# Patient Record
Sex: FEMALE | Race: WHITE | Employment: OTHER | URBAN - METROPOLITAN AREA
[De-identification: names, ages, dates, MRNs, and addresses within clinical notes are randomized per-mention and may not be internally consistent; named-entity substitution may affect disease eponyms.]

---

## 2017-04-12 ENCOUNTER — HOSPITAL ENCOUNTER (OUTPATIENT)
Dept: MAMMOGRAPHY | Age: 72
Discharge: HOME | End: 2017-04-12
Attending: FAMILY MEDICINE
Payer: MEDICARE

## 2017-04-12 DIAGNOSIS — Z12.39 SCREENING FOR BREAST CANCER: ICD-10-CM

## 2017-04-12 DIAGNOSIS — Z12.31 ENCOUNTER FOR SCREENING MAMMOGRAM FOR MALIGNANT NEOPLASM OF BREAST: ICD-10-CM

## 2017-04-12 PROCEDURE — 77067 SCR MAMMO BI INCL CAD: CPT

## 2017-04-19 ENCOUNTER — HOSPITAL ENCOUNTER (OUTPATIENT)
Dept: MAMMOGRAPHY | Age: 72
Discharge: HOME | End: 2017-04-19
Attending: FAMILY MEDICINE
Payer: MEDICARE

## 2017-04-19 DIAGNOSIS — R92.8 ABNORMAL SCREENING MAMMOGRAM: ICD-10-CM

## 2017-04-19 PROCEDURE — 77065 DX MAMMO INCL CAD UNI: CPT

## 2017-07-27 ENCOUNTER — PATIENT OUTREACH (OUTPATIENT)
Dept: CASE MANAGEMENT | Age: 72
End: 2017-07-27

## 2017-07-27 NOTE — PROGRESS NOTES
PANKAJ CM attempted outreach to patient today - RN CM will return to town and be present at work on Monday, 7/31 - PANKAJ explained to patient name, contact information, position on team, function of SW on  team and relationship with  [facility] [place] [facility] practices and ambulatory care group - patient stated that she was unaware of this referral being made by her PCP (Dr. Annie Teixeira) and was reluctant to supply any information - SW stated understanding of same and will communicate to RN CM when she returns and request outreach call be made to patient at that time - relayed this to patient and she was in agreement.

## 2017-07-31 ENCOUNTER — PATIENT OUTREACH (OUTPATIENT)
Dept: CASE MANAGEMENT | Age: 72
End: 2017-07-31

## 2017-07-31 NOTE — PROGRESS NOTES
CCM initial outreach completed. Patient declined Case Management services at present time. Patient is a patient of Dr. Los Marie with [facility]. She plans to notify the office to schedule appointment. Patient also states she is attending a 6 week course titled \"Living a Healthy Life with Chronic Health Conditions\"  offered through [facility] ([facility]). CCM contact information provided in the event she changes her mind or has questions after completion of the course. This note will not be viewable in [address]

## 2018-10-28 ENCOUNTER — HOSPITAL ENCOUNTER (EMERGENCY)
Age: 73
Discharge: HOME | End: 2018-10-28
Attending: EMERGENCY MEDICINE
Payer: MEDICARE

## 2018-10-28 VITALS
SYSTOLIC BLOOD PRESSURE: 175 MMHG | TEMPERATURE: 98 F | HEART RATE: 81 BPM | OXYGEN SATURATION: 97 % | RESPIRATION RATE: 16 BRPM | DIASTOLIC BLOOD PRESSURE: 81 MMHG

## 2018-10-28 DIAGNOSIS — I10 ESSENTIAL HYPERTENSION: ICD-10-CM

## 2018-10-28 DIAGNOSIS — L03.90 CELLULITIS, UNSPECIFIED CELLULITIS SITE: Primary | ICD-10-CM

## 2018-10-28 DIAGNOSIS — R60.9 PERIPHERAL EDEMA: ICD-10-CM

## 2018-10-28 LAB
ALBUMIN SERPL-MCNC: 3.9 G/DL (ref 3.2–4.6)
ALBUMIN/GLOB SERPL: 1 {RATIO} (ref 1.2–3.5)
ALP SERPL-CCNC: 123 U/L (ref 50–136)
ALT SERPL-CCNC: 22 U/L (ref 12–65)
ANION GAP SERPL CALC-SCNC: 9 MMOL/L (ref 7–16)
AST SERPL-CCNC: 15 U/L (ref 15–37)
BASOPHILS # BLD: 0.1 K/UL (ref 0–0.2)
BASOPHILS NFR BLD: 1 % (ref 0–2)
BILIRUB SERPL-MCNC: 0.6 MG/DL (ref 0.2–1.1)
BNP SERPL-MCNC: 114 PG/ML
BUN SERPL-MCNC: 13 MG/DL (ref 8–23)
CALCIUM SERPL-MCNC: 9.5 MG/DL (ref 8.3–10.4)
CHLORIDE SERPL-SCNC: 104 MMOL/L (ref 98–107)
CO2 SERPL-SCNC: 26 MMOL/L (ref 21–32)
CREAT SERPL-MCNC: 0.68 MG/DL (ref 0.6–1)
DIFFERENTIAL METHOD BLD: NORMAL
EOSINOPHIL # BLD: 0.3 K/UL (ref 0–0.8)
EOSINOPHIL NFR BLD: 3 % (ref 0.5–7.8)
ERYTHROCYTE [DISTWIDTH] IN BLOOD BY AUTOMATED COUNT: 14 %
GLOBULIN SER CALC-MCNC: 4 G/DL (ref 2.3–3.5)
GLUCOSE SERPL-MCNC: 107 MG/DL (ref 65–100)
HCT VFR BLD AUTO: 36.5 % (ref 35.8–46.3)
HGB BLD-MCNC: 12.4 G/DL (ref 11.7–15.4)
IMM GRANULOCYTES # BLD: 0 K/UL (ref 0–0.5)
IMM GRANULOCYTES NFR BLD AUTO: 0 % (ref 0–5)
LYMPHOCYTES # BLD: 1.7 K/UL (ref 0.5–4.6)
LYMPHOCYTES NFR BLD: 17 % (ref 13–44)
MCH RBC QN AUTO: 30.2 PG (ref 26.1–32.9)
MCHC RBC AUTO-ENTMCNC: 34 G/DL (ref 31.4–35)
MCV RBC AUTO: 89 FL (ref 79.6–97.8)
MONOCYTES # BLD: 0.8 K/UL (ref 0.1–1.3)
MONOCYTES NFR BLD: 8 % (ref 4–12)
NEUTS SEG # BLD: 7 K/UL (ref 1.7–8.2)
NEUTS SEG NFR BLD: 71 % (ref 43–78)
NRBC # BLD: 0 K/UL (ref 0–0.2)
PLATELET # BLD AUTO: 291 K/UL (ref 150–450)
PMV BLD AUTO: 10.8 FL (ref 9.4–12.3)
POTASSIUM SERPL-SCNC: 3.8 MMOL/L (ref 3.5–5.1)
PROT SERPL-MCNC: 7.9 G/DL (ref 6.3–8.2)
RBC # BLD AUTO: 4.1 M/UL (ref 4.05–5.2)
SODIUM SERPL-SCNC: 139 MMOL/L (ref 136–145)
WBC # BLD AUTO: 9.9 K/UL (ref 4.3–11.1)

## 2018-10-28 PROCEDURE — 85025 COMPLETE CBC W/AUTO DIFF WBC: CPT

## 2018-10-28 PROCEDURE — 99283 EMERGENCY DEPT VISIT LOW MDM: CPT | Performed by: EMERGENCY MEDICINE

## 2018-10-28 PROCEDURE — 80053 COMPREHEN METABOLIC PANEL: CPT

## 2018-10-28 PROCEDURE — 74011250637 HC RX REV CODE- 250/637: Performed by: EMERGENCY MEDICINE

## 2018-10-28 PROCEDURE — 83880 ASSAY OF NATRIURETIC PEPTIDE: CPT

## 2018-10-28 RX ORDER — FUROSEMIDE 20 MG/1
20 TABLET ORAL DAILY
Qty: 30 TAB | Refills: 0 | Status: SHIPPED | OUTPATIENT
Start: 2018-10-28 | End: 2018-11-14 | Stop reason: SDUPTHER

## 2018-10-28 RX ORDER — CEPHALEXIN 500 MG/1
500 CAPSULE ORAL 4 TIMES DAILY
Qty: 28 CAP | Refills: 0 | Status: SHIPPED | OUTPATIENT
Start: 2018-10-28 | End: 2018-11-04

## 2018-10-28 RX ORDER — FUROSEMIDE 40 MG/1
40 TABLET ORAL
Status: COMPLETED | OUTPATIENT
Start: 2018-10-28 | End: 2018-10-28

## 2018-10-28 RX ADMIN — FUROSEMIDE 40 MG: 40 TABLET ORAL at 14:40

## 2018-10-28 NOTE — ED PROVIDER NOTES
75-year-old female with a history of gout, hypertension, morbid obesity, polymyalgia rheumatica, Sjogrens syndrome, arthritis, cellulitis, chronic kidney disease presents with swelling to bilateral ankles that has been worsening over the past several months. She states she has never addressed this with her doctor. She is, however, on 20 mg of Lasix daily and is not sure why this was prescribed. She has taken a few extra doses recently. She states that when she takes Lasix, she usually has a gout flare. She admits to twisting her right knee about 2 weeks ago after standing up in her chair. She has had increased swelling to her right knee since that time. She states pain improved with cream and taping. She recently traveled to [facility] and swelling seemed to increase. She has been using compression stockings and elevating legs. She noticed redness to her right ankle with increased swelling 3 days ago. She has occasional chills without documented fevers. No chest pain or shortness of breath. Denies history of congestive heart failure. No urinary changes. Denies any pain with range of motion of her ankles. The history is provided by the patient. Leg Pain Pertinent negatives include no back pain. Past Medical History:  
Diagnosis Date  Arrhythmia  Arthritis  Asthma  Cellulitis  Chronic kidney disease Hx renal failure  Depression  Gout  Gouty arthritis  NOS 12/2/2015  Hypercholesterolemia  Hypertension  Mitral valve prolapse  Morbid obesity ([address]  Nausea & vomiting  Polymyalgia rheumatica ([address] 12/2/2015  Psychiatric disorder   
 depression & anxiety  PUD (peptic ulcer disease)  Sjogren's syndrome ([address]  Temporal arteritis ([address] Past Surgical History:  
Procedure Laterality Date  CARDIAC SURG PROCEDURE UNLIST  4/07 STRESS TEST  CARDIAC SURG PROCEDURE UNLIST  8/13  HEART ABLATION  
  HX COLONOSCOPY  6/08  HX ENDOSCOPY  8/08  HX HYSTERECTOMY  HX TUBAL LIGATION Family History:  
Problem Relation Age of Onset  Cancer Mother  Breast Cancer Mother 80  Hypertension Mother  Dementia Mother  Cancer Father LUNG  
 Heart Disease Father 62  Diabetes Son   
Antonio Fregoso Other Son PSYCHIATRIC ILLNESS Social History Socioeconomic History  Marital status:  Spouse name: Not on file  Number of children: Not on file  Years of education: Not on file  Highest education level: Not on file Social Needs  Financial resource strain: Not on file  Food insecurity - worry: Not on file  Food insecurity - inability: Not on file  Transportation needs - medical: Not on file  Transportation needs - non-medical: Not on file Occupational History  Not on file Tobacco Use  Smoking status: Never Smoker  Smokeless tobacco: Never Used Substance and Sexual Activity  Alcohol use: No  
 Drug use: No  
 Sexual activity: No  
Other Topics Concern   Service No  
 Blood Transfusions No  
 Caffeine Concern No  
 Occupational Exposure No  
 Hobby Hazards No  
 Sleep Concern No  
 Stress Concern Yes  Weight Concern Yes  Special Diet No  
 Back Care No  
 Exercise No  
 Bike Helmet No  
 Seat Belt Yes  Self-Exams Not Asked Social History Narrative  Not on file ALLERGIES: Celebrex [celecoxib]; Lisinopril; and Losartan Review of Systems Constitutional: Positive for chills. Negative for fever. HENT: Negative for hearing loss. Eyes: Negative for visual disturbance. Respiratory: Negative for cough and shortness of breath. Cardiovascular: Positive for leg swelling. Negative for chest pain and palpitations. Gastrointestinal: Negative for abdominal pain, diarrhea, nausea and vomiting. Musculoskeletal: Positive for joint swelling. Negative for arthralgias and back pain. Skin: Positive for color change. Negative for rash. Neurological: Negative for weakness and headaches. Psychiatric/Behavioral: Negative for confusion. Vitals:  
 10/28/18 1356 BP: 196/76 Pulse: 84 Resp: 18 Temp: 98 °F (36.7 °C) SpO2: 98% Physical Exam  
Constitutional: She appears well-developed and well-nourished. Morbid obesity HENT:  
Head: Normocephalic and atraumatic. Right Ear: External ear normal.  
Left Ear: External ear normal.  
Nose: Nose normal.  
Mouth/Throat: Oropharynx is clear and moist.  
Eyes: Conjunctivae are normal. Pupils are equal, round, and reactive to light. Neck: Normal range of motion. Neck supple. Cardiovascular: Regular rhythm, normal heart sounds and intact distal pulses. Pulmonary/Chest: Effort normal and breath sounds normal. No respiratory distress. She has no wheezes. Abdominal: Soft. Bowel sounds are normal. She exhibits no distension. There is no tenderness. Musculoskeletal: Normal range of motion. She exhibits edema. Right ankle: She exhibits swelling. She exhibits normal range of motion. No tenderness. Feet: 
 
Bilateral 3+ pitting edema, slightly worse on right. Strong palpable DP pulses Neurological: She is alert. Skin: Skin is warm and dry. Psychiatric: Judgment normal.  
Nursing note and vitals reviewed. MDM Number of Diagnoses or Management Options Diagnosis management comments: Parts of this document were created using dragon voice recognition software. The chart has been reviewed but errors may still be present. Bilateral ankle edema likely due to body habitus and venous insufficiency. Slight erythema on the right consistent with cellulitis. No pain with range of motion to suggest septic joint, gout, or traumatic injury. We'll give Lasix and antibiotic. Advised primary care follow-up. Normal kidney function.  
 
 I discussed the results of all labs, procedures, radiographs, and treatments with the patient and available family. Treatment plan is agreed upon and the patient is ready for discharge. Questions about treatment in the ED and differential diagnosis of presenting condition were answered. Patient was given verbal discharge instructions including, but not limited to, importance of returning to the emergency department for any concern of worsening or continued symptoms. Instructions were given to follow up with a primary care provider or specialist within 1-2 days. Adverse effects of medications, if prescribed, were discussed and patient was advised to refrain from significant physical activity until followed up by primary care physician and to not drive or operate heavy machinery after taking any sedating substances. Amount and/or Complexity of Data Reviewed Clinical lab tests: reviewed and ordered Tests in the medicine section of CPT®: ordered and reviewed Procedures

## 2018-10-28 NOTE — DISCHARGE INSTRUCTIONS
Cellulitis: Care Instructions  Your Care Instructions    Cellulitis is a skin infection caused by bacteria, most often strep or staph. It often occurs after a break in the skin from a scrape, cut, bite, or puncture, or after a rash. Cellulitis may be treated without doing tests to find out what caused it. But your doctor may do tests, if needed, to look for a specific bacteria, like methicillin-resistant Staphylococcus aureus (MRSA). The doctor has checked you carefully, but problems can develop later. If you notice any problems or new symptoms, get medical treatment right away. Follow-up care is a key part of your treatment and safety. Be sure to make and go to all appointments, and call your doctor if you are having problems. It's also a good idea to know your test results and keep a list of the medicines you take. How can you care for yourself at home? · Take your antibiotics as directed. Do not stop taking them just because you feel better. You need to take the full course of antibiotics. · Prop up the infected area on pillows to reduce pain and swelling. Try to keep the area above the level of your heart as often as you can. · If your doctor told you how to care for your wound, follow your doctor's instructions. If you did not get instructions, follow this general advice:  ? Wash the wound with clean water 2 times a day. Don't use hydrogen peroxide or alcohol, which can slow healing. ? You may cover the wound with a thin layer of petroleum jelly, such as Vaseline, and a nonstick bandage. ? Apply more petroleum jelly and replace the bandage as needed. · Be safe with medicines. Take pain medicines exactly as directed. ? If the doctor gave you a prescription medicine for pain, take it as prescribed. ? If you are not taking a prescription pain medicine, ask your doctor if you can take an over-the-counter medicine.   To prevent cellulitis in the future  · Try to prevent cuts, scrapes, or other injuries to your skin. Cellulitis most often occurs where there is a break in the skin. · If you get a scrape, cut, mild burn, or bite, wash the wound with clean water as soon as you can to help avoid infection. Don't use hydrogen peroxide or alcohol, which can slow healing. · If you have swelling in your legs (edema), support stockings and good skin care may help prevent leg sores and cellulitis. · Take care of your feet, especially if you have diabetes or other conditions that increase the risk of infection. Wear shoes and socks. Do not go barefoot. If you have athlete's foot or other skin problems on your feet, talk to your doctor about how to treat them. When should you call for help? Call your doctor now or seek immediate medical care if:    · You have signs that your infection is getting worse, such as:  ? Increased pain, swelling, warmth, or redness. ? Red streaks leading from the area. ? Pus draining from the area. ? A fever.     · You get a rash.    Watch closely for changes in your health, and be sure to contact your doctor if:    · You do not get better as expected. Where can you learn more? Go to http://noe-eri.info/. Amara Iqbal in the search box to learn more about \"Cellulitis: Care Instructions. \"  Current as of: April 18, 2018  Content Version: 11.8  © 1122-0382 [employer]. Care instructions adapted under license by [employer] (which disclaims liability or warranty for this information). If you have questions about a medical condition or this instruction, always ask your healthcare professional. [place] [address] any warranty or liability for your use of this information. Leg and Ankle Edema: Care Instructions  Your Care Instructions  Swelling in the legs, ankles, and feet is called edema. It is common after you sit or stand for a while. Long plane flights or car rides often cause swelling in the legs and feet.  You may also have swelling if you have to stand for long periods of time at your job. Problems with the veins in the legs (varicose veins) and changes in hormones can also cause swelling. Sometimes the swelling in the ankles and feet is caused by a more serious problem, such as heart failure, infection, blood clots, or liver or kidney disease. Follow-up care is a key part of your treatment and safety. Be sure to make and go to all appointments, and call your doctor if you are having problems. It's also a good idea to know your test results and keep a list of the medicines you take. How can you care for yourself at home? · If your doctor gave you medicine, take it as prescribed. Call your doctor if you think you are having a problem with your medicine. · Whenever you are resting, raise your legs up. Try to keep the swollen area higher than the level of your heart. · Take breaks from standing or sitting in one position. ? Walk around to increase the blood flow in your lower legs. ? Move your feet and ankles often while you stand, or tighten and relax your leg muscles. · Wear support stockings. Put them on in the morning, before swelling gets worse. · Eat a balanced diet. Lose weight if you need to. · Limit the amount of salt (sodium) in your diet. Salt holds fluid in the body and may increase swelling. When should you call for help? Call 911 anytime you think you may need emergency care. For example, call if:    · You have symptoms of a blood clot in your lung (called a pulmonary embolism). These may include:  ? Sudden chest pain. ? Trouble breathing. ? Coughing up blood.    Call your doctor now or seek immediate medical care if:    · You have signs of a blood clot, such as:  ? Pain in your calf, back of the knee, thigh, or groin. ? Redness and swelling in your leg or groin.     · You have symptoms of infection, such as:  ? Increased pain, swelling, warmth, or redness. ? Red streaks or pus. ? A fever.  Watch closely for changes in your health, and be sure to contact your doctor if:    · Your swelling is getting worse.     · You have new or worsening pain in your legs.     · You do not get better as expected. Where can you learn more? Go to http://noe-eri.info/. Enter W747 in the search box to learn more about \"Leg and Ankle Edema: Care Instructions. \"  Current as of: November 20, 2017  Content Version: 11.8  © 7328-5918 [employer]. Care instructions adapted under license by [employer] (which disclaims liability or warranty for this information). If you have questions about a medical condition or this instruction, always ask your healthcare professional. [address] any warranty or liability for your use of this information.

## 2018-10-28 NOTE — ED NOTES
I have reviewed discharge instructions with the patient and caregiver. The patient and caregiver verbalized understanding. Patient left ED via Discharge Method: ambulatory to Home with family. Opportunity for questions and clarification provided. Patient given 2 scripts. To continue your aftercare when you leave the hospital, you may receive an automated call from our care team to check in on how you are doing. This is a free service and part of our promise to provide the best care and service to meet your aftercare needs.  If you have questions, or wish to unsubscribe from this service please call [phone]. Thank you for Choosing our [facility].

## 2018-10-28 NOTE — ED TRIAGE NOTES
Patient states that she twisted her right knee last Monday and has been treating at home with taping and NSAIDs. Patient states that her ankle on the left is swollen and red. Patient states that she does have some swelling at times but this the first time that it is red. Patient ambulatory to triage with rollator.

## 2019-02-23 ENCOUNTER — APPOINTMENT (OUTPATIENT)
Dept: CT IMAGING | Age: 74
End: 2019-02-23
Attending: EMERGENCY MEDICINE
Payer: MEDICARE

## 2019-02-23 ENCOUNTER — HOSPITAL ENCOUNTER (EMERGENCY)
Age: 74
Discharge: HOME | End: 2019-02-23
Attending: EMERGENCY MEDICINE
Payer: MEDICARE

## 2019-02-23 VITALS
HEIGHT: 63 IN | HEART RATE: 86 BPM | WEIGHT: 209 LBS | RESPIRATION RATE: 18 BRPM | DIASTOLIC BLOOD PRESSURE: 83 MMHG | TEMPERATURE: 98.6 F | SYSTOLIC BLOOD PRESSURE: 178 MMHG | BODY MASS INDEX: 37.03 KG/M2 | OXYGEN SATURATION: 100 %

## 2019-02-23 DIAGNOSIS — R51.9 NONINTRACTABLE HEADACHE, UNSPECIFIED CHRONICITY PATTERN, UNSPECIFIED HEADACHE TYPE: Primary | ICD-10-CM

## 2019-02-23 LAB
ANION GAP SERPL CALC-SCNC: 9 MMOL/L (ref 7–16)
BASOPHILS # BLD: 0.1 K/UL (ref 0–0.2)
BASOPHILS NFR BLD: 1 % (ref 0–2)
BUN SERPL-MCNC: 16 MG/DL (ref 8–23)
CALCIUM SERPL-MCNC: 10.2 MG/DL (ref 8.3–10.4)
CHLORIDE SERPL-SCNC: 102 MMOL/L (ref 98–107)
CO2 SERPL-SCNC: 27 MMOL/L (ref 21–32)
CREAT SERPL-MCNC: 0.79 MG/DL (ref 0.6–1)
DIFFERENTIAL METHOD BLD: ABNORMAL
EOSINOPHIL # BLD: 0.1 K/UL (ref 0–0.8)
EOSINOPHIL NFR BLD: 1 % (ref 0.5–7.8)
ERYTHROCYTE [DISTWIDTH] IN BLOOD BY AUTOMATED COUNT: 13.4 % (ref 11.9–14.6)
ERYTHROCYTE [SEDIMENTATION RATE] IN BLOOD: 34 MM/HR (ref 0–30)
GLUCOSE SERPL-MCNC: 138 MG/DL (ref 65–100)
HCT VFR BLD AUTO: 40 % (ref 35.8–46.3)
HGB BLD-MCNC: 13.7 G/DL (ref 11.7–15.4)
IMM GRANULOCYTES # BLD AUTO: 0 K/UL (ref 0–0.5)
IMM GRANULOCYTES NFR BLD AUTO: 0 % (ref 0–5)
LYMPHOCYTES # BLD: 1.9 K/UL (ref 0.5–4.6)
LYMPHOCYTES NFR BLD: 17 % (ref 13–44)
MCH RBC QN AUTO: 30.7 PG (ref 26.1–32.9)
MCHC RBC AUTO-ENTMCNC: 34.3 G/DL (ref 31.4–35)
MCV RBC AUTO: 89.7 FL (ref 79.6–97.8)
MONOCYTES # BLD: 0.7 K/UL (ref 0.1–1.3)
MONOCYTES NFR BLD: 6 % (ref 4–12)
NEUTS SEG # BLD: 8.3 K/UL (ref 1.7–8.2)
NEUTS SEG NFR BLD: 75 % (ref 43–78)
NRBC # BLD: 0 K/UL (ref 0–0.2)
PLATELET # BLD AUTO: 343 K/UL (ref 150–450)
PMV BLD AUTO: 10.8 FL (ref 9.4–12.3)
POTASSIUM SERPL-SCNC: 3.6 MMOL/L (ref 3.5–5.1)
RBC # BLD AUTO: 4.46 M/UL (ref 4.05–5.2)
SODIUM SERPL-SCNC: 138 MMOL/L (ref 136–145)
WBC # BLD AUTO: 11 K/UL (ref 4.3–11.1)

## 2019-02-23 PROCEDURE — 70450 CT HEAD/BRAIN W/O DYE: CPT

## 2019-02-23 PROCEDURE — 85025 COMPLETE CBC W/AUTO DIFF WBC: CPT

## 2019-02-23 PROCEDURE — 99284 EMERGENCY DEPT VISIT MOD MDM: CPT | Performed by: EMERGENCY MEDICINE

## 2019-02-23 PROCEDURE — 85652 RBC SED RATE AUTOMATED: CPT

## 2019-02-23 PROCEDURE — 74011250637 HC RX REV CODE- 250/637: Performed by: EMERGENCY MEDICINE

## 2019-02-23 PROCEDURE — 96374 THER/PROPH/DIAG INJ IV PUSH: CPT | Performed by: EMERGENCY MEDICINE

## 2019-02-23 PROCEDURE — 74011250636 HC RX REV CODE- 250/636: Performed by: EMERGENCY MEDICINE

## 2019-02-23 PROCEDURE — 80048 BASIC METABOLIC PNL TOTAL CA: CPT

## 2019-02-23 RX ORDER — ACETAMINOPHEN 500 MG
1000 TABLET ORAL
Status: COMPLETED | OUTPATIENT
Start: 2019-02-23 | End: 2019-02-23

## 2019-02-23 RX ORDER — ONDANSETRON 2 MG/ML
4 INJECTION INTRAMUSCULAR; INTRAVENOUS
Status: COMPLETED | OUTPATIENT
Start: 2019-02-23 | End: 2019-02-23

## 2019-02-23 RX ADMIN — ONDANSETRON 4 MG: 2 INJECTION INTRAMUSCULAR; INTRAVENOUS at 14:33

## 2019-02-23 RX ADMIN — ACETAMINOPHEN 1000 MG: 500 TABLET, FILM COATED ORAL at 14:59

## 2019-02-23 NOTE — ED TRIAGE NOTES
Pt started new medication (nifedipine) this morning. Less than 1 hour after taking medication she began having headache around both eyes. Had retinal hemorrhage a few years ago and lost vision in left eye. Denies blurry vision. Pt states she was told a side effect of the medication is headache.

## 2019-02-23 NOTE — DISCHARGE INSTRUCTIONS
Follow-up primary care physician on Monday in regards to restarting your prednisone. Follow-up with ophthalmology on Monday as well. .  Return to ER if symptoms worsen or progress in any way. Headache: Care Instructions  Your Care Instructions    Headaches have many possible causes. Most headaches aren't a sign of a more serious problem, and they will get better on their own. Home treatment may help you feel better faster. The doctor has checked you carefully, but problems can develop later. If you notice any problems or new symptoms, get medical treatment right away. Follow-up care is a key part of your treatment and safety. Be sure to make and go to all appointments, and call your doctor if you are having problems. It's also a good idea to know your test results and keep a list of the medicines you take. How can you care for yourself at home? · Do not drive if you have taken a prescription pain medicine. · Rest in a quiet, dark room until your headache is gone. Close your eyes and try to relax or go to sleep. Don't watch TV or read. · Put a cold, moist cloth or cold pack on the painful area for 10 to 20 minutes at a time. Put a thin cloth between the cold pack and your skin. · Use a warm, moist towel or a heating pad set on low to relax tight shoulder and neck muscles. · Have someone gently massage your neck and shoulders. · Take pain medicines exactly as directed. ? If the doctor gave you a prescription medicine for pain, take it as prescribed. ? If you are not taking a prescription pain medicine, ask your doctor if you can take an over-the-counter medicine. · Be careful not to take pain medicine more often than the instructions allow, because you may get worse or more frequent headaches when the medicine wears off. · Do not ignore new symptoms that occur with a headache, such as a fever, weakness or numbness, vision changes, or confusion. These may be signs of a more serious problem.   To prevent headaches  · Keep a headache diary so you can figure out what triggers your headaches. Avoiding triggers may help you prevent headaches. Record when each headache began, how long it lasted, and what the pain was like (throbbing, aching, stabbing, or dull). Write down any other symptoms you had with the headache, such as nausea, flashing lights or dark spots, or sensitivity to bright light or loud noise. Note if the headache occurred near your period. List anything that might have triggered the headache, such as certain foods (chocolate, cheese, wine) or odors, smoke, bright light, stress, or lack of sleep. · Find healthy ways to deal with stress. Headaches are most common during or right after stressful times. Take time to relax before and after you do something that has caused a headache in the past.  · Try to keep your muscles relaxed by keeping good posture. Check your jaw, face, neck, and shoulder muscles for tension, and try relaxing them. When sitting at a desk, change positions often, and stretch for 30 seconds each hour. · Get plenty of sleep and exercise. · Eat regularly and well. Long periods without food can trigger a headache. · Treat yourself to a massage. Some people find that regular massages are very helpful in relieving tension. · Limit caffeine by not drinking too much coffee, tea, or soda. But don't quit caffeine suddenly, because that can also give you headaches. · Reduce eyestrain from computers by blinking frequently and looking away from the computer screen every so often. Make sure you have proper eyewear and that your monitor is set up properly, about an arm's length away. · Seek help if you have depression or anxiety. Your headaches may be linked to these conditions. Treatment can both prevent headaches and help with symptoms of anxiety or depression. When should you call for help? Call 911 anytime you think you may need emergency care.  For example, call if:    · You have signs of a stroke. These may include:  ? Sudden numbness, paralysis, or weakness in your face, arm, or leg, especially on only one side of your body. ? Sudden vision changes. ? Sudden trouble speaking. ? Sudden confusion or trouble understanding simple statements. ? Sudden problems with walking or balance. ? A sudden, severe headache that is different from past headaches.    Call your doctor now or seek immediate medical care if:    · You have a new or worse headache.     · Your headache gets much worse. Where can you learn more? Go to http://noe-eri.info/. Enter M271 in the search box to learn more about \"Headache: Care Instructions. \"  Current as of: Tonia 3, 2018  Content Version: 11.9  © 1592-1393 [employer]. Care instructions adapted under license by [employer] (which disclaims liability or warranty for this information). If you have questions about a medical condition or this instruction, always ask your healthcare professional. [place] [address] any warranty or liability for your use of this information. Patient Education        Head or Face Pain: Care Instructions  Your Care Instructions    Common causes of head or face pain are allergies, stress, and injuries. Other causes include tooth problems and sinus infections. Eating certain foods, such as chocolate or cheese, or drinking certain liquids, such as coffee or cola, can cause head pain for some people. If you have mild head pain, you may not need treatment. It is important to watch your symptoms and talk to your doctor if your pain continues or gets worse. Follow-up care is a key part of your treatment and safety. Be sure to make and go to all appointments, and call your doctor if you are having problems. It's also a good idea to know your test results and keep a list of the medicines you take. How can you care for yourself at home?   · Take pain medicines exactly as directed. ? If the doctor gave you a prescription medicine for pain, take it as prescribed. ? If you are not taking a prescription pain medicine, ask your doctor if you can take an over-the-counter pain medicine. · Take it easy for the next few days or longer if you are not feeling well. · Use a warm, moist towel or heating pad set on low to relax tight muscles in your shoulder and neck. Have someone gently massage your neck and shoulders. · Put ice or a cold pack on the area for 10 to 20 minutes at a time. Put a thin cloth between the ice and your skin. When should you call for help? Call 911 anytime you think you may need emergency care. For example, call if:    · You have twitching, jerking, or a seizure.     · You passed out (lost consciousness).     · You have symptoms of a stroke. These may include:  ? Sudden numbness, tingling, weakness, or loss of movement in your face, arm, or leg, especially on only one side of your body. ? Sudden vision changes. ? Sudden trouble speaking. ? Sudden confusion or trouble understanding simple statements. ? Sudden problems with walking or balance. ? A sudden, severe headache that is different from past headaches.     · You have jaw pain and pain in your chest, shoulder, neck, or arm.    Call your doctor now or seek immediate medical care if:    · You have a fever with a stiff neck or a severe headache.     · You have nausea and vomiting, or you cannot keep food or liquids down.    Watch closely for changes in your health, and be sure to contact your doctor if:    · Your head or face pain does not get better as expected. Where can you learn more? Go to http://noe-eri.info/. Enter P568 in the search box to learn more about \"Head or Face Pain: Care Instructions. \"  Current as of: September 23, 2018  Content Version: 11.9  © 4039-5220 [employer].  Care instructions adapted under license by [employer] (which disclaims liability or warranty for this information). If you have questions about a medical condition or this instruction, always ask your healthcare professional. [place] [address] any warranty or liability for your use of this information.

## 2019-02-23 NOTE — ED PROVIDER NOTES
27-year-old female with history of hypertension, temporal arteritis presents with complaint of worsening bilateral headache since this morning. States that it occurred less than one hour after taking new medication she was started on for her blood pressure. States she was started on nifedipine this morning. Denies vision loss, nausea, vomiting, fever, chills, blurred vision, dysarthria, focal weakness, numbness, tingling, chest pain, shortness of breath, headache, neck pain, trouble walking. Denies any recent trauma or injury. States she is previously on prednisone in the past.  States she was diagnosed with temporal arteritis 11 years ago. States she never underwent temporal artery biopsy. States she was instructed by her primary care doctor to stop taking prednisone around a year ago. States she is not currently on any steroid. The history is provided by the patient. No  was used. Headache This is a new problem. The current episode started 3 to 5 hours ago. The problem occurs constantly. The problem has not changed since onset. The pain is located in the bilateral and temporal region. The quality of the pain is described as throbbing. The pain is at a severity of 3/10. The pain is mild. Pertinent negatives include no anorexia, no fever, no malaise/fatigue, no chest pressure, no near-syncope, no orthopnea, no palpitations, no syncope, no shortness of breath, no weakness, no dizziness, no visual change, no nausea and no vomiting. She has tried nothing for the symptoms. The treatment provided no relief. Past Medical History:  
Diagnosis Date  Arrhythmia  Arthritis  Asthma  Cellulitis  Chronic kidney disease Hx renal failure  Depression  Gout  Gouty arthritis  NOS 12/2/2015  Hypercholesterolemia  Hypertension  Mitral valve prolapse  Morbid obesity ([address]  Nausea & vomiting  Polymyalgia rheumatica [facility] [facility] 12/2/2015  Psychiatric disorder   
 depression & anxiety  PUD (peptic ulcer disease)  Sjogren's syndrome [facility]  Temporal arteritis ([facility] Past Surgical History:  
Procedure Laterality Date  CARDIAC SURG PROCEDURE UNLIST  4/07 STRESS TEST  CARDIAC SURG PROCEDURE UNLIST  8/13 HEART ABLATION  
 HX COLONOSCOPY  6/08  HX ENDOSCOPY  8/08  HX HYSTERECTOMY  HX TUBAL LIGATION Family History:  
Problem Relation Age of Onset  Cancer Mother  Breast Cancer Mother 80  Hypertension Mother  Dementia Mother  Cancer Father LUNG  
 Heart Disease Father 62  Diabetes Son   
[address] Other Son PSYCHIATRIC ILLNESS Social History Socioeconomic History  Marital status:  Spouse name: Not on file  Number of children: Not on file  Years of education: Not on file  Highest education level: Not on file Social Needs  Financial resource strain: Not on file  Food insecurity - worry: Not on file  Food insecurity - inability: Not on file  Transportation needs - medical: Not on file  Transportation needs - non-medical: Not on file Occupational History  Not on file Tobacco Use  Smoking status: Never Smoker  Smokeless tobacco: Never Used Substance and Sexual Activity  Alcohol use: No  
 Drug use: No  
 Sexual activity: No  
Other Topics Concern   Service No  
 Blood Transfusions No  
 Caffeine Concern No  
 Occupational Exposure No  
 Hobby Hazards No  
 Sleep Concern No  
 Stress Concern Yes  Weight Concern Yes  Special Diet No  
 Back Care No  
 Exercise No  
 Bike Helmet No  
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, 12 great grandchildren ALLERGIES: Celebrex [celecoxib]; Lisinopril; and Losartan Review of Systems Constitutional: Negative for chills, fatigue, fever and malaise/fatigue. Eyes: Negative for visual disturbance. Respiratory: Negative for cough and shortness of breath. Cardiovascular: Negative for chest pain, palpitations, orthopnea, syncope and near-syncope. Gastrointestinal: Negative for abdominal pain, anorexia, nausea and vomiting. Genitourinary: Negative for dysuria and flank pain. Musculoskeletal: Negative for myalgias and neck pain. Skin: Negative for rash. Neurological: Positive for headaches. Negative for dizziness, seizures, speech difficulty, weakness, light-headedness and numbness. Psychiatric/Behavioral: Negative for confusion. Vitals:  
 02/23/19 1245 BP: 184/90 Pulse: 88 Resp: 18 Temp: 97.8 °F (36.6 °C) SpO2: 99% Weight: 94.8 kg (209 lb) Height: 5' 2.5\" (1.588 m) Physical Exam  
Constitutional: She is oriented to person, place, and time. She appears well-developed and well-nourished. Well appearing and in NAD. HENT:  
Head: Normocephalic. MMM. No tonsillar erythema or exudate. Bilateral temporal artery tenderness to palpation. Eyes: EOM are normal. Pupils are equal, round, and reactive to light. No nystagmus. Neck: No JVD present. No tracheal deviation present. FROM. No nuchal rigidity. Cardiovascular: Normal rate, regular rhythm and normal heart sounds. RRR. Pulses 2+ and equal bilaterally. Pulmonary/Chest:  
CTAB. Abdominal:  
Soft, NTND. Musculoskeletal: Normal range of motion. FROM of all extremities. Neurological: She is alert and oriented to person, place, and time. No cranial nerve deficit. Strength 5/5 throughout. Normal sensory. No dysarthria. No drift. No meningismus. No focal neuro deficits present. Skin: Skin is warm and dry. No rash. Nursing note and vitals reviewed. MDM Number of Diagnoses or Management Options Nonintractable headache, unspecified chronicity pattern, unspecified headache type: new and requires workup Diagnosis management comments: Repeat vital signs stable. CT of acute findings. ESR is slightly elevated. Remainder of labs normal. 
Ophthalmology consulted. See ED course section for recommendations. Instructed patient on need for close follow-up with PCP on tomorrow as well as follow-up with ophthalmology. Differential return precautions. Visual acuity 20/30 bilaterally. No visual complaints. Headache resolved. Neuro exam normal.  No focal deficits. Amount and/or Complexity of Data Reviewed Clinical lab tests: ordered and reviewed Tests in the radiology section of CPT®: ordered and reviewed Tests in the medicine section of CPT®: ordered and reviewed Review and summarize past medical records: yes Independent visualization of images, tracings, or specimens: yes Risk of Complications, Morbidity, and/or Mortality Presenting problems: moderate Diagnostic procedures: moderate Management options: moderate Patient Progress Patient progress: stable ED Course as of Feb 23 1536 Sat Feb 23, 2019  
1406 CT head IMPRESSION: No acute intracranial abnormality. [DF] 1506 Visual acuity 20/30 bilaterally. Denies vision disturbance, diplopia. [DF] 200 Ophthalmology Lcuian Renata) states not overwhelmingly concerned for temporal arteritis. Recommends patient follow-up w/ internist in Monday. States not to start Prednisone at this time and have internist manage. [DF] ED Course User Index 
[DF] Delmer Byrd MD  
 
 
Procedures Results Include: 
 
Recent Results (from the past 24 hour(s)) CBC WITH AUTOMATED DIFF Collection Time: 02/23/19  1:28 PM  
Result Value Ref Range WBC 11.0 4.3 - 11.1 K/uL  
 RBC 4.46 4.05 - 5.2 M/uL  
 HGB 13.7 11.7 - 15.4 g/dL HCT 40.0 35.8 - 46.3 % MCV 89.7 79.6 - 97.8 FL  
 MCH 30.7 26.1 - 32.9 PG  
 MCHC 34.3 31.4 - 35.0 g/dL  
 RDW 13.4 11.9 - 14.6 % PLATELET 831 402 - 274 K/uL MPV 10.8 9.4 - 12.3 FL ABSOLUTE NRBC 0.00 0.0 - 0.2 K/uL  
 DF AUTOMATED NEUTROPHILS 75 43 - 78 % LYMPHOCYTES 17 13 - 44 % MONOCYTES 6 4.0 - 12.0 % EOSINOPHILS 1 0.5 - 7.8 % BASOPHILS 1 0.0 - 2.0 % IMMATURE GRANULOCYTES 0 0.0 - 5.0 %  
 ABS. NEUTROPHILS 8.3 (H) 1.7 - 8.2 K/UL  
 ABS. LYMPHOCYTES 1.9 0.5 - 4.6 K/UL  
 ABS. MONOCYTES 0.7 0.1 - 1.3 K/UL  
 ABS. EOSINOPHILS 0.1 0.0 - 0.8 K/UL  
 ABS. BASOPHILS 0.1 0.0 - 0.2 K/UL  
 ABS. IMM. GRANS. 0.0 0.0 - 0.5 K/UL METABOLIC PANEL, BASIC Collection Time: 02/23/19  1:28 PM  
Result Value Ref Range Sodium 138 136 - 145 mmol/L Potassium 3.6 3.5 - 5.1 mmol/L Chloride 102 98 - 107 mmol/L  
 CO2 27 21 - 32 mmol/L Anion gap 9 7 - 16 mmol/L Glucose 138 (H) 65 - 100 mg/dL BUN 16 8 - 23 MG/DL Creatinine 0.79 0.6 - 1.0 MG/DL  
 GFR est AA >60 >60 ml/min/1.73m2 GFR est non-AA >60 >60 ml/min/1.73m2 Calcium 10.2 8.3 - 10.4 MG/DL Elan Perry MD; 2/23/2019 @2:08 PM Voice dictation software was used during the making of this note. This software is not perfect and grammatical and other typographical errors may be present.   This note has not been proofread for errors. 
===================================================================

## 2019-02-23 NOTE — ED NOTES

## 2019-03-18 ENCOUNTER — HOSPITAL ENCOUNTER (OUTPATIENT)
Dept: ULTRASOUND IMAGING | Age: 74
Discharge: HOME | End: 2019-03-18
Attending: NURSE PRACTITIONER

## 2019-03-18 DIAGNOSIS — I10 RESISTANT HYPERTENSION: ICD-10-CM

## 2019-07-03 ENCOUNTER — HOSPITAL ENCOUNTER (INPATIENT)
Age: 74
LOS: 12 days | Discharge: NURSING, REHAB OR LONG-TERM CARE | DRG: 853 | End: 2019-07-15
Admitting: INTERNAL MEDICINE
Payer: MEDICARE

## 2019-07-03 ENCOUNTER — APPOINTMENT (OUTPATIENT)
Dept: GENERAL RADIOLOGY | Age: 74
DRG: 853 | End: 2019-07-03
Payer: MEDICARE

## 2019-07-03 DIAGNOSIS — M35.3 POLYMYALGIA RHEUMATICA (HCC): ICD-10-CM

## 2019-07-03 DIAGNOSIS — I95.89 HYPOTENSION DUE TO HYPOVOLEMIA: ICD-10-CM

## 2019-07-03 DIAGNOSIS — E66.01 SEVERE OBESITY WITH BODY MASS INDEX (BMI) OF 35.0 TO 39.9 WITH COMORBIDITY (HCC): ICD-10-CM

## 2019-07-03 DIAGNOSIS — J96.01 ACUTE HYPOXEMIC RESPIRATORY FAILURE (HCC): ICD-10-CM

## 2019-07-03 DIAGNOSIS — I48.91 ATRIAL FIBRILLATION WITH RAPID VENTRICULAR RESPONSE (HCC): ICD-10-CM

## 2019-07-03 DIAGNOSIS — R65.20 SEVERE SEPSIS (HCC): ICD-10-CM

## 2019-07-03 DIAGNOSIS — M35.01 SJOGREN'S SYNDROME WITH KERATOCONJUNCTIVITIS SICCA (HCC): ICD-10-CM

## 2019-07-03 DIAGNOSIS — E86.1 HYPOTENSION DUE TO HYPOVOLEMIA: ICD-10-CM

## 2019-07-03 DIAGNOSIS — J96.01 ACUTE RESPIRATORY FAILURE WITH HYPOXIA (HCC): Primary | ICD-10-CM

## 2019-07-03 DIAGNOSIS — A41.9 SEVERE SEPSIS (HCC): ICD-10-CM

## 2019-07-03 DIAGNOSIS — M1A.9XX0 CHRONIC GOUT WITHOUT TOPHUS, UNSPECIFIED CAUSE, UNSPECIFIED SITE: ICD-10-CM

## 2019-07-03 DIAGNOSIS — R91.8 PULMONARY INFILTRATES: ICD-10-CM

## 2019-07-03 LAB
ALBUMIN SERPL-MCNC: 3.6 G/DL (ref 3.2–4.6)
ALBUMIN/GLOB SERPL: 1 {RATIO} (ref 1.2–3.5)
ALP SERPL-CCNC: 112 U/L (ref 50–136)
ALT SERPL-CCNC: 77 U/L (ref 12–65)
AMORPH CRY URNS QL MICRO: ABNORMAL
ANION GAP SERPL CALC-SCNC: 11 MMOL/L (ref 7–16)
APPEARANCE UR: CLEAR
ARTERIAL PATENCY WRIST A: YES
AST SERPL-CCNC: 38 U/L (ref 15–37)
ATRIAL RATE: 375 BPM
BACTERIA URNS QL MICRO: 0 /HPF
BASE DEFICIT BLD-SCNC: 4 MMOL/L
BASOPHILS # BLD: 0.2 K/UL (ref 0–0.2)
BASOPHILS NFR BLD: 1 % (ref 0–2)
BASOPHILS NFR FLD: 1 %
BDY SITE: ABNORMAL
BILIRUB SERPL-MCNC: 0.9 MG/DL (ref 0.2–1.1)
BILIRUB UR QL: NEGATIVE
BNP SERPL-MCNC: 981 PG/ML
BODY TEMPERATURE: 98.6
BRONCH. LAVAGE DIFF.,BR: NORMAL
BUN SERPL-MCNC: 22 MG/DL (ref 8–23)
CALCIUM SERPL-MCNC: 9.3 MG/DL (ref 8.3–10.4)
CALCULATED R AXIS, ECG10: 32 DEGREES
CALCULATED T AXIS, ECG11: 148 DEGREES
CASTS URNS QL MICRO: ABNORMAL /LPF
CHLORIDE SERPL-SCNC: 107 MMOL/L (ref 98–107)
CO2 BLD-SCNC: 24 MMOL/L
CO2 SERPL-SCNC: 24 MMOL/L (ref 21–32)
COLLECT TIME,HTIME: 344
COLOR UR: YELLOW
CORTIS BS SERPL-MCNC: 30.4 UG/DL
CREAT SERPL-MCNC: 1.02 MG/DL (ref 0.6–1)
CRYSTALS URNS QL MICRO: 0 /LPF
DIAGNOSIS, 93000: NORMAL
DIFFERENTIAL METHOD BLD: ABNORMAL
EOSINOPHIL # BLD: 0.2 K/UL (ref 0–0.8)
EOSINOPHIL NFR BLD: 1 % (ref 0.5–7.8)
EOSINOPHIL NFR BRONCH MANUAL: 0 %
EPI CELLS #/AREA URNS HPF: 0 /HPF
ERYTHROCYTE [DISTWIDTH] IN BLOOD BY AUTOMATED COUNT: 15.5 % (ref 11.9–14.6)
ERYTHROCYTE [SEDIMENTATION RATE] IN BLOOD: 12 MM/HR (ref 0–30)
EXHALED MINUTE VOLUME, VE: 12 L/MIN
GAS FLOW.O2 O2 DELIVERY SYS: ABNORMAL L/MIN
GAS FLOW.O2 SETTING OXYMISER: 20 BPM
GLOBULIN SER CALC-MCNC: 3.6 G/DL (ref 2.3–3.5)
GLUCOSE SERPL-MCNC: 255 MG/DL (ref 65–100)
GLUCOSE UR STRIP.AUTO-MCNC: NEGATIVE MG/DL
HCO3 BLD-SCNC: 22.6 MMOL/L (ref 22–26)
HCT VFR BLD AUTO: 44.8 % (ref 35.8–46.3)
HGB BLD-MCNC: 14.5 G/DL (ref 11.7–15.4)
HGB UR QL STRIP: ABNORMAL
IMM GRANULOCYTES # BLD AUTO: 0.2 K/UL (ref 0–0.5)
IMM GRANULOCYTES NFR BLD AUTO: 1 % (ref 0–5)
KETONES UR QL STRIP.AUTO: NEGATIVE MG/DL
LACTATE BLD-SCNC: 1.69 MMOL/L (ref 0.5–1.9)
LACTATE BLD-SCNC: 3.45 MMOL/L (ref 0.5–1.9)
LEUKOCYTE ESTERASE UR QL STRIP.AUTO: NEGATIVE
LYMPHOCYTES # BLD: 3.8 K/UL (ref 0.5–4.6)
LYMPHOCYTES NFR BLD: 13 % (ref 13–44)
LYMPHOCYTES NFR BRONCH MANUAL: 48 %
MACROPHAGES NFR BRONCH MANUAL: 13 %
MCH RBC QN AUTO: 31.3 PG (ref 26.1–32.9)
MCHC RBC AUTO-ENTMCNC: 32.4 G/DL (ref 31.4–35)
MCV RBC AUTO: 96.6 FL (ref 79.6–97.8)
MONOCYTES # BLD: 1.9 K/UL (ref 0.1–1.3)
MONOCYTES NFR BLD: 7 % (ref 4–12)
MUCOUS THREADS URNS QL MICRO: 0 /LPF
NEUTROPHILS NFR BRONCH MANUAL: 38 %
NEUTS SEG # BLD: 22.2 K/UL (ref 1.7–8.2)
NEUTS SEG NFR BLD: 78 % (ref 43–78)
NITRITE UR QL STRIP.AUTO: NEGATIVE
NRBC # BLD: 0 K/UL (ref 0–0.2)
O2/TOTAL GAS SETTING VFR VENT: 60 %
OTHER OBSERVATIONS,UCOM: ABNORMAL
PCO2 BLD: 45.8 MMHG (ref 35–45)
PEEP RESPIRATORY: 8 CMH2O
PH BLD: 7.3 [PH] (ref 7.35–7.45)
PH UR STRIP: 6 [PH] (ref 5–9)
PLATELET # BLD AUTO: 283 K/UL (ref 150–450)
PMV BLD AUTO: 12.3 FL (ref 9.4–12.3)
PO2 BLD: 84 MMHG (ref 75–100)
POTASSIUM SERPL-SCNC: 4.3 MMOL/L (ref 3.5–5.1)
PROCALCITONIN SERPL-MCNC: <0.1 NG/ML
PROT SERPL-MCNC: 7.2 G/DL (ref 6.3–8.2)
PROT UR STRIP-MCNC: 100 MG/DL
Q-T INTERVAL, ECG07: 278 MS
QRS DURATION, ECG06: 82 MS
QTC CALCULATION (BEZET), ECG08: 421 MS
RBC # BLD AUTO: 4.64 M/UL (ref 4.05–5.2)
RBC #/AREA URNS HPF: ABNORMAL /HPF
SAO2 % BLD: 95 % (ref 95–98)
SERVICE CMNT-IMP: ABNORMAL
SODIUM SERPL-SCNC: 142 MMOL/L (ref 136–145)
SP GR UR REFRACTOMETRY: 1.02 (ref 1–1.02)
SPECIMEN TYPE: ABNORMAL
TROPONIN I BLD-MCNC: 0.02 NG/ML (ref 0.02–0.05)
TROPONIN I SERPL-MCNC: <0.02 NG/ML (ref 0.02–0.05)
TROPONIN I SERPL-MCNC: <0.02 NG/ML (ref 0.02–0.05)
URATE SERPL-MCNC: 7.2 MG/DL (ref 2.6–6)
UROBILINOGEN UR QL STRIP.AUTO: 0.2 EU/DL (ref 0.2–1)
VENTILATION MODE VENT: ABNORMAL
VENTRICULAR RATE, ECG03: 138 BPM
VT SETTING VENT: 450 ML
WBC # BLD AUTO: 28.5 K/UL (ref 4.3–11.1)
WBC URNS QL MICRO: ABNORMAL /HPF

## 2019-07-03 PROCEDURE — 83605 ASSAY OF LACTIC ACID: CPT

## 2019-07-03 PROCEDURE — 87116 MYCOBACTERIA CULTURE: CPT

## 2019-07-03 PROCEDURE — 84145 PROCALCITONIN (PCT): CPT

## 2019-07-03 PROCEDURE — 87254 VIRUS INOCULATION SHELL VIA: CPT

## 2019-07-03 PROCEDURE — 80053 COMPREHEN METABOLIC PANEL: CPT

## 2019-07-03 PROCEDURE — 74011250636 HC RX REV CODE- 250/636: Performed by: INTERNAL MEDICINE

## 2019-07-03 PROCEDURE — 88112 CYTOPATH CELL ENHANCE TECH: CPT

## 2019-07-03 PROCEDURE — 96368 THER/DIAG CONCURRENT INF: CPT

## 2019-07-03 PROCEDURE — 74011000258 HC RX REV CODE- 258: Performed by: INTERNAL MEDICINE

## 2019-07-03 PROCEDURE — 87633 RESP VIRUS 12-25 TARGETS: CPT

## 2019-07-03 PROCEDURE — 36415 COLL VENOUS BLD VENIPUNCTURE: CPT

## 2019-07-03 PROCEDURE — 02HV33Z INSERTION OF INFUSION DEVICE INTO SUPERIOR VENA CAVA, PERCUTANEOUS APPROACH: ICD-10-PCS

## 2019-07-03 PROCEDURE — 87486 CHLMYD PNEUM DNA AMP PROBE: CPT

## 2019-07-03 PROCEDURE — 31500 INSERT EMERGENCY AIRWAY: CPT

## 2019-07-03 PROCEDURE — 81015 MICROSCOPIC EXAM OF URINE: CPT

## 2019-07-03 PROCEDURE — 84484 ASSAY OF TROPONIN QUANT: CPT

## 2019-07-03 PROCEDURE — 86356 MONONUCLEAR CELL ANTIGEN: CPT

## 2019-07-03 PROCEDURE — 81003 URINALYSIS AUTO W/O SCOPE: CPT

## 2019-07-03 PROCEDURE — 87070 CULTURE OTHR SPECIMN AEROBIC: CPT

## 2019-07-03 PROCEDURE — 87305 ASPERGILLUS AG IA: CPT

## 2019-07-03 PROCEDURE — 96366 THER/PROPH/DIAG IV INF ADDON: CPT

## 2019-07-03 PROCEDURE — 82803 BLOOD GASES ANY COMBINATION: CPT

## 2019-07-03 PROCEDURE — 65610000001 HC ROOM ICU GENERAL

## 2019-07-03 PROCEDURE — 94002 VENT MGMT INPAT INIT DAY: CPT

## 2019-07-03 PROCEDURE — 74011250636 HC RX REV CODE- 250/636

## 2019-07-03 PROCEDURE — 87076 CULTURE ANAEROBE IDENT EACH: CPT

## 2019-07-03 PROCEDURE — 82533 TOTAL CORTISOL: CPT

## 2019-07-03 PROCEDURE — 94640 AIRWAY INHALATION TREATMENT: CPT

## 2019-07-03 PROCEDURE — 85025 COMPLETE CBC W/AUTO DIFF WBC: CPT

## 2019-07-03 PROCEDURE — C1751 CATH, INF, PER/CENT/MIDLINE: HCPCS

## 2019-07-03 PROCEDURE — 93005 ELECTROCARDIOGRAM TRACING: CPT

## 2019-07-03 PROCEDURE — 75810000455 HC PLCMT CENT VENOUS CATH LVL 2 5182

## 2019-07-03 PROCEDURE — 31624 DX BRONCHOSCOPE/LAVAGE: CPT | Performed by: INTERNAL MEDICINE

## 2019-07-03 PROCEDURE — 77030018836 HC SOL IRR NACL ICUM -A

## 2019-07-03 PROCEDURE — 96375 TX/PRO/DX INJ NEW DRUG ADDON: CPT

## 2019-07-03 PROCEDURE — 77030012699 HC VLV SUC CNTRL OCOA -A: Performed by: INTERNAL MEDICINE

## 2019-07-03 PROCEDURE — 87077 CULTURE AEROBIC IDENTIFY: CPT

## 2019-07-03 PROCEDURE — 84550 ASSAY OF BLOOD/URIC ACID: CPT

## 2019-07-03 PROCEDURE — 74011000258 HC RX REV CODE- 258

## 2019-07-03 PROCEDURE — 99285 EMERGENCY DEPT VISIT HI MDM: CPT

## 2019-07-03 PROCEDURE — 87040 BLOOD CULTURE FOR BACTERIA: CPT

## 2019-07-03 PROCEDURE — 74011000250 HC RX REV CODE- 250: Performed by: INTERNAL MEDICINE

## 2019-07-03 PROCEDURE — 74011000250 HC RX REV CODE- 250

## 2019-07-03 PROCEDURE — 71045 X-RAY EXAM CHEST 1 VIEW: CPT

## 2019-07-03 PROCEDURE — 85652 RBC SED RATE AUTOMATED: CPT

## 2019-07-03 PROCEDURE — 87075 CULTR BACTERIA EXCEPT BLOOD: CPT

## 2019-07-03 PROCEDURE — 87153 DNA/RNA SEQUENCING: CPT

## 2019-07-03 PROCEDURE — 0B9H8ZX DRAINAGE OF LUNG LINGULA, VIA NATURAL OR ARTIFICIAL OPENING ENDOSCOPIC, DIAGNOSTIC: ICD-10-PCS | Performed by: INTERNAL MEDICINE

## 2019-07-03 PROCEDURE — 5A1945Z RESPIRATORY VENTILATION, 24-96 CONSECUTIVE HOURS: ICD-10-PCS

## 2019-07-03 PROCEDURE — 87252 VIRUS INOCULATION TISSUE: CPT

## 2019-07-03 PROCEDURE — 87102 FUNGUS ISOLATION CULTURE: CPT

## 2019-07-03 PROCEDURE — 83880 ASSAY OF NATRIURETIC PEPTIDE: CPT

## 2019-07-03 PROCEDURE — 36600 WITHDRAWAL OF ARTERIAL BLOOD: CPT

## 2019-07-03 PROCEDURE — 76040000025: Performed by: INTERNAL MEDICINE

## 2019-07-03 PROCEDURE — 77030019605

## 2019-07-03 PROCEDURE — 99291 CRITICAL CARE FIRST HOUR: CPT | Performed by: INTERNAL MEDICINE

## 2019-07-03 PROCEDURE — 77030020263 HC SOL INJ SOD CL0.9% LFCR 1000ML

## 2019-07-03 PROCEDURE — 96365 THER/PROPH/DIAG IV INF INIT: CPT

## 2019-07-03 PROCEDURE — 0BH17EZ INSERTION OF ENDOTRACHEAL AIRWAY INTO TRACHEA, VIA NATURAL OR ARTIFICIAL OPENING: ICD-10-PCS

## 2019-07-03 PROCEDURE — 89051 BODY FLUID CELL COUNT: CPT

## 2019-07-03 PROCEDURE — 87276 INFLUENZA A AG IF: CPT

## 2019-07-03 PROCEDURE — 75810000304 HC PLACE NEED INTRAOSSEOUS INFUS

## 2019-07-03 PROCEDURE — C8929 TTE W OR WO FOL WCON,DOPPLER: HCPCS

## 2019-07-03 RX ORDER — SODIUM CHLORIDE 0.9 % (FLUSH) 0.9 %
5-40 SYRINGE (ML) INJECTION EVERY 8 HOURS
Status: DISCONTINUED | OUTPATIENT
Start: 2019-07-03 | End: 2019-07-15 | Stop reason: HOSPADM

## 2019-07-03 RX ORDER — PANTOPRAZOLE SODIUM 40 MG/10ML
40 INJECTION, POWDER, LYOPHILIZED, FOR SOLUTION INTRAVENOUS EVERY 24 HOURS
Status: DISCONTINUED | OUTPATIENT
Start: 2019-07-03 | End: 2019-07-03 | Stop reason: CLARIF

## 2019-07-03 RX ORDER — VECURONIUM BROMIDE FOR INJECTION 1 MG/ML
10 INJECTION, POWDER, LYOPHILIZED, FOR SOLUTION INTRAVENOUS
Status: COMPLETED | OUTPATIENT
Start: 2019-07-03 | End: 2019-07-03

## 2019-07-03 RX ORDER — FENTANYL CITRATE-0.9 % NACL/PF 25 MCG/ML
0-200 PLASTIC BAG, INJECTION (ML) INJECTION
Status: DISCONTINUED | OUTPATIENT
Start: 2019-07-03 | End: 2019-07-06

## 2019-07-03 RX ORDER — PROPOFOL 10 MG/ML
0-50 VIAL (ML) INTRAVENOUS
Status: DISCONTINUED | OUTPATIENT
Start: 2019-07-03 | End: 2019-07-03

## 2019-07-03 RX ORDER — HYDROCORTISONE SODIUM SUCCINATE 100 MG/2ML
100 INJECTION, POWDER, FOR SOLUTION INTRAMUSCULAR; INTRAVENOUS EVERY 8 HOURS
Status: DISCONTINUED | OUTPATIENT
Start: 2019-07-03 | End: 2019-07-05

## 2019-07-03 RX ORDER — SUCCINYLCHOLINE CHLORIDE 20 MG/ML
150 INJECTION INTRAMUSCULAR; INTRAVENOUS
Status: COMPLETED | OUTPATIENT
Start: 2019-07-03 | End: 2019-07-03

## 2019-07-03 RX ORDER — HEPARIN SODIUM 5000 [USP'U]/100ML
12-25 INJECTION, SOLUTION INTRAVENOUS
Status: DISCONTINUED | OUTPATIENT
Start: 2019-07-03 | End: 2019-07-06

## 2019-07-03 RX ORDER — ENOXAPARIN SODIUM 100 MG/ML
40 INJECTION SUBCUTANEOUS EVERY 12 HOURS
Status: DISCONTINUED | OUTPATIENT
Start: 2019-07-03 | End: 2019-07-03

## 2019-07-03 RX ORDER — MIDAZOLAM HYDROCHLORIDE 1 MG/ML
INJECTION, SOLUTION INTRAMUSCULAR; INTRAVENOUS
Status: DISCONTINUED
Start: 2019-07-03 | End: 2019-07-03

## 2019-07-03 RX ORDER — HEPARIN SODIUM 5000 [USP'U]/100ML
12-25 INJECTION, SOLUTION INTRAVENOUS
Status: CANCELLED | OUTPATIENT
Start: 2019-07-03

## 2019-07-03 RX ORDER — VANCOMYCIN/0.9 % SOD CHLORIDE 1.5G/250ML
1500 PLASTIC BAG, INJECTION (ML) INTRAVENOUS
Status: DISCONTINUED | OUTPATIENT
Start: 2019-07-04 | End: 2019-07-06 | Stop reason: ALTCHOICE

## 2019-07-03 RX ORDER — LEVALBUTEROL INHALATION SOLUTION 0.63 MG/3ML
0.63 SOLUTION RESPIRATORY (INHALATION)
Status: DISCONTINUED | OUTPATIENT
Start: 2019-07-03 | End: 2019-07-08

## 2019-07-03 RX ORDER — HEPARIN SODIUM 5000 [USP'U]/ML
5000 INJECTION, SOLUTION INTRAVENOUS; SUBCUTANEOUS ONCE
Status: CANCELLED | OUTPATIENT
Start: 2019-07-03 | End: 2019-07-03

## 2019-07-03 RX ORDER — ETOMIDATE 2 MG/ML
40 INJECTION INTRAVENOUS
Status: COMPLETED | OUTPATIENT
Start: 2019-07-03 | End: 2019-07-03

## 2019-07-03 RX ORDER — PROPOFOL 10 MG/ML
INJECTION, EMULSION INTRAVENOUS
Status: DISCONTINUED
Start: 2019-07-03 | End: 2019-07-03 | Stop reason: WASHOUT

## 2019-07-03 RX ORDER — PROPOFOL 10 MG/ML
10 VIAL (ML) INTRAVENOUS
Status: DISCONTINUED | OUTPATIENT
Start: 2019-07-03 | End: 2019-07-03

## 2019-07-03 RX ORDER — SODIUM CHLORIDE 0.9 % (FLUSH) 0.9 %
5-40 SYRINGE (ML) INJECTION AS NEEDED
Status: DISCONTINUED | OUTPATIENT
Start: 2019-07-03 | End: 2019-07-15 | Stop reason: HOSPADM

## 2019-07-03 RX ORDER — ENOXAPARIN SODIUM 100 MG/ML
40 INJECTION SUBCUTANEOUS EVERY 24 HOURS
Status: DISCONTINUED | OUTPATIENT
Start: 2019-07-03 | End: 2019-07-03

## 2019-07-03 RX ORDER — HEPARIN SODIUM 5000 [USP'U]/ML
5000 INJECTION, SOLUTION INTRAVENOUS; SUBCUTANEOUS ONCE
Status: COMPLETED | OUTPATIENT
Start: 2019-07-03 | End: 2019-07-03

## 2019-07-03 RX ORDER — PROPOFOL 10 MG/ML
INJECTION, EMULSION INTRAVENOUS
Status: DISCONTINUED
Start: 2019-07-03 | End: 2019-07-03

## 2019-07-03 RX ORDER — SODIUM CHLORIDE 0.9 % (FLUSH) 0.9 %
5-10 SYRINGE (ML) INJECTION AS NEEDED
Status: DISCONTINUED | OUTPATIENT
Start: 2019-07-03 | End: 2019-07-03 | Stop reason: SDUPTHER

## 2019-07-03 RX ORDER — VANCOMYCIN 2 GRAM/500 ML IN 0.9 % SODIUM CHLORIDE INTRAVENOUS
2000 ONCE
Status: COMPLETED | OUTPATIENT
Start: 2019-07-03 | End: 2019-07-03

## 2019-07-03 RX ORDER — MIDAZOLAM HYDROCHLORIDE 1 MG/ML
5 INJECTION, SOLUTION INTRAMUSCULAR; INTRAVENOUS
Status: COMPLETED | OUTPATIENT
Start: 2019-07-03 | End: 2019-07-03

## 2019-07-03 RX ADMIN — ETOMIDATE 40 MG: 2 INJECTION, SOLUTION INTRAVENOUS at 03:13

## 2019-07-03 RX ADMIN — LEVALBUTEROL HYDROCHLORIDE 0.63 MG: 0.63 SOLUTION RESPIRATORY (INHALATION) at 11:19

## 2019-07-03 RX ADMIN — PIPERACILLIN SODIUM,TAZOBACTAM SODIUM 4.5 G: 4; .5 INJECTION, POWDER, FOR SOLUTION INTRAVENOUS at 05:31

## 2019-07-03 RX ADMIN — DILTIAZEM HYDROCHLORIDE 10 MG/HR: 5 INJECTION, SOLUTION INTRAVENOUS at 14:57

## 2019-07-03 RX ADMIN — LEVALBUTEROL HYDROCHLORIDE 0.63 MG: 0.63 SOLUTION RESPIRATORY (INHALATION) at 16:31

## 2019-07-03 RX ADMIN — Medication 10 ML: at 08:11

## 2019-07-03 RX ADMIN — DILTIAZEM HYDROCHLORIDE 7.5 MG/HR: 5 INJECTION, SOLUTION INTRAVENOUS at 21:28

## 2019-07-03 RX ADMIN — MIDAZOLAM 2 MG/HR: 5 INJECTION INTRAMUSCULAR; INTRAVENOUS at 14:26

## 2019-07-03 RX ADMIN — HYDROCORTISONE SODIUM SUCCINATE 100 MG: 100 INJECTION, POWDER, FOR SOLUTION INTRAMUSCULAR; INTRAVENOUS at 21:31

## 2019-07-03 RX ADMIN — Medication 10 ML: at 14:00

## 2019-07-03 RX ADMIN — Medication 10 ML: at 22:00

## 2019-07-03 RX ADMIN — DILTIAZEM HYDROCHLORIDE 10 MG/HR: 5 INJECTION INTRAVENOUS at 11:09

## 2019-07-03 RX ADMIN — FAMOTIDINE 20 MG: 10 INJECTION INTRAVENOUS at 21:26

## 2019-07-03 RX ADMIN — LEVALBUTEROL HYDROCHLORIDE 0.63 MG: 0.63 SOLUTION RESPIRATORY (INHALATION) at 08:07

## 2019-07-03 RX ADMIN — PIPERACILLIN SODIUM,TAZOBACTAM SODIUM 3.38 G: 3; .375 INJECTION, POWDER, FOR SOLUTION INTRAVENOUS at 19:36

## 2019-07-03 RX ADMIN — VANCOMYCIN HYDROCHLORIDE 2000 MG: 10 INJECTION, POWDER, LYOPHILIZED, FOR SOLUTION INTRAVENOUS at 07:43

## 2019-07-03 RX ADMIN — PERFLUTREN 1 ML: 6.52 INJECTION, SUSPENSION INTRAVENOUS at 13:29

## 2019-07-03 RX ADMIN — SODIUM CHLORIDE 1000 ML: 900 INJECTION, SOLUTION INTRAVENOUS at 04:56

## 2019-07-03 RX ADMIN — HEPARIN SODIUM 5000 UNITS: 5000 INJECTION INTRAVENOUS; SUBCUTANEOUS at 21:27

## 2019-07-03 RX ADMIN — LEVALBUTEROL HYDROCHLORIDE 0.63 MG: 0.63 SOLUTION RESPIRATORY (INHALATION) at 19:27

## 2019-07-03 RX ADMIN — MIDAZOLAM HYDROCHLORIDE 5 MG: 1 INJECTION, SOLUTION INTRAMUSCULAR; INTRAVENOUS at 03:15

## 2019-07-03 RX ADMIN — PIPERACILLIN SODIUM,TAZOBACTAM SODIUM 3.38 G: 3; .375 INJECTION, POWDER, FOR SOLUTION INTRAVENOUS at 11:51

## 2019-07-03 RX ADMIN — SODIUM CHLORIDE 500 ML: 900 INJECTION, SOLUTION INTRAVENOUS at 09:49

## 2019-07-03 RX ADMIN — SODIUM CHLORIDE 500 ML: 900 INJECTION, SOLUTION INTRAVENOUS at 12:57

## 2019-07-03 RX ADMIN — SODIUM CHLORIDE 1000 ML: 900 INJECTION, SOLUTION INTRAVENOUS at 03:19

## 2019-07-03 RX ADMIN — ENOXAPARIN SODIUM 40 MG: 40 INJECTION SUBCUTANEOUS at 08:13

## 2019-07-03 RX ADMIN — HEPARIN SODIUM 12 UNITS/KG/HR: 5000 INJECTION, SOLUTION INTRAVENOUS at 21:37

## 2019-07-03 RX ADMIN — HYDROCORTISONE SODIUM SUCCINATE 100 MG: 100 INJECTION, POWDER, FOR SOLUTION INTRAMUSCULAR; INTRAVENOUS at 14:54

## 2019-07-03 RX ADMIN — Medication 50 MCG/HR: at 14:25

## 2019-07-03 RX ADMIN — PROPOFOL 15 MCG/KG/MIN: 10 INJECTION, EMULSION INTRAVENOUS at 08:34

## 2019-07-03 RX ADMIN — PROPOFOL 10 MCG/KG/MIN: 10 INJECTION, EMULSION INTRAVENOUS at 03:20

## 2019-07-03 RX ADMIN — SUCCINYLCHOLINE CHLORIDE 150 MG: 20 INJECTION, SOLUTION INTRAMUSCULAR; INTRAVENOUS; PARENTERAL at 03:13

## 2019-07-03 RX ADMIN — FAMOTIDINE 20 MG: 10 INJECTION INTRAVENOUS at 08:13

## 2019-07-03 RX ADMIN — VECURONIUM BROMIDE 10 MG: 1 INJECTION, POWDER, LYOPHILIZED, FOR SOLUTION INTRAVENOUS at 04:29

## 2019-07-03 NOTE — PROGRESS NOTES
Head to toe assessment completed on pt this PM.    Neuro: Pt eyes open to voice - able to follow all commands and AMOS purposefully. Pt mouths words appropriately and writes comprehensible words. PERRLA - 3 mm and sluggish to react. Resp: ETT PRVC FiO2 40%. Coarse upper / diminished lower breath sounds. Cardiac: Rate controlled Afib. Cardizem gtt at 10. GI: NPO - active bowel sounds. : Morales - draining and patent. Skin: Skin clean, dry and intact. Allevyn intact.

## 2019-07-03 NOTE — PROGRESS NOTES
Ventilator check complete; patient has a #7.5 ET tube secured at the 23 at the lip. Patient is sedated. Patient is not able to follow commands. Breath sounds are clear and diminished. Trachea is midline, Negative for subcutaneous air, and chest excursion is symmetric. Patient is also Negative for cyanosis and is Negative for pitting edema. All alarms are set and audible. Resuscitation bag is at the head of the bed.       Ventilator Settings  Mode FIO2 Rate Tidal Volume Pressure PEEP I:E Ratio   PRVC  50 %  20  450 ml     8 cm H20  1:2.2      Peak airway pressure: 27 cm H2O   Minute ventilation: 10.5 l/min     Malachi Osgood Putman, RT

## 2019-07-03 NOTE — PROGRESS NOTES
PT. Arrived to room 3109 with RN and RT. Placed on monitors. HR a fib ranging from 100's- 130's. Pt. On propofol gtt- opens eyes to voice and follows commands. Pt bathed with CHG wipes and Allevyn placed. No skin issues.

## 2019-07-03 NOTE — H&P
HISTORY AND PHYSICAL      Meaghan Toledo    7/3/2019    Date of Admission:  7/3/2019    The patient's chart is reviewed and the patient is discussed with the staff. Subjective:     Patient is a 74 y.o.  female presents with worsening dyspnea from home. All information from ER attending/chart -- no family here. 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 Oxygen needs increased per EMS and in ER noted in AFIB with RVR and worsening dyspnea. Started Cardizem and then had to intubate patient since worsening hypoxemia and could not even speak/moaning per ER attending. Patient had WBC of 28.5 K. CXR with infiltrates more on Right side. procal is <0.1. Per charts by Rheumatology Dr. Jomar Torres has been having bronchitis and he gave her azithromycin. Per his notes she has not bee on any biologics but was on methotrexate in the past but was not effective. In ER now on Vent and had central line placed by Dr. Colette Chaparro. On diprivan and BP lower and just stopped and map coming up. On 3rd liter of fluid currently. Sedated. No family here. Her chart also reports Sjogren's syndrome?       Review of Systems  Sedated -- not able to assess    Patient Active Problem List   Diagnosis Code    WPW (Suresh-Parkinson-White syndrome) I45.6    HTN (hypertension) I10    Severe obesity with body mass index (BMI) of 35.0 to 39.9 with comorbidity ([facility]) E66.01    Other and unspecified hyperlipidemia E78.5    Sjogren's syndrome ([address] M35.00    Fibromyalgia M79.7    Peptic ulcer K27.9    Gout M10.9    Polymyalgia rheumatica (HCC) M35.3    Gouty arthritis  NOS M10.9    Mixed hyperlipidemia E78.2    Acute hypoxemic respiratory failure ([facility] J96.01    Atrial fibrillation with rapid ventricular response ([address] vs WPW in patient with history of ablation I48.91    Pulmonary infiltrates vs acute congestive heart failure R91.8       Prior to Admission Medications   Prescriptions Last Dose Informant Patient Reported? Taking? COLCRYS 0.6 mg tablet   No No   Sig: Take 1 Tab by mouth daily for 360 days. albuterol (PROVENTIL HFA, VENTOLIN HFA, PROAIR HFA) 90 mcg/actuation inhaler   No No   Sig: Take 2 Puffs by inhalation every six (6) hours as needed for Wheezing. allopurinol (ZYLOPRIM) 100 mg tablet   No No   Sig: Take 1 Tab by mouth daily. amLODIPine (NORVASC) 5 mg tablet   No No   Sig: Take 1 Tab by mouth daily. aspirin delayed-release 81 mg tablet   Yes No   Sig: Take 81 mg by mouth daily. hydroxychloroquine (PLAQUENIL) 200 mg tablet   No No   Sig: Take 1 Tab by mouth two (2) times a day. ibuprofen (MOTRIN) 200 mg tablet   Yes No   Sig: Take  by mouth as needed for Pain.   magnesium oxide (MAG-OX) 400 mg tablet   Yes No   Sig: Take 400 mg by mouth two (2) times a day. multivitamin (ONE A DAY) tablet   Yes No   Sig: Take 1 Tab by mouth daily. nadolol (CORGARD) 40 mg tablet   No No   Sig: Take 2 Tabs by mouth daily for 360 days. predniSONE (DELTASONE) 5 mg tablet   No No   Sig: Take 3 Tabs by mouth daily.       Facility-Administered Medications: None       Past Medical History:   Diagnosis Date    Arrhythmia     Arthritis     Asthma     Cellulitis     Chronic kidney disease     Hx renal failure    Depression     Gout     Gouty arthritis  NOS 12/2/2015    Hypercholesterolemia     Hypertension     Mitral valve prolapse     Morbid obesity (HCC)     Nausea & vomiting     Polymyalgia rheumatica (HCC) 12/2/2015    Psychiatric disorder     depression & anxiety    PUD (peptic ulcer disease)     [facility]ogren's [facility]     Temporal arteritis ([facility] [address]      Past Surgical History:   Procedure Laterality Date    CARDIAC SURG PROCEDURE UNLIST  4/07    STRESS TEST    CARDIAC SURG PROCEDURE UNLIST  8/13    HEART ABLATION    HX COLONOSCOPY  6/08    HX ENDOSCOPY  8/08    HX HYSTERECTOMY      HX TUBAL LIGATION       Social History     Socioeconomic History    Marital status:      Spouse name: Not on file    Number of children: Not on file    Years of education: Not on file    Highest education level: Not on file   Occupational History    Not on file   Social Needs    Financial resource strain: Not on file    Food insecurity:     Worry: Not on file     Inability: Not on file    Transportation needs:     Medical: Not on file     Non-medical: Not on file   Tobacco Use    Smoking status: Never Smoker    Smokeless tobacco: Never Used   Substance and Sexual Activity    Alcohol use: No    Drug use: No    Sexual activity: Never   Lifestyle    Physical activity:     Days per week: Not on file     Minutes per session: Not on file    Stress: Not on file   Relationships    Social connections:     Talks on phone: Not on file     Gets together: Not on file     Attends Yazdanism service: Not on file     Active member of club or organization: Not on file     Attends meetings of clubs or organizations: Not on file     Relationship status: Not on file    Intimate partner violence:     Fear of current or ex partner: Not on file     Emotionally abused: Not on file     Physically abused: Not on file     Forced sexual activity: Not on file   Other Topics Concern     Service No    Blood Transfusions No    Caffeine Concern No    Occupational Exposure No    Hobby Hazards No    Sleep Concern No    Stress Concern Yes    Weight Concern Yes    Special Diet No    Back Care No    Exercise No    Bike Helmet No    Seat Belt Yes    Self-Exams Not Asked   Social History Narrative    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  Nifedipine Other (comments)     severe headache       Current Facility-Administered Medications   Medication Dose Route Frequency    sodium chloride (NS) flush 5-10 mL  5-10 mL IntraVENous PRN    sodium chloride 0.9 % bolus infusion 748 mL  748 mL IntraVENous ONCE    vancomycin (VANCOCIN) 2000 mg in  ml infusion  2,000 mg IntraVENous ONCE    propofol (DIPRIVAN) infusion  10 mcg/kg/min IntraVENous TITRATE     Current Outpatient Medications   Medication Sig    ibuprofen (MOTRIN) 200 mg tablet Take  by mouth as needed for Pain.  allopurinol (ZYLOPRIM) 100 mg tablet Take 1 Tab by mouth daily.  predniSONE (DELTASONE) 5 mg tablet Take 3 Tabs by mouth daily.  hydroxychloroquine (PLAQUENIL) 200 mg tablet Take 1 Tab by mouth two (2) times a day.  albuterol (PROVENTIL HFA, VENTOLIN HFA, PROAIR HFA) 90 mcg/actuation inhaler Take 2 Puffs by inhalation every six (6) hours as needed for Wheezing.  nadolol (CORGARD) 40 mg tablet Take 2 Tabs by mouth daily for 360 days.  amLODIPine (NORVASC) 5 mg tablet Take 1 Tab by mouth daily.  COLCRYS 0.6 mg tablet Take 1 Tab by mouth daily for 360 days.  multivitamin (ONE A DAY) tablet Take 1 Tab by mouth daily.  magnesium oxide (MAG-OX) 400 mg tablet Take 400 mg by mouth two (2) times a day.  aspirin delayed-release 81 mg tablet Take 81 mg by mouth daily.            Objective:     Vitals:    07/03/19 0515 07/03/19 0521 07/03/19 0531 07/03/19 0539   BP: 184/88 174/71 181/82    Pulse: (!) 139 (!) 113 (!) 106    Resp: 18 20 17    Temp:       SpO2: 100% 99% 100% 100%   Weight:       Height:           PHYSICAL EXAM Ventilator Settings  Mode FIO2 Rate Tidal Volume Pressure PEEP      65 %    450 ml     8 cm H20      Peak airway pressure: 27 cm H2O   Minute ventilation: 9.8 l/min     ABG:   Recent Labs     07/03/19  0344   PHI 7.301*   PCO2I 45.8*   PO2I 84   HCO3I 22.6       Constitutional:  the patient is elderly WF in bed on Vent  EENMT:  Sclera clear, pupils equal, oral mucosa moist  Respiratory: b/l rhonchi  Cardiovascular:  RRR without M,G,R  Gastrointestinal: soft and non-tender; with positive bowel sounds. Musculoskeletal: warm without cyanosis. There is no lower extremity edema. Skin:  no jaundice or rashes, no visible wounds   Neurologic: sedated with diprivan but pupils equal   Psychiatric:  Sedated and calm currently. CXR: intubated with b/l infiltrates more in right chest          Recent Labs     07/03/19  0334   WBC 28.5*   HGB 14.5   HCT 44.8        Recent Labs     07/03/19  0334      K 4.3      *   CO2 24   BUN 22   CREA 1.02*   CA 9.3   ALB 3.6   TBILI 0.9   ALT 77*   SGOT 38*     Recent Labs     07/03/19  0344   PHI 7.301*   PCO2I 45.8*   PO2I 84   HCO3I 22.6     No results for input(s): LCAD, LAC in the last 72 hours.     Assessment:  (Medical Decision Making)     Hospital Problems  Date Reviewed: 6/9/2019          Codes Class Noted POA    * (Principal) Acute hypoxemic respiratory failure (HCC) ICD-10-CM: J96.01  ICD-9-CM: 518.81  7/3/2019 Unknown        Atrial fibrillation with rapid ventricular response (HCC) vs WPW in patient with history of ablation ICD-10-CM: I48.91  ICD-9-CM: 427.31  7/3/2019 Unknown        Pulmonary infiltrates vs acute congestive heart failure ICD-10-CM: R91.8  ICD-9-CM: 793.19  7/3/2019 Unknown        Polymyalgia rheumatica ([facility] [address] [address] ICD-10-CM: M35.3  ICD-9-CM: 294  12/2/2015 Yes        HTN (hypertension) ICD-10-CM: I10  ICD-9-CM: 401.9  8/12/2013 Yes        Severe obesity with body mass index (BMI) of 35.0 to 39.9 with comorbidity ([address] ICD-10-CM: E66.01  ICD-9-CM: 278.01  8/12/2013 Yes        Sjogren's syndrome ([facility] ICD-10-CM: M35.00  ICD-9-CM: 710.2  8/12/2013 Yes            Elderly obese WF with history of PMR/hyperuricemia on steroids and plaquenil / WPW history with prior ablation came in with AFIB with RVR and respiratory failure on Vent and cxr with b/l infiltrates    Plan:  (Medical Decision Making)     --Will admit to ICU  --continue vent support and serial ABG   --plan for bronch in AM with BAL since has been fighting bronchitis and given on chronic steroids for rheumatologic disease. --cultures and started ABX and would continue for now but note procal is 0.1. cxr noted with infiltrates  --EKG noted in AFIB and down to 110-120 range. Will get cardiology to see in AM given arhythmia history. Check BNP and Echo. First TNI is not normal range f/u serially. --check ESR and will get rheumatology to see in AM  --check baseline cortisol  --DVT/PUD prophylaxis    More than 50% of the time documented was spent in face-to-face contact with the patient and in the care of the patient on the floor/unit where the patient is located.     Darrion Piper MD

## 2019-07-03 NOTE — ED TRIAGE NOTES
Pt arrives from home via [facility] with c/o shortness of breath and nausea with movement. Upon ems arrival lung sounds clear and sats 94% ra. Upon getting pt onto ems stretcher sats in 80s and heart rate in 180s. sats 70% ra on arrival, heart rate in 130s, cyanotic, diaphoretic, wheezing.  cardizem 20mg, zofran 4mg iv given enroute by ems

## 2019-07-03 NOTE — ED PROVIDER NOTES
75-year-old female brought in by EMS shortness of breath respiratory distress. EMS called home patient was having shortness of breathhypoxia. Patient does not use oxygen at home. Patient is morbidly obese and has Suresh-Parkinson-White syndrome as well as Sjorgenson disease. patient initially was able to answer questions but was having difficulty breathing however in route to the hospital continued to deteriorate requiring more oxygenand switched to a nonrebreather mask. Patient was in A. Fib RVR was given Cardizem slowed her rate down briefly however she continues to have tachycardia. Patient arrived moaning and having respiratory distress    The history is provided by the EMS personnel. Shortness of Breath   This is a new problem.         Past Medical History:   Diagnosis Date    Arrhythmia     Arthritis     Asthma     Cellulitis     Chronic kidney disease     Hx renal failure    Depression     Gout     Gouty arthritis  NOS 12/2/2015    Hypercholesterolemia     Hypertension     Mitral valve prolapse     Morbid obesity (HCC)     Nausea & vomiting     Polymyalgia rheumatica (HCC) 12/2/2015    Psychiatric disorder     depression & anxiety    PUD (peptic ulcer disease)     Sjogren's syndrome ([address]     Temporal arteritis ([address]        Past Surgical History:   Procedure Laterality Date    CARDIAC SURG PROCEDURE UNLIST  4/07    STRESS TEST    CARDIAC SURG PROCEDURE UNLIST  8/13    HEART ABLATION    HX COLONOSCOPY  6/08    HX ENDOSCOPY  8/08    HX HYSTERECTOMY      HX TUBAL LIGATION           Family History:   Problem Relation Age of Onset   [facility] Cancer Mother     Breast Cancer Mother 80    Hypertension Mother     Dementia Mother     Cancer Father         LUNG    Heart Disease Father 62    Diabetes Son     Other Son         PSYCHIATRIC ILLNESS    Hypertension Sister        Social History     Socioeconomic History    Marital status:      Spouse name: Not on file    Number of children: Not on file    Years of education: Not on file    Highest education level: Not on file   Occupational History    Not on file   Social Needs    Financial resource strain: Not on file    Food insecurity:     Worry: Not on file     Inability: Not on file    Transportation needs:     Medical: Not on file     Non-medical: Not on file   Tobacco Use    Smoking status: Never Smoker    Smokeless tobacco: Never Used   Substance and Sexual Activity    Alcohol use: No    Drug use: No    Sexual activity: Never   Lifestyle    Physical activity:     Days per week: Not on file     Minutes per session: Not on file    Stress: Not on file   Relationships    Social connections:     Talks on phone: Not on file     Gets together: Not on file     Attends Latter day service: Not on file     Active member of club or organization: Not on file     Attends meetings of clubs or organizations: Not on file     Relationship status: Not on file    Intimate partner violence:     Fear of current or ex partner: Not on file     Emotionally abused: Not on file     Physically abused: Not on file     Forced sexual activity: Not on file   Other Topics Concern     Service No    Blood Transfusions No    Caffeine Concern No    Occupational Exposure No    Hobby Hazards No    Sleep Concern No    Stress Concern Yes    Weight Concern Yes    Special Diet No    Back Care No    Exercise No    Bike Helmet No    Seat Belt Yes    Self-Exams Not Asked   Social History Narrative    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, 12 great grandchildren         ALLERGIES: Celebrex [celecoxib]; Lisinopril; Losartan; and Nifedipine    Review of Systems   Constitutional: Negative. Negative for activity change. HENT: Negative. Eyes: Negative. Respiratory: Positive for shortness of breath. Cardiovascular: Negative. Gastrointestinal: Negative. Genitourinary: Negative.     Musculoskeletal: Negative. Skin: Negative. Neurological: Negative. Psychiatric/Behavioral: Negative. All other systems reviewed and are negative. Vitals:    07/03/19 0300   BP: (!) 179/119   Pulse: (!) 133   Resp: (!) 36   Temp: 98.6 °F (37 °C)   SpO2: (!) 71%   Weight: 91.6 kg (202 lb)   Height: 5' 2\" (1.575 m)            Physical Exam   Constitutional: She is oriented to person, place, and time. She appears well-developed and well-nourished. She appears toxic. She appears ill. She appears distressed. HENT:   Head: Normocephalic and atraumatic. Right Ear: External ear normal.   Left Ear: External ear normal.   Nose: Nose normal.   Mouth/Throat: Mucous membranes are pale and dry. No oropharyngeal exudate. Eyes: Pupils are equal, round, and reactive to light. Conjunctivae and EOM are normal. Right eye exhibits no discharge. Left eye exhibits no discharge. No scleral icterus. Neck: Normal range of motion. Neck supple. No JVD present. No tracheal deviation present. Cardiovascular: Normal rate, regular rhythm and intact distal pulses. Pulmonary/Chest: Effort normal and breath sounds normal. No stridor. No respiratory distress. She has no wheezes. She exhibits no tenderness. Abdominal: Soft. Bowel sounds are normal. She exhibits no distension and no mass. There is no tenderness. Musculoskeletal: Normal range of motion. She exhibits no edema or tenderness. Neurological: She is alert and oriented to person, place, and time. No cranial nerve deficit. Skin: Skin is warm and dry. No rash noted. She is not diaphoretic. No erythema. No pallor. Psychiatric: She has a normal mood and affect. Her behavior is normal. Thought content normal.   Nursing note and vitals reviewed. MDM  Number of Diagnoses or Management Options  Acute respiratory failure with hypoxia [facility]):   Diagnosis management comments: Severe shortness of breath and hypoxia. Patient was struggling to breathe using accessory muscles.  She was unable to answer questions due to dyspnea. Intubation was achieved without difficulty SaO2 improved markedly. Intraosseous access was needed to get medications therefore right IJ was placed without difficulty. Internist was consult to and responds to ED for admission. Impression: (J96.01) Acute respiratory failure with hypoxia (HCC)  (primary encounter diagnosis)     Condition at disposition:     Disposition: ICU           Amount and/or Complexity of Data Reviewed  Clinical lab tests: ordered and reviewed  Tests in the radiology section of CPT®: reviewed and ordered  Tests in the medicine section of CPT®: ordered and reviewed    Risk of Complications, Morbidity, and/or Mortality  Presenting problems: high  Diagnostic procedures: high  Management options: high           Central Line  Date/Time: 7/3/2019 7:18 AM  Performed by: Ashwini Hunt MD  Authorized by: Ashwini Hunt MD     Consent:     Consent obtained:  Emergent situation    Risks discussed:  Arterial puncture, incorrect placement, bleeding and infection  Pre-procedure details:     Hand hygiene: Hand hygiene performed prior to insertion      Sterile barrier technique: All elements of maximal sterile technique followed      Skin preparation:  2% chlorhexidine    Skin preparation agent: Skin preparation agent completely dried prior to procedure    Sedation:     Sedation type:  Deep  Anesthesia (see MAR for exact dosages): Anesthesia method:  None  Procedure details:     Location:  R internal jugular    Procedural supplies: quad.     Catheter size:  7.5 Fr    Landmarks identified: yes      Ultrasound guidance: yes      Sterile ultrasound techniques: Sterile gel and sterile probe covers were used      Number of attempts:  2    Successful placement: yes    Post-procedure details:     Post-procedure:  Dressing applied and line sutured    Assessment:  Blood return through all ports, free fluid flow, no pneumothorax on x-ray and placement verified by x-ray    Patient tolerance of procedure: Tolerated well, no immediate complications  Intubation  Date/Time: 7/3/2019 7:20 AM  Performed by: Zechariah Hart MD  Authorized by: Zechariah Hart MD     Consent:     Consent obtained:  Emergent situation    Risks discussed:  Aspiration and bleeding    Alternatives discussed:  No treatment  Pre-procedure details:     Patient status:  Altered mental status    Mallampati score:  II    Pretreatment medications:  Midazolam    Paralytics:  Succinylcholine  Procedure details:     Preoxygenation:  Nonrebreather mask    CPR in progress: no      Intubation method:  Oral    Oral intubation technique:  Video-assisted    Laryngoscope blade: Mac 3    Tube type:  Cuffed    Number of attempts:  1    Cricoid pressure: no      Tube visualized through cords: yes    Placement assessment:     ETT to lip:  22    Tube secured with:  ETT espinosa    Breath sounds:  Absent over the epigastrium and equal    Placement verification: chest rise, condensation, CXR verification, direct visualization, equal breath sounds and esophageal detector      CXR findings:  ETT in proper place  Post-procedure details:     Patient tolerance of procedure:   Tolerated well, no immediate complications  CRITICAL CARE (ASAP ONLY)  Performed by: Zechariah Hart MD  Authorized by: Zechariah Hart MD     Critical care provider statement:     Critical care time (minutes):  50    Critical care start time:  7/3/2019 4:00 AM    Critical care end time:  7/3/2019 6:22 AM    Critical care time was exclusive of:  Separately billable procedures and treating other patients    Critical care was necessary to treat or prevent imminent or life-threatening deterioration of the following conditions:  Respiratory failure and sepsis    Critical care was time spent personally by me on the following activities:  Blood draw for specimens, discussions with consultants, evaluation of patient's response to treatment, examination of patient, ordering and performing treatments and interventions, ordering and review of laboratory studies, ordering and review of radiographic studies, pulse oximetry, re-evaluation of patient's condition, review of old charts and vascular access procedures

## 2019-07-03 NOTE — PROGRESS NOTES
Critical Care Daily Progress Note: 7/3/2019  Admission Date: 7/3/2019     The patient's chart is reviewed and the patient is discussed with the staff. Patient is a 74 y.o.  female presents with worsening dyspnea from home. All information from ER attending/chart -- no family here.      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 Oxygen needs increased per EMS and in ER noted in AFIB with RVR and worsening dyspnea. Started Cardizem and then had to intubate patient since worsening hypoxemia and could not even speak/moaning per ER attending.      Patient had WBC of 28.5 K. CXR with infiltrates more on Right side. procal is <0.1.    Per charts by Rheumatology Dr. Nini Guzman has been having bronchitis and he gave her azithromycin.      Per his notes she has not bee on any biologics but was on methotrexate in the past but was not effective.               Subjective:     Sedated   On vent     Current Facility-Administered Medications   Medication Dose Route Frequency    sodium chloride 0.9 % bolus infusion 748 mL  748 mL IntraVENous ONCE    sodium chloride (NS) flush 5-40 mL  5-40 mL IntraVENous Q8H    sodium chloride (NS) flush 5-40 mL  5-40 mL IntraVENous PRN    levalbuterol (XOPENEX) nebulizer soln 0.63 mg/3 mL  0.63 mg Nebulization QID RT    piperacillin-tazobactam (ZOSYN) 3.375 g in 0.9% sodium chloride (MBP/ADV) 100 mL  3.375 g IntraVENous Q8H    famotidine (PF) (PEPCID) 20 mg in sodium chloride 0.9% 10 mL injection  20 mg IntraVENous Q12H    propofol (DIPRIVAN) infusion  0-50 mcg/kg/min IntraVENous TITRATE    [START ON 7/4/2019] vancomycin (VANCOCIN) 1500 mg in  ml infusion  1,500 mg IntraVENous Q18H    enoxaparin (LOVENOX) injection 40 mg  40 mg SubCUTAneous Q12H    dilTIAZem (CARDIZEM) 125 mg in dextrose 5% 125 mL infusion  10 mg/hr IntraVENous TITRATE    sodium chloride 0.9 % bolus infusion 500 mL  500 mL IntraVENous ONCE       Review of Systems   Unobtainable due to patient status. Objective:     Vitals:    07/03/19 1216 07/03/19 1230 07/03/19 1245 07/03/19 1250   BP:  (!) 88/53 98/56 90/54   Pulse: 93 100 91 93   Resp: 20 20 20 20   Temp:       SpO2:  100% 100% 100%   Weight:       Height:           Intake and Output:   No intake/output data recorded. 07/03 0701 - 07/03 1900  In: -   Out: 500 [Urine:500]    Physical Exam:          Constitutional:  intubated and mechanically ventilated. EENMT:  Sclera clear, pupils equal, oral mucosa moist  Respiratory: crackels  Cardiovascular:  RRR with no M,G,R;  Gastrointestinal:  soft with no tenderness; positive bowel sounds present  Musculoskeletal:  warm with no cyanosis, no lower extremity edema  Skin:  no jaundice or ecchymosis  Neurologic: no gross neuro deficits     Psychiatric:  Sedated on vent     LINES:    ETT,foely, RIJ central line    DRIPS:    Fentanyl, versed gtt    CXR:        Ventilator Settings  Mode FIO2 Rate Tidal Volume Pressure PEEP   PRVC  40 %    450 ml     8 cm H20      Peak airway pressure: 23.4 cm H2O   Minute ventilation: 8.8 l/min     ABG:   Recent Labs     07/03/19  0344   PHI 7.301*   PCO2I 45.8*   PO2I 84   HCO3I 22.6        LAB  No results for input(s): GLUCPOC in the last 72 hours.     No lab exists for component: [place]  Recent Labs     07/03/19  0334   WBC 28.5*   HGB 14.5   HCT 44.8        Recent Labs     07/03/19  0334      K 4.3      CO2 24   *   BUN 22   CREA 1.02*   CA 9.3   ALB 3.6   SGOT 38*         Assessment:  (Medical Decision Making)     Hospital Problems  Date Reviewed: 6/9/2019          Codes Class Noted POA    * (Principal) Acute hypoxemic respiratory failure ([facility] [address] on vent  ICD-10-CM: J96.01  ICD-9-CM: 518.81  7/3/2019 Unknown        Atrial fibrillation with rapid ventricular response (HCC) vs WPW in patient with history of ablation on cardizem gtt on HR control ICD-10-CM: I48.91  ICD-9-CM: 427.31  7/3/2019 Unknown        Pulmonary infiltrates vs acute congestive heart failure ICD-10-CM: R91.8  ICD-9-CM: 793.19  7/3/2019 Unknown        Severe sepsis [facility]) ICD-10-CM: A41.9, R65.20  ICD-9-CM: 038.9, 995.92  7/3/2019 Unknown        Hypotension due to hypovolemia   iv fluids for now may need pressors ICD-10-CM: I95.89, E86.1  ICD-9-CM: 458.8, 276.52  7/3/2019 Unknown        Polymyalgia rheumatica ([facility] ICD-10-CM: M35.3  ICD-9-CM: 115  12/2/2015 Yes        HTN (hypertension) ICD-10-CM: I10  ICD-9-CM: 401.9  8/12/2013 Yes        Severe obesity with body mass index (BMI) of 35.0 to 39.9 with comorbidity [facility]) ICD-10-CM: E66.01  ICD-9-CM: 278.01  8/12/2013 Yes        Sjogren's syndrome ([address] ICD-10-CM: M35.00  ICD-9-CM: 710.2  8/12/2013 Yes              Plan:  (Medical Decision Making)     --  -stop diprivan gtt due to hypotension and change to Fentanyl and versed gtt  - continue ABX  - bronch with BAL  -pressors if needed  -start solucortef as she is on chronic prednisone  - echo pending  - continue cardizem gtt, titrate for HR control  - GI/ DVT profylaxis    -critically ill, spent 40 min patient care    More than 50% of the time documented was spent in face-to-face contact with the patient and in the care of the patient on the floor/unit where the patient is located.     Bowen Nguyen MD

## 2019-07-03 NOTE — PROGRESS NOTES
Called to do bedside bronchoscopy. Consent obtained from patient family. Time out performed. Pts vitals monitored throughout procedure. Scope # 17 used. Procedure tolerated with no adverse rxn. No additional meds given. Pt placed on a rate on the ventilator by RT for procedure. BAL sent to the lab x1 and labeled appropriately. Report given to [facility].

## 2019-07-03 NOTE — ROUTINE PROCESS
TRANSFER - OUT REPORT: 
 
Verbal report given to NICOLA Walker on Indira Betts  being transferred to ICU for routine progression of care Report consisted of patients Situation, Background, Assessment and  
Recommendations(SBAR). Information from the following report(s) ED Summary was reviewed with the receiving nurse. Lines:  
Quad Lumen 07/03/19 Right Internal jugular (Active) Peripheral IV Left Hand (Active) Site Assessment Clean, dry, & intact 7/3/2019  3:16 AM  
Phlebitis Assessment 0 7/3/2019  3:16 AM  
Infiltration Assessment 0 7/3/2019  3:16 AM  
Dressing Status Clean, dry, & intact 7/3/2019  3:16 AM  
Dressing Type Transparent 7/3/2019  3:16 AM  
Hub Color/Line Status Pink 7/3/2019  3:16 AM  
   
Intraosseous Line 07/03/19 Left;Humerus (Active) Site Assessment Clean, dry, & intact 7/3/2019  3:16 AM  
Dressing Status Clean, dry, & intact 7/3/2019  3:16 AM  
Dressing Type Transparent 7/3/2019  3:16 AM  
Status Infusing 7/3/2019  3:16 AM  
  
 
Opportunity for questions and clarification was provided. Patient transported with: 
 Monitor Patient-specific medications from Pharmacy Registered Nurse Respiratory with intubation

## 2019-07-03 NOTE — PROCEDURES
PROCEDURE    Bronchoscopy with airway inspection/cleanout/BAL    INDICATION   Pulmonary infiltrate     EQUIPMENT:    Olympus Q 180 Bronchhoscope. ANESTHESIA    Concious sedation with:  Diprivan gtt  . AIRWAY INSPECTION    After obtaining informed consent, using a bite block/ET tube adapter, an Olympus Q 180 video/fiberoptic bronchoscope was  introduced into the trachea through ET tube, without complication. RIGHT    LOCATION NORM/ABNORM DESCRIPTION   VOCAL CORDS NL    TRACHEA NL    ALYSHA NL    RMSB NL    RUL NL    BI NL    RML NL    SUP SEGM RLL NL    MED BASAL NL    ANTERIOR BASAL NL    LATERAL BASAL NL    POSTERIOR BASAL NL                          LEFT    LOCATION NORMAL/ABNORMAL TYPE   LMSB NL    IKE NL    LINGULA NL Scope wedged and BAL obtained    SUPERIOR DIVISION NL    SUPERIOR SEG LLL NL    SABINE-MEDIAL LLL NL    LATERAL LLL NL    POSTERIOR LLL NL        There was minimal secretions present, no endobronchial lesion ,    The following samples were obtained:    BAL:    Samples were sent for:  GS, c and S  Viral panel  Fungal cultures  Atypical   -cytology  - galactomanan    The procedure was completed  without complication and the patient tolerated the procedure well.     EBL:  None      Recommendations:    Await results   Nidia Ramirez MD

## 2019-07-03 NOTE — PROGRESS NOTES
Chart reviewed after admission to ICU s/p Acute hypoxemic respiratory failure/afib. Currently intubated/vent. Pt has PCP - Dr. Shahriar Velasco per records, MCR/СЕРГЕЙ. CM will follow for any assist and d/c POC when medically stable.      Care Management Interventions  PCP Verified by CM: ([address])  Transition of Care Consult (CM Consult): Discharge Planning  Discharge Location  Discharge Placement: Unable to determine at this time

## 2019-07-03 NOTE — ED NOTES
Dr. Saqib Zuniga intubated pt, 7.5 tube via curt vision, 22 at the lips, good color change.  X-ray called for placement verification

## 2019-07-03 NOTE — PROGRESS NOTES
Patient was intubated with a number 7.5 ET Tube. Tube placement verified by auscultation, by CXR and ETCO2 monitor. ET Tube is secured at the 22 cm new at the lip and on the right side. Patient was intubated by Dr. Mery Meredith on the 1 attempt. Breath sounds are coarse. Patient is Negative for subcutaneous air and chest excursion is symmetrical. Trachea is midline. Patient is also Negative for cyanosis and is Negative for pitting edema. Patient placed on ventilator on documented settings. All alarms are set and audible. Resuscitation bag is at the head of the bed. Ventilator Settings  Mode FIO2 Rate Tidal Volume Pressure PEEP I:E Ratio     PRVC 60 %  20  450 ml     8 cm H20  1:2.2      Peak airway pressure: 27 cm H2O   Minute ventilation: 9.8 l/min     ABG: No results for input(s): PH, PCO2, PO2, HCO3 in the last 72 hours.

## 2019-07-03 NOTE — CONSULTS
[facility] Cardiology Consult                Date of  Admission: 7/3/2019  3:02 AM     Primary Care Physician:  Primary Cardiologist:  Referring Physician:  Consulting Physician:    CC/Reason for consult: New onset A-fib with SOB      Maris Pike is a 74 y.o. female admitted for new onset A-fib with SOB and CAP. The pt is currently on Propofol and intubated with Vancomycin and Zosyn. The pt is currently in Afib.      Patient Active Problem List   Diagnosis Code    WPW (Suresh-Parkinson-White syndrome) I45.6    HTN (hypertension) I10    Severe obesity with body mass index (BMI) of 35.0 to 39.9 with comorbidity (HCC) E66.01    Other and unspecified hyperlipidemia E78.5    Sjogren's syndrome ([address] M35.00    Fibromyalgia M79.7    Peptic ulcer K27.9    Gout M10.9    Polymyalgia rheumatica (HCC) M35.3    Gouty arthritis  NOS M10.9    Mixed hyperlipidemia E78.2    Acute hypoxemic respiratory failure (HCC) J96.01    Atrial fibrillation with rapid ventricular response (HCC) vs WPW in patient with history of ablation I48.91    Pulmonary infiltrates vs acute congestive heart failure R91.8    Severe sepsis (HCC) A41.9, R65.20       Past Medical History:   Diagnosis Date    Arrhythmia     Arthritis     Asthma     Cellulitis     Chronic kidney disease     Hx renal failure    Depression     Gout     Gouty arthritis  NOS 12/2/2015    Hypercholesterolemia     Hypertension     Mitral valve prolapse     Morbid obesity (HCC)     Nausea & vomiting     Polymyalgia rheumatica (HCC) 12/2/2015    Psychiatric disorder     depression & anxiety    PUD (peptic ulcer disease)     Sjogren's syndrome ([address]     Temporal arteritis ([address]       Past Surgical History:   Procedure Laterality Date    CARDIAC SURG PROCEDURE UNLIST  4/07    STRESS TEST    CARDIAC SURG PROCEDURE UNLIST  8/13    HEART ABLATION    HX COLONOSCOPY  6/08    HX ENDOSCOPY  8/08    HX HYSTERECTOMY      HX TUBAL LIGATION       Allergies Allergen Reactions    Celebrex [Celecoxib] Rash    Lisinopril Cough    Losartan Other (comments)     Burning sensation tongue.  Nifedipine Other (comments)     severe headache      Family History   Problem Relation Age of Onset    Cancer Mother     Breast Cancer Mother 80    Hypertension Mother     Dementia Mother     Cancer Father         LUNG    Heart Disease Father 62    Diabetes Son    [facility] Other Son         PSYCHIATRIC ILLNESS    Hypertension Sister         Current Facility-Administered Medications   Medication Dose Route Frequency    sodium chloride 0.9 % bolus infusion 748 mL  748 mL IntraVENous ONCE    vancomycin (VANCOCIN) 2000 mg in  ml infusion  2,000 mg IntraVENous ONCE    sodium chloride (NS) flush 5-40 mL  5-40 mL IntraVENous Q8H    sodium chloride (NS) flush 5-40 mL  5-40 mL IntraVENous PRN    levalbuterol (XOPENEX) nebulizer soln 0.63 mg/3 mL  0.63 mg Nebulization QID RT    piperacillin-tazobactam (ZOSYN) 3.375 g in 0.9% sodium chloride (MBP/ADV) 100 mL  3.375 g IntraVENous Q8H    famotidine (PF) (PEPCID) 20 mg in sodium chloride 0.9% 10 mL injection  20 mg IntraVENous Q12H    propofol (DIPRIVAN) infusion  0-50 mcg/kg/min IntraVENous TITRATE    [START ON 7/4/2019] vancomycin (VANCOCIN) 1500 mg in  ml infusion  1,500 mg IntraVENous Q18H    enoxaparin (LOVENOX) injection 40 mg  40 mg SubCUTAneous Q12H       ROS Cardiovascular as noted above. Physical Exam  Vitals:    07/03/19 0845 07/03/19 0901 07/03/19 0915 07/03/19 0930   BP: 94/56 105/70 109/79 99/60   Pulse: (!) 118 (!) 111 (!) 141 (!) 147   Resp: 20 20 20 20   Temp:       SpO2: 100% 100% (!) 86% 100%   Weight:       Height:           Physical Exam:  Constitution:  Well developed, well nourished. Pt not alert and on Propofol with intubation. Skin:  Warm, dry, pale. HEENT:  Neck supple, no JVD noted. Cardiovascular:  Irregularly irregular rhythm noted.     Pulmonary:  Pt intubated tolerating well with Propofol. Gastrointestinal:  No masses, soft, tender, no guarding noted. MSK:  Pt in bed, able to move. No contractures noted. No edema. Cardiographics    Telemetry: Afib  ECG: Afib   Echocardiogram: Not performed. Labs:   Recent Labs     07/03/19  0334      K 4.3   BUN 22   CREA 1.02*   *   WBC 28.5*   HGB 14.5   HCT 44.8           Assessment/Plan:     Assessment:      Principal Problem:    Acute hypoxemic respiratory failure ([facility] [address] (7/3/2019)  Continue per provider. Active Problems:    HTN (hypertension) (8/12/2013)  Continue to monitor BP. Fluids as needed. Severe obesity with body mass index (BMI) of 35.0 to 39.9 with comorbidity ([facility] [address] (8/12/2013)  Continue per provider. Polymyalgia rheumatica ([facility] [address] (12/2/2015)  Continue per provider. Atrial fibrillation with rapid ventricular response (HCC) vs WPW in patient with history of ablation (7/3/2019)  Place patient on Cardizem drip with bolus to control rate. Place patient on Heparin drip and continuously watch CBC. Pulmonary infiltrates vs acute congestive heart failure (7/3/2019)  Continue as per provider and will check Echo. Severe sepsis ([facility] [address] (7/3/2019)  Continue per provider. Thank you very much for this referral. We appreciate the opportunity to participate in this patient's care. We will follow along with above stated plan.     Frank Ceballos MD: Josefa Latham

## 2019-07-03 NOTE — PROGRESS NOTES
TRANSFER - IN REPORT:    Verbal report received from Berta RN(name) on Alfred Solis  being received from ED(unit) for routine progression of care      Report consisted of patients Situation, Background, Assessment and   Recommendations(SBAR). Information from the following report(s) SBAR, Kardex, ED Summary, Intake/Output, MAR and Recent Results was reviewed with the receiving nurse. Opportunity for questions and clarification was provided. Assessment completed upon patients arrival to unit and care assumed.

## 2019-07-03 NOTE — PROGRESS NOTES
Ventilator check complete; patient has a #7.5 ET tube secured at the 23 at the lip. Patient is sedated. Patient is able to follow commands. Breath sounds are coarse. Trachea is midline, Negative for subcutaneous air, and chest excursion is symmetric. Patient is also Negative for cyanosis and is Negative for pitting edema. All alarms are set and audible. Resuscitation bag is at the head of the bed.       Ventilator Settings  Mode FIO2 Rate Tidal Volume Pressure PEEP I:E Ratio   PRVC  40 %    450 ml     8 cm H20  1:4      Peak airway pressure: 22.6 cm H2O   Minute ventilation: 5.6 l/min     [facility]

## 2019-07-03 NOTE — PROGRESS NOTES
CM chart review. PCP - listed as Dr. Vida Rios but last seen by Pancho Pastrana NP ([address] on 6-3-19). Per system, patient also sees [facility] (last seen on 5-15-19) and [facility] (last seen on 3-22-19).

## 2019-07-03 NOTE — ROUTINE PROCESS
Guideline Guideline Number: [id number] Title: Management of the Patient with Mechanical Ventilation (including weaning) and ABCDE Bundle Effective Date:  03/00 Revised Date: 02/09, 03/10, 7/12, 5/13,                                  10/13, 8/14 Reviewed Date: 07/2015, 04/2016, 06/2017 I. Policy:  Management of the patient requiring mechanical ventilation, including readiness to wean and weaning protocol. The information provided serves as a guideline for patient management. Included in this guidelines is the ABCDE Bundle to provide guidance to staff for evidence based management of pain, agitation/anxiety and delirium in the intensive care unit. The goals of critical care analgesia and sedation are to facilitate mechanical ventilation, to prevent patient and caregiver injury, and to avoid the psychological and physiologic consequences of inadequate treatment of pain, anxiety, agitation, and delirium by maintaining a light level of sedation. Pain occurs commonly in adult ICU patients, regardless of admitting diagnosis. Therefore, pain should be frequently assessed and analgesic medications titrated to prevent adverse effects associated with either inadequate or excessive analgesia. Once pain has been addressed, anxiolytic and/or antipsychotic medications can be utilized to treat unresolved agitation/anxiety and delirium, with the goal to prevent over- or under sedation by using the Parnell Agitation Sedation Scale (RASS). Assertive management of these issues has been shown to reduce costs, improve ICU outcomes such as successful extubation and ICU length of stay, and allow for patients to participate in their own care. II. Purpose: The respiratory care practitioner and the critical care RN will utilize the following guideline to provide the most efficient and effective management of mechanical ventilators and weaning and extubation processes. Goals of Treatment: 1. Adequate management of patients pain and discomfort while maintaining a light level of                   sedation (RASS score of 0 to -1) 2. Both chronic and acute sources of pain should be identified and treated. 3. Sedative agents should be considered if patient still expresses discomfort and/or is not at RASS         goal of 0 to -1 despite adequate management of pain. 4. Patients requiring neuromuscular blockage must have continuous infusions of analgesic and              sedative agents. III. Responsibility: Director Respiratory Care Services and all Respiratory Care Practitioners  with documented competency as well as Critical Care RN staff. General Guidelines 1. Introduction to Ventilator Plan Phase 
a. Ventilator Management Phase, General Statement -  The plan should be initiated in patients who have a secure airway/require invasive mechanical ventilation (endotracheal or tracheostomy) only -   The provider determines the appropriate medications used for analgesia and agitation/anxiety along with the clinical pharmacist 
 
2. Monitoring Levels of Comfort 
a. Pain Assessment 
- Pain is monitored using the numerical scales - A pain assessment should be conducted, at a minimum, every 4 hours and as needed and per guidelines. - The level of pain should be determined as satisfactory by the patient based on patients baseline level of pain , considering any chronic pain that the patient may have. - If the patient is unable to communicate pain level, the nurse can assess for nonverbal indicators including facial grimacing, moaning, tachypnea, tachycardia, hypertension, diaphoresis, etc as a cue to begin further pain assessment. b.  Sedation Assessment - Sedation is monitored using the Parnell Agitation Sedation Scale (RASS) Target RASS RASS Description +4 Combative, violent, danger to staff 
  
+3 Pulls or removes tubes(s) or catheters; aggressive +2 Frequent nonpurposeful movement, fights ventilator +1 Anxious, apprehensive, but not aggressive  
0 Alert and calm  
-1 Awakens to voice (eye opening/contact) > 10 sec  
-2 Light sedation, briefly awakens to voice (eye opening/contact) < 10 sec  
-3 Moderate sedation, movement or eye opening. No eye contact  
-4 Deep sedation, no response to voice, but movement or eye opening to physical stimulation  
-5 Unarousable, no response to voice or physical stimulation  
 
-  Goal RASS is 0 to -1, unless otherwise specified by providers order. 
- Nursing staff should conduct the RASS every 4 hours and as needed to maintain goal RASS of 0 to -1. 
- If RASS is outside of goal range, discuss treatment options with provider. 
- A RASS score of +2 to +4 requires further assessment by the nurse. Causes of agitation/anxiety that should be considered include: 
a. Pulmonary -   endotracheal tube malposition or patency, mode of ventilation, pneumothorax, hypoxemia, hypercarbia 
b. Metabolic  hypoglycemia, hyponatremia, acute renal or hepatic failure 
c. Emotional upset  with information and awareness of critical condition, prognosis, need for surgical or invasive procedures, other interventions or complications, family or personal stressors 
- C. Sedation Assessment while using Neuromusclar Blocking Agents 
- D. Delirium Assessment 
a. the ICU ([facility]  ICU) 3. Analgesia The incidence of significant pain has been reported to be 50% or higher in both medical and surgical ICU patients. These patients also experience discomfort during routine/procedural ICU care and at rest.  However, patients may be unable to self-report their pain (either verbally or with other signs) because of an altered level of consciousness, the use of mechanical ventilation, or high doses of sedative agents or neuromuscular blocking agents.  
The short and long term consequences of unrelieved or inadequately treated pain are significant and include patient discomfort, decreased satisfaction with care by family and patient, delirium, agitation/anxiety, post traumatic stress disorder and depression. Therefore, routine assessment and treatment of pain should occur in all ICU patients. Causes and Treatment of Pain in the ICU 
a. Acute pain (post-operative, procedural pain, discomfort with usual ICU care or other acute episodes of pain-related to underlying disease) 1. Consider use of PCA for alert and oriented patients with pain needs not met by PRN dosing or opoids. 2. Preemptive analgesia should occur prior to chest tube removal, and should be considered for other procedural pain such as turning and repositioning, would drain removal, wound dressing change, tracheal suctioning, femoral catheter removal or place of central venous catheter. 3. Appropriate analgesic medications for preemptive analgesia are short acting intravenous (IV) agents (i.e. fentanyl, morphine, hydromorphone) a. Administration of analgesia before patient experiences noxious stimuli prevents amplification and hyperexcitability of the central nervous system. b. Analgesia for Mechanically Ventilated Patients: 
1. The approach to sedation and analgesia management for mechanically ventilated patients favors use of analgesia first sedation. The primary goal of this strategy is to address pain and discomfort first, and then if necessary, add anxiolytic agent. 2. Analgesia first sedation reduces dose escalation of medications, decreases the duration of mechanical ventilation and the incidence of VAP, improves the probability of successful extubation, and ultimately shortens ICU length of stay. 3. For pain management, analgesic medications are determined by the provider.    Intermittent dosing of the analgesic should be attempted first.   
If the patient requires more than 3 doses within 1 hour then provider should be contacted to consider continuous infusion. 4.  Analgesic options for mechanically ventilated patients include: 
a. Fentanyl which is considered the drug of choice for patients requiring continuous infusion. b.Morphine may be considered for those patients without renal dysfunction who are hemodynamically stable and require intermittent pain medication. Continuous infusions of morphine may be used for patientl who are receiving comfort care as part of end of life care. c.Hydromorphone is reserved for patients who are refractory to fentanyl or morphine and is typically admininstered by intermittent dosing. 4.  Agitation/Anxiety Background Agitation and anxiety frequently occur in ICU patients. Anxiolytic/sedation agents may be indicated to help relieve discomfort, improve synchrony with mechanical ventilation, and decrease the overall work of breathing. Pain control alone may be sufficient to make patients comfortable enough to require no anxiolytic/sedative agent. In addition, non-pharmacologic interventions such as repositioning or verbal assurance may be helpful to comfort or redirect an agitated patient. If these methods are unsuccessful, then anxiolytic/sedative medications such as propofol, dexmedetomidine, or benzodiazepines can be used. Selection of an anxiolytic should be based on the pharmacokinetic properties of the medication, patient specific characteristics, and sedation goal.   However, nonbenzodiazepine sedatives (ie propofol or dexmedetomidine) may be preferable over benzodiazepines (ie midazolam or larazepam) due to more favorable outcomes such as delirum. Causes and Treatment of Agitation/Anxiety 
a. Possible underlying causes of agitation and anxiety include pain, delirium, hypoxemia, hypoglycemia, hypotension, or withdrawal from alcohol  and other drugs.  
b. Analgesia first sedation should be attempted initially to manage pain and provide sedation in appropriate patients. Analgesia alone may be adequate to reach RASS goal of 0 to -1. If patient remains agitated or anxious despite adequate analgesia (ie RASS +2 to +4) then anxiolytic/sedative should be considered. c. The choice of anxiolytic should be based on desired levels of sedation (ie light sedation or deep sedation) with preference for the use of nonbenzodiazepines such as propofol or dexmedetomidine if appropriate. While light sedation (ie RASS 0 to -1) is preferred for most patients, there are instances when deep sedation (ie RASS -4 to -5) is desired. For example, in the setting of ventilator dysynchrony due to ARDS or for patients receiving NMB agents. d. Medications to maintain light sedation (ie RASS 0 to -1) include 1. Propofol continuous infusion can be considered for hemodynamically stable (ie SBP = 100, MAP = 65 and/or not requiring vasopressor support) patients requiring light sedation. Propofol has a quick onset (1-2 minutes) and offset of action, making it a good agent to assess neurological status and facilitate liberation from the mechanical ventilator. 2.Dexmedetomidine continuous infusion is a good option for hemodynamically stable patients requiring light sedation as it allows for a more awake, interactive patient is associated with less delirium. It has an intermediate onset of action (5-10 min). Therefore, abrupt titrations should be avoided, but use of prn haloperidol or benzodiazepine may be useful to manage agitation until the medication takes effect. 3. Antipsychotics are another option. In particular, haloperidol intermittently dosed may be useful for patients with symptoms of agitation/anxiety and delirium. 4.Benzodiazapines can also be considered for light sedation, but should be intermittently doses. Midazolam is an option for patients without renal dysfunction.   It has a short onset of action (2-5 minutes) making it a good agent for acute agitation/anxiety, but short duration of action resulting in frequent dosing. Lorazepam is another option. It has a longer onset of action (15-20 minutes) in comparison to midazolam, but longer duration of action. e. Medications to maintain deep sedation (RASS -4 to -5) include: 
1) Propofol continuous infusion should be considered as a first line option for hemodynamically stable patients. 2)Benzodiazepines can be considered as second line options for deep sedation. Studies comparing these agents to other sedatives have shown that they lead to worse outcomes including delirium, oversedation, delayed extubation, and longer time to discharge. Midazolam is one option for patients without renal dysfunction and lorazepam is another options. If patient requires more than 3 doses within 1 hour then contact provider  to consider initiation of continuous infusion. 5.  Daily Sedation Awakening Trial (SAT) from IV Continuous Analgesia/Sedation 
a. Patients are to have daily awakening from sedation while on continuous IV analgesia and/or sedation in the ICU. Continuous analgesia infusions may be maintained only if needed for active pain and RASS is at goal 0 - -1. Unit guideline is for the SAT to occur following ICP rounds each morning.   
b. The sedation awakening trial (SAT) is done regardless if the patient meets criteria for spontaneous breathing trial (SBT). c. SAT safety screen is assessed and SAT should not be performed if sedation is being used for active seizures, alcohol withdrawal, hemodynamically unstable or requiring support of vasoactive medications , in conjunction with NMB agents, if ICP is greater than 
20mmHg or if sedation is being used to control ICP, patients RASS is +3 or +4 (very agitated or combative).   Other exclusion criteria are:  if there is documentation of myocardial ischemia in the past 24 hours; or patient is receiving high frequency oscillator ventilation (HFOV) , if the patient has an open chest /abdomen or is receiving comfort care. d. Criteria for passing the SAT are the patient opened their eyes to verbal stimuli or tolerated sedative interruption without exhibiting failure criteria. 
e. Patients fail the SAT if the develop sustained anxiety, agitation, or pain; a respiratory rate of 35 per minutes for 5 minutes or longer, an SpO2 less than 88% for 5 minutes or longer; an acute cardiac dysrhythmia; two or more signs of respiratory distress including tachycardia, bradycardia; use of accessory muscles; diaphoresis; marked dyspnea; or myocardial ischemia. f. Respiratory therapy staff must verify with the nurse that continuous IV analgesia (unless being use  for active pain) and sedation (unless patient is receiving dexmedetomidine) is off prior to placing patient on SBT. g. DO NOT interrupt infusion of analgesia and sedation medications if patient is receiving neuromuscular blockade. 
h. Monitor level of wakefulness unless patient is awake and follows commands (RASS 0 to -1) or patient becomes uncomfortable or agitated (RASS +3 to +4) 
i. If agitation prevents successful awakening , administer bolus of analgesia and/or sedation then resume infusion of the medication at ½ previous dose and titrate as needed. 
j. If oversedation prevents successful awakening, hole infusion until at goal and resume ½ of prior infusion rate/dose if clinically indicated. 6. Delirium Background Delirium is characterized by the acute onset of cerebral dysfunction with a change or fluctuation baseline 
mental status, inattention, and either disorganized thinking or an altered level of consciousness. It affects up to 80% of mechanically ventilated adult ICU patients, and is associated with increased mortality,  
and treatment is important and may in turn allow for a patient to be conscious yet cooperative enough to participate in ventilator weaning trials and early mobilization efforts. Delirium can only be assessed in patients who are able to sufficiently interact and communicate with bedside 
clinicians (ie RASS -3 to +4). IV. Procedure: A. Assessment: The following criteria must be assessed prior to the initiation of weaning from mechanical ventilation. Note: The criteria are general guidelines and must be individualized for each patient. The patients primary nurse will be responsible, in coordination with the RT, the Spontaneous Awakening Trial). The RT will perform the SBT. B. Spontaneous Awakening Trials (SATs  also referred to as Sedation Vacation) and Spontaneous Breathing Trials (SBTs) performed to determine readiness to wean. 1. For patients who meet established criteria, such as those without active seizures, alcohol withdrawal and agitation, myocardial ischemia or those requiring cardiac support devices, without increased intracranial pressure and those not receiving neuromuscular blockade, the nurse will reduce the infusions of sedative by 50% of current used for sedation that was used to achieve a level of light sedation (Morgan Score 2 or RASS score of 0 to -1) and evaluate patient response to reduction of sedation. Analgesics required for pain control are continued during the test.  Obtain MD order to cover no SAT for that time period if patient has any exclusion criteria as described above. 2. Failure of the spontaneous awakening trial occurs when the patient shows symptoms such as increased agitation, anxiety, pain or signs of respiratory distress including respiratory rate >35/min or oxygen saturation <88% as well as development of acute cardiac arrhythmias. If these symptoms develop during the SAT, the nurse then restarts sedation at 75% of the previous dose and titrates the medications until the patient is comfortable and/or symptoms have abated. 3.  If the patient passes the SAT then the patient moves on to the Spontaneous Breathing Trials as performed by the RT. The SBT Safety Screen included the following:  No agitation, oxygen saturation > 88%, FIO2 < 50%, PEEP < 7.5 cm H20, no myocardial ischemia, no vasopressor use, and with inspiratory efforts. 4. Patients who pass the spontaneous awakening trial but fail the spontaneous breathing trial are placed back on full ventilator support and reassessed the next day. 5. Failure of the SBT (spontaneous breathing trail) includes any of the following:  Respiratory rate > 35/min, respiratory rate < 8/min, oxygen saturation < 88%, respiratory distress, mental status change, acute cardiac arrhythmia. 6. Extubation is considered for patients who tolerate the spontaneous awakening trial and pass the spontaneous breathing trial.   
C. Can the cause of respiratory failure be reversed (i.e. absence of high spinal cord injury or advanced ALS)? D. Is gas exchange adequate? 1. PaO2/FIO2 ratio > 150  200, 2. PEEP < 8 cm H20 
3. FIO2 < 50 
4. pH > 7.30 
5. Rapid shallow breathing index (f/VT) < 105 
E. Is patient hemodynamically stable? 1. Absence of clinically significant hypotension (minimal vasopressors such as Dopamine < 5mcg/kg/minute)? F. Is there evidence of intact respiratory drive (NIP/NIF >-53 WPI85, stable VC02)? G. Does patient have an adequate cough, airway clearance ability? H. Is there absence of excessive secretions? V. Initiation: A. The therapist shall consult with RN to determine if sedation can be discontinued or significantly decreased. If this can be achieved, the therapist shall implement the  
                  followin. Identify patient and verify name and account number via ID bracelet. 2. Perform hand hygiene per hospital policy utilizing Standard Precautions for all patients and following transmission-based isolation as indicated per hospital policy. 3. Perform a ONE-MINUTE SPONTANEOUS TRIAL AND ASSESSMENT. 4. Measure Rapid Shallow Breathing Index (RSBI) and monitor SpO2 and cardiovascular parameters during the spontaneous breathing assessment. 5. If SpO2 and cardiovascular parameters are stable, continue spontaneous breathing trial for at least 30 minutes and up to 120 minutes, as patient tolerates. 6. Monitor ventilatory status, SpO2, and cardiovascular status during spontaneous breathing trial. 
7. If patient has a successful trial, consider patient as a candidate for extubation and obtain order. 8. If patient fails the weaning trial, place back on ventilator and adjust settings to provide a non-fatiguing form of ventilatory support for the remainder of the day and night. 9. One attempt at weaning shall be performed each day until successful weaning occurs. The RCP will make every attempt to begin the spontaneous breathing trials between 0500 and 0600 to provide documentation of the trial when the pulmonologist makes rounds. B.  Assessment of SBT or PST: 
1. Is gas exchange acceptable? 2. PaO2 > 60 mm Hg. 3. PH > 7.30 
4. Increase in PaCO2  < 10 mm Hg C. Is patient hemodynamically stable? 1. HR < 120 beats/minute 2. HR  < 20% 3. Systolic BP < 230 and > 90 mmHg 4. BP  < 20%, no vasopressors required D. Does patient have stable ventilatory pattern? 1. Sustained RR < 30 breaths per minute 2. Normal and stable VCO2 3. Patient is not demonstrating any signs of increased work of breathing, such as increased use of accessory muscles. E. Mental status stable throughout trial? 
1. Absence of changes such as somnolence, excessive agitation or anxiety 2. Absence of diaphoresis during trial? 
IV. Safety: A. The RCP shall monitor patient according to the above guidelines.  If at any time during the weaning process, the respiratory therapist or nurse feels that the patient is not tolerating weaning, the therapist shall place patient back on previous ventilator settings. B. The patient shall be reassessed and the weaning process should be continued the following day. V. Reportable Conditions: A. The therapist shall notify the physician, as appropriate, for any of the following         conditions: 1. FIO2 increase (sustained) at 10% or greater 2. Poor patient/ventilator interface in spite of adjustments 3. Need for increased sedation for respiratory distress 4. Need for increasing ventilating pressures (i.e. PEEP, PIP, MAP) 5. ABG results meeting panic value criteria or other clinical signs indicating deterioration of patients condition. 6. Unplanned extubation. 7. Unexplained sustained increase in PIP greater than 10 cm H2O. 
8. Assessment results regarding ventilator discontinuance process. VI. Ventilator protocol management A. The following items should be maintained for patients who are being mechanically           ventilated. 1. Obtain STAT Chest X-Ray for ET tube placement after insertion. 2. Chest X-Ray q a.m. while on ventilator. 3. ABGs 30 - 60 minutes after being stable on the ventilator. 4. ABG's q a.m. while on ventilator and prn. 
5. Do spontaneous breathing trials when patient is hemodynamically stable, responsive, and without fever. 6. Terminate trials if patient exhibits signs of respiratory distress. 7. Therapists should maintain ABG s as follows: 
    a. pH -  7.30 - 7.50              
    b. PaO2 -   60  100  
     8. Racemic Epinephrine (0.5cc) for post extubation stridor (2 UA treatments max.) 
 
VII. Early Mobilization Mobility Level Criteria Start at Level 1 if:  
PaO2/FIO2 <250 Positive end-expiratory Pressure (PEEP) >=10 cm H2O  
O2 saturation <90% Respiratory Rate (RR) Not within 10-30 per min Cardiac arrhythmias or ischemia New onset Heart Rate  (HR) <60 or >120 beats per min Mean arterial pressure (MAP) <55 or >140 mmHg Systolic blood pressure (SBP) <90 or > 180 mmHg Vasopressor infusion New or increasing Parnell Agitation Scale (RASS) < - 3 Level I:   Breathe  (Rass -5 to -3) HOB Angle  improve VAP protocol compliance ? Visually confirm the [facility] is elevated >= 30 degrees to comply with VAP prevention protocols ? The Centers for Disease Control and Prevention recommends an HOB angle of 30-45 degrees , unless contraindicated Additional activities to be implemented ? Every 2 hour turning ? Passive range of motion ? Up to 20 degrees reverse trendelenburg with lower extremity exercise/retracting footboard ? Continuous lateral rotation therapy can be considered part of early mobility therapy in patients who are at high risk for pulmonary complications Move to Level 2 when the patient 
- Has acceptable oxygenation/hemodynamics - Tolerates q 2 turning - Tolerates HOB > 30 degrees or up to 20 degrees reverse trendelenburg Level 2 :Tilt  Patient Assessment Rass > -3  (eg, opens eyes, may have profound weakness) Up to 20 degrees Reverse Trendelenburg position and 10 degrees minimum HOB 
- Reverse Trendelenburg positioning allows for orthostatic position in fragile patients - If available , use in conjunction with retracting foot section to allow for partial weight bearing prior to sitting up in the bed or getting out of bed Additional activities to be implemented -  Maintain HOB >/= 30 degrees - Q 2 hour turning - Passive/active range of motion - Legs dependent - PT consultation Move to Level 3 when the patient . Dipesh Eminence -Tolerates active- assistance exercises 2 times per day 
  -Tolerates lower extremity exercises against footboard/Up to 20 degrees Reverse Trendelenburg 
  -Tolerates legs dependent /HOB 45 degrees Level 3 :  SIT  (Rass >- 1 (eg , weak but may move arms/legs independently) Full chair position (footboard on) ?  Full upright positioning allows for diaphragmatic excursion and lung expansion ? Sitting with legs in a dependent position facilitates gas exchange Additional activities to be implemented - Maintain HOB >= 30 degrees - Q 2 hour turning (assisted) - Active range of motion  PT/OT actively involved - Encourage activities of daily living 
- Dangling, if patient can move arm against gravity Move to Level 4 when the patient . Arnaud Bundy - Tolerates increasing active exercise in bed - Actively assists with every- 2- hour turning or turns independently - Tolerates full chair position 3 times/day Level 4:  Stand ( RASS >0 (eg, weak but may tolerate increased activity) Stand Attempts ? Full chair position (footboard off/feet on the floor) ? Consider using a sit-to-stand lift ? Pivot to chair, it tolerates partial weight bearing Additional activities to be implemented - Maintain head of bed >= 30 degrees - Q 2 hr turning (self/assisted) - Active range of motion - Encourage activities of daily living 
- PT/OT actively involved Move to Level 5 when the patient . 
- Can successfully comply with all activities - Tolerates trial periods of full chair position (footboard off/feet on floor) 3 times per day - Tolerates partial weight-bearing stand and pivots to chair Level 5 :  Move  (RASS > 0    (eg, weak but may tolerate increased activity) Achieve out of bed ? Utilize mobile floor life to ambulate to bedside chair Additional activities to be implemented - Maintain HOB > = 30 degrees - Q 2 hour turning (self/assisted) - Active range of motion - Patient stands/bears weight > 1 minute - Patient marches in place 
- PT/OT actively involved Patient continues to ambulate progressively longer distances as tolerated until they consistently participate and move independently. E Approved by Critical Care Committee 2-19-09 N [facility] Clinical Guidelines ABCDE DOC Flowsheet Content Variables to select when addressing section Comments ABCDE Initiated ? Yes/No  RN to address minimum q 24 hours (day shift) Target RASS ? 0 = alert and oriented ? -1 = drowsy ? -2 = light sedation ? -3= moderate sedation ? -4= deep sedation Target on standard ventilator setting should be -2; -4 with oscillator CAM -ICU ? Positive ? Negative ? Unable to assess Delirium assessment SAT Safety Screen Passed ? Yes 
? No Select yes if proceeding on to the sedation vacation (reduction of continuous sedative drip by directed by MD) Select no if your patient has any of the below reasons for not proceeding on to the daily awakening sedation vacation trial  
SAT Screen for Failure ? Active seizures ? Acute delirium tremors ? Agitation that threatens accidental line/tube removal 
? On paralytics ? MI (24-48hr) ? Abnormal ICP ? Open abdomen Select one of the options when the patient will not undergo the sedation vacation  ALSO MUST OBTAIN AN ORDER FOR leslie Almanzar written under nursing miscellaneous for now by either the NP or Intensivist  
Daily sedation Vacation/assessment of  ? Yes 
? No 
? Not applicable If yes, MUST see the reduction in sedation on the [facility] and please place in the comment section of the sedative sedation vacation started SBT Safety Screen Passed ? Yes 
? No Select Yes if the patient has none of the below listed reasons for not proceeding on to the SBT following reduction of sedation SBT Screen Reason for Failure ? Agitation ? O2 Sat < or = 88% 
? FIO2 > 50% ? PEEP >7 
? MI 
? Vasopressor Use 
? Bilevel setting on Vent ? Oscillator in use ? Increased resp effort Select reason as appropriate for NOT proceeding on to the SBT Wake Up and Breathe Protocol

## 2019-07-03 NOTE — PROGRESS NOTES
Initial visit made to patient and a prayer was provided. The patient was not alert. A  card was left.  met her granddaughter later when she came to visit her grandmother.         [employer]

## 2019-07-03 NOTE — PROGRESS NOTES
Pharmacokinetic Consult to Pharmacist    Belkys Facundo is a 74 y.o. female being treated for CAP with vancomycin and pip/tazo. Height: 5' 2\" (157.5 cm)  Weight: 107.4 kg (236 lb 12.4 oz)  Lab Results   Component Value Date/Time    BUN 22 07/03/2019 03:34 AM    Creatinine 1.02 (H) 07/03/2019 03:34 AM    WBC 28.5 (H) 07/03/2019 03:34 AM    Procalcitonin <0.1 07/03/2019 03:34 AM    Lactic Acid (POC) 1.69 07/03/2019 05:56 AM      Estimated Creatinine Clearance: 54.9 mL/min (A) (based on SCr of 1.02 mg/dL (H)). CULTURES:  All Micro Results     Procedure Component Value Units Date/Time    CULTURE, RESPIRATORY/SPUTUM/BRONCH [place] [[id number]] Collected:  07/03/19 0643    Order Status:  Completed Specimen:  Sputum Updated:  07/03/19 0745    CULTURE, BLOOD [[id number]] Collected:  07/03/19 0641    Order Status:  Completed Specimen:  Blood Updated:  07/03/19 0701    CULTURE, BLOOD [[id number]] Collected:  07/03/19 0515    Order Status:  Completed Specimen:  Blood Updated:  07/03/19 0635     Special Requests: CENTRAL LINE     Culture result: PENDING          Day 1 of vancomycin. Goal trough is 15-20. Vancomycin dose initiated at 2000 mg IV x 1 and will be followed by 1500 mg IV q18h. Levels will be ordered as clinically indicated. Pharmacy will continue to follow. Please call with any questions.      Thank you,  Shiva Chauhan, PharmD  Clinical Pharmacist  [phone]

## 2019-07-03 NOTE — H&P
Date of Surgery Update:  Nick Núñez was seen and examined. History and physical has been reviewed. The patient has been examined.  There have been no significant clinical changes since the completion of the originally dated History and Physical.    Signed By: Maciej Juarez MD     July 3, 2019 1:08 PM

## 2019-07-04 ENCOUNTER — APPOINTMENT (OUTPATIENT)
Dept: GENERAL RADIOLOGY | Age: 74
DRG: 853 | End: 2019-07-04
Attending: INTERNAL MEDICINE
Payer: MEDICARE

## 2019-07-04 LAB
ANION GAP SERPL CALC-SCNC: 9 MMOL/L (ref 7–16)
APTT PPP: 154.2 SEC (ref 24.7–39.8)
APTT PPP: 66.4 SEC (ref 24.7–39.8)
APTT PPP: 91.9 SEC (ref 24.7–39.8)
ARTERIAL PATENCY WRIST A: YES
BASE DEFICIT BLD-SCNC: 4 MMOL/L
BASOPHILS # BLD: 0 K/UL (ref 0–0.2)
BASOPHILS NFR BLD: 0 % (ref 0–2)
BDY SITE: ABNORMAL
BNP SERPL-MCNC: 314 PG/ML
BODY TEMPERATURE: 98.6
BUN SERPL-MCNC: 24 MG/DL (ref 8–23)
CA-I BLD-MCNC: 4.52 MG/DL (ref 4–5.2)
CALCIUM SERPL-MCNC: 7.6 MG/DL (ref 8.3–10.4)
CHLORIDE SERPL-SCNC: 112 MMOL/L (ref 98–107)
CO2 BLD-SCNC: 22 MMOL/L
CO2 SERPL-SCNC: 24 MMOL/L (ref 21–32)
COLLECT TIME,HTIME: 310
CREAT SERPL-MCNC: 0.9 MG/DL (ref 0.6–1)
DIFFERENTIAL METHOD BLD: ABNORMAL
EOSINOPHIL # BLD: 0 K/UL (ref 0–0.8)
EOSINOPHIL NFR BLD: 0 % (ref 0.5–7.8)
ERYTHROCYTE [DISTWIDTH] IN BLOOD BY AUTOMATED COUNT: 15.1 % (ref 11.9–14.6)
EXHALED MINUTE VOLUME, VE: 7.5 L/MIN
GAS FLOW.O2 O2 DELIVERY SYS: ABNORMAL L/MIN
GAS FLOW.O2 SETTING OXYMISER: 14 BPM
GLUCOSE SERPL-MCNC: 121 MG/DL (ref 65–100)
HCO3 BLD-SCNC: 21 MMOL/L (ref 22–26)
HCT VFR BLD AUTO: 32 % (ref 35.8–46.3)
HGB BLD-MCNC: 10.3 G/DL (ref 11.7–15.4)
IMM GRANULOCYTES # BLD AUTO: 0 K/UL (ref 0–0.5)
IMM GRANULOCYTES NFR BLD AUTO: 0 % (ref 0–5)
INSPIRATION.DURATION SETTING TIME VENT: 0.9 SEC
LYMPHOCYTES # BLD: 0.7 K/UL (ref 0.5–4.6)
LYMPHOCYTES NFR BLD: 7 % (ref 13–44)
MAGNESIUM SERPL-MCNC: 2 MG/DL (ref 1.8–2.4)
MCH RBC QN AUTO: 30.7 PG (ref 26.1–32.9)
MCHC RBC AUTO-ENTMCNC: 32.2 G/DL (ref 31.4–35)
MCV RBC AUTO: 95.5 FL (ref 79.6–97.8)
MONOCYTES # BLD: 0.3 K/UL (ref 0.1–1.3)
MONOCYTES NFR BLD: 3 % (ref 4–12)
NEUTS SEG # BLD: 8.4 K/UL (ref 1.7–8.2)
NEUTS SEG NFR BLD: 89 % (ref 43–78)
NRBC # BLD: 0 K/UL (ref 0–0.2)
O2/TOTAL GAS SETTING VFR VENT: 40 %
PCO2 BLD: 37.4 MMHG (ref 35–45)
PEEP RESPIRATORY: 8 CMH2O
PH BLD: 7.36 [PH] (ref 7.35–7.45)
PHOSPHATE SERPL-MCNC: 4.5 MG/DL (ref 2.3–3.7)
PLATELET # BLD AUTO: 145 K/UL (ref 150–450)
PMV BLD AUTO: 11.1 FL (ref 9.4–12.3)
PO2 BLD: 110 MMHG (ref 75–100)
POTASSIUM SERPL-SCNC: 3.3 MMOL/L (ref 3.5–5.1)
POTASSIUM SERPL-SCNC: 4.3 MMOL/L (ref 3.5–5.1)
PROCALCITONIN SERPL-MCNC: 4.3 NG/ML
RBC # BLD AUTO: 3.35 M/UL (ref 4.05–5.2)
SAO2 % BLD: 98 % (ref 95–98)
SERVICE CMNT-IMP: 0
SERVICE CMNT-IMP: ABNORMAL
SODIUM SERPL-SCNC: 145 MMOL/L (ref 136–145)
SPECIMEN TYPE: ABNORMAL
VENTILATION MODE VENT: ABNORMAL
VT SETTING VENT: 450 ML
WBC # BLD AUTO: 9.4 K/UL (ref 4.3–11.1)

## 2019-07-04 PROCEDURE — 74011250636 HC RX REV CODE- 250/636: Performed by: INTERNAL MEDICINE

## 2019-07-04 PROCEDURE — 71045 X-RAY EXAM CHEST 1 VIEW: CPT

## 2019-07-04 PROCEDURE — 84132 ASSAY OF SERUM POTASSIUM: CPT

## 2019-07-04 PROCEDURE — 74011000258 HC RX REV CODE- 258: Performed by: INTERNAL MEDICINE

## 2019-07-04 PROCEDURE — 85025 COMPLETE CBC W/AUTO DIFF WBC: CPT

## 2019-07-04 PROCEDURE — 36600 WITHDRAWAL OF ARTERIAL BLOOD: CPT

## 2019-07-04 PROCEDURE — 84100 ASSAY OF PHOSPHORUS: CPT

## 2019-07-04 PROCEDURE — 83735 ASSAY OF MAGNESIUM: CPT

## 2019-07-04 PROCEDURE — 80048 BASIC METABOLIC PNL TOTAL CA: CPT

## 2019-07-04 PROCEDURE — 65610000001 HC ROOM ICU GENERAL

## 2019-07-04 PROCEDURE — 82803 BLOOD GASES ANY COMBINATION: CPT

## 2019-07-04 PROCEDURE — 83880 ASSAY OF NATRIURETIC PEPTIDE: CPT

## 2019-07-04 PROCEDURE — 99233 SBSQ HOSP IP/OBS HIGH 50: CPT | Performed by: INTERNAL MEDICINE

## 2019-07-04 PROCEDURE — 82330 ASSAY OF CALCIUM: CPT

## 2019-07-04 PROCEDURE — 85730 THROMBOPLASTIN TIME PARTIAL: CPT

## 2019-07-04 PROCEDURE — 94640 AIRWAY INHALATION TREATMENT: CPT

## 2019-07-04 PROCEDURE — 94003 VENT MGMT INPAT SUBQ DAY: CPT

## 2019-07-04 PROCEDURE — 84145 PROCALCITONIN (PCT): CPT

## 2019-07-04 PROCEDURE — 74011000250 HC RX REV CODE- 250: Performed by: INTERNAL MEDICINE

## 2019-07-04 RX ORDER — POTASSIUM CHLORIDE 14.9 MG/ML
20 INJECTION INTRAVENOUS
Status: COMPLETED | OUTPATIENT
Start: 2019-07-04 | End: 2019-07-04

## 2019-07-04 RX ORDER — HEPARIN SODIUM 5000 [USP'U]/ML
35 INJECTION, SOLUTION INTRAVENOUS; SUBCUTANEOUS ONCE
Status: COMPLETED | OUTPATIENT
Start: 2019-07-04 | End: 2019-07-04

## 2019-07-04 RX ADMIN — LEVALBUTEROL HYDROCHLORIDE 0.63 MG: 0.63 SOLUTION RESPIRATORY (INHALATION) at 11:19

## 2019-07-04 RX ADMIN — DILTIAZEM HYDROCHLORIDE 12.5 MG/HR: 5 INJECTION, SOLUTION INTRAVENOUS at 17:23

## 2019-07-04 RX ADMIN — LEVALBUTEROL HYDROCHLORIDE 0.63 MG: 0.63 SOLUTION RESPIRATORY (INHALATION) at 15:16

## 2019-07-04 RX ADMIN — CALCIUM GLUCONATE 1 G: 98 INJECTION, SOLUTION INTRAVENOUS at 05:28

## 2019-07-04 RX ADMIN — LEVALBUTEROL HYDROCHLORIDE 0.63 MG: 0.63 SOLUTION RESPIRATORY (INHALATION) at 19:45

## 2019-07-04 RX ADMIN — FAMOTIDINE 20 MG: 10 INJECTION INTRAVENOUS at 10:00

## 2019-07-04 RX ADMIN — VANCOMYCIN HYDROCHLORIDE 1500 MG: 10 INJECTION, POWDER, LYOPHILIZED, FOR SOLUTION INTRAVENOUS at 03:07

## 2019-07-04 RX ADMIN — HYDROCORTISONE SODIUM SUCCINATE 100 MG: 100 INJECTION, POWDER, FOR SOLUTION INTRAMUSCULAR; INTRAVENOUS at 13:02

## 2019-07-04 RX ADMIN — HEPARIN SODIUM 10 UNITS/KG/HR: 5000 INJECTION, SOLUTION INTRAVENOUS at 19:34

## 2019-07-04 RX ADMIN — HYDROCORTISONE SODIUM SUCCINATE 100 MG: 100 INJECTION, POWDER, FOR SOLUTION INTRAMUSCULAR; INTRAVENOUS at 21:33

## 2019-07-04 RX ADMIN — FAMOTIDINE 20 MG: 10 INJECTION INTRAVENOUS at 20:18

## 2019-07-04 RX ADMIN — Medication 10 ML: at 13:02

## 2019-07-04 RX ADMIN — VANCOMYCIN HYDROCHLORIDE 1500 MG: 10 INJECTION, POWDER, LYOPHILIZED, FOR SOLUTION INTRAVENOUS at 20:07

## 2019-07-04 RX ADMIN — Medication 10 ML: at 21:34

## 2019-07-04 RX ADMIN — POTASSIUM CHLORIDE 20 MEQ: 200 INJECTION, SOLUTION INTRAVENOUS at 05:30

## 2019-07-04 RX ADMIN — PIPERACILLIN SODIUM,TAZOBACTAM SODIUM 3.38 G: 3; .375 INJECTION, POWDER, FOR SOLUTION INTRAVENOUS at 11:15

## 2019-07-04 RX ADMIN — PIPERACILLIN SODIUM,TAZOBACTAM SODIUM 3.38 G: 3; .375 INJECTION, POWDER, FOR SOLUTION INTRAVENOUS at 20:04

## 2019-07-04 RX ADMIN — LEVALBUTEROL HYDROCHLORIDE 0.63 MG: 0.63 SOLUTION RESPIRATORY (INHALATION) at 08:53

## 2019-07-04 RX ADMIN — HEPARIN SODIUM 3550 UNITS: 5000 INJECTION INTRAVENOUS; SUBCUTANEOUS at 21:33

## 2019-07-04 RX ADMIN — POTASSIUM CHLORIDE 20 MEQ: 200 INJECTION, SOLUTION INTRAVENOUS at 07:47

## 2019-07-04 RX ADMIN — PIPERACILLIN SODIUM,TAZOBACTAM SODIUM 3.38 G: 3; .375 INJECTION, POWDER, FOR SOLUTION INTRAVENOUS at 05:06

## 2019-07-04 RX ADMIN — HYDROCORTISONE SODIUM SUCCINATE 100 MG: 100 INJECTION, POWDER, FOR SOLUTION INTRAMUSCULAR; INTRAVENOUS at 05:38

## 2019-07-04 NOTE — PROGRESS NOTES
Ventilator check complete; patient has a #7.5 ET tube secured at the 22 at the lip. Patient is sedated. Patient is able to follow commands. Breath sounds are clear. Trachea is midline, Negative for subcutaneous air, and chest excursion is symmetric. Patient is also Negative for cyanosis. All alarms are set and audible. Resuscitation bag is at the head of the bed. Ventilator Settings  Mode FIO2 Rate Tidal Volume Pressure PEEP I:E Ratio   PRVC  30 %   14 450 ml     8 cm H20  1:4      Peak airway pressure: 16 cm H2O   Minute ventilation: 6.5 l/min     ABG: No results for input(s): PH, PCO2, PO2, HCO3 in the last 72 hours.       Davi Addison, RT

## 2019-07-04 NOTE — PROGRESS NOTES
Critical Care Daily Progress Note: 7/4/2019  Admission Date: 7/3/2019     The patient's chart is reviewed and the patient is discussed with the staff. Patient is a 74 y.o.  female with hx of PMR, ? Sjogrens presents with worsening dyspnea from home. Found to be in Afib with RVR with R infiltrate. Intubated in ER and CVL placed. Admitted to ICU.  Pt had been on MTX in the past without benefit and has not been on biologics.        Subjective:     Sedated     Current Facility-Administered Medications   Medication Dose Route Frequency    potassium chloride 20 mEq in 100 ml IVPB  20 mEq IntraVENous Q2H    sodium chloride (NS) flush 5-40 mL  5-40 mL IntraVENous Q8H    sodium chloride (NS) flush 5-40 mL  5-40 mL IntraVENous PRN    levalbuterol (XOPENEX) nebulizer soln 0.63 mg/3 mL  0.63 mg Nebulization QID RT    piperacillin-tazobactam (ZOSYN) 3.375 g in 0.9% sodium chloride (MBP/ADV) 100 mL  3.375 g IntraVENous Q8H    famotidine (PF) (PEPCID) 20 mg in sodium chloride 0.9% 10 mL injection  20 mg IntraVENous Q12H    vancomycin (VANCOCIN) 1500 mg in  ml infusion  1,500 mg IntraVENous Q18H    fentaNYL in normal saline (pf) 25 mcg/mL infusion  0-200 mcg/hr IntraVENous TITRATE    midazolam (VERSED) 100 mg in 0.9% sodium chloride 100 mL infusion  0-10 mg/hr IntraVENous TITRATE    hydrocortisone Sod Succ (PF) (SOLU-CORTEF) injection 100 mg  100 mg IntraVENous Q8H    dilTIAZem (CARDIZEM) 125 mg in dextrose 5% 125 mL infusion  0-15 mg/hr IntraVENous TITRATE    heparin 25,000 units in dextrose 500 mL infusion  12-25 Units/kg/hr IntraVENous TITRATE       Review of Systems    Constitutional:  negative for fever, chills, sweats  Cardiovascular:  negative for chest pain, palpitations, syncope, edema  Gastrointestinal:  negative for dysphagia, reflux, vomiting, diarrhea, abdominal pain, or melena  Neurologic:  negative for focal weakness, numbness, headache        Objective:     Vitals: 07/04/19 0700 07/04/19 0715 07/04/19 0730 07/04/19 0745   BP: 109/58 114/59 121/59 110/57   Pulse: (!) 101 (!) 101 93 100   Resp: 15 16 (!) 32 26   Temp:       SpO2: 99% 96% 98% 98%   Weight:       Height:           Intake and Output:   07/02 1901 - 07/04 0700  In: 8008.7 [I.V.:8008.7]  Out: 3516 [Urine:1650]  No intake/output data recorded. Physical Exam:          Constitutional:  intubated and mechanically ventilated. EENMT:  Sclera clear, pupils equal, oral mucosa moist  Respiratory: fairly clear  Cardiovascular:  Tachy iRR with no M,G,R;  Gastrointestinal:  soft with no tenderness; positive bowel sounds present  Musculoskeletal:  warm with no cyanosis, no lower extremity edema  Skin:  no jaundice or ecchymosis  Neurologic: no gross neuro deficits     Psychiatric:  alert and oriented x 3    LINES:    ETT, RIJ CVL, covarrubias    DRIPS:    Cardizem 5, heparin, fentanyl    CXR:            Ventilator Settings  Mode FIO2 Rate Tidal Volume Pressure PEEP   PRVC  30 %(decreased per ABG results)    450 ml     8 cm H20      Peak airway pressure: 16 cm H2O   Minute ventilation: 6.3 l/min     ABG:   Recent Labs     07/04/19  0311 07/03/19  0344   PHI 7.358 7.301*   PCO2I 37.4 45.8*   PO2I 110* 84   HCO3I 21.0* 22.6        LAB  No results for input(s): GLUCPOC in the last 72 hours. No lab exists for component: [place]  Recent Labs     07/04/19  0355 07/03/19  0334   WBC 9.4 28.5*   HGB 10.3* 14.5   HCT 32.0* 44.8   * 283     Recent Labs     07/04/19  0355 07/03/19  0334    142   K 3.3* 4.3   * 107   CO2 24 24   * 255*   BUN 24* 22   CREA 0.90 1.02*   MG 2.0  --    PHOS 4.5*  --    CA 7.6* 9.3   ALB  --  3.6   SGOT  --  38*     No results for input(s): LCAD, LAC in the last 72 hours. Results for Arce Delay (MRN [id number]) as of 7/4/2019 07:51   Ref.  Range 7/3/2019 09:42 7/3/2019 19:24 7/4/2019 03:55   BNP Latest Ref Range: 0 pg/mL 981 (H)       Blood and sputum cx: Pndg      BAL:    Results for Zarina Paris (MRN [id number]) as of 7/4/2019 07:51   Ref. Range 7/3/2019 12:45   FLD BASOS Latest Units: % 1   BRCH NEUTROPHIL Latest Units: % 38   BRCH LYMPHS Latest Units: % 48   BRCH MACROPHAGES Latest Units: % 13   BRCH EOSINS Latest Units: % 0       Assessment:  (Medical Decision Making)     Hospital Problems  Date Reviewed: 6/9/2019          Codes Class Noted POA    * (Principal) Acute hypoxemic respiratory failure ([facility] ICD-10-CM: J96.01  ICD-9-CM: 518.81  7/3/2019 Unknown    Comfortable on vent. 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 Looks to have more effusions. May benefit from lasix. Need to replete K. Severe sepsis ([facility] ICD-10-CM: A41.9, R65.20  ICD-9-CM: 038.9, 995.92  7/3/2019 Unknown    WBC markedly better    Hypotension due to hypovolemia ICD-10-CM: I95.89, E86.1  ICD-9-CM: 458.8, 276.52  7/3/2019 Unknown    BP better. Polymyalgia rheumatica ([facility] ICD-10-CM: M35.3  ICD-9-CM: 006  12/2/2015 Yes    Dr. Janice Oliver recommending continuing hydroxychoroquin and low dose SM. HTN (hypertension) ICD-10-CM: I10  ICD-9-CM: 401.9  8/12/2013 Yes    BP controlled    Severe obesity with body mass index (BMI) of 35.0 to 39.9 with comorbidity ([facility] ICD-10-CM: E66.01  ICD-9-CM: 278.01  8/12/2013 Yes        Sjogren's syndrome ([facility] ICD-10-CM: M35.00  ICD-9-CM: 710.2  8/12/2013 Yes              Plan:  (Medical Decision Making)     Check BNP and follow up PCT. Replete K. Await cardiology input for RVR. Wean to PSV if able to control VR. Follow up BAL studies. Continue Abx/HC.     --    More than 50% of the time documented was spent in face-to-face contact with the patient and in the care of the patient on the floor/unit where the patient is located.     Brian Godfrey MD

## 2019-07-04 NOTE — PROGRESS NOTES
7/4/2019 9:54 AM    Admit Date: 7/3/2019        Subjective:     Iram Pearce has been alert writing notes on vent On low dose cardiazem drip for a fib Has h/o ablation for WPW  . Objective:      Visit Vitals  /57   Pulse 88   Temp 98.8 °F (37.1 °C)   Resp 18   Ht 5' 2\" (1.575 m)   Wt 107.4 kg (236 lb 12.4 oz)   SpO2 99%   BMI 43.31 kg/m²       Physical Exam:  Heart: irregularly irregular rhythm  Lungs: rhonchi L base    Data Review:   Labs:    Recent Results (from the past 24 hour(s))   CULTURE, RESPIRATORY/SPUTUM/BRONCH W GRAM STAIN    Collection Time: 07/03/19 12:45 PM   Result Value Ref Range    Special Requests: NO SPECIAL REQUESTS      GRAM STAIN PENDING     Culture result: NO GROWTH 1 DAY     BRONCH. LAVAGE DIFF. Collection Time: 07/03/19 12:45 PM   Result Value Ref Range    BRCH LAVAGE DIFF          BRCH NEUTROPHIL 38 %    BRCH LYMPHS 48 %    BRCH MACROPHAGES 13 %    BRCH EOSINS 0 %    FLD BASOS 1 %   TROPONIN I    Collection Time: 07/03/19  7:24 PM   Result Value Ref Range    Troponin-I, Qt. <0.02 (L) 0.02 - 0.05 NG/ML   POC G3    Collection Time: 07/04/19  3:11 AM   Result Value Ref Range    Device: VENT      FIO2 (POC) 40 %    pH (POC) 7.358 7.35 - 7.45      pCO2 (POC) 37.4 35 - 45 MMHG    pO2 (POC) 110 (H) 75 - 100 MMHG    HCO3 (POC) 21.0 (L) 22 - 26 MMOL/L    sO2 (POC) 98 95 - 98 %    Base deficit (POC) 4 mmol/L    Mode Pressure regulated volume control      Tidal volume 450 ml    Set Rate 14 bpm    PEEP/CPAP (POC) 8 cmH2O    Allens test (POC) YES      Inspiratory Time 0.9 sec    Site RIGHT RADIAL      Patient temp.  98.6      Specimen type (POC) ARTERIAL      Performed by Gene     CO2, POC 22 MMOL/L    Respiratory comment: 0      Exhaled minute volume 7.50 L/min    COLLECT TIME 418     METABOLIC PANEL, BASIC    Collection Time: 07/04/19  3:55 AM   Result Value Ref Range    Sodium 145 136 - 145 mmol/L    Potassium 3.3 (L) 3.5 - 5.1 mmol/L    Chloride 112 (H) 98 - 107 mmol/L    CO2 24 21 - 32 mmol/L    Anion gap 9 7 - 16 mmol/L    Glucose 121 (H) 65 - 100 mg/dL    BUN 24 (H) 8 - 23 MG/DL    Creatinine 0.90 0.6 - 1.0 MG/DL    GFR est AA >60 >60 ml/min/1.73m2    GFR est non-AA >60 >60 ml/min/1.73m2    Calcium 7.6 (L) 8.3 - 10.4 MG/DL   MAGNESIUM    Collection Time: 07/04/19  3:55 AM   Result Value Ref Range    Magnesium 2.0 1.8 - 2.4 mg/dL   PHOSPHORUS    Collection Time: 07/04/19  3:55 AM   Result Value Ref Range    Phosphorus 4.5 (H) 2.3 - 3.7 MG/DL   CBC WITH AUTOMATED DIFF    Collection Time: 07/04/19  3:55 AM   Result Value Ref Range    WBC 9.4 4.3 - 11.1 K/uL    RBC 3.35 (L) 4.05 - 5.2 M/uL    HGB 10.3 (L) 11.7 - 15.4 g/dL    HCT 32.0 (L) 35.8 - 46.3 %    MCV 95.5 79.6 - 97.8 FL    MCH 30.7 26.1 - 32.9 PG    MCHC 32.2 31.4 - 35.0 g/dL    RDW 15.1 (H) 11.9 - 14.6 %    PLATELET 918 (L) 176 - 450 K/uL    MPV 11.1 9.4 - 12.3 FL    ABSOLUTE NRBC 0.00 0.0 - 0.2 K/uL    DF AUTOMATED      NEUTROPHILS 89 (H) 43 - 78 %    LYMPHOCYTES 7 (L) 13 - 44 %    MONOCYTES 3 (L) 4.0 - 12.0 %    EOSINOPHILS 0 (L) 0.5 - 7.8 %    BASOPHILS 0 0.0 - 2.0 %    IMMATURE GRANULOCYTES 0 0.0 - 5.0 %    ABS. NEUTROPHILS 8.4 (H) 1.7 - 8.2 K/UL    ABS. LYMPHOCYTES 0.7 0.5 - 4.6 K/UL    ABS. MONOCYTES 0.3 0.1 - 1.3 K/UL    ABS. EOSINOPHILS 0.0 0.0 - 0.8 K/UL    ABS. BASOPHILS 0.0 0.0 - 0.2 K/UL    ABS. IMM.  GRANS. 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 titrate cardiazem drip  07/03/2019    Pulmonary infiltrates vs acute congestive heart failure 07/03/2019    Severe sepsis ([address] 07/03/2019    Hypotension due to hypovolemia 07/03/2019    Polymyalgia rheumatica ([facility] 12/02/2015    Gouty arthritis  NOS 12/02/2015    Mixed hyperlipidemia 12/02/2015    WPW (Suresh-Parkinson-White syndrome) stable 08/12/2013    HTN (hypertension) 08/12/2013    Severe obesity with body mass index (BMI) of 35.0 to 39.9 with comorbidity ([facility] 08/12/2013    Other and unspecified hyperlipidemia 08/12/2013    Sjogren's syndrome ([facility] 08/12/2013    Fibromyalgia 08/12/2013    Peptic ulcer 08/12/2013    Gout 08/12/2013       Plan:   Rate control ?  Extubate later

## 2019-07-04 NOTE — PROGRESS NOTES
Bedside and Verbal shift change report given to Audrey Barnes RN (oncoming nurse) by Tato Zuniga RN (offgoing nurse). Report included the following information SBAR, Kardex, ED Summary, Intake/Output, MAR, Recent Results and Cardiac Rhythm Afib. Pt resting in bed on vent PRVC 30% with O2 sat 99%. Pt awakens to voice, follows commands and nods appropriately. PERRLA 3 mm. ETT in place 22 at the lips. Lung sounds clear. Afib on monitor  and /58. Stomach obese and soft upon palpation with bowel sounds active. Morales in place. Pt shakes head \"no\" when asked if in pain.     Heparin @ 10  Versed @ 1  Fentanyl @ 25  Cardizem @ 5

## 2019-07-04 NOTE — PROGRESS NOTES
Ventilator check complete; patient has a #7.5 ET tube secured at the 22 at the lip. Patient is not sedated. Patient is able to follow commands. Breath sounds are coarse. Trachea is midline, Negative for subcutaneous air, and chest excursion is symmetric. Patient is also Negative for cyanosis and is Negative for pitting edema. All alarms are set and audible. Resuscitation bag is at the head of the bed.       Ventilator Settings  Mode FIO2 Rate Tidal Volume Pressure PEEP I:E Ratio   PRVC  30 %    450 ml     8 cm H20  1:4      Peak airway pressure: 18.2 cm H2O   Minute ventilation: 8 l/min     Sue Beckett

## 2019-07-04 NOTE — PROGRESS NOTES
A follow up visit was made to the patient. Emotional support, spiritual presence and   prayer were provided. She was awake and alert.       [employer]

## 2019-07-04 NOTE — PROGRESS NOTES
Bedside, Verbal and Written shift change report given to Chalo Veliz RN (oncoming nurse) by Khurram Melton RN (offgoing nurse). Report included the following information SBAR, Kardex, ED Summary, Intake/Output and Recent Results. Fentanyl and heparin infusions dual verified with Khurram Melton RN.

## 2019-07-05 ENCOUNTER — APPOINTMENT (OUTPATIENT)
Dept: GENERAL RADIOLOGY | Age: 74
DRG: 853 | End: 2019-07-05
Attending: INTERNAL MEDICINE
Payer: MEDICARE

## 2019-07-05 LAB
ANION GAP SERPL CALC-SCNC: 8 MMOL/L (ref 7–16)
APTT PPP: 103.6 SEC (ref 24.7–39.8)
APTT PPP: 127.2 SEC (ref 24.7–39.8)
APTT PPP: 55.2 SEC (ref 24.7–39.8)
APTT PPP: 81.1 SEC (ref 24.7–39.8)
ARTERIAL PATENCY WRIST A: YES
BACTERIA SPEC CULT: NORMAL
BACTERIA SPEC CULT: NORMAL
BASE DEFICIT BLD-SCNC: 4 MMOL/L
BASOPHILS # BLD: 0 K/UL (ref 0–0.2)
BASOPHILS NFR BLD: 0 % (ref 0–2)
BDY SITE: ABNORMAL
BODY TEMPERATURE: 98.6
BUN SERPL-MCNC: 30 MG/DL (ref 8–23)
CALCIUM SERPL-MCNC: 8.2 MG/DL (ref 8.3–10.4)
CD3 CELLS # SPEC: 74 %
CD3+CD4+ CELLS # BLD: 51 %
CD4:CD8 RATIO, C48RBT: 2.68 RATIO
CD8, CD8BT: 19 %
CHLORIDE SERPL-SCNC: 111 MMOL/L (ref 98–107)
CO2 BLD-SCNC: 23 MMOL/L
CO2 SERPL-SCNC: 24 MMOL/L (ref 21–32)
COLLECT TIME,HTIME: 318
CREAT SERPL-MCNC: 1 MG/DL (ref 0.6–1)
DIFFERENTIAL METHOD BLD: ABNORMAL
EOSINOPHIL # BLD: 0 K/UL (ref 0–0.8)
EOSINOPHIL NFR BLD: 0 % (ref 0.5–7.8)
ERYTHROCYTE [DISTWIDTH] IN BLOOD BY AUTOMATED COUNT: 15 % (ref 11.9–14.6)
EXHALED MINUTE VOLUME, VE: 6.8 L/MIN
GAS FLOW.O2 O2 DELIVERY SYS: ABNORMAL L/MIN
GLUCOSE SERPL-MCNC: 153 MG/DL (ref 65–100)
GRAM STN SPEC: NORMAL
HCO3 BLD-SCNC: 21.4 MMOL/L (ref 22–26)
HCT VFR BLD AUTO: 33.4 % (ref 35.8–46.3)
HGB BLD-MCNC: 10.6 G/DL (ref 11.7–15.4)
IMM GRANULOCYTES # BLD AUTO: 0.1 K/UL (ref 0–0.5)
IMM GRANULOCYTES NFR BLD AUTO: 1 % (ref 0–5)
LYMPHOCYTES # BLD: 0.9 K/UL (ref 0.5–4.6)
LYMPHOCYTES NFR BLD: 8 % (ref 13–44)
MAGNESIUM SERPL-MCNC: 2.3 MG/DL (ref 1.8–2.4)
MCH RBC QN AUTO: 30.5 PG (ref 26.1–32.9)
MCHC RBC AUTO-ENTMCNC: 31.7 G/DL (ref 31.4–35)
MCV RBC AUTO: 96.3 FL (ref 79.6–97.8)
MONOCYTES # BLD: 0.4 K/UL (ref 0.1–1.3)
MONOCYTES NFR BLD: 3 % (ref 4–12)
NEUTS SEG # BLD: 10.2 K/UL (ref 1.7–8.2)
NEUTS SEG NFR BLD: 88 % (ref 43–78)
NRBC # BLD: 0 K/UL (ref 0–0.2)
O2/TOTAL GAS SETTING VFR VENT: 30 %
PCO2 BLD: 37.8 MMHG (ref 35–45)
PEEP RESPIRATORY: 8 CMH2O
PH BLD: 7.36 [PH] (ref 7.35–7.45)
PHOSPHATE SERPL-MCNC: 3.3 MG/DL (ref 2.3–3.7)
PLATELET # BLD AUTO: 179 K/UL (ref 150–450)
PMV BLD AUTO: 11 FL (ref 9.4–12.3)
PO2 BLD: 89 MMHG (ref 75–100)
POTASSIUM SERPL-SCNC: 3.8 MMOL/L (ref 3.5–5.1)
PRESSURE SUPPORT SETTING VENT: 10 CMH2O
RBC # BLD AUTO: 3.47 M/UL (ref 4.05–5.2)
SAO2 % BLD: 97 % (ref 95–98)
SERVICE CMNT-IMP: 21
SERVICE CMNT-IMP: ABNORMAL
SERVICE CMNT-IMP: NORMAL
SERVICE CMNT-IMP: NORMAL
SODIUM SERPL-SCNC: 143 MMOL/L (ref 136–145)
SPECIMEN SOURCE: NORMAL
SPECIMEN TYPE: ABNORMAL
TOTAL RESP. RATE, ITRR: 14
VANCOMYCIN TROUGH SERPL-MCNC: 16 UG/ML (ref 5–20)
VENTILATION MODE VENT: ABNORMAL
WBC # BLD AUTO: 11.6 K/UL (ref 4.3–11.1)

## 2019-07-05 PROCEDURE — 74011000258 HC RX REV CODE- 258: Performed by: INTERNAL MEDICINE

## 2019-07-05 PROCEDURE — 92610 EVALUATE SWALLOWING FUNCTION: CPT

## 2019-07-05 PROCEDURE — 74011250636 HC RX REV CODE- 250/636: Performed by: INTERNAL MEDICINE

## 2019-07-05 PROCEDURE — 36600 WITHDRAWAL OF ARTERIAL BLOOD: CPT

## 2019-07-05 PROCEDURE — 85730 THROMBOPLASTIN TIME PARTIAL: CPT

## 2019-07-05 PROCEDURE — 82803 BLOOD GASES ANY COMBINATION: CPT

## 2019-07-05 PROCEDURE — 94003 VENT MGMT INPAT SUBQ DAY: CPT

## 2019-07-05 PROCEDURE — 80202 ASSAY OF VANCOMYCIN: CPT

## 2019-07-05 PROCEDURE — 84100 ASSAY OF PHOSPHORUS: CPT

## 2019-07-05 PROCEDURE — 83735 ASSAY OF MAGNESIUM: CPT

## 2019-07-05 PROCEDURE — 74011000250 HC RX REV CODE- 250: Performed by: INTERNAL MEDICINE

## 2019-07-05 PROCEDURE — 65610000001 HC ROOM ICU GENERAL

## 2019-07-05 PROCEDURE — 77010033678 HC OXYGEN DAILY

## 2019-07-05 PROCEDURE — 99233 SBSQ HOSP IP/OBS HIGH 50: CPT | Performed by: INTERNAL MEDICINE

## 2019-07-05 PROCEDURE — 71045 X-RAY EXAM CHEST 1 VIEW: CPT

## 2019-07-05 PROCEDURE — 94640 AIRWAY INHALATION TREATMENT: CPT

## 2019-07-05 PROCEDURE — 36592 COLLECT BLOOD FROM PICC: CPT

## 2019-07-05 PROCEDURE — 85025 COMPLETE CBC W/AUTO DIFF WBC: CPT

## 2019-07-05 PROCEDURE — 80048 BASIC METABOLIC PNL TOTAL CA: CPT

## 2019-07-05 RX ORDER — FUROSEMIDE 10 MG/ML
40 INJECTION INTRAMUSCULAR; INTRAVENOUS ONCE
Status: COMPLETED | OUTPATIENT
Start: 2019-07-05 | End: 2019-07-05

## 2019-07-05 RX ORDER — HYDROCORTISONE SODIUM SUCCINATE 100 MG/2ML
50 INJECTION, POWDER, FOR SOLUTION INTRAMUSCULAR; INTRAVENOUS EVERY 8 HOURS
Status: DISCONTINUED | OUTPATIENT
Start: 2019-07-05 | End: 2019-07-06 | Stop reason: ALTCHOICE

## 2019-07-05 RX ORDER — HEPARIN SODIUM 5000 [USP'U]/ML
35 INJECTION, SOLUTION INTRAVENOUS; SUBCUTANEOUS ONCE
Status: COMPLETED | OUTPATIENT
Start: 2019-07-05 | End: 2019-07-05

## 2019-07-05 RX ADMIN — LEVALBUTEROL HYDROCHLORIDE 0.63 MG: 0.63 SOLUTION RESPIRATORY (INHALATION) at 11:54

## 2019-07-05 RX ADMIN — PIPERACILLIN SODIUM,TAZOBACTAM SODIUM 3.38 G: 3; .375 INJECTION, POWDER, FOR SOLUTION INTRAVENOUS at 05:24

## 2019-07-05 RX ADMIN — HEPARIN SODIUM 3550 UNITS: 5000 INJECTION INTRAVENOUS; SUBCUTANEOUS at 17:11

## 2019-07-05 RX ADMIN — PIPERACILLIN SODIUM,TAZOBACTAM SODIUM 3.38 G: 3; .375 INJECTION, POWDER, FOR SOLUTION INTRAVENOUS at 11:31

## 2019-07-05 RX ADMIN — FAMOTIDINE 20 MG: 10 INJECTION INTRAVENOUS at 21:27

## 2019-07-05 RX ADMIN — HYDROCORTISONE SODIUM SUCCINATE 50 MG: 100 INJECTION, POWDER, FOR SOLUTION INTRAMUSCULAR; INTRAVENOUS at 21:28

## 2019-07-05 RX ADMIN — HYDROCORTISONE SODIUM SUCCINATE 100 MG: 100 INJECTION, POWDER, FOR SOLUTION INTRAMUSCULAR; INTRAVENOUS at 05:24

## 2019-07-05 RX ADMIN — PIPERACILLIN SODIUM,TAZOBACTAM SODIUM 3.38 G: 3; .375 INJECTION, POWDER, FOR SOLUTION INTRAVENOUS at 19:43

## 2019-07-05 RX ADMIN — LEVALBUTEROL HYDROCHLORIDE 0.63 MG: 0.63 SOLUTION RESPIRATORY (INHALATION) at 19:21

## 2019-07-05 RX ADMIN — DILTIAZEM HYDROCHLORIDE 12.5 MG/HR: 5 INJECTION, SOLUTION INTRAVENOUS at 23:32

## 2019-07-05 RX ADMIN — Medication 10 ML: at 05:25

## 2019-07-05 RX ADMIN — VANCOMYCIN HYDROCHLORIDE 1500 MG: 10 INJECTION, POWDER, LYOPHILIZED, FOR SOLUTION INTRAVENOUS at 13:41

## 2019-07-05 RX ADMIN — FAMOTIDINE 20 MG: 10 INJECTION INTRAVENOUS at 08:30

## 2019-07-05 RX ADMIN — FUROSEMIDE 40 MG: 10 INJECTION, SOLUTION INTRAMUSCULAR; INTRAVENOUS at 09:33

## 2019-07-05 RX ADMIN — LEVALBUTEROL HYDROCHLORIDE 0.63 MG: 0.63 SOLUTION RESPIRATORY (INHALATION) at 15:29

## 2019-07-05 RX ADMIN — HEPARIN SODIUM 12 UNITS/KG/HR: 5000 INJECTION, SOLUTION INTRAVENOUS at 18:28

## 2019-07-05 RX ADMIN — Medication 10 ML: at 13:43

## 2019-07-05 RX ADMIN — Medication 10 ML: at 21:27

## 2019-07-05 RX ADMIN — LEVALBUTEROL HYDROCHLORIDE 0.63 MG: 0.63 SOLUTION RESPIRATORY (INHALATION) at 08:00

## 2019-07-05 RX ADMIN — HYDROCORTISONE SODIUM SUCCINATE 50 MG: 100 INJECTION, POWDER, FOR SOLUTION INTRAMUSCULAR; INTRAVENOUS at 13:37

## 2019-07-05 NOTE — PROGRESS NOTES
Bedside, Verbal and Written shift change report given to Darling Rodriguez RN (oncoming nurse) by Mana Mixon RN (offgoing nurse). Report included the following information SBAR, Kardex, ED Summary, Intake/Output and Recent Results.

## 2019-07-05 NOTE — PROGRESS NOTES
Ventilator check complete; patient has a #7.5 ET tube secured at the 22 at the lip. Patient is not sedated. Patient is able to follow commands. Breath sounds are clear. Trachea is midline, Negative for subcutaneous air, and chest excursion is symmetric. Patient is also Negative for cyanosis and is Negative for pitting edema. All alarms are set and audible. Resuscitation bag is at the head of the bed. Ventilator Settings  Mode FIO2 Rate Tidal Volume Pressure PEEP I:E Ratio   Pressure support  31 %    450 ml  8 cm H2O  8 cm H20  1:4      Peak airway pressure: 16.5 cm H2O   Minute ventilation: 6.3 l/min     ABG: Results for Geovany Ross (MRN [id number]) as of 7/5/2019 08:05   7/5/2019 03:20   pH (POC) 7.361   pCO2 (POC) 37.8   pO2 (POC) 89   HCO3 (POC) 21.4 (L)   sO2 (POC) 97   Base deficit (POC) 4   FIO2 (POC) 30   Patient temp. 98.6   Specimen type (POC) ARTERIAL   Site RIGHT RADIAL   Device: VENT   Total resp. rate 14   Mode VENTILATOR/SPONTA. ..    PEEP/CPAP (POC) 8   Pressure support 10       Pablo Martinez

## 2019-07-05 NOTE — INTERDISCIPLINARY ROUNDS
Interdisciplinary team rounds were held 7/5/2019 with the following team members:Care Management, Nursing, Nurse Practitioner, Nutrition, Palliative Care, Pastoral Care, Pharmacy, Physical Therapy, Physician, Respiratory Therapy and Clinical Coordinator and the patient. Plan of care discussed. See clinical pathway and/or care plan for interventions and desired outcomes.

## 2019-07-05 NOTE — PROGRESS NOTES
Pharmacokinetic Consult to Pharmacist    Debra Aubrie is a 74 y.o. female being treated for CAP with vancomycin and pip/tazo. Height: 5' 2\" (157.5 cm)  Weight: 102 kg (224 lb 13.9 oz)  Lab Results   Component Value Date/Time    BUN 30 (H) 07/05/2019 03:17 AM    Creatinine 1.00 07/05/2019 03:17 AM    WBC 11.6 (H) 07/05/2019 03:17 AM    Procalcitonin 4.3 07/04/2019 03:55 AM    Lactic Acid (POC) 1.69 07/03/2019 05:56 AM      Estimated Creatinine Clearance: 54.4 mL/min (based on SCr of 1 mg/dL). Lab Results   Component Value Date/Time    Vancomycin,trough 16.0 07/05/2019 01:37 PM       Day 3 of vancomycin. Goal trough is 15-20. Vancomycin dose initiated at 2000 mg IV x 1 and will be followed by 1500 mg IV q18 hours. Trough within therapeutic range - will continue regimen for now. Pharmacy will continue to follow. Please call with any questions.      Thank you,  J Luis Chand, PharmD  Clinical Pharmacist  [phone]

## 2019-07-05 NOTE — PROGRESS NOTES
Respiratory Mechanics completed and are as follows:  Weaning Parameters  Spontaneous Breathing Trial Complete: Yes  Resp Rate Observed: 13  Ve: 6.5  VT: 505  RSBI: 25  NIF: -30  [place]ell Agitation Sedation Scale (RASS): Alert and calm  Patient extubated to a 3L NC. Patient is able to communicate and is negative for stridor. Breath sounds are clear. No complications with extubation.      [place] Harley

## 2019-07-05 NOTE — PROGRESS NOTES
SPEECH LANGUAGE PATHOLOGY: BEDSIDE SWALLOW NOTE: Initial Assessment and Discharge    NAME/AGE/GENDER: Selena Shah is a 74 y.o. female  DATE: 7/5/2019  PRIMARY DIAGNOSIS: Acute hypoxemic respiratory failure (HCC) [J96.01]  Severe sepsis ([facility] [address] [A41.9, R65.20]  Procedure(s) (LRB):  BRONCHOSCOPY (N/A)  BRONCHIAL ALVEOLAR LAVAGE (N/A) 2 Days Post-Op  ICD-10: Treatment Diagnosis: R13.12 oropharyngeal dysphagia  INTERDISCIPLINARY COLLABORATION: Registered Nurse  PRECAUTIONS/ALLERGIES: Celebrex [celecoxib]; Lisinopril; Losartan; and Nifedipine   ASSESSMENT:   Based on the objective data described below, Ms. Adrienne Ward presents with an essentially normal oropharyngeal swallow function characterized by no overt signs/symptoms of laryngeal penetration/aspiration with thin liquids, pureed solids, mech soft/mixed consistency solids, or regular solids as evidenced by timely/distinct swallows with no cough response and no change in clear/dry vocal quality. Pt was intubated from 7/3-7/5, extubated a few hours ago, and presents with strong vocal quality with productive cough on command. Pt expectorated small mucus plug and independently used yankauer to orally suction. Pt denies any globus sensation with po trials and fed herself all trials under the direction of SLP. After all trials completed, pt reporting feeling need to cough to expectorate small amount of secretions and pointed to sternal notch area. No expectoration achieved despite adequate cough. SLP encouraged pt to drink water throughout the day to help with secretions. Pt in agreement. No further skilled SLP intervention warranted at this time. Thank you for this referral. Recommend regular diet with thin liquids, meds whole with water. Pt reports eating all foods without upper dentition at home. ?????? ? ? This section established at most recent assessment??????????  PROBLEM LIST (Impairments causing functional limitations):  none  REHABILITATION POTENTIAL FOR STATED GOALS: Excellent  PLAN OF CARE:   Patient will benefit from skilled intervention to address the following impairments. RECOMMENDATIONS AND PLANNED INTERVENTIONS (Benefits and precautions of therapy have been discussed with the patient.):  PO:  Regular  Liquids:  regular thin  MEDICATIONS:  With liquid  ASPIRATION PRECAUTIONS:  Slow rate of intake  Small bites/sips  Upright at 90 degrees during meal  COMPENSATORY STRATEGIES/MODIFICATIONS INCLUDING:  None  FREQUENCY/DURATION: No further speech therapy indicated at this time as oropharyngeal swallow function is within normal limits. RECOMMENDED REHABILITATION/EQUIPMENT: (at time of discharge pending progress): Due to the probability of continued deficits (see above) this patient will not likely need continued skilled speech therapy after discharge. SUBJECTIVE:   Pt calm and cooperative, no visitors present. Pt in good spirits. History of Present Injury/Illness: Ms. Chana Wills  has a past medical history of Arrhythmia, Arthritis, Asthma, Cellulitis, Chronic kidney disease, Depression, Gout, Gouty arthritis  NOS (12/2/2015), Hypercholesterolemia, Hypertension, Mitral valve prolapse, Morbid obesity ([address] Nausea & vomiting, Polymyalgia rheumatica ([address] (12/2/2015), Psychiatric disorder, PUD (peptic ulcer disease), Sjogren's syndrome ([address] and Temporal arteritis ([address] She also  has a past surgical history that includes hx hysterectomy; hx tubal ligation; pr cardiac surg procedure unlist (4/07); pr cardiac surg procedure unlist (8/13); hx endoscopy (8/08); and hx colonoscopy (6/08). Present Symptoms:    Pain Scale 1: Numeric (0 - 10)  Pain Intensity 1: 0  Current Medications:   No current facility-administered medications on file prior to encounter. Current Outpatient Medications on File Prior to Encounter   Medication Sig Dispense Refill    ibuprofen (MOTRIN) 200 mg tablet Take  by mouth as needed for Pain.       allopurinol (ZYLOPRIM) 100 mg tablet Take 1 Tab by mouth daily. 90 Tab 0    predniSONE (DELTASONE) 5 mg tablet Take 3 Tabs by mouth daily. 90 Tab 1    hydroxychloroquine (PLAQUENIL) 200 mg tablet Take 1 Tab by mouth two (2) times a day. 60 Tab 1    albuterol (PROVENTIL HFA, VENTOLIN HFA, PROAIR HFA) 90 mcg/actuation inhaler Take 2 Puffs by inhalation every six (6) hours as needed for Wheezing. 1 Inhaler 0    nadolol (CORGARD) 40 mg tablet Take 2 Tabs by mouth daily for 360 days. 180 Tab 3    amLODIPine (NORVASC) 5 mg tablet Take 1 Tab by mouth daily. 30 Tab 11    COLCRYS 0.6 mg tablet Take 1 Tab by mouth daily for 360 days. 30 Tab 11    multivitamin (ONE A DAY) tablet Take 1 Tab by mouth daily. magnesium oxide (MAG-OX) 400 mg tablet Take 400 mg by mouth two (2) times a day. aspirin delayed-release 81 mg tablet Take 81 mg by mouth daily. Current Dietary Status:     NPO  History of reflux:  NO   Reflux medication:n/a  Social History/Home Situation:       OBJECTIVE:   Respiratory Status:  Nasal cannula  2 l/min  CXR Results:mildly improved bibasilar lung edema or infiltrates and small pleural effusion  MRI/CT Results:n/a  Oral Motor Structure/Speech:  Oral-Motor Structure/Motor Speech  Labial: No impairment  Dentition: Other (comment)(natural lower dentition, no upper dentures in place)  Oral Hygiene: adequate  Lingual: No impairment    Cognitive and Communication Status:  Neurologic State: Alert  Orientation Level: Oriented to situation;Oriented to place;Oriented to person  Cognition: Appropriate for age attention/concentration; Follows commands  Perception: Appears intact  Perseveration: No perseveration noted       BEDSIDE SWALLOW EVALUATION  Oral Assessment:  Oral Assessment  Labial: No impairment  Dentition: Other (comment)(natural lower dentition, no upper dentures in place)  Oral Hygiene: adequate  Lingual: No impairment  P.O.  Trials:  Patient Position: upright in bed    The patient was given bite/sip amounts of the following:   Consistency Presented: Solid;Puree; Thin liquid; Ice chips;Mechanical soft;Mixed consistency  How Presented: Self-fed/presented;Cup/sip;Straw;Spoon; Successive swallows    ORAL PHASE:  Bolus Acceptance: No impairment  Bolus Formation/Control: No impairment  Propulsion: No impairment     Oral Residue: None    PHARYNGEAL PHASE:  Initiation of Swallow: No impairment  Laryngeal Elevation: Functional  Aspiration Signs/Symptoms: None  Vocal Quality: No impairment           Pharyngeal Phase Characteristics: No impairment, issues, or problems     OTHER OBSERVATIONS:  Rate/bite size: WNL   Endurance: WNL       Tool Used: Dysphagia Outcome and Severity Scale (MANISH)    Score Comments   Normal Diet  ? 7 With no strategies or extra time needed   Functional Swallow  ? 6 May have mild oral or pharyngeal delay       Mild Dysphagia    ? 5 Which may require one diet consistency restricted (those who demonstrate penetration which is entirely cleared on MBS would be included)   Mild-Moderate Dysphagia  ? 4 With 1-2 diet consistencies restricted       Moderate Dysphagia  ? 3 With 2 or more diet consistencies restricted       Moderately Severe Dysphagia  ? 2 With partial PO strategies (trials with ST only)       Severe Dysphagia  ? 1 With inability to tolerate any PO safely          Score:  Initial: 7 Most Recent: X (Date: --)   Interpretation of Tool: The Dysphagia Outcome and Severity Scale (MANISH) is a simple, easy-to-use, 7-point scale developed to systematically rate the functional severity of dysphagia based on objective assessment and make recommendations for diet level, independence level, and type of nutrition.        [place] / [place] B / Product Type: Medicare /     TREATMENT:    (In addition to Assessment/Re-Assessment sessions the following treatments were rendered)  Assessment/Reassessment only, no treatment provided today  MODALITIES:                                                                    ORAL MOTOR EXERCISES:                                                                                                                                                                      LARYNGEAL / PHARYNGEAL EXERCISES:                                                                                                                                     __________________________________________________________________________________________________  Safety:   After treatment position/precautions:  Call light within reach  Nurse at bedside  Upright in Bed  Progression/Medical Necessity:   No further skilled SLP intervention warranted at this time. No signs of oropharyngeal dysphagia and pt tolerating thin liquids and regular solids without difficulty. Recommend regular diet with thin liquids.  Thank you for this referral.     Total Treatment Duration:  Time In: 1315  Time Out: Lex 45, [place], CCC-SLP

## 2019-07-05 NOTE — CDMP QUERY
Pt admitted with Acute hypoxemic respiratory failure. Pt noted to have EF of 40-45%, high BNP . If possible, please document in progress notes and d/c summary if you are evaluating and /or treating any of the following: ? Acute on Chronic Systolic CHF ? Acute on Chronic Diastolic CHF ? Acute on Chronic Systolic and Diastolic CHF ? Acute Systolic CHF ? Acute Diastolic CHF ? Acute Systolic and Diastolic CHF ? Chronic Systolic CHF ? Chronic Diastolic CHF ? Chronic Systolic and Diastolic CHF 
? Other, please specify ? Clinically unable to determine The medical record reflects the following: 
  Risk Factors: 74 female, hx of WPW,HTN, Clinical Indicators: Afib with RVR, EF 40-45%, cxr shows. Mildly progressed bilateral lung edema or infiltrates,  decreased to 314 after 2 doses of lasix Treatment: IV lasix, Echo, Mechanical ventilation, ICU, strict I & O's, covarrubias catheter insertion Thanks, Nabeel Christian RN Compliant Documentation Management Program 
[phone]

## 2019-07-05 NOTE — PROGRESS NOTES
07/05/19 0015   Vent Settings   FIO2 (%) 30 %   Back-Up Rate 15   Pressure Support (cm H2O) 10 cm H2O   PEEP/VENT (cm H2O) 8 cm H20   Insp Rise Time (sec) 0.15   Flow Trigger 2   Expiratory Sensitivity 30 %   Ventilator Measurements   Vt Exhaled (Machine Breath) (ml) 527 ml   Vt Spont (ml) 499 ml   Ve Observed (l/min) 6 l/min   PIP Observed (cm H2O) 18 cm H2O   MAP (cm H2O) 10.5   Dynamic Compliance (ml/cm H20) 55 ml/cm H20   Vent Method/Mode   Ventilation Method Conventional   Ventilator Mode Pressure support   Ventilator Mode ID 8   $$ Ventilator Subsequent Subsequent Vent Day     Pt placed in PSV in attempt to wean support. ABG to follow. Adryan Cleaning RN aware.

## 2019-07-05 NOTE — PROGRESS NOTES
Bedside and Verbal shift change report given to Doug Duque RN (oncoming nurse) by Jennie Ugalde RN (offgoing nurse). Report included the following information SBAR, Kardex, Procedure Summary, Intake/Output and MAR.

## 2019-07-05 NOTE — PROGRESS NOTES
A follow up visit was made to the patient. Emotional support, spiritual presence and   prayer were provided. The patient is on the vent. She is awake and oriented.       [employer]

## 2019-07-05 NOTE — PROGRESS NOTES
7/5/2019 9:24 AM    Admit Date: 7/3/2019        Subjective:     Iram Pearce is on vent but very alert and comfortable Plan to extubate Had a dose of lasix this AM for elevated BNP effusion  on IV cardiazem. Objective:      Visit Vitals  /64   Pulse 95   Temp 97.8 °F (36.6 °C)   Resp 15   Ht 5' 2\" (1.575 m)   Wt 102 kg (224 lb 13.9 oz)   SpO2 98%   BMI 41.13 kg/m²       Physical Exam:  Heart: irregularly irregular rhythm  Lungs: clear to auscultation bilaterally  Abdomen: soft, non-tender.  Bowel sounds normal. No masses,  no organomegaly  Extremities: no edema    Data Review:   Labs:    Recent Results (from the past 24 hour(s))   POTASSIUM    Collection Time: 07/04/19  9:26 AM   Result Value Ref Range    Potassium 4.3 3.5 - 5.1 mmol/L   CALCIUM, IONIZED    Collection Time: 07/04/19  9:26 AM   Result Value Ref Range    Calcium, ionized 4.52 4.0 - 5.2 mg/dL   PTT    Collection Time: 07/04/19 11:22 AM   Result Value Ref Range    aPTT 91.9 (H) 24.7 - 39.8 SEC   PTT    Collection Time: 07/04/19  7:29 PM   Result Value Ref Range    aPTT 66.4 (H) 24.7 - 39.8 SEC   PTT    Collection Time: 07/05/19  2:45 AM   Result Value Ref Range    aPTT 127.2 (H) 24.7 - 01.6 SEC   METABOLIC PANEL, BASIC    Collection Time: 07/05/19  3:17 AM   Result Value Ref Range    Sodium 143 136 - 145 mmol/L    Potassium 3.8 3.5 - 5.1 mmol/L    Chloride 111 (H) 98 - 107 mmol/L    CO2 24 21 - 32 mmol/L    Anion gap 8 7 - 16 mmol/L    Glucose 153 (H) 65 - 100 mg/dL    BUN 30 (H) 8 - 23 MG/DL    Creatinine 1.00 0.6 - 1.0 MG/DL    GFR est AA >60 >60 ml/min/1.73m2    GFR est non-AA 57 (L) >60 ml/min/1.73m2    Calcium 8.2 (L) 8.3 - 10.4 MG/DL   MAGNESIUM    Collection Time: 07/05/19  3:17 AM   Result Value Ref Range    Magnesium 2.3 1.8 - 2.4 mg/dL   PHOSPHORUS    Collection Time: 07/05/19  3:17 AM   Result Value Ref Range    Phosphorus 3.3 2.3 - 3.7 MG/DL   CBC WITH AUTOMATED DIFF    Collection Time: 07/05/19  3:17 AM   Result Value Ref Range    WBC 11.6 (H) 4.3 - 11.1 K/uL    RBC 3.47 (L) 4.05 - 5.2 M/uL    HGB 10.6 (L) 11.7 - 15.4 g/dL    HCT 33.4 (L) 35.8 - 46.3 %    MCV 96.3 79.6 - 97.8 FL    MCH 30.5 26.1 - 32.9 PG    MCHC 31.7 31.4 - 35.0 g/dL    RDW 15.0 (H) 11.9 - 14.6 %    PLATELET 228 911 - 526 K/uL    MPV 11.0 9.4 - 12.3 FL    ABSOLUTE NRBC 0.00 0.0 - 0.2 K/uL    DF AUTOMATED      NEUTROPHILS 88 (H) 43 - 78 %    LYMPHOCYTES 8 (L) 13 - 44 %    MONOCYTES 3 (L) 4.0 - 12.0 %    EOSINOPHILS 0 (L) 0.5 - 7.8 %    BASOPHILS 0 0.0 - 2.0 %    IMMATURE GRANULOCYTES 1 0.0 - 5.0 %    ABS. NEUTROPHILS 10.2 (H) 1.7 - 8.2 K/UL    ABS. LYMPHOCYTES 0.9 0.5 - 4.6 K/UL    ABS. MONOCYTES 0.4 0.1 - 1.3 K/UL    ABS. EOSINOPHILS 0.0 0.0 - 0.8 K/UL    ABS. BASOPHILS 0.0 0.0 - 0.2 K/UL    ABS. IMM. GRANS. 0.1 0.0 - 0.5 K/UL   POC G3    Collection Time: 07/05/19  3:20 AM   Result Value Ref Range    Device: VENT      FIO2 (POC) 30 %    pH (POC) 7.361 7.35 - 7.45      pCO2 (POC) 37.8 35 - 45 MMHG    pO2 (POC) 89 75 - 100 MMHG    HCO3 (POC) 21.4 (L) 22 - 26 MMOL/L    sO2 (POC) 97 95 - 98 %    Base deficit (POC) 4 mmol/L    Mode VENTILATOR/SPONTANEOUS/PRESSURE SUPPORT      PEEP/CPAP (POC) 8 cmH2O    Pressure support 10 cmH2O    Allens test (POC) YES      Total resp. rate 14      Site RIGHT RADIAL      Patient temp.  98.6      Specimen type (POC) ARTERIAL      Performed by Irma     CO2, POC 23 MMOL/L    Respiratory comment: 21      Exhaled minute volume 6.80 L/min    COLLECT TIME 318         Telemetry: AFIB    ECHO:EF 40-45%      Radiology:small eff      Assessment:     Patient Active Problem List    Diagnosis Date Noted    Acute hypoxemic respiratory failure ([address] on vent Better hopefully extubate today 07/03/2019    Atrial fibrillation with rapid ventricular response (HCC) vs WPW in patient with history of ablation once taking po will add lopressor If BP ok add ace 07/03/2019    Pulmonary infiltrates vs acute congestive heart failure has elevated BNP BP is better lasix  07/03/2019    Severe sepsis ([facility] [address] 07/03/2019    Hypotension due to hypovolemia 07/03/2019    Polymyalgia rheumatica ([address] 12/02/2015    Gouty arthritis  NOS 12/02/2015    Mixed hyperlipidemia 12/02/2015    WPW (Suresh-Parkinson-White syndrome) prior ablation  08/12/2013    HTN (hypertension) 08/12/2013    Severe obesity with body mass index (BMI) of 35.0 to 39.9 with comorbidity ([facility] [address] 08/12/2013    Other and unspecified hyperlipidemia 08/12/2013    S[facility]ogren's [facility] ([facility] 08/12/2013    Fibromyalgia 08/12/2013    Peptic ulcer 08/12/2013    Gout 08/12/2013       Plan:   Continue cardiazem for now

## 2019-07-05 NOTE — PROGRESS NOTES
Bedside, Verbal and Written shift change report given to Chalo Veliz RN (oncoming nurse) by Dallas Burt RN (offgoing nurse). Report included the following information SBAR, Kardex, ED Summary, Intake/Output, MAR and Recent Results. Fentanyl and heparin infusions dual verified.

## 2019-07-05 NOTE — PROGRESS NOTES
Physical Therapy Note:    Participated in interdisciplinary rounds in ICU/CCU and chart reviewed. Patient is experiencing decrease in function from baseline. Patient would benefit from skilled acute therapy to increase independence with self care/ADLs, strength, endurance, and functional mobility. Recommend PT/OT consult when medically stable and MD agrees.     Thank you for your consideration,  Indu Nguyen, PT, DPT

## 2019-07-05 NOTE — PROGRESS NOTES
Critical Care Daily Progress Note: 7/5/2019  Admission Date: 7/3/2019     The patient's chart is reviewed and the patient is discussed with the staff. Patient is a 65 y.o.  female with hx of PMR and  ? Sjogrens. She presented to the ER with worsening dyspnea on 7/3/19. She was found to be in Afib with RVR with R infiltrate. Intubated in ER and CVL placed. Admitted to ICU. Pt had been on MTX in the past without benefit and has not been on biologics. Subjective:     Pt awake and able to follow commands. Pt denies complaints.      Current Facility-Administered Medications   Medication Dose Route Frequency    Vancomycin trough reminder   Other ONCE    sodium chloride (NS) flush 5-40 mL  5-40 mL IntraVENous Q8H    sodium chloride (NS) flush 5-40 mL  5-40 mL IntraVENous PRN    levalbuterol (XOPENEX) nebulizer soln 0.63 mg/3 mL  0.63 mg Nebulization QID RT    piperacillin-tazobactam (ZOSYN) 3.375 g in 0.9% sodium chloride (MBP/ADV) 100 mL  3.375 g IntraVENous Q8H    famotidine (PF) (PEPCID) 20 mg in sodium chloride 0.9% 10 mL injection  20 mg IntraVENous Q12H    vancomycin (VANCOCIN) 1500 mg in  ml infusion  1,500 mg IntraVENous Q18H    fentaNYL in normal saline (pf) 25 mcg/mL infusion  0-200 mcg/hr IntraVENous TITRATE    midazolam (VERSED) 100 mg in 0.9% sodium chloride 100 mL infusion  0-10 mg/hr IntraVENous TITRATE    hydrocortisone Sod Succ (PF) (SOLU-CORTEF) injection 100 mg  100 mg IntraVENous Q8H    dilTIAZem (CARDIZEM) 125 mg in dextrose 5% 125 mL infusion  0-15 mg/hr IntraVENous TITRATE    heparin 25,000 units in dextrose 500 mL infusion  12-25 Units/kg/hr IntraVENous TITRATE       Review of Systems  Constitutional:  negative for fever, chills, sweats  Cardiovascular:  negative for chest pain, palpitations, syncope, edema  Gastrointestinal:  negative for dysphagia, reflux, vomiting, diarrhea, abdominal pain, or melena  Neurologic:  negative for focal weakness, numbness, headache        Objective:     Vitals:    07/05/19 0815 07/05/19 0830 07/05/19 0845 07/05/19 0846   BP: 147/67 136/75 138/78    Pulse: (!) 107 (!) 110  (!) 160   Resp: 11 16  26   Temp:       SpO2: 98% 99% 92% 94%   Weight:       Height:           Intake and Output:   07/03 1901 - 07/05 0700  In: 2159.3 [I.V.:2159.3]  Out: 975 [Urine:975]  No intake/output data recorded. Physical Exam:          Constitutional:  intubated and mechanically ventilated, on vent. EENMT:  Sclera clear, pupils equal, oral mucosa moist  Respiratory: fairly clear, no wheezing   Cardiovascular:  irregularly irregular   Gastrointestinal:  soft with no tenderness; positive bowel sounds present  Musculoskeletal:  warm with no cyanosis, trace lower extremity edema  Skin:  no jaundice or ecchymosis  Neurologic: no gross neuro deficits     Psychiatric:  alert and oriented x 3     LINES:    ETT, quad lumen R IJ, peripheral L hand, covarrubias    DRIPS:    Fentanyl, heparin, cardizem    CXR:        Ventilator Settings  Mode FIO2 Rate Tidal Volume Pressure PEEP   Pressure support  31 %    450 ml  8 cm H2O  8 cm H20      Peak airway pressure: 16.5 cm H2O   Minute ventilation: 6.3 l/min     ABG:   Recent Labs     07/05/19  0320 07/04/19  0311 07/03/19  0344   PHI 7.361 7.358 7.301*   PCO2I 37.8 37.4 45.8*   PO2I 89 110* 84   HCO3I 21.4* 21.0* 22.6        LAB  No results for input(s): GLUCPOC in the last 72 hours.     No lab exists for component: [place]  Recent Labs     07/05/19 0317 07/04/19  0355 07/03/19  0334   WBC 11.6* 9.4 28.5*   HGB 10.6* 10.3* 14.5   HCT 33.4* 32.0* 44.8    145* 283     Recent Labs     07/05/19  0317 07/04/19  0926 07/04/19  0355 07/03/19  0334     --  145 142   K 3.8 4.3 3.3* 4.3   *  --  112* 107   CO2 24  --  24 24   *  --  121* 255*   BUN 30*  --  24* 22   CREA 1.00  --  0.90 1.02*   MG 2.3  --  2.0  --    PHOS 3.3  --  4.5*  --    CA 8.2*  --  7.6* 9.3   ALB  --   --   --  3.6   SGOT  -- --   --  38*     No results for input(s): LCAD, LAC in the last 72 hours. Assessment:  (Medical Decision Making)     Hospital Problems  Date Reviewed: 7/5/2019          Codes Class Noted POA    * (Principal) Acute hypoxemic respiratory failure (HCC) ICD-10-CM: J96.01  ICD-9-CM: 518.81  7/3/2019 Unknown    Wean vent as tolerated     Atrial fibrillation with rapid ventricular response (HCC) vs WPW in patient with history of ablation ICD-10-CM: I48.91  ICD-9-CM: 427.31  7/3/2019 Unknown    Cardiology following     Pulmonary infiltrates vs acute congestive heart failure ICD-10-CM: R91.8  ICD-9-CM: 793.19  7/3/2019 Unknown    On IV vanc     Severe sepsis [facility]) ICD-10-CM: A41.9, R65.20  ICD-9-CM: 038.9, 995.92  7/3/2019 Unknown    On IV Vanc    Hypotension due to hypovolemia ICD-10-CM: I95.89, E86.1  ICD-9-CM: 458.8, 276.52  7/3/2019 Unknown    Pressures improved     Polymyalgia rheumatica ([facility] ICD-10-CM: M35.3  ICD-9-CM: 584  12/2/2015 Yes    Chronic     HTN (hypertension) ICD-10-CM: I10  ICD-9-CM: 401.9  8/12/2013 Yes    Controlled     Severe obesity with body mass index (BMI) of 35.0 to 39.9 with comorbidity ([facility] ICD-10-CM: E66.01  ICD-9-CM: 278.01  8/12/2013 Yes        Sjogren's syndrome ([facility] ICD-10-CM: M35.00  ICD-9-CM: 710.2  8/12/2013 Yes    Chronic           Plan:  (Medical Decision Making)     --wean vent as tolerated. Tolerating PS well  --continue IV vanc  --continue nebs  --rate control is major issue    More than 50% of the time documented was spent in face-to-face contact with the patient and in the care of the patient on the floor/unit where the patient is located. KRISTIE Sylvester     Lungs:  Fairly clear  Heart:  iRR with no Murmur/Rubs/Gallops    Additional Comments:  Appears very comfortable on 8/8 with good volumes. VR controlled. Can extubate. PCT yesterday up to 4.3 so will continue ABx. BAL studies negative so far. Will given lasix x 1 as UOP well behind.      I have spoken with and examined the patient. I agree with the above assessment and plan as documented.     Grant Casanova MD

## 2019-07-06 ENCOUNTER — APPOINTMENT (OUTPATIENT)
Dept: GENERAL RADIOLOGY | Age: 74
DRG: 853 | End: 2019-07-06
Attending: INTERNAL MEDICINE
Payer: MEDICARE

## 2019-07-06 LAB
ANION GAP SERPL CALC-SCNC: 9 MMOL/L (ref 7–16)
APTT PPP: 92.3 SEC (ref 24.7–39.8)
BASOPHILS # BLD: 0 K/UL (ref 0–0.2)
BASOPHILS NFR BLD: 0 % (ref 0–2)
BUN SERPL-MCNC: 31 MG/DL (ref 8–23)
CALCIUM SERPL-MCNC: 8.4 MG/DL (ref 8.3–10.4)
CHLORIDE SERPL-SCNC: 108 MMOL/L (ref 98–107)
CO2 SERPL-SCNC: 26 MMOL/L (ref 21–32)
CREAT SERPL-MCNC: 1.06 MG/DL (ref 0.6–1)
DIFFERENTIAL METHOD BLD: ABNORMAL
EOSINOPHIL # BLD: 0 K/UL (ref 0–0.8)
EOSINOPHIL NFR BLD: 0 % (ref 0.5–7.8)
ERYTHROCYTE [DISTWIDTH] IN BLOOD BY AUTOMATED COUNT: 14.9 % (ref 11.9–14.6)
GLUCOSE SERPL-MCNC: 159 MG/DL (ref 65–100)
HCT VFR BLD AUTO: 32.1 % (ref 35.8–46.3)
HGB BLD-MCNC: 10.8 G/DL (ref 11.7–15.4)
IMM GRANULOCYTES # BLD AUTO: 0.1 K/UL (ref 0–0.5)
IMM GRANULOCYTES NFR BLD AUTO: 1 % (ref 0–5)
LYMPHOCYTES # BLD: 0.8 K/UL (ref 0.5–4.6)
LYMPHOCYTES NFR BLD: 8 % (ref 13–44)
MAGNESIUM SERPL-MCNC: 2.2 MG/DL (ref 1.8–2.4)
MCH RBC QN AUTO: 31.2 PG (ref 26.1–32.9)
MCHC RBC AUTO-ENTMCNC: 33.6 G/DL (ref 31.4–35)
MCV RBC AUTO: 92.8 FL (ref 79.6–97.8)
MONOCYTES # BLD: 0.4 K/UL (ref 0.1–1.3)
MONOCYTES NFR BLD: 4 % (ref 4–12)
NEUTS SEG # BLD: 8.7 K/UL (ref 1.7–8.2)
NEUTS SEG NFR BLD: 87 % (ref 43–78)
NRBC # BLD: 0 K/UL (ref 0–0.2)
PHOSPHATE SERPL-MCNC: 2.7 MG/DL (ref 2.3–3.7)
PLATELET # BLD AUTO: 185 K/UL (ref 150–450)
PMV BLD AUTO: 10.8 FL (ref 9.4–12.3)
POTASSIUM SERPL-SCNC: 2.8 MMOL/L (ref 3.5–5.1)
RBC # BLD AUTO: 3.46 M/UL (ref 4.05–5.2)
SODIUM SERPL-SCNC: 143 MMOL/L (ref 136–145)
WBC # BLD AUTO: 10 K/UL (ref 4.3–11.1)

## 2019-07-06 PROCEDURE — 94640 AIRWAY INHALATION TREATMENT: CPT

## 2019-07-06 PROCEDURE — 74011000250 HC RX REV CODE- 250: Performed by: INTERNAL MEDICINE

## 2019-07-06 PROCEDURE — 74011000258 HC RX REV CODE- 258: Performed by: INTERNAL MEDICINE

## 2019-07-06 PROCEDURE — 85025 COMPLETE CBC W/AUTO DIFF WBC: CPT

## 2019-07-06 PROCEDURE — 71045 X-RAY EXAM CHEST 1 VIEW: CPT

## 2019-07-06 PROCEDURE — 94760 N-INVAS EAR/PLS OXIMETRY 1: CPT

## 2019-07-06 PROCEDURE — 74011250637 HC RX REV CODE- 250/637: Performed by: NURSE PRACTITIONER

## 2019-07-06 PROCEDURE — 99233 SBSQ HOSP IP/OBS HIGH 50: CPT | Performed by: INTERNAL MEDICINE

## 2019-07-06 PROCEDURE — 80048 BASIC METABOLIC PNL TOTAL CA: CPT

## 2019-07-06 PROCEDURE — 74011250636 HC RX REV CODE- 250/636: Performed by: INTERNAL MEDICINE

## 2019-07-06 PROCEDURE — 74011000302 HC RX REV CODE- 302: Performed by: PHYSICIAN ASSISTANT

## 2019-07-06 PROCEDURE — 83735 ASSAY OF MAGNESIUM: CPT

## 2019-07-06 PROCEDURE — 74011250637 HC RX REV CODE- 250/637: Performed by: INTERNAL MEDICINE

## 2019-07-06 PROCEDURE — 84100 ASSAY OF PHOSPHORUS: CPT

## 2019-07-06 PROCEDURE — 86580 TB INTRADERMAL TEST: CPT | Performed by: PHYSICIAN ASSISTANT

## 2019-07-06 PROCEDURE — 77030027138 HC INCENT SPIROMETER -A

## 2019-07-06 PROCEDURE — 65660000000 HC RM CCU STEPDOWN

## 2019-07-06 PROCEDURE — 85730 THROMBOPLASTIN TIME PARTIAL: CPT

## 2019-07-06 RX ORDER — TEMAZEPAM 15 MG/1
15 CAPSULE ORAL
Status: DISCONTINUED | OUTPATIENT
Start: 2019-07-06 | End: 2019-07-15 | Stop reason: HOSPADM

## 2019-07-06 RX ORDER — PANTOPRAZOLE SODIUM 40 MG/1
40 TABLET, DELAYED RELEASE ORAL
Status: DISCONTINUED | OUTPATIENT
Start: 2019-07-06 | End: 2019-07-15 | Stop reason: HOSPADM

## 2019-07-06 RX ORDER — CARVEDILOL 6.25 MG/1
6.25 TABLET ORAL 2 TIMES DAILY WITH MEALS
Status: DISCONTINUED | OUTPATIENT
Start: 2019-07-06 | End: 2019-07-07

## 2019-07-06 RX ORDER — BISACODYL 5 MG
5 TABLET, DELAYED RELEASE (ENTERIC COATED) ORAL DAILY PRN
Status: DISCONTINUED | OUTPATIENT
Start: 2019-07-06 | End: 2019-07-15 | Stop reason: HOSPADM

## 2019-07-06 RX ORDER — POTASSIUM CHLORIDE 14.9 MG/ML
20 INJECTION INTRAVENOUS
Status: COMPLETED | OUTPATIENT
Start: 2019-07-06 | End: 2019-07-06

## 2019-07-06 RX ORDER — CARVEDILOL 6.25 MG/1
6.25 TABLET ORAL 2 TIMES DAILY WITH MEALS
Status: DISCONTINUED | OUTPATIENT
Start: 2019-07-06 | End: 2019-07-06

## 2019-07-06 RX ADMIN — DILTIAZEM HYDROCHLORIDE 15 MG/HR: 5 INJECTION, SOLUTION INTRAVENOUS at 23:14

## 2019-07-06 RX ADMIN — LEVALBUTEROL HYDROCHLORIDE 0.63 MG: 0.63 SOLUTION RESPIRATORY (INHALATION) at 08:11

## 2019-07-06 RX ADMIN — TUBERCULIN PURIFIED PROTEIN DERIVATIVE 5 UNITS: 5 INJECTION, SOLUTION INTRADERMAL at 16:11

## 2019-07-06 RX ADMIN — PIPERACILLIN SODIUM,TAZOBACTAM SODIUM 3.38 G: 3; .375 INJECTION, POWDER, FOR SOLUTION INTRAVENOUS at 21:05

## 2019-07-06 RX ADMIN — VANCOMYCIN HYDROCHLORIDE 1500 MG: 10 INJECTION, POWDER, LYOPHILIZED, FOR SOLUTION INTRAVENOUS at 08:30

## 2019-07-06 RX ADMIN — PANTOPRAZOLE SODIUM 40 MG: 40 TABLET, DELAYED RELEASE ORAL at 10:52

## 2019-07-06 RX ADMIN — POTASSIUM CHLORIDE 20 MEQ: 200 INJECTION, SOLUTION INTRAVENOUS at 07:17

## 2019-07-06 RX ADMIN — Medication 10 ML: at 06:33

## 2019-07-06 RX ADMIN — LEVALBUTEROL HYDROCHLORIDE 0.63 MG: 0.63 SOLUTION RESPIRATORY (INHALATION) at 15:13

## 2019-07-06 RX ADMIN — CARVEDILOL 6.25 MG: 6.25 TABLET, FILM COATED ORAL at 12:05

## 2019-07-06 RX ADMIN — APIXABAN 5 MG: 5 TABLET, FILM COATED ORAL at 21:05

## 2019-07-06 RX ADMIN — BISACODYL 5 MG: 5 TABLET, COATED ORAL at 10:52

## 2019-07-06 RX ADMIN — APIXABAN 5 MG: 5 TABLET, FILM COATED ORAL at 11:24

## 2019-07-06 RX ADMIN — HYDROCORTISONE SODIUM SUCCINATE 50 MG: 100 INJECTION, POWDER, FOR SOLUTION INTRAMUSCULAR; INTRAVENOUS at 06:33

## 2019-07-06 RX ADMIN — POTASSIUM CHLORIDE 20 MEQ: 200 INJECTION, SOLUTION INTRAVENOUS at 13:24

## 2019-07-06 RX ADMIN — Medication 10 ML: at 13:25

## 2019-07-06 RX ADMIN — POTASSIUM CHLORIDE 20 MEQ: 200 INJECTION, SOLUTION INTRAVENOUS at 09:04

## 2019-07-06 RX ADMIN — FAMOTIDINE 20 MG: 10 INJECTION INTRAVENOUS at 09:03

## 2019-07-06 RX ADMIN — POTASSIUM CHLORIDE 20 MEQ: 200 INJECTION, SOLUTION INTRAVENOUS at 10:51

## 2019-07-06 RX ADMIN — LEVALBUTEROL HYDROCHLORIDE 0.63 MG: 0.63 SOLUTION RESPIRATORY (INHALATION) at 12:59

## 2019-07-06 RX ADMIN — Medication 10 ML: at 21:05

## 2019-07-06 RX ADMIN — DILTIAZEM HYDROCHLORIDE 10 MG/HR: 5 INJECTION, SOLUTION INTRAVENOUS at 15:27

## 2019-07-06 RX ADMIN — CARVEDILOL 6.25 MG: 6.25 TABLET, FILM COATED ORAL at 16:12

## 2019-07-06 RX ADMIN — DILTIAZEM HYDROCHLORIDE 7.5 MG/HR: 5 INJECTION, SOLUTION INTRAVENOUS at 11:05

## 2019-07-06 RX ADMIN — TEMAZEPAM 15 MG: 15 CAPSULE ORAL at 22:09

## 2019-07-06 RX ADMIN — LEVALBUTEROL HYDROCHLORIDE 0.63 MG: 0.63 SOLUTION RESPIRATORY (INHALATION) at 19:35

## 2019-07-06 RX ADMIN — PIPERACILLIN SODIUM,TAZOBACTAM SODIUM 3.38 G: 3; .375 INJECTION, POWDER, FOR SOLUTION INTRAVENOUS at 03:57

## 2019-07-06 RX ADMIN — PIPERACILLIN SODIUM,TAZOBACTAM SODIUM 3.38 G: 3; .375 INJECTION, POWDER, FOR SOLUTION INTRAVENOUS at 11:30

## 2019-07-06 NOTE — PROGRESS NOTES
Bedside, Verbal and Written shift change report given to Rusty Mcelroy RN (oncoming nurse) by Rosalba Perea RN (offgoing nurse). Report included the following information SBAR, Kardex, ED Summary, Intake/Output and Recent Results.

## 2019-07-06 NOTE — PROGRESS NOTES
Bedside and Verbal shift change report received from [facility]. Report included the following information SBAR, Kardex, ED Summary, Procedure Summary, Intake/Output, MAR, Recent Results and Cardiac Rhythm A FIB.

## 2019-07-06 NOTE — PROGRESS NOTES
TRANSFER - IN REPORT:    Verbal report received from [place], [address] on Julita Barriga being received from ICU  for routine progression of care. Report consisted of patients Situation, Background, Assessment and Recommendations(SBAR). Information from the following report(s) SBAR, Kardex, MAR, Recent Results and Cardiac Rhythm afib controlled <100 was reviewed. Opportunity for questions and clarification was provided. Assessment completed upon patients arrival to unit and care assumed. Patient received to room 301. Patient connected to monitor and assessment completed. Plan of care reviewed. Patient oriented to room and call light. Patient aware to use call light to communicate any chest pain or needs. On arrival to unit noted pt's HR ranging afib 120-140. Notified Nelly Platt and may restart Cardizem gtt stopped in ICU at 1230 today. Daisetta set in the room to cycle hourly BP    Admission skin assessment completed with second RN and reveals the following: No pressure injury. Sacrum is free of breakdown. Dimple with flaky skin, allevyn intact. Feet with trace edema. Bruising left hand.

## 2019-07-06 NOTE — PROGRESS NOTES
Critical Care Daily Progress Note: 7/6/2019  Admission Date: 7/3/2019     The patient's chart is reviewed and the patient is discussed with the staff. Patient is a 65 y.o.  female with hx of PMR and  ? Sjogrens. She presented to the ER with worsening dyspnea on 7/3/19. She was found to be in Afib with RVR with R infiltrate. Intubated in ER and CVL placed. Admitted to ICU. Pt had been on MTX in the past without benefit and has not been on biologics. Subjective:     Extubated yesterday. Now on RA. VR now under better control. Still coughing up yellow sputum.      Current Facility-Administered Medications   Medication Dose Route Frequency    NUTRITIONAL SUPPORT ELECTROLYTE PRN ORDERS   Does Not Apply PRN    potassium chloride 20 mEq in 100 ml IVPB  20 mEq IntraVENous Q2H    hydrocortisone Sod Succ (PF) (SOLU-CORTEF) injection 50 mg  50 mg IntraVENous Q8H    sodium chloride (NS) flush 5-40 mL  5-40 mL IntraVENous Q8H    sodium chloride (NS) flush 5-40 mL  5-40 mL IntraVENous PRN    levalbuterol (XOPENEX) nebulizer soln 0.63 mg/3 mL  0.63 mg Nebulization QID RT    piperacillin-tazobactam (ZOSYN) 3.375 g in 0.9% sodium chloride (MBP/ADV) 100 mL  3.375 g IntraVENous Q8H    famotidine (PF) (PEPCID) 20 mg in sodium chloride 0.9% 10 mL injection  20 mg IntraVENous Q12H    vancomycin (VANCOCIN) 1500 mg in  ml infusion  1,500 mg IntraVENous Q18H    dilTIAZem (CARDIZEM) 125 mg in dextrose 5% 125 mL infusion  0-15 mg/hr IntraVENous TITRATE    heparin 25,000 units in dextrose 500 mL infusion  12-25 Units/kg/hr IntraVENous TITRATE       Review of Systems  Constitutional:  negative for fever, chills, sweats  Cardiovascular:  negative for chest pain, palpitations, syncope, edema  Gastrointestinal:  negative for dysphagia, reflux, vomiting, diarrhea, abdominal pain, or melena  Neurologic:  negative for focal weakness, numbness, headache        Objective:     Vitals:    07/06/19 0645 07/06/19 0700 07/06/19 0715 07/06/19 0811   BP: 142/65 136/69 144/67    Pulse: 84 86 87    Resp: 17 18 22    Temp:  98.2 °F (36.8 °C)     SpO2: 95% 95% 94% 99%   Weight:       Height:           Intake and Output:   07/04 1901 - 07/06 0700  In: 2233.3 [P.O.:180; I.V.:2053.3]  Out: 3645 [Urine:3645]  No intake/output data recorded. Physical Exam:          Constitutional:  intubated and mechanically ventilated, on vent. EENMT:  Sclera clear, pupils equal, oral mucosa moist  Respiratory: fairly clear, no wheezing   Cardiovascular:  irregularly irregular   Gastrointestinal:  soft with no tenderness; positive bowel sounds present  Musculoskeletal:  warm with no cyanosis, trace lower extremity edema  Skin:  no jaundice or ecchymosis  Neurologic: no gross neuro deficits     Psychiatric:  alert and oriented x 3     LINES:    Quad lumen R IJ, peripheral L hand, covarrubias    DRIPS:    Cardizem; heparin  CXR:        7/6:          7/5:        Ventilator Settings  Mode FIO2 Rate Tidal Volume Pressure PEEP   Pressure support  31 %    450 ml  8 cm H2O  8 cm H20      Peak airway pressure: 16.5 cm H2O   Minute ventilation: 6.3 l/min     ABG:   Recent Labs     07/05/19 0320 07/04/19 0311   PHI 7.361 7.358   PCO2I 37.8 37.4   PO2I 89 110*   HCO3I 21.4* 21.0*        LAB  No results for input(s): GLUCPOC in the last 72 hours. No lab exists for component: [place]  Recent Labs     07/06/19 0459 07/05/19 0317 07/04/19  0355   WBC 10.0 11.6* 9.4   HGB 10.8* 10.6* 10.3*   HCT 32.1* 33.4* 32.0*    179 145*     Recent Labs     07/06/19 0459 07/05/19 0317 07/04/19  0926 07/04/19  0355    143  --  145   K 2.8* 3.8 4.3 3.3*   * 111*  --  112*   CO2 26 24  --  24   * 153*  --  121*   BUN 31* 30*  --  24*   CREA 1.06* 1.00  --  0.90   MG 2.2 2.3  --  2.0   PHOS 2.7 3.3  --  4.5*   CA 8.4 8.2*  --  7.6*     No results for input(s): LCAD, LAC in the last 72 hours.     Specimen type      Final   BRONCHOALVEOLAR LAVAGE CD3, CD3BT 74    % Final   CD4, CD4BT 51    % Final   CD8, CD8BT 19    % Final   CD4:CD8 RATIO, C48RBT 2.68    Ratio Final     Resp viral panel: pending    Assessment:  (Medical Decision Making)     Hospital Problems  Date Reviewed: 7/5/2019          Codes Class Noted POA    * (Principal) Acute hypoxemic respiratory failure (HCC) ICD-10-CM: J96.01  ICD-9-CM: 518.81  7/3/2019 Unknown    Extubated    Atrial fibrillation with rapid ventricular response (HCC) vs WPW in patient with history of ablation ICD-10-CM: I48.91  ICD-9-CM: 427.31  7/3/2019 Unknown    Cardiology following     Pulmonary infiltrates vs acute congestive heart failure ICD-10-CM: R91.8  ICD-9-CM: 793.19  7/3/2019 Unknown    On IV vanc and zosyn. BAL negative. Stop Vanc. Severe sepsis ([facility] ICD-10-CM: A41.9, R65.20  ICD-9-CM: 038.9, 995.92  7/3/2019 Unknown    Hypotension resolved    Hypotension due to hypovolemia ICD-10-CM: I95.89, E86.1  ICD-9-CM: 458.8, 276.52  7/3/2019 Unknown    Resolved    Polymyalgia rheumatica ([facility] ICD-10-CM: M35.3  ICD-9-CM: 697  12/2/2015 Yes    Chronic     HTN (hypertension) ICD-10-CM: I10  ICD-9-CM: 401.9  8/12/2013 Yes    Controlled     Severe obesity with body mass index (BMI) of 35.0 to 39.9 with comorbidity ([facility] ICD-10-CM: E66.01  ICD-9-CM: 278.01  8/12/2013 Yes        Sjogren's syndrome ([facility] ICD-10-CM: M35.00  ICD-9-CM: 710.2  8/12/2013 Yes    Chronic           Plan:  (Medical Decision Making)     Stop Vanc.  ? PO cardizem and NOAC- defer to cardiology. Stop steroids. Follow resp viral panel. [facility] for telemetry if [facility] with cardiology. Gina Romero MD      More than 50% of the time documented was spent in face-to-face contact with the patient and in the care of the patient on the floor/unit where the patient is located.

## 2019-07-06 NOTE — PROGRESS NOTES
7/6/2019 10:42 AM    Admit Date: 7/3/2019        Subjective:     Florence Romero reports feeling better She was extubated States she has had bronchitis before the a fib .             Objective:      Visit Vitals  /66   Pulse 93   Temp 98.2 °F (36.8 °C)   Resp 24   Ht 5' 2\" (1.575 m)   Wt 100.3 kg (221 lb 1.9 oz)   SpO2 96%   BMI 40.44 kg/m²       Physical Exam:  Heart: irregularly irregular rhythm  Lungs: clear to auscultation bilaterally  Extremities: no edema    Data Review:   Labs:    Recent Results (from the past 24 hour(s))   VANCOMYCIN, TROUGH    Collection Time: 07/05/19  1:37 PM   Result Value Ref Range    Vancomycin,trough 16.0 5 - 20 ug/mL   PTT    Collection Time: 07/05/19  3:54 PM   Result Value Ref Range    aPTT 55.2 (H) 24.7 - 39.8 SEC   PTT    Collection Time: 07/05/19 11:18 PM   Result Value Ref Range    aPTT 103.6 (H) 24.7 - 26.6 SEC   METABOLIC PANEL, BASIC    Collection Time: 07/06/19  4:59 AM   Result Value Ref Range    Sodium 143 136 - 145 mmol/L    Potassium 2.8 (LL) 3.5 - 5.1 mmol/L    Chloride 108 (H) 98 - 107 mmol/L    CO2 26 21 - 32 mmol/L    Anion gap 9 7 - 16 mmol/L    Glucose 159 (H) 65 - 100 mg/dL    BUN 31 (H) 8 - 23 MG/DL    Creatinine 1.06 (H) 0.6 - 1.0 MG/DL    GFR est AA >60 >60 ml/min/1.73m2    GFR est non-AA 54 (L) >60 ml/min/1.73m2    Calcium 8.4 8.3 - 10.4 MG/DL   MAGNESIUM    Collection Time: 07/06/19  4:59 AM   Result Value Ref Range    Magnesium 2.2 1.8 - 2.4 mg/dL   PHOSPHORUS    Collection Time: 07/06/19  4:59 AM   Result Value Ref Range    Phosphorus 2.7 2.3 - 3.7 MG/DL   CBC WITH AUTOMATED DIFF    Collection Time: 07/06/19  4:59 AM   Result Value Ref Range    WBC 10.0 4.3 - 11.1 K/uL    RBC 3.46 (L) 4.05 - 5.2 M/uL    HGB 10.8 (L) 11.7 - 15.4 g/dL    HCT 32.1 (L) 35.8 - 46.3 %    MCV 92.8 79.6 - 97.8 FL    MCH 31.2 26.1 - 32.9 PG    MCHC 33.6 31.4 - 35.0 g/dL    RDW 14.9 (H) 11.9 - 14.6 %    PLATELET 083 874 - 081 K/uL    MPV 10.8 9.4 - 12.3 FL    ABSOLUTE NRBC 0. 00 0.0 - 0.2 K/uL    DF AUTOMATED      NEUTROPHILS 87 (H) 43 - 78 %    LYMPHOCYTES 8 (L) 13 - 44 %    MONOCYTES 4 4.0 - 12.0 %    EOSINOPHILS 0 (L) 0.5 - 7.8 %    BASOPHILS 0 0.0 - 2.0 %    IMMATURE GRANULOCYTES 1 0.0 - 5.0 %    ABS. NEUTROPHILS 8.7 (H) 1.7 - 8.2 K/UL    ABS. LYMPHOCYTES 0.8 0.5 - 4.6 K/UL    ABS. MONOCYTES 0.4 0.1 - 1.3 K/UL    ABS. EOSINOPHILS 0.0 0.0 - 0.8 K/UL    ABS. BASOPHILS 0.0 0.0 - 0.2 K/UL    ABS. IMM.  GRANS. 0.1 0.0 - 0.5 K/UL   PTT    Collection Time: 07/06/19  4:59 AM   Result Value Ref Range    aPTT 92.3 (H) 24.7 - 39.8 SEC       Telemetry: AFIB rate 93      Assessment:     Patient Active Problem List    Diagnosis Date Noted    Acute hypoxemic respiratory failure (HCC) better 07/03/2019    Atrial fibrillation with rapid ventricular response ([address] vs WPW in patient with history of ablation start coreg wean cardiazem drip 07/03/2019    Pulmonary infiltrates vs acute congestive heart failure 07/03/2019    Severe sepsis ([address] 07/03/2019    Hypotension due to hypovolemia 07/03/2019    Polymyalgia rheumatica ([address] 12/02/2015    Gouty arthritis  NOS 12/02/2015    Mixed hyperlipidemia 12/02/2015    WPW (Suresh-Parkinson-White syndrome) 08/12/2013    HTN (hypertension) 08/12/2013    Severe obesity with body mass index (BMI) of 35.0 to 39.9 with comorbidity ([address] 08/12/2013    Other and unspecified hyperlipidemia 08/12/2013    Sjogren's syndrome ([address] 08/12/2013    Fibromyalgia 08/12/2013    Peptic ulcer 08/12/2013    Gout 08/12/2013       Plan:   Can change to eliquis

## 2019-07-06 NOTE — PROGRESS NOTES
TRANSFER - OUT REPORT:    Verbal report given to [address], RN (name) on Tahmina Quintanilla  being transferred to 3rd floor (unit) for routine progression of care       Report consisted of patients Situation, Background, Assessment and   Recommendations(SBAR). Information from the following report(s) SBAR, Kardex, ED Summary, Intake/Output and Recent Results was reviewed with the receiving nurse. Lines:   Quad Lumen 07/03/19 Right Internal jugular (Active)   Central Line Being Utilized Yes 7/6/2019  7:00 AM   Criteria for Appropriate Use Limited/no vessel suitable for conventional peripheral access 7/6/2019  7:00 AM   Site Assessment Clean, dry, & intact 7/6/2019  7:00 AM   Infiltration Assessment 0 7/6/2019  7:00 AM   Affected Extremity/Extremities Color distal to insertion site pink (or appropriate for race); Pulses palpable;Range of motion performed 7/6/2019  7:00 AM   Date of Last Dressing Change 07/05/19 7/6/2019  7:00 AM   Dressing Status Clean, dry, & intact 7/6/2019  7:00 AM   Dressing Type Disk with Chlorhexadine gluconate (CHG); Transparent 7/6/2019  7:00 AM   Action Taken Blood drawn 7/5/2019 11:30 PM   Proximal Hub Color/Line Status Flushed;Patent; White 7/6/2019  7:00 AM   Positive Blood Return (Medial Site) Yes 7/6/2019  7:00 AM   Medial 1 Hub Color/Line Status Flushed;Patent;Gray 7/6/2019  7:00 AM   Positive Blood Return (Lateral Site) Yes 7/6/2019  7:00 AM   Medial 2 Hub Color/Line Status Flushed;Patent;Blue 7/6/2019  7:00 AM   Positive Blood Return (Site #3) Yes 7/6/2019  7:00 AM   Distal Hub Color/Line Status Flushed;Patent;Brown 7/6/2019  7:00 AM   Positive Blood Return (Site #4) Yes 7/6/2019  7:00 AM   External Catheter Length (cm) 0 centimeters 7/5/2019 10:51 AM   Alcohol Cap Used No 7/6/2019  7:00 AM        Opportunity for questions and clarification was provided.       Patient transported with:   Monitor  Registered Nurse

## 2019-07-07 ENCOUNTER — APPOINTMENT (OUTPATIENT)
Dept: GENERAL RADIOLOGY | Age: 74
DRG: 853 | End: 2019-07-07
Attending: INTERNAL MEDICINE
Payer: MEDICARE

## 2019-07-07 LAB
ANION GAP SERPL CALC-SCNC: 6 MMOL/L (ref 7–16)
APTT PPP: 28.9 SEC (ref 24.7–39.8)
BASOPHILS # BLD: 0 K/UL (ref 0–0.2)
BASOPHILS NFR BLD: 0 % (ref 0–2)
BUN SERPL-MCNC: 29 MG/DL (ref 8–23)
CALCIUM SERPL-MCNC: 8.5 MG/DL (ref 8.3–10.4)
CHLORIDE SERPL-SCNC: 113 MMOL/L (ref 98–107)
CO2 SERPL-SCNC: 27 MMOL/L (ref 21–32)
CREAT SERPL-MCNC: 1.04 MG/DL (ref 0.6–1)
DIFFERENTIAL METHOD BLD: ABNORMAL
EOSINOPHIL # BLD: 0.2 K/UL (ref 0–0.8)
EOSINOPHIL NFR BLD: 2 % (ref 0.5–7.8)
ERYTHROCYTE [DISTWIDTH] IN BLOOD BY AUTOMATED COUNT: 14.9 % (ref 11.9–14.6)
FLUAV RNA SPEC QL NAA+PROBE: NEGATIVE
FLUBV RNA SPEC QL NAA+PROBE: NEGATIVE
GLUCOSE SERPL-MCNC: 92 MG/DL (ref 65–100)
HADV DNA SPEC QL NAA+PROBE: NEGATIVE
HCT VFR BLD AUTO: 31.8 % (ref 35.8–46.3)
HEMOCCULT STL QL: NEGATIVE
HGB BLD-MCNC: 10.5 G/DL (ref 11.7–15.4)
HMPV RNA SPEC QL NAA+PROBE: NEGATIVE
HPIV1 RNA SPEC QL NAA+PROBE: NEGATIVE
HPIV2 RNA SPEC QL NAA+PROBE: NEGATIVE
HPIV3 RNA SPEC QL NAA+PROBE: NEGATIVE
IMM GRANULOCYTES # BLD AUTO: 0 K/UL (ref 0–0.5)
IMM GRANULOCYTES NFR BLD AUTO: 1 % (ref 0–5)
LYMPHOCYTES # BLD: 1.3 K/UL (ref 0.5–4.6)
LYMPHOCYTES NFR BLD: 15 % (ref 13–44)
MAGNESIUM SERPL-MCNC: 2.1 MG/DL (ref 1.8–2.4)
MCH RBC QN AUTO: 31 PG (ref 26.1–32.9)
MCHC RBC AUTO-ENTMCNC: 33 G/DL (ref 31.4–35)
MCV RBC AUTO: 93.8 FL (ref 79.6–97.8)
MM INDURATION POC: 0 MM (ref 0–5)
MONOCYTES # BLD: 0.8 K/UL (ref 0.1–1.3)
MONOCYTES NFR BLD: 9 % (ref 4–12)
NEUTS SEG # BLD: 6.5 K/UL (ref 1.7–8.2)
NEUTS SEG NFR BLD: 73 % (ref 43–78)
NRBC # BLD: 0 K/UL (ref 0–0.2)
PHOSPHATE SERPL-MCNC: 1.9 MG/DL (ref 2.3–3.7)
PLATELET # BLD AUTO: 187 K/UL (ref 150–450)
PMV BLD AUTO: 10.9 FL (ref 9.4–12.3)
POTASSIUM SERPL-SCNC: 3.1 MMOL/L (ref 3.5–5.1)
PPD POC: NEGATIVE NEGATIVE
RBC # BLD AUTO: 3.39 M/UL (ref 4.05–5.2)
RHINOVIRUS RNA SPEC QL NAA+PROBE: NEGATIVE
RSV A RNA SPEC QL NAA+PROBE: NEGATIVE
RSV B RNA SPEC QL NAA+PROBE: NEGATIVE
SODIUM SERPL-SCNC: 146 MMOL/L (ref 136–145)
SPECIMEN SOURCE: NORMAL
WBC # BLD AUTO: 8.8 K/UL (ref 4.3–11.1)

## 2019-07-07 PROCEDURE — 74011250636 HC RX REV CODE- 250/636: Performed by: INTERNAL MEDICINE

## 2019-07-07 PROCEDURE — 74011250637 HC RX REV CODE- 250/637: Performed by: INTERNAL MEDICINE

## 2019-07-07 PROCEDURE — 99232 SBSQ HOSP IP/OBS MODERATE 35: CPT | Performed by: INTERNAL MEDICINE

## 2019-07-07 PROCEDURE — 80048 BASIC METABOLIC PNL TOTAL CA: CPT

## 2019-07-07 PROCEDURE — 97530 THERAPEUTIC ACTIVITIES: CPT

## 2019-07-07 PROCEDURE — 51798 US URINE CAPACITY MEASURE: CPT

## 2019-07-07 PROCEDURE — 84100 ASSAY OF PHOSPHORUS: CPT

## 2019-07-07 PROCEDURE — 97161 PT EVAL LOW COMPLEX 20 MIN: CPT

## 2019-07-07 PROCEDURE — 82272 OCCULT BLD FECES 1-3 TESTS: CPT

## 2019-07-07 PROCEDURE — 85025 COMPLETE CBC W/AUTO DIFF WBC: CPT

## 2019-07-07 PROCEDURE — 74011000258 HC RX REV CODE- 258: Performed by: INTERNAL MEDICINE

## 2019-07-07 PROCEDURE — 94640 AIRWAY INHALATION TREATMENT: CPT

## 2019-07-07 PROCEDURE — 36591 DRAW BLOOD OFF VENOUS DEVICE: CPT

## 2019-07-07 PROCEDURE — 74011000250 HC RX REV CODE- 250: Performed by: INTERNAL MEDICINE

## 2019-07-07 PROCEDURE — 85730 THROMBOPLASTIN TIME PARTIAL: CPT

## 2019-07-07 PROCEDURE — 94760 N-INVAS EAR/PLS OXIMETRY 1: CPT

## 2019-07-07 PROCEDURE — 65660000000 HC RM CCU STEPDOWN

## 2019-07-07 PROCEDURE — 77010033678 HC OXYGEN DAILY

## 2019-07-07 PROCEDURE — 71045 X-RAY EXAM CHEST 1 VIEW: CPT

## 2019-07-07 PROCEDURE — 83735 ASSAY OF MAGNESIUM: CPT

## 2019-07-07 RX ORDER — POTASSIUM CHLORIDE 1.5 G/1.77G
20 POWDER, FOR SOLUTION ORAL 2 TIMES DAILY WITH MEALS
Status: DISCONTINUED | OUTPATIENT
Start: 2019-07-07 | End: 2019-07-15 | Stop reason: HOSPADM

## 2019-07-07 RX ORDER — FUROSEMIDE 10 MG/ML
20 INJECTION INTRAMUSCULAR; INTRAVENOUS EVERY 12 HOURS
Status: DISCONTINUED | OUTPATIENT
Start: 2019-07-07 | End: 2019-07-14

## 2019-07-07 RX ORDER — HYDRALAZINE HYDROCHLORIDE 25 MG/1
25 TABLET, FILM COATED ORAL 3 TIMES DAILY
Status: DISCONTINUED | OUTPATIENT
Start: 2019-07-07 | End: 2019-07-08

## 2019-07-07 RX ORDER — CARVEDILOL 12.5 MG/1
12.5 TABLET ORAL 2 TIMES DAILY WITH MEALS
Status: DISCONTINUED | OUTPATIENT
Start: 2019-07-07 | End: 2019-07-08

## 2019-07-07 RX ORDER — POTASSIUM CHLORIDE 14.9 MG/ML
20 INJECTION INTRAVENOUS EVERY 4 HOURS
Status: COMPLETED | OUTPATIENT
Start: 2019-07-07 | End: 2019-07-07

## 2019-07-07 RX ADMIN — POTASSIUM CHLORIDE 20 MEQ: 1.5 POWDER, FOR SOLUTION ORAL at 18:15

## 2019-07-07 RX ADMIN — HYDRALAZINE HYDROCHLORIDE 25 MG: 25 TABLET ORAL at 08:25

## 2019-07-07 RX ADMIN — HYDRALAZINE HYDROCHLORIDE 25 MG: 25 TABLET ORAL at 20:10

## 2019-07-07 RX ADMIN — HYDRALAZINE HYDROCHLORIDE 25 MG: 25 TABLET ORAL at 18:15

## 2019-07-07 RX ADMIN — CARVEDILOL 12.5 MG: 12.5 TABLET, FILM COATED ORAL at 08:25

## 2019-07-07 RX ADMIN — FUROSEMIDE 20 MG: 10 INJECTION, SOLUTION INTRAMUSCULAR; INTRAVENOUS at 20:11

## 2019-07-07 RX ADMIN — PIPERACILLIN SODIUM,TAZOBACTAM SODIUM 3.38 G: 3; .375 INJECTION, POWDER, FOR SOLUTION INTRAVENOUS at 04:40

## 2019-07-07 RX ADMIN — CARVEDILOL 12.5 MG: 12.5 TABLET, FILM COATED ORAL at 18:15

## 2019-07-07 RX ADMIN — LEVALBUTEROL HYDROCHLORIDE 0.63 MG: 0.63 SOLUTION RESPIRATORY (INHALATION) at 07:12

## 2019-07-07 RX ADMIN — DILTIAZEM HYDROCHLORIDE 10 MG/HR: 5 INJECTION, SOLUTION INTRAVENOUS at 08:52

## 2019-07-07 RX ADMIN — PIPERACILLIN SODIUM,TAZOBACTAM SODIUM 3.38 G: 3; .375 INJECTION, POWDER, FOR SOLUTION INTRAVENOUS at 11:21

## 2019-07-07 RX ADMIN — APIXABAN 5 MG: 5 TABLET, FILM COATED ORAL at 20:10

## 2019-07-07 RX ADMIN — LEVALBUTEROL HYDROCHLORIDE 0.63 MG: 0.63 SOLUTION RESPIRATORY (INHALATION) at 19:19

## 2019-07-07 RX ADMIN — LEVALBUTEROL HYDROCHLORIDE 0.63 MG: 0.63 SOLUTION RESPIRATORY (INHALATION) at 11:17

## 2019-07-07 RX ADMIN — Medication 10 ML: at 15:06

## 2019-07-07 RX ADMIN — POTASSIUM CHLORIDE 20 MEQ: 200 INJECTION, SOLUTION INTRAVENOUS at 11:22

## 2019-07-07 RX ADMIN — PIPERACILLIN SODIUM,TAZOBACTAM SODIUM 3.38 G: 3; .375 INJECTION, POWDER, FOR SOLUTION INTRAVENOUS at 20:10

## 2019-07-07 RX ADMIN — Medication 10 ML: at 04:43

## 2019-07-07 RX ADMIN — DILTIAZEM HYDROCHLORIDE 10 MG/HR: 5 INJECTION, SOLUTION INTRAVENOUS at 20:12

## 2019-07-07 RX ADMIN — Medication 10 ML: at 20:12

## 2019-07-07 RX ADMIN — PANTOPRAZOLE SODIUM 40 MG: 40 TABLET, DELAYED RELEASE ORAL at 08:25

## 2019-07-07 RX ADMIN — POTASSIUM CHLORIDE 20 MEQ: 200 INJECTION, SOLUTION INTRAVENOUS at 15:05

## 2019-07-07 RX ADMIN — FUROSEMIDE 20 MG: 10 INJECTION, SOLUTION INTRAMUSCULAR; INTRAVENOUS at 11:22

## 2019-07-07 RX ADMIN — LEVALBUTEROL HYDROCHLORIDE 0.63 MG: 0.63 SOLUTION RESPIRATORY (INHALATION) at 15:17

## 2019-07-07 RX ADMIN — APIXABAN 5 MG: 5 TABLET, FILM COATED ORAL at 08:25

## 2019-07-07 NOTE — PROGRESS NOTES
Patient complaining of shortness of breath at rest and feels like \"shes not getting better\". Patient is on room air with expiratory wheezes in all lung fields. She just received a xopenex treatment. Gave patient an incentive spirometer and gave education on how to use it. Patient pulls 500 ml with nursing instruction. Instructed patient to practice 10 times an hour while awake. Will continue to monitor closely.

## 2019-07-07 NOTE — PROGRESS NOTES
Bedside and Verbal shift change report received from [facility]. Report included the following information SBAR, Kardex, Intake/Output, MAR, Recent Results and Cardiac Rhythm A FIB.

## 2019-07-07 NOTE — PROGRESS NOTES
7/7/2019  Attempted to see patient this afternoon for OT evaluation, however pt reports that she doesn't feel up to participating due to having such a busy day. Pt admits that she is weaker than baseline but states she will not go to rehab and pt lives alone. Will re-attempt as time permits.   Thank you,  Amarjit Garcia, OT

## 2019-07-07 NOTE — CONSULTS
Hospitalist Note     Admit Date:  7/3/2019  3:02 AM   Name:  Indira Betts   Age:  74 y.o.  :  1945   MRN:  [id number]   PCP:  Rosales Haley MD  Treatment Team: Attending Provider: Deepa Urrutia MD; Consulting Provider: Vonn Goodell, MD; Consulting Provider: Robert Smith MD; Care Manager: Sheila Urias, RN; Utilization Review: Solomon Hightower RN; Consulting Provider: Bernice Parrish MD; Occupational Therapist: Deepika Levi OT    HPI/Subjective:   Mrs. Kelsea Harley is a pleasant 73 y/o WF seen at the request of the ICU service for assumption of care. Her PMH includes PMR on chronic steroids, WPW s/p ablation, gout, HLD, HTN. She was brought to the ER on 7/3 after calling EMS with worsening SOB. On arrival to the ER she was in rapid atrial fibrillation. CXR showed patchy bilateral edema vs infiltrates. She had progressive hypoxemia and was ultimately intubated and admitted to the ICU service. She was started on Zosyn and diuresed. Cardiology consulted for atrial fibrillation and she was started on Cardizem drip and heparin drip; she has since been transitioned to Eliquis. TTE showed LVEF 40-45%, grade 1 DD, RV dilation with reduced function, JONNY without significant valvular abnormality. She was extubated on  and transferred to the medical floor on . Repeat CXR this morning showed vascular congestion with bilateral pleural effusions. She continued to c/o SOB today and HRs up so she was re-started on Cardizem drip and IV Lasix and has had >1L uop since. Feels better but still says she's a little SOB. Hospitalist is consulted to assume care now that she is out of the ICU. 10 systems reviewed and negative except as noted in HPI.   Past Medical History:   Diagnosis Date    Arrhythmia     Arthritis     Asthma     Cellulitis     Chronic kidney disease     Hx renal failure    Depression     Gout     Gouty arthritis  NOS 2015    Hypercholesterolemia     Hypertension     Mitral valve prolapse     Morbid obesity (HCC)     Nausea & vomiting     Polymyalgia rheumatica (HCC) 12/2/2015    Psychiatric disorder     depression & anxiety    PUD (peptic ulcer disease)     Sjogren's syndrome ([facility]     Temporal arteritis (HCC)       Past Surgical History:   Procedure Laterality Date    CARDIAC SURG PROCEDURE UNLIST  4/07    STRESS TEST    CARDIAC SURG PROCEDURE UNLIST  8/13    HEART ABLATION    HX COLONOSCOPY  6/08    HX ENDOSCOPY  8/08    HX HYSTERECTOMY      HX TUBAL LIGATION        Allergies   Allergen Reactions    Celebrex [Celecoxib] Rash    Lisinopril Cough    Losartan Other (comments)     Burning sensation tongue.  Nifedipine Other (comments)     severe headache      Social History     Tobacco Use    Smoking status: Never Smoker    Smokeless tobacco: Never Used   Substance Use Topics    Alcohol use: No      Family History   Problem Relation Age of Onset    Cancer Mother     Breast Cancer Mother 80    Hypertension Mother     Dementia Mother     Cancer Father         LUNG    Heart Disease Father 62    Diabetes Son     Other Son         PSYCHIATRIC ILLNESS    Hypertension Sister       Immunization History   Administered Date(s) Administered    Influenza Vaccine 10/06/2014    Influenza Vaccine PF 12/03/2015    Pneumococcal Conjugate (PCV-13) 10/06/2014    TB Skin Test (PPD) Intradermal 07/06/2019     PTA Medications:  Prior to Admission Medications   Prescriptions Last Dose Informant Patient Reported? Taking? COLCRYS 0.6 mg tablet   No Yes   Sig: Take 1 Tab by mouth daily for 360 days. Patient taking differently: Take 0.6 mg by mouth as needed (gout). albuterol (PROVENTIL HFA, VENTOLIN HFA, PROAIR HFA) 90 mcg/actuation inhaler   No Yes   Sig: Take 2 Puffs by inhalation every six (6) hours as needed for Wheezing. amLODIPine (NORVASC) 5 mg tablet   No Yes   Sig: Take 1 Tab by mouth daily.    aspirin delayed-release 81 mg tablet   Yes Yes   Sig: Take 81 mg by mouth daily. ibuprofen (MOTRIN) 200 mg tablet   Yes Yes   Sig: Take  by mouth as needed for Pain.   magnesium oxide (MAG-OX) 400 mg tablet   Yes Yes   Sig: Take 400 mg by mouth two (2) times a day. multivitamin (ONE A DAY) tablet   Yes Yes   Sig: Take 1 Tab by mouth daily. nadolol (CORGARD) 40 mg tablet   No Yes   Sig: Take 2 Tabs by mouth daily for 360 days. predniSONE (DELTASONE) 5 mg tablet   No Yes   Sig: Take 3 Tabs by mouth daily.       Facility-Administered Medications: None       Objective:     Patient Vitals for the past 24 hrs:   Temp Pulse Resp BP SpO2   07/07/19 1122 97.9 °F (36.6 °C) 97 16 133/64 96 %   07/07/19 1119     96 %   07/07/19 0825 98.2 °F (36.8 °C) (!) 102 18 147/75 94 %   07/07/19 0714     97 %   07/07/19 0534 97.5 °F (36.4 °C) 90 18 148/84 95 %   07/07/19 0500  88  148/84    07/07/19 0400  96  139/69    07/07/19 0300  82  148/71    07/07/19 0200  84  134/79    07/07/19 0100  96  134/49    07/07/19 0037 97.4 °F (36.3 °C) 90 18 134/49 95 %   07/07/19 0000  84  136/76    07/06/19 2300  86  116/66    07/06/19 2200  90  136/65    07/06/19 2100  (!) 102  115/89    07/06/19 2045 97.8 °F (36.6 °C) 99 18 115/89 95 %   07/06/19 2000  (!) 104  135/55    07/06/19 1935     94 %   07/06/19 1900  (!) 108  133/73    07/06/19 1632 97.8 °F (36.6 °C) 86 18 134/72 93 %   07/06/19 1533  (!) 121  149/77    07/06/19 1519     95 %     Oxygen Therapy  O2 Sat (%): 96 % (07/07/19 1122)  Pulse via Oximetry: 91 beats per minute (07/06/19 1935)  O2 Device: Room air (07/07/19 1119)  O2 Flow Rate (L/min): 2 l/min (07/05/19 1529)  FIO2 (%): 31 % (07/05/19 0800)      Intake/Output Summary (Last 24 hours) at 7/7/2019 1457  Last data filed at 7/7/2019 1105  Gross per 24 hour   Intake 1153.67 ml   Output 575 ml   Net 578.67 ml       *Note that automatically entered I/Os may not be accurate; dependent on patient compliance with collection and accurate  by assistants. Physical Exam:  General:    Well nourished. Alert. Obese. Eyes:   Normal sclerae. Extraocular movements intact. HENT:  Normocephalic, atraumatic. Moist mucous membranes  CV:   Irreg irreg, rate 80s-90s. No m/r/g. Lungs:  CTAB. May some scant bb rales. Abdomen: Soft, nontender, nondistended. Extremities: Warm and dry. No cyanosis or edema. Neurologic: CN II-XII grossly intact. Sensation intact. Skin:     No rashes or jaundice. Normal coloration  Psych:  Normal mood and affect. I reviewed the labs, imaging, EKGs, telemetry, and other studies done this admission.   Data Review:   Recent Results (from the past 24 hour(s))   METABOLIC PANEL, BASIC    Collection Time: 07/07/19  4:36 AM   Result Value Ref Range    Sodium 146 (H) 136 - 145 mmol/L    Potassium 3.1 (L) 3.5 - 5.1 mmol/L    Chloride 113 (H) 98 - 107 mmol/L    CO2 27 21 - 32 mmol/L    Anion gap 6 (L) 7 - 16 mmol/L    Glucose 92 65 - 100 mg/dL    BUN 29 (H) 8 - 23 MG/DL    Creatinine 1.04 (H) 0.6 - 1.0 MG/DL    GFR est AA >60 >60 ml/min/1.73m2    GFR est non-AA 55 (L) >60 ml/min/1.73m2    Calcium 8.5 8.3 - 10.4 MG/DL   MAGNESIUM    Collection Time: 07/07/19  4:36 AM   Result Value Ref Range    Magnesium 2.1 1.8 - 2.4 mg/dL   PHOSPHORUS    Collection Time: 07/07/19  4:36 AM   Result Value Ref Range    Phosphorus 1.9 (L) 2.3 - 3.7 MG/DL   CBC WITH AUTOMATED DIFF    Collection Time: 07/07/19  4:36 AM   Result Value Ref Range    WBC 8.8 4.3 - 11.1 K/uL    RBC 3.39 (L) 4.05 - 5.2 M/uL    HGB 10.5 (L) 11.7 - 15.4 g/dL    HCT 31.8 (L) 35.8 - 46.3 %    MCV 93.8 79.6 - 97.8 FL    MCH 31.0 26.1 - 32.9 PG    MCHC 33.0 31.4 - 35.0 g/dL    RDW 14.9 (H) 11.9 - 14.6 %    PLATELET 607 984 - 985 K/uL    MPV 10.9 9.4 - 12.3 FL    ABSOLUTE NRBC 0.00 0.0 - 0.2 K/uL    DF AUTOMATED      NEUTROPHILS 73 43 - 78 %    LYMPHOCYTES 15 13 - 44 %    MONOCYTES 9 4.0 - 12.0 %    EOSINOPHILS 2 0.5 - 7.8 %    BASOPHILS 0 0.0 - 2.0 %    IMMATURE GRANULOCYTES 1 0.0 - 5.0 %    ABS. NEUTROPHILS 6.5 1.7 - 8.2 K/UL    ABS. LYMPHOCYTES 1.3 0.5 - 4.6 K/UL    ABS. MONOCYTES 0.8 0.1 - 1.3 K/UL    ABS. EOSINOPHILS 0.2 0.0 - 0.8 K/UL    ABS. BASOPHILS 0.0 0.0 - 0.2 K/UL    ABS. IMM.  GRANS. 0.0 0.0 - 0.5 K/UL   PTT    Collection Time: 19  4:36 AM   Result Value Ref Range    aPTT 28.9 24.7 - 39.8 SEC       All Micro Results     Procedure Component Value Units Date/Time    CULTURE, BLOOD [[id number]] Collected:  19    Order Status:  Completed Specimen:  Blood Updated:  19     Special Requests: CENTRAL LINE     Culture result: NO GROWTH 4 DAYS       CULTURE, BLOOD [[id number]] Collected:  19    Order Status:  Completed Specimen:  Blood Updated:  19     Special Requests: Terrea Scripture     Culture result: NO GROWTH 4 DAYS       FUNGUS CULTURE AND SMEAR - INCLUDES MUCOR [[id number]] Collected:  19 124    Order Status:  Completed Specimen:  Other from Miscellaneous sample Updated:  19 0836     Source BRONCHIAL LAVAGE        Fungus stain Direct Inoculation     Fungus (Mycology) Culture Other source received     Comment: (NOTE)  Performed At: [address] [id number]  Edwardo Bruce MD S         CULTURE, RESPIRATORY/SPUTUM/BRONCH Robin Roe [[id number]] Collected:  19 0643    Order Status:  Completed Specimen:  Sputum Updated:  19 1214     Special Requests: NO SPECIAL REQUESTS        GRAM STAIN 0 to 2 WBCS/OIF      NO EPITHELIAL CELLS SEEN         FEW GRAM POSITIVE COCCI         2+ MUCUS PRESENT        Culture result:       HEAVY NORMAL RESPIRATORY ROBEL          ANAEROBIC CULTURE - ONLY PERFORMED ON BRONCHIAL BRUSHING [[id number]] Collected:  19 1245    Order Status:  Completed Specimen:  Bronchial lavage Updated:  19 1025     Special Requests: NO SPECIAL REQUESTS        Culture result:       NO ANAEROBES ISOLATED 2 DAYS          CULTURE, RESPIRATORY/SPUTUM/BRONCH Randall Lent STAIN [[id number]] Collected:  07/03/19 1245    Order Status:  Completed Specimen:  Sputum from Bronchial lavage Updated:  07/05/19 0743     Special Requests: NO SPECIAL REQUESTS        GRAM STAIN NO WBCS SEEN         NO DEFINITE ORGANISM SEEN        Culture result: NO GROWTH 2 DAYS       VIRAL CULTURE - INCLUDES ADENOVIRUS, CMV, ENTEROVIRUSES, HSV, INFLUENZA, MUMPS, PARAINFLUENZA, RSV, & VARICELLA ZOSTER [[id number]] Collected:  07/03/19 1245    Order Status:  Completed Specimen:  Other from Miscellaneous sample Updated:  07/05/19 0635     Source BRONCHIAL LAVAGE        Viral Culture, General Comment        Comment: (NOTE)  Preliminary Report:  No virus isolated at 24 hours. Next report to follow after 4 days. Performed At: [facility]  [employer] [address] [place] [id number]  Marce Hoffman MD KN:[id number]         AFB (MYCOBACTERIUM) CULTURE & SMEAR W/REFLEX ID Laure Jones [[id number]] Collected:  07/03/19 1245    Order Status:  Completed Specimen:  Miscellaneous sample Updated:  07/04/19 1336     Source BRONCHIAL LAVAGE        AFB Specimen processing Concentration     Acid Fast Smear NEGATIVE         Comment: (NOTE)  Performed At: [facility]  [employer] [address] [place] [id number]  Marce Hoffman MD OJ:[id number]          Acid Fast Culture PENDING    RESPIRATORY VIRAL PANEL, PCR [[id number]] Collected:  07/03/19 1245    Order Status:  Completed Updated:  07/03/19 1531    A.  GALACTOMANNAN BY EIA, BRONCHOALVEOLAR LAVAGE [[id number]] Collected:  07/03/19 1245    Order Status:  Completed Updated:  07/03/19 1446    ATYPICAL PNEUMONIA BY PCR,BRONCHOALVEOLAR LAVAGE - INCLUDES: MYCOPLASMA PNEUMONIAE AND CHLAMYDIA PNEUMONIAE [[id number]] Collected:  07/03/19 1245    Order Status:  Completed Updated:  07/03/19 1440          Current Facility-Administered Medications   Medication Dose Route Frequency    carvedilol (COREG) tablet 12.5 mg  12.5 mg Oral BID WITH MEALS    hydrALAZINE (APRESOLINE) tablet 25 mg  25 mg Oral TID    furosemide (LASIX) injection 20 mg  20 mg IntraVENous Q12H    potassium chloride 20 mEq in 100 ml IVPB  20 mEq IntraVENous Q4H    potassium chloride (KLOR-CON) packet for solution 20 mEq  20 mEq Oral BID WITH MEALS    NUTRITIONAL SUPPORT ELECTROLYTE PRN ORDERS   Does Not Apply PRN    pantoprazole (PROTONIX) tablet 40 mg  40 mg Oral ACB    bisacodyl (DULCOLAX) tablet 5 mg  5 mg Oral DAILY PRN    apixaban (ELIQUIS) tablet 5 mg  5 mg Oral Q12H    tuberculin injection 5 Units  5 Units IntraDERMal ONCE    temazepam (RESTORIL) capsule 15 mg  15 mg Oral QHS PRN    sodium chloride (NS) flush 5-40 mL  5-40 mL IntraVENous Q8H    sodium chloride (NS) flush 5-40 mL  5-40 mL IntraVENous PRN    levalbuterol (XOPENEX) nebulizer soln 0.63 mg/3 mL  0.63 mg Nebulization QID RT    piperacillin-tazobactam (ZOSYN) 3.375 g in 0.9% sodium chloride (MBP/ADV) 100 mL  3.375 g IntraVENous Q8H    dilTIAZem (CARDIZEM) 125 mg in dextrose 5% 125 mL infusion  0-15 mg/hr IntraVENous TITRATE       Other Studies:  Xr Chest Sngl V    Result Date: 7/7/2019   Portable view of the chest COMPARISON: Yesterday. CLINICAL HISTORY: Shortness of breath. FINDINGS: There is vascular congestion. There are bilateral pleural effusions. No pneumothorax. Heart is enlarged. Mediastinal contour is within normal limits. Right-sided IJ line is stable. IMPRESSION: Bilateral pleural effusions and vascular congestion. No significant change.       Assessment and Plan:     Hospital Problems as of 7/7/2019 Date Reviewed: 7/5/2019          Codes Class Noted - Resolved POA    * (Principal) Acute hypoxemic respiratory failure (HCC) ICD-10-CM: J96.01  ICD-9-CM: 518.81  7/3/2019 - Present Unknown        Atrial fibrillation with rapid ventricular response (HCC) vs WPW in patient with history of ablation ICD-10-CM: I48.91  ICD-9-CM: 427.31  7/3/2019 - Present Unknown        Pulmonary infiltrates vs acute congestive heart failure ICD-10-CM: R91.8  ICD-9-CM: 793.19  7/3/2019 - Present Unknown        Severe sepsis ([facility] ICD-10-CM: A41.9, R65.20  ICD-9-CM: 038.9, 995.92  7/3/2019 - Present Unknown        Hypotension due to hypovolemia ICD-10-CM: I95.89, E86.1  ICD-9-CM: 458.8, 276.52  7/3/2019 - Present Unknown        Polymyalgia rheumatica ([facility] ICD-10-CM: M35.3  ICD-9-CM: 127  12/2/2015 - Present Yes        HTN (hypertension) ICD-10-CM: I10  ICD-9-CM: 401.9  8/12/2013 - Present Yes        Severe obesity with body mass index (BMI) of 35.0 to 39.9 with comorbidity ([facility] ICD-10-CM: E66.01  ICD-9-CM: 278.01  8/12/2013 - Present Yes        Sjogren's syndrome ([facility] ICD-10-CM: M35.00  ICD-9-CM: 710.2  8/12/2013 - Present Yes              Plan:  # Acute volume overload/dCHF   - TTE with grade 1 DD plus rate induced initially with RVR   - extubated 7/5   - IV Lasix    # AFib RVR   - Cardiology following. Kate BB. Back on Cardizem gtt. # HTN   - hydralazine added. BB.     # Normocytic anemia   - Hb 14 on admission, now stable 10-11.   - Add FOBT. Already on PPI. # HypoK   - replace    # PMR   - now off steroids    Thank you for the consultation on Mrs. Nova. We will assume care of her onto our service. Discharge planning: Doesn't want rehab but she will probably require it.     DVT ppx: Eliquis  Code status:  Full  Estimated LOS:  Greater than 2 midnights  Risk:  high    Signed:  Jean Carlos Cole MD

## 2019-07-07 NOTE — PROGRESS NOTES
Critical Care Daily Progress Note: 7/7/2019  Admission Date: 7/3/2019     The patient's chart is reviewed and the patient is discussed with the staff. Patient is a 65 y.o.  female with hx of PMR and ? Sjogrens. She presented to the ER with worsening dyspnea on 7/3/19. She was found to be in Afib with RVR with R infiltrate. Intubated in ER and CVL placed. Admitted to ICU. Pt had been on MTX in the past without benefit and has not been on biologics. Subjective:     Extubated 7/5. RA yesterday and transferred to floor yesterday. Remains on RA, but stated short of breath at time. HR 80s-120s past 24 hours, back on dilt gtt at 10, hydralazine added by cardiology for hypertension. States she gained 20lbs in last fews days, hasn't eaten, thinks its fluid.      Current Facility-Administered Medications   Medication Dose Route Frequency    carvedilol (COREG) tablet 12.5 mg  12.5 mg Oral BID WITH MEALS    hydrALAZINE (APRESOLINE) tablet 25 mg  25 mg Oral TID    NUTRITIONAL SUPPORT ELECTROLYTE PRN ORDERS   Does Not Apply PRN    pantoprazole (PROTONIX) tablet 40 mg  40 mg Oral ACB    bisacodyl (DULCOLAX) tablet 5 mg  5 mg Oral DAILY PRN    apixaban (ELIQUIS) tablet 5 mg  5 mg Oral Q12H    tuberculin injection 5 Units  5 Units IntraDERMal ONCE    temazepam (RESTORIL) capsule 15 mg  15 mg Oral QHS PRN    sodium chloride (NS) flush 5-40 mL  5-40 mL IntraVENous Q8H    sodium chloride (NS) flush 5-40 mL  5-40 mL IntraVENous PRN    levalbuterol (XOPENEX) nebulizer soln 0.63 mg/3 mL  0.63 mg Nebulization QID RT    piperacillin-tazobactam (ZOSYN) 3.375 g in 0.9% sodium chloride (MBP/ADV) 100 mL  3.375 g IntraVENous Q8H    dilTIAZem (CARDIZEM) 125 mg in dextrose 5% 125 mL infusion  0-15 mg/hr IntraVENous TITRATE     Review of Systems  Constitutional:  negative for fever, chills, sweats  Cardiovascular:  negative for chest pain, palpitations, syncope, edema  Gastrointestinal:  negative for dysphagia, reflux, vomiting, diarrhea, abdominal pain, or melena  Neurologic:  negative for focal weakness, numbness, headache    Objective:     Vitals:    07/07/19 0500 07/07/19 0534 07/07/19 0714 07/07/19 0825   BP: 148/84 148/84  147/75   Pulse: 88 90  (!) 102   Resp:  18  18   Temp:  97.5 °F (36.4 °C)  98.2 °F (36.8 °C)   SpO2:  95% 97% 94%   Weight:  226 lb 8 oz (102.7 kg)     Height:         Intake and Output:   07/05 1901 - 07/07 0700  In: 1952.5 [P.O.:390; I.V.:1562.5]  Out: 1620 [Urine:1620]  07/07 0701 - 07/07 1900  In: 144 [I.V.:144]  Out: 0     Physical Exam:          Constitutional:  intubated and mechanically ventilated, on vent. EENMT:  Sclera clear, pupils equal, oral mucosa moist  Respiratory: fairly clear, no wheezing   Cardiovascular:  irregularly irregular   Gastrointestinal:  soft with no tenderness; positive bowel sounds present  Musculoskeletal:  warm with no cyanosis, trace lower extremity edema  Skin:  no jaundice or ecchymosis  Neurologic: no gross neuro deficits     Psychiatric:  alert and oriented x 3     LINES:    Quad lumen R IJ, peripheral L hand, covarrubias    DRIPS:    Cardizem;    CXR: 7/7: stable pulmonary edema, likely small effusions      7/6:      7/5:      ABG:   Recent Labs     07/05/19  0320   PHI 7.361   PCO2I 37.8   PO2I 89   HCO3I 21.4*      LAB  No results for input(s): GLUCPOC in the last 72 hours. No lab exists for component: [place]  Recent Labs     07/07/19  0436 07/06/19  0459 07/05/19  0317   WBC 8.8 10.0 11.6*   HGB 10.5* 10.8* 10.6*   HCT 31.8* 32.1* 33.4*    185 179     Recent Labs     07/07/19  0436 07/06/19  0459 07/05/19  0317   * 143 143   K 3.1* 2.8* 3.8   * 108* 111*   CO2 27 26 24   GLU 92 159* 153*   BUN 29* 31* 30*   CREA 1.04* 1.06* 1.00   MG 2.1 2.2 2.3   PHOS 1.9* 2.7 3.3   CA 8.5 8.4 8.2*     No results for input(s): LCAD, LAC in the last 72 hours.     Specimen type      Final   BRONCHOALVEOLAR LAVAGE    CD3, CD3BT 74    % Final   CD4, CD4BT 51    % Final   CD8, CD8BT 19    % Final   CD4:CD8 RATIO, C48RBT 2.68    Ratio Final     Resp viral panel: pending    Assessment:  (Medical Decision Making)     Hospital Problems  Date Reviewed: 7/5/2019          Codes Class Noted POA    * (Principal) Acute hypoxemic respiratory failure (HCC) ICD-10-CM: J96.01  ICD-9-CM: 518.81  7/3/2019 Unknown    Extubated    Atrial fibrillation with rapid ventricular response (HCC) vs WPW in patient with history of ablation ICD-10-CM: I48.91  ICD-9-CM: 427.31  7/3/2019 Unknown    Cardiology following     Pulmonary infiltrates vs acute congestive heart failure ICD-10-CM: R91.8  ICD-9-CM: 793.19  7/3/2019 Unknown    On IV vanc and zosyn. BAL negative. Stop Vanc. Severe sepsis [facility]) ICD-10-CM: A41.9, R65.20  ICD-9-CM: 038.9, 995.92  7/3/2019 Unknown    Hypotension resolved    Hypotension due to hypovolemia ICD-10-CM: I95.89, E86.1  ICD-9-CM: 458.8, 276.52  7/3/2019 Unknown    Resolved    Polymyalgia rheumatica ([facility] ICD-10-CM: M35.3  ICD-9-CM: 879  12/2/2015 Yes    Chronic     HTN (hypertension) ICD-10-CM: I10  ICD-9-CM: 401.9  8/12/2013 Yes    Controlled     Severe obesity with body mass index (BMI) of 35.0 to 39.9 with comorbidity [facility]) ICD-10-CM: E66.01  ICD-9-CM: 278.01  8/12/2013 Yes        Sjogren's syndrome ([facility] ICD-10-CM: M35.00  ICD-9-CM: 710.2  8/12/2013 Yes    Chronic           Plan:  (Medical Decision Making)     HR and BP control per cards  Off steroids  On RA  Add lasix for pulmonary edema, effusions  RVP pending, no significant findings from bronchoscopy, rheum evaluation  K is 3.1, give 40mEq IV K, start K 20mEq BID PO  Continue to monitor chem daily    Jhony Phipps MD    More than 50% of the time documented was spent in face-to-face contact with the patient and in the care of the patient on the floor/unit where the patient is located.

## 2019-07-07 NOTE — PROGRESS NOTES
7/7/2019 7:51 AM    Admit Date: 7/3/2019        Subjective:     Jean Alberto reports SOB last night No h/o CP or CAD She states that she has trouble finding meds that she can take for BP. Objective:      Visit Vitals  /84 (BP 1 Location: Left arm, BP Patient Position: At rest)   Pulse 90   Temp 97.5 °F (36.4 °C)   Resp 18   Ht 5' 2\" (1.575 m)   Wt 226 lb 8 oz (102.7 kg)   SpO2 97%   BMI 41.43 kg/m²       Physical Exam:  Heart: regularly irregular rhythm  Lungs: clear to auscultation bilaterally no rales  Extremities: no edema    Data Review:   Labs:    Recent Results (from the past 24 hour(s))   METABOLIC PANEL, BASIC    Collection Time: 07/07/19  4:36 AM   Result Value Ref Range    Sodium 146 (H) 136 - 145 mmol/L    Potassium 3.1 (L) 3.5 - 5.1 mmol/L    Chloride 113 (H) 98 - 107 mmol/L    CO2 27 21 - 32 mmol/L    Anion gap 6 (L) 7 - 16 mmol/L    Glucose 92 65 - 100 mg/dL    BUN 29 (H) 8 - 23 MG/DL    Creatinine 1.04 (H) 0.6 - 1.0 MG/DL    GFR est AA >60 >60 ml/min/1.73m2    GFR est non-AA 55 (L) >60 ml/min/1.73m2    Calcium 8.5 8.3 - 10.4 MG/DL   MAGNESIUM    Collection Time: 07/07/19  4:36 AM   Result Value Ref Range    Magnesium 2.1 1.8 - 2.4 mg/dL   PHOSPHORUS    Collection Time: 07/07/19  4:36 AM   Result Value Ref Range    Phosphorus 1.9 (L) 2.3 - 3.7 MG/DL   CBC WITH AUTOMATED DIFF    Collection Time: 07/07/19  4:36 AM   Result Value Ref Range    WBC 8.8 4.3 - 11.1 K/uL    RBC 3.39 (L) 4.05 - 5.2 M/uL    HGB 10.5 (L) 11.7 - 15.4 g/dL    HCT 31.8 (L) 35.8 - 46.3 %    MCV 93.8 79.6 - 97.8 FL    MCH 31.0 26.1 - 32.9 PG    MCHC 33.0 31.4 - 35.0 g/dL    RDW 14.9 (H) 11.9 - 14.6 %    PLATELET 013 797 - 058 K/uL    MPV 10.9 9.4 - 12.3 FL    ABSOLUTE NRBC 0.00 0.0 - 0.2 K/uL    DF AUTOMATED      NEUTROPHILS 73 43 - 78 %    LYMPHOCYTES 15 13 - 44 %    MONOCYTES 9 4.0 - 12.0 %    EOSINOPHILS 2 0.5 - 7.8 %    BASOPHILS 0 0.0 - 2.0 %    IMMATURE GRANULOCYTES 1 0.0 - 5.0 %    ABS.  NEUTROPHILS 6.5 1.7 - 8.2 K/UL    ABS. LYMPHOCYTES 1.3 0.5 - 4.6 K/UL    ABS. MONOCYTES 0.8 0.1 - 1.3 K/UL    ABS. EOSINOPHILS 0.2 0.0 - 0.8 K/UL    ABS. BASOPHILS 0.0 0.0 - 0.2 K/UL    ABS. IMM. GRANS. 0.0 0.0 - 0.5 K/UL   PTT    Collection Time: 07/07/19  4:36 AM   Result Value Ref Range    aPTT 28.9 24.7 - 39.8 SEC       Telemetry: AFIB    ECHO:mild reduced function      Radiology:          Assessment:     Patient Active Problem List    Diagnosis Date Noted    Acute hypoxemic respiratory failure ([facility] 07/03/2019    Atrial fibrillation with rapid ventricular response ([facility] vs WPW in patient with history of ablation better on po coreg will increase dose 07/03/2019    Pulmonary infiltrates vs acute congestive heart failure reduced EF has reactions to ace/arb add hydralazine Her BP is > 140 07/03/2019    Severe sepsis ([facility] better 07/03/2019    Hypotension due to hypovolemia 07/03/2019    Polymyalgia rheumatica ([facility] 12/02/2015    Gouty arthritis  NOS 12/02/2015    Mixed hyperlipidemia 12/02/2015    WPW (Suresh-Parkinson-White syndrome) 08/12/2013    HTN (hypertension) 08/12/2013    Severe obesity with body mass index (BMI) of 35.0 to 39.9 with comorbidity ([facility] 08/12/2013    Other and unspecified hyperlipidemia 08/12/2013    Sjogren's syndrome ([facility] 08/12/2013    Fibromyalgia 08/12/2013    Peptic ulcer 08/12/2013    Gout 08/12/2013       Plan:    Add hydralazine

## 2019-07-07 NOTE — PROGRESS NOTES
Problem: Mobility Impaired (Adult and Pediatric)  Goal: *Acute Goals and Plan of Care (Insert Text)  Description  STG:  (1.)Ms. Sue David will move from supine to sit and sit to supine  with SUPERVISION within 3 treatment day(s). (2.)Ms. Sue David will transfer from bed to chair and chair to bed with SUPERVISION using the least restrictive device within 3 treatment day(s). (3.)Ms. Sue David will ambulate with SUPERVISION for 25 feet with the least restrictive device within 3 treatment day(s). LTG:  (1.)Ms. Sue David will move from supine to sit and sit to supine  in bed with MODIFIED INDEPENDENCE within 7 treatment day(s). (2.)Ms. Sue David will transfer from bed to chair and chair to bed with MODIFIED INDEPENDENCE using the least restrictive device within 7 treatment day(s). (3.)Ms. Nova will ambulate with MODIFIED INDEPENDENCE for 100 feet with the least restrictive device within 7 treatment day(s). ________________________________________________________________________________________________     Outcome: Progressing Towards Goal      PHYSICAL THERAPY: Initial Assessment 7/7/2019  INPATIENT: PT Visit Days : 1  [place] / [place] / Product Type: Medicare /       NAME/AGE/GENDER: Meaghan Toledo is a 74 y.o. female   PRIMARY DIAGNOSIS: Acute hypoxemic respiratory failure ([facility] [J96.01]  Severe sepsis ([facility] [A41.9, R65.20] Acute hypoxemic respiratory failure ([facility]   Acute hypoxemic respiratory failure (HCC)    Procedure(s) (LRB):  BRONCHOSCOPY (N/A)  BRONCHIAL ALVEOLAR LAVAGE (N/A)  4 Days Post-Op  ICD-10: Treatment Diagnosis:    Generalized Muscle Weakness (M62.81)  Difficulty in walking, Not elsewhere classified (R26.2)   Precaution/Allergies:  Celebrex [celecoxib]; Lisinopril; Losartan; and Nifedipine      ASSESSMENT:     Ms. Sue David presents supine in bed and is agreeable to PT. She reports lives alone at baseline and using a cane or a rollator depending on the day.   She uses the transport [employer] [facility] to go to her appointments and is otherwise mod I with ADLs. She required additional time to peform bed mobility, transfers, and gait today. She reported slight dizziness that resolved with prolonged sitting. She was very motivated despite being overall fatigued. She needed min A for all mobility. She would benefit from acute skilled PT to improve her functional mobility and functional independence in order to return to living alone. This section established at most recent assessment   PROBLEM LIST (Impairments causing functional limitations):  Decreased Strength  Decreased ADL/Functional Activities  Decreased Transfer Abilities  Decreased Ambulation Ability/Technique  Decreased Balance  Decreased Activity Tolerance  Decreased [place] with Home Exercise Program   INTERVENTIONS PLANNED: (Benefits and precautions of physical therapy have been discussed with the patient.)  Balance Exercise  Bed Mobility  Gait Training  Home Exercise Program (HEP)  Neuromuscular Re-education/Strengthening  Therapeutic Activites  Therapeutic Exercise/Strengthening  Transfer Training     TREATMENT PLAN: Frequency/Duration: 3 times a week for duration of hospital stay  Rehabilitation Potential For Stated Goals: Good     REHAB RECOMMENDATIONS (at time of discharge pending progress):    Placement: It is my opinion, based on this patient's performance to date, that Ms. Mary Leos may benefit from participating in 1-2 additional therapy sessions in order to continue to assess for rehab potential and then make recommendation for disposition at discharge. Equipment:   None at this time              HISTORY:   History of Present Injury/Illness (Reason for Referral):  Patient is a 74 y.o.  female presents with worsening dyspnea from home. All information from ER attending/chart -- no family here.       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 Oxygen needs increased per EMS and in ER noted in AFIB with RVR and worsening dyspnea. Started Cardizem and then had to intubate patient since worsening hypoxemia and could not even speak/moaning per ER attending. Patient had WBC of 28.5 K. CXR with infiltrates more on Right side. procal is <0.1. Per charts by Rheumatology Dr. Renea Lennon has been having bronchitis and he gave her azithromycin. Per his notes she has not bee on any biologics but was on methotrexate in the past but was not effective. In ER now on Vent and had central line placed by Dr. Lizzette Dunbar. On diprivan and BP lower and just stopped and map coming up. On 3rd liter of fluid currently. Sedated. No family here. Her chart also reports Sjogren's syndrome? Past Medical History/Comorbidities:   Ms. Nicola Bates  has a past medical history of Arrhythmia, Arthritis, Asthma, Cellulitis, Chronic kidney disease, Depression, Gout, Gouty arthritis  NOS (12/2/2015), Hypercholesterolemia, Hypertension, Mitral valve prolapse, Morbid obesity ([address] Nausea & vomiting, Polymyalgia rheumatica ([address] (12/2/2015), Psychiatric disorder, PUD (peptic ulcer disease), Sjogren's syndrome ([address] and Temporal arteritis ([address] Ms. Nicola Bates  has a past surgical history that includes hx hysterectomy; hx tubal ligation; pr cardiac surg procedure unlist (4/07); pr cardiac surg procedure unlist (8/13); hx endoscopy (8/08); and hx colonoscopy (6/08). Social History/Living Environment:   Home Environment: Apartment  One/Two Story Residence: One story  Living Alone: Yes  Support Systems: Child(aubrey)  Patient Expects to be Discharged to[de-identified] Apartment  Current DME Used/Available at Home: [address], [place], straight, Walker, rollator  Prior Level of Function/Work/Activity:  Modified independent. Using a cane and rollator for 10+ years.        Number of Personal Factors/Comorbidities that affect the Plan of Care: 1-2: MODERATE COMPLEXITY   EXAMINATION:   Most Recent Physical Functioning:   Gross Assessment:  AROM: Generally decreased, functional  PROM: Generally decreased, functional  Strength: Generally decreased, functional  Sensation: Intact               Posture:  Posture (WDL): Exceptions to WDL  Posture Assessment: Forward head, Kyphosis, Rounded shoulders  Balance:  Sitting: Impaired  Sitting - Static: Good (unsupported)  Sitting - Dynamic: Fair (occasional)  Standing: Impaired  Standing - Static: Fair  Standing - Dynamic : Poor Bed Mobility:  Rolling: Minimum assistance; Additional time  Supine to Sit: Minimum assistance; Additional time  Scooting: Minimum assistance; Additional time  Wheelchair Mobility:     Transfers:  Sit to Stand: Minimum assistance; Additional time  Stand to Sit: Minimum assistance; Additional time  Bed to Chair: Minimum assistance; Additional time  Gait:     Base of Support: Widened  Speed/[place]: Shuffled; Slow  Step Length: Left shortened;Right shortened  Gait Abnormalities: Decreased step clearance;Shuffling gait  Distance (ft): 5 Feet (ft)  Assistive Device: Walker, rolling  Ambulation - Level of Assistance: Minimal assistance  Interventions: Safety awareness training;Verbal cues; Tactile cues      Body Structures Involved:  Heart  Lungs  Bones  Joints  Muscles Body Functions Affected:  Cardio  Respiratory  Neuromusculoskeletal  Movement Related Activities and Participation Affected:  General Tasks and Demands  Mobility  Self Care  Domestic Life   Number of elements that affect the Plan of Care: 4+: HIGH COMPLEXITY   CLINICAL PRESENTATION:   Presentation: Stable and uncomplicated: LOW COMPLEXITY   CLINICAL DECISION MAKIN [address]? ?6 Clicks? Basic Mobility Inpatient Short Form  How much difficulty does the patient currently have. .. Unable A Lot A Little None   1.   Turning over in bed (including adjusting bedclothes, sheets and blankets)? ? 1   ? 2   ? 3   ? 4   2. Sitting down on and standing up from a chair with arms ( e.g., wheelchair, bedside commode, etc.)   ? 1   ? 2   ? 3   ? 4   3. Moving from lying on back to sitting on the side of the bed?   ? 1   ? 2   ? 3   ? 4   How much help from another person does the patient currently need. .. Total A Lot A Little None   4. Moving to and from a bed to a chair (including a wheelchair)? ? 1   ? 2   ? 3   ? 4   5. Need to walk in hospital room? ? 1   ? 2   ? 3   ? 4   6. Climbing 3-5 steps with a railing? ? 1   ? 2   ? 3   ? 4   © 2007, Trustees of [address] [place] [address] [address] [id number], under license to 1-800-[employer]ENT[employer]. All rights reserved      Score:  Initial: 17 Most Recent: X (Date: -- )    Interpretation of Tool:  Represents activities that are increasingly more difficult (i.e. Bed mobility, Transfers, Gait). Medical Necessity:     Patient is expected to demonstrate progress in strength, balance and functional technique   to increase independence with ADLs and IADLs   . Reason for Services/Other Comments:  Patient continues to require modification of therapeutic interventions to increase complexity of exercises  . Use of outcome tool(s) and clinical judgement create a POC that gives a: Clear prediction of patient's progress: LOW COMPLEXITY            TREATMENT:   (In addition to Assessment/Re-Assessment sessions the following treatments were rendered)   Pre-treatment Symptoms/Complaints:  no pain just fatigue and SOB  Pain: Initial:   Pain Intensity 1: 0  Post Session:  0/10     Therapeutic Activity: (    8 minutes): Therapeutic activities including Bed transfers, Chair transfers and Ambulation on level ground to improve mobility, strength and balance. Required moderate Safety awareness training;Verbal cues; Tactile cues to promote static and dynamic balance in standing.          Braces/Orthotics/Lines/Etc:   IV  covarrubias catheter  O2 Device: Room air  Treatment/Session Assessment: Response to Treatment:  left in bedside chair, needs in reach. HR stabilized prior to PT leaving room  Interdisciplinary Collaboration:   Occupational Therapist  Registered Nurse  After treatment position/precautions:   Up in chair  Bed/Chair-wheels locked  Call light within reach   Compliance with Program/Exercises: Will assess as treatment progresses  Recommendations/Intent for next treatment session: \"Next visit will focus on advancements to more challenging activities and reduction in assistance provided\".   Total Treatment Duration:  PT Patient Time In/Time Out  Time In: 0910  Time Out: 084 [facility], [facility]

## 2019-07-07 NOTE — PROGRESS NOTES
Problem: Falls - Risk of  Goal: *Absence of Falls  Description  Document Nhung Joseph Fall Risk and appropriate interventions in the flowsheet. Outcome: Progressing Towards Goal   PT consult ordered to evaluate mobility. Instructed to call for assistance with moving. Problem: Patient Education: Go to Patient Education Activity  Goal: Patient/Family Education  Outcome: Progressing Towards Goal     Problem: Breathing Pattern - Ineffective  Goal: *Absence of hypoxia  Outcome: Progressing Towards Goal   On room air with no shortness of breath.

## 2019-07-07 NOTE — PROGRESS NOTES
Verbal bedside report given to [facility], oncoming RN. Patient's situation, background, assessment and recommendations provided. Opportunity for questions provided. Oncoming RN assumed care of patient. Cardizem IV drip verified at bedside with oncoming RN.

## 2019-07-08 ENCOUNTER — APPOINTMENT (OUTPATIENT)
Dept: GENERAL RADIOLOGY | Age: 74
DRG: 853 | End: 2019-07-08
Attending: INTERNAL MEDICINE
Payer: MEDICARE

## 2019-07-08 LAB
ADENOVIRUS, VCR1: NEGATIVE
ANION GAP SERPL CALC-SCNC: 8 MMOL/L (ref 7–16)
BACTERIA SPEC CULT: NORMAL
BACTERIA SPEC CULT: NORMAL
BASOPHILS # BLD: 0.1 K/UL (ref 0–0.2)
BASOPHILS NFR BLD: 1 % (ref 0–2)
BUN SERPL-MCNC: 21 MG/DL (ref 8–23)
C. PNEUMONAIE, CPPT: NOT DETECTED
CALCIUM SERPL-MCNC: 8.1 MG/DL (ref 8.3–10.4)
CHLORIDE SERPL-SCNC: 109 MMOL/L (ref 98–107)
CO2 SERPL-SCNC: 30 MMOL/L (ref 21–32)
CREAT SERPL-MCNC: 1.13 MG/DL (ref 0.6–1)
DIFFERENTIAL METHOD BLD: ABNORMAL
EOSINOPHIL # BLD: 0.3 K/UL (ref 0–0.8)
EOSINOPHIL NFR BLD: 3 % (ref 0.5–7.8)
ERYTHROCYTE [DISTWIDTH] IN BLOOD BY AUTOMATED COUNT: 15.1 % (ref 11.9–14.6)
FLUAV AG SPEC QL IF: NEGATIVE
FLUBV AG SPEC QL IF: NEGATIVE
GALACTOMANNAN AG SERPL IA-ACNC: 0.07
GLUCOSE SERPL-MCNC: 97 MG/DL (ref 65–100)
HADV AG SPEC QL IF: NEGATIVE
HCT VFR BLD AUTO: 31 % (ref 35.8–46.3)
HGB BLD-MCNC: 10.1 G/DL (ref 11.7–15.4)
HMPV AG SPEC QL IF: NEGATIVE
HPIV AG SPEC QL IF: NEGATIVE
IMM GRANULOCYTES # BLD AUTO: 0 K/UL (ref 0–0.5)
IMM GRANULOCYTES NFR BLD AUTO: 0 % (ref 0–5)
INFLUENZA A, VCR2: NEGATIVE
INFLUENZA B, VCR3: NEGATIVE
LYMPHOCYTES # BLD: 1.1 K/UL (ref 0.5–4.6)
LYMPHOCYTES NFR BLD: 11 % (ref 13–44)
M. PNEUMONIAE, MPPT: NOT DETECTED
MAGNESIUM SERPL-MCNC: 1.9 MG/DL (ref 1.8–2.4)
MCH RBC QN AUTO: 30.5 PG (ref 26.1–32.9)
MCHC RBC AUTO-ENTMCNC: 32.6 G/DL (ref 31.4–35)
MCV RBC AUTO: 93.7 FL (ref 79.6–97.8)
MM INDURATION POC: 0 MM (ref 0–5)
MONOCYTES # BLD: 0.7 K/UL (ref 0.1–1.3)
MONOCYTES NFR BLD: 7 % (ref 4–12)
NEUTS SEG # BLD: 7.5 K/UL (ref 1.7–8.2)
NEUTS SEG NFR BLD: 78 % (ref 43–78)
NRBC # BLD: 0 K/UL (ref 0–0.2)
PARAINFLUENZA 1, VCR4: NEGATIVE
PARAINFLUENZA 2, VCR5: NEGATIVE
PARAINFLUENZA 3, VCR6: NEGATIVE
PHOSPHATE SERPL-MCNC: 3 MG/DL (ref 2.3–3.7)
PLATELET # BLD AUTO: 181 K/UL (ref 150–450)
PMV BLD AUTO: 10.9 FL (ref 9.4–12.3)
POTASSIUM SERPL-SCNC: 3.3 MMOL/L (ref 3.5–5.1)
PPD POC: NEGATIVE NEGATIVE
RBC # BLD AUTO: 3.31 M/UL (ref 4.05–5.2)
RESPIRATORY SYNCYTIAL VIRUS, VCR7: NEGATIVE
RSV AG SPEC QL IF: NEGATIVE
SERVICE CMNT-IMP: NORMAL
SERVICE CMNT-IMP: NORMAL
SODIUM SERPL-SCNC: 147 MMOL/L (ref 136–145)
SPECIMEN SOURCE: NORMAL
VIRAL CULTURE, REFLEX, RDFACT: NORMAL
WBC # BLD AUTO: 9.7 K/UL (ref 4.3–11.1)

## 2019-07-08 PROCEDURE — 74011250636 HC RX REV CODE- 250/636: Performed by: INTERNAL MEDICINE

## 2019-07-08 PROCEDURE — 74011000250 HC RX REV CODE- 250: Performed by: INTERNAL MEDICINE

## 2019-07-08 PROCEDURE — 74011250637 HC RX REV CODE- 250/637: Performed by: INTERNAL MEDICINE

## 2019-07-08 PROCEDURE — 84100 ASSAY OF PHOSPHORUS: CPT

## 2019-07-08 PROCEDURE — 97530 THERAPEUTIC ACTIVITIES: CPT

## 2019-07-08 PROCEDURE — 65660000000 HC RM CCU STEPDOWN

## 2019-07-08 PROCEDURE — 36591 DRAW BLOOD OFF VENOUS DEVICE: CPT

## 2019-07-08 PROCEDURE — 94760 N-INVAS EAR/PLS OXIMETRY 1: CPT

## 2019-07-08 PROCEDURE — 94640 AIRWAY INHALATION TREATMENT: CPT

## 2019-07-08 PROCEDURE — 83735 ASSAY OF MAGNESIUM: CPT

## 2019-07-08 PROCEDURE — 77010033678 HC OXYGEN DAILY

## 2019-07-08 PROCEDURE — 97110 THERAPEUTIC EXERCISES: CPT

## 2019-07-08 PROCEDURE — 74011000258 HC RX REV CODE- 258: Performed by: INTERNAL MEDICINE

## 2019-07-08 PROCEDURE — 80048 BASIC METABOLIC PNL TOTAL CA: CPT

## 2019-07-08 PROCEDURE — 97535 SELF CARE MNGMENT TRAINING: CPT

## 2019-07-08 PROCEDURE — 85025 COMPLETE CBC W/AUTO DIFF WBC: CPT

## 2019-07-08 PROCEDURE — 97166 OT EVAL MOD COMPLEX 45 MIN: CPT

## 2019-07-08 PROCEDURE — 71045 X-RAY EXAM CHEST 1 VIEW: CPT

## 2019-07-08 RX ORDER — DIGOXIN 0.25 MG/ML
250 INJECTION INTRAMUSCULAR; INTRAVENOUS EVERY 6 HOURS
Status: COMPLETED | OUTPATIENT
Start: 2019-07-08 | End: 2019-07-08

## 2019-07-08 RX ORDER — LEVALBUTEROL INHALATION SOLUTION 0.63 MG/3ML
0.63 SOLUTION RESPIRATORY (INHALATION)
Status: DISCONTINUED | OUTPATIENT
Start: 2019-07-08 | End: 2019-07-15 | Stop reason: HOSPADM

## 2019-07-08 RX ORDER — SPIRONOLACTONE 25 MG/1
25 TABLET ORAL DAILY
Status: DISCONTINUED | OUTPATIENT
Start: 2019-07-08 | End: 2019-07-15 | Stop reason: HOSPADM

## 2019-07-08 RX ORDER — MAGNESIUM SULFATE HEPTAHYDRATE 40 MG/ML
2 INJECTION, SOLUTION INTRAVENOUS ONCE
Status: COMPLETED | OUTPATIENT
Start: 2019-07-08 | End: 2019-07-08

## 2019-07-08 RX ADMIN — APIXABAN 5 MG: 5 TABLET, FILM COATED ORAL at 08:44

## 2019-07-08 RX ADMIN — Medication 10 ML: at 21:02

## 2019-07-08 RX ADMIN — FUROSEMIDE 20 MG: 10 INJECTION, SOLUTION INTRAMUSCULAR; INTRAVENOUS at 20:59

## 2019-07-08 RX ADMIN — Medication 10 ML: at 04:14

## 2019-07-08 RX ADMIN — PIPERACILLIN SODIUM,TAZOBACTAM SODIUM 3.38 G: 3; .375 INJECTION, POWDER, FOR SOLUTION INTRAVENOUS at 04:13

## 2019-07-08 RX ADMIN — POTASSIUM CHLORIDE 20 MEQ: 1.5 POWDER, FOR SOLUTION ORAL at 17:26

## 2019-07-08 RX ADMIN — FUROSEMIDE 20 MG: 10 INJECTION, SOLUTION INTRAMUSCULAR; INTRAVENOUS at 08:47

## 2019-07-08 RX ADMIN — PIPERACILLIN SODIUM,TAZOBACTAM SODIUM 3.38 G: 3; .375 INJECTION, POWDER, FOR SOLUTION INTRAVENOUS at 12:11

## 2019-07-08 RX ADMIN — POTASSIUM CHLORIDE 20 MEQ: 1.5 POWDER, FOR SOLUTION ORAL at 08:45

## 2019-07-08 RX ADMIN — SPIRONOLACTONE 25 MG: 25 TABLET ORAL at 08:45

## 2019-07-08 RX ADMIN — CARVEDILOL 18.75 MG: 12.5 TABLET, FILM COATED ORAL at 21:02

## 2019-07-08 RX ADMIN — DILTIAZEM HYDROCHLORIDE 15 MG/HR: 5 INJECTION, SOLUTION INTRAVENOUS at 17:26

## 2019-07-08 RX ADMIN — MAGNESIUM SULFATE HEPTAHYDRATE 2 G: 40 INJECTION, SOLUTION INTRAVENOUS at 10:30

## 2019-07-08 RX ADMIN — DIGOXIN 250 MCG: 0.25 INJECTION INTRAMUSCULAR; INTRAVENOUS at 14:55

## 2019-07-08 RX ADMIN — Medication 10 ML: at 13:37

## 2019-07-08 RX ADMIN — DILTIAZEM HYDROCHLORIDE 10 MG/HR: 5 INJECTION, SOLUTION INTRAVENOUS at 08:47

## 2019-07-08 RX ADMIN — LEVALBUTEROL HYDROCHLORIDE 0.63 MG: 0.63 SOLUTION RESPIRATORY (INHALATION) at 07:17

## 2019-07-08 RX ADMIN — PIPERACILLIN SODIUM,TAZOBACTAM SODIUM 3.38 G: 3; .375 INJECTION, POWDER, FOR SOLUTION INTRAVENOUS at 20:58

## 2019-07-08 RX ADMIN — PANTOPRAZOLE SODIUM 40 MG: 40 TABLET, DELAYED RELEASE ORAL at 08:44

## 2019-07-08 RX ADMIN — CARVEDILOL 18.75 MG: 12.5 TABLET, FILM COATED ORAL at 14:54

## 2019-07-08 RX ADMIN — APIXABAN 5 MG: 5 TABLET, FILM COATED ORAL at 20:59

## 2019-07-08 RX ADMIN — DIGOXIN 250 MCG: 0.25 INJECTION INTRAMUSCULAR; INTRAVENOUS at 08:46

## 2019-07-08 NOTE — PROGRESS NOTES
Date of Outreach Update:  Jessica Phillips was seen and assessed. MEWS Score: 4 (07/08/19 0845)  Vitals:    07/08/19 0522 07/08/19 0718 07/08/19 0845 07/08/19 1030   BP: 127/60  121/54 148/59   Pulse: (!) 110  (!) 136 (!) 122   Resp: 18  20    Temp: 98 °F (36.7 °C)  98.7 °F (37.1 °C)    SpO2: 97% 98% 97%    Weight: 96.3 kg (212 lb 3.2 oz)      Height:             Pain Assessment  Pain Intensity 1: 0 (07/08/19 1130)        Patient Stated Pain Goal: 0      Previous Outreach assessment has been reviewed. Patient with ongoing tachycardia. Discussed with primary RN. Suggested titration of Cardizem gtt as ordered. Morning dose of carvedilol that was missed could also be given after discussion with MD or pharmacist.     Will continue to follow up per outreach protocol.      Signed By:   Alesha Saab RN    July 8, 2019 1:13 PM

## 2019-07-08 NOTE — PROGRESS NOTES
Verbal bedside report received from NICOLA Hampton. Assumed care of patient. Cardizem IV drip verified at bedside with outgoing RN. BP cycling hourly.

## 2019-07-08 NOTE — PROGRESS NOTES
Respiratory Care Services     Policy Number: -[id number]    Title: Aerosolized Medication Protocol    Effective Date: 10/1998    Revised Date: 06/2013, 03/2016    Reviewed Date: 05/2014/ 03/2015 , 06/2017       I. Policy: The Aerosolized Medication Protocol shall by implemented by Respiratory Care              Practitioners (RCP) for patients with orders to receive aerosol therapy with medication. II. Purpose: To open and maintain obstructed airways, the RCP, will utilize the following   protocol to select the indicated aerosolized medication(s) and determine the most effective method of delivery to the patient. III. Patient Type: All patients who are determined to meet aerosolized medication criteria as          outlined in this protocol. IV. Responsibility: Director, [address], registered Respiratory Care                                                     Practitioners (RCPs) with documented competency in the performance of                                     respiratory therapeutic techniques. V. Equipment needed:  A. Stethoscope  B. Pulse oximeter  C. IPPB machine and circuit  D. Aerosol nebulizer  E. MDI Inhaler     VI. Protocol:   A. The following conditions are accepted indications for aerosolized medication therapy. 1. Bronchospasm/wheezing  2. Impaired mucociliary clearance  3. Tracheobronchial mucosal congestion/and laryngeal stridor  4. Diseases which commonly require aerosolized medication therapy include, but are not limited to:  a. Asthma/reactive airway disease  b. Bronchitis/emphysema (COPD)  c. Cystic fibrosis  d. Severe laryngitis/tracheitis  e. Bronchiectasis  f. Smoke inhalation or chemical trauma to the lung or upper airway  g. Physical trauma to the upper airway  h. Laryngotracheobronchitis  i. Bronchiolitis  j. Non-specific wheezing              B. Indications for bronchodilator medications will include:  a.  Bronchospasm/ wheezing  b. Asthma/reactive airway disease  c. Chronic obstructive pulmonary disease  d. Obstructive defect on pulmonary function testing  C. Administration of medications  1. If a bronchodilator or any other type of respiratory medication is needed, a physician order must be indicated in the medication section in the patients EMR. 2. When the physician specifies the medication and dosage at the time of request, the ordered medication will be used as part of the care plan. D. The following guidelines will be utilized in the evaluation and selection of the         appropriate delivery device for indicated medication(s):  1. IPPB  a. Ventilation is inadequate (rapid shallow breathing)  b. Refractory atelectasis has developed, other forms of therapy are unsuccessful  c. Inability to clear secretions  d. Need to improve lung expansion  2. Unassisted aerosol (UA) is the preferred method of aerosol delivery and indicated if  a. Ventilation is inadequate  b. Patient demonstrates wheezing   c. patient is unable to perform MDI effectively   d. Patient preference  3. Metered Dose Inhaler (MDI)   a. Patient is alert/cooperative  b. Medication(s) available in this delivery method. c. Able to perform 3 second breath hold. d. Patient has demonstrated ability to use MDI effectively  e. Patient has used MDI therapy previously, either at home or in the hospital.  f. Note: The only approved inhalers on formulary are albuterol and Spiriva. VII. Guidelines:   Monitor patients vital signs and evaluate patients clinical status. The need to change medication and/or modality may be indicated by:  1. A pulse greater than 120 bpm, or if a pulse increase of 20 bpm occurs with bronchodilator medications. 2. Significant worsening of dyspnea or wheezing occurring during or within 30 minutes of discontinuing therapy.   3. Worsening of patients sensorium (e.g. patient becomes confused or obtunded, and unable to follow directions). 4. Worsening of patients chest x-ray. 5. Change in sputum (e.g. increased pulmonary infiltrate, which might indicate need for volume expansion therapy). 6. Patient has difficulty coughing up secretions, which might indicate need for acetylcysteine and/or bronchial hygiene therapy. 7. Call physician immediately if dyspnea worsens and is not responsive to modifications allowed by protocol. VIII. Clinical Responsibility:  A. The therapy assessment guidelines will be used to evaluate all patients receiving aerosolized medications with the exception of critical care areas. 1. RCPs will perform changes in therapy per protocol. 2. It will be the responsibility of RCP to provide instruction regarding respiratory medications, aerosol therapy and proper MDI technique, as well as, spacer usage to patients ordered MDI therapy. 3. Current therapy that is part of a patients home regimen will not be discontinued. IX. Documentation  A. Document assessment findings in the respiratory assessment section of the patients EMR. B. Document changes in therapy per protocol in the respiratory orders section and in the care plan section of the patients EMR. C. Document patient education in the patient education section of the patients EMR. X. Outcome Criteria:  A. Relief of wheezes and obstruction  B. Improved cough and sputum color and consistency  C. Improved chest x-ray  D. Improved arterial oxygen tension and or SaO2  E. Improved Peak Flow on asthmatic patients        XI. Related Protocols:  A. Respiratory Patient Care Protocols  B. Bronchial Hygiene Therapy  C. Oxygen Protocol    L - Respiratory Care Department Policy, Procedure and Protocol Guideline Manual, 1995, JEYSON Kirby. L -  Therapist Driven Respiratory Care Protocols  A Practitioners Guide for Criteria-Based         Respiratory Care by Maite Barber M.D., and JEYSON Wong, JEAN MARIE.   L - The rationale for therapist-driven protocols: an update. Respiratory Care 1998; [id number].  -[facility] Clinical Practice Guidelines.   Reference:

## 2019-07-08 NOTE — PROGRESS NOTES
Hospitalist Note     Admit Date:  7/3/2019  3:02 AM   Name:  Indira Betts   Age:  74 y.o.  :  1945   MRN:  [id number]   PCP:  Rosales Haley MD  Treatment Team: Attending Provider: Deepa Urrutia MD; Consulting Provider: Vonn Goodell, MD; Consulting Provider: Robert Smith MD; Utilization Review: Solomon Hightower RN; Consulting Provider: Bernice Parrish MD; Occupational Therapist: Marina Hall OT; Care Manager: Vandana Haas RN; Physical Therapy Assistant: Sujatha Johnson PTA    HPI/Subjective:   Ms. Kelsea Harley is a 73 y/o WF admitted to ICU on 7/3 with respiratory failure after being intubated. Developed rapid AFib, cardiology consulted. Started on abx and IV diuresis. Extubated  and sent to floor . Hospitalist assumed care .    : Up to chair. Cardizem @ 15. She ambulated some around the room with PT earlier today. Now realizes she needs rehab and isn't safe at home by herself after all of this. No chest pain, SOB. ROS neg.        Objective:     Patient Vitals for the past 24 hrs:   Temp Pulse Resp BP SpO2   19 1454 98.2 °F (36.8 °C) (!) 122 20 159/72 96 %   19 1030  (!) 122  148/59    19 0845 98.7 °F (37.1 °C) (!) 136 20 121/54 97 %   19 0718     98 %   19 0522 98 °F (36.7 °C) (!) 110 18 127/60 97 %   19 0500  100  127/60    19 0400  100  137/53    19 0300  96  128/55    19 0200  98  111/56    19 0100  (!) 102  123/64    19 0035 100 °F (37.8 °C) 93 18 118/54 98 %   19 0000  96  105/51    19 2300  (!) 102  125/53    19 2200  (!) 110  102/48    19 2100  96  118/61    192 98.4 °F (36.9 °C) (!) 110 18 123/56 97 %   19  (!) 115  130/60    19  (!) 106  130/60    19 1920     98 %   19 1800 98.1 °F (36.7 °C) (!) 105 18 145/70 92 %     Oxygen Therapy  O2 Sat (%): 96 % (19 1454)  Pulse via Oximetry: 96 beats per minute (07/08/19 0718)  O2 Device: Nasal cannula (07/08/19 1530)  O2 Flow Rate (L/min): 2 l/min (07/08/19 1530)  FIO2 (%): 28 % (07/08/19 0718)      Intake/Output Summary (Last 24 hours) at 7/8/2019 1638  Last data filed at 7/8/2019 1454  Gross per 24 hour   Intake 862.34 ml   Output 4850 ml   Net -3987.66 ml       *Note that automatically entered I/Os may not be accurate; dependent on patient compliance with collection and accurate  by techs. General:    Well nourished. Alert. Obese. CV:   Irreg irreg, tachy. No murmur, rub, or gallop. Lungs:   Mild bb rales  Abdomen:   Soft, nontender, nondistended. Extremities: Warm and dry. No cyanosis or edema. Skin:     No rashes or jaundice. RJI CVC.   Neuro:  No gross focal deficits    Data Review:  I have reviewed all labs, meds, and studies from the last 24 hours:    Recent Results (from the past 24 hour(s))   OCCULT BLOOD, STOOL    Collection Time: 07/07/19 10:09 PM   Result Value Ref Range    Occult blood, stool NEGATIVE  NEG     METABOLIC PANEL, BASIC    Collection Time: 07/08/19  4:13 AM   Result Value Ref Range    Sodium 147 (H) 136 - 145 mmol/L    Potassium 3.3 (L) 3.5 - 5.1 mmol/L    Chloride 109 (H) 98 - 107 mmol/L    CO2 30 21 - 32 mmol/L    Anion gap 8 7 - 16 mmol/L    Glucose 97 65 - 100 mg/dL    BUN 21 8 - 23 MG/DL    Creatinine 1.13 (H) 0.6 - 1.0 MG/DL    GFR est AA >60 >60 ml/min/1.73m2    GFR est non-AA 50 (L) >60 ml/min/1.73m2    Calcium 8.1 (L) 8.3 - 10.4 MG/DL   MAGNESIUM    Collection Time: 07/08/19  4:13 AM   Result Value Ref Range    Magnesium 1.9 1.8 - 2.4 mg/dL   PHOSPHORUS    Collection Time: 07/08/19  4:13 AM   Result Value Ref Range    Phosphorus 3.0 2.3 - 3.7 MG/DL   CBC WITH AUTOMATED DIFF    Collection Time: 07/08/19  4:13 AM   Result Value Ref Range    WBC 9.7 4.3 - 11.1 K/uL    RBC 3.31 (L) 4.05 - 5.2 M/uL    HGB 10.1 (L) 11.7 - 15.4 g/dL    HCT 31.0 (L) 35.8 - 46.3 %    MCV 93.7 79.6 - 97.8 FL MCH 30.5 26.1 - 32.9 PG    MCHC 32.6 31.4 - 35.0 g/dL    RDW 15.1 (H) 11.9 - 14.6 %    PLATELET 045 252 - 985 K/uL    MPV 10.9 9.4 - 12.3 FL    ABSOLUTE NRBC 0.00 0.0 - 0.2 K/uL    DF AUTOMATED      NEUTROPHILS 78 43 - 78 %    LYMPHOCYTES 11 (L) 13 - 44 %    MONOCYTES 7 4.0 - 12.0 %    EOSINOPHILS 3 0.5 - 7.8 %    BASOPHILS 1 0.0 - 2.0 %    IMMATURE GRANULOCYTES 0 0.0 - 5.0 %    ABS. NEUTROPHILS 7.5 1.7 - 8.2 K/UL    ABS. LYMPHOCYTES 1.1 0.5 - 4.6 K/UL    ABS. MONOCYTES 0.7 0.1 - 1.3 K/UL    ABS. EOSINOPHILS 0.3 0.0 - 0.8 K/UL    ABS. BASOPHILS 0.1 0.0 - 0.2 K/UL    ABS. IMM. GRANS. 0.0 0.0 - 0.5 K/UL   PLEASE READ & DOCUMENT PPD TEST IN 48 HRS    Collection Time: 07/08/19  2:54 PM   Result Value Ref Range    PPD Negative Negative    mm Induration 0 0 - 5 mm        All Micro Results     Procedure Component Value Units Date/Time    VIRAL CULTURE - INCLUDES ADENOVIRUS, CMV, ENTEROVIRUSES, HSV, INFLUENZA, MUMPS, PARAINFLUENZA, RSV, & VARICELLA ZOSTER [[id number]] Collected:  07/03/19 1245    Order Status:  Completed Specimen:  Other from Miscellaneous sample Updated:  07/08/19 1436     Source BRONCHIAL LAVAGE        Viral Culture, General Comment        Comment: (NOTE)  Preliminary Report:  No virus isolated at 4 days. Next report to follow after 7 days. Performed At: [address] [id number]  Khadra Delatorre MD XE:[id number]         ANAEROBIC CULTURE - ONLY PERFORMED ON BRONCHIAL BRUSHING [[id number]] Collected:  07/03/19 1245    Order Status:  Completed Specimen:  Bronchial lavage Updated:  07/08/19 1245     Special Requests: NO SPECIAL REQUESTS        Culture result:       SUBCULTURE IS NECESSARY TO DETERMINE PRESENCE OR ABSENCE OF ANAEROBIC BACTERIA IN THIS CULTURE. FURTHER REPORT TO FOLLOW AFTER INCUBATION OF SUBCULTURE.           CULTURE, BLOOD [[id number]] Collected:  07/03/19 0515    Order Status:  Completed Specimen:  Blood Updated:  07/08/19 1137     Special Requests: CENTRAL LINE     Culture result: NO GROWTH 5 DAYS       CULTURE, BLOOD [[id number]] Collected:  07/03/19 0641    Order Status:  Completed Specimen:  Blood Updated:  07/08/19 1137     Special Requests: Milena Lilly     Culture result: NO GROWTH 5 DAYS       RESPIRATORY VIRAL PANEL, PCR [[id number]] Collected:  07/03/19 1245    Order Status:  Completed Specimen:  Respiratory sample Updated:  07/07/19 2335     Source BRONCHIAL LAVAGE        Influenza A NEGATIVE         Influenza B NEGATIVE         RSV A NEGATIVE         RSV B NEGATIVE         Parainfluenza 1 NEGATIVE         Parainfluenza 2 NEGATIVE         Parainfluenza 3 NEGATIVE         Rhinovirus NEGATIVE         Metapneumovirus NEGATIVE         Adenovirus NEGATIVE         Comment: (NOTE)  Performed At: [address] [id number]  Annika Stout MD QL:[id number]         [facility] CULTURE AND SMEAR - Debra Stoddard [[id number]] Collected:  07/03/19 1245    Order Status:  Completed Specimen:  Other from Miscellaneous sample Updated:  07/06/19 0836     Source BRONCHIAL LAVAGE        Fungus stain Direct Inoculation     Fungus (Mycology) Culture Other source received     Comment: (NOTE)  Performed At: [address] [id number]  Annika Stout MD [id number]         CULTURE, RESPIRATORY/SPUTUM/BRONCH [place] Catrachita [[id number]] Collected:  07/03/19 0643    Order Status:  Completed Specimen:  Sputum Updated:  07/05/19 1214     Special Requests: NO SPECIAL REQUESTS        GRAM STAIN 0 to 2 WBCS/OIF      NO EPITHELIAL CELLS SEEN         FEW GRAM POSITIVE COCCI         2+ MUCUS PRESENT        Culture result:       HEAVY NORMAL RESPIRATORY ROBEL          CULTURE, RESPIRATORY/SPUTUM/BRONCH Bess Catrachita [[id number]] Collected:  07/03/19 1245    Order Status:  Completed Specimen:  Sputum from Bronchial lavage Updated:  07/05/19 0743     Special Requests: NO SPECIAL REQUESTS        GRAM STAIN NO WBCS SEEN         NO DEFINITE ORGANISM SEEN        Culture result: NO GROWTH 2 DAYS       AFB (MYCOBACTERIUM) CULTURE & SMEAR W/REFLEX ID Romy Corral NOCARDIA [[id number]] Collected:  19    Order Status:  Completed Specimen:  Miscellaneous sample Updated:  19 133     Source BRONCHIAL LAVAGE        AFB Specimen processing Concentration     Acid Fast Smear NEGATIVE         Comment: (NOTE)  Performed At: [address] [id number]  Corinne Mayes MD VS:[id number]          Acid Fast Culture PENDING    A. GALACTOMANNAN BY EIA, BRONCHOALVEOLAR LAVAGE [[id number]] Collected:  19    Order Status:  Completed Updated:  19 144    ATYPICAL PNEUMONIA BY PCR,BRONCHOALVEOLAR LAVAGE - INCLUDES: MYCOPLASMA PNEUMONIAE AND CHLAMYDIA PNEUMONIAE [[id number]] Collected:  19    Order Status:  Completed Updated:  19          Results for orders placed or performed during the hospital encounter of 19   2D ECHO COMPLETE ADULT (TTE) [address] [place] [address]  [phone]    Transthoracic Echocardiogram  2D, M-mode, Doppler, and Color Doppler    Patient: Alan Hernandez  MR #: [id number]  : 1945  Age: 74 years  Gender: Female  Study date: 2019  Account #: [de-identified]  Height: 62 in  Weight: 201.5 lb  BSA: 1.92 mï¾²  Status:Routine  Location: 3109  BP: 181/ 82    Allergies: CELECOXIB, LISINOPRIL, LOSARTAN, NIFEDIPINE    Sonographer:  Christina Mcnulty [facility]  Group:  [id number] Cardiology  Referring Physician:  Gaby Ryan. Juan Cárdenas MD  Reading Physician:  Yan Pearson. Jania Tristan MD [facility]    INDICATIONS: Check EF    PROCEDURE: This was a routine study. A transthoracic echocardiogram was  performed. The study included complete 2D imaging, M-mode, complete spectral  Doppler, and color Doppler. Intravenous contrast (Definity, 3 ml) was  administered.  Echocardiographic views were limited by restricted patient  mobility and poor acoustic window availability. Image quality was adequate. *Patient was restrained, intubated and sedated. Scanned supine. *    LEFT VENTRICLE: Size was normal. Systolic function was mildly to moderately  reduced. Ejection fraction was estimated in the range of 40 % to 45 %. There  was global mild hypokinesis. Wall thickness was normal. Left ventricular  diastolic dysfunction Grade 1. Average E/e' of 12.9. RIGHT VENTRICLE: The ventricle was dilated. Systolic function was reduced. LEFT ATRIUM: The atrium was mildly to moderately dilated. RIGHT ATRIUM: The atrium was mildly dilated. SYSTEMIC VEINS: IVC: The inferior vena cava was mildly dilated. Respirophasic  changes in dimension were absent. AORTIC VALVE: The valve was structurally normal, tri-commissural. There was   no  evidence for stenosis. There was no significant regurgitation. MITRAL VALVE: Valve structure was normal. There was no evidence for stenosis. There was mild regurgitation. TRICUSPID VALVE: The valve structure was normal. There was no evidence for  stenosis. There was mild regurgitation. PULMONIC VALVE: The valve structure was normal. There was no evidence for  stenosis. There was mild regurgitation. PERICARDIUM: A small to moderate pericardial effusion was identified along   the  right ventricular free wall. A pericardial fat pad was present. AORTA: The root exhibited normal size. SUMMARY:    -  Left ventricle: Systolic function was mildly to moderately reduced. Ejection  fraction was estimated in the range of 40 % to 45 %. There was global mild  hypokinesis. -  Right ventricle: The ventricle was dilated. Systolic function was reduced. -  Left atrium: The atrium was mildly to moderately dilated. -  Right atrium: The atrium was mildly dilated. -  Inferior vena cava, hepatic veins: The inferior vena cava was mildly  dilated.  Respirophasic changes in dimension were absent. -  Mitral valve: There was mild regurgitation.    -  Tricuspid valve: There was mild regurgitation.    -  Pericardium: A small to moderate pericardial effusion was identified along  the right ventricular free wall. SYSTEM MEASUREMENT TABLES    2D mode  AoR Diam (2D): 2.8 cm  LA Dimension (2D): 4.5 cm  Left Atrium Systolic Volume Index; Method of Disks, Biplane; 2D mode;: 39.5  ml/m2  IVS/LVPW (2D): 1  IVSd (2D): 1.2 cm  LVIDd (2D): 4.8 cm  LVIDs (2D): 4.3 cm  LVPWd (2D): 1.2 cm  RVIDd (2D): 3.5 cm    Unspecified Scan Mode  Peak Grad; Mean; Antegrade Flow: 5 mm[Hg]  Vmax; Antegrade Flow: 113 cm/s    Prepared and signed by    Jared Cano.  Vicky Jimenez MD [facility]  Signed 03-Jul-2019 16:54:40         Current Meds:  Current Facility-Administered Medications   Medication Dose Route Frequency    spironolactone (ALDACTONE) tablet 25 mg  25 mg Oral DAILY    levalbuterol (XOPENEX) nebulizer soln 0.63 mg/3 mL  0.63 mg Nebulization Q6H PRN    carvedilol (COREG) tablet 18.75 mg  18.75 mg Oral BID    [START ON 7/9/2019] carvedilol (COREG) tablet 18.75 mg  18.75 mg Oral BID WITH MEALS    furosemide (LASIX) injection 20 mg  20 mg IntraVENous Q12H    potassium chloride (KLOR-CON) packet for solution 20 mEq  20 mEq Oral BID WITH MEALS    NUTRITIONAL SUPPORT ELECTROLYTE PRN ORDERS   Does Not Apply PRN    pantoprazole (PROTONIX) tablet 40 mg  40 mg Oral ACB    bisacodyl (DULCOLAX) tablet 5 mg  5 mg Oral DAILY PRN    apixaban (ELIQUIS) tablet 5 mg  5 mg Oral Q12H    temazepam (RESTORIL) capsule 15 mg  15 mg Oral QHS PRN    sodium chloride (NS) flush 5-40 mL  5-40 mL IntraVENous Q8H    sodium chloride (NS) flush 5-40 mL  5-40 mL IntraVENous PRN    piperacillin-tazobactam (ZOSYN) 3.375 g in 0.9% sodium chloride (MBP/ADV) 100 mL  3.375 g IntraVENous Q8H    dilTIAZem (CARDIZEM) 125 mg in dextrose 5% 125 mL infusion  0-15 mg/hr IntraVENous TITRATE       Other Studies (last 24 hours):  Xr Chest Sngl V    Result Date: 7/8/2019   Portable view of the chest COMPARISON: Yesterday. CLINICAL HISTORY: Shortness of breath. FINDINGS: There are bilateral pleural effusions. There is vascular congestion. The heart is enlarged. Mediastinal contour is within normal limits. No pneumothorax. Right-sided IJ line is stable. IMPRESSION: Vascular congestion and bilateral pleural effusions, similar to previous exam.      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 ICD-10-CM: I48.91  ICD-9-CM: 427.31  7/3/2019 - Present Unknown        Pulmonary infiltrates vs acute congestive heart failure ICD-10-CM: R91.8  ICD-9-CM: 793.19  7/3/2019 - Present Unknown        Severe sepsis ([facility] ICD-10-CM: A41.9, R65.20  ICD-9-CM: 038.9, 995.92  7/3/2019 - Present Unknown        Hypotension due to hypovolemia ICD-10-CM: I95.89, E86.1  ICD-9-CM: 458.8, 276.52  7/3/2019 - Present Unknown        Polymyalgia rheumatica ([facility] ICD-10-CM: M35.3  ICD-9-CM: 098  12/2/2015 - Present Yes        HTN (hypertension) ICD-10-CM: I10  ICD-9-CM: 401.9  8/12/2013 - Present Yes        Severe obesity with body mass index (BMI) of 35.0 to 39.9 with comorbidity ([facility] ICD-10-CM: E66.01  ICD-9-CM: 278.01  8/12/2013 - Present Yes        Sjogren's syndrome ([facility] ICD-10-CM: M35.00  ICD-9-CM: 710.2  8/12/2013 - Present Yes              Plan:  # Acute volume overload/dCHF              - TTE with grade 1 DD plus rate induced initially with RVR              - extubated 7/5. Great response to IV Lasix past 24 hours.     # AFib RVR              - Cardiology following. ELAINA Love. Cardizem gtt.     # HTN              - hydralazine added.  BB.      # Normocytic anemia              - Hb 14 on admission, now stable 10-11.              - FOBT neg. Already on PPI.     # HypoK              - replace     # PMR              - now off steroids    DC planning/Dispo:  STR when medically stable.   Diet:  DIET CARDIAC  DVT ppx:      Signed:  Aura Luu MD

## 2019-07-08 NOTE — PROGRESS NOTES
Problem: Mobility Impaired (Adult and Pediatric)  Goal: *Acute Goals and Plan of Care (Insert Text)  Description  STG:  (1.)Ms. Cristain Vann will move from supine to sit and sit to supine  with SUPERVISION within 3 treatment day(s). (2.)Ms. Cristian Vann will transfer from bed to chair and chair to bed with SUPERVISION using the least restrictive device within 3 treatment day(s). (3.)Ms. Cristian Vann will ambulate with SUPERVISION for 25 feet with the least restrictive device within 3 treatment day(s). LTG:  (1.)Ms. Cristian Vnan will move from supine to sit and sit to supine  in bed with MODIFIED INDEPENDENCE within 7 treatment day(s). (2.)Ms. Cristian Vann will transfer from bed to chair and chair to bed with MODIFIED INDEPENDENCE using the least restrictive device within 7 treatment day(s). (3.)Ms. Nova will ambulate with MODIFIED INDEPENDENCE for 100 feet with the least restrictive device within 7 treatment day(s). ________________________________________________________________________________________________     Outcome: Progressing Towards Goal      PHYSICAL THERAPY: Daily Note and PM 7/8/2019  INPATIENT: PT Visit Days : 2  [place] / [place] / Product Type: Medicare /       NAME/AGE/GENDER: Maris Pike is a 74 y.o. female   PRIMARY DIAGNOSIS: Acute hypoxemic respiratory failure ([facility] [J96.01]  Severe sepsis ([facility] [address] [A41.9, R65.20] Acute hypoxemic respiratory failure ([facility] [address]   Acute hypoxemic respiratory failure (HCC)    Procedure(s) (LRB):  BRONCHOSCOPY (N/A)  BRONCHIAL ALVEOLAR LAVAGE (N/A)  5 Days Post-Op  ICD-10: Treatment Diagnosis:    · Generalized Muscle Weakness (M62.81)  · Difficulty in walking, Not elsewhere classified (R26.2)   Precaution/Allergies:  Celebrex [celecoxib]; Lisinopril; Losartan; and Nifedipine      ASSESSMENT:     Ms. Cristian Vann presents sitting in the recliner and agreeable to therapy. She reports lives alone at baseline and using a cane or a rollator depending on the day.   She uses the transport van to go to her appointments and is otherwise mod I with ADLs. Patient is able to scoot to the edge of the chair independently. Sit to stand with the use of the momentum method. Patient did require min assist .  Once standing the patient demonstrated good standing balance. She was able to shift her weight left to right and even step forward and back. Patient returned to sitting and participated in therapeutic exercises for bilateral LE strengthening. Patients family arrives at the end of the treatment session. Good session. Patient demonstrates progress. She would benefit from acute skilled PT to improve her functional mobility and functional independence in order to return to living alone. Additional skilled inpatient therapy would be very beneficial to the patient as she lives alone and is rather active in the community. Will continue PT efforts. This section established at most recent assessment   PROBLEM LIST (Impairments causing functional limitations):  1. Decreased Strength  2. Decreased ADL/Functional Activities  3. Decreased Transfer Abilities  4. Decreased Ambulation Ability/Technique  5. Decreased Balance  6. Decreased Activity Tolerance  7. Decreased [place] with Home Exercise Program   INTERVENTIONS PLANNED: (Benefits and precautions of physical therapy have been discussed with the patient.)  1. Balance Exercise  2. Bed Mobility  3. Gait Training  4. Home Exercise Program (HEP)  5. Neuromuscular Re-education/Strengthening  6. Therapeutic Activites  7. Therapeutic Exercise/Strengthening  8. Transfer Training     TREATMENT PLAN: Frequency/Duration: 3 times a week for duration of hospital stay  Rehabilitation Potential For Stated Goals: Good     REHAB RECOMMENDATIONS (at time of discharge pending progress):    Placement: It is my opinion, based on this patient's performance to date, that Ms. Sue David may benefit from participating in 1-2 additional therapy sessions in order to continue to assess for rehab potential and then make recommendation for disposition at discharge. Equipment:    None at this time              HISTORY:   History of Present Injury/Illness (Reason for Referral):  Patient is a 74 y.o.  female presents with worsening dyspnea from home. All information from ER attending/chart -- no family here. 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 Oxygen needs increased per EMS and in ER noted in AFIB with RVR and worsening dyspnea. Started Cardizem and then had to intubate patient since worsening hypoxemia and could not even speak/moaning per ER attending. Patient had WBC of 28.5 K. CXR with infiltrates more on Right side. procal is <0.1. Per charts by Rheumatology Dr. Matt Chauhan has been having bronchitis and he gave her azithromycin. Per his notes she has not bee on any biologics but was on methotrexate in the past but was not effective. In ER now on Vent and had central line placed by Dr. Marlene Espinoza. On diprivan and BP lower and just stopped and map coming up. On 3rd liter of fluid currently. Sedated. No family here. Her chart also reports Sjogren's syndrome? Past Medical History/Comorbidities:   Ms. Khoa Sapp  has a past medical history of Arrhythmia, Arthritis, Asthma, Cellulitis, Chronic kidney disease, Depression, Gout, Gouty arthritis  NOS (12/2/2015), Hypercholesterolemia, Hypertension, Mitral valve prolapse, Morbid obesity ([address] Nausea & vomiting, Polymyalgia rheumatica ([address] (12/2/2015), Psychiatric disorder, PUD (peptic ulcer disease), Sjogren's syndrome ([address] and Temporal arteritis ([address]   Ms. Khoa Sapp  has a past surgical history that includes hx hysterectomy; hx tubal ligation; pr cardiac surg procedure unlist (4/07); pr cardiac surg procedure unlist (); hx endoscopy (); and hx colonoscopy (). Social History/Living Environment:   Home Environment: Apartment  # Steps to Enter: 0  One/Two Story Residence: One story  Living Alone: Yes  Support Systems: Child(aubrey), Family member(s)  Patient Expects to be Discharged to[de-identified] Apartment  Current DME Used/Available at Home: Cane, straight, Blood pressure cuff, Grab bars, Walker, rollator, Other (comment), Shower chair( nozzle)  Tub or Shower Type: Tub/Shower combination  Prior Level of Function/Work/Activity:  Modified independent. Using a cane and rollator for 10+ years. Number of Personal Factors/Comorbidities that affect the Plan of Care: 1-2: MODERATE COMPLEXITY   EXAMINATION:   Most Recent Physical Functioning:   Gross Assessment:                  Posture:     Balance:  Sitting: Intact  Sitting - Static: Good (unsupported)  Sitting - Dynamic: Fair (occasional)  Standing: Impaired  Standing - Static: Fair  Standing - Dynamic : Poor Bed Mobility:     Wheelchair Mobility:     Transfers:  Sit to Stand: Minimum assistance  Stand to Sit: Minimum assistance  Gait:            Body Structures Involved:  1. Heart  2. Lungs  3. Bones  4. Joints  5. Muscles Body Functions Affected:  1. Cardio  2. Respiratory  3. Neuromusculoskeletal  4. Movement Related Activities and Participation Affected:  1. General Tasks and Demands  2. Mobility  3. Self Care  4. Domestic Life   Number of elements that affect the Plan of Care: 4+: HIGH COMPLEXITY   CLINICAL PRESENTATION:   Presentation: Stable and uncomplicated: LOW COMPLEXITY   CLINICAL DECISION MAKIN [facility] [address] [id number] AM-PAC 6 Clicks   Basic Mobility Inpatient Short Form  How much difficulty does the patient currently have. .. Unable A Lot A Little None   1. Turning over in bed (including adjusting bedclothes, sheets and blankets)? ? 1   ? 2   ? 3   ? 4   2.   Sitting down on and standing up from a chair with arms ( e.g., wheelchair, bedside commode, etc.)   ? 1   ? 2   ? 3   ? 4   3. Moving from lying on back to sitting on the side of the bed?   ? 1   ? 2   ? 3   ? 4   How much help from another person does the patient currently need. .. Total A Lot A Little None   4. Moving to and from a bed to a chair (including a wheelchair)? ? 1   ? 2   ? 3   ? 4   5. Need to walk in hospital room? ? 1   ? 2   ? 3   ? 4   6. Climbing 3-5 steps with a railing? ? 1   ? 2   ? 3   ? 4   © 2007, Trustees of [address] [place] [address] [address] [id number], under license to [employer] / ?????.??. All rights reserved      Score:  Initial: 17 Most Recent: X (Date: -- )    Interpretation of Tool:  Represents activities that are increasingly more difficult (i.e. Bed mobility, Transfers, Gait). Medical Necessity:     · Patient is expected to demonstrate progress in strength, balance and functional technique  ·  to increase independence with ADLs and IADLs   · .  Reason for Services/Other Comments:  · Patient continues to require modification of therapeutic interventions to increase complexity of exercises  · . Use of outcome tool(s) and clinical judgement create a POC that gives a: Clear prediction of patient's progress: LOW COMPLEXITY            TREATMENT:   (In addition to Assessment/Re-Assessment sessions the following treatments were rendered)   Pre-treatment Symptoms/Complaints: \" I'm not sure how much I can do\"  Pain: Initial:   Pain Intensity 1: 0  Post Session:  0/10     Therapeutic Activity: (   13 minutes): Therapeutic activities including transfers, Chair and standing balance activities  ground to improve mobility, strength and balance. Required moderate   to promote static and dynamic balance in standing. Therapeutic Exercise: ( 11 minutes):  Exercises per grid below to improve functional mobility and safety with all activities  Along with minimum  verbal cues to increase stability. Progressed repetitions and complexity of movement as indicated.          Date:  7/8/19 Date:   Date: Activity/Exercise Parameters Parameters Parameters   Ankle pumps 20     LAQ 20     marching 20                                     Braces/Orthotics/Lines/Etc:   · IV  · covarrubias catheter  · O2 Device: Room air  Treatment/Session Assessment:  Tolerated well. Vital remained stable  · Interdisciplinary Collaboration:   o Physical Therapy Assistant  o Registered Nurse  · After treatment position/precautions:   o Up in chair  o Bed alarm/tab alert on  o Bed/Chair-wheels locked  o Call light within reach  o RN notified   · Compliance with Program/Exercises: Will assess as treatment progresses  · Recommendations/Intent for next treatment session: \"Next visit will focus on advancements to more challenging activities and reduction in assistance provided\".   Total Treatment Duration:  PT Patient Time In/Time Out  Time In: 1420  Time Out: David Lovett 42    Elis East-Miya, PTA

## 2019-07-08 NOTE — PROGRESS NOTES
Care Management Interventions  PCP Verified by CM: Yes(saw nurse practioner at Internal medicine states Dr. Roro Yates has left group)  Palliative Care Criteria Met (RRAT>21 & CHF Dx)?: No(RRAT 23 Dx Resp failure)  Transition of Care Consult (CM Consult): Discharge Planning  Discharge Durable Medical Equipment: No(rollator, cane, shower chair and B/P cuff)  Physical Therapy Consult: Yes  Occupational Therapy Consult: Yes  Speech Therapy Consult: Yes  Current Support Network: Lives Alone  Confirm Follow Up Transport: Other (see comment)(she takes the [employer] service for handicap they come to her home and pick her up)  Plan discussed with Pt/Family/Caregiver: Yes  Freedom of Choice Offered: Yes  Discharge Location  Discharge Placement: Skilled nursing facility  Met with patient for d/c planning. Patient found sitting up in chair. Alert and oriented x 3, independent of ADL's prior to admission and lives alone in one story apartment. She is legally blind in her left eye and does not drive. She has transportation provided for her by the handicap Dominic Isbell from [employer] that comes to her home for . She has 3 children 2 that live nearby in [place] and one that lives in [place]. DME inclueds rollator, cane, shower chair and B/P cuff. She has Medicare and Medicaid and is able to obtain her medications at [facility] on [address] [facility] [phone]. She is interested in rehab but unsure where she wants to go. She wants to discuss with her family. Provided her with a SNF list for review. CM following.

## 2019-07-08 NOTE — PROGRESS NOTES
Bedside and Verbal shift change report received from [facility]. Report included the following information SBAR, Kardex, Intake/Output, MAR, Recent Results and Cardiac Rhythm A FIB.

## 2019-07-08 NOTE — PROGRESS NOTES
Bedside and Verbal shift change report given to NICOLA Hampton (oncoming nurse) by self (offgoing nurse). Report included the following information SBAR, Kardex, Intake/Output, MAR, Recent Results and Med Rec Status. IV cardizem gtt infusing

## 2019-07-08 NOTE — PROGRESS NOTES
[facility] CARDIOLOGY PROGRESS NOTE           7/8/2019 8:26 AM    Admit Date: 7/3/2019      Subjective:     No o/e; improved dyspnea. Good diuretic response overnight; ~800cc + total. Still with RVR     ROS:  Cardiovascular:  As noted above    Objective:      Vitals:    07/08/19 0400 07/08/19 0500 07/08/19 0522 07/08/19 0718   BP: 137/53 127/60 127/60    Pulse: 100 100 (!) 110    Resp:   18    Temp:   98 °F (36.7 °C)    SpO2:   97% 98%   Weight:   96.3 kg (212 lb 3.2 oz)    Height:           Physical Exam:  General-No Acute Distress  Neck- supple, no JVD  CV- irregular rate and rhythm no MRG  Lung- bibasilar rales  Abd- soft, nontender, nondistended  Ext- tr edema bilaterally. Skin- warm and dry    Data Review:   Recent Labs     07/08/19  0413 07/07/19  0436   * 146*   K 3.3* 3.1*   MG 1.9 2.1   BUN 21 29*   CREA 1.13* 1.04*   GLU 97 92   WBC 9.7 8.8   HGB 10.1* 10.5*   HCT 31.0* 31.8*    187       Assessment/Plan:     Principal Problem:    Acute hypoxemic respiratory failure (HCC) (7/3/2019)    Active Problems:    HTN (hypertension) (8/12/2013)      Severe obesity with body mass index (BMI) of 35.0 to 39.9 with comorbidity ([facility] [address] (8/12/2013)      Sjogren's syndrome ([facility] [address] (8/12/2013)      Polymyalgia rheumatica ([facility] [address] (12/2/2015)      Atrial fibrillation with rapid ventricular response (HCC) vs WPW in patient with history of ablation (7/3/2019)      Pulmonary infiltrates vs acute congestive heart failure (7/3/2019)      Severe sepsis ([facility] [address] (7/3/2019)      Hypotension due to hypovolemia (7/3/2019)    Noted EF ~02-35%; uncertain duration- ?TCM vs consideration for infection related with reported recent URI. Discussed consideration for ischemic w/u long term with risk profile if persistent  Continue coreg; start aldactone. Stop hydralazine for now for more BP room for LV dysfn meds. Plan rechallenge with ARB during hospital stay. Attempt digoxin. Increase coreg.    On eliquis; defer rhythm control at this time and reassess      Randi Howard MD  7/8/2019 8:26 AM

## 2019-07-08 NOTE — PROGRESS NOTES
Bedside and Verbal shift change report given to NICOLA Hampton (oncoming nurse) by self (offgoing nurse). Report included the following information SBAR, Kardex, Intake/Output, MAR, Recent Results and Med Rec Status. IV cardizem gtt infusing and BP cycling hourly.

## 2019-07-08 NOTE — PROGRESS NOTES
[address] CRITICAL CARE OUTREACH NURSE PROGRESS REPORT      SUBJECTIVE: Called to assess patient secondary to transfer from ICU. MEWS Score: 2 (07/08/19 0522)  Vitals:    07/08/19 0500 07/08/19 0522 07/08/19 0718 07/08/19 0845   BP: 127/60 127/60  121/54   Pulse: 100 (!) 110  (!) 136   Resp:  18     Temp:  98 °F (36.7 °C)     SpO2:  97% 98%    Weight:  96.3 kg (212 lb 3.2 oz)     Height:            LAB DATA:    Recent Labs     07/08/19 0413 07/07/19 0436 07/06/19 0459   * 146* 143   K 3.3* 3.1* 2.8*   * 113* 108*   CO2 30 27 26   AGAP 8 6* 9   GLU 97 92 159*   BUN 21 29* 31*   CREA 1.13* 1.04* 1.06*   GFRAA >60 >60 >60   GFRNA 50* 55* 54*   CA 8.1* 8.5 8.4   MG 1.9 2.1 2.2   PHOS 3.0 1.9* 2.7        Recent Labs     07/08/19 0413 07/07/19 0436 07/06/19 0459   WBC 9.7 8.8 10.0   HGB 10.1* 10.5* 10.8*   HCT 31.0* 31.8* 32.1*    187 185          OBJECTIVE: On arrival to room, I found patient to be lying in bed. Pain Assessment  Pain Intensity 1: 0 (07/08/19 0730)        Patient Stated Pain Goal: 0      ASSESSMENT:  Patient alert and oriented. Respirations even and unlabored. Reports dyspnea with exertion. Lower lung sounds coarse bilaterally. A. Fib 130s on bedside monitor. Remains on cardizem gtt. Trace edema to bilateral lower extremities. Clear yellow urine draining in covarrubias. Patient reports feeling \"a little better. \" VS, labs, and progress notes reviewed. PLAN:  Will continue to follow per outreach protocol.

## 2019-07-08 NOTE — PROGRESS NOTES
MEWS score noted to be 4. Charge nurse, Cande Corrales RN informed and assessed patient. High MEWS score noted due to elevated HR.

## 2019-07-08 NOTE — PROGRESS NOTES
Verbal bedside report given to [facility], oncoming RN. Patient's situation, background, assessment and recommendations provided. Opportunity for questions provided. Oncoming RN assumed care of patient. Cardizem IV drip verified at bedside with oncoming RN.

## 2019-07-08 NOTE — PROGRESS NOTES
Problem: Self Care Deficits Care Plan (Adult)  Goal: *Acute Goals and Plan of Care (Insert Text)  Description  1. Patient will complete upper body bathing and dressing with setup, additional time, and adaptive equipment as needed. 2. Patient will complete lower body bathing and dressing with min A, additional time, and adaptive equipment as needed. 3. Patient will complete toileting with min A.   4. Patient will tolerate 25 minutes of OT treatment with 4 rest breaks to increase activity tolerance for ADLs. 5. Patient will complete functional transfers with mod I, additional time, and adaptive equipment as needed. 6. Patient will complete grooming tasks in standing at sink level including gathering of supplies to improve home management. Timeframe: 7 visits    Outcome: Progressing Towards Goal         OCCUPATIONAL THERAPY: Initial Assessment, Daily Note and AM   7/8/2019  INPATIENT: OT Visit Days: 1  [place] / [place] / Product Type: Medicare /      NAME/AGE/GENDER: Ken Araiza is a 74 y.o. female   PRIMARY DIAGNOSIS:  Acute hypoxemic respiratory failure ([facility] [J96.01]  Severe sepsis ([facility] [A41.9, R65.20] Acute hypoxemic respiratory failure ([facility]   Acute hypoxemic respiratory failure (HCC)    Procedure(s) (LRB):  BRONCHOSCOPY (N/A)  BRONCHIAL ALVEOLAR LAVAGE (N/A)  5 Days Post-Op  ICD-10: Treatment Diagnosis:    · Generalized Muscle Weakness (M62.81)  · Other lack of cordination (R27.8)  · Difficulty in walking, Not elsewhere classified (R26.2)   Precautions/Allergies:    Falls,  Celebrex [celecoxib]; Lisinopril; Losartan; and Nifedipine      ASSESSMENT:     Ms. Rosalie Cole is a 75 YO R dominant WF admitted with above. Pt reports she lives alone in a 1 level apartment at baseline, was independent with self care tasks, using a SC or a rollator depending on the day and how she felt. Has T/S combo with shower chair, HH nozzle and GBs in place. Has not driven since 2386.   She uses the transport [employer] to go to her appointments, get groceries and attend the [facility] for their activities. Reports she is very active with them and goes on lots of outings. Pt does all her own housework and laundry. Today, she is A &O x4, presents supine in bed and is agreeable to OT evaluation and treatment with getting out of bed for sponge bath. She required additional time to peform all tasks including bed mobility at [facility], sitting balance good/fair, Min A sit to stand and Min A transfers. Min A for short steps taken to recliner today. Pt hesitant to get up but OT encouraged pt as did RN, for improved breathing and getting more fluid off her. No c/o dizziness or pain, just short of breath. Pt on 2L NC oxygen, did not wear at home. Sats hovering in mid 90s. Trace edema in B LEs. She was motivated despite being overall fatigued and instructed pt to sit up through lunch and practice her deep breathing, which she verbally agreed to do. B UEs are WFLs for basic self care tasks, good AROM and strength. She needed min A for all mobility. Not safe to be at home alone. She is adamant about wanting to go home. Completed full sponge bath while sitting up in recliner, max A LB this day, required numerous rest breaks. Wearing brief and covarrubias while getting Lasix. B LEs propped up on pillows for comfort. She would benefit from continued acute skilled OT to improve her activity tolerance, safety and independence with self care tasks and functional mobility in order to return to living alone. Recommend STR. Pt reports there is no one that can stay with her 24/7 at her apartment. This section established at most recent assessment   PROBLEM LIST (Impairments causing functional limitations):  1. Decreased Strength  2. Decreased ADL/Functional Activities  3. Decreased Transfer Abilities  4. Decreased Ambulation Ability/Technique  5. Decreased Balance  6. Decreased Activity Tolerance  7.  Decreased Pacing Skills  8. Decreased Work Simplification/Energy Conservation Techniques  9. Increased Fatigue  10. Increased Shortness of Breath  11. Decreased Flexibility/Joint Mobility  12. Edema/Girth  13. Decreased Skin Integrity/Hygeine   INTERVENTIONS PLANNED: (Benefits and precautions of occupational therapy have been discussed with the patient.)  1. Activities of daily living training  2. Adaptive equipment training  3. Balance training  4. Clothing management  5. Cognitive training  6. Community reintergration  7. Donning&doffing training  8. Therapeutic activity  9. Therapeutic exercise     TREATMENT PLAN: Frequency/Duration: Follow patient 3x per week to address above goals. Rehabilitation Potential For Stated Goals: Good     REHAB RECOMMENDATIONS (at time of discharge pending progress):    Placement: It is my opinion, based on this patient's performance to date, that Ms. Twin Gonsales may benefit from intensive therapy at a [address] [place] [address] after discharge due to the functional deficits listed above that are likely to improve with skilled rehabilitation and concerns that he/she may be unsafe to be unsupervised at home due to high falls risk and needing help with LB tasks as well as all IADLs . Equipment:    TBD based on progress               OCCUPATIONAL PROFILE AND HISTORY:   History of Present Injury/Illness (Reason for Referral):  See H&P  Past Medical History/Comorbidities:   Ms. Twin Gonsales  has a past medical history of Arrhythmia, Arthritis, Asthma, Cellulitis, Chronic kidney disease, Depression, Gout, Gouty arthritis  NOS (12/2/2015), Hypercholesterolemia, Hypertension, Mitral valve prolapse, Morbid obesity ([address] Nausea & vomiting, Polymyalgia rheumatica ([address] (12/2/2015), Psychiatric disorder, PUD (peptic ulcer disease), Sjogren's syndrome ([address] and Temporal arteritis ([address]   Ms. Twin Gonsales  has a past surgical history that includes hx hysterectomy; hx tubal ligation; pr cardiac surg procedure unlist (4/07); pr cardiac surg procedure unlist (8/13); hx endoscopy (8/08); and hx colonoscopy (6/08). Social History/Living Environment:   Home Environment: Apartment  # Steps to Enter: 0  One/Two Story Residence: One story  Living Alone: Yes  Support Systems: Child(aubrey), Family member(s)  Patient Expects to be Discharged to[de-identified] Apartment  Current DME Used/Available at Home: Cane, straight, Blood pressure cuff, Grab bars, Walker, rollator, Other (comment), Shower chair(HH nozzle)  Tub or Shower Type: Tub/Shower combination  Prior Level of Function/Work/Activity:  Pt reports she lives alone in a 1 level apartment at baseline, was independent with self care tasks, using a SC or a rollator depending on the day and how she felt. Has T/S combo with shower chair, HH nozzle and GBs in place. Has not driven since 3787. She uses the transport [employer] to go to her appointments, get groceries and attend the [facility] for their activities. Reports she is very active with them and goes on lots of outings. Pt does all her own housework and laundry. Dominant Side:         RIGHT   Number of Personal Factors/Comorbidities that affect the Plan of Care: Extensive review of physical, cognitive, and psychosocial performance (3+):  HIGH COMPLEXITY   ASSESSMENT OF OCCUPATIONAL PERFORMANCE[de-identified]   Activities of Daily Living:   Basic ADLs (From Assessment) Complex ADLs (From Assessment)   Feeding: Setup, Additional time  Oral Facial Hygiene/Grooming: Minimum assistance, Additional time  Bathing: Moderate assistance, Additional time  Upper Body Dressing: Moderate assistance, Additional time  Lower Body Dressing: Total assistance, Additional time  Toileting: Total assistance, Adaptive equipment, Additional time(covarrubias in place) Instrumental ADL  Meal Preparation: Total assistance  Homemaking:  Total assistance  Medication Management: Setup  Financial Management: Stand-by assistance   Grooming/Bathing/Dressing Activities of Daily Living     Cognitive Retraining  Safety/Judgement: Awareness of environment; Fall prevention                 Functional Transfers  Bathroom Mobility: Maximum assistance  Toilet Transfer : Moderate assistance; Additional time  Tub Transfer: Total assistance; Additional time  Shower Transfer: Maximum assistance; Additional time     Bed/Mat Mobility  Rolling: Additional time;Contact guard assistance  Supine to Sit: Additional time;Contact guard assistance  Sit to Supine: (up to recliner and remained there)  Sit to Stand: Additional time;Minimum assistance  Stand to Sit: Additional time;Minimum assistance  Bed to Chair: Additional time;Minimum assistance  Scooting: Minimum assistance; Additional time     Most Recent Physical Functioning:   Gross Assessment:  AROM: Generally decreased, functional(limited by mass of arms)  PROM: Generally decreased, functional  Strength: Generally decreased, functional  Coordination: Generally decreased, functional  Sensation: Intact               Posture:  Posture (WDL): Exceptions to WDL  Posture Assessment: Forward head, Kyphosis, Rounded shoulders  Balance:  Sitting: Impaired  Sitting - Static: Good (unsupported)  Sitting - Dynamic: Fair (occasional)  Standing: Impaired  Standing - Static: Fair  Standing - Dynamic : Poor Bed Mobility:  Rolling: Additional time;Contact guard assistance  Supine to Sit: Additional time;Contact guard assistance  Sit to Supine: (up to recliner and remained there)  Scooting: Minimum assistance; Additional time  Wheelchair Mobility:     Transfers:  Sit to Stand:  Additional time;Minimum assistance  Stand to Sit: Additional time;Minimum assistance  Bed to Chair: Additional time;Minimum assistance            Patient Vitals for the past 6 hrs:   BP BP Patient Position SpO2 O2 Flow Rate (L/min) Pulse   07/08/19 0500 127/60    100   07/08/19 0522 127/60 At rest 97 %  (!) 110   07/08/19 0718   98 % 2 l/min    07/08/19 0730    2 l/min    07/08/19 0845 121/54  97 % 2 l/min (!) 136 19 1030 148/59    (!) 122       Mental Status  Neurologic State: Alert  Orientation Level: Oriented X4  Cognition: Follows commands, Appropriate for age attention/concentration, Appropriate safety awareness, Appropriate decision making  Perception: Appears intact  Perseveration: No perseveration noted  Safety/Judgement: Awareness of environment, Fall prevention                          Physical Skills Involved:  1. Range of Motion  2. Balance  3. Strength  4. Activity Tolerance  5. Pain (acute)  6. Edema  7. Skin Integrity Cognitive Skills Affected (resulting in the inability to perform in a timely and safe manner):  1. Perception  2. Sustained Attention  3. Divided Attention Psychosocial Skills Affected:  1. Habits/Routines  2. Environmental Adaptation  3. Social Interaction  4. Self-Awareness  5. Social Roles   Number of elements that affect the Plan of Care: 5+:  HIGH COMPLEXITY   CLINICAL DECISION MAKIN[address] [place] [id number] AM-PAC 6 Clicks   Daily Activity Inpatient Short Form  How much help from another person does the patient currently need. .. Total A Lot A Little None   1. Putting on and taking off regular lower body clothing? ? 1   ? 2   ? 3   ? 4   2. Bathing (including washing, rinsing, drying)? ? 1   ? 2   ? 3   ? 4   3. Toileting, which includes using toilet, bedpan or urinal?   ? 1   ? 2   ? 3   ? 4   4. Putting on and taking off regular upper body clothing? ? 1   ? 2   ? 3   ? 4   5. Taking care of personal grooming such as brushing teeth? ? 1   ? 2   ? 3   ? 4   6. Eating meals? ? 1   ? 2   ? 3   ? 4   © , Trustees of [address] [place] [id number], under license to [employer]. All rights reserved      Score:  Initial: 15, completed 2019 Most Recent: X (Date: -- )    Interpretation of Tool:  Represents activities that are increasingly more difficult (i.e. Bed mobility, Transfers, Gait).     Medical Necessity:     · Patient demonstrates good  ·  rehab potential due to higher previous functional level. Reason for Services/Other Comments:  · Patient continues to require skilled intervention due to s/p above and decresed ADLs. · .   Use of outcome tool(s) and clinical judgement create a POC that gives a: MODERATE COMPLEXITY         TREATMENT:   (In addition to Assessment/Re-Assessment sessions the following treatments were rendered)     Pre-treatment Symptoms/Complaints:  \"I just need to get some more of this fluid off and start feeling like myself again and then I will be fine to go home. 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 \"  Pain: Initial:   Pain Intensity 1: 0  Post Session:  no complaint of pain, just SOB with talking and all activity     Self Care: (30 mins): Procedure(s) (per grid) utilized to improve and/or restore self-care/home management as related to dressing, bathing, toileting, grooming and self feeding. Required maximal visual, verbal, manual and tactile cueing to facilitate activities of daily living skills and compensatory activities. Therapeutic Activity: (    11 mins): Therapeutic activities including Bed transfers, Chair transfers, Toilet transfers, Ambulation on level ground, Carry objects and activity tolerance for ADLs  to improve mobility, strength, balance and coordination. Required moderate   to promote dynamic balance in standing.      Evaluation: 10 mins     Braces/Orthotics/Lines/Etc:   · IV  · covarrubias catheter  · Adult brief   · O2 Device: Nasal cannula  Treatment/Session Assessment:    · Response to Treatment:  no adverse reaction, SOB with all activity and talking  · Interdisciplinary Collaboration:   o Occupational Therapist  o Registered Nurse  o   o Certified Nursing Assistant/Patient Care Technician  · After treatment position/precautions:   o Up in chair  o Bed alarm/tab alert on  o Bed/Chair-wheels locked  o Bed in low position  o Call light within reach  o RN notified  o B  feet elevated, tray table across pt, CNA at bedside    · Compliance with Program/Exercises: Compliant most of the time. · Recommendations/Intent for next treatment session: \"Next visit will focus on advancements to more challenging activities and reduction in assistance provided\".   Total Treatment Duration:   51 mins  OT Patient Time In/Time Out  Time In: 0940  Time Out: [address] OT     Latonia Hogue MS, OTR/L

## 2019-07-08 NOTE — DISCHARGE INSTRUCTIONS

## 2019-07-09 ENCOUNTER — APPOINTMENT (OUTPATIENT)
Dept: GENERAL RADIOLOGY | Age: 74
DRG: 853 | End: 2019-07-09
Attending: INTERNAL MEDICINE
Payer: MEDICARE

## 2019-07-09 LAB
ANION GAP SERPL CALC-SCNC: 7 MMOL/L (ref 7–16)
BASOPHILS # BLD: 0.1 K/UL (ref 0–0.2)
BASOPHILS NFR BLD: 1 % (ref 0–2)
BUN SERPL-MCNC: 15 MG/DL (ref 8–23)
CALCIUM SERPL-MCNC: 8.4 MG/DL (ref 8.3–10.4)
CHLORIDE SERPL-SCNC: 104 MMOL/L (ref 98–107)
CO2 SERPL-SCNC: 32 MMOL/L (ref 21–32)
CREAT SERPL-MCNC: 1.06 MG/DL (ref 0.6–1)
DIFFERENTIAL METHOD BLD: ABNORMAL
EOSINOPHIL # BLD: 0.2 K/UL (ref 0–0.8)
EOSINOPHIL NFR BLD: 2 % (ref 0.5–7.8)
ERYTHROCYTE [DISTWIDTH] IN BLOOD BY AUTOMATED COUNT: 15.4 % (ref 11.9–14.6)
GLUCOSE SERPL-MCNC: 109 MG/DL (ref 65–100)
HCT VFR BLD AUTO: 34.5 % (ref 35.8–46.3)
HGB BLD-MCNC: 11.2 G/DL (ref 11.7–15.4)
IMM GRANULOCYTES # BLD AUTO: 0.1 K/UL (ref 0–0.5)
IMM GRANULOCYTES NFR BLD AUTO: 0 % (ref 0–5)
LYMPHOCYTES # BLD: 1.2 K/UL (ref 0.5–4.6)
LYMPHOCYTES NFR BLD: 10 % (ref 13–44)
MAGNESIUM SERPL-MCNC: 2 MG/DL (ref 1.8–2.4)
MCH RBC QN AUTO: 30.6 PG (ref 26.1–32.9)
MCHC RBC AUTO-ENTMCNC: 32.5 G/DL (ref 31.4–35)
MCV RBC AUTO: 94.3 FL (ref 79.6–97.8)
MM INDURATION POC: 0 MM (ref 0–5)
MONOCYTES # BLD: 0.9 K/UL (ref 0.1–1.3)
MONOCYTES NFR BLD: 7 % (ref 4–12)
NEUTS SEG # BLD: 9.9 K/UL (ref 1.7–8.2)
NEUTS SEG NFR BLD: 81 % (ref 43–78)
NRBC # BLD: 0 K/UL (ref 0–0.2)
PHOSPHATE SERPL-MCNC: 3.2 MG/DL (ref 2.3–3.7)
PLATELET # BLD AUTO: 214 K/UL (ref 150–450)
PMV BLD AUTO: 11 FL (ref 9.4–12.3)
POTASSIUM SERPL-SCNC: 3.3 MMOL/L (ref 3.5–5.1)
PPD POC: NEGATIVE NEGATIVE
RBC # BLD AUTO: 3.66 M/UL (ref 4.05–5.2)
SODIUM SERPL-SCNC: 143 MMOL/L (ref 136–145)
WBC # BLD AUTO: 12.3 K/UL (ref 4.3–11.1)

## 2019-07-09 PROCEDURE — 74011000258 HC RX REV CODE- 258: Performed by: INTERNAL MEDICINE

## 2019-07-09 PROCEDURE — 74011250637 HC RX REV CODE- 250/637: Performed by: INTERNAL MEDICINE

## 2019-07-09 PROCEDURE — 80048 BASIC METABOLIC PNL TOTAL CA: CPT

## 2019-07-09 PROCEDURE — 74011250636 HC RX REV CODE- 250/636: Performed by: INTERNAL MEDICINE

## 2019-07-09 PROCEDURE — 84100 ASSAY OF PHOSPHORUS: CPT

## 2019-07-09 PROCEDURE — 74011000250 HC RX REV CODE- 250: Performed by: INTERNAL MEDICINE

## 2019-07-09 PROCEDURE — 85025 COMPLETE CBC W/AUTO DIFF WBC: CPT

## 2019-07-09 PROCEDURE — 36591 DRAW BLOOD OFF VENOUS DEVICE: CPT

## 2019-07-09 PROCEDURE — 83735 ASSAY OF MAGNESIUM: CPT

## 2019-07-09 PROCEDURE — 71045 X-RAY EXAM CHEST 1 VIEW: CPT

## 2019-07-09 PROCEDURE — 65660000000 HC RM CCU STEPDOWN

## 2019-07-09 RX ORDER — CARVEDILOL 6.25 MG/1
6.25 TABLET ORAL ONCE
Status: COMPLETED | OUTPATIENT
Start: 2019-07-09 | End: 2019-07-09

## 2019-07-09 RX ORDER — POTASSIUM CHLORIDE 20 MEQ/1
40 TABLET, EXTENDED RELEASE ORAL
Status: COMPLETED | OUTPATIENT
Start: 2019-07-09 | End: 2019-07-09

## 2019-07-09 RX ORDER — DIGOXIN 125 MCG
0.12 TABLET ORAL DAILY
Status: DISCONTINUED | OUTPATIENT
Start: 2019-07-09 | End: 2019-07-12

## 2019-07-09 RX ORDER — CARVEDILOL 25 MG/1
25 TABLET ORAL 2 TIMES DAILY WITH MEALS
Status: DISCONTINUED | OUTPATIENT
Start: 2019-07-09 | End: 2019-07-12

## 2019-07-09 RX ADMIN — CARVEDILOL 25 MG: 25 TABLET, FILM COATED ORAL at 16:16

## 2019-07-09 RX ADMIN — PIPERACILLIN SODIUM,TAZOBACTAM SODIUM 3.38 G: 3; .375 INJECTION, POWDER, FOR SOLUTION INTRAVENOUS at 04:46

## 2019-07-09 RX ADMIN — POTASSIUM CHLORIDE 20 MEQ: 1.5 POWDER, FOR SOLUTION ORAL at 16:16

## 2019-07-09 RX ADMIN — DILTIAZEM HYDROCHLORIDE 15 MG/HR: 5 INJECTION, SOLUTION INTRAVENOUS at 01:32

## 2019-07-09 RX ADMIN — PIPERACILLIN SODIUM,TAZOBACTAM SODIUM 3.38 G: 3; .375 INJECTION, POWDER, FOR SOLUTION INTRAVENOUS at 11:22

## 2019-07-09 RX ADMIN — Medication 30 ML: at 21:30

## 2019-07-09 RX ADMIN — PANTOPRAZOLE SODIUM 40 MG: 40 TABLET, DELAYED RELEASE ORAL at 08:42

## 2019-07-09 RX ADMIN — Medication 10 ML: at 13:36

## 2019-07-09 RX ADMIN — POTASSIUM CHLORIDE 20 MEQ: 1.5 POWDER, FOR SOLUTION ORAL at 08:42

## 2019-07-09 RX ADMIN — FUROSEMIDE 20 MG: 10 INJECTION, SOLUTION INTRAMUSCULAR; INTRAVENOUS at 08:44

## 2019-07-09 RX ADMIN — PIPERACILLIN SODIUM,TAZOBACTAM SODIUM 3.38 G: 3; .375 INJECTION, POWDER, FOR SOLUTION INTRAVENOUS at 20:25

## 2019-07-09 RX ADMIN — CARVEDILOL 18.75 MG: 12.5 TABLET, FILM COATED ORAL at 08:43

## 2019-07-09 RX ADMIN — APIXABAN 5 MG: 5 TABLET, FILM COATED ORAL at 08:43

## 2019-07-09 RX ADMIN — SPIRONOLACTONE 25 MG: 25 TABLET ORAL at 08:43

## 2019-07-09 RX ADMIN — Medication 10 ML: at 04:46

## 2019-07-09 RX ADMIN — CARVEDILOL 6.25 MG: 6.25 TABLET, FILM COATED ORAL at 11:24

## 2019-07-09 RX ADMIN — POTASSIUM CHLORIDE 40 MEQ: 20 TABLET, EXTENDED RELEASE ORAL at 10:05

## 2019-07-09 RX ADMIN — DIGOXIN 0.12 MG: 125 TABLET ORAL at 10:05

## 2019-07-09 RX ADMIN — FUROSEMIDE 20 MG: 10 INJECTION, SOLUTION INTRAMUSCULAR; INTRAVENOUS at 20:27

## 2019-07-09 RX ADMIN — APIXABAN 5 MG: 5 TABLET, FILM COATED ORAL at 20:27

## 2019-07-09 NOTE — PROGRESS NOTES
Follow up with patient and she is interested in 9th floor as possible rehab options and CM spoke with [facility] and referral sent for review. If not accepted for 9th floor then she is interested in [address] and [address] for SNF. CM following.

## 2019-07-09 NOTE — PROGRESS NOTES
Verbal bedside report received from NICOLA Hampton. Assumed care of patient. Cardizem IV drip verified at bedside with outgoing RN.

## 2019-07-09 NOTE — PROGRESS NOTES
Bedside and Verbal shift change report given to self (oncoming nurse) by Halie De Paz RN (offgoing nurse). Report included the following information SBAR, Kardex, MAR and Recent Results. cardizem gtt verified at bedside.

## 2019-07-09 NOTE — PROGRESS NOTES
Verbal bedside report given to [facility], oncoming RN. Patient's situation, background, assessment and recommendations provided. Opportunity for questions provided. Oncoming RN assumed care of patient. Cardizem IV drip verified at bedside with oncoming RN.

## 2019-07-09 NOTE — PROGRESS NOTES
[facility] CARDIOLOGY PROGRESS NOTE           7/9/2019 8:26 AM    Admit Date: 7/3/2019      Subjective:     No o/e; improved dyspnea. Good diuretic response overnight of about 4.3L (net ~2.3L neg). Improved HRs; still on a cardizem gtt. ROS:  Cardiovascular:  As noted above    Objective:      Vitals:    07/09/19 0453 07/09/19 0500 07/09/19 0600 07/09/19 0843   BP: 119/46 125/51 131/69 136/64   Pulse: 91 92 90 96   Resp: 20   20   Temp: 98.6 °F (37 °C)   97.7 °F (36.5 °C)   SpO2: 97%   97%   Weight: 93.8 kg (206 lb 12.8 oz)      Height:           Physical Exam:  General-No Acute Distress  Neck- supple, no JVD  CV- irregular rate and rhythm no MRG  Lung- bibasilar rales; improved  Abd- soft, nontender, nondistended  Ext- tr edema bilaterally. Skin- warm and dry    Data Review:   Recent Labs     07/09/19  0505 07/08/19  0413    147*   K 3.3* 3.3*   MG 2.0 1.9   BUN 15 21   CREA 1.06* 1.13*   * 97   WBC 12.3* 9.7   HGB 11.2* 10.1*   HCT 34.5* 31.0*    181       Assessment/Plan:     Principal Problem:    Acute hypoxemic respiratory failure (HCC) (7/3/2019)    Active Problems:    HTN (hypertension) (8/12/2013)      Severe obesity with body mass index (BMI) of 35.0 to 39.9 with comorbidity ([address] (8/12/2013)      Sjogren's syndrome ([address] (8/12/2013)      Polymyalgia rheumatica ([address] (12/2/2015)      Atrial fibrillation with rapid ventricular response (HCC) 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 Discussed consideration for ischemic w/u long term with risk profile if persistent  Continue coreg; started aldactone. Plan rechallenge with ARB during hospital stay. Start po digoxin (IV load given yesterday). Increase coreg; wean off cardizem gtt.    On eliquis; defer rhythm control at this time and reassess      Serge Hurley MD  7/9/2019 8:26 AM

## 2019-07-09 NOTE — CONSULTS
Physical Medicine & Rehabilitation Note-consult    Patient: Claudia Chan MRN: [id number]  SSN: [id number]    YOB: 1945  Age: 74 y.o. Sex: female      Admit Date: 7/3/2019  Admitting Physician: Sarah Hastings MD    Medical Decision Making/Plan/Recommend: Functional deficit, mobility, gait impairment. Continue acute PT, OT. Continue gait training, transfer training, balance activities, exercises to improve strength and balance to maximize ADLs and functional mobility. Admission to [facility] rehab program is reasonable and necessary due to ongoing medical conditions and functional deficits. Patient will benefit from daily multi disciplinary  inpatient rehabilitation program and the availability of all the needed medical and nursing care. Patient will require continued cardiac monitoring for a.fib, with uncontrolled HR, treatment for HR control, BP management. Cardiology will need to continue to follow. Patient will need continued remote telemetry at [facility] to monitor for arrhythmia. Patient will be monitored for respiratory failure, CHF decompensation, volume status, continued on management with diuretics, O2 as required. Patient will continue anticoagulation, monitored for renal injury on high dose diuretics. For these ongoing medical issues, rehabilitation services could not be safely provided at a lower level of care such as a skilled nursing facility or nursing home. The patient has shown the ability to tolerate and benefit from 3 hours of therapy daily and is being admitted to a comprehensive acute inpatient rehabilitation program consisting of at least 3 hours of combined physical and occupational therapies. planning intensive Physical Therapy for a minimum of 1.5 hours a day, at least 5 out of 7 days per week to address bed mobility, transfers, ambulation, strengthening, balance, and endurance.    Planning intensive Occupational Therapy for a minimum of 1.5 hours a day, at least 5 out of 7 days per week to address ADL ( bathing, LE dressing, toileting) and adaptive equipment as needed.     Patient lives alone, was independent at baseline with mobility and ADLs, with use of a cane/ rollator     Expect patient will improve to mod I level with ADLs, mobility and ambulation as cardiopulmonary condition stabilize. Anticipate 10 days length of stay and patient is expected to return home  and continued rehabilitation with home health therapy, out pt PT.     I will continue to follow patient at telemetry, and plan for [facility] admission once patient is stable. Thank you for the opportunity to participate in the care of this patient. Chief Complaint : Gait dysfunction secondary to below. Admit Diagnosis: Acute hypoxemic respiratory failure ([address] [J96.01]; Severe sepsis ([address] [A41.9, R65.20]  Acute hypoxemic respiratory failure  HTN (hypertension)\  Hypotension due to hypovolemia\  Atrial fibrillation with rapid ventricular response (HCC) vs WPW in patient with history of ablation    Pulmonary infiltrates vs acute congestive heart failure (7/3/2019)  Severe obesity with body mass index (BMI) of 35.0 to 39.9 with comorbidity ([address] (8/12/2013)  Severe sepsis ([address] (7/3/2019)   Sjogren's syndrome ([address] (8/12/2013)  Polymyalgia rheumatica ([address] (12/2/2015)  weakness  Pain  DVT risk  Acute Rehab Dx:  weakness  deconditioning  Mobility and ambulation deficits  Self Care/ADL deficits    Medical Dx:  Past Medical History:   Diagnosis Date    Arrhythmia     Arthritis     Asthma     Cellulitis     Chronic kidney disease     Hx renal failure    Depression     Gout     Gouty arthritis  NOS 12/2/2015    Hypercholesterolemia     Hypertension     Mitral valve prolapse     Morbid obesity (HCC)     Nausea & vomiting     Polymyalgia rheumatica ([address] 12/2/2015    Psychiatric disorder     depression & anxiety    PUD (peptic ulcer disease)     Sjogren's syndrome ([address]     Temporal arteritis ([address] Subjective:     Date of Evaluation:  July 10, 2019    HPI: Michael Lao is a 74 y.o. female patient at [address] [place] [address] who was admitted on 7/3/2019  by Fawn Alaniz MD with below mentioned medical history ,is being seen for Physical Medicine and Rehabilitation consult. Michael Lao who has history of PMR on chronic steroids, WPW s/p ablation, gout, HLD, HTN presented with progressively worsening shortness of breath. Patient was found to be in atrial fibrillation with rapid ventricular rate. CXR showed patchy bilateral edema vs infiltrates. Patient developed progressive hypoxemia/ respiratory failure,required intubation, was admitted to ICU on ventilator support. Cardiology has started treatment for HR control, a.fib, started, continued on Cardizem atrial fibrillation and she was started on Cardizem gtt and  and heparin gtt. Patient continued on iv lasix, treatment of CHF. Patient has been transitioned to Eliquis. patient was extubated successfully on 7/5. CXR (7/9)still shows bilateral pleural effusions and vascular congestion. No significant change. Patient is still requiring Cardizem drip and IV Lasix as HR still elevated. We are consulted to assist with rehab needs and placement. Acute PT, OT and ST has started evaluation and treatment as tolerated. Patient's symptoms have improved and she is participating fairly with acute therapy. she is able to negotiate mobility, transfers and walking short distances with CGA/ min A using a RW. She is still limited by weakness and decreased activity tolerance. Michael Lao is seen and examined today. Medical Records reviewed. At baseline patient is independent with all activities using a sc or a rollator. Current Level of Function:   bed mobility - min A, transfers - min A, decreased balance , ambulation - 10' with RW and min A.     Prior Level of Function/Work/Activity:  Modified independent. Using a cane and rollator for 10+ years. Family History   Problem Relation Age of Onset    Cancer Mother     Breast Cancer Mother 80    Hypertension Mother     Dementia Mother     Cancer Father         LUNG    Heart Disease Father 62    Diabetes Son     Other Son         PSYCHIATRIC ILLNESS    Hypertension Sister       Social History     Tobacco Use    Smoking status: Never Smoker    Smokeless tobacco: Never Used   Substance Use Topics    Alcohol use: No     Past Surgical History:   Procedure Laterality Date    CARDIAC SURG PROCEDURE UNLIST  4/07    STRESS TEST    CARDIAC SURG PROCEDURE UNLIST  8/13    HEART ABLATION    HX COLONOSCOPY  6/08    HX ENDOSCOPY  8/08    HX HYSTERECTOMY      HX TUBAL LIGATION        Prior to Admission medications    Medication Sig Start Date End Date Taking? Authorizing Provider   ibuprofen (MOTRIN) 200 mg tablet Take  by mouth as needed for Pain. Yes Provider, Historical   predniSONE (DELTASONE) 5 mg tablet Take 3 Tabs by mouth daily. 6/5/19  Yes Ary Davis MD   albuterol (PROVENTIL HFA, VENTOLIN HFA, PROAIR HFA) 90 mcg/actuation inhaler Take 2 Puffs by inhalation every six (6) hours as needed for Wheezing. 6/3/19  Yes Pb GUPTA NP   nadolol (CORGARD) 40 mg tablet Take 2 Tabs by mouth daily for 360 days. 3/29/19 3/23/20 Yes Ana Cristina Jalloh MD   amLODIPine (NORVASC) 5 mg tablet Take 1 Tab by mouth daily. 3/22/19  Yes Erik Washburn MD   COLCRYS 0.6 mg tablet Take 1 Tab by mouth daily for 360 days. Patient taking differently: Take 0.6 mg by mouth as needed (gout). 2/15/19 2/10/20 Yes Farhana Vaughan MD   multivitamin (ONE A DAY) tablet Take 1 Tab by mouth daily. Yes Provider, Historical   magnesium oxide (MAG-OX) 400 mg tablet Take 400 mg by mouth two (2) times a day. Yes Provider, Historical   aspirin delayed-release 81 mg tablet Take 81 mg by mouth daily.    Yes Provider, Historical     Allergies   Allergen Reactions    Celebrex [Celecoxib] Rash    Lisinopril Cough    Losartan Other (comments)     Burning sensation tongue.  Nifedipine Other (comments)     severe headache        Review of Systems: Denies chest pain, shortness of breath, cough, headache, visual problems, abdominal pain, dysurea, calf pain. Pertinent positives are as noted in the medical records and unremarkable otherwise. Objective:     Vitals:  Blood pressure 110/53, pulse (!) 131, temperature 98.8 °F (37.1 °C), resp. rate 20, height 5' 2\" (1.575 m), weight 195 lb 9.6 oz (88.7 kg), SpO2 96 %. Temp (24hrs), Av.1 °F (36.7 °C), Min:96.8 °F (36 °C), Max:98.8 °F (37.1 °C)    BMI (calculated): 35.8 (07/10/19 0628)   Intake and Output:   1901 - 07/10 0700  In: 2041.5 [P.O.:1420; I.V.:621.5]  Out: 5350 [Urine:5350]    Physical Exam:   General: Alert and age appropriately oriented. No acute cardio respiratory distress. HEENT: Normocephalic,no scleral icterus  Oral mucosa moist without cyanosis, No bruit, +JVD. Lungs: + kartik. Crackles. Respiration even and unlabored   Heart: irregular rate and rhythm, S1, S2   No  murmurs, clicks, rub or gallops   Abdomen: Soft, non-tender, nondistended. Bowel sounds present. Genitourinary: defer   Neuromuscular:      PERRL, EOMI  Follows simple commands consistently. Able to identify, recall repeat. Mood appropriate. Insight, judgement intact.   + mild generalized weakness, BUE< BLE, major muscle groups. Sensory - intact  No cerebellar signs. No atrophy, no fasciculations, no tremors. Skin/extremity: No rashes, no erythema. Calf non tender BLE. + trace B:E edema.                                                                                          Labs/Studies:  Recent Results (from the past 72 hour(s))   PLEASE READ & DOCUMENT PPD TEST IN 24 HRS    Collection Time: 19  4:15 PM   Result Value Ref Range    PPD Negative Negative    mm Induration 0 0 - 5 mm   OCCULT BLOOD, STOOL    Collection Time: 19 10:09 PM   Result Value Ref Range    Occult blood, stool NEGATIVE  NEG     METABOLIC PANEL, BASIC    Collection Time: 07/08/19  4:13 AM   Result Value Ref Range    Sodium 147 (H) 136 - 145 mmol/L    Potassium 3.3 (L) 3.5 - 5.1 mmol/L    Chloride 109 (H) 98 - 107 mmol/L    CO2 30 21 - 32 mmol/L    Anion gap 8 7 - 16 mmol/L    Glucose 97 65 - 100 mg/dL    BUN 21 8 - 23 MG/DL    Creatinine 1.13 (H) 0.6 - 1.0 MG/DL    GFR est AA >60 >60 ml/min/1.73m2    GFR est non-AA 50 (L) >60 ml/min/1.73m2    Calcium 8.1 (L) 8.3 - 10.4 MG/DL   MAGNESIUM    Collection Time: 07/08/19  4:13 AM   Result Value Ref Range    Magnesium 1.9 1.8 - 2.4 mg/dL   PHOSPHORUS    Collection Time: 07/08/19  4:13 AM   Result Value Ref Range    Phosphorus 3.0 2.3 - 3.7 MG/DL   CBC WITH AUTOMATED DIFF    Collection Time: 07/08/19  4:13 AM   Result Value Ref Range    WBC 9.7 4.3 - 11.1 K/uL    RBC 3.31 (L) 4.05 - 5.2 M/uL    HGB 10.1 (L) 11.7 - 15.4 g/dL    HCT 31.0 (L) 35.8 - 46.3 %    MCV 93.7 79.6 - 97.8 FL    MCH 30.5 26.1 - 32.9 PG    MCHC 32.6 31.4 - 35.0 g/dL    RDW 15.1 (H) 11.9 - 14.6 %    PLATELET 401 478 - 919 K/uL    MPV 10.9 9.4 - 12.3 FL    ABSOLUTE NRBC 0.00 0.0 - 0.2 K/uL    DF AUTOMATED      NEUTROPHILS 78 43 - 78 %    LYMPHOCYTES 11 (L) 13 - 44 %    MONOCYTES 7 4.0 - 12.0 %    EOSINOPHILS 3 0.5 - 7.8 %    BASOPHILS 1 0.0 - 2.0 %    IMMATURE GRANULOCYTES 0 0.0 - 5.0 %    ABS. NEUTROPHILS 7.5 1.7 - 8.2 K/UL    ABS. LYMPHOCYTES 1.1 0.5 - 4.6 K/UL    ABS. MONOCYTES 0.7 0.1 - 1.3 K/UL    ABS. EOSINOPHILS 0.3 0.0 - 0.8 K/UL    ABS. BASOPHILS 0.1 0.0 - 0.2 K/UL    ABS. IMM.  GRANS. 0.0 0.0 - 0.5 K/UL   PLEASE READ & DOCUMENT PPD TEST IN 48 HRS    Collection Time: 07/08/19  2:54 PM   Result Value Ref Range    PPD Negative Negative    mm Induration 0 0 - 5 mm   METABOLIC PANEL, BASIC    Collection Time: 07/09/19  5:05 AM   Result Value Ref Range    Sodium 143 136 - 145 mmol/L    Potassium 3.3 (L) 3.5 - 5.1 mmol/L    Chloride 104 98 - 107 mmol/L    CO2 32 21 - 32 mmol/L    Anion gap 7 7 - 16 mmol/L    Glucose 109 (H) 65 - 100 mg/dL    BUN 15 8 - 23 MG/DL    Creatinine 1.06 (H) 0.6 - 1.0 MG/DL    GFR est AA >60 >60 ml/min/1.73m2    GFR est non-AA 54 (L) >60 ml/min/1.73m2    Calcium 8.4 8.3 - 10.4 MG/DL   MAGNESIUM    Collection Time: 07/09/19  5:05 AM   Result Value Ref Range    Magnesium 2.0 1.8 - 2.4 mg/dL   PHOSPHORUS    Collection Time: 07/09/19  5:05 AM   Result Value Ref Range    Phosphorus 3.2 2.3 - 3.7 MG/DL   CBC WITH AUTOMATED DIFF    Collection Time: 07/09/19  5:05 AM   Result Value Ref Range    WBC 12.3 (H) 4.3 - 11.1 K/uL    RBC 3.66 (L) 4.05 - 5.2 M/uL    HGB 11.2 (L) 11.7 - 15.4 g/dL    HCT 34.5 (L) 35.8 - 46.3 %    MCV 94.3 79.6 - 97.8 FL    MCH 30.6 26.1 - 32.9 PG    MCHC 32.5 31.4 - 35.0 g/dL    RDW 15.4 (H) 11.9 - 14.6 %    PLATELET 849 227 - 853 K/uL    MPV 11.0 9.4 - 12.3 FL    ABSOLUTE NRBC 0.00 0.0 - 0.2 K/uL    DF AUTOMATED      NEUTROPHILS 81 (H) 43 - 78 %    LYMPHOCYTES 10 (L) 13 - 44 %    MONOCYTES 7 4.0 - 12.0 %    EOSINOPHILS 2 0.5 - 7.8 %    BASOPHILS 1 0.0 - 2.0 %    IMMATURE GRANULOCYTES 0 0.0 - 5.0 %    ABS. NEUTROPHILS 9.9 (H) 1.7 - 8.2 K/UL    ABS. LYMPHOCYTES 1.2 0.5 - 4.6 K/UL    ABS. MONOCYTES 0.9 0.1 - 1.3 K/UL    ABS. EOSINOPHILS 0.2 0.0 - 0.8 K/UL    ABS. BASOPHILS 0.1 0.0 - 0.2 K/UL    ABS. IMM.  GRANS. 0.1 0.0 - 0.5 K/UL   PLEASE READ & DOCUMENT PPD TEST IN 72 HRS    Collection Time: 07/09/19  4:15 PM   Result Value Ref Range    PPD Negative Negative    mm Induration 0 0 - 5 mm   METABOLIC PANEL, BASIC    Collection Time: 07/10/19  4:35 AM   Result Value Ref Range    Sodium 141 136 - 145 mmol/L    Potassium 3.7 3.5 - 5.1 mmol/L    Chloride 102 98 - 107 mmol/L    CO2 32 21 - 32 mmol/L    Anion gap 7 7 - 16 mmol/L    Glucose 112 (H) 65 - 100 mg/dL    BUN 15 8 - 23 MG/DL    Creatinine 1.08 (H) 0.6 - 1.0 MG/DL    GFR est AA >60 >60 ml/min/1.73m2    GFR est non-AA 53 (L) >60 ml/min/1.73m2    Calcium 8.4 8.3 - 10.4 MG/DL   MAGNESIUM    Collection Time: 07/10/19  4:35 AM   Result Value Ref Range    Magnesium 2.0 1.8 - 2.4 mg/dL   PHOSPHORUS    Collection Time: 07/10/19  4:35 AM   Result Value Ref Range    Phosphorus 2.9 2.3 - 3.7 MG/DL   CBC WITH AUTOMATED DIFF    Collection Time: 07/10/19  4:35 AM   Result Value Ref Range    WBC 12.1 (H) 4.3 - 11.1 K/uL    RBC 3.37 (L) 4.05 - 5.2 M/uL    HGB 10.4 (L) 11.7 - 15.4 g/dL    HCT 31.8 (L) 35.8 - 46.3 %    MCV 94.4 79.6 - 97.8 FL    MCH 30.9 26.1 - 32.9 PG    MCHC 32.7 31.4 - 35.0 g/dL    RDW 15.6 (H) 11.9 - 14.6 %    PLATELET 346 168 - 386 K/uL    MPV 11.2 9.4 - 12.3 FL    ABSOLUTE NRBC 0.00 0.0 - 0.2 K/uL    DF AUTOMATED      NEUTROPHILS 80 (H) 43 - 78 %    LYMPHOCYTES 11 (L) 13 - 44 %    MONOCYTES 7 4.0 - 12.0 %    EOSINOPHILS 1 0.5 - 7.8 %    BASOPHILS 1 0.0 - 2.0 %    IMMATURE GRANULOCYTES 0 0.0 - 5.0 %    ABS. NEUTROPHILS 9.7 (H) 1.7 - 8.2 K/UL    ABS. LYMPHOCYTES 1.3 0.5 - 4.6 K/UL    ABS. MONOCYTES 0.8 0.1 - 1.3 K/UL    ABS. EOSINOPHILS 0.2 0.0 - 0.8 K/UL    ABS. BASOPHILS 0.1 0.0 - 0.2 K/UL    ABS. IMM. GRANS. 0.0 0.0 - 0.5 K/UL       Functional Assessment:  Reviewed participation and progress in therapies  Gross Assessment  AROM: Generally decreased, functional(limited by mass of arms) (07/08/19 1000)  PROM: Generally decreased, functional (07/08/19 1000)  Strength: Generally decreased, functional (07/08/19 1000)  Coordination: Generally decreased, functional (07/08/19 1000)  Sensation: Intact (07/08/19 1000)   Bed Mobility  Rolling: Additional time;Contact guard assistance (07/08/19 1000)  Supine to Sit: Additional time;Contact guard assistance (07/08/19 1000)  Sit to Supine: (up to recliner and remained there) (07/08/19 1000)  Scooting: Minimum assistance; Additional time (07/08/19 1000)   Balance  Sitting: Intact (07/08/19 1400)  Sitting - Static: Good (unsupported) (07/08/19 1400)  Sitting - Dynamic: Fair (occasional) (07/08/19 1400)  Standing: Impaired (07/08/19 1400)  Standing - Static: Fair (07/08/19 1400)  Standing - Dynamic : Poor (07/08/19 1400)               Bed/Mat Mobility  Rolling: Additional time;Contact guard assistance (07/08/19 1000)  Supine to Sit: Additional time;Contact guard assistance (07/08/19 1000)  Sit to Supine: (up to recliner and remained there) (07/08/19 1000)  Sit to Stand: Minimum assistance (07/08/19 1400)  Stand to Sit: Minimum assistance (07/08/19 1400)  Bed to Chair: Additional time;Minimum assistance (07/08/19 1000)  Scooting: Minimum assistance; Additional time (07/08/19 1000)     Ambulation:  Gait  Base of Support: Widened (07/07/19 1000)  Speed/Miesha: Shuffled; Slow (07/07/19 1000)  Step Length: Left shortened;Right shortened (07/07/19 1000)  Gait Abnormalities: Decreased step clearance;Shuffling gait (07/07/19 1000)  Ambulation - Level of Assistance: Minimal assistance (07/07/19 1000)  Distance (ft): 5 Feet (ft) (07/07/19 1000)  Assistive Device: Walker, rolling (07/07/19 1000)  Interventions: Safety awareness training;Verbal cues; Tactile cues (07/07/19 1000)    Impression/Plan:     Principal Problem:    Acute hypoxemic respiratory failure ([address] (7/3/2019)    Active Problems:    HTN (hypertension) (8/12/2013)      Severe obesity with body mass index (BMI) of 35.0 to 39.9 with comorbidity ([address] (8/12/2013)      Sjogren's syndrome ([address] (8/12/2013)      Polymyalgia rheumatica ([address] (12/2/2015)      Atrial fibrillation with rapid ventricular response ([address] vs WPW in patient with history of ablation (7/3/2019)      Pulmonary infiltrates vs acute congestive heart failure (7/3/2019)      Severe sepsis ([address] (7/3/2019)      Hypotension due to hypovolemia (7/3/2019)        Current Facility-Administered Medications   Medication Dose Route Frequency Provider Last Rate Last Dose    digoxin (LANOXIN) tablet 0.125 mg  0.125 mg Oral DAILY Jaxson Viramontes MD   0.125 mg at 07/09/19 1005    carvedilol (COREG) tablet 25 mg  25 mg Oral BID WITH MEALS Mark De Leon MD   25 mg at 07/09/19 1619    spironolactone (ALDACTONE) tablet 25 mg  25 mg Oral DAILY Naida Moura MD   25 mg at 07/09/19 0843    levalbuterol (XOPENEX) nebulizer soln 0.63 mg/3 mL  0.63 mg Nebulization Q6H PRN Kailyn Cruz MD        furosemide (LASIX) injection 20 mg  20 mg IntraVENous Q12H Alyson Suarez MD   20 mg at 07/09/19 2027    potassium chloride (KLOR-CON) packet for solution 20 mEq  20 mEq Oral BID WITH MEALS Alyson Suarez MD   20 mEq at 07/09/19 1616    NUTRITIONAL SUPPORT ELECTROLYTE PRN ORDERS   Does Not Apply PRN Vipul Paz MD        pantoprazole (PROTONIX) tablet 40 mg  40 mg Oral ACB Jessica Hand MD   40 mg at 07/09/19 3621    bisacodyl (DULCOLAX) tablet 5 mg  5 mg Oral DAILY PRN Jessica Hand MD   5 mg at 07/06/19 1052    apixaban (ELIQUIS) tablet 5 mg  5 mg Oral Q12H Arnie MARTINEZ MD   5 mg at 07/09/19 2027    temazepam (RESTORIL) capsule 15 mg  15 mg Oral QHS PRN Ellen MARTINEZ NP   15 mg at 07/06/19 2209    sodium chloride (NS) flush 5-40 mL  5-40 mL IntraVENous Q8H Harish Torres MD   5 mL at 07/10/19 0525    sodium chloride (NS) flush 5-40 mL  5-40 mL IntraVENous PRN Mustapha Torres MD        dilTIAZem (CARDIZEM) 125 mg in dextrose 5% 125 mL infusion  0-15 mg/hr IntraVENous TITRATE Melony Claude, MD 12.5 mL/hr at 07/10/19 0554 12.5 mg/hr at 07/10/19 8290            Signed By: Miguel Evans MD     July 10, 2019

## 2019-07-09 NOTE — PROGRESS NOTES
Hospitalist Progress Note     Admit Date:  7/3/2019  3:02 AM   Name:  Rosalba Johnson   Age:  74 y.o.  :  1945   MRN:  [id number]   PCP:  Vann Sicard, MD  Treatment Team: Attending Provider: Whitley Cárdenas MD; Consulting Provider: Nikhil Mark MD; Consulting Provider: Joaquín Saba MD; Utilization Review: Amadeo Cai RN; Consulting Provider: Flavia Caballero MD; Care Manager: Arvin Parson RN; Consulting Provider: Shantell Suresh MD    Subjective:   Ms. Vivia Jeans is a 73 y/o WF admitted to ICU on 7/3 with respiratory failure after being intubated. Developed rapid AFib, cardiology consulted. Started on abx and IV diuresis. Extubated  and sent to floor .  Hospitalist assumed care .    19  On oxygen   Says feels better  Still on cardizem drip        Objective:     Patient Vitals for the past 24 hrs:   Temp Pulse Resp BP SpO2   19 1122  97  125/57    19 1005  79  130/57    19 0843 97.7 °F (36.5 °C) 96 20 136/64 97 %   19 0600  90  131/69    19 0500  92  125/51    19 0453 98.6 °F (37 °C) 91 20 119/46 97 %   19 0400  90  119/46    19 0300  86  103/49    19 0200  90  113/52    19 0100  88  90/45    19 0035 98.4 °F (36.9 °C) 88 20 99/48 98 %   19 0000  92  99/48    19 2300  94  119/44    19 2200  94  117/47    19 2100  94  130/60    19 2035 99 °F (37.2 °C) 99 20 133/59 98 %   19 2000  90  133/59    19 1900  94  122/59    19 1824 98.1 °F (36.7 °C) (!) 112 20 109/42 98 %   19 1454 98.2 °F (36.8 °C) (!) 122 20 159/72 96 %     Oxygen Therapy  O2 Sat (%): 97 % (19)  Pulse via Oximetry: 96 beats per minute (19)  O2 Device: Nasal cannula (19)  O2 Flow Rate (L/min): 2 l/min (19 111)  FIO2 (%): 28 % (19)    Intake/Output Summary (Last 24 hours) at 2019 1309  Last data filed at 7/9/2019 1114  Gross per 24 hour   Intake 1221.54 ml   Output 3575 ml   Net -2353.46 ml         General:    Well nourished. Alert. On oxygen  heent- normal   CV:   RRR. No murmur, rub, or gallop. Lungs:   Mild basilar crepitations  Abdomen:   Soft, nontender, nondistended. Cns- no focal neurological deficits  Extremities: Warm and dry. No cyanosis or edema. Skin:     No rashes or jaundice. Data Review:  I have reviewed all labs, meds, telemetry events, and studies from the last 24 hours.     Recent Results (from the past 24 hour(s))   PLEASE READ & DOCUMENT PPD TEST IN 48 HRS    Collection Time: 07/08/19  2:54 PM   Result Value Ref Range    PPD Negative Negative    mm Induration 0 0 - 5 mm   METABOLIC PANEL, BASIC    Collection Time: 07/09/19  5:05 AM   Result Value Ref Range    Sodium 143 136 - 145 mmol/L    Potassium 3.3 (L) 3.5 - 5.1 mmol/L    Chloride 104 98 - 107 mmol/L    CO2 32 21 - 32 mmol/L    Anion gap 7 7 - 16 mmol/L    Glucose 109 (H) 65 - 100 mg/dL    BUN 15 8 - 23 MG/DL    Creatinine 1.06 (H) 0.6 - 1.0 MG/DL    GFR est AA >60 >60 ml/min/1.73m2    GFR est non-AA 54 (L) >60 ml/min/1.73m2    Calcium 8.4 8.3 - 10.4 MG/DL   MAGNESIUM    Collection Time: 07/09/19  5:05 AM   Result Value Ref Range    Magnesium 2.0 1.8 - 2.4 mg/dL   PHOSPHORUS    Collection Time: 07/09/19  5:05 AM   Result Value Ref Range    Phosphorus 3.2 2.3 - 3.7 MG/DL   CBC WITH AUTOMATED DIFF    Collection Time: 07/09/19  5:05 AM   Result Value Ref Range    WBC 12.3 (H) 4.3 - 11.1 K/uL    RBC 3.66 (L) 4.05 - 5.2 M/uL    HGB 11.2 (L) 11.7 - 15.4 g/dL    HCT 34.5 (L) 35.8 - 46.3 %    MCV 94.3 79.6 - 97.8 FL    MCH 30.6 26.1 - 32.9 PG    MCHC 32.5 31.4 - 35.0 g/dL    RDW 15.4 (H) 11.9 - 14.6 %    PLATELET 379 121 - 565 K/uL    MPV 11.0 9.4 - 12.3 FL    ABSOLUTE NRBC 0.00 0.0 - 0.2 K/uL    DF AUTOMATED      NEUTROPHILS 81 (H) 43 - 78 %    LYMPHOCYTES 10 (L) 13 - 44 %    MONOCYTES 7 4.0 - 12.0 %    EOSINOPHILS 2 0.5 - 7.8 % BASOPHILS 1 0.0 - 2.0 %    IMMATURE GRANULOCYTES 0 0.0 - 5.0 %    ABS. NEUTROPHILS 9.9 (H) 1.7 - 8.2 K/UL    ABS. LYMPHOCYTES 1.2 0.5 - 4.6 K/UL    ABS. MONOCYTES 0.9 0.1 - 1.3 K/UL    ABS. EOSINOPHILS 0.2 0.0 - 0.8 K/UL    ABS. BASOPHILS 0.1 0.0 - 0.2 K/UL    ABS. IMM. GRANS. 0.1 0.0 - 0.5 K/UL        All Micro Results     Procedure Component Value Units Date/Time    ATYPICAL PNEUMONIA BY PCR,BRONCHOALVEOLAR LAVAGE - INCLUDES: MYCOPLASMA PNEUMONIAE AND CHLAMYDIA PNEUMONIAE [[id number]] Collected:  07/03/19 1245    Order Status:  Completed Specimen:  Bronchial lavage Updated:  07/08/19 [address]     M. PNEUMONIAE, MPPT Not Detected     C. PNEUMONAIE, CPPT Not Detected     Comment: (NOTE)  This test was developed and its performance  characteristics determined by [employer]. It has not  been cleared or approved by the U.S. Food and Drug  Administration. Results should be used in conjunction with  clinical findings, and should not form the sole basis for  a diagnosis or treatment decision. Performed At: [facility]  [address] [place], [place] [id number]  Gina Saravia PhD BK:[id number]         A. GALACTOMANNAN BY EIA, BRONCHOALVEOLAR LAVAGE [[id number]] Collected:  07/03/19 1245    Order Status:  Completed Specimen:  Bronchial lavage Updated:  07/08/19 1735     A. GALACTOMANNAN, AGPT 0.071        Comment: (NOTE)  Interpretation:   Patients with an index value of greater  than or equal to 0.5 are considered to be positive for  galactomannan antigen. The Platelia(TM) Aspergillus EIA  package insert also recommends a new sample be collected  from the patient for follow-up testing. Patients with an  index value of less than 0.5 are considered to be negative  for galactomannan antigen. A negative result may indicate  that the patient's result is below the detectable level of  the assay. Negative results do not rule out the diagnosis of Invasive  Aspergillosis.   Pursuant to the package insert, repeat  testing is recommended if the result is negative, but the  disease is suspected. Due to the potential for  environmental contamination when transferred to pour-off  tubes, which can lead to false positive results, interpret  positive results from samples provided in pour-off tubes  with caution. Results should be used in conjunction with  clinical findings, and should not form the sole basis for  a diagnosis or treatment decision. The Platelia Aspergillus Galactomannan EIA is a product of  [employer] and is FDA approved for in vitro diagnostic use. Performed At: [facility] [place], [place] [id number]  Josue Urena PhD ZH:[id number]         VIRAL CULTURE - INCLUDES ADENOVIRUS, CMV, ENTEROVIRUSES, HSV, INFLUENZA, MUMPS, PARAINFLUENZA, RSV, & VARICELLA ZOSTER [[id number]] Collected:  07/03/19 1245    Order Status:  Completed Specimen:  Other from Miscellaneous sample Updated:  07/08/19 1436     Source BRONCHIAL LAVAGE        Viral Culture, General Comment        Comment: (NOTE)  Preliminary Report:  No virus isolated at 4 days. Next report to follow after 7 days. Performed At: [address] [id number]  Sancho Graham MD [id number]         ANAEROBIC CULTURE - ONLY PERFORMED ON BRONCHIAL BRUSHING [[id number]] Collected:  07/03/19 1245    Order Status:  Completed Specimen:  Bronchial lavage Updated:  07/08/19 1249     Special Requests: NO SPECIAL REQUESTS        Culture result:       SUBCULTURE IS NECESSARY TO DETERMINE PRESENCE OR ABSENCE OF ANAEROBIC BACTERIA IN THIS CULTURE. FURTHER REPORT TO FOLLOW AFTER INCUBATION OF SUBCULTURE.           CULTURE, BLOOD [[id number]] Collected:  07/03/19 0515    Order Status:  Completed Specimen:  Blood Updated:  07/08/19 1137     Special Requests: CENTRAL LINE     Culture result: NO GROWTH 5 DAYS       CULTURE, BLOOD [[id number]] Collected:  07/03/19 0641    Order Status:  Completed Specimen:  Blood Updated:  07/08/19 1137     Special Requests: Remi Herman     Culture result: NO GROWTH 5 DAYS       RESPIRATORY VIRAL PANEL, PCR [[id number]] Collected:  07/03/19 1245    Order Status:  Completed Specimen:  Respiratory sample Updated:  07/07/19 2335     Source BRONCHIAL LAVAGE        Influenza A NEGATIVE         Influenza B NEGATIVE         RSV A NEGATIVE         RSV B NEGATIVE         Parainfluenza 1 NEGATIVE         Parainfluenza 2 NEGATIVE         Parainfluenza 3 NEGATIVE         Rhinovirus NEGATIVE         Metapneumovirus NEGATIVE         Adenovirus NEGATIVE         Comment: (NOTE)  Performed At: [address] [id number]  Toya Barr MD LX:[id number]         [facility] CULTURE AND SMEAR - Kalyn Roberts [[id number]] Collected:  07/03/19 1245    Order Status:  Completed Specimen:  Other from Miscellaneous sample Updated:  07/06/19 0836     Source BRONCHIAL LAVAGE        Fungus stain Direct Inoculation     Fungus (Mycology) Culture Other source received     Comment: (NOTE)  Performed At: [address] [id number]  Toya Barr MD GK:[id number]         CULTURE, RESPIRATORY/SPUTUM/BRONCH Pleas Jenelle [[id number]] Collected:  07/03/19 0643    Order Status:  Completed Specimen:  Sputum Updated:  07/05/19 1214     Special Requests: NO SPECIAL REQUESTS        GRAM STAIN 0 to 2 WBCS/OIF      NO EPITHELIAL CELLS SEEN         FEW GRAM POSITIVE COCCI         2+ MUCUS PRESENT        Culture result:       HEAVY NORMAL RESPIRATORY ROBEL          CULTURE, RESPIRATORY/SPUTUM/BRONCH Pleas Jenelle [[id number]] Collected:  07/03/19 1245    Order Status:  Completed Specimen:  Sputum from Bronchial lavage Updated:  07/05/19 0743     Special Requests: NO SPECIAL REQUESTS        GRAM STAIN NO WBCS SEEN         NO DEFINITE ORGANISM SEEN        Culture result: NO GROWTH 2 DAYS       AFB (MYCOBACTERIUM) CULTURE & SMEAR W/REFLEX ID - INCLUDES NOCARDIA [[id number]] Collected:  19 1245    Order Status:  Completed Specimen:  Miscellaneous sample Updated:  19 1336     Source BRONCHIAL LAVAGE        AFB Specimen processing Concentration     Acid Fast Smear NEGATIVE         Comment: (NOTE)  Performed At: [address] [id number]  Chata ENCARNACION          Acid Fast Culture PENDING          Current Meds:  Current Facility-Administered Medications   Medication Dose Route Frequency    digoxin (LANOXIN) tablet 0.125 mg  0.125 mg Oral DAILY    carvedilol (COREG) tablet 25 mg  25 mg Oral BID WITH MEALS    spironolactone (ALDACTONE) tablet 25 mg  25 mg Oral DAILY    levalbuterol (XOPENEX) nebulizer soln 0.63 mg/3 mL  0.63 mg Nebulization Q6H PRN    furosemide (LASIX) injection 20 mg  20 mg IntraVENous Q12H    potassium chloride (KLOR-CON) packet for solution 20 mEq  20 mEq Oral BID WITH MEALS    NUTRITIONAL SUPPORT ELECTROLYTE PRN ORDERS   Does Not Apply PRN    pantoprazole (PROTONIX) tablet 40 mg  40 mg Oral ACB    bisacodyl (DULCOLAX) tablet 5 mg  5 mg Oral DAILY PRN    apixaban (ELIQUIS) tablet 5 mg  5 mg Oral Q12H    temazepam (RESTORIL) capsule 15 mg  15 mg Oral QHS PRN    sodium chloride (NS) flush 5-40 mL  5-40 mL IntraVENous Q8H    sodium chloride (NS) flush 5-40 mL  5-40 mL IntraVENous PRN    piperacillin-tazobactam (ZOSYN) 3.375 g in 0.9% sodium chloride (MBP/ADV) 100 mL  3.375 g IntraVENous Q8H    dilTIAZem (CARDIZEM) 125 mg in dextrose 5% 125 mL infusion  0-15 mg/hr IntraVENous TITRATE       Other Studies (last 24 hours):  Xr Chest Sngl V    Result Date: 2019  Clinical history: Shortness of breath. TECHNIQUE: AP portable chest. COMPARISON: Yesterday. FINDINGS: There are bibasilar opacities, likely combination of effusions and atelectasis. There is vascular congestion. No pneumothorax. Heart is enlarged. Right IJ line is stable. IMPRESSION: 1.  Bilateral pleural effusions and vascular congestion. No significant change. 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 ICD-10-CM: I48.91  ICD-9-CM: 427.31  7/3/2019 - Present Unknown        Pulmonary infiltrates vs acute congestive heart failure ICD-10-CM: R91.8  ICD-9-CM: 793.19  7/3/2019 - Present Unknown        Severe sepsis ([facility] ICD-10-CM: A41.9, R65.20  ICD-9-CM: 038.9, 995.92  7/3/2019 - Present Unknown        Hypotension due to hypovolemia ICD-10-CM: I95.89, E86.1  ICD-9-CM: 458.8, 276.52  7/3/2019 - Present Unknown        Polymyalgia rheumatica ([facility] ICD-10-CM: M35.3  ICD-9-CM: 942  12/2/2015 - Present Yes        HTN (hypertension) ICD-10-CM: I10  ICD-9-CM: 401.9  8/12/2013 - Present Yes        Severe obesity with body mass index (BMI) of 35.0 to 39.9 with comorbidity ([facility] ICD-10-CM: E66.01  ICD-9-CM: 278.01  8/12/2013 - Present Yes        Sjogren's syndrome ([facility] ICD-10-CM: M35.00  ICD-9-CM: 710.2  8/12/2013 - Present Yes              PLAN:    Acute respiratory failure- during the stay intubated- extubated- presently on nasal canula 2 lit/min  Acute volume overload- echo ef 62-37%- grade 1 diastolic chf- good diuresis  Afib with rvr- cont eliquis,on cardizem drip- cardiology following  Hypokalemia- cont k replacement- cardiology started spironolactone  htn  Hypotension resolved. Poly myalgia rheumatica- off steroids    DC planning/Dispo:    DVT ppx:  ibrahima.     Signed:  Jaci Ya MD

## 2019-07-09 NOTE — ROUTINE PROCESS
Patient admit with hypoxemic resp failure admit with Afib RVR, severe sepsis, hypotension due to hypovolemia. Patient has noted 43-20%QWUJQJ uncertain duration. Cardiology starting on medical therapy for reduced EF. Currently attempting cardizem wean. CHF teaching started post introduction to pt/family; aware of diagnosis. Planner/scale @ BS and will follow. Smoking/ ETOH/Illicit drug use cessation and maintain a healthy weight covered. Pt/family aware that I can not prescribe nor adjust  medications: 15mins Palliative Care score: 23-refused. Patient independent at home prior to hospitalization and uncertain of duration of EF drop- meds being started and rehab current dispo Refused ACP on admission Start 2L/D Fluid restriction/ cardiac diet CHF teaching started post introduction to pt/family; aware of diagnosis. Planner/scale @ BS and will follow. Smoking/ ETOH/Illicit drug use cessation and maintain a healthy weight covered. Pt/family aware that I can not prescribe nor adjust  medications: 15mins Palliative Care score: 
Refused ACP on admission CHF teaching completed, verbalize emphasis on monitoring self and report to MD: 
? If you gain 2 lbs in one day or 5 lbs in a week, and short of breath. ? If you can not lay flat without developing short of breath or rapid breathing at night; or if it wakes you up. Develop a cough or wheezing. ? If you notice swollen hands/feet/ankles or stomach with a bloated/ full feeling. ? If you are  more confused or mentally fuzzy or dizzy. ? If you notice a rapid or change in your heart rate. ? If you become more exhausted all the time and unable to do the same level of activity without stopping to catch your breath. Drink no more than 8 cups a day in 8 oz. cups. Limit Cola Drinks. Your Heart can not handle any more. Stay away from salt (limit anything with salt or sodium in it). Limit to 250mg per serving. Exercise needs to be started with your Doctors approval. 
Reduce stress; Call myself or Provider if assistance is needed. Pass post test via teach back, will make self available post DC ,if an questions arise. Diabetic teaching completed.  Planner/scale @ BS:  60 mins total

## 2019-07-09 NOTE — PROGRESS NOTES
Bedside and Verbal shift change report given to [facility] (oncoming nurse) by self (offgoing nurse). Report included the following information SBAR, Kardex, Intake/Output, MAR, Recent Results and Med Rec Status. IV cardizem infusing and BP hourly.

## 2019-07-10 ENCOUNTER — APPOINTMENT (OUTPATIENT)
Dept: GENERAL RADIOLOGY | Age: 74
DRG: 853 | End: 2019-07-10
Attending: INTERNAL MEDICINE
Payer: MEDICARE

## 2019-07-10 LAB
ANION GAP SERPL CALC-SCNC: 7 MMOL/L (ref 7–16)
BASOPHILS # BLD: 0.1 K/UL (ref 0–0.2)
BASOPHILS NFR BLD: 1 % (ref 0–2)
BUN SERPL-MCNC: 15 MG/DL (ref 8–23)
CALCIUM SERPL-MCNC: 8.4 MG/DL (ref 8.3–10.4)
CHLORIDE SERPL-SCNC: 102 MMOL/L (ref 98–107)
CO2 SERPL-SCNC: 32 MMOL/L (ref 21–32)
CREAT SERPL-MCNC: 1.08 MG/DL (ref 0.6–1)
DIFFERENTIAL METHOD BLD: ABNORMAL
EOSINOPHIL # BLD: 0.2 K/UL (ref 0–0.8)
EOSINOPHIL NFR BLD: 1 % (ref 0.5–7.8)
ERYTHROCYTE [DISTWIDTH] IN BLOOD BY AUTOMATED COUNT: 15.6 % (ref 11.9–14.6)
GLUCOSE SERPL-MCNC: 112 MG/DL (ref 65–100)
HCT VFR BLD AUTO: 31.8 % (ref 35.8–46.3)
HGB BLD-MCNC: 10.4 G/DL (ref 11.7–15.4)
IMM GRANULOCYTES # BLD AUTO: 0 K/UL (ref 0–0.5)
IMM GRANULOCYTES NFR BLD AUTO: 0 % (ref 0–5)
LYMPHOCYTES # BLD: 1.3 K/UL (ref 0.5–4.6)
LYMPHOCYTES NFR BLD: 11 % (ref 13–44)
MAGNESIUM SERPL-MCNC: 2 MG/DL (ref 1.8–2.4)
MCH RBC QN AUTO: 30.9 PG (ref 26.1–32.9)
MCHC RBC AUTO-ENTMCNC: 32.7 G/DL (ref 31.4–35)
MCV RBC AUTO: 94.4 FL (ref 79.6–97.8)
MONOCYTES # BLD: 0.8 K/UL (ref 0.1–1.3)
MONOCYTES NFR BLD: 7 % (ref 4–12)
NEUTS SEG # BLD: 9.7 K/UL (ref 1.7–8.2)
NEUTS SEG NFR BLD: 80 % (ref 43–78)
NRBC # BLD: 0 K/UL (ref 0–0.2)
PHOSPHATE SERPL-MCNC: 2.9 MG/DL (ref 2.3–3.7)
PLATELET # BLD AUTO: 203 K/UL (ref 150–450)
PMV BLD AUTO: 11.2 FL (ref 9.4–12.3)
POTASSIUM SERPL-SCNC: 3.7 MMOL/L (ref 3.5–5.1)
RBC # BLD AUTO: 3.37 M/UL (ref 4.05–5.2)
SODIUM SERPL-SCNC: 141 MMOL/L (ref 136–145)
WBC # BLD AUTO: 12.1 K/UL (ref 4.3–11.1)

## 2019-07-10 PROCEDURE — 74011250636 HC RX REV CODE- 250/636: Performed by: INTERNAL MEDICINE

## 2019-07-10 PROCEDURE — 74011000250 HC RX REV CODE- 250: Performed by: INTERNAL MEDICINE

## 2019-07-10 PROCEDURE — 85025 COMPLETE CBC W/AUTO DIFF WBC: CPT

## 2019-07-10 PROCEDURE — 99222 1ST HOSP IP/OBS MODERATE 55: CPT | Performed by: PHYSICAL MEDICINE & REHABILITATION

## 2019-07-10 PROCEDURE — 71045 X-RAY EXAM CHEST 1 VIEW: CPT

## 2019-07-10 PROCEDURE — 74011250637 HC RX REV CODE- 250/637: Performed by: INTERNAL MEDICINE

## 2019-07-10 PROCEDURE — 74011000258 HC RX REV CODE- 258: Performed by: INTERNAL MEDICINE

## 2019-07-10 PROCEDURE — 83735 ASSAY OF MAGNESIUM: CPT

## 2019-07-10 PROCEDURE — 97530 THERAPEUTIC ACTIVITIES: CPT

## 2019-07-10 PROCEDURE — 80048 BASIC METABOLIC PNL TOTAL CA: CPT

## 2019-07-10 PROCEDURE — 97110 THERAPEUTIC EXERCISES: CPT

## 2019-07-10 PROCEDURE — 84100 ASSAY OF PHOSPHORUS: CPT

## 2019-07-10 PROCEDURE — 65660000000 HC RM CCU STEPDOWN

## 2019-07-10 RX ADMIN — APIXABAN 5 MG: 5 TABLET, FILM COATED ORAL at 08:31

## 2019-07-10 RX ADMIN — CARVEDILOL 25 MG: 25 TABLET, FILM COATED ORAL at 08:31

## 2019-07-10 RX ADMIN — FUROSEMIDE 20 MG: 10 INJECTION, SOLUTION INTRAMUSCULAR; INTRAVENOUS at 08:31

## 2019-07-10 RX ADMIN — Medication 5 ML: at 05:25

## 2019-07-10 RX ADMIN — DILTIAZEM HYDROCHLORIDE 7.5 MG/HR: 5 INJECTION, SOLUTION INTRAVENOUS at 04:20

## 2019-07-10 RX ADMIN — PIPERACILLIN SODIUM,TAZOBACTAM SODIUM 3.38 G: 3; .375 INJECTION, POWDER, FOR SOLUTION INTRAVENOUS at 04:27

## 2019-07-10 RX ADMIN — DILTIAZEM HYDROCHLORIDE 7.5 MG/HR: 5 INJECTION, SOLUTION INTRAVENOUS at 04:00

## 2019-07-10 RX ADMIN — DILTIAZEM HYDROCHLORIDE 10 MG/HR: 5 INJECTION, SOLUTION INTRAVENOUS at 04:27

## 2019-07-10 RX ADMIN — DILTIAZEM HYDROCHLORIDE 12.5 MG/HR: 5 INJECTION, SOLUTION INTRAVENOUS at 05:54

## 2019-07-10 RX ADMIN — Medication 10 ML: at 15:08

## 2019-07-10 RX ADMIN — DILTIAZEM HYDROCHLORIDE 12.5 MG/HR: 5 INJECTION, SOLUTION INTRAVENOUS at 08:36

## 2019-07-10 RX ADMIN — SPIRONOLACTONE 25 MG: 25 TABLET ORAL at 08:31

## 2019-07-10 RX ADMIN — PANTOPRAZOLE SODIUM 40 MG: 40 TABLET, DELAYED RELEASE ORAL at 08:31

## 2019-07-10 RX ADMIN — POTASSIUM CHLORIDE 20 MEQ: 1.5 POWDER, FOR SOLUTION ORAL at 08:31

## 2019-07-10 RX ADMIN — Medication 40 ML: at 21:24

## 2019-07-10 RX ADMIN — CARVEDILOL 25 MG: 25 TABLET, FILM COATED ORAL at 18:07

## 2019-07-10 RX ADMIN — DIGOXIN 0.12 MG: 125 TABLET ORAL at 08:31

## 2019-07-10 RX ADMIN — DILTIAZEM HYDROCHLORIDE 10 MG/HR: 5 INJECTION, SOLUTION INTRAVENOUS at 19:34

## 2019-07-10 RX ADMIN — APIXABAN 5 MG: 5 TABLET, FILM COATED ORAL at 21:23

## 2019-07-10 RX ADMIN — POTASSIUM CHLORIDE 20 MEQ: 1.5 POWDER, FOR SOLUTION ORAL at 18:07

## 2019-07-10 RX ADMIN — FUROSEMIDE 20 MG: 10 INJECTION, SOLUTION INTRAMUSCULAR; INTRAVENOUS at 21:23

## 2019-07-10 NOTE — PROGRESS NOTES
Bedside and Verbal shift change report given to Marisol Schwartz RN (oncoming nurse) by self (offgoing nurse). Report included the following information SBAR, Kardex, MAR and Recent Results.

## 2019-07-10 NOTE — PROGRESS NOTES
Bedside and Verbal shift change report given to self (oncoming nurse) by Marisol Schwartz RN (offgoing nurse). Report included the following information SBAR, Kardex, MAR and Recent Results. cardizem gtt verified at bedside.

## 2019-07-10 NOTE — PROGRESS NOTES
[facility] CARDIOLOGY PROGRESS NOTE    7/10/2019 7:55 AM    Admit Date: 7/3/2019    Admit Diagnosis: Acute hypoxemic respiratory failure ([facility] [J96.01]; Severe sepsis ([facility] [A41.9, R65.20]      Subjective:   Stable overnight without angina, CHF, but more palpitations this AM and HR increased this AM but without angina. Vitals stable and controlled otherwise. No other complaints overnight. Tolerating meds well. Started oral rate slowing meds last night. Objective:      Vitals:    07/10/19 0326 07/10/19 0438 07/10/19 0447 07/10/19 0628   BP:  (!) 115/38 110/53    Pulse: (!) 143 (!) 113 (!) 131    Resp:  20     Temp:  98.8 °F (37.1 °C)     SpO2:  96%     Weight:    88.7 kg (195 lb 9.6 oz)   Height:           Physical Exam:  Neck- supple,8-10cm JVD at 60 deg  CV- irregular rate and rhythm no MRG  Lung- clear bilaterally apically, decreased bibasilar with faint crackles  Abd- soft, nontender, nondistended  Ext- 1+ pitting LE edema  Skin- warm and dry    Data Review:   Recent Labs     07/10/19  0435 07/09/19  0505    143   K 3.7 3.3*   MG 2.0 2.0   BUN 15 15   CREA 1.08* 1.06*   * 109*   WBC 12.1* 12.3*   HGB 10.4* 11.2*   HCT 31.8* 34.5*    214       Assessment and Plan:       Principal Problem:    Acute hypoxemic respiratory failure (HCC) (7/3/2019)- improving, continue IV lasix, increase rate slowing meds as tolerated, see below. 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  Tachy induced CM vs occult CAD- no angina whatsoever despite tachy-  Needs ischemic workup later on (potentially as outpatient) when more stable. Continue coreg at max dose today, second dose today, continue digoxin,  wean off cardizem gtt as tolerated later today. On eliquis; See prior plan- defer rhythm control at this time and reassess       Pulmonary infiltrates vs acute congestive heart failure (7/3/2019)       Severe sepsis ([address] (7/3/2019)       Hypotension due to hypovolemia (7/3/2019)      If HR uncontrollable, may need to consider ESHA/cardioversion when more euvolemic and respiratory status back to baseline, still SOB with minimal activity now, continue IV lasix, rate control and reassess in AM.  Labs in AM         A.  Zulema Abrams MD  [facility] Cardiology  Pager [phone]

## 2019-07-10 NOTE — PROGRESS NOTES
Problem: Mobility Impaired (Adult and Pediatric)  Goal: *Acute Goals and Plan of Care (Insert Text)  Description  STG:  (1.)Ms. Juliana Bustillo will move from supine to sit and sit to supine  with SUPERVISION within 3 treatment day(s). (2.)Ms. Juliana Bustillo will transfer from bed to chair and chair to bed with SUPERVISION using the least restrictive device within 3 treatment day(s). (3.)Ms. Juliana Bustillo will ambulate with SUPERVISION for 25 feet with the least restrictive device within 3 treatment day(s). LTG:  (1.)Ms. Juliana Bustillo will move from supine to sit and sit to supine  in bed with MODIFIED INDEPENDENCE within 7 treatment day(s). (2.)Ms. Juliana Bustillo will transfer from bed to chair and chair to bed with MODIFIED INDEPENDENCE using the least restrictive device within 7 treatment day(s). (3.)Ms. Nova will ambulate with MODIFIED INDEPENDENCE for 100 feet with the least restrictive device within 7 treatment day(s). ________________________________________________________________________________________________     Outcome: Progressing Towards Goal      PHYSICAL THERAPY: Daily Note and PM 7/10/2019  INPATIENT: PT Visit Days : 3  [place] / [place] / Product Type: Medicare /       NAME/AGE/GENDER: Julio Sylvester is a 74 y.o. female   PRIMARY DIAGNOSIS: Acute hypoxemic respiratory failure ([facility] [J96.01]  Severe sepsis ([facility] [address] [A41.9, R65.20] Acute hypoxemic respiratory failure ([facility] [address]   Acute hypoxemic respiratory failure (HCC)    Procedure(s) (LRB):  BRONCHOSCOPY (N/A)  BRONCHIAL ALVEOLAR LAVAGE (N/A)  7 Days Post-Op  ICD-10: Treatment Diagnosis:    · Generalized Muscle Weakness (M62.81)  · Difficulty in walking, Not elsewhere classified (R26.2)   Precaution/Allergies:  Celebrex [celecoxib]; Lisinopril; Losartan; and Nifedipine      ASSESSMENT:     Ms. Juliana Bustillo reports lives alone at baseline and using a cane or a rollator depending on the day.   She uses the transport van to go to her appointments and is otherwise mod I with ADLs. Pt presents sitting on BSC and agreeable to therapy. She stood with VC's for safety and was able to maintain standing using RW to be cleaned by PCT. She took several steps and sat in chair with CGA. Rested a minute then stood and ambulated 25' just outside of her door and back to chair. She ambulates very slowly. She shows some fear of falling. Patient returned to sitting and participated in therapeutic exercises for bilateral LE strengthening. Performs ex slowly also. Good session. Patient demonstrates progress by increasing ambulation distance and requiring less assistance with transfers. She would benefit from acute skilled PT to improve her functional mobility and functional independence in order to return to living alone. Additional skilled inpatient therapy would be very beneficial to the patient as she lives alone and is rather active in the community. Will continue PT efforts. This section established at most recent assessment   PROBLEM LIST (Impairments causing functional limitations):  1. Decreased Strength  2. Decreased ADL/Functional Activities  3. Decreased Transfer Abilities  4. Decreased Ambulation Ability/Technique  5. Decreased Balance  6. Decreased Activity Tolerance  7. Decreased Bent with Home Exercise Program   INTERVENTIONS PLANNED: (Benefits and precautions of physical therapy have been discussed with the patient.)  1. Balance Exercise  2. Bed Mobility  3. Gait Training  4. Home Exercise Program (HEP)  5. Neuromuscular Re-education/Strengthening  6. Therapeutic Activites  7. Therapeutic Exercise/Strengthening  8. Transfer Training     TREATMENT PLAN: Frequency/Duration: 3 times a week for duration of hospital stay  Rehabilitation Potential For Stated Goals: Good     REHAB RECOMMENDATIONS (at time of discharge pending progress):    Placement: It is my opinion, based on this patient's performance to date, that Ms. Kamla Martini may benefit from participating in 1-2 additional therapy sessions in order to continue to assess for rehab potential and then make recommendation for disposition at discharge. Equipment:    None at this time              HISTORY:   History of Present Injury/Illness (Reason for Referral):  Patient is a 74 y.o.  female presents with worsening dyspnea from home. All information from ER attending/chart -- no family here. 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 Oxygen needs increased per EMS and in ER noted in AFIB with RVR and worsening dyspnea. Started Cardizem and then had to intubate patient since worsening hypoxemia and could not even speak/moaning per ER attending. Patient had WBC of 28.5 K. CXR with infiltrates more on Right side. procal is <0.1. Per charts by Rheumatology Dr. Pastor Goldman has been having bronchitis and he gave her azithromycin. Per his notes she has not bee on any biologics but was on methotrexate in the past but was not effective. In ER now on Vent and had central line placed by Dr. Alyssa Paige. On diprivan and BP lower and just stopped and map coming up. On 3rd liter of fluid currently. Sedated. No family here. Her chart also reports Sjogren's syndrome? Past Medical History/Comorbidities:   Ms. Chana Wills  has a past medical history of Arrhythmia, Arthritis, Asthma, Cellulitis, Chronic kidney disease, Depression, Gout, Gouty arthritis  NOS (12/2/2015), Hypercholesterolemia, Hypertension, Mitral valve prolapse, Morbid obesity ([address] Nausea & vomiting, Polymyalgia rheumatica ([address] (12/2/2015), Psychiatric disorder, PUD (peptic ulcer disease), Sjogren's syndrome ([address] and Temporal arteritis ([address]   Ms. Chana Wills  has a past surgical history that includes hx hysterectomy; hx tubal ligation; pr cardiac surg procedure unlist (4/07); pr cardiac surg procedure unlist (8/13); hx endoscopy (8/08); and hx colonoscopy (6/08). Social History/Living Environment:   Home Environment: Apartment  # Steps to Enter: 0  One/Two Story Residence: One story  Living Alone: Yes  Support Systems: Child(aubrey), Family member(s)  Patient Expects to be Discharged to[de-identified] Apartment  Current DME Used/Available at Home: Cane, straight, Blood pressure cuff, Grab bars, Walker, rollator, Other (comment), Shower chair(HH nozzle)  Tub or Shower Type: Tub/Shower combination  Prior Level of Function/Work/Activity:  Modified independent. Using a cane and rollator for 10+ years. Number of Personal Factors/Comorbidities that affect the Plan of Care: 1-2: MODERATE COMPLEXITY   EXAMINATION:   Most Recent Physical Functioning:   Gross Assessment:                  Posture:  Posture (WDL): Exceptions to WDL  Posture Assessment: Forward head  Balance:  Sitting - Static: Good (unsupported)  Sitting - Dynamic: Good (unsupported)(-)  Standing - Static: Fair  Standing - Dynamic : Fair Bed Mobility:     Wheelchair Mobility:     Transfers:  Sit to Stand: Contact guard assistance  Stand to Sit: Contact guard assistance  Gait:     Base of Support: Widened  Speed/Miesha: Shuffled; Slow  Step Length: Left shortened;Right shortened  Gait Abnormalities: Decreased step clearance  Distance (ft): 25 Feet (ft)  Assistive Device: Walker, rolling  Ambulation - Level of Assistance: Contact guard assistance;Minimal assistance  Interventions: Safety awareness training;Verbal cues      Body Structures Involved:  1. Heart  2. Lungs  3. Bones  4. Joints  5. Muscles Body Functions Affected:  1. Cardio  2. Respiratory  3. Neuromusculoskeletal  4. Movement Related Activities and Participation Affected:  1. General Tasks and Demands  2. Mobility  3. Self Care  4.  Domestic Life   Number of elements that affect the Plan of Care: 4+: HIGH COMPLEXITY   CLINICAL PRESENTATION: Presentation: Stable and uncomplicated: LOW COMPLEXITY   CLINICAL DECISION MAKIN[address] [place] [address] AM-PAC 6 Clicks   Basic Mobility Inpatient Short Form  How much difficulty does the patient currently have. .. Unable A Lot A Little None   1. Turning over in bed (including adjusting bedclothes, sheets and blankets)? ? 1   ? 2   ? 3   ? 4   2. Sitting down on and standing up from a chair with arms ( e.g., wheelchair, bedside commode, etc.)   ? 1   ? 2   ? 3   ? 4   3. Moving from lying on back to sitting on the side of the bed?   ? 1   ? 2   ? 3   ? 4   How much help from another person does the patient currently need. .. Total A Lot A Little None   4. Moving to and from a bed to a chair (including a wheelchair)? ? 1   ? 2   ? 3   ? 4   5. Need to walk in hospital room? ? 1   ? 2   ? 3   ? 4   6. Climbing 3-5 steps with a railing? ? 1   ? 2   ? 3   ? 4   © , Trustees of [address] [place] [address], under license to [employer]. All rights reserved      Score:  Initial: 17 Most Recent: X (Date: -- )    Interpretation of Tool:  Represents activities that are increasingly more difficult (i.e. Bed mobility, Transfers, Gait). Medical Necessity:     · Patient is expected to demonstrate progress in strength, balance and functional technique  ·  to increase independence with ADLs and IADLs   · .  Reason for Services/Other Comments:  · Patient continues to require modification of therapeutic interventions to increase complexity of exercises  · . Use of outcome tool(s) and clinical judgement create a POC that gives a: Clear prediction of patient's progress: LOW COMPLEXITY            TREATMENT:   (In addition to Assessment/Re-Assessment sessions the following treatments were rendered)   Pre-treatment Symptoms/Complaints: Complaints of sore buttocks and slight pain in right knee with ex.   Pain: Initial:   Pain Intensity 1: 2  Pain Location 1: Buttocks  Post Session:  No change     Therapeutic Activity: (   14 minutes): Therapeutic activities including transfers, Chair and standing balance activities, ambulation on level ground to improve mobility, strength and balance. Required moderate Safety awareness training;Verbal cues to promote static and dynamic balance in standing. Therapeutic Exercise: ( 11 minutes):  Exercises per grid below to improve functional mobility and safety with all activities  Along with minimum  verbal cues to increase stability. Progressed repetitions and complexity of movement as indicated. Date:  7/8/19 Date:   Date:     Activity/Exercise Parameters Parameters Parameters   Ankle pumps 20     LAQ 20     marching 20                                     Braces/Orthotics/Lines/Etc:   · IV  · covarrubias catheter  · O2 Device: Room air  Treatment/Session Assessment:  Tolerated well. · Interdisciplinary Collaboration:   o Physical Therapy Assistant  o Registered Nurse  o Certified Nursing Assistant/Patient Care Technician  · After treatment position/precautions:   o Up in chair  o Bed/Chair-wheels locked  o Call light within reach  o RN notified  o PCT in room   · Compliance with Program/Exercises: Compliant most of the time  · Recommendations/Intent for next treatment session: \"Next visit will focus on advancements to more challenging activities and reduction in assistance provided\".   Total Treatment Duration:  PT Patient Time In/Time Out  Time In: 0940  Time Out: [facility] P[facility]

## 2019-07-10 NOTE — PROGRESS NOTES
Problem: Pressure Injury - Risk of  Goal: *Prevention of pressure injury  Description  Document Mark Scale and appropriate interventions in the flowsheet.   Outcome: Progressing Towards Goal  Note:   Pressure Injury Interventions:  Sensory Interventions: Assess changes in LOC    Moisture Interventions: Apply protective barrier, creams and emollients    Activity Interventions: PT/OT evaluation, Pressure redistribution bed/mattress(bed type)    Mobility Interventions: Assess need for specialty bed, HOB 30 degrees or less, Pressure redistribution bed/mattress (bed type), PT/OT evaluation    Nutrition Interventions: Document food/fluid/supplement intake    Friction and Shear Interventions: Apply protective barrier, creams and emollients                Problem: Breathing Pattern - Ineffective  Goal: *Absence of hypoxia  Outcome: Progressing Towards Goal  Goal: *Use of effective breathing techniques  Outcome: Progressing Towards Goal

## 2019-07-10 NOTE — PROGRESS NOTES
Mews score observed as 3. High score resulting from elevated HR. Pt currently in A-fib on Cardizem gtt and digoxin. Charge nurse, Iva Sandhu RN notified and aware. Will continue to monitor.

## 2019-07-10 NOTE — PROGRESS NOTES
Nutrition LOS Note: day 7  Assessment  DIET CARDIAC Regular    Food,Nutrition, and Pertinent History: Pt seen during lunch today. Pt reports having no appetite, despite 100% of lunch consumed with the exception of serving of vegetables, and having to force herself to eat meals throughout admission. Pt states that she wants to avoid having a feeding tube. Per pt, she has always had a great appetite. Pt states that she lives at home alone so she does not spend a lot of time cooking and eats \"easy to prepare\" items. When I inquired about this further, pt states that she eats \"all the wrong stuff-frozen, packaged stuff. \"  Pt states that she has had gas and diarrhea for the past couple of days. Pt was intubated upon admission (7/3), extubated 7/5. S/P bronch/BAL on 7/3. Pt is hopeful to get to 9th floor rehab soon. Anthropometrics: Height: 5' 2\" (157.5 cm), Weight Source: Standing scale (comment), Weight: 88.7 kg (195 lb 9.6 oz), Body mass index is 35.78 kg/m². BMI class of overweight for age >65 years. Last 3 Recorded Weights in this Encounter    07/08/19 0522 07/09/19 0453 07/10/19 2649   Weight: 96.3 kg (212 lb 3.2 oz) 93.8 kg (206 lb 12.8 oz) 88.7 kg (195 lb 9.6 oz)   Edema: trace non-pitting to BLEs  Two weights at admission range between ~15 kg : 91.6 kg, 107.7 kg  Macronutrient Needs:  · EER:  9170-5701 kcal /day (15-20 kcal/kg listed BW)  · EPR:   grams protein/day (1-1.2 grams/kg CBW)(GFR 53)-no h/o CKD in EMR. Intake/Comparative Standards: Per RD meal rounds: 75-90% of lunch. Average intake for past 7 day(s)/9 recorded meal(s): 64%. This potentially meets ~100% of kcal and ~65% of protein needs     Nutrition Diagnosis: Inadequate protein energy intake r/t decreased appetite, as evidenced by pt meeting <75% estimated PRO needs. Intervention:   Meals and snacks: Continue current diet. Add greek yogurt to breakfast daily. Preferences added. Discharge nutrition plan: Too soon to determine. Reg [address], [address] [place] [address], [address] [place] [address], [address] [place] [address].

## 2019-07-11 ENCOUNTER — APPOINTMENT (OUTPATIENT)
Dept: GENERAL RADIOLOGY | Age: 74
DRG: 853 | End: 2019-07-11
Attending: INTERNAL MEDICINE
Payer: MEDICARE

## 2019-07-11 LAB
ANION GAP SERPL CALC-SCNC: 7 MMOL/L (ref 7–16)
BASOPHILS # BLD: 0 K/UL (ref 0–0.2)
BASOPHILS NFR BLD: 0 % (ref 0–2)
BUN SERPL-MCNC: 13 MG/DL (ref 8–23)
CALCIUM SERPL-MCNC: 8.7 MG/DL (ref 8.3–10.4)
CHLORIDE SERPL-SCNC: 104 MMOL/L (ref 98–107)
CO2 SERPL-SCNC: 31 MMOL/L (ref 21–32)
CREAT SERPL-MCNC: 1.04 MG/DL (ref 0.6–1)
DIFFERENTIAL METHOD BLD: ABNORMAL
EOSINOPHIL # BLD: 0.1 K/UL (ref 0–0.8)
EOSINOPHIL NFR BLD: 2 % (ref 0.5–7.8)
ERYTHROCYTE [DISTWIDTH] IN BLOOD BY AUTOMATED COUNT: 15.6 % (ref 11.9–14.6)
GLUCOSE SERPL-MCNC: 101 MG/DL (ref 65–100)
HCT VFR BLD AUTO: 30.4 % (ref 35.8–46.3)
HGB BLD-MCNC: 9.9 G/DL (ref 11.7–15.4)
IMM GRANULOCYTES # BLD AUTO: 0 K/UL (ref 0–0.5)
IMM GRANULOCYTES NFR BLD AUTO: 0 % (ref 0–5)
LYMPHOCYTES # BLD: 1.5 K/UL (ref 0.5–4.6)
LYMPHOCYTES NFR BLD: 17 % (ref 13–44)
MAGNESIUM SERPL-MCNC: 2 MG/DL (ref 1.8–2.4)
MCH RBC QN AUTO: 30.8 PG (ref 26.1–32.9)
MCHC RBC AUTO-ENTMCNC: 32.6 G/DL (ref 31.4–35)
MCV RBC AUTO: 94.7 FL (ref 79.6–97.8)
MONOCYTES # BLD: 0.8 K/UL (ref 0.1–1.3)
MONOCYTES NFR BLD: 9 % (ref 4–12)
NEUTS SEG # BLD: 6.4 K/UL (ref 1.7–8.2)
NEUTS SEG NFR BLD: 72 % (ref 43–78)
NRBC # BLD: 0 K/UL (ref 0–0.2)
PHOSPHATE SERPL-MCNC: 3.3 MG/DL (ref 2.3–3.7)
PLATELET # BLD AUTO: 210 K/UL (ref 150–450)
PMV BLD AUTO: 11.1 FL (ref 9.4–12.3)
POTASSIUM SERPL-SCNC: 3.8 MMOL/L (ref 3.5–5.1)
RBC # BLD AUTO: 3.21 M/UL (ref 4.05–5.2)
SODIUM SERPL-SCNC: 142 MMOL/L (ref 136–145)
SPECIMEN SOURCE: NORMAL
VIRUS SPEC CULT: NORMAL
WBC # BLD AUTO: 8.9 K/UL (ref 4.3–11.1)

## 2019-07-11 PROCEDURE — 74011250637 HC RX REV CODE- 250/637: Performed by: INTERNAL MEDICINE

## 2019-07-11 PROCEDURE — 80048 BASIC METABOLIC PNL TOTAL CA: CPT

## 2019-07-11 PROCEDURE — 71045 X-RAY EXAM CHEST 1 VIEW: CPT

## 2019-07-11 PROCEDURE — 74011000258 HC RX REV CODE- 258: Performed by: INTERNAL MEDICINE

## 2019-07-11 PROCEDURE — 74011250637 HC RX REV CODE- 250/637: Performed by: NURSE PRACTITIONER

## 2019-07-11 PROCEDURE — 74011250636 HC RX REV CODE- 250/636: Performed by: INTERNAL MEDICINE

## 2019-07-11 PROCEDURE — 74011000250 HC RX REV CODE- 250: Performed by: INTERNAL MEDICINE

## 2019-07-11 PROCEDURE — 83735 ASSAY OF MAGNESIUM: CPT

## 2019-07-11 PROCEDURE — 85025 COMPLETE CBC W/AUTO DIFF WBC: CPT

## 2019-07-11 PROCEDURE — 65660000000 HC RM CCU STEPDOWN

## 2019-07-11 PROCEDURE — 97530 THERAPEUTIC ACTIVITIES: CPT

## 2019-07-11 PROCEDURE — 74011250637 HC RX REV CODE- 250/637: Performed by: FAMILY MEDICINE

## 2019-07-11 PROCEDURE — 84100 ASSAY OF PHOSPHORUS: CPT

## 2019-07-11 RX ORDER — DILTIAZEM HYDROCHLORIDE 5 MG/ML
10 INJECTION INTRAVENOUS ONCE
Status: ACTIVE | OUTPATIENT
Start: 2019-07-11 | End: 2019-07-11

## 2019-07-11 RX ORDER — LOPERAMIDE HYDROCHLORIDE 2 MG/1
2 CAPSULE ORAL
Status: DISCONTINUED | OUTPATIENT
Start: 2019-07-11 | End: 2019-07-15 | Stop reason: HOSPADM

## 2019-07-11 RX ADMIN — Medication 20 ML: at 21:37

## 2019-07-11 RX ADMIN — APIXABAN 5 MG: 5 TABLET, FILM COATED ORAL at 08:43

## 2019-07-11 RX ADMIN — POTASSIUM CHLORIDE 20 MEQ: 1.5 POWDER, FOR SOLUTION ORAL at 17:11

## 2019-07-11 RX ADMIN — PANTOPRAZOLE SODIUM 40 MG: 40 TABLET, DELAYED RELEASE ORAL at 08:43

## 2019-07-11 RX ADMIN — DILTIAZEM HYDROCHLORIDE 10 MG/HR: 5 INJECTION, SOLUTION INTRAVENOUS at 11:31

## 2019-07-11 RX ADMIN — CARVEDILOL 25 MG: 25 TABLET, FILM COATED ORAL at 17:10

## 2019-07-11 RX ADMIN — DILTIAZEM HYDROCHLORIDE 10 MG/HR: 5 INJECTION, SOLUTION INTRAVENOUS at 08:41

## 2019-07-11 RX ADMIN — APIXABAN 5 MG: 5 TABLET, FILM COATED ORAL at 21:36

## 2019-07-11 RX ADMIN — Medication 5 ML: at 14:00

## 2019-07-11 RX ADMIN — DIGOXIN 0.12 MG: 125 TABLET ORAL at 08:43

## 2019-07-11 RX ADMIN — POTASSIUM CHLORIDE 20 MEQ: 1.5 POWDER, FOR SOLUTION ORAL at 08:43

## 2019-07-11 RX ADMIN — TEMAZEPAM 15 MG: 15 CAPSULE ORAL at 21:35

## 2019-07-11 RX ADMIN — FUROSEMIDE 20 MG: 10 INJECTION, SOLUTION INTRAMUSCULAR; INTRAVENOUS at 21:36

## 2019-07-11 RX ADMIN — SPIRONOLACTONE 25 MG: 25 TABLET ORAL at 08:43

## 2019-07-11 RX ADMIN — FUROSEMIDE 20 MG: 10 INJECTION, SOLUTION INTRAMUSCULAR; INTRAVENOUS at 08:43

## 2019-07-11 RX ADMIN — Medication 40 ML: at 05:05

## 2019-07-11 RX ADMIN — LOPERAMIDE HYDROCHLORIDE 2 MG: 2 CAPSULE ORAL at 17:10

## 2019-07-11 RX ADMIN — CARVEDILOL 25 MG: 25 TABLET, FILM COATED ORAL at 08:43

## 2019-07-11 NOTE — PROGRESS NOTES
Problem: Breathing Pattern - Ineffective  Goal: *Absence of hypoxia  Outcome: Progressing Towards Goal  Goal: *Use of effective breathing techniques  Outcome: Progressing Towards Goal  Goal: *PALLIATIVE CARE:  Alleviation of Dyspnea  Outcome: Progressing Towards Goal

## 2019-07-11 NOTE — PROGRESS NOTES
Hospitalist Progress Note     Admit Date:  7/3/2019  3:02 AM   Name:  Delmer Colunga   Age:  74 y.o.  :  1945   MRN:  [id number]   PCP:  Kyle Serna MD  Treatment Team: Attending Provider: Cristina Van MD; Consulting Provider: Danita Seymour MD; Consulting Provider: Khushboo Miller MD; Utilization Review: Johnny Marshall RN; Consulting Provider: Sylwia Molina MD; Care Manager: Barbara Andersen RN; Consulting Provider: Nico Kaba MD; Physical Therapist: Mauricio Pelletier, PT, DPT; Occupational Therapist: Zaira Cooper OT    Subjective:   Ms. Dwight Russ is a 75 y/o WF admitted to ICU on 7/3 with respiratory failure after being intubated. Developed rapid AFib, cardiology consulted. Started on abx and IV diuresis. Extubated  and sent to floor . Hospitalist assumed care .    19  On cardizem drip  C/o diarrhea  Anxious that her heart rate is not controlled- she will be needing cardioversion      Objective:     Patient Vitals for the past 24 hrs:   Temp Pulse Resp BP SpO2   19 1125  85  96/62    19 0821 99.3 °F (37.4 °C) 94 17 140/74 96 %   19 0537 99 °F (37.2 °C) 97 18 128/54 96 %   19 0035 99.2 °F (37.3 °C) 99 18 111/46 97 %   07/10/19 2123  89  106/43    07/10/19 2043 98.8 °F (37.1 °C) 92 18 134/46 97 %   07/10/19 1811 99.1 °F (37.3 °C) 96 20 113/45 96 %   07/10/19 1808  95      07/10/19 1502  87  112/46    07/10/19 1427 98.4 °F (36.9 °C) 89 20 107/45 98 %     Oxygen Therapy  O2 Sat (%): 96 % (19 0821)  Pulse via Oximetry: 96 beats per minute (19 0718)  O2 Device: Room air (19 0950)  O2 Flow Rate (L/min): 2 l/min (07/10/19 0420)  FIO2 (%): 28 % (19 0718)    Intake/Output Summary (Last 24 hours) at 2019 1200  Last data filed at 2019 0950  Gross per 24 hour   Intake 580 ml   Output 3700 ml   Net -3120 ml         General:    Well nourished. Alert.  On oxygen  heent- normal   CV:   Irregular mildTachycardia No murmur, rub, or gallop. Lungs:   Mild basilar crepitations  Abdomen:   Soft, nontender, nondistended. Cns- no focal neurological deficits  Extremities: Warm and dry. No cyanosis or edema. Skin:     No rashes or jaundice. Data Review:  I have reviewed all labs, meds, telemetry events, and studies from the last 24 hours. Recent Results (from the past 24 hour(s))   METABOLIC PANEL, BASIC    Collection Time: 07/11/19  4:58 AM   Result Value Ref Range    Sodium 142 136 - 145 mmol/L    Potassium 3.8 3.5 - 5.1 mmol/L    Chloride 104 98 - 107 mmol/L    CO2 31 21 - 32 mmol/L    Anion gap 7 7 - 16 mmol/L    Glucose 101 (H) 65 - 100 mg/dL    BUN 13 8 - 23 MG/DL    Creatinine 1.04 (H) 0.6 - 1.0 MG/DL    GFR est AA >60 >60 ml/min/1.73m2    GFR est non-AA 55 (L) >60 ml/min/1.73m2    Calcium 8.7 8.3 - 10.4 MG/DL   MAGNESIUM    Collection Time: 07/11/19  4:58 AM   Result Value Ref Range    Magnesium 2.0 1.8 - 2.4 mg/dL   PHOSPHORUS    Collection Time: 07/11/19  4:58 AM   Result Value Ref Range    Phosphorus 3.3 2.3 - 3.7 MG/DL   CBC WITH AUTOMATED DIFF    Collection Time: 07/11/19  4:58 AM   Result Value Ref Range    WBC 8.9 4.3 - 11.1 K/uL    RBC 3.21 (L) 4.05 - 5.2 M/uL    HGB 9.9 (L) 11.7 - 15.4 g/dL    HCT 30.4 (L) 35.8 - 46.3 %    MCV 94.7 79.6 - 97.8 FL    MCH 30.8 26.1 - 32.9 PG    MCHC 32.6 31.4 - 35.0 g/dL    RDW 15.6 (H) 11.9 - 14.6 %    PLATELET 888 645 - 258 K/uL    MPV 11.1 9.4 - 12.3 FL    ABSOLUTE NRBC 0.00 0.0 - 0.2 K/uL    DF AUTOMATED      NEUTROPHILS 72 43 - 78 %    LYMPHOCYTES 17 13 - 44 %    MONOCYTES 9 4.0 - 12.0 %    EOSINOPHILS 2 0.5 - 7.8 %    BASOPHILS 0 0.0 - 2.0 %    IMMATURE GRANULOCYTES 0 0.0 - 5.0 %    ABS. NEUTROPHILS 6.4 1.7 - 8.2 K/UL    ABS. LYMPHOCYTES 1.5 0.5 - 4.6 K/UL    ABS. MONOCYTES 0.8 0.1 - 1.3 K/UL    ABS. EOSINOPHILS 0.1 0.0 - 0.8 K/UL    ABS. BASOPHILS 0.0 0.0 - 0.2 K/UL    ABS. IMM.  GRANS. 0.0 0.0 - 0.5 K/UL        All Micro Results     Procedure Component Value Units Date/Time    VIRAL CULTURE - INCLUDES ADENOVIRUS, CMV, ENTEROVIRUSES, HSV, INFLUENZA, MUMPS, PARAINFLUENZA, RSV, & VARICELLA ZOSTER [[id number]] Collected:  07/03/19 1245    Order Status:  Completed Specimen:  Other from Miscellaneous sample Updated:  07/11/19 0936     Source BRONCHIAL LAVAGE        Viral Culture, General No virus isolated. Comment: (NOTE)  Performed At: [address] [id number]  Yunier Sigala MD YO:[id number]  Corrected on 07/11 AT 5510: This is a correction to the medical record of the test results previously reported as Comment         ANAEROBIC CULTURE - ONLY PERFORMED ON BRONCHIAL BRUSHING [[id number]]  (Abnormal) Collected:  07/03/19 1245    Order Status:  Completed Specimen:  Bronchial lavage Updated:  07/10/19 1151     Special Requests: NO SPECIAL REQUESTS        Culture result:       SCANT ANAEROBIC GRAM POSITIVE RODS            ISOLATE SENT TO   [facility]  07/10/19      ATYPICAL PNEUMONIA BY PCR,BRONCHOALVEOLAR LAVAGE - INCLUDES: MYCOPLASMA PNEUMONIAE AND CHLAMYDIA PNEUMONIAE [[id number]] Collected:  07/03/19 1245    Order Status:  Completed Specimen:  Bronchial lavage Updated:  07/08/19 [address]     M. PNEUMONIAE, MPPT Not Detected     C. PNEUMONAIE, CPPT Not Detected     Comment: (NOTE)  This test was developed and its performance  characteristics determined by [employer]. It has not  been cleared or approved by the U.S. Food and Drug  Administration. Results should be used in conjunction with  clinical findings, and should not form the sole basis for  a diagnosis or treatment decision. Performed At: [facility]  [address] [place], [place] [id number]  Colletta Rutter PhD AU:[id number]         A. GALACTOMANNAN BY EIA, BRONCHOALVEOLAR LAVAGE [[id number]] Collected:  07/03/19 1245    Order Status:  Completed Specimen:  Bronchial lavage Updated:  07/08/19 1735     A.  GALACTOMANNAN, AGPT 0.071        Comment: (NOTE)  Interpretation:   Patients with an index value of greater  than or equal to 0.5 are considered to be positive for  galactomannan antigen. The Platelia(TM) Aspergillus EIA  package insert also recommends a new sample be collected  from the patient for follow-up testing. Patients with an  index value of less than 0.5 are considered to be negative  for galactomannan antigen. A negative result may indicate  that the patient's result is below the detectable level of  the assay. Negative results do not rule out the diagnosis of Invasive  Aspergillosis. Pursuant to the package insert, repeat  testing is recommended if the result is negative, but the  disease is suspected. Due to the potential for  environmental contamination when transferred to pour-off  tubes, which can lead to false positive results, interpret  positive results from samples provided in pour-off tubes  with caution. Results should be used in conjunction with  clinical findings, and should not form the sole basis for  a diagnosis or treatment decision. The Platelia Aspergillus Galactomannan EIA is a product of  [employer] and is FDA approved for in vitro diagnostic use.   Performed At: [facility] [place], [place] [id number]  Paula Echols PhD KR:[id number]         CULTURE, BLOOD [[id number]] Collected:  07/03/19 0515    Order Status:  Completed Specimen:  Blood Updated:  07/08/19 1137     Special Requests: CENTRAL LINE     Culture result: NO GROWTH 5 DAYS       CULTURE, BLOOD [[id number]] Collected:  07/03/19 0641    Order Status:  Completed Specimen:  Blood Updated:  07/08/19 1137     Special Requests: Carmelo Mary     Culture result: NO GROWTH 5 DAYS       RESPIRATORY VIRAL PANEL, PCR [[id number]] Collected:  07/03/19 1245    Order Status:  Completed Specimen:  Respiratory sample Updated:  07/07/19 2335     Source BRONCHIAL LAVAGE        Influenza A NEGATIVE         Influenza B NEGATIVE         RSV A NEGATIVE RSV B NEGATIVE         Parainfluenza 1 NEGATIVE         Parainfluenza 2 NEGATIVE         Parainfluenza 3 NEGATIVE         Rhinovirus NEGATIVE         Metapneumovirus NEGATIVE         Adenovirus NEGATIVE         Comment: (NOTE)  Performed At: [address] [id number]  Jennifer Tran MD OY:[id number]         [facility] CULTURE AND SMEAR Jolene Saba [[id number]] Collected:  07/03/19 1245    Order Status:  Completed Specimen:  Other from Miscellaneous sample Updated:  07/06/19 0836     Source BRONCHIAL LAVAGE        Fungus stain Direct Inoculation     Fungus (Mycology) Culture Other source received     Comment: (NOTE)  Performed At: [address] [id number]  Jennifer Tran MD TB:[id number]         CULTURE, RESPIRATORY/SPUTUM/BRONCH Daril Gretchen [[id number]] Collected:  07/03/19 0643    Order Status:  Completed Specimen:  Sputum Updated:  07/05/19 1214     Special Requests: NO SPECIAL REQUESTS        GRAM STAIN 0 to 2 WBCS/OIF      NO EPITHELIAL CELLS SEEN         FEW GRAM POSITIVE COCCI         2+ MUCUS PRESENT        Culture result:       HEAVY NORMAL RESPIRATORY ROBEL          CULTURE, RESPIRATORY/SPUTUM/BRONCH Daril Gretchen [[id number]] Collected:  07/03/19 1245    Order Status:  Completed Specimen:  Sputum from Bronchial lavage Updated:  07/05/19 0743     Special Requests: NO SPECIAL REQUESTS        GRAM STAIN NO WBCS SEEN         NO DEFINITE ORGANISM SEEN        Culture result: NO GROWTH 2 DAYS       AFB (MYCOBACTERIUM) CULTURE & SMEAR W/REFLEX ID Anna Lemos NOCARDIA [[id number]] Collected:  07/03/19 1245    Order Status:  Completed Specimen:  Miscellaneous sample Updated:  07/04/19 1336     Source BRONCHIAL LAVAGE        AFB Specimen processing Concentration     Acid Fast Smear NEGATIVE         Comment: (NOTE)  Performed At: [address] [id number]  Jennifer Tran MD NA:[id number] Acid Fast Culture PENDING          Current Meds:  Current Facility-Administered Medications   Medication Dose Route Frequency    dilTIAZem (CARDIZEM) injection 10 mg  10 mg IntraVENous ONCE    dilTIAZem (CARDIZEM) 125 mg in dextrose 5% 125 mL infusion  10 mg/hr IntraVENous TITRATE    digoxin (LANOXIN) tablet 0.125 mg  0.125 mg Oral DAILY    carvedilol (COREG) tablet 25 mg  25 mg Oral BID WITH MEALS    spironolactone (ALDACTONE) tablet 25 mg  25 mg Oral DAILY    levalbuterol (XOPENEX) nebulizer soln 0.63 mg/3 mL  0.63 mg Nebulization Q6H PRN    furosemide (LASIX) injection 20 mg  20 mg IntraVENous Q12H    potassium chloride (KLOR-CON) packet for solution 20 mEq  20 mEq Oral BID WITH MEALS    NUTRITIONAL SUPPORT ELECTROLYTE PRN ORDERS   Does Not Apply PRN    pantoprazole (PROTONIX) tablet 40 mg  40 mg Oral ACB    bisacodyl (DULCOLAX) tablet 5 mg  5 mg Oral DAILY PRN    apixaban (ELIQUIS) tablet 5 mg  5 mg Oral Q12H    temazepam (RESTORIL) capsule 15 mg  15 mg Oral QHS PRN    sodium chloride (NS) flush 5-40 mL  5-40 mL IntraVENous Q8H    sodium chloride (NS) flush 5-40 mL  5-40 mL IntraVENous PRN    dilTIAZem (CARDIZEM) 125 mg in dextrose 5% 125 mL infusion  0-15 mg/hr IntraVENous TITRATE       Other Studies (last 24 hours):  Xr Chest Sngl V    Result Date: 7/11/2019   Portable view of the chest COMPARISON: July 10, 2019 CLINICAL HISTORY: Shortness of breath FINDINGS: There are bilateral pleural effusions. There is vascular congestion. Heart is enlarged. No pneumothorax. Right IJ line is stable. IMPRESSION: 1. Stable chest x-ray. No significant change.       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 ICD-10-CM: I48.91  ICD-9-CM: 427.31  7/3/2019 - Present Unknown        Pulmonary infiltrates vs acute congestive heart failure ICD-10-CM: R91.8  ICD-9-CM: 793.19  7/3/2019 - Present Unknown        Severe sepsis [facility]) ICD-10-CM: A41.9, R65.20  ICD-9-CM: 038.9, 995.92  7/3/2019 - Present Unknown        Hypotension due to hypovolemia ICD-10-CM: I95.89, E86.1  ICD-9-CM: 458.8, 276.52  7/3/2019 - Present Unknown        Polymyalgia rheumatica ([facility] ICD-10-CM: M35.3  ICD-9-CM: 012  12/2/2015 - Present Yes        HTN (hypertension) ICD-10-CM: I10  ICD-9-CM: 401.9  8/12/2013 - Present Yes        Severe obesity with body mass index (BMI) of 35.0 to 39.9 with comorbidity ([facility] [address] ICD-10-CM: E66.01  ICD-9-CM: 278.01  8/12/2013 - Present Yes        Sjogren's syndrome ([facility] ICD-10-CM: M35.00  ICD-9-CM: 710.2  8/12/2013 - Present Yes              PLAN:    Acute respiratory failure- during the stay intubated- extubated- presently on nasal canula 2 lit/min- finished course of antibiotics  Acute volume overload- echo ef 95-79%- grade 1 diastolic chf- good diuresis  Afib with rvr- cont eliquis,on cardizem drip- cardiology following  Hypokalemia- resolved  htn  Hypotension resolved. Poly myalgia rheumatica- off steroids  ? Diarrhea - close watch - bowel for now still appear soft/formed - will order immodium    DC planning/Dispo:    DVT ppx:  eliquis.     Signed:  Cristobal Donato MD

## 2019-07-11 NOTE — PROGRESS NOTES
Problem: Mobility Impaired (Adult and Pediatric)  Goal: *Acute Goals and Plan of Care (Insert Text)  Description  STG:  (1.)Ms. Sue David will move from supine to sit and sit to supine  with SUPERVISION within 3 treatment day(s). (2.)Ms. Sue David will transfer from bed to chair and chair to bed with SUPERVISION using the least restrictive device within 3 treatment day(s). (3.)Ms. Sue David will ambulate with SUPERVISION for 25 feet with the least restrictive device within 3 treatment day(s). LTG:  (1.)Ms. Sue David will move from supine to sit and sit to supine  in bed with MODIFIED INDEPENDENCE within 7 treatment day(s). (2.)Ms. Sue David will transfer from bed to chair and chair to bed with MODIFIED INDEPENDENCE using the least restrictive device within 7 treatment day(s). (3.)Ms. Nova will ambulate with MODIFIED INDEPENDENCE for 100 feet with the least restrictive device within 7 treatment day(s). ________________________________________________________________________________________________     Outcome: Progressing Towards Goal      PHYSICAL THERAPY: Daily Note and PM 7/11/2019  INPATIENT: PT Visit Days : 4  [place] / [place] / Product Type: Medicare /       NAME/AGE/GENDER: Meaghan Toledo is a 74 y.o. female   PRIMARY DIAGNOSIS: Acute hypoxemic respiratory failure ([facility] [J96.01]  Severe sepsis ([facility] [A41.9, R65.20] Acute hypoxemic respiratory failure ([facility] [address]   Acute hypoxemic respiratory failure (HCC)    Procedure(s) (LRB):  BRONCHOSCOPY (N/A)  BRONCHIAL ALVEOLAR LAVAGE (N/A)  8 Days Post-Op  ICD-10: Treatment Diagnosis:    · Generalized Muscle Weakness (M62.81)  · Difficulty in walking, Not elsewhere classified (R26.2)   Precaution/Allergies:  Celebrex [celecoxib]; Lisinopril; Losartan; and Nifedipine      ASSESSMENT:     Ms. Sue David reports lives alone at baseline and using a cane or a rollator depending on the day.   She uses the transport van to go to her appointments and is otherwise mod I with ADLs. She is supine on contact and agreeable to physical therapy treatment. Required CGA to transfer to sitting, CGA to stand and CGA to increase ambulation distance to 40' with rolling walker. She continues to ambulate at a very slow but steady pace, sometimes even able to maintain balance with stand by assist. She returned to room to sit in recliner. Patient demonstrates progress by increasing ambulation distance and requiring less assistance with transfers. She would benefit from acute skilled PT to improve her functional mobility and functional independence in order to return to living alone. Additional skilled inpatient therapy would be very beneficial to the patient as she lives alone and is rather active in the community. Will continue PT efforts. This section established at most recent assessment   PROBLEM LIST (Impairments causing functional limitations):  1. Decreased Strength  2. Decreased ADL/Functional Activities  3. Decreased Transfer Abilities  4. Decreased Ambulation Ability/Technique  5. Decreased Balance  6. Decreased Activity Tolerance  7. Decreased [place] with Home Exercise Program   INTERVENTIONS PLANNED: (Benefits and precautions of physical therapy have been discussed with the patient.)  1. Balance Exercise  2. Bed Mobility  3. Gait Training  4. Home Exercise Program (HEP)  5. Neuromuscular Re-education/Strengthening  6. Therapeutic Activites  7. Therapeutic Exercise/Strengthening  8. Transfer Training     TREATMENT PLAN: Frequency/Duration: 3 times a week for duration of hospital stay  Rehabilitation Potential For Stated Goals: Good     REHAB RECOMMENDATIONS (at time of discharge pending progress):    Placement: It is my opinion, based on this patient's performance to date, that Ms. Tricia Hilton may benefit from participating in 1-2 additional therapy sessions in order to continue to assess for rehab potential and then make recommendation for disposition at discharge. Equipment:    None at this time              HISTORY:   History of Present Injury/Illness (Reason for Referral):  Patient is a 74 y.o.  female presents with worsening dyspnea from home. All information from ER attending/chart -- no family here. 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 Oxygen needs increased per EMS and in ER noted in AFIB with RVR and worsening dyspnea. Started Cardizem and then had to intubate patient since worsening hypoxemia and could not even speak/moaning per ER attending. Patient had WBC of 28.5 K. CXR with infiltrates more on Right side. procal is <0.1. Per charts by Rheumatology Dr. Lorena Solitario has been having bronchitis and he gave her azithromycin. Per his notes she has not bee on any biologics but was on methotrexate in the past but was not effective. In ER now on Vent and had central line placed by Dr. Emily Nguyen. On diprivan and BP lower and just stopped and map coming up. On 3rd liter of fluid currently. Sedated. No family here. Her chart also reports Sjogren's syndrome? Past Medical History/Comorbidities:   Ms. Mireille Choudhury  has a past medical history of Arrhythmia, Arthritis, Asthma, Cellulitis, Chronic kidney disease, Depression, Gout, Gouty arthritis  NOS (12/2/2015), Hypercholesterolemia, Hypertension, Mitral valve prolapse, Morbid obesity ([address] Nausea & vomiting, Polymyalgia rheumatica ([address] (12/2/2015), Psychiatric disorder, PUD (peptic ulcer disease), Sjogren's syndrome ([address] and Temporal arteritis ([address] Ms. Mireille Choudhury  has a past surgical history that includes hx hysterectomy; hx tubal ligation; pr cardiac surg procedure unlist (4/07); pr cardiac surg procedure unlist (8/13); hx endoscopy (8/08); and hx colonoscopy (6/08).   Social History/Living Environment:   Home Environment: Apartment  # Steps to Enter: 0  One/Two Story Residence: One story  Living Alone: Yes  Support Systems: Child(aubrey), Family member(s)  Patient Expects to be Discharged to[de-identified] Apartment  Current DME Used/Available at Home: Cane, straight, Blood pressure cuff, Grab bars, Walker, rollator, Other (comment), Shower chair(HH nozzle)  Tub or Shower Type: Tub/Shower combination  Prior Level of Function/Work/Activity:  Modified independent. Using a cane and rollator for 10+ years. Number of Personal Factors/Comorbidities that affect the Plan of Care: 1-2: MODERATE COMPLEXITY   EXAMINATION:   Most Recent Physical Functioning:   Gross Assessment:                  Posture:     Balance:  Sitting: Intact  Standing: Impaired  Standing - Static: Fair  Standing - Dynamic : Fair Bed Mobility:  Supine to Sit: Contact guard assistance  Sit to Supine: Contact guard assistance  Scooting: Contact guard assistance  Interventions: Safety awareness training;Verbal cues  Wheelchair Mobility:     Transfers:  Sit to Stand: Contact guard assistance  Stand to Sit: Contact guard assistance  Bed to Chair: Contact guard assistance  Interventions: Safety awareness training;Verbal cues; Visual cues  Gait:     Base of Support: Widened  Speed/Miesha: Shuffled; Slow  Step Length: Right shortened;Left shortened  Gait Abnormalities: Decreased step clearance  Distance (ft): 40 Feet (ft)  Assistive Device: Walker, rolling  Ambulation - Level of Assistance: Contact guard assistance  Interventions: Safety awareness training;Verbal cues      Body Structures Involved:  1. Heart  2. Lungs  3. Bones  4. Joints  5. Muscles Body Functions Affected:  1. Cardio  2. Respiratory  3. Neuromusculoskeletal  4. Movement Related Activities and Participation Affected:  1. General Tasks and Demands  2. Mobility  3. Self Care  4.  Domestic Life   Number of elements that affect the Plan of Care: 4+: HIGH COMPLEXITY   CLINICAL PRESENTATION: Presentation: Stable and uncomplicated: LOW COMPLEXITY   CLINICAL DECISION MAKIN[address] [place] [address] AM-PAC 6 Clicks   Basic Mobility Inpatient Short Form  How much difficulty does the patient currently have. .. Unable A Lot A Little None   1. Turning over in bed (including adjusting bedclothes, sheets and blankets)? ? 1   ? 2   ? 3   ? 4   2. Sitting down on and standing up from a chair with arms ( e.g., wheelchair, bedside commode, etc.)   ? 1   ? 2   ? 3   ? 4   3. Moving from lying on back to sitting on the side of the bed?   ? 1   ? 2   ? 3   ? 4   How much help from another person does the patient currently need. .. Total A Lot A Little None   4. Moving to and from a bed to a chair (including a wheelchair)? ? 1   ? 2   ? 3   ? 4   5. Need to walk in hospital room? ? 1   ? 2   ? 3   ? 4   6. Climbing 3-5 steps with a railing? ? 1   ? 2   ? 3   ? 4   © , Trustees of [address] [place] [address], under license to [employer]. All rights reserved      Score:  Initial: 17 Most Recent: X (Date: -- )    Interpretation of Tool:  Represents activities that are increasingly more difficult (i.e. Bed mobility, Transfers, Gait). Medical Necessity:     · Patient is expected to demonstrate progress in strength, balance and functional technique  ·  to increase independence with ADLs and IADLs   · .  Reason for Services/Other Comments:  · Patient continues to require modification of therapeutic interventions to increase complexity of exercises  · . Use of outcome tool(s) and clinical judgement create a POC that gives a: Clear prediction of patient's progress: LOW COMPLEXITY            TREATMENT:   (In addition to Assessment/Re-Assessment sessions the following treatments were rendered)   Pre-treatment Symptoms/Complaints: Complaints of sore buttocks and slight pain in right knee with ex. Pain: Initial:   Pain Intensity 1: 0  Post Session:  No change     Therapeutic Activity: (   23 minutes):   Therapeutic activities including bed mobility, transfers, Chair and standing balance activities, ambulation on level ground to improve mobility, strength and balance. Required moderate Safety awareness training;Verbal cues to promote static and dynamic balance in standing. Therapeutic Exercise: (  minutes):  Exercises per grid below to improve functional mobility and safety with all activities  Along with minimum  verbal cues to increase stability. Progressed repetitions and complexity of movement as indicated. Date:  7/8/19 Date:   Date:     Activity/Exercise Parameters Parameters Parameters   Ankle pumps 20     LAQ 20     marching 20                                     Braces/Orthotics/Lines/Etc:   · IV  · covarrubias catheter  · O2 Device: Room air  Treatment/Session Assessment:  Tolerated well. · Interdisciplinary Collaboration:   o Physical Therapist  o Registered Nurse  · After treatment position/precautions:   o Up in chair  o Bed/Chair-wheels locked  o Call light within reach  o RN notified   · Compliance with Program/Exercises: Compliant most of the time  · Recommendations/Intent for next treatment session: \"Next visit will focus on advancements to more challenging activities and reduction in assistance provided\".   Total Treatment Duration:  PT Patient Time In/Time Out  Time In: 1604  Time Out: LOULOU Liang, PT, DPT

## 2019-07-11 NOTE — PROGRESS NOTES
[facility] CARDIOLOGY PROGRESS NOTE           7/11/2019 10:17 AM    Admit Date: 7/3/2019      Subjective:   weak    ROS:  Cardiovascular:  As noted above    Objective:      Vitals:    07/10/19 2123 07/11/19 0035 07/11/19 0537 07/11/19 0821   BP: 106/43 111/46 128/54 140/74   Pulse: 89 99 97 94   Resp:  18 18 17   Temp:  99.2 °F (37.3 °C) 99 °F (37.2 °C) 99.3 °F (37.4 °C)   SpO2:  97% 96% 96%   Weight:   90.7 kg (200 lb)    Height:           Physical Exam:  General-No Acute Distress  Neck- supple, no JVD  CV- irregular rate and rhythm no MRG, rapid  Lung- clear bilaterally  Abd- soft, nontender, nondistended  Ext- no edema bilaterally. Skin- warm and dry    Data Review:   Recent Labs     07/11/19  0458 07/10/19  0435    141   K 3.8 3.7   MG 2.0 2.0   BUN 13 15   CREA 1.04* 1.08*   * 112*   WBC 8.9 12.1*   HGB 9.9* 10.4*   HCT 30.4* 31.8*    203       Assessment/Plan:     Principal Problem:    Acute hypoxemic respiratory failure (HCC) (7/3/2019)        Active Problems:    HTN (hypertension) (8/12/2013)          Severe obesity with body mass index (BMI) of 35.0 to 39.9 with comorbidity ([address] (8/12/2013)          Sjogren's syndrome ([address] (8/12/2013)          Polymyalgia rheumatica ([address] (12/2/2015)          Atrial fibrillation with rapid ventricular response ([address] vs WPW in patient with history of ablation (7/3/2019)          Pulmonary infiltrates vs acute congestive heart failure (7/3/2019)          Severe sepsis ([address] (7/3/2019)          Hypotension due to hypovolemia (7/3/2019)      ////    Her a fib is uncontrolled.   She has had breakfast.  Will sarah eCv tomorrow          Berto Hale MD  7/11/2019 10:17 AM

## 2019-07-11 NOTE — PROGRESS NOTES
Bedside and verbal report received from [facility], [address] [facility]

## 2019-07-11 NOTE — PROGRESS NOTES
Bedside and Verbal shift change report given to Angela Lofton RN (oncoming nurse) by self (offgoing nurse). Report included the following information SBAR, Kardex, MAR and Recent Results.

## 2019-07-12 ENCOUNTER — APPOINTMENT (OUTPATIENT)
Dept: GENERAL RADIOLOGY | Age: 74
DRG: 853 | End: 2019-07-12
Attending: INTERNAL MEDICINE
Payer: MEDICARE

## 2019-07-12 ENCOUNTER — PATIENT OUTREACH (OUTPATIENT)
Dept: CASE MANAGEMENT | Age: 74
End: 2019-07-12

## 2019-07-12 LAB
ANION GAP SERPL CALC-SCNC: 8 MMOL/L (ref 7–16)
BASOPHILS # BLD: 0.1 K/UL (ref 0–0.2)
BASOPHILS NFR BLD: 1 % (ref 0–2)
BUN SERPL-MCNC: 15 MG/DL (ref 8–23)
CALCIUM SERPL-MCNC: 8.9 MG/DL (ref 8.3–10.4)
CHLORIDE SERPL-SCNC: 104 MMOL/L (ref 98–107)
CO2 SERPL-SCNC: 29 MMOL/L (ref 21–32)
CREAT SERPL-MCNC: 1.08 MG/DL (ref 0.6–1)
DIFFERENTIAL METHOD BLD: ABNORMAL
EOSINOPHIL # BLD: 0.2 K/UL (ref 0–0.8)
EOSINOPHIL NFR BLD: 2 % (ref 0.5–7.8)
ERYTHROCYTE [DISTWIDTH] IN BLOOD BY AUTOMATED COUNT: 15.5 % (ref 11.9–14.6)
GLUCOSE BLD STRIP.AUTO-MCNC: 137 MG/DL (ref 65–100)
GLUCOSE SERPL-MCNC: 104 MG/DL (ref 65–100)
HCT VFR BLD AUTO: 31.8 % (ref 35.8–46.3)
HGB BLD-MCNC: 10.2 G/DL (ref 11.7–15.4)
IMM GRANULOCYTES # BLD AUTO: 0 K/UL (ref 0–0.5)
IMM GRANULOCYTES NFR BLD AUTO: 1 % (ref 0–5)
LYMPHOCYTES # BLD: 1.6 K/UL (ref 0.5–4.6)
LYMPHOCYTES NFR BLD: 20 % (ref 13–44)
MAGNESIUM SERPL-MCNC: 2.1 MG/DL (ref 1.8–2.4)
MCH RBC QN AUTO: 30.5 PG (ref 26.1–32.9)
MCHC RBC AUTO-ENTMCNC: 32.1 G/DL (ref 31.4–35)
MCV RBC AUTO: 95.2 FL (ref 79.6–97.8)
MONOCYTES # BLD: 0.9 K/UL (ref 0.1–1.3)
MONOCYTES NFR BLD: 11 % (ref 4–12)
NEUTS SEG # BLD: 5.1 K/UL (ref 1.7–8.2)
NEUTS SEG NFR BLD: 66 % (ref 43–78)
NRBC # BLD: 0 K/UL (ref 0–0.2)
PHOSPHATE SERPL-MCNC: 3.6 MG/DL (ref 2.3–3.7)
PLATELET # BLD AUTO: 227 K/UL (ref 150–450)
PMV BLD AUTO: 10.7 FL (ref 9.4–12.3)
POTASSIUM SERPL-SCNC: 4 MMOL/L (ref 3.5–5.1)
RBC # BLD AUTO: 3.34 M/UL (ref 4.05–5.2)
SODIUM SERPL-SCNC: 141 MMOL/L (ref 136–145)
WBC # BLD AUTO: 7.8 K/UL (ref 4.3–11.1)

## 2019-07-12 PROCEDURE — 84100 ASSAY OF PHOSPHORUS: CPT

## 2019-07-12 PROCEDURE — 97535 SELF CARE MNGMENT TRAINING: CPT

## 2019-07-12 PROCEDURE — 82962 GLUCOSE BLOOD TEST: CPT

## 2019-07-12 PROCEDURE — 74011250637 HC RX REV CODE- 250/637: Performed by: INTERNAL MEDICINE

## 2019-07-12 PROCEDURE — 93312 ECHO TRANSESOPHAGEAL: CPT

## 2019-07-12 PROCEDURE — 97530 THERAPEUTIC ACTIVITIES: CPT

## 2019-07-12 PROCEDURE — 77030019605

## 2019-07-12 PROCEDURE — 74011000250 HC RX REV CODE- 250: Performed by: INTERNAL MEDICINE

## 2019-07-12 PROCEDURE — B24BZZ4 ULTRASONOGRAPHY OF HEART WITH AORTA, TRANSESOPHAGEAL: ICD-10-PCS | Performed by: INTERNAL MEDICINE

## 2019-07-12 PROCEDURE — 65660000000 HC RM CCU STEPDOWN

## 2019-07-12 PROCEDURE — 85025 COMPLETE CBC W/AUTO DIFF WBC: CPT

## 2019-07-12 PROCEDURE — 83735 ASSAY OF MAGNESIUM: CPT

## 2019-07-12 PROCEDURE — 74011250636 HC RX REV CODE- 250/636: Performed by: INTERNAL MEDICINE

## 2019-07-12 PROCEDURE — 74011000258 HC RX REV CODE- 258: Performed by: INTERNAL MEDICINE

## 2019-07-12 PROCEDURE — 94760 N-INVAS EAR/PLS OXIMETRY 1: CPT

## 2019-07-12 PROCEDURE — 92960 CARDIOVERSION ELECTRIC EXT: CPT

## 2019-07-12 PROCEDURE — 5A2204Z RESTORATION OF CARDIAC RHYTHM, SINGLE: ICD-10-PCS | Performed by: INTERNAL MEDICINE

## 2019-07-12 PROCEDURE — 74011250636 HC RX REV CODE- 250/636

## 2019-07-12 PROCEDURE — 80048 BASIC METABOLIC PNL TOTAL CA: CPT

## 2019-07-12 PROCEDURE — 99152 MOD SED SAME PHYS/QHP 5/>YRS: CPT

## 2019-07-12 PROCEDURE — 36591 DRAW BLOOD OFF VENOUS DEVICE: CPT

## 2019-07-12 PROCEDURE — 71045 X-RAY EXAM CHEST 1 VIEW: CPT

## 2019-07-12 RX ORDER — FENTANYL CITRATE 50 UG/ML
25-50 INJECTION, SOLUTION INTRAMUSCULAR; INTRAVENOUS
Status: DISCONTINUED | OUTPATIENT
Start: 2019-07-12 | End: 2019-07-15 | Stop reason: HOSPADM

## 2019-07-12 RX ORDER — MIDAZOLAM HYDROCHLORIDE 1 MG/ML
.5-2 INJECTION, SOLUTION INTRAMUSCULAR; INTRAVENOUS
Status: DISCONTINUED | OUTPATIENT
Start: 2019-07-12 | End: 2019-07-15 | Stop reason: HOSPADM

## 2019-07-12 RX ORDER — AMIODARONE HYDROCHLORIDE 200 MG/1
400 TABLET ORAL 2 TIMES DAILY
Status: DISCONTINUED | OUTPATIENT
Start: 2019-07-12 | End: 2019-07-15 | Stop reason: HOSPADM

## 2019-07-12 RX ORDER — LIDOCAINE HYDROCHLORIDE 20 MG/ML
15 SOLUTION OROPHARYNGEAL AS NEEDED
Status: DISCONTINUED | OUTPATIENT
Start: 2019-07-12 | End: 2019-07-15 | Stop reason: HOSPADM

## 2019-07-12 RX ORDER — CARVEDILOL 6.25 MG/1
6.25 TABLET ORAL 2 TIMES DAILY WITH MEALS
Status: DISCONTINUED | OUTPATIENT
Start: 2019-07-12 | End: 2019-07-15

## 2019-07-12 RX ADMIN — MIDAZOLAM HYDROCHLORIDE 1 MG: 1 INJECTION, SOLUTION INTRAMUSCULAR; INTRAVENOUS at 12:56

## 2019-07-12 RX ADMIN — Medication 10 ML: at 20:42

## 2019-07-12 RX ADMIN — FUROSEMIDE 20 MG: 10 INJECTION, SOLUTION INTRAMUSCULAR; INTRAVENOUS at 20:37

## 2019-07-12 RX ADMIN — APIXABAN 5 MG: 5 TABLET, FILM COATED ORAL at 20:37

## 2019-07-12 RX ADMIN — Medication 30 ML: at 04:36

## 2019-07-12 RX ADMIN — MIDAZOLAM HYDROCHLORIDE 2 MG: 1 INJECTION, SOLUTION INTRAMUSCULAR; INTRAVENOUS at 12:58

## 2019-07-12 RX ADMIN — Medication 5 ML: at 14:00

## 2019-07-12 RX ADMIN — DILTIAZEM HYDROCHLORIDE 10 MG/HR: 5 INJECTION, SOLUTION INTRAVENOUS at 00:19

## 2019-07-12 RX ADMIN — FUROSEMIDE 20 MG: 10 INJECTION, SOLUTION INTRAMUSCULAR; INTRAVENOUS at 08:39

## 2019-07-12 RX ADMIN — SPIRONOLACTONE 25 MG: 25 TABLET ORAL at 08:39

## 2019-07-12 RX ADMIN — POTASSIUM CHLORIDE 20 MEQ: 1.5 POWDER, FOR SOLUTION ORAL at 17:27

## 2019-07-12 RX ADMIN — PANTOPRAZOLE SODIUM 40 MG: 40 TABLET, DELAYED RELEASE ORAL at 08:39

## 2019-07-12 RX ADMIN — MIDAZOLAM HYDROCHLORIDE 1 MG: 1 INJECTION, SOLUTION INTRAMUSCULAR; INTRAVENOUS at 12:54

## 2019-07-12 RX ADMIN — APIXABAN 5 MG: 5 TABLET, FILM COATED ORAL at 08:39

## 2019-07-12 RX ADMIN — AMIODARONE HYDROCHLORIDE 400 MG: 200 TABLET ORAL at 17:27

## 2019-07-12 RX ADMIN — CARVEDILOL 25 MG: 25 TABLET, FILM COATED ORAL at 08:39

## 2019-07-12 RX ADMIN — LIDOCAINE HYDROCHLORIDE 15 ML: 20 SOLUTION ORAL; TOPICAL at 12:50

## 2019-07-12 RX ADMIN — CARVEDILOL 6.25 MG: 6.25 TABLET, FILM COATED ORAL at 17:27

## 2019-07-12 RX ADMIN — DIGOXIN 0.12 MG: 125 TABLET ORAL at 08:39

## 2019-07-12 NOTE — PROCEDURES
Brief Cardiac Procedure Note    Patient: Aris Lagos MRN: [id number]  SSN: [id number]    YOB: 1945  Age: 74 y.o. Sex: female      Date of Procedure: 7/12/2019     Pre-procedure Diagnosis: Atrial Fibrillation/Atrial Flutter    Post-procedure Diagnosis: Shortness of Breath    Reason for Procedure: Cardiac Arrhythmia    Procedure: Transesophageal Echocardiogram with Cardioversion    Brief Description of Procedure:     Performed By: Jason Shelton MD     Assistants:     Anesthesia: Moderate Sedation    Estimated Blood Loss: Less than 10 mL      Specimens: None    Implants: None    Findings:   No clot, nsr after 705 joules    Complications: None    Recommendations: Continue medical therapy.     Signed By: Jason Shelton MD     July 12, 2019

## 2019-07-12 NOTE — PROGRESS NOTES
Bedside shift change report given to [employer] (oncoming nurse) by self (offgoing nurse). Report included the following information SBAR, Kardex and Recent Results.

## 2019-07-12 NOTE — PROGRESS NOTES
Bedside and Verbal shift change report given to self (oncoming nurse) by Isidro Lang RN (offgoing nurse). Report included the following information SBAR, Kardex, ED Summary, Procedure Summary, Intake/Output, MAR and Recent Results.

## 2019-07-12 NOTE — PROGRESS NOTES
Bedside shift change report given to self (oncoming nurse) by Aleksandr Viveros RN (offgoing nurse). Report included the following information SBAR, Kardex, Recent Results and Cardiac Rhythm afib.

## 2019-07-12 NOTE — PROGRESS NOTES
Problem: Pressure Injury - Risk of  Goal: *Prevention of pressure injury  Description  Document Mark Scale and appropriate interventions in the flowsheet. Outcome: Progressing Towards Goal     Problem: Patient Education: Go to Patient Education Activity  Goal: Patient/Family Education  Outcome: Progressing Towards Goal     Problem: Falls - Risk of  Goal: *Absence of Falls  Description  Document [facility] Fall Risk and appropriate interventions in the flowsheet.   Outcome: Progressing Towards Goal     Problem: Patient Education: Go to Patient Education Activity  Goal: Patient/Family Education  Outcome: Progressing Towards Goal     Problem: Dysphagia (Adult)  Goal: Interventions  Outcome: Progressing Towards Goal     Problem: Patient Education: Go to Patient Education Activity  Goal: Patient/Family Education  Outcome: Progressing Towards Goal     Problem: Breathing Pattern - Ineffective  Goal: *Absence of hypoxia  Outcome: Progressing Towards Goal  Goal: *Use of effective breathing techniques  Outcome: Progressing Towards Goal  Goal: *PALLIATIVE CARE:  Alleviation of Dyspnea  Outcome: Progressing Towards Goal     Problem: Patient Education: Go to Patient Education Activity  Goal: Patient/Family Education  Outcome: Progressing Towards Goal     Problem: Patient Education: Go to Patient Education Activity  Goal: Patient/Family Education  Outcome: Progressing Towards Goal     Problem: Nutrition Deficit  Goal: *Optimize nutritional status  Outcome: Progressing Towards Goal

## 2019-07-12 NOTE — PROGRESS NOTES
Hospitalist Progress Note     Admit Date:  7/3/2019  3:02 AM   Name:  Jessica Phillips   Age:  74 y.o.  :  1945   MRN:  [id number]   PCP:  Romel Burgess MD  Treatment Team: Attending Provider: Ryan Walker MD; Consulting Provider: Judy Rodríguez MD; Consulting Provider: Curly Mcclendon MD; Utilization Review: Flynn Thomas RN; Consulting Provider: Cece Bautista MD; Care Manager: Gonsalo Aly RN; Consulting Provider: Vinny Pagan MD; Occupational Therapy Assistant: Radha Meza    Subjective:   Ms. Lawernce Castleman is a 75 y/o WF admitted to ICU on 7/3 with respiratory failure after being intubated. Developed rapid AFib, cardiology consulted. Started on abx and IV diuresis. Extubated  and sent to floor .  Hospitalist assumed care .    19  Says diarrhea better  Going for cardioversion today for persistent afib       Objective:     Patient Vitals for the past 24 hrs:   Temp Pulse Resp BP SpO2   19 1645 99 °F (37.2 °C) 86 18 140/44 94 %   19 1424 98.6 °F (37 °C) 75 18 128/57 96 %   19 1314  69  (!) 88/44 98 %   19 1308  72  99/50 98 %   19 1304  72  122/61 96 %   19 1300  (!) 109  163/72 97 %   19 1256  (!) 103  154/82 97 %   19 1252  (!) 107  142/65 100 %   19 1139  97  106/60    19 0905 99.9 °F (37.7 °C) 97 18 131/53 96 %   19 0440 99.2 °F (37.3 °C) 92 18 125/54 99 %   19 0045 98.7 °F (37.1 °C) 90 18 111/55 96 %   19 0020    119/43    19 0018  97      19 2340    101/46    19 2135  86      07/11/19 2129    104/43    19 97.8 °F (36.6 °C) 99 18 123/65 97 %     Oxygen Therapy  O2 Sat (%): 94 % (19 1645)  Pulse via Oximetry: 96 beats per minute (19)  O2 Device: Nasal cannula (19 1310)  O2 Flow Rate (L/min): 2 l/min (19 1310)  FIO2 (%): 28 % (19)    Intake/Output Summary (Last 24 hours) at 2019 [address] [place] [address] filed at 7/12/2019 1535  Gross per 24 hour   Intake 580 ml   Output 2725 ml   Net -2145 ml         General:    Well nourished. Alert. On oxygen  heent- normal   CV:   Irregular mildTachycardia  No murmur, rub, or gallop. Lungs:   Mild basilar crepitations  Abdomen:   Soft, nontender, nondistended. Cns- no focal neurological deficits  Extremities: Warm and dry. No cyanosis or edema. Skin:     No rashes or jaundice. Data Review:  I have reviewed all labs, meds, telemetry events, and studies from the last 24 hours. Recent Results (from the past 24 hour(s))   METABOLIC PANEL, BASIC    Collection Time: 07/12/19  4:23 AM   Result Value Ref Range    Sodium 141 136 - 145 mmol/L    Potassium 4.0 3.5 - 5.1 mmol/L    Chloride 104 98 - 107 mmol/L    CO2 29 21 - 32 mmol/L    Anion gap 8 7 - 16 mmol/L    Glucose 104 (H) 65 - 100 mg/dL    BUN 15 8 - 23 MG/DL    Creatinine 1.08 (H) 0.6 - 1.0 MG/DL    GFR est AA >60 >60 ml/min/1.73m2    GFR est non-AA 53 (L) >60 ml/min/1.73m2    Calcium 8.9 8.3 - 10.4 MG/DL   MAGNESIUM    Collection Time: 07/12/19  4:23 AM   Result Value Ref Range    Magnesium 2.1 1.8 - 2.4 mg/dL   PHOSPHORUS    Collection Time: 07/12/19  4:23 AM   Result Value Ref Range    Phosphorus 3.6 2.3 - 3.7 MG/DL   CBC WITH AUTOMATED DIFF    Collection Time: 07/12/19  4:23 AM   Result Value Ref Range    WBC 7.8 4.3 - 11.1 K/uL    RBC 3.34 (L) 4.05 - 5.2 M/uL    HGB 10.2 (L) 11.7 - 15.4 g/dL    HCT 31.8 (L) 35.8 - 46.3 %    MCV 95.2 79.6 - 97.8 FL    MCH 30.5 26.1 - 32.9 PG    MCHC 32.1 31.4 - 35.0 g/dL    RDW 15.5 (H) 11.9 - 14.6 %    PLATELET 832 858 - 139 K/uL    MPV 10.7 9.4 - 12.3 FL    ABSOLUTE NRBC 0.00 0.0 - 0.2 K/uL    DF AUTOMATED      NEUTROPHILS 66 43 - 78 %    LYMPHOCYTES 20 13 - 44 %    MONOCYTES 11 4.0 - 12.0 %    EOSINOPHILS 2 0.5 - 7.8 %    BASOPHILS 1 0.0 - 2.0 %    IMMATURE GRANULOCYTES 1 0.0 - 5.0 %    ABS. NEUTROPHILS 5.1 1.7 - 8.2 K/UL    ABS.  LYMPHOCYTES 1.6 0.5 - 4.6 K/UL ABS. MONOCYTES 0.9 0.1 - 1.3 K/UL    ABS. EOSINOPHILS 0.2 0.0 - 0.8 K/UL    ABS. BASOPHILS 0.1 0.0 - 0.2 K/UL    ABS. IMM. GRANS. 0.0 0.0 - 0.5 K/UL   GLUCOSE, POC    Collection Time: 07/12/19 11:33 AM   Result Value Ref Range    Glucose (POC) 137 (H) 65 - 100 mg/dL        All Micro Results     Procedure Component Value Units Date/Time    VIRAL CULTURE - INCLUDES ADENOVIRUS, CMV, ENTEROVIRUSES, HSV, INFLUENZA, MUMPS, PARAINFLUENZA, RSV, & VARICELLA ZOSTER [[id number]] Collected:  07/03/19 1245    Order Status:  Completed Specimen:  Other from Miscellaneous sample Updated:  07/11/19 0936     Source BRONCHIAL LAVAGE        Viral Culture, General No virus isolated. Comment: (NOTE)  Performed At: [address] [id number]  Tahmina Thapa MD PZ:[id number]  Corrected on 07/11 AT 2632: This is a correction to the medical record of the test results previously reported as Comment         ANAEROBIC CULTURE - ONLY PERFORMED ON BRONCHIAL BRUSHING [[id number]]  (Abnormal) Collected:  07/03/19 1245    Order Status:  Completed Specimen:  Bronchial lavage Updated:  07/10/19 1151     Special Requests: NO SPECIAL REQUESTS        Culture result:       SCANT ANAEROBIC GRAM POSITIVE RODS            ISOLATE SENT TO   [facility]  07/10/19      ATYPICAL PNEUMONIA BY PCR,BRONCHOALVEOLAR LAVAGE - INCLUDES: MYCOPLASMA PNEUMONIAE AND CHLAMYDIA PNEUMONIAE [[id number]] Collected:  07/03/19 1245    Order Status:  Completed Specimen:  Bronchial lavage Updated:  07/08/19 [address]     M. PNEUMONIAE, MPPT Not Detected     C. PNEUMONAIE, CPPT Not Detected     Comment: (NOTE)  This test was developed and its performance  characteristics determined by [employer]. It has not  been cleared or approved by the U.S. Food and Drug  Administration. Results should be used in conjunction with  clinical findings, and should not form the sole basis for  a diagnosis or treatment decision.   Performed At: [facility] [address], [place] [id number]  Niño Scale PhD LH:[id number]         A. GALACTOMANNAN BY EIA, BRONCHOALVEOLAR LAVAGE [[id number]] Collected:  07/03/19 1245    Order Status:  Completed Specimen:  Bronchial lavage Updated:  07/08/19 1735     A. GALACTOMANNAN, AGPT 0.071        Comment: (NOTE)  Interpretation:   Patients with an index value of greater  than or equal to 0.5 are considered to be positive for  galactomannan antigen. The Platelia(TM) Aspergillus EIA  package insert also recommends a new sample be collected  from the patient for follow-up testing. Patients with an  index value of less than 0.5 are considered to be negative  for galactomannan antigen. A negative result may indicate  that the patient's result is below the detectable level of  the assay. Negative results do not rule out the diagnosis of Invasive  Aspergillosis. Pursuant to the package insert, repeat  testing is recommended if the result is negative, but the  disease is suspected. Due to the potential for  environmental contamination when transferred to pour-off  tubes, which can lead to false positive results, interpret  positive results from samples provided in pour-off tubes  with caution. Results should be used in conjunction with  clinical findings, and should not form the sole basis for  a diagnosis or treatment decision. The Platelia Aspergillus Galactomannan EIA is a product of  [employer] and is FDA approved for in vitro diagnostic use.   Performed At: [facility] [place], [place] [id number]  Niño Scale PhD WL:[id number]         CULTURE, BLOOD [[id number]] Collected:  07/03/19 0515    Order Status:  Completed Specimen:  Blood Updated:  07/08/19 1137     Special Requests: CENTRAL LINE     Culture result: NO GROWTH 5 DAYS       CULTURE, BLOOD [[id number]] Collected:  07/03/19 0641    Order Status:  Completed Specimen:  Blood Updated:  07/08/19 1137 Special Requests: RTHAND     Culture result: NO GROWTH 5 DAYS       RESPIRATORY VIRAL PANEL, PCR [[id number]] Collected:  07/03/19 1245    Order Status:  Completed Specimen:  Respiratory sample Updated:  07/07/19 2335     Source BRONCHIAL LAVAGE        Influenza A NEGATIVE         Influenza B NEGATIVE         RSV A NEGATIVE         RSV B NEGATIVE         Parainfluenza 1 NEGATIVE         Parainfluenza 2 NEGATIVE         Parainfluenza 3 NEGATIVE         Rhinovirus NEGATIVE         Metapneumovirus NEGATIVE         Adenovirus NEGATIVE         Comment: (NOTE)  Performed At: [address] [id number]  Tadeo Smith MD SH:[id number]         [facility] CULTURE AND SMEAR - Margret Posey [[id number]] Collected:  07/03/19 1245    Order Status:  Completed Specimen:  Other from Miscellaneous sample Updated:  07/06/19 0836     Source BRONCHIAL LAVAGE        Fungus stain Direct Inoculation     Fungus (Mycology) Culture Other source received     Comment: (NOTE)  Performed At: [address] [id number]  Tadeo Smith MD UL:[id number]         CULTURE, RESPIRATORY/SPUTUM/BRONCH Dorthula Beams [[id number]] Collected:  07/03/19 0643    Order Status:  Completed Specimen:  Sputum Updated:  07/05/19 1214     Special Requests: NO SPECIAL REQUESTS        GRAM STAIN 0 to 2 WBCS/OIF      NO EPITHELIAL CELLS SEEN         FEW GRAM POSITIVE COCCI         2+ MUCUS PRESENT        Culture result:       HEAVY NORMAL RESPIRATORY ROBEL          CULTURE, RESPIRATORY/SPUTUM/BRONCH Dorthula Beams [[id number]] Collected:  07/03/19 1245    Order Status:  Completed Specimen:  Sputum from Bronchial lavage Updated:  07/05/19 0743     Special Requests: NO SPECIAL REQUESTS        GRAM STAIN NO WBCS SEEN         NO DEFINITE ORGANISM SEEN        Culture result: NO GROWTH 2 DAYS       AFB (MYCOBACTERIUM) CULTURE & SMEAR W/REFLEX ID [place] NOCARDIA [[id number]] Collected:  07/03/19 1245 Order Status:  Completed Specimen:  Miscellaneous sample Updated:  07/04/19 1336     Source BRONCHIAL LAVAGE        AFB Specimen processing Concentration     Acid Fast Smear NEGATIVE         Comment: (NOTE)  Performed At: [address] [id number]  Joann Portillo MD :[id number]          Acid Fast Culture PENDING          Current Meds:  Current Facility-Administered Medications   Medication Dose Route Frequency    fentaNYL citrate (PF) injection 25-50 mcg  25-50 mcg IntraVENous Multiple    midazolam (VERSED) injection 0.5-2 mg  0.5-2 mg IntraVENous Multiple    lidocaine (XYLOCAINE) 2 % viscous solution 15 mL  15 mL Mouth/Throat PRN    carvedilol (COREG) tablet 6.25 mg  6.25 mg Oral BID WITH MEALS    amiodarone (CORDARONE) tablet 400 mg  400 mg Oral BID    loperamide (IMODIUM) capsule 2 mg  2 mg Oral Q4H PRN    spironolactone (ALDACTONE) tablet 25 mg  25 mg Oral DAILY    levalbuterol (XOPENEX) nebulizer soln 0.63 mg/3 mL  0.63 mg Nebulization Q6H PRN    furosemide (LASIX) injection 20 mg  20 mg IntraVENous Q12H    potassium chloride (KLOR-CON) packet for solution 20 mEq  20 mEq Oral BID WITH MEALS    NUTRITIONAL SUPPORT ELECTROLYTE PRN ORDERS   Does Not Apply PRN    pantoprazole (PROTONIX) tablet 40 mg  40 mg Oral ACB    bisacodyl (DULCOLAX) tablet 5 mg  5 mg Oral DAILY PRN    apixaban (ELIQUIS) tablet 5 mg  5 mg Oral Q12H    temazepam (RESTORIL) capsule 15 mg  15 mg Oral QHS PRN    sodium chloride (NS) flush 5-40 mL  5-40 mL IntraVENous Q8H    sodium chloride (NS) flush 5-40 mL  5-40 mL IntraVENous PRN    dilTIAZem (CARDIZEM) 125 mg in dextrose 5% 125 mL infusion  0-15 mg/hr IntraVENous TITRATE       Other Studies (last 24 hours):  Xr Chest Sngl V    Result Date: 7/12/2019   Portable view of the chest COMPARISON: Yesterday. CLINICAL HISTORY: Shortness of breath. FINDINGS: Right-sided IJ line is stable. Persistent bibasilar opacities.  There is no pneumothorax or significant pulmonary edema. Heart is enlarged. IMPRESSION: Persistent bibasilar opacities, likely combination of effusions and atelectasis. No significant change. Assessment and Plan:     Hospital Problems as of 7/12/2019 Date Reviewed: 7/5/2019          Codes Class Noted - Resolved POA    * (Principal) Acute hypoxemic respiratory failure (HCC) ICD-10-CM: J96.01  ICD-9-CM: 518.81  7/3/2019 - Present Unknown        Atrial fibrillation with rapid ventricular response (HCC) vs WPW in patient with history of ablation ICD-10-CM: I48.91  ICD-9-CM: 427.31  7/3/2019 - Present Unknown        Pulmonary infiltrates vs acute congestive heart failure ICD-10-CM: R91.8  ICD-9-CM: 793.19  7/3/2019 - Present Unknown        Severe sepsis ([facility] ICD-10-CM: A41.9, R65.20  ICD-9-CM: 038.9, 995.92  7/3/2019 - Present Unknown        Hypotension due to hypovolemia ICD-10-CM: I95.89, E86.1  ICD-9-CM: 458.8, 276.52  7/3/2019 - Present Unknown        Polymyalgia rheumatica ([facility] ICD-10-CM: M35.3  ICD-9-CM: 046  12/2/2015 - Present Yes        HTN (hypertension) ICD-10-CM: I10  ICD-9-CM: 401.9  8/12/2013 - Present Yes        Severe obesity with body mass index (BMI) of 35.0 to 39.9 with comorbidity ([facility] ICD-10-CM: E66.01  ICD-9-CM: 278.01  8/12/2013 - Present Yes        Sjogren's syndrome ([facility] ICD-10-CM: M35.00  ICD-9-CM: 710.2  8/12/2013 - Present Yes              PLAN:    Acute respiratory failure- during the stay intubated- extubated- presently on nasal canula 2 lit/min- finished course of antibiotics  Acute volume overload- echo ef 70-17%- grade 1 diastolic chf- good diuresis  Afib with rvr- cont eliquis,on cardizem drip- cardiology following-- for cardiversion today  Hypokalemia- resolved  htn  Hypotension resolved. Poly myalgia rheumatica- off steroids  Diarrhea - resolved- on prn immodium. DC planning/Dispo:    DVT ppx:  ibrahima.     Signed:  Rossi Pierson MD

## 2019-07-12 NOTE — PROGRESS NOTES
TRANSFER - IN REPORT:    Verbal report received from [facility] on Dennice Loss being received from [address] [place] [address] for ordered procedure      Report consisted of patients Situation, Background, Assessment and Recommendations(SBAR). Information from the following report(s) Procedure Summary was reviewed with the receiving nurse. Opportunity for questions and clarification was provided. Assessment completed upon patients arrival to unit and care assumed.

## 2019-07-12 NOTE — PROGRESS NOTES
Referral via [facility] RN Case Manager  Patient identified as High Risk for Readmission related to protracted LOS, lives alone, legally blind os (does not drive). Extensive history of co-morbidities including:  AFib - history of ablation  Pulmonary infiltrates vs acute CHF  Severe Sepsis  Sjogren's Syndrome  Severe obesity      RRAT 23: admitted 7/3 for Acute hypoxemic respiratory failure  Expect discharge to [facility] once Cardizem drip is DC'd (today?)    Plan - link patient with [address] [place] [address] Road - for support through transitions of care once discharged from [facility].

## 2019-07-12 NOTE — PROGRESS NOTES
Physical Therapy Note:    Chart reviewed for physical therapy treatment, patient off the floor for procedure. Will attempt later as patient is able and schedule allows.     Thank you,  Jeff Rodriguez, PT, DPT

## 2019-07-12 NOTE — PROGRESS NOTES
Clarence/CVN with Dr Heidi Barajas 4 mg  One sync shock at 200 joules   Pt tolerated procedure well  Report called to tele RN

## 2019-07-12 NOTE — PROGRESS NOTES
Problem: Self Care Deficits Care Plan (Adult)  Goal: *Acute Goals and Plan of Care (Insert Text)  Description  1. Patient will complete upper body bathing and dressing with setup, additional time, and adaptive equipment as needed. 2. Patient will complete lower body bathing and dressing with min A, additional time, and adaptive equipment as needed. 3. Patient will complete toileting with min A.   4. Patient will tolerate 25 minutes of OT treatment with 4 rest breaks to increase activity tolerance for ADLs. 5. Patient will complete functional transfers with mod I, additional time, and adaptive equipment as needed. 6. Patient will complete grooming tasks in standing at sink level including gathering of supplies to improve home management. Timeframe: 7 visits    Outcome: Progressing Towards Goal         OCCUPATIONAL THERAPY: Daily Note and AM   7/12/2019  INPATIENT: OT Visit Days: 2  [place] / [place] / Product Type: Medicare /      NAME/AGE/GENDER: Mook Lim is a 74 y.o. female   PRIMARY DIAGNOSIS:  Acute hypoxemic respiratory failure ([facility] [J96.01]  Severe sepsis ([facility] [A41.9, R65.20] Acute hypoxemic respiratory failure ([facility]   Acute hypoxemic respiratory failure (HCC)    Procedure(s) (LRB):  BRONCHOSCOPY (N/A)  BRONCHIAL ALVEOLAR LAVAGE (N/A)  9 Days Post-Op  ICD-10: Treatment Diagnosis:    · Generalized Muscle Weakness (M62.81)  · Other lack of cordination (R27.8)  · Difficulty in walking, Not elsewhere classified (R26.2)   Precautions/Allergies:    Falls,  Celebrex [celecoxib]; Lisinopril; Losartan; and Nifedipine      ASSESSMENT:     Ms. Daniel Hernandez is a 75 YO R dominant WF admitted with above. Pt reports she lives alone in a 1 level apartment at baseline, was independent with self care tasks, using a SC or a rollator depending on the day and how she felt. Has T/S combo with shower chair, HH nozzle and GBs in place. Has not driven since 3576.   She uses the transport [employer]ic Ke[employer] to go to her appointments, get groceries and attend the [employer] for their activities. Reports she is very active with them and goes on lots of outings. Pt does all her own housework and laundry. 7/12/2019 Pt presents on BSC attempting to have a bowel movement with PCT in room. Pt at that time was not successful having a BM. Pt completed UB/LB bathing with SBA and UB dressing with max a. Pt stood with CGA to finish up mary lou hygiene and pt became dizzy and nauseated. RN called to the room due to pt stating that these symptoms were getting worse. Pt's BP was checked and it was within normal limits. RN applied supplemental O2 and therapist applied cold compression to pt's neck. Pt stated that she was feeling a little better after above. RN stayed in the room with pt as pt was starting to have a bowel movement. Good effort despite above. Continue POC. This section established at most recent assessment   PROBLEM LIST (Impairments causing functional limitations):  1. Decreased Strength  2. Decreased ADL/Functional Activities  3. Decreased Transfer Abilities  4. Decreased Ambulation Ability/Technique  5. Decreased Balance  6. Decreased Activity Tolerance  7. Decreased Pacing Skills  8. Decreased Work Simplification/Energy Conservation Techniques  9. Increased Fatigue  10. Increased Shortness of Breath  11. Decreased Flexibility/Joint Mobility  12. Edema/Girth  13. Decreased Skin Integrity/Hygeine   INTERVENTIONS PLANNED: (Benefits and precautions of occupational therapy have been discussed with the patient.)  1. Activities of daily living training  2. Adaptive equipment training  3. Balance training  4. Clothing management  5. Cognitive training  6. Community reintergration  7. Donning&doffing training  8. Therapeutic activity  9. Therapeutic exercise     TREATMENT PLAN: Frequency/Duration: Follow patient 3x per week to address above goals.   Rehabilitation Potential For Stated Goals: Good     REHAB RECOMMENDATIONS (at time of discharge pending progress):    Placement: It is my opinion, based on this patient's performance to date, that Ms. Tim Pretty may benefit from intensive therapy at a [address] [place] [address] after discharge due to the functional deficits listed above that are likely to improve with skilled rehabilitation and concerns that he/she may be unsafe to be unsupervised at home due to high falls risk and needing help with LB tasks as well as all IADLs . Equipment:    TBD based on progress               OCCUPATIONAL PROFILE AND HISTORY:   History of Present Injury/Illness (Reason for Referral):  See H&P  Past Medical History/Comorbidities:   Ms. Tim Pretty  has a past medical history of Arrhythmia, Arthritis, Asthma, Cellulitis, Chronic kidney disease, Depression, Gout, Gouty arthritis  NOS (12/2/2015), Hypercholesterolemia, Hypertension, Mitral valve prolapse, Morbid obesity ([address] Nausea & vomiting, Polymyalgia rheumatica ([facility] (12/2/2015), Psychiatric disorder, PUD (peptic ulcer disease), Sjogren's syndrome ([address] and Temporal arteritis ([facility] Ms. Tim Pretty  has a past surgical history that includes hx hysterectomy; hx tubal ligation; pr cardiac surg procedure unlist (4/07); pr cardiac surg procedure unlist (8/13); hx endoscopy (8/08); and hx colonoscopy (6/08). Social History/Living Environment:   Home Environment: Apartment  # Steps to Enter: 0  One/Two Story Residence: One story  Living Alone: Yes  Support Systems: Child(aubrey), Family member(s)  Patient Expects to be Discharged to[de-identified] Apartment  Current DME Used/Available at Home: Cane, straight, Blood pressure cuff, Grab bars, Walker, rollator, Other (comment), Shower chair( nozzle)  Tub or Shower Type: Tub/Shower combination  Prior Level of Function/Work/Activity:  Pt reports she lives alone in a 1 level apartment at baseline, was independent with self care tasks, using a SC or a rollator depending on the day and how she felt.  Has T/S combo with shower chair, HH nozzle and GBs in place. Has not driven since 7320. She uses the transport "[employer] to go to her appointments, get groceries and attend the [employer] for their activities. Reports she is very active with them and goes on lots of outings. Pt does all her own housework and laundry. Dominant Side:         RIGHT   Number of Personal Factors/Comorbidities that affect the Plan of Care: Extensive review of physical, cognitive, and psychosocial performance (3+):  HIGH COMPLEXITY   ASSESSMENT OF OCCUPATIONAL PERFORMANCE[de-identified]   Activities of Daily Living:   Basic ADLs (From Assessment) Complex ADLs (From Assessment)   Feeding: Setup, Additional time  Oral Facial Hygiene/Grooming: Minimum assistance, Additional time  Bathing: Moderate assistance, Additional time  Upper Body Dressing: Moderate assistance, Additional time  Lower Body Dressing: Total assistance, Additional time  Toileting: Total assistance, Adaptive equipment, Additional time(covarrubias in place) Instrumental ADL  Meal Preparation: Total assistance  Homemaking: Total assistance  Medication Management: Setup  Financial Management: Stand-by assistance   Grooming/Bathing/Dressing Activities of Daily Living   Grooming  Grooming Assistance: Stand-by assistance  Washing Face: Stand-by assistance  Washing Hands: Stand-by assistance     Upper Body Bathing  Bathing Assistance: Stand-by assistance  Position Performed: Other (comment)(seated on [facility])     Lower Body Bathing  Bathing Assistance: Stand-by assistance  Perineal  : Stand-by assistance  Position Performed: Seated on toilet;Standing  Lower Body : Stand-by assistance     Upper Body [address]: Maximum assistance       Bed/Mat Mobility  Sit to Stand: Contact guard assistance  Stand to Sit: Contact guard assistance     Most Recent Physical Functioning:   Gross Assessment:                  Posture:  Posture (WDL): Exceptions to WDL  Posture Assessment:  Forward head  Balance:  Standing: Impaired  Standing - Static: Fair Bed Mobility:     Wheelchair Mobility:     Transfers:  Sit to Stand: Contact guard assistance  Stand to Sit: Contact guard assistance            Patient Vitals for the past 6 hrs:   BP BP Patient Position SpO2 Pulse   19 0905 131/53 At rest 96 % 97   19 1139 106/60   97       Mental Status  Neurologic State: Alert  Orientation Level: Oriented X4  Cognition: Follows commands  Perception: Appears intact  Perseveration: No perseveration noted  Safety/Judgement: Awareness of environment, Fall prevention                          Physical Skills Involved:  1. Range of Motion  2. Balance  3. Strength  4. Activity Tolerance  5. Pain (acute)  6. Edema  7. Skin Integrity Cognitive Skills Affected (resulting in the inability to perform in a timely and safe manner):  1. Perception  2. Sustained Attention  3. Divided Attention Psychosocial Skills Affected:  1. Habits/Routines  2. Environmental Adaptation  3. Social Interaction  4. Self-Awareness  5. Social Roles   Number of elements that affect the Plan of Care: 5+:  HIGH COMPLEXITY   CLINICAL DECISION MAKIN[address] [place] [id number] AM-PAC 6 Clicks   Daily Activity Inpatient Short Form  How much help from another person does the patient currently need. .. Total A Lot A Little None   1. Putting on and taking off regular lower body clothing? ? 1   ? 2   ? 3   ? 4   2. Bathing (including washing, rinsing, drying)? ? 1   ? 2   ? 3   ? 4   3. Toileting, which includes using toilet, bedpan or urinal?   ? 1   ? 2   ? 3   ? 4   4. Putting on and taking off regular upper body clothing? ? 1   ? 2   ? 3   ? 4   5. Taking care of personal grooming such as brushing teeth? ? 1   ? 2   ? 3   ? 4   6. Eating meals? ? 1   ? 2   ? 3   ? 4   © , Trustees of [address] [place] [id number], under license to [employer].  All rights reserved      Score:  Initial: 15, completed 2019 Most Recent: X (Date: -- )    Interpretation of Tool: Represents activities that are increasingly more difficult (i.e. Bed mobility, Transfers, Gait). Medical Necessity:     · Patient demonstrates good  ·  rehab potential due to higher previous functional level. Reason for Services/Other Comments:  · Patient continues to require skilled intervention due to s/p above and decresed ADLs. · .   Use of outcome tool(s) and clinical judgement create a POC that gives a: MODERATE COMPLEXITY         TREATMENT:   (In addition to Assessment/Re-Assessment sessions the following treatments were rendered)     Pre-treatment Symptoms/Complaints: None  Pain: Initial:   Pain Intensity 1: 0  Post Session:  none     Therapeutic Activity: (8 minutes): Therapeutic activities including sit to stand transfer and static standing to improve mobility, strength and balance. Required CGA to promote static balance in standing. Self Care: (17 minutes): Procedure(s) (per grid) utilized to improve and/or restore self-care/home management as related to dressing, bathing and toileting. Required minimal verbal and   cueing to facilitate activities of daily living skills and compensatory activities. Braces/Orthotics/Lines/Etc:   · IV  · covarrubias catheter  · Adult brief   · O2 Device: Nasal cannula  Treatment/Session Assessment:    · Response to Treatment:  Pt became dizzy and nauseated during standing  · Interdisciplinary Collaboration:   o Certified Occupational Therapy Assistant  o Registered Nurse  o Certified Nursing Assistant/Patient Care Technician  · After treatment position/precautions:   o Nurse at bedside  o Pt on BSC   · Compliance with Program/Exercises: Compliant most of the time. · Recommendations/Intent for next treatment session: \"Next visit will focus on advancements to more challenging activities and reduction in assistance provided\".   Total Treatment Duration:     OT Patient Time In/Time Out  Time In: 1100  Time Out: Annette 5 [employer]

## 2019-07-12 NOTE — PROGRESS NOTES
Care Management Interventions  PCP Verified by CM: Yes(saw nurse practioner at Internal medicine states Dr. Milly Jacobo has left group)  Palliative Care Criteria Met (RRAT>21 & CHF Dx)?: No(RRAT 23 Dx Resp failure)  Transition of Care Consult (CM Consult): Discharge Planning  Discharge Durable Medical Equipment: No(rollator, cane, shower chair and B/P cuff)  Physical Therapy Consult: Yes  Occupational Therapy Consult: Yes  Speech Therapy Consult: Yes  Current Support Network: Lives Alone  Confirm Follow Up Transport: Other (see comment)(she takes the [employer] service for handicap they come to her home and pick her up)  Plan discussed with Pt/Family/Caregiver: Yes  Freedom of Choice Offered: Yes  Discharge Location  Discharge Placement: Rehab hospital/unit acute(9th floor [facility])   Followed up with patient for d/c plans. Patient has bed on 9th floor when medically stable. Per Dr. Elkin Chacon can accept patient on Monday if medically stable and off Cardizem IV for 24 hours. Patient in agreement with d/c plan. She is to have cardioversion today. Patient asking about assistance at home and discussed options. She requested [facility] as she has Medicaid and may qualify for the program. CM sent electronic referral to [facility] with confirmation # [id number]. D/C plan is 9th floor on Monday if medically ready. CM following.

## 2019-07-13 ENCOUNTER — APPOINTMENT (OUTPATIENT)
Dept: GENERAL RADIOLOGY | Age: 74
DRG: 853 | End: 2019-07-13
Attending: INTERNAL MEDICINE
Payer: MEDICARE

## 2019-07-13 LAB
ANION GAP SERPL CALC-SCNC: 9 MMOL/L (ref 7–16)
BASOPHILS # BLD: 0.1 K/UL (ref 0–0.2)
BASOPHILS NFR BLD: 1 % (ref 0–2)
BUN SERPL-MCNC: 22 MG/DL (ref 8–23)
CALCIUM SERPL-MCNC: 8.4 MG/DL (ref 8.3–10.4)
CHLORIDE SERPL-SCNC: 103 MMOL/L (ref 98–107)
CO2 SERPL-SCNC: 28 MMOL/L (ref 21–32)
CREAT SERPL-MCNC: 1.13 MG/DL (ref 0.6–1)
DIFFERENTIAL METHOD BLD: ABNORMAL
EOSINOPHIL # BLD: 0.1 K/UL (ref 0–0.8)
EOSINOPHIL NFR BLD: 1 % (ref 0.5–7.8)
ERYTHROCYTE [DISTWIDTH] IN BLOOD BY AUTOMATED COUNT: 15.3 % (ref 11.9–14.6)
GLUCOSE SERPL-MCNC: 103 MG/DL (ref 65–100)
HCT VFR BLD AUTO: 29.9 % (ref 35.8–46.3)
HGB BLD-MCNC: 9.8 G/DL (ref 11.7–15.4)
IMM GRANULOCYTES # BLD AUTO: 0.1 K/UL (ref 0–0.5)
IMM GRANULOCYTES NFR BLD AUTO: 1 % (ref 0–5)
LYMPHOCYTES # BLD: 1.2 K/UL (ref 0.5–4.6)
LYMPHOCYTES NFR BLD: 13 % (ref 13–44)
MAGNESIUM SERPL-MCNC: 2 MG/DL (ref 1.8–2.4)
MCH RBC QN AUTO: 30.7 PG (ref 26.1–32.9)
MCHC RBC AUTO-ENTMCNC: 32.8 G/DL (ref 31.4–35)
MCV RBC AUTO: 93.7 FL (ref 79.6–97.8)
MONOCYTES # BLD: 0.9 K/UL (ref 0.1–1.3)
MONOCYTES NFR BLD: 10 % (ref 4–12)
NEUTS SEG # BLD: 7 K/UL (ref 1.7–8.2)
NEUTS SEG NFR BLD: 75 % (ref 43–78)
NRBC # BLD: 0 K/UL (ref 0–0.2)
PHOSPHATE SERPL-MCNC: 4.1 MG/DL (ref 2.3–3.7)
PLATELET # BLD AUTO: 244 K/UL (ref 150–450)
PMV BLD AUTO: 10.7 FL (ref 9.4–12.3)
POTASSIUM SERPL-SCNC: 3.8 MMOL/L (ref 3.5–5.1)
RBC # BLD AUTO: 3.19 M/UL (ref 4.05–5.2)
SODIUM SERPL-SCNC: 140 MMOL/L (ref 136–145)
WBC # BLD AUTO: 9.3 K/UL (ref 4.3–11.1)

## 2019-07-13 PROCEDURE — 74011250637 HC RX REV CODE- 250/637: Performed by: INTERNAL MEDICINE

## 2019-07-13 PROCEDURE — 80048 BASIC METABOLIC PNL TOTAL CA: CPT

## 2019-07-13 PROCEDURE — 84100 ASSAY OF PHOSPHORUS: CPT

## 2019-07-13 PROCEDURE — 85025 COMPLETE CBC W/AUTO DIFF WBC: CPT

## 2019-07-13 PROCEDURE — 65660000000 HC RM CCU STEPDOWN

## 2019-07-13 PROCEDURE — 74011250636 HC RX REV CODE- 250/636: Performed by: FAMILY MEDICINE

## 2019-07-13 PROCEDURE — 36591 DRAW BLOOD OFF VENOUS DEVICE: CPT

## 2019-07-13 PROCEDURE — 74011250636 HC RX REV CODE- 250/636: Performed by: INTERNAL MEDICINE

## 2019-07-13 PROCEDURE — 71045 X-RAY EXAM CHEST 1 VIEW: CPT

## 2019-07-13 PROCEDURE — 83735 ASSAY OF MAGNESIUM: CPT

## 2019-07-13 RX ADMIN — CARVEDILOL 6.25 MG: 6.25 TABLET, FILM COATED ORAL at 08:21

## 2019-07-13 RX ADMIN — Medication 10 ML: at 20:20

## 2019-07-13 RX ADMIN — FUROSEMIDE 20 MG: 10 INJECTION, SOLUTION INTRAMUSCULAR; INTRAVENOUS at 20:20

## 2019-07-13 RX ADMIN — PANTOPRAZOLE SODIUM 40 MG: 40 TABLET, DELAYED RELEASE ORAL at 06:03

## 2019-07-13 RX ADMIN — POTASSIUM CHLORIDE 20 MEQ: 1.5 POWDER, FOR SOLUTION ORAL at 08:22

## 2019-07-13 RX ADMIN — APIXABAN 5 MG: 5 TABLET, FILM COATED ORAL at 08:22

## 2019-07-13 RX ADMIN — FUROSEMIDE 20 MG: 10 INJECTION, SOLUTION INTRAMUSCULAR; INTRAVENOUS at 08:23

## 2019-07-13 RX ADMIN — APIXABAN 5 MG: 5 TABLET, FILM COATED ORAL at 20:20

## 2019-07-13 RX ADMIN — CARVEDILOL 6.25 MG: 6.25 TABLET, FILM COATED ORAL at 17:31

## 2019-07-13 RX ADMIN — AMIODARONE HYDROCHLORIDE 400 MG: 200 TABLET ORAL at 17:31

## 2019-07-13 RX ADMIN — ALTEPLASE 1 MG: KIT at 15:09

## 2019-07-13 RX ADMIN — Medication 40 ML: at 13:10

## 2019-07-13 RX ADMIN — Medication 10 ML: at 06:04

## 2019-07-13 RX ADMIN — POTASSIUM CHLORIDE 20 MEQ: 1.5 POWDER, FOR SOLUTION ORAL at 17:30

## 2019-07-13 RX ADMIN — AMIODARONE HYDROCHLORIDE 400 MG: 200 TABLET ORAL at 08:22

## 2019-07-13 RX ADMIN — SPIRONOLACTONE 25 MG: 25 TABLET ORAL at 08:22

## 2019-07-13 NOTE — PROGRESS NOTES
Verbal shift change report given to self (oncoming nurse) by Juliana Alicea RN (offgoing nurse). Report included the following information SBAR, Kardex, MAR, Recent Results and Cardiac Rhythm SR. Pt sleeping at this time.

## 2019-07-13 NOTE — PROGRESS NOTES
Problem: Pressure Injury - Risk of  Goal: *Prevention of pressure injury  Description  Document Mark Scale and appropriate interventions in the flowsheet. Outcome: Progressing Towards Goal   Allevyn in place on sacrum. Problem: Patient Education: Go to Patient Education Activity  Goal: Patient/Family Education  Outcome: Progressing Towards Goal     Problem: Falls - Risk of  Goal: *Absence of Falls  Description  Document Deangelo Drake Fall Risk and appropriate interventions in the flowsheet. Outcome: Progressing Towards Goal     Problem: Patient Education: Go to Patient Education Activity  Goal: Patient/Family Education  Outcome: Progressing Towards Goal     Problem: Breathing Pattern - Ineffective  Goal: *Absence of hypoxia  Outcome: Progressing Towards Goal  RA  Goal: *Use of effective breathing techniques  Outcome: Progressing Towards Goal   No respiratory distress on assessment  Problem: Nutrition Deficit  Goal: *Optimize nutritional status  Outcome: Progressing Towards Goal   Per pt poor appetite. Encouraged pt that snacks can be provided.

## 2019-07-13 NOTE — PROGRESS NOTES
Hospitalist Progress Note     Admit Date:  7/3/2019  3:02 AM   Name:  Therese Tanner   Age:  74 y.o.  :  1945   MRN:  [id number]   PCP:  Nan Tanner MD  Treatment Team: Attending Provider: Tabitha Alexis MD; Consulting Provider: Guido Sevilla MD; Consulting Provider: Cindi Ricks MD; Utilization Review: Viv José RN; Consulting Provider: Donald Reinoso MD; Care Manager: Belgica Chaudhary RN; Consulting Provider: Sushant Sales MD    Subjective:   Ms. Beny Flanagan is a 73 y/o WF admitted to ICU on 7/3 with respiratory failure after being intubated. Developed rapid AFib, cardiology consulted. Started on abx and IV diuresis. Extubated  and sent to floor . Hospitalist assumed care .    19   Pt had cardioversion for persistent afib on  19- now in sinus  Says doing fine      Objective:     Patient Vitals for the past 24 hrs:   Temp Pulse Resp BP SpO2   19 0820 98.3 °F (36.8 °C) 80 18 145/69 97 %   19 0611 98.6 °F (37 °C) 76 18 123/58 96 %   19 0044 99.5 °F (37.5 °C) 81 18 150/64 97 %   19 2130 99.4 °F (37.4 °C) 77 18 139/64 97 %   19 2037  78  123/53    19 1905     97 %   19 1645 99 °F (37.2 °C) 86 18 140/44 94 %   19 1424 98.6 °F (37 °C) 75 18 128/57 96 %     Oxygen Therapy  O2 Sat (%): 97 % (19 0820)  Pulse via Oximetry: 85 beats per minute (19 1905)  O2 Device: Room air (19 1040)  O2 Flow Rate (L/min): 2 l/min (19 1310)  FIO2 (%): 28 % (19 0718)    Intake/Output Summary (Last 24 hours) at 2019 1339  Last data filed at 2019 1213  Gross per 24 hour   Intake 390 ml   Output 2020 ml   Net -1630 ml         General:    Well nourished. Alert. On oxygen  heent- normal   CV:   sinus  No murmur, rub, or gallop. Lungs:   Mild basilar crepitations  Abdomen:   Soft, nontender, nondistended. Cns- no focal neurological deficits  Extremities: Warm and dry. No cyanosis or edema.    Skin: No rashes or jaundice. Data Review:  I have reviewed all labs, meds, telemetry events, and studies from the last 24 hours. Recent Results (from the past 24 hour(s))   METABOLIC PANEL, BASIC    Collection Time: 07/13/19  3:22 AM   Result Value Ref Range    Sodium 140 136 - 145 mmol/L    Potassium 3.8 3.5 - 5.1 mmol/L    Chloride 103 98 - 107 mmol/L    CO2 28 21 - 32 mmol/L    Anion gap 9 7 - 16 mmol/L    Glucose 103 (H) 65 - 100 mg/dL    BUN 22 8 - 23 MG/DL    Creatinine 1.13 (H) 0.6 - 1.0 MG/DL    GFR est AA >60 >60 ml/min/1.73m2    GFR est non-AA 50 (L) >60 ml/min/1.73m2    Calcium 8.4 8.3 - 10.4 MG/DL   MAGNESIUM    Collection Time: 07/13/19  3:22 AM   Result Value Ref Range    Magnesium 2.0 1.8 - 2.4 mg/dL   PHOSPHORUS    Collection Time: 07/13/19  3:22 AM   Result Value Ref Range    Phosphorus 4.1 (H) 2.3 - 3.7 MG/DL   CBC WITH AUTOMATED DIFF    Collection Time: 07/13/19  3:22 AM   Result Value Ref Range    WBC 9.3 4.3 - 11.1 K/uL    RBC 3.19 (L) 4.05 - 5.2 M/uL    HGB 9.8 (L) 11.7 - 15.4 g/dL    HCT 29.9 (L) 35.8 - 46.3 %    MCV 93.7 79.6 - 97.8 FL    MCH 30.7 26.1 - 32.9 PG    MCHC 32.8 31.4 - 35.0 g/dL    RDW 15.3 (H) 11.9 - 14.6 %    PLATELET 725 259 - 681 K/uL    MPV 10.7 9.4 - 12.3 FL    ABSOLUTE NRBC 0.00 0.0 - 0.2 K/uL    DF AUTOMATED      NEUTROPHILS 75 43 - 78 %    LYMPHOCYTES 13 13 - 44 %    MONOCYTES 10 4.0 - 12.0 %    EOSINOPHILS 1 0.5 - 7.8 %    BASOPHILS 1 0.0 - 2.0 %    IMMATURE GRANULOCYTES 1 0.0 - 5.0 %    ABS. NEUTROPHILS 7.0 1.7 - 8.2 K/UL    ABS. LYMPHOCYTES 1.2 0.5 - 4.6 K/UL    ABS. MONOCYTES 0.9 0.1 - 1.3 K/UL    ABS. EOSINOPHILS 0.1 0.0 - 0.8 K/UL    ABS. BASOPHILS 0.1 0.0 - 0.2 K/UL    ABS. IMM.  GRANS. 0.1 0.0 - 0.5 K/UL        All Micro Results     Procedure Component Value Units Date/Time    VIRAL CULTURE - INCLUDES ADENOVIRUS, CMV, ENTEROVIRUSES, HSV, INFLUENZA, MUMPS, PARAINFLUENZA, RSV, & VARICELLA ZOSTER [[id number]] Collected:  07/03/19 1245    Order Status:  Completed Specimen:  Other from Miscellaneous sample Updated:  19 0936     Source BRONCHIAL LAVAGE        Viral Culture, General No virus isolated. Comment: (NOTE)  Performed At: [address] [id number]  Edwardo Bruce MD MV:[id number]  Corrected on  AT 4748: This is a correction to the medical record of the test results previously reported as Comment         ANAEROBIC CULTURE - ONLY PERFORMED ON BRONCHIAL BRUSHING [[id number]]  (Abnormal) Collected:  19    Order Status:  Completed Specimen:  Bronchial lavage Updated:  07/10/19 1151     Special Requests: NO SPECIAL REQUESTS        Culture result:       SCANT ANAEROBIC GRAM POSITIVE RODS            ISOLATE SENT TO   [facility]  07/10/19      ATYPICAL PNEUMONIA BY PCR,BRONCHOALVEOLAR LAVAGE - INCLUDES: MYCOPLASMA PNEUMONIAE AND CHLAMYDIA PNEUMONIAE [[id number]] Collected:  19    Order Status:  Completed Specimen:  Bronchial lavage Updated:  19 [address]     M. PNEUMONIAE, MPPT Not Detected     C. PNEUMONAIE, CPPT Not Detected     Comment: (NOTE)  This test was developed and its performance  characteristics determined by [employer]. It has not  been cleared or approved by the U.S. Food and Drug  Administration. Results should be used in conjunction with  clinical findings, and should not form the sole basis for  a diagnosis or treatment decision. Performed At: [facility]  [address] [place], [place] [id number]  Shayan Perea PhD N         A. GALACTOMANNAN BY EIA, BRONCHOALVEOLAR LAVAGE [[id number]] Collected:  19    Order Status:  Completed Specimen:  Bronchial lavage Updated:  19 1735     A. GALACTOMANNAN, AGPT 0.071        Comment: (NOTE)  Interpretation:   Patients with an index value of greater  than or equal to 0.5 are considered to be positive for  galactomannan antigen.   The Platelia(TM) Aspergillus EIA  package insert also recommends a new sample be collected  from the patient for follow-up testing. Patients with an  index value of less than 0.5 are considered to be negative  for galactomannan antigen. A negative result may indicate  that the patient's result is below the detectable level of  the assay. Negative results do not rule out the diagnosis of Invasive  Aspergillosis. Pursuant to the package insert, repeat  testing is recommended if the result is negative, but the  disease is suspected. Due to the potential for  environmental contamination when transferred to pour-off  tubes, which can lead to false positive results, interpret  positive results from samples provided in pour-off tubes  with caution. Results should be used in conjunction with  clinical findings, and should not form the sole basis for  a diagnosis or treatment decision. The Platelia Aspergillus Galactomannan EIA is a product of  [employer] and is FDA approved for in vitro diagnostic use.   Performed At: [facility] [place], [place] [id number]  Maryanne Vines PhD NZ:[id number]         CULTURE, BLOOD [[id number]] Collected:  07/03/19 0515    Order Status:  Completed Specimen:  Blood Updated:  07/08/19 1137     Special Requests: CENTRAL LINE     Culture result: NO GROWTH 5 DAYS       CULTURE, BLOOD [[id number]] Collected:  07/03/19 0641    Order Status:  Completed Specimen:  Blood Updated:  07/08/19 1137     Special Requests: Marivel Marie     Culture result: NO GROWTH 5 DAYS       RESPIRATORY VIRAL PANEL, PCR [[id number]] Collected:  07/03/19 1245    Order Status:  Completed Specimen:  Respiratory sample Updated:  07/07/19 2335     Source BRONCHIAL LAVAGE        Influenza A NEGATIVE         Influenza B NEGATIVE         RSV A NEGATIVE         RSV B NEGATIVE         Parainfluenza 1 NEGATIVE         Parainfluenza 2 NEGATIVE         Parainfluenza 3 NEGATIVE         Rhinovirus NEGATIVE         Metapneumovirus NEGATIVE         Adenovirus NEGATIVE         Comment: (NOTE)  Performed At: [facility]  [facility] [address], [place] [id number]  Rigobetro Chaney MD XL:[id number]         [facility] CULTURE AND SMEAR Lashay Lorenzo [[id number]] Collected:  07/03/19 1245    Order Status:  Completed Specimen:  Other from Miscellaneous sample Updated:  07/06/19 0836     Source BRONCHIAL LAVAGE        Fungus stain Direct Inoculation     Fungus (Mycology) Culture Other source received     Comment: (NOTE)  Performed At: [facility]  [address] [place] [id number]  Rigoberto Chaney MD KM:[id number]         CULTURE, RESPIRATORY/SPUTUM/BRONCH Rosmery Luke [[id number]] Collected:  07/03/19 0643    Order Status:  Completed Specimen:  Sputum Updated:  07/05/19 1214     Special Requests: NO SPECIAL REQUESTS        GRAM STAIN 0 to 2 WBCS/OIF      NO EPITHELIAL CELLS SEEN         FEW GRAM POSITIVE COCCI         2+ MUCUS PRESENT        Culture result:       HEAVY NORMAL RESPIRATORY ROBEL          CULTURE, RESPIRATORY/SPUTUM/BRONCH Rosmery Metz [[id number]] Collected:  07/03/19 1245    Order Status:  Completed Specimen:  Sputum from Bronchial lavage Updated:  07/05/19 0743     Special Requests: NO SPECIAL REQUESTS        GRAM STAIN NO WBCS SEEN         NO DEFINITE ORGANISM SEEN        Culture result: NO GROWTH 2 DAYS       AFB (MYCOBACTERIUM) CULTURE & SMEAR W/REFLEX ID Marylu Chase NOCARDIA [[id number]] Collected:  07/03/19 1245    Order Status:  Completed Specimen:  Miscellaneous sample Updated:  07/04/19 1336     Source BRONCHIAL LAVAGE        AFB Specimen processing Concentration     Acid Fast Smear NEGATIVE         Comment: (NOTE)  Performed At: [facility] [address] [place] [id number]  Rigoberto Chaney MD [id number]          Acid Fast Culture PENDING          Current Meds:  Current Facility-Administered Medications   Medication Dose Route Frequency    fentaNYL citrate (PF) injection 25-50 mcg  25-50 mcg IntraVENous Multiple  midazolam (VERSED) injection 0.5-2 mg  0.5-2 mg IntraVENous Multiple    lidocaine (XYLOCAINE) 2 % viscous solution 15 mL  15 mL Mouth/Throat PRN    carvedilol (COREG) tablet 6.25 mg  6.25 mg Oral BID WITH MEALS    amiodarone (CORDARONE) tablet 400 mg  400 mg Oral BID    loperamide (IMODIUM) capsule 2 mg  2 mg Oral Q4H PRN    spironolactone (ALDACTONE) tablet 25 mg  25 mg Oral DAILY    levalbuterol (XOPENEX) nebulizer soln 0.63 mg/3 mL  0.63 mg Nebulization Q6H PRN    furosemide (LASIX) injection 20 mg  20 mg IntraVENous Q12H    potassium chloride (KLOR-CON) packet for solution 20 mEq  20 mEq Oral BID WITH MEALS    NUTRITIONAL SUPPORT ELECTROLYTE PRN ORDERS   Does Not Apply PRN    pantoprazole (PROTONIX) tablet 40 mg  40 mg Oral ACB    bisacodyl (DULCOLAX) tablet 5 mg  5 mg Oral DAILY PRN    apixaban (ELIQUIS) tablet 5 mg  5 mg Oral Q12H    temazepam (RESTORIL) capsule 15 mg  15 mg Oral QHS PRN    sodium chloride (NS) flush 5-40 mL  5-40 mL IntraVENous Q8H    sodium chloride (NS) flush 5-40 mL  5-40 mL IntraVENous PRN    dilTIAZem (CARDIZEM) 125 mg in dextrose 5% 125 mL infusion  0-15 mg/hr IntraVENous TITRATE       Other Studies (last 24 hours):  Xr Chest Sngl V    Result Date: 7/13/2019  EXAM: XR CHEST SNGL V INDICATION: SOB COMPARISON: 7/12/2019 FINDINGS: A portable AP radiograph of the chest was obtained at 0423 hours. The patient is on a cardiac monitor. Bibasilar airspace disease unchanged. The cardiac and mediastinal contours and pulmonary vascularity are normal.  The bones and soft tissues are grossly within normal limits. IMPRESSION: Bibasilar atelectasis or pneumonia unchanged.       Assessment and Plan:     Hospital Problems as of 7/13/2019 Date Reviewed: 7/5/2019          Codes Class Noted - Resolved POA    * (Principal) Acute hypoxemic respiratory failure (HCC) ICD-10-CM: J96.01  ICD-9-CM: 518.81  7/3/2019 - Present Unknown        Atrial fibrillation with rapid ventricular response ([facility] vs WPW in patient with history of ablation ICD-10-CM: I48.91  ICD-9-CM: 427.31  7/3/2019 - Present Unknown        Pulmonary infiltrates vs acute congestive heart failure ICD-10-CM: R91.8  ICD-9-CM: 793.19  7/3/2019 - Present Unknown        Severe sepsis [facility]) ICD-10-CM: A41.9, R65.20  ICD-9-CM: 038.9, 995.92  7/3/2019 - Present Unknown        Hypotension due to hypovolemia ICD-10-CM: I95.89, E86.1  ICD-9-CM: 458.8, 276.52  7/3/2019 - Present Unknown        Polymyalgia rheumatica ([facility] ICD-10-CM: M35.3  ICD-9-CM: 723  12/2/2015 - Present Yes        HTN (hypertension) ICD-10-CM: I10  ICD-9-CM: 401.9  8/12/2013 - Present Yes        Severe obesity with body mass index (BMI) of 35.0 to 39.9 with comorbidity ([facility] ICD-10-CM: E66.01  ICD-9-CM: 278.01  8/12/2013 - Present Yes        Sjogren's syndrome ([facility] ICD-10-CM: M35.00  ICD-9-CM: 710.2  8/12/2013 - Present Yes              PLAN:    Acute respiratory failure- during the stay intubated- extubated- presently on nasal canula 2 lit/min- finished course of antibiotics  Acute volume overload- echo ef 67-61%- grade 1 diastolic chf- good diuresis  Afib with rvr- cont eliquis,on cardizem drip- cardiology following-- had cardiversion today 7/12/19- off drip. Hypokalemia- resolved  htn  Hypotension resolved. Poly myalgia rheumatica- off steroids  Diarrhea - resolved- on prn immodium. DC planning/Dispo:    DVT ppx:  eliquis.     Signed:  Karin Guerra MD

## 2019-07-13 NOTE — PROGRESS NOTES
Bedside and Verbal shift change report given to self (oncoming nurse) by Melissa Ortiz RN (offgoing nurse).  Report included the following information SBAR, Kardex, Intake/Output, MAR, Recent Results and Cardiac Rhythm SR.

## 2019-07-13 NOTE — PROGRESS NOTES
Bedside and Verbal shift change report given to Melody Martin RN (oncoming nurse) by self (offgoing nurse).  Report included the following information SBAR, Kardex, ED Summary, Procedure Summary, Intake/Output, MAR, Recent Results and Cardiac Rhythm SR   .

## 2019-07-13 NOTE — PROGRESS NOTES
Bedside and Verbal shift change report given to Art Merida RN (oncoming nurse) by self Nila crocker). Report included the following information SBAR, Kardex, MAR and Recent Results.

## 2019-07-13 NOTE — PROGRESS NOTES
[facility] CARDIOLOGY PROGRESS NOTE           7/13/2019 7:57 AM    Admit Date: 7/3/2019      Subjective:   No complaints. ROS:  GEN:  No fever or chills  Cardiovascular:  As noted above:no CP or palpitations. Pulmonary:  As noted above:mild SOB. Neuro:  No new focal motor or sensory loss    Objective:      Vitals:    07/12/19 2130 07/13/19 0044 07/13/19 0445 07/13/19 0611   BP: 139/64 150/64  123/58   Pulse: 77 81  76   Resp: 18 18  18   Temp: 99.4 °F (37.4 °C) 99.5 °F (37.5 °C)  98.6 °F (37 °C)   SpO2: 97% 97%  96%   Weight:   88 kg (193 lb 14.4 oz)    Height:           Physical Exam:  General-no distress  Neck- supple, no JVD  CV- regular rate and rhythm no MRG  Lung- clear bilaterally  Abd- soft, nontender, nondistended  Ext- no edema bilaterally. Skin- warm and dry  Psychiatric:  Normal mood and affect. Neurologic:  Alert and oriented X 3      Data Review:   Recent Labs     07/13/19  0322 07/12/19  0423    141   K 3.8 4.0   MG 2.0 2.1   BUN 22 15   CREA 1.13* 1.08*   * 104*   WBC 9.3 7.8   HGB 9.8* 10.2*   HCT 29.9* 31.8*    227       TELEMETRY:  NSR    Assessment/Plan:     Principal Problem:    Acute hypoxemic respiratory failure ([facility] (7/3/2019): Improving. Managed by Pulmonary Medicine. Active Problems:    HTN (hypertension) (8/12/2013):stable on current medications. Severe obesity with body mass index (BMI) of 35.0 to 39.9 with comorbidity ([facility] (8/12/2013)      Sjogren's syndrome ([facility] (8/12/2013)      Polymyalgia rheumatica ([facility] (12/2/2015)      Atrial fibrillation with rapid ventricular response ([facility] vs WPW in patient with history of ablation (7/3/2019):Remains in NSR after eCv. Continue Amiodarone,Coreg,and Eliquis. Pulmonary infiltrates vs acute congestive heart failure (7/3/2019)      Severe sepsis ([facility] (7/3/2019)      Hypotension due to hypovolemia (7/3/2019):resolved.                 Mark Ríos MD  7/13/2019 7:57 AM

## 2019-07-13 NOTE — PROCEDURES
[facility]  CARDIAC CATH    Name:  Mahamed Milan  MR#:  [id number]  :  1945  ACCOUNT #:  [de-identified]  DATE OF SERVICE:  2019    PROCEDURES PERFORMED:  1. Transesophageal echocardiogram.  2.  Electrical cardioversion. PREOPERATIVE DIAGNOSIS:  Persistent atrial fibrillation. POSTOPERATIVE DIAGNOSIS:  Persistent atrial fibrillation. SURGEON:  Edison Pichardo MD    ASSISTANT:  None. ESTIMATED BLOOD LOSS:  None. SPECIMENS REMOVED:  None. COMPLICATIONS:  None. IMPLANTS:  None. ANESTHESIA:  Conscious sedation. HISTORY:  This is a 20-year-old lady on oral anticoagulation with persistent atrial fibrillation with poor rate control. A ESHA followed by electrical cardioversion is recommended. PROCEDURE:  After adequate sedation and topical oropharyngeal anesthesia, a transesophageal echocardiogram was easily performed. See the separate report for details. There was no clot identified. She was then shocked with 200 joules biphasic pads AP. This resulted in resumption of normal sinus rhythm.       Vijay Martinez MD      [id number]  D:  2019 18:12  T:  2019 18:23  JOB #:  [id number]

## 2019-07-13 NOTE — PROCEDURES
[facility]  CARDIAC CATH    Name:  Panchito Wooten  MR#:  [id number]  :  1945  ACCOUNT #:  [de-identified]  DATE OF SERVICE:  2019      PROCEDURES PERFORMED:  1. Transesophageal echocardiogram.  2.  Electrical cardioversion. PREOPERATIVE DIAGNOSIS:  Persistent atrial fibrillation. POSTOPERATIVE DIAGNOSIS:  Persistent atrial fibrillation. SURGEON:  Jameson Rosales MD    ASSISTANT:  None. ESTIMATED BLOOD LOSS:  None. SPECIMENS REMOVED:  None. COMPLICATIONS:  None. IMPLANTS:  None. ANESTHESIA:  Conscious sedation. HISTORY:  This is a 68-year-old lady with persistent symptomatic atrial fibrillation with poor rate control. A cardioversion is recommended. PROCEDURE:  A transesophageal echocardiogram was easily performed by standard technique. See separate report. There was no clot identified. She was sedated with Versed and then was shocked with 200 joules which was successful in restoring normal sinus rhythm.       Barry Swan MD      [id number]  D:  2019 13:18  T:  2019 15:29  JOB #:  [id number]

## 2019-07-14 ENCOUNTER — APPOINTMENT (OUTPATIENT)
Dept: GENERAL RADIOLOGY | Age: 74
DRG: 853 | End: 2019-07-14
Attending: INTERNAL MEDICINE
Payer: MEDICARE

## 2019-07-14 LAB
ANION GAP SERPL CALC-SCNC: 9 MMOL/L (ref 7–16)
BASOPHILS # BLD: 0.1 K/UL (ref 0–0.2)
BASOPHILS NFR BLD: 1 % (ref 0–2)
BUN SERPL-MCNC: 21 MG/DL (ref 8–23)
CALCIUM SERPL-MCNC: 8.6 MG/DL (ref 8.3–10.4)
CHLORIDE SERPL-SCNC: 102 MMOL/L (ref 98–107)
CO2 SERPL-SCNC: 29 MMOL/L (ref 21–32)
CREAT SERPL-MCNC: 1.22 MG/DL (ref 0.6–1)
DIFFERENTIAL METHOD BLD: ABNORMAL
EOSINOPHIL # BLD: 0.1 K/UL (ref 0–0.8)
EOSINOPHIL NFR BLD: 1 % (ref 0.5–7.8)
ERYTHROCYTE [DISTWIDTH] IN BLOOD BY AUTOMATED COUNT: 15 % (ref 11.9–14.6)
GLUCOSE SERPL-MCNC: 110 MG/DL (ref 65–100)
HCT VFR BLD AUTO: 30.5 % (ref 35.8–46.3)
HGB BLD-MCNC: 9.8 G/DL (ref 11.7–15.4)
IMM GRANULOCYTES # BLD AUTO: 0 K/UL (ref 0–0.5)
IMM GRANULOCYTES NFR BLD AUTO: 1 % (ref 0–5)
LYMPHOCYTES # BLD: 1.4 K/UL (ref 0.5–4.6)
LYMPHOCYTES NFR BLD: 16 % (ref 13–44)
MAGNESIUM SERPL-MCNC: 1.9 MG/DL (ref 1.8–2.4)
MCH RBC QN AUTO: 30.2 PG (ref 26.1–32.9)
MCHC RBC AUTO-ENTMCNC: 32.1 G/DL (ref 31.4–35)
MCV RBC AUTO: 94.1 FL (ref 79.6–97.8)
MONOCYTES # BLD: 0.9 K/UL (ref 0.1–1.3)
MONOCYTES NFR BLD: 10 % (ref 4–12)
NEUTS SEG # BLD: 6.3 K/UL (ref 1.7–8.2)
NEUTS SEG NFR BLD: 71 % (ref 43–78)
NRBC # BLD: 0 K/UL (ref 0–0.2)
PHOSPHATE SERPL-MCNC: 3.5 MG/DL (ref 2.3–3.7)
PLATELET # BLD AUTO: 255 K/UL (ref 150–450)
PMV BLD AUTO: 10.7 FL (ref 9.4–12.3)
POTASSIUM SERPL-SCNC: 4 MMOL/L (ref 3.5–5.1)
RBC # BLD AUTO: 3.24 M/UL (ref 4.05–5.2)
SODIUM SERPL-SCNC: 140 MMOL/L (ref 136–145)
WBC # BLD AUTO: 8.8 K/UL (ref 4.3–11.1)

## 2019-07-14 PROCEDURE — 74011250637 HC RX REV CODE- 250/637: Performed by: FAMILY MEDICINE

## 2019-07-14 PROCEDURE — 80048 BASIC METABOLIC PNL TOTAL CA: CPT

## 2019-07-14 PROCEDURE — 65660000000 HC RM CCU STEPDOWN

## 2019-07-14 PROCEDURE — 74011250637 HC RX REV CODE- 250/637: Performed by: INTERNAL MEDICINE

## 2019-07-14 PROCEDURE — 83735 ASSAY OF MAGNESIUM: CPT

## 2019-07-14 PROCEDURE — 84100 ASSAY OF PHOSPHORUS: CPT

## 2019-07-14 PROCEDURE — 85025 COMPLETE CBC W/AUTO DIFF WBC: CPT

## 2019-07-14 PROCEDURE — 36591 DRAW BLOOD OFF VENOUS DEVICE: CPT

## 2019-07-14 PROCEDURE — 71045 X-RAY EXAM CHEST 1 VIEW: CPT

## 2019-07-14 RX ORDER — FUROSEMIDE 20 MG/1
20 TABLET ORAL DAILY
Status: DISCONTINUED | OUTPATIENT
Start: 2019-07-14 | End: 2019-07-15 | Stop reason: HOSPADM

## 2019-07-14 RX ORDER — IBUPROFEN 600 MG/1
600 TABLET ORAL
Status: DISCONTINUED | OUTPATIENT
Start: 2019-07-14 | End: 2019-07-15 | Stop reason: HOSPADM

## 2019-07-14 RX ORDER — NYSTATIN 100000 [USP'U]/G
POWDER TOPICAL 2 TIMES DAILY
Status: DISCONTINUED | OUTPATIENT
Start: 2019-07-14 | End: 2019-07-15 | Stop reason: HOSPADM

## 2019-07-14 RX ADMIN — NYSTATIN: 100000 POWDER TOPICAL at 11:00

## 2019-07-14 RX ADMIN — NYSTATIN: 100000 POWDER TOPICAL at 17:54

## 2019-07-14 RX ADMIN — Medication 10 ML: at 14:16

## 2019-07-14 RX ADMIN — SPIRONOLACTONE 25 MG: 25 TABLET ORAL at 09:14

## 2019-07-14 RX ADMIN — POTASSIUM CHLORIDE 20 MEQ: 1.5 POWDER, FOR SOLUTION ORAL at 09:14

## 2019-07-14 RX ADMIN — Medication 10 ML: at 20:40

## 2019-07-14 RX ADMIN — PANTOPRAZOLE SODIUM 40 MG: 40 TABLET, DELAYED RELEASE ORAL at 05:19

## 2019-07-14 RX ADMIN — FUROSEMIDE 20 MG: 20 TABLET ORAL at 09:17

## 2019-07-14 RX ADMIN — IBUPROFEN 600 MG: 600 TABLET ORAL at 06:40

## 2019-07-14 RX ADMIN — APIXABAN 5 MG: 5 TABLET, FILM COATED ORAL at 09:14

## 2019-07-14 RX ADMIN — APIXABAN 5 MG: 5 TABLET, FILM COATED ORAL at 20:39

## 2019-07-14 RX ADMIN — CARVEDILOL 6.25 MG: 6.25 TABLET, FILM COATED ORAL at 09:14

## 2019-07-14 RX ADMIN — AMIODARONE HYDROCHLORIDE 400 MG: 200 TABLET ORAL at 09:14

## 2019-07-14 RX ADMIN — POTASSIUM CHLORIDE 20 MEQ: 1.5 POWDER, FOR SOLUTION ORAL at 17:54

## 2019-07-14 RX ADMIN — CARVEDILOL 6.25 MG: 6.25 TABLET, FILM COATED ORAL at 17:54

## 2019-07-14 RX ADMIN — Medication 10 ML: at 05:22

## 2019-07-14 RX ADMIN — AMIODARONE HYDROCHLORIDE 400 MG: 200 TABLET ORAL at 17:54

## 2019-07-14 NOTE — PROGRESS NOTES
Hospitalist Progress Note     Admit Date:  7/3/2019  3:02 AM   Name:  Alfred Solis   Age:  74 y.o.  :  1945   MRN:  [id number]   PCP:  Daniel Cole MD  Treatment Team: Attending Provider: Forest Mc MD; Consulting Provider: Demar Yung MD; Consulting Provider: Sheldon Junior MD; Utilization Review: Shelley Granados RN; Consulting Provider: Hailee Kim MD; Care Manager: Jorge Velasquez RN; Consulting Provider: Marlon Marks MD    Subjective:   Ms. Kamla Martini is a 75 y/o WF admitted to ICU on 7/3 with respiratory failure after being intubated. Developed rapid AFib, cardiology consulted. Started on abx and IV diuresis. Extubated  and sent to floor . Hospitalist assumed care . Was on cardizem drip for afib,cont diuretics. Finished course of antibiotics. Pt had cardioversion for persistent afib on  19- now in sinus. Off cardizem drip. 19   says doing fine- mild generalized weakness      Objective:     Patient Vitals for the past 24 hrs:   Temp Pulse Resp BP SpO2   19 98.2 °F (36.8 °C) 79 18 104/66 97 %   19 0538 98.9 °F (37.2 °C) 79 16 127/52 96 %   19 0024 99.1 °F (37.3 °C) 78 16 118/48 96 %   19 2018 99.3 °F (37.4 °C) 79 18 119/51 99 %   19 1631 97.6 °F (36.4 °C) 85 18 137/51 97 %   19 1325 97.5 °F (36.4 °C) 84 18 122/48 97 %     Oxygen Therapy  O2 Sat (%): 97 % (19)  Pulse via Oximetry: 85 beats per minute (19 1905)  O2 Device: Room air (19)  O2 Flow Rate (L/min): 2 l/min (19 1310)  FIO2 (%): 28 % (07/08/19 0718)    Intake/Output Summary (Last 24 hours) at 2019 1113  Last data filed at 2019 4886  Gross per 24 hour   Intake 320 ml   Output 1120 ml   Net -800 ml         General:    Well nourished. Alert. On oxygen  heent- normal   CV:   sinus  No murmur, rub, or gallop. Lungs:   Mild basilar crepitations  Abdomen:   Soft, nontender, nondistended.    Cns- no focal neurological deficits  Extremities: Warm and dry. No cyanosis or edema. Skin:     No rashes or jaundice. Data Review:  I have reviewed all labs, meds, telemetry events, and studies from the last 24 hours. Recent Results (from the past 24 hour(s))   METABOLIC PANEL, BASIC    Collection Time: 07/14/19  3:19 AM   Result Value Ref Range    Sodium 140 136 - 145 mmol/L    Potassium 4.0 3.5 - 5.1 mmol/L    Chloride 102 98 - 107 mmol/L    CO2 29 21 - 32 mmol/L    Anion gap 9 7 - 16 mmol/L    Glucose 110 (H) 65 - 100 mg/dL    BUN 21 8 - 23 MG/DL    Creatinine 1.22 (H) 0.6 - 1.0 MG/DL    GFR est AA 55 (L) >60 ml/min/1.73m2    GFR est non-AA 46 (L) >60 ml/min/1.73m2    Calcium 8.6 8.3 - 10.4 MG/DL   MAGNESIUM    Collection Time: 07/14/19  3:19 AM   Result Value Ref Range    Magnesium 1.9 1.8 - 2.4 mg/dL   PHOSPHORUS    Collection Time: 07/14/19  3:19 AM   Result Value Ref Range    Phosphorus 3.5 2.3 - 3.7 MG/DL   CBC WITH AUTOMATED DIFF    Collection Time: 07/14/19  3:19 AM   Result Value Ref Range    WBC 8.8 4.3 - 11.1 K/uL    RBC 3.24 (L) 4.05 - 5.2 M/uL    HGB 9.8 (L) 11.7 - 15.4 g/dL    HCT 30.5 (L) 35.8 - 46.3 %    MCV 94.1 79.6 - 97.8 FL    MCH 30.2 26.1 - 32.9 PG    MCHC 32.1 31.4 - 35.0 g/dL    RDW 15.0 (H) 11.9 - 14.6 %    PLATELET 650 040 - 606 K/uL    MPV 10.7 9.4 - 12.3 FL    ABSOLUTE NRBC 0.00 0.0 - 0.2 K/uL    DF AUTOMATED      NEUTROPHILS 71 43 - 78 %    LYMPHOCYTES 16 13 - 44 %    MONOCYTES 10 4.0 - 12.0 %    EOSINOPHILS 1 0.5 - 7.8 %    BASOPHILS 1 0.0 - 2.0 %    IMMATURE GRANULOCYTES 1 0.0 - 5.0 %    ABS. NEUTROPHILS 6.3 1.7 - 8.2 K/UL    ABS. LYMPHOCYTES 1.4 0.5 - 4.6 K/UL    ABS. MONOCYTES 0.9 0.1 - 1.3 K/UL    ABS. EOSINOPHILS 0.1 0.0 - 0.8 K/UL    ABS. BASOPHILS 0.1 0.0 - 0.2 K/UL    ABS. IMM.  GRANS. 0.0 0.0 - 0.5 K/UL        All Micro Results     Procedure Component Value Units Date/Time    VIRAL CULTURE - INCLUDES ADENOVIRUS, CMV, ENTEROVIRUSES, HSV, INFLUENZA, MUMPS, PARAINFLUENZA, RSV, & VARICELLA ZOSTER [[id number]] Collected:  07/03/19 1245    Order Status:  Completed Specimen:  Other from Miscellaneous sample Updated:  07/11/19 0936     Source BRONCHIAL LAVAGE        Viral Culture, General No virus isolated. Comment: (NOTE)  Performed At: [address] [id number]  Henrique White MD ZANE:[id number]  Corrected on 07/11 AT 8422: This is a correction to the medical record of the test results previously reported as Comment         ANAEROBIC CULTURE - ONLY PERFORMED ON BRONCHIAL BRUSHING [[id number]]  (Abnormal) Collected:  07/03/19 1245    Order Status:  Completed Specimen:  Bronchial lavage Updated:  07/10/19 1151     Special Requests: NO SPECIAL REQUESTS        Culture result:       SCANT ANAEROBIC GRAM POSITIVE RODS            ISOLATE SENT TO   [facility]  07/10/19      ATYPICAL PNEUMONIA BY PCR,BRONCHOALVEOLAR LAVAGE - INCLUDES: MYCOPLASMA PNEUMONIAE AND CHLAMYDIA PNEUMONIAE [[id number]] Collected:  07/03/19 1245    Order Status:  Completed Specimen:  Bronchial lavage Updated:  07/08/19 [address]     M. PNEUMONIAE, MPPT Not Detected     C. PNEUMONAIE, CPPT Not Detected     Comment: (NOTE)  This test was developed and its performance  characteristics determined by [employer]. It has not  been cleared or approved by the U.S. Food and Drug  Administration. Results should be used in conjunction with  clinical findings, and should not form the sole basis for  a diagnosis or treatment decision. Performed At: [facility]  [address] [place], [place] [id number]  Shabnam Swanson PhD KB:[id number]         A. GALACTOMANNAN BY EIA, BRONCHOALVEOLAR LAVAGE [[id number]] Collected:  07/03/19 1245    Order Status:  Completed Specimen:  Bronchial lavage Updated:  07/08/19 1735     A.  GALACTOMANNAN, AGPT 0.071        Comment: (NOTE)  Interpretation:   Patients with an index value of greater  than or equal to 0.5 are considered to be positive for  galactomannan antigen. The Platelia(TM) Aspergillus EIA  package insert also recommends a new sample be collected  from the patient for follow-up testing. Patients with an  index value of less than 0.5 are considered to be negative  for galactomannan antigen. A negative result may indicate  that the patient's result is below the detectable level of  the assay. Negative results do not rule out the diagnosis of Invasive  Aspergillosis. Pursuant to the package insert, repeat  testing is recommended if the result is negative, but the  disease is suspected. Due to the potential for  environmental contamination when transferred to pour-off  tubes, which can lead to false positive results, interpret  positive results from samples provided in pour-off tubes  with caution. Results should be used in conjunction with  clinical findings, and should not form the sole basis for  a diagnosis or treatment decision. The Platelia Aspergillus Galactomannan EIA is a product of  [employer] and is FDA approved for in vitro diagnostic use.   Performed At: [facility] [place], [place] [id number]  Daryl Guzman PhD FC:[id number]         CULTURE, BLOOD [[id number]] Collected:  07/03/19 0515    Order Status:  Completed Specimen:  Blood Updated:  07/08/19 1137     Special Requests: CENTRAL LINE     Culture result: NO GROWTH 5 DAYS       CULTURE, BLOOD [[id number]] Collected:  07/03/19 0641    Order Status:  Completed Specimen:  Blood Updated:  07/08/19 1137     Special Requests: Baljeet Matthews     Culture result: NO GROWTH 5 DAYS       RESPIRATORY VIRAL PANEL, PCR [[id number]] Collected:  07/03/19 1245    Order Status:  Completed Specimen:  Respiratory sample Updated:  07/07/19 2335     Source BRONCHIAL LAVAGE        Influenza A NEGATIVE         Influenza B NEGATIVE         RSV A NEGATIVE         RSV B NEGATIVE         Parainfluenza 1 NEGATIVE         Parainfluenza 2 NEGATIVE         Parainfluenza 3 NEGATIVE Rhinovirus NEGATIVE         Metapneumovirus NEGATIVE         Adenovirus NEGATIVE         Comment: (NOTE)  Performed At: [address] [id number]  Yunier Sigala MD LI:[id number]         [facility] CULTURE AND SMEAR Sania Robbins [[id number]] Collected:  07/03/19 1245    Order Status:  Completed Specimen:  Other from Miscellaneous sample Updated:  07/06/19 0836     Source BRONCHIAL LAVAGE        Fungus stain Direct Inoculation     Fungus (Mycology) Culture Other source received     Comment: (NOTE)  Performed At: [address] [id number]  Yunier Sigala MD QR:[id number]         CULTURE, RESPIRATORY/SPUTUM/BRONCH Denaemurray Dunlap [[id number]] Collected:  07/03/19 0643    Order Status:  Completed Specimen:  Sputum Updated:  07/05/19 1214     Special Requests: NO SPECIAL REQUESTS        GRAM STAIN 0 to 2 WBCS/OIF      NO EPITHELIAL CELLS SEEN         FEW GRAM POSITIVE COCCI         2+ MUCUS PRESENT        Culture result:       HEAVY NORMAL RESPIRATORY ROBEL          CULTURE, RESPIRATORY/SPUTUM/BRONCH Denae Germán [[id number]] Collected:  07/03/19 1245    Order Status:  Completed Specimen:  Sputum from Bronchial lavage Updated:  07/05/19 0743     Special Requests: NO SPECIAL REQUESTS        GRAM STAIN NO WBCS SEEN         NO DEFINITE ORGANISM SEEN        Culture result: NO GROWTH 2 DAYS       AFB (MYCOBACTERIUM) CULTURE & SMEAR W/REFLEX ID Ashish Jovel NOCARDIA [[id number]] Collected:  07/03/19 1245    Order Status:  Completed Specimen:  Miscellaneous sample Updated:  07/04/19 1336     Source BRONCHIAL LAVAGE        AFB Specimen processing Concentration     Acid Fast Smear NEGATIVE         Comment: (NOTE)  Performed At: [address] [id number]  Yunier Sigala MD OT:[id number]          Acid Fast Culture PENDING          Current Meds:  Current Facility-Administered Medications   Medication Dose Route Frequency    ibuprofen (MOTRIN) tablet 600 mg  600 mg Oral Q6H PRN    furosemide (LASIX) tablet 20 mg  20 mg Oral DAILY    nystatin (MYCOSTATIN) 100,000 unit/gram powder   Topical BID    fentaNYL citrate (PF) injection 25-50 mcg  25-50 mcg IntraVENous Multiple    midazolam (VERSED) injection 0.5-2 mg  0.5-2 mg IntraVENous Multiple    lidocaine (XYLOCAINE) 2 % viscous solution 15 mL  15 mL Mouth/Throat PRN    carvedilol (COREG) tablet 6.25 mg  6.25 mg Oral BID WITH MEALS    amiodarone (CORDARONE) tablet 400 mg  400 mg Oral BID    loperamide (IMODIUM) capsule 2 mg  2 mg Oral Q4H PRN    spironolactone (ALDACTONE) tablet 25 mg  25 mg Oral DAILY    levalbuterol (XOPENEX) nebulizer soln 0.63 mg/3 mL  0.63 mg Nebulization Q6H PRN    potassium chloride (KLOR-CON) packet for solution 20 mEq  20 mEq Oral BID WITH MEALS    NUTRITIONAL SUPPORT ELECTROLYTE PRN ORDERS   Does Not Apply PRN    pantoprazole (PROTONIX) tablet 40 mg  40 mg Oral ACB    bisacodyl (DULCOLAX) tablet 5 mg  5 mg Oral DAILY PRN    apixaban (ELIQUIS) tablet 5 mg  5 mg Oral Q12H    temazepam (RESTORIL) capsule 15 mg  15 mg Oral QHS PRN    sodium chloride (NS) flush 5-40 mL  5-40 mL IntraVENous Q8H    sodium chloride (NS) flush 5-40 mL  5-40 mL IntraVENous PRN    dilTIAZem (CARDIZEM) 125 mg in dextrose 5% 125 mL infusion  0-15 mg/hr IntraVENous TITRATE       Other Studies (last 24 hours):  Xr Chest Sngl V    Result Date: 7/14/2019  EXAM: XR CHEST SNGL V INDICATION: SOB COMPARISON: 7/13/2019 FINDINGS: A portable AP radiograph of the chest was obtained at 0434 hours. The patient is on a cardiac monitor. Bibasilar airspace disease improved. The cardiac and mediastinal contours and pulmonary vascularity are normal.  The bones and soft tissues are grossly within normal limits. IMPRESSION: Bibasilar atelectasis or pneumonia improved.       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 ICD-10-CM: A41.9, R65.20  ICD-9-CM: 038.9, 995.92  7/3/2019 - Present Unknown        Hypotension due to hypovolemia ICD-10-CM: I95.89, E86.1  ICD-9-CM: 458.8, 276.52  7/3/2019 - Present Unknown        Polymyalgia rheumatica ([facility] ICD-10-CM: M35.3  ICD-9-CM: 894  12/2/2015 - Present Yes        HTN (hypertension) ICD-10-CM: I10  ICD-9-CM: 401.9  8/12/2013 - Present Yes        Severe obesity with body mass index (BMI) of 35.0 to 39.9 with comorbidity ([facility] ICD-10-CM: E66.01  ICD-9-CM: 278.01  8/12/2013 - Present Yes        Sjogren's syndrome ([facility] ICD-10-CM: M35.00  ICD-9-CM: 710.2  8/12/2013 - Present Yes              PLAN:    Acute respiratory failure- during the stay intubated- extubated- presently on nasal canula 2 lit/min- finished course of antibiotics  Acute volume overload- echo ef 28-15%- grade 1 diastolic chf- good diuresis  Afib with rvr- cont eliquis,on cardizem drip- cardiology following-- had cardiversion today 7/12/19- off drip.  ckd 3  Hypokalemia- resolved  htn  Hypotension resolved. Poly myalgia rheumatica- off steroids  Diarrhea - resolved- on prn immodium. DC planning/Dispo:    DVT ppx:  eliquis.     Signed:  Cyndy Bianchi MD

## 2019-07-14 NOTE — PROGRESS NOTES
Bedside and Verbal shift change report given to Nora Brannon RN (oncoming nurse) by self (offgoing nurse). Report included the following information SBAR, Kardex, Procedure Summary, Intake/Output, MAR and Recent Results.

## 2019-07-14 NOTE — PROGRESS NOTES
[facility] CARDIOLOGY PROGRESS NOTE           7/14/2019 8:10 AM    Admit Date: 7/3/2019      Subjective:   Reports feeling better except for pain in feet.R>L. She thinks her \"gout\" may be flairing up. ROS:  GEN:  No fever or chills  Cardiovascular:  As noted above  Pulmonary:  As noted above  Neuro:  No new focal motor or sensory loss    Objective:      Vitals:    07/13/19 2018 07/14/19 0024 07/14/19 0422 07/14/19 0538   BP: 119/51 118/48  127/52   Pulse: 79 78  79   Resp: 18 16  16   Temp: 99.3 °F (37.4 °C) 99.1 °F (37.3 °C)  98.9 °F (37.2 °C)   SpO2: 99% 96%  96%   Weight:   87.2 kg (192 lb 4.8 oz)    Height:           Physical Exam:  General-no distress  Neck- supple, no JVD  CV- regular rate and rhythm no MRG  Lung- clear bilaterally  Abd- soft, nontender, nondistended  Ext- no edema bilaterally. Skin- warm and dry  Psychiatric:  Normal mood and affect. Neurologic:  Alert and oriented X 3      Data Review:   Recent Labs     07/14/19  0319 07/13/19  0322    140   K 4.0 3.8   MG 1.9 2.0   BUN 21 22   CREA 1.22* 1.13*   * 103*   WBC 8.8 9.3   HGB 9.8* 9.8*   HCT 30.5* 29.9*    244       TELEMETRY:  NSR    Assessment/Plan:     Principal Problem:    Acute hypoxemic respiratory failure (HCC) (7/3/2019):Reolved. CXR shows improvement in ?pneumonia. Stop iv lasix . Start low dose po lasix. Echo showed LV EF in low 40's. Continue Coreg and Lasix. Reports allergy to ACEI inhibitor. Active Problems:    HTN (hypertension) (8/12/2013):stable on current medications. Continue Coreg. Severe obesity with body mass index (BMI) of 35.0 to 39.9 with comorbidity ([facility] (8/12/2013)      Sjogren's syndrome ([facility] (8/12/2013)      Polymyalgia rheumatica ([facility] (12/2/2015)      Atrial fibrillation with rapid ventricular response ([facility] vs WPW in patient with history of ablation (7/3/2019):Resolved with eCv. Receiving Eliquis and Amiodarone.     Pulmonary infiltrates vs acute congestive heart failure (7/3/2019): Improved. Start po Lasix. Continue Coreg.                 Antonio Zheng MD  7/14/2019 8:10 AM

## 2019-07-14 NOTE — PROGRESS NOTES
Problem: Pressure Injury - Risk of  Goal: *Prevention of pressure injury  Description  Document Mark Scale and appropriate interventions in the flowsheet. Outcome: Progressing Towards Goal   Pt has 2 intact allevyns on backside. Barrier cream utilized for mary lou area. Problem: Patient Education: Go to Patient Education Activity  Goal: Patient/Family Education  Outcome: Progressing Towards Goal     Problem: Falls - Risk of  Goal: *Absence of Falls  Description  Document [facility] Fall Risk and appropriate interventions in the flowsheet. Outcome: Progressing Towards Goal   Pt calls for assistance. Does not require bed alarm per [facility].   Problem: Patient Education: Go to Patient Education Activity  Goal: Patient/Family Education  Outcome: Progressing Towards Goal     Problem: Dysphagia (Adult)  Goal: Interventions  Outcome: Progressing Towards Goal     Problem: Patient Education: Go to Patient Education Activity  Goal: Patient/Family Education  Outcome: Progressing Towards Goal     Problem: Breathing Pattern - Ineffective  Goal: *Absence of hypoxia  Outcome: Progressing Towards Goal  Goal: *Use of effective breathing techniques  Outcome: Progressing Towards Goal     Problem: Nutrition Deficit  Goal: *Optimize nutritional status  Outcome: Progressing Towards Goal

## 2019-07-14 NOTE — PROGRESS NOTES
Bedside and Verbal shift change report given to self (oncoming nurse) by John Fuelling (offgoing nurse). Report included the following information SBAR, Kardex, Intake/Output, MAR and Recent Results.

## 2019-07-15 ENCOUNTER — HOSPITAL ENCOUNTER (INPATIENT)
Age: 74
LOS: 8 days | Discharge: HOME HEALTH | DRG: 308 | End: 2019-07-23
Attending: PHYSICAL MEDICINE & REHABILITATION | Admitting: PHYSICAL MEDICINE & REHABILITATION
Payer: MEDICARE

## 2019-07-15 ENCOUNTER — PATIENT OUTREACH (OUTPATIENT)
Dept: CASE MANAGEMENT | Age: 74
End: 2019-07-15

## 2019-07-15 ENCOUNTER — APPOINTMENT (OUTPATIENT)
Dept: GENERAL RADIOLOGY | Age: 74
DRG: 853 | End: 2019-07-15
Attending: INTERNAL MEDICINE
Payer: MEDICARE

## 2019-07-15 VITALS
DIASTOLIC BLOOD PRESSURE: 73 MMHG | SYSTOLIC BLOOD PRESSURE: 154 MMHG | HEIGHT: 62 IN | BODY MASS INDEX: 35.59 KG/M2 | RESPIRATION RATE: 20 BRPM | TEMPERATURE: 98.7 F | OXYGEN SATURATION: 99 % | WEIGHT: 193.4 LBS | HEART RATE: 80 BPM

## 2019-07-15 DIAGNOSIS — I48.20 CHRONIC ATRIAL FIBRILLATION WITH RAPID VENTRICULAR RESPONSE (HCC): ICD-10-CM

## 2019-07-15 DIAGNOSIS — R26.89 IMPAIRED GAIT AND MOBILITY: ICD-10-CM

## 2019-07-15 DIAGNOSIS — R53.81 PHYSICAL DECONDITIONING: ICD-10-CM

## 2019-07-15 DIAGNOSIS — I10 ESSENTIAL HYPERTENSION: ICD-10-CM

## 2019-07-15 DIAGNOSIS — I50.21 ACUTE SYSTOLIC CONGESTIVE HEART FAILURE (HCC): ICD-10-CM

## 2019-07-15 DIAGNOSIS — M35.3 POLYMYALGIA RHEUMATICA (HCC): ICD-10-CM

## 2019-07-15 DIAGNOSIS — B37.49 CANDIDIASIS OF PERINEUM: ICD-10-CM

## 2019-07-15 PROBLEM — M10.9 GOUT ATTACK: Status: ACTIVE | Noted: 2019-07-15

## 2019-07-15 PROBLEM — N18.30 STAGE 3 CHRONIC KIDNEY DISEASE (HCC): Status: ACTIVE | Noted: 2019-07-15

## 2019-07-15 PROBLEM — I50.22 SYSTOLIC CHF, CHRONIC (HCC): Status: ACTIVE | Noted: 2019-07-15

## 2019-07-15 PROBLEM — I50.9 CHF (CONGESTIVE HEART FAILURE) (HCC): Status: ACTIVE | Noted: 2019-07-15

## 2019-07-15 LAB
ANION GAP SERPL CALC-SCNC: 7 MMOL/L (ref 7–16)
APPEARANCE UR: ABNORMAL
BACTERIA URNS QL MICRO: ABNORMAL /HPF
BASOPHILS # BLD: 0.1 K/UL (ref 0–0.2)
BASOPHILS NFR BLD: 1 % (ref 0–2)
BILIRUB UR QL: NEGATIVE
BUN SERPL-MCNC: 25 MG/DL (ref 8–23)
CALCIUM SERPL-MCNC: 8.7 MG/DL (ref 8.3–10.4)
CASTS URNS QL MICRO: 0 /LPF
CHLORIDE SERPL-SCNC: 105 MMOL/L (ref 98–107)
CO2 SERPL-SCNC: 28 MMOL/L (ref 21–32)
COLOR UR: YELLOW
CREAT SERPL-MCNC: 1.18 MG/DL (ref 0.6–1)
CRYSTALS URNS QL MICRO: 0 /LPF
DIFFERENTIAL METHOD BLD: ABNORMAL
EOSINOPHIL # BLD: 0.1 K/UL (ref 0–0.8)
EOSINOPHIL NFR BLD: 1 % (ref 0.5–7.8)
EPI CELLS #/AREA URNS HPF: ABNORMAL /HPF
ERYTHROCYTE [DISTWIDTH] IN BLOOD BY AUTOMATED COUNT: 15.1 % (ref 11.9–14.6)
GLUCOSE SERPL-MCNC: 113 MG/DL (ref 65–100)
GLUCOSE UR STRIP.AUTO-MCNC: NEGATIVE MG/DL
HCT VFR BLD AUTO: 28.3 % (ref 35.8–46.3)
HGB BLD-MCNC: 9.2 G/DL (ref 11.7–15.4)
HGB UR QL STRIP: NEGATIVE
IMM GRANULOCYTES # BLD AUTO: 0.1 K/UL (ref 0–0.5)
IMM GRANULOCYTES NFR BLD AUTO: 1 % (ref 0–5)
KETONES UR QL STRIP.AUTO: NEGATIVE MG/DL
LEUKOCYTE ESTERASE UR QL STRIP.AUTO: ABNORMAL
LYMPHOCYTES # BLD: 1.1 K/UL (ref 0.5–4.6)
LYMPHOCYTES NFR BLD: 14 % (ref 13–44)
MAGNESIUM SERPL-MCNC: 1.9 MG/DL (ref 1.8–2.4)
MCH RBC QN AUTO: 30.4 PG (ref 26.1–32.9)
MCHC RBC AUTO-ENTMCNC: 32.5 G/DL (ref 31.4–35)
MCV RBC AUTO: 93.4 FL (ref 79.6–97.8)
MONOCYTES # BLD: 0.9 K/UL (ref 0.1–1.3)
MONOCYTES NFR BLD: 11 % (ref 4–12)
MUCOUS THREADS URNS QL MICRO: 0 /LPF
NEUTS SEG # BLD: 5.6 K/UL (ref 1.7–8.2)
NEUTS SEG NFR BLD: 72 % (ref 43–78)
NITRITE UR QL STRIP.AUTO: NEGATIVE
NRBC # BLD: 0 K/UL (ref 0–0.2)
OTHER OBSERVATIONS,UCOM: ABNORMAL
PH UR STRIP: 5.5 [PH] (ref 5–9)
PHOSPHATE SERPL-MCNC: 3.1 MG/DL (ref 2.3–3.7)
PLATELET # BLD AUTO: 237 K/UL (ref 150–450)
PMV BLD AUTO: 11.1 FL (ref 9.4–12.3)
POTASSIUM SERPL-SCNC: 4.2 MMOL/L (ref 3.5–5.1)
PROT UR STRIP-MCNC: NEGATIVE MG/DL
RBC # BLD AUTO: 3.03 M/UL (ref 4.05–5.2)
RBC #/AREA URNS HPF: 0 /HPF
SODIUM SERPL-SCNC: 140 MMOL/L (ref 136–145)
SP GR UR REFRACTOMETRY: 1.01 (ref 1–1.02)
UROBILINOGEN UR QL STRIP.AUTO: 0.2 EU/DL (ref 0.2–1)
WBC # BLD AUTO: 7.8 K/UL (ref 4.3–11.1)
WBC URNS QL MICRO: ABNORMAL /HPF

## 2019-07-15 PROCEDURE — 83735 ASSAY OF MAGNESIUM: CPT

## 2019-07-15 PROCEDURE — 74011250637 HC RX REV CODE- 250/637: Performed by: INTERNAL MEDICINE

## 2019-07-15 PROCEDURE — 74011250637 HC RX REV CODE- 250/637: Performed by: PHYSICAL MEDICINE & REHABILITATION

## 2019-07-15 PROCEDURE — 87186 SC STD MICRODIL/AGAR DIL: CPT

## 2019-07-15 PROCEDURE — 81015 MICROSCOPIC EXAM OF URINE: CPT

## 2019-07-15 PROCEDURE — 87086 URINE CULTURE/COLONY COUNT: CPT

## 2019-07-15 PROCEDURE — 81003 URINALYSIS AUTO W/O SCOPE: CPT

## 2019-07-15 PROCEDURE — 65310000000 HC RM PRIVATE REHAB

## 2019-07-15 PROCEDURE — 36591 DRAW BLOOD OFF VENOUS DEVICE: CPT

## 2019-07-15 PROCEDURE — 85025 COMPLETE CBC W/AUTO DIFF WBC: CPT

## 2019-07-15 PROCEDURE — 80048 BASIC METABOLIC PNL TOTAL CA: CPT

## 2019-07-15 PROCEDURE — 71045 X-RAY EXAM CHEST 1 VIEW: CPT

## 2019-07-15 PROCEDURE — 99223 1ST HOSP IP/OBS HIGH 75: CPT | Performed by: PHYSICAL MEDICINE & REHABILITATION

## 2019-07-15 PROCEDURE — 87088 URINE BACTERIA CULTURE: CPT

## 2019-07-15 PROCEDURE — 84100 ASSAY OF PHOSPHORUS: CPT

## 2019-07-15 RX ORDER — NITROFURANTOIN MACROCRYSTALS 50 MG/1
50 CAPSULE ORAL EVERY 6 HOURS
Status: DISCONTINUED | OUTPATIENT
Start: 2019-07-16 | End: 2019-07-18

## 2019-07-15 RX ORDER — AMIODARONE HYDROCHLORIDE 200 MG/1
400 TABLET ORAL 2 TIMES DAILY
Status: CANCELLED | OUTPATIENT
Start: 2019-07-15

## 2019-07-15 RX ORDER — TEMAZEPAM 15 MG/1
15 CAPSULE ORAL
Status: DISCONTINUED | OUTPATIENT
Start: 2019-07-15 | End: 2019-07-23 | Stop reason: HOSPADM

## 2019-07-15 RX ORDER — NYSTATIN 100000 [USP'U]/G
POWDER TOPICAL 2 TIMES DAILY
Status: CANCELLED | OUTPATIENT
Start: 2019-07-15

## 2019-07-15 RX ORDER — FLUCONAZOLE 100 MG/1
200 TABLET ORAL DAILY
Status: COMPLETED | OUTPATIENT
Start: 2019-07-16 | End: 2019-07-22

## 2019-07-15 RX ORDER — PANTOPRAZOLE SODIUM 40 MG/1
40 TABLET, DELAYED RELEASE ORAL
Status: DISCONTINUED | OUTPATIENT
Start: 2019-07-16 | End: 2019-07-23 | Stop reason: HOSPADM

## 2019-07-15 RX ORDER — BISACODYL 5 MG
5 TABLET, DELAYED RELEASE (ENTERIC COATED) ORAL DAILY PRN
Status: CANCELLED | OUTPATIENT
Start: 2019-07-15

## 2019-07-15 RX ORDER — ALBUTEROL SULFATE 0.83 MG/ML
1.25 SOLUTION RESPIRATORY (INHALATION)
Status: DISCONTINUED | OUTPATIENT
Start: 2019-07-15 | End: 2019-07-23 | Stop reason: HOSPADM

## 2019-07-15 RX ORDER — FUROSEMIDE 20 MG/1
20 TABLET ORAL DAILY
Status: CANCELLED | OUTPATIENT
Start: 2019-07-16

## 2019-07-15 RX ORDER — LOPERAMIDE HYDROCHLORIDE 2 MG/1
2 CAPSULE ORAL
Status: CANCELLED | OUTPATIENT
Start: 2019-07-15

## 2019-07-15 RX ORDER — NYSTATIN 100000 [USP'U]/G
POWDER TOPICAL 2 TIMES DAILY
Status: DISCONTINUED | OUTPATIENT
Start: 2019-07-15 | End: 2019-07-21

## 2019-07-15 RX ORDER — PANTOPRAZOLE SODIUM 40 MG/1
40 TABLET, DELAYED RELEASE ORAL
Status: CANCELLED | OUTPATIENT
Start: 2019-07-16

## 2019-07-15 RX ORDER — CARVEDILOL 6.25 MG/1
6.25 TABLET ORAL 2 TIMES DAILY WITH MEALS
Status: CANCELLED | OUTPATIENT
Start: 2019-07-15

## 2019-07-15 RX ORDER — BISACODYL 5 MG
5 TABLET, DELAYED RELEASE (ENTERIC COATED) ORAL DAILY PRN
Status: DISCONTINUED | OUTPATIENT
Start: 2019-07-15 | End: 2019-07-23 | Stop reason: HOSPADM

## 2019-07-15 RX ORDER — COLCHICINE 0.6 MG/1
0.6 TABLET, FILM COATED ORAL AS NEEDED
Qty: 7 TAB | Refills: 7 | Status: SHIPPED | OUTPATIENT
Start: 2019-07-15 | End: 2020-07-09

## 2019-07-15 RX ORDER — FUROSEMIDE 20 MG/1
20 TABLET ORAL DAILY
Qty: 30 TAB | Refills: 0 | Status: SHIPPED | OUTPATIENT
Start: 2019-07-15 | End: 2019-07-23

## 2019-07-15 RX ORDER — LOPERAMIDE HYDROCHLORIDE 2 MG/1
2 CAPSULE ORAL
Status: DISCONTINUED | OUTPATIENT
Start: 2019-07-15 | End: 2019-07-23 | Stop reason: HOSPADM

## 2019-07-15 RX ORDER — POTASSIUM CHLORIDE 1.5 G/1.77G
20 POWDER, FOR SOLUTION ORAL 2 TIMES DAILY WITH MEALS
Status: CANCELLED | OUTPATIENT
Start: 2019-07-15

## 2019-07-15 RX ORDER — FUROSEMIDE 20 MG/1
20 TABLET ORAL DAILY
Status: DISCONTINUED | OUTPATIENT
Start: 2019-07-16 | End: 2019-07-16

## 2019-07-15 RX ORDER — CARVEDILOL 6.25 MG/1
6.25 TABLET ORAL 2 TIMES DAILY WITH MEALS
Qty: 60 TAB | Refills: 0 | Status: SHIPPED | OUTPATIENT
Start: 2019-07-15 | End: 2019-07-23

## 2019-07-15 RX ORDER — AMIODARONE HYDROCHLORIDE 400 MG/1
400 TABLET ORAL 2 TIMES DAILY
Qty: 60 TAB | Refills: 0 | Status: SHIPPED | OUTPATIENT
Start: 2019-07-15 | End: 2019-07-23

## 2019-07-15 RX ORDER — SODIUM CHLORIDE 0.9 % (FLUSH) 0.9 %
5-40 SYRINGE (ML) INJECTION AS NEEDED
Status: CANCELLED | OUTPATIENT
Start: 2019-07-15

## 2019-07-15 RX ORDER — SPIRONOLACTONE 25 MG/1
25 TABLET ORAL DAILY
Status: DISCONTINUED | OUTPATIENT
Start: 2019-07-16 | End: 2019-07-19

## 2019-07-15 RX ORDER — IBUPROFEN 600 MG/1
600 TABLET ORAL
Status: DISCONTINUED | OUTPATIENT
Start: 2019-07-15 | End: 2019-07-23 | Stop reason: HOSPADM

## 2019-07-15 RX ORDER — SPIRONOLACTONE 25 MG/1
25 TABLET ORAL DAILY
Qty: 30 TAB | Refills: 0 | Status: SHIPPED | OUTPATIENT
Start: 2019-07-16 | End: 2019-07-23

## 2019-07-15 RX ORDER — CARVEDILOL 12.5 MG/1
12.5 TABLET ORAL 2 TIMES DAILY WITH MEALS
Status: DISCONTINUED | OUTPATIENT
Start: 2019-07-15 | End: 2019-07-15 | Stop reason: HOSPADM

## 2019-07-15 RX ORDER — IBUPROFEN 600 MG/1
600 TABLET ORAL
Status: CANCELLED | OUTPATIENT
Start: 2019-07-15

## 2019-07-15 RX ORDER — SPIRONOLACTONE 25 MG/1
25 TABLET ORAL DAILY
Status: CANCELLED | OUTPATIENT
Start: 2019-07-16

## 2019-07-15 RX ORDER — LEVALBUTEROL INHALATION SOLUTION 0.63 MG/3ML
0.63 SOLUTION RESPIRATORY (INHALATION)
Status: CANCELLED | OUTPATIENT
Start: 2019-07-15

## 2019-07-15 RX ORDER — SODIUM CHLORIDE 0.9 % (FLUSH) 0.9 %
5-40 SYRINGE (ML) INJECTION AS NEEDED
Status: DISCONTINUED | OUTPATIENT
Start: 2019-07-15 | End: 2019-07-23 | Stop reason: HOSPADM

## 2019-07-15 RX ORDER — POTASSIUM CHLORIDE 1.5 G/1.77G
20 POWDER, FOR SOLUTION ORAL 2 TIMES DAILY WITH MEALS
Status: DISCONTINUED | OUTPATIENT
Start: 2019-07-15 | End: 2019-07-16

## 2019-07-15 RX ORDER — POTASSIUM CHLORIDE 750 MG/1
10 TABLET, EXTENDED RELEASE ORAL DAILY
Qty: 30 TAB | Refills: 0 | Status: SHIPPED | OUTPATIENT
Start: 2019-07-15 | End: 2019-07-23

## 2019-07-15 RX ORDER — TEMAZEPAM 15 MG/1
15 CAPSULE ORAL
Status: CANCELLED | OUTPATIENT
Start: 2019-07-15

## 2019-07-15 RX ORDER — AMIODARONE HYDROCHLORIDE 200 MG/1
400 TABLET ORAL 2 TIMES DAILY
Status: DISCONTINUED | OUTPATIENT
Start: 2019-07-15 | End: 2019-07-22

## 2019-07-15 RX ADMIN — FUROSEMIDE 20 MG: 20 TABLET ORAL at 08:52

## 2019-07-15 RX ADMIN — AMIODARONE HYDROCHLORIDE 400 MG: 200 TABLET ORAL at 08:52

## 2019-07-15 RX ADMIN — NYSTATIN: 100000 POWDER TOPICAL at 08:53

## 2019-07-15 RX ADMIN — AMIODARONE HYDROCHLORIDE 400 MG: 200 TABLET ORAL at 17:14

## 2019-07-15 RX ADMIN — CARVEDILOL 6.25 MG: 6.25 TABLET, FILM COATED ORAL at 08:52

## 2019-07-15 RX ADMIN — IBUPROFEN 600 MG: 600 TABLET ORAL at 21:35

## 2019-07-15 RX ADMIN — POTASSIUM CHLORIDE 20 MEQ: 1.5 POWDER, FOR SOLUTION ORAL at 08:52

## 2019-07-15 RX ADMIN — APIXABAN 5 MG: 5 TABLET, FILM COATED ORAL at 21:31

## 2019-07-15 RX ADMIN — NYSTATIN: 100000 POWDER TOPICAL at 17:13

## 2019-07-15 RX ADMIN — CARVEDILOL 12.5 MG: 12.5 TABLET, FILM COATED ORAL at 14:08

## 2019-07-15 RX ADMIN — SPIRONOLACTONE 25 MG: 25 TABLET ORAL at 08:52

## 2019-07-15 RX ADMIN — POTASSIUM CHLORIDE 20 MEQ: 1.5 POWDER, FOR SOLUTION ORAL at 17:14

## 2019-07-15 RX ADMIN — APIXABAN 5 MG: 5 TABLET, FILM COATED ORAL at 08:52

## 2019-07-15 RX ADMIN — Medication 10 ML: at 05:03

## 2019-07-15 RX ADMIN — PANTOPRAZOLE SODIUM 40 MG: 40 TABLET, DELAYED RELEASE ORAL at 08:52

## 2019-07-15 NOTE — PROGRESS NOTES
Bedside and Verbal shift change report given to Genie Meier RN (oncoming nurse) by self (offgoing nurse).  Report included the following information SBAR, Kardex, Intake/Output, MAR, Recent Results and Cardiac Rhythm SR.

## 2019-07-15 NOTE — PROGRESS NOTES
TRANSFER - IN REPORT:    Verbal [facility] on Julita Barriga  being received from [facility] for routine progression of care      Report consisted of patients Situation, Background, Assessment and   Recommendations(SBAR). Information from the following report(s) SBAR was reviewed with the receiving nurse. Opportunity for questions and clarification was provided. Assessment completed upon patients arrival to unit and care assumed.

## 2019-07-15 NOTE — H&P
HISTORY AND PHYSICAL  IRC       Admit Date: 7/15/2019  Primary Care Physician: Jyotsna Murray MD  Specialty Group: cardiology, PMR    Chief Complaint : Gait dysfunction secondary to below. Admit Diagnosis: CHF (congestive heart failure) ([address] [I50.9]; Polymyalgia rheumatica ([address] [M35.3]; Gout attack [M10.9]; Physical deconditioning [R53.81]; Chronic atrial fibrillation with rapid ventricular response ([address] [I48.2]; Impaired gait and mobility [R26.89]; Candidiasis of perineum [B37.49]; Hypertension [I10]  Acute hypoxemic respiratory failure ([address] [J96.01]; Severe sepsis ([address] [A41.9, R65.20]  Acute hypoxemic respiratory failure  HTN (hypertension)\  Hypotension due to hypovolemia\  Atrial fibrillation with rapid ventricular response ([address] vs WPW in patient with history of ablation    S/p electric cardioversion of a.fib.    Pulmonary infiltrates vs acute congestive heart failure (7/3/2019)  Severe obesity with body mass index (BMI) of 35.0 to 39.9 with comorbidity ([address] (8/12/2013)  Severe sepsis ([address] (7/3/2019)   Sjogren's syndrome ([address] (8/12/2013)  Polymyalgia rheumatica ([address] (12/2/2015)  weakness  Pain  DVT risk  Acute Rehab Dx:  weakness  deconditioning  Mobility and ambulation deficits  Self Care/ADL deficits      Medical Dx:  Past Medical History:   Diagnosis Date    Arrhythmia     Arthritis     Asthma     Cellulitis     Chronic kidney disease     Hx renal failure    Depression     Gout     Gouty arthritis  NOS 12/2/2015    Hypercholesterolemia     Hypertension     Mitral valve prolapse     Morbid obesity (HCC)     Nausea & vomiting     Polymyalgia rheumatica ([address] 12/2/2015    Psychiatric disorder     depression & anxiety    PUD (peptic ulcer disease)     Sjogren's syndrome ([address]     Temporal arteritis ([address]        Date of Evaluation:  July 15, 2019    HPI: Roney Freire is a 74 y.o. female patient at [address] [place] [address] who was admitted on 7/3/2019  by Benoit Adorno MD with below mentioned medical history ,is being seen for Physical Medicine and Rehabilitation consult.       Humble Rosado who has history of PMR on chronic steroids, WPW s/p ablation, gout, HLD, HTN presented with progressively worsening shortness of breath. Patient was found to be in atrial fibrillation with rapid ventricular rate. CXR showed patchy bilateral edema vs infiltrates. Patient developed progressive hypoxemia/ respiratory failure,required intubation, was admitted to ICU on ventilator support. Cardiology has started treatment for HR control, a.fib, started, continued on Cardizem atrial fibrillation and she was started on Cardizem gtt and  and heparin gtt. Patient continued on iv lasix, treatment of CHF. Patient has been transitioned to Eliquis. Patient was extubated successfully on 7/5. CXR (7/9)still shows bilateral pleural effusions and vascular congestion. No significant change. Patient is still requiring Cardizem drip and IV Lasix as HR still elevated. Patient HR continued to be rapid, refractory to iv cardizem administration. On 7/12 She underwent a transesophageal echocardiogram and electrical cardioversion for persistent atrial fibrillation. Post op patient was started on amiodarone load iv. Patient is continued on lasix for volume overload. Patient is continued on Coreg per cardiology direction. Patiet started to have pain in bilateral feet. Acute gout flair is diagnosed, was started on colchicine. Patient reported to have severe intertrigo throughout her mary lou areas. topical and oral medications will be needed. We are consulted to assist with rehab needs and placement. Acute PT, OT and ST has started evaluation and treatment as tolerated. Patient's symptoms have improved and she is participating fairly with acute therapy. she is able to negotiate mobility, transfers and walking short distances with CGA/ min A using a RW. She is still limited by weakness and decreased activity tolerance.   Jeremiah Dec Saji Velasquez is seen and examined today. Medical Records reviewed. At baseline patient is independent with all activities using a sc or a rollator. Physical therapy was initiated and patient was starting to mobilize. However, Patient shows significant functional deficits due to prolonged immobility, hospitalization and on goinmg a.fib, CHF, pulmonary congestion. .  Our service was consulted for rehab needs and we recommended inpatient hospital rehabilitation is reasonable and necessary due to ongoing acute medical issues which have not changed since initial pre-admission evaluation. I reviewed the preadmission screening and have approved for admission to the [facility]. The patient was cleared for transfer to rehab today. Patient continues to have ongoing  medical issues outlined above requiring physician medical supervision and functional deficits which would benefit from continued intensive therapies. Current Level of Function: (evaluated by acute therapy staff, with bed mobility, transfers, balance personally observed post-admission in the IRF setting minutes prior to submission of document) bathing - mod A, lower body dressing - max A, toileting - max A, bed mobility - mod A, transfers- min A, poor balance , ambulation- short distances with RW     Prior Level of Function/Work/Activity:  Pt reports she lives alone in a 1 level apartment at baseline, was independent with self care tasks, using a SC or a rollator depending on the day and how she felt. Has T/S combo with shower chair, HH nozzle and GBs in place. Has not driven since 2291.  She uses the transport [employer] to go to her appointments, get groceries and attend the [facility] for their activities. Reports she is very active with them and goes on lots of outings. Pt does all her own housework and laundry.        Past Medical History:   Diagnosis Date    Arrhythmia     Arthritis     Asthma     Cellulitis     Chronic kidney disease     Hx renal failure    Depression     Gout     Gouty arthritis  NOS 12/2/2015    Hypercholesterolemia     Hypertension     Mitral valve prolapse     Morbid obesity (HCC)     Nausea & vomiting     Polymyalgia rheumatica (HCC) 12/2/2015    Psychiatric disorder     depression & anxiety    PUD (peptic ulcer disease)     Sjogren's syndrome ([address]     Temporal arteritis (HCC)       Past Surgical History:   Procedure Laterality Date    CARDIAC SURG PROCEDURE UNLIST  4/07    STRESS TEST    CARDIAC SURG PROCEDURE UNLIST  8/13    HEART ABLATION    HX COLONOSCOPY  6/08    HX ENDOSCOPY  8/08    HX HYSTERECTOMY      HX TUBAL LIGATION       Allergies   Allergen Reactions    Celebrex [Celecoxib] Rash    Lisinopril Cough    Losartan Other (comments)     Burning sensation tongue.     Nifedipine Other (comments)     severe headache      Family History   Problem Relation Age of Onset    Cancer Mother     Breast Cancer Mother 80    Hypertension Mother     Dementia Mother     Cancer Father         LUNG    Heart Disease Father 62    Diabetes Son     Other Son         PSYCHIATRIC ILLNESS    Hypertension Sister       Social History     Tobacco Use    Smoking status: Never Smoker    Smokeless tobacco: Never Used   Substance Use Topics    Alcohol use: No      Current Facility-Administered Medications   Medication Dose Route Frequency    sodium chloride (NS) flush 5-40 mL  5-40 mL IntraVENous PRN    amiodarone (CORDARONE) tablet 400 mg  400 mg Oral BID    apixaban (ELIQUIS) tablet 5 mg  5 mg Oral Q12H    bisacodyl (DULCOLAX) tablet 5 mg  5 mg Oral DAILY PRN    [START ON 7/16/2019] furosemide (LASIX) tablet 20 mg  20 mg Oral DAILY    ibuprofen (MOTRIN) tablet 600 mg  600 mg Oral Q6H PRN    albuterol (PROVENTIL VENTOLIN) nebulizer solution 1.25 mg  1.25 mg Nebulization Q6H PRN    loperamide (IMODIUM) capsule 2 mg  2 mg Oral Q4H PRN    nystatin (MYCOSTATIN) 100,000 unit/gram powder   Topical BID    [START ON 7/16/2019] pantoprazole (PROTONIX) tablet 40 mg  40 mg Oral ACB    potassium chloride (KLOR-CON) packet for solution 20 mEq  20 mEq Oral BID WITH MEALS    [START ON 7/16/2019] spironolactone (ALDACTONE) tablet 25 mg  25 mg Oral DAILY    temazepam (RESTORIL) capsule 15 mg  15 mg Oral QHS PRN    [START ON 7/16/2019] fluconazole (DIFLUCAN) tablet 200 mg  200 mg Oral DAILY       Review of Systems: + weakness. + foot pain. Denies: fevers, chills, sweats, fatigue, malaise, anorexia, weight loss   Denies: blurry vision, loss of vision, eye pain, photophobia   Denies: hearing loss, ringing in the ears, earache, epistaxis   Denies: chest pain, palpitations, syncope, orthopnea, paroxysmal nocturnal dyspnea, claudication   Denies: dysphagia, odynophagia, nausea, vomiting, diarrhea, constipation, abdominal pain, jaundice, melena   Denies: frequency, dysuria, nocturia, urinary incontinence, stones, hematuria   Denies: polydipsia/polyuria, skin changes, temperature intolerance, unexpected weight gain   Denies: back pain, joint pain, joint swelling, muscle pain   Denies: bleeding problems, blood transfusions, bruising, pallor, swollen lymph nodes   Denies: headache, dysarthria, blurred vision, diplopia,seizure       Objective:     Visit Vitals  /70   Pulse 76   Temp 98.3 °F (36.8 °C)   Resp 20   SpO2 96%      Intake and Output:  No intake/output data recorded. Physical Exam:        General:   Alert, appears stated age, No acute distress. HEENT:  Normocephalic, EOMI, facial symmetry  Intact. Oral mucosa pink and moist. No nasal discharge. noJVD   Lungs:  CTA Bilaterally,Respiration even and unlabored   No apparent use of accessory muscles for respiration. Heart:  Regular rate and rhythm, S1, S2, No obvious murmur, click, rub or gallop. Genitourinary: defered   Abdomen:  Soft, non-tender to palpation in all four quadrants. Bowel sounds present.      Neuromuscular:  PERRL, EOMI  + mild generalized weakness, more proxima> distal BLE  Sensory - intact  Plantars - down going   Skin:  + maculopapular rash with satellite lesions at folds at groin, perineum mary lou anal areas, intragluteal cleft    Edema: Trace BLE edema             Lab Review:    Recent Results (from the past 72 hour(s))   METABOLIC PANEL, BASIC    Collection Time: 07/13/19  3:22 AM   Result Value Ref Range    Sodium 140 136 - 145 mmol/L    Potassium 3.8 3.5 - 5.1 mmol/L    Chloride 103 98 - 107 mmol/L    CO2 28 21 - 32 mmol/L    Anion gap 9 7 - 16 mmol/L    Glucose 103 (H) 65 - 100 mg/dL    BUN 22 8 - 23 MG/DL    Creatinine 1.13 (H) 0.6 - 1.0 MG/DL    GFR est AA >60 >60 ml/min/1.73m2    GFR est non-AA 50 (L) >60 ml/min/1.73m2    Calcium 8.4 8.3 - 10.4 MG/DL   MAGNESIUM    Collection Time: 07/13/19  3:22 AM   Result Value Ref Range    Magnesium 2.0 1.8 - 2.4 mg/dL   PHOSPHORUS    Collection Time: 07/13/19  3:22 AM   Result Value Ref Range    Phosphorus 4.1 (H) 2.3 - 3.7 MG/DL   CBC WITH AUTOMATED DIFF    Collection Time: 07/13/19  3:22 AM   Result Value Ref Range    WBC 9.3 4.3 - 11.1 K/uL    RBC 3.19 (L) 4.05 - 5.2 M/uL    HGB 9.8 (L) 11.7 - 15.4 g/dL    HCT 29.9 (L) 35.8 - 46.3 %    MCV 93.7 79.6 - 97.8 FL    MCH 30.7 26.1 - 32.9 PG    MCHC 32.8 31.4 - 35.0 g/dL    RDW 15.3 (H) 11.9 - 14.6 %    PLATELET 827 494 - 543 K/uL    MPV 10.7 9.4 - 12.3 FL    ABSOLUTE NRBC 0.00 0.0 - 0.2 K/uL    DF AUTOMATED      NEUTROPHILS 75 43 - 78 %    LYMPHOCYTES 13 13 - 44 %    MONOCYTES 10 4.0 - 12.0 %    EOSINOPHILS 1 0.5 - 7.8 %    BASOPHILS 1 0.0 - 2.0 %    IMMATURE GRANULOCYTES 1 0.0 - 5.0 %    ABS. NEUTROPHILS 7.0 1.7 - 8.2 K/UL    ABS. LYMPHOCYTES 1.2 0.5 - 4.6 K/UL    ABS. MONOCYTES 0.9 0.1 - 1.3 K/UL    ABS. EOSINOPHILS 0.1 0.0 - 0.8 K/UL    ABS. BASOPHILS 0.1 0.0 - 0.2 K/UL    ABS. IMM.  GRANS. 0.1 0.0 - 0.5 K/UL   METABOLIC PANEL, BASIC    Collection Time: 07/14/19  3:19 AM   Result Value Ref Range    Sodium 140 136 - 145 mmol/L    Potassium 4.0 3.5 - 5.1 mmol/L    Chloride 102 98 - 107 mmol/L    CO2 29 21 - 32 mmol/L    Anion gap 9 7 - 16 mmol/L    Glucose 110 (H) 65 - 100 mg/dL    BUN 21 8 - 23 MG/DL    Creatinine 1.22 (H) 0.6 - 1.0 MG/DL    GFR est AA 55 (L) >60 ml/min/1.73m2    GFR est non-AA 46 (L) >60 ml/min/1.73m2    Calcium 8.6 8.3 - 10.4 MG/DL   MAGNESIUM    Collection Time: 07/14/19  3:19 AM   Result Value Ref Range    Magnesium 1.9 1.8 - 2.4 mg/dL   PHOSPHORUS    Collection Time: 07/14/19  3:19 AM   Result Value Ref Range    Phosphorus 3.5 2.3 - 3.7 MG/DL   CBC WITH AUTOMATED DIFF    Collection Time: 07/14/19  3:19 AM   Result Value Ref Range    WBC 8.8 4.3 - 11.1 K/uL    RBC 3.24 (L) 4.05 - 5.2 M/uL    HGB 9.8 (L) 11.7 - 15.4 g/dL    HCT 30.5 (L) 35.8 - 46.3 %    MCV 94.1 79.6 - 97.8 FL    MCH 30.2 26.1 - 32.9 PG    MCHC 32.1 31.4 - 35.0 g/dL    RDW 15.0 (H) 11.9 - 14.6 %    PLATELET 694 336 - 726 K/uL    MPV 10.7 9.4 - 12.3 FL    ABSOLUTE NRBC 0.00 0.0 - 0.2 K/uL    DF AUTOMATED      NEUTROPHILS 71 43 - 78 %    LYMPHOCYTES 16 13 - 44 %    MONOCYTES 10 4.0 - 12.0 %    EOSINOPHILS 1 0.5 - 7.8 %    BASOPHILS 1 0.0 - 2.0 %    IMMATURE GRANULOCYTES 1 0.0 - 5.0 %    ABS. NEUTROPHILS 6.3 1.7 - 8.2 K/UL    ABS. LYMPHOCYTES 1.4 0.5 - 4.6 K/UL    ABS. MONOCYTES 0.9 0.1 - 1.3 K/UL    ABS. EOSINOPHILS 0.1 0.0 - 0.8 K/UL    ABS. BASOPHILS 0.1 0.0 - 0.2 K/UL    ABS. IMM.  GRANS. 0.0 0.0 - 0.5 K/UL   METABOLIC PANEL, BASIC    Collection Time: 07/15/19  3:19 AM   Result Value Ref Range    Sodium 140 136 - 145 mmol/L    Potassium 4.2 3.5 - 5.1 mmol/L    Chloride 105 98 - 107 mmol/L    CO2 28 21 - 32 mmol/L    Anion gap 7 7 - 16 mmol/L    Glucose 113 (H) 65 - 100 mg/dL    BUN 25 (H) 8 - 23 MG/DL    Creatinine 1.18 (H) 0.6 - 1.0 MG/DL    GFR est AA 57 (L) >60 ml/min/1.73m2    GFR est non-AA 47 (L) >60 ml/min/1.73m2    Calcium 8.7 8.3 - 10.4 MG/DL   MAGNESIUM    Collection Time: 07/15/19  3:19 AM   Result Value Ref Range    Magnesium 1.9 1.8 - 2.4 mg/dL   PHOSPHORUS    Collection Time: 07/15/19  3:19 AM   Result Value Ref Range    Phosphorus 3.1 2.3 - 3.7 MG/DL   CBC WITH AUTOMATED DIFF    Collection Time: 07/15/19  3:19 AM   Result Value Ref Range    WBC 7.8 4.3 - 11.1 K/uL    RBC 3.03 (L) 4.05 - 5.2 M/uL    HGB 9.2 (L) 11.7 - 15.4 g/dL    HCT 28.3 (L) 35.8 - 46.3 %    MCV 93.4 79.6 - 97.8 FL    MCH 30.4 26.1 - 32.9 PG    MCHC 32.5 31.4 - 35.0 g/dL    RDW 15.1 (H) 11.9 - 14.6 %    PLATELET 530 537 - 729 K/uL    MPV 11.1 9.4 - 12.3 FL    ABSOLUTE NRBC 0.00 0.0 - 0.2 K/uL    DF AUTOMATED      NEUTROPHILS 72 43 - 78 %    LYMPHOCYTES 14 13 - 44 %    MONOCYTES 11 4.0 - 12.0 %    EOSINOPHILS 1 0.5 - 7.8 %    BASOPHILS 1 0.0 - 2.0 %    IMMATURE GRANULOCYTES 1 0.0 - 5.0 %    ABS. NEUTROPHILS 5.6 1.7 - 8.2 K/UL    ABS. LYMPHOCYTES 1.1 0.5 - 4.6 K/UL    ABS. MONOCYTES 0.9 0.1 - 1.3 K/UL    ABS. EOSINOPHILS 0.1 0.0 - 0.8 K/UL    ABS. BASOPHILS 0.1 0.0 - 0.2 K/UL    ABS. IMM. GRANS. 0.1 0.0 - 0.5 K/UL       Functional Assessment:    Bed Mobility:  Wheelchair Mobility:  Transfers:  Sit to Stand: Contact guard assistance  Stand to Sit: Contact guard assistance  Gait:  Base of Support: Widened  Speed/Miesha: Shuffled; Slow  Step Length: Left shortened;Right shortened  Gait Abnormalities: Decreased step clearance  Distance (ft): 25 Feet (ft)  Assistive Device: Walker, rolling  Ambulation - Level of Assistance: Contact guard assistance;Minimal assistance  Interventions: Safety awareness training;Verbal cues       Condition on Admission: Good      Assessment:    The Post Assessment Physician Evaluation (MAISHA) found the current functional status to be comparable with the Pre-admission Screening. The Patient is a good candidate for acute inpatient rehabilitation. Nothing since the Pre-admission screen has changed that determination.      Rehabilitation Plan  The patient has shown the ability to tolerate and benefit from 3 hours of therapy daily and is being admitted to a comprehensive acute inpatient rehabilitation program consisting of at least 3 hours of combined physical and occupational therapies. Begin intensive Physical Therapy for a minimum of 1.5 hours a day, at least 5 out of 7 days per week to address bed mobility, transfers, ambulation, strengthening, balance, and endurance. Begin intensive Occupational Therapy for a minimum of 1.5 hours a day, at least 5 out of 7 days per week to address ADL ( bathing, LE dressing, toileting) and adaptive equipment as needed. The patient will also require 24-hour skilled rehabilitation nursing for bowel and bladder management, skin care for decubitus ulcer prevention , pain management and ongoing medication administration     Continue daily physician medical management:  Acute hypoxemic respiratory failure/ CHF  -secondary to a.fib.   - continue oral lasix. Monitor volume status. Will wean diuretics as appropriate    HTN (hypertension)\  Hypotension due to hypovolemia  - continued on aldactone. Lasix. - will add, adjust antihypertensives as needed      Atrial fibrillation with rapid ventricular response (HCC) vs WPW in patient with history of ablation    - s/p electric cardioversion - continue amiodarone po.   - continue eliquis  - monitor for recurrent a.fib. HR control.   - continue remote telemetry. Severe sepsis ([facility] (7/3/2019) / Pulmonary infiltrates vs acute congestive heart failure (7/3/2019)- treated. abx finished. Pneumonia prophylaxis- Insentive spirometer every hour while awake    Severe obesity with body mass index (BMI) of 35.0 to 39.9 with comorbidity  - cardiac diet. Sjogren's syndrome/ Polymyalgia rheumatica - steroid course finished. Monitor for acute flair, increased weakness. DVT risk / DVT Prophylaxis- Will require daily physician exam to assess for signs and symptoms as patient is at increased risk for of thromboembolism. Mobilization as tolerated.  Intermittent pneumatic compression devices when in bed Thigh-high or knee-high thromboembolic deterrent hose when out of bed.   - covered with eliquis. Pain Control: stable, mild-to-moderate joint symptoms intermittently, reasonably well controlled by PRN meds. Will require regular pain assessment and comprenhensive pain management,   - foot pain -   - acute gout flair - started on colchicine per prior service. Monitor. Treat until flair/ symptoms imroved. Wound Care: Monitor wound status daily per staff and physician. At risk for failure. Will require 24/7 rehab nursing. Keep wound clean and dry  - candidiasis periarea, genital.   - pt has been started on nystatin powder and barrier cream. Will add diflucan po. Potential urinary retention/ neurogenic bladder - schedule voids q6-8 hrs. Check post-void residual every shift; In and Out catheterize if post-void residual is more than 400 cc.  - c/o frequency following [prolonged covarrubias insertion, removal.   Send UA, UCx.    bowel program - as needed. GERD - resume PPI. At times may need additional antacids, Maalox prn. The patient's prognosis for significant practical improvement within a reasonable period of time appears good and the estimated length of stay is 7days and patient is expected to return home with spouse and continued rehabilitation with home health therapy. Given the patient's complex neurologic/medical condition and the risk of further medical complications including: DVT, PE, skin breakdown, pneumonia due to decreased mobility, recurrent a.fib, CHF exacerbation, volume overload following cardioversion. PT at risk for CVA, MI,  increased risk of thromboembolism could potentially impact the IRF program. For these ongoing medical issues, rehabilitation services could not be safely provided at a lower level of care such as a skilled nursing facility or nursing home.         Signed By: Sue Bullock MD     July 15, 2019

## 2019-07-15 NOTE — PROGRESS NOTES
[facility] CARDIOLOGY PROGRESS NOTE           7/15/2019 11:54 AM    Admit Date: 7/3/2019         Subjective: Remains in SR doing well, denies CP or SOB. ROS:  Cardiovascular:  As noted above    Objective:      Vitals:    07/14/19 2021 07/15/19 0038 07/15/19 0441 07/15/19 0920   BP: 112/61 126/51 152/70 154/73   Pulse: 81 79 75 80   Resp: 18 18 18 20   Temp: 98.7 °F (37.1 °C) 99.5 °F (37.5 °C) 98.9 °F (37.2 °C) 98.7 °F (37.1 °C)   SpO2: 97% 96% 96% 99%   Weight:   87.7 kg (193 lb 6.4 oz)    Height:           On telemetry:      Physical Exam:  General: Well Developed, Well Nourished, No Acute Distress, Alert & Oriented x 3, Appropriate mood  Neck: supple, no JVD  Heart: S1S2 with RRR without murmurs or gallops  Lungs: Clear throughout auscultation bilaterally without adventitious sounds  Abd: soft, nontender, nondistended, with good bowel sounds  Ext: no edema bilaterally  Skin: warm and dry      Data Review:   Recent Labs     07/15/19  0319 07/14/19  0319    140   K 4.2 4.0   MG 1.9 1.9   BUN 25* 21   CREA 1.18* 1.22*   * 110*   WBC 7.8 8.8   HGB 9.2* 9.8*   HCT 28.3* 30.5*    255       No results for input(s): TNIPOC, TROIQ in the last 72 hours.       Assessment/Plan:     Principal Problem:    Acute hypoxemic respiratory failure ([facility] (7/3/2019)    Active Problems:    HTN (hypertension) (8/12/2013)      Severe obesity with body mass index (BMI) of 35.0 to 39.9 with comorbidity ([facility] [address] [facility] (8/12/2013)      Sjogren's syndrome ([facility] [address] [facility] (8/12/2013)      Polymyalgia rheumatica ([facility] [address] (12/2/2015)      Atrial fibrillation with rapid ventricular response ([facility] [address] vs WPW in patient with history of ablation (7/3/2019)      Pulmonary infiltrates vs acute congestive heart failure (7/3/2019)      Severe sepsis ([address] (7/3/2019)      Hypotension due to hypovolemia (7/3/2019)      Stage 3 chronic kidney disease ([address] (7/15/2019)      Acute systolic congestive heart failure ([address] (7/15/2019)    A/P  1) pAFib - amiodarone load (400 mg BID) and eliquis  2) sCHF - Coreg and spironolactone, continue daily lasix, Cr stable, intolerant to ARB and ACE-Is. Increase coreg dose today.   3) Hypoxic resp failure - resolved, on abx, per [facility] team.      Marybeth William MD  7/15/2019 11:54 AM

## 2019-07-15 NOTE — WOUND CARE
Intertrigo under bilateral breast with maculopapular rash with satellite lesions, using nystatin powder, recommend to continue. Folds at groin, external labia and vulva, perineum mary lou anal areas, intragluteal cleft and fold with maculopapular rash with satellite lesions, add antifungal ointment to all areas bid and prn for 10 days. Patient has natural dimple at coccyx top of cleft, this dimple also has rash, use ointment in this area as well. Note don diflucan PO as well. Will monitor.

## 2019-07-15 NOTE — DISCHARGE SUMMARY
Hospitalist Discharge Summary     Admit Date:  7/3/2019  3:02 AM   Name:  Julita Barriga   Age:  74 y.o.  :  1945   MRN:  [id number]   PCP:  Paulina Shore MD  Treatment Team: Attending Provider: Malena Veliz MD; Consulting Provider: Sadia Castillo MD; Consulting Provider: Camilla Garcia MD; Utilization Review: Tracy Liang RN; Consulting Provider: Francisco Cueto MD; Consulting Provider: Polly Elizabeth MD; Physical Therapist: Yoselyn Griffin PT, DPT; Physical Therapist: Lindsey Cruz; Occupational Therapist: Niles Fraire OT    Problem List for this Hospitalization:  Hospital Problems as of 7/15/2019 Date Reviewed: 2019          Codes Class Noted - Resolved POA    Stage 3 chronic kidney disease ([facility] ICD-10-CM: N18.3  ICD-9-CM: 585.3  7/15/2019 - Present Unknown        Systolic CHF, chronic ([facility] ICD-10-CM: I50.22  ICD-9-CM: 428.22, 428.0  7/15/2019 - Present         Acute systolic congestive heart failure ([facility] ICD-10-CM: I50.21  ICD-9-CM: 428.21, 428.0  7/15/2019 - Present Unknown        * (Principal) Acute hypoxemic respiratory failure ([facility] ICD-10-CM: J96.01  ICD-9-CM: 518.81  7/3/2019 - Present Unknown        Atrial fibrillation with rapid ventricular response ([facility] vs WPW in patient with history of ablation ICD-10-CM: I48.91  ICD-9-CM: 427.31  7/3/2019 - Present Unknown        Pulmonary infiltrates vs acute congestive heart failure ICD-10-CM: R91.8  ICD-9-CM: 793.19  7/3/2019 - Present Unknown        Severe sepsis ([facility] ICD-10-CM: A41.9, R65.20  ICD-9-CM: 038.9, 995.92  7/3/2019 - Present Unknown        Hypotension due to hypovolemia ICD-10-CM: I95.89, E86.1  ICD-9-CM: 458.8, 276.52  7/3/2019 - Present Unknown        Polymyalgia rheumatica ([facility] ICD-10-CM: M35.3  ICD-9-CM: 651  2015 - Present Yes        HTN (hypertension) ICD-10-CM: I10  ICD-9-CM: 401.9  2013 - Present Yes        Severe obesity with body mass index (BMI) of 35.0 to 39.9 with comorbidity ([facility] ICD-10-CM: E66.01  ICD-9-CM: 278.01  8/12/2013 - Present Yes        Sjogren's syndrome ([facility] ICD-10-CM: M35.00  ICD-9-CM: 710.2  8/12/2013 - Present Yes                Admission HPI from 7/3/2019:    Patient is a 74 y.o.  female presents with worsening dyspnea from home. All information from ER attending/chart -- no family here.      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 Oxygen needs increased per EMS and in ER noted in AFIB with RVR and worsening dyspnea. Started Cardizem and then had to intubate patient since worsening hypoxemia and could not even speak/moaning per ER attending.      Patient had WBC of 28.5 K. CXR with infiltrates more on Right side. procal is <0.1. Per charts by Rheumatology Dr. Renea Lennon has been having bronchitis and he gave her azithromycin.      Per his notes she has not bee on any biologics but was on methotrexate in the past but was not effective.     In ER now on Vent and had central line placed by Dr. Lizzette Dunbar. On diprivan and BP lower and just stopped and map coming up. On 3rd liter of fluid currently. Sedated. No family here.      Her chart also reports Sjogren's syndrome? Hospital Course:  Ms. Nicola Bates is a 73 y/o WF admitted to ICU on 7/3 with respiratory failure after being intubated. Developed rapid AFib, cardiology consulted. Started on abx(finished the course of antibiotics) and IV diuresis. Extubated 7/5 and sent to floor 7/6. Hospitalist assumed care 7/7. Was on cardizem drip for afib,cont diuretics. Finished course of antibiotics. Pt had cardioversion for persistent afib on  7/12/19- now in sinus. Off cardizem drip.pt is presently stage 3 ckd. Pt has been working with PT. Pt is stable to go to rehab. Follow up instructions below.   Plan was discussed with patient. All questions answered. Patient was stable at time of discharge and was instructed to call or return if there are any concerns or recurrence of symptoms. Diagnostic Imaging/Tests:   Xr Chest Sngl V    Result Date: 7/4/2019  EXAM: Chest x-ray. INDICATION: Dyspnea. COMPARISON: Yesterday's chest x-ray. TECHNIQUE: Frontal view chest x-ray. FINDINGS: Although bilateral lung edema or infiltrates have not significant changed, there are new small bilateral pleural effusions. The cardiac size is stable. No pneumothorax is identified. The endotracheal tube and right jugular central line remain in place. IMPRESSION: New small bilateral pleural effusions. Xr Chest Port    Result Date: 7/3/2019  EXAM: Chest x-ray. INDICATION: Central line placement. COMPARISON: Earlier study on the same day at 0 335. TECHNIQUE: Frontal view chest x-ray. FINDINGS: There is a new right jugular central line, with its tip in the superior vena cava. No pneumothorax or pleural effusion is identified. The endotracheal tube tip remains at the lower level of the clavicles. Bilateral lung edema or infiltrates have mildly progressed. The heart remains enlarged. IMPRESSION: 1. New central line, without evidence of a pneumothorax. 2. Mildly progressed bilateral lung edema or infiltrates. Xr Chest Port    Result Date: 7/3/2019  EXAM: Chest x-ray. INDICATION: Sepsis. COMPARISON: None. TECHNIQUE: Frontal view chest x-ray. FINDINGS: The heart is enlarged. Patchy pulmonary edema or infiltrates are seen throughout both lungs. No pneumothorax or pleural effusion is identified. The endotracheal tube tip lies at the level of the lower clavicles. IMPRESSION: Patchy bilateral lung edema or infiltrates.       Echocardiogram results:  Results for orders placed or performed during the hospital encounter of 07/03/19   2D ECHO COMPLETE ADULT (TTE) W  [place] [address] [facility], [address] [place] [address]  [phone]    Transthoracic Echocardiogram  2D, M-mode, Doppler, and Color Doppler    Patient: Severo Belt  MR #: [id number]  : 1945  Age: 74 years  Gender: Female  Study date: 2019  Account #: [de-identified]  Height: 62 in  Weight: 201.5 lb  BSA: 1.92 mï¾²  Status:Routine  Location: [facility]  BP: 181/ 82    Allergies: CELECOXIB, LISINOPRIL, LOSARTAN, NIFEDIPINE    Sonographer:  Chrissy Guevara RD  Group:  [id number] [facility] Cardiology  Referring Physician:  Rona Khoury. Martha Gr MD  Reading Physician:  Zandra Rosales. Hawa Helton MD [facility]    INDICATIONS: Check EF    PROCEDURE: This was a routine study. A transthoracic echocardiogram was  performed. The study included complete 2D imaging, M-mode, complete spectral  Doppler, and color Doppler. Intravenous contrast (Definity, 3 ml) was  administered. Echocardiographic views were limited by restricted patient  mobility and poor acoustic window availability. Image quality was adequate. *Patient was restrained, intubated and sedated. Scanned supine. *    LEFT VENTRICLE: Size was normal. Systolic function was mildly to moderately  reduced. Ejection fraction was estimated in the range of 40 % to 45 %. There  was global mild hypokinesis. Wall thickness was normal. Left ventricular  diastolic dysfunction Grade 1. Average E/e' of 12.9. RIGHT VENTRICLE: The ventricle was dilated. Systolic function was reduced. LEFT ATRIUM: The atrium was mildly to moderately dilated. RIGHT ATRIUM: The atrium was mildly dilated. SYSTEMIC VEINS: IVC: The inferior vena cava was mildly dilated. Respirophasic  changes in dimension were absent. AORTIC VALVE: The valve was structurally normal, tri-commissural. There was   no  evidence for stenosis. There was no significant regurgitation. MITRAL VALVE: Valve structure was normal. There was no evidence for stenosis. There was mild regurgitation.     TRICUSPID VALVE: The valve structure was normal. There was no evidence for  stenosis. There was mild regurgitation. PULMONIC VALVE: The valve structure was normal. There was no evidence for  stenosis. There was mild regurgitation. PERICARDIUM: A small to moderate pericardial effusion was identified along   the  right ventricular free wall. A pericardial fat pad was present. AORTA: The root exhibited normal size. SUMMARY:    -  Left ventricle: Systolic function was mildly to moderately reduced. Ejection  fraction was estimated in the range of 40 % to 45 %. There was global mild  hypokinesis. -  Right ventricle: The ventricle was dilated. Systolic function was reduced. -  Left atrium: The atrium was mildly to moderately dilated. -  Right atrium: The atrium was mildly dilated. -  Inferior vena cava, hepatic veins: The inferior vena cava was mildly  dilated. Respirophasic changes in dimension were absent. -  Mitral valve: There was mild regurgitation.    -  Tricuspid valve: There was mild regurgitation.    -  Pericardium: A small to moderate pericardial effusion was identified along  the right ventricular free wall. SYSTEM MEASUREMENT TABLES    2D mode  AoR Diam (2D): 2.8 cm  LA Dimension (2D): 4.5 cm  Left Atrium Systolic Volume Index; Method of Disks, Biplane; 2D mode;: 39.5  ml/m2  IVS/LVPW (2D): 1  IVSd (2D): 1.2 cm  LVIDd (2D): 4.8 cm  LVIDs (2D): 4.3 cm  LVPWd (2D): 1.2 cm  RVIDd (2D): 3.5 cm    Unspecified Scan Mode  Peak Grad; Mean; Antegrade Flow: 5 mm[Hg]  Vmax; Antegrade Flow: 113 cm/s    Prepared and signed by    Christine Frey.  Saniya Ortega MD [facility]  Signed 03-Jul-2019 16:54:40           All Micro Results     Procedure Component Value Units Date/Time    VIRAL CULTURE - INCLUDES ADENOVIRUS, CMV, ENTEROVIRUSES, HSV, INFLUENZA, MUMPS, PARAINFLUENZA, RSV, & VARICELLA ZOSTER [[id number]] Collected:  07/03/19 1245    Order Status:  Completed Specimen:  Other from Miscellaneous sample Updated:  07/11/19 0936     Source BRONCHIAL LAVAGE        Viral Culture, General No virus isolated. Comment: (NOTE)  Performed At: [address] [id number]  Aletha Mendoza MD RW:[id number]  Corrected on 07/11 AT 7459: This is a correction to the medical record of the test results previously reported as Comment         ANAEROBIC CULTURE - ONLY PERFORMED ON BRONCHIAL BRUSHING [[id number]]  (Abnormal) Collected:  07/03/19 1245    Order Status:  Completed Specimen:  Bronchial lavage Updated:  07/10/19 1151     Special Requests: NO SPECIAL REQUESTS        Culture result:       SCANT ANAEROBIC GRAM POSITIVE RODS            ISOLATE SENT TO   [facility]  07/10/19      ATYPICAL PNEUMONIA BY PCR,BRONCHOALVEOLAR LAVAGE - INCLUDES: MYCOPLASMA PNEUMONIAE AND CHLAMYDIA PNEUMONIAE [[id number]] Collected:  07/03/19 1245    Order Status:  Completed Specimen:  Bronchial lavage Updated:  07/08/19 [address]     M. PNEUMONIAE, MPPT Not Detected     C. PNEUMONAIE, CPPT Not Detected     Comment: (NOTE)  This test was developed and its performance  characteristics determined by [employer]. It has not  been cleared or approved by the U.S. Food and Drug  Administration. Results should be used in conjunction with  clinical findings, and should not form the sole basis for  a diagnosis or treatment decision. Performed At: [facility]  [address] [place], [place] [id number]  Rosalva Lerner PhD TM:[id number]         A. GALACTOMANNAN BY EIA, BRONCHOALVEOLAR LAVAGE [[id number]] Collected:  07/03/19 1245    Order Status:  Completed Specimen:  Bronchial lavage Updated:  07/08/19 1735     A. GALACTOMANNAN, AGPT 0.071        Comment: (NOTE)  Interpretation:   Patients with an index value of greater  than or equal to 0.5 are considered to be positive for  galactomannan antigen. The Platelia(TM) Aspergillus EIA  package insert also recommends a new sample be collected  from the patient for follow-up testing.   Patients with an  index value of less than 0.5 are considered to be negative  for galactomannan antigen. A negative result may indicate  that the patient's result is below the detectable level of  the assay. Negative results do not rule out the diagnosis of Invasive  Aspergillosis. Pursuant to the package insert, repeat  testing is recommended if the result is negative, but the  disease is suspected. Due to the potential for  environmental contamination when transferred to pour-off  tubes, which can lead to false positive results, interpret  positive results from samples provided in pour-off tubes  with caution. Results should be used in conjunction with  clinical findings, and should not form the sole basis for  a diagnosis or treatment decision. The Platelia Aspergillus Galactomannan EIA is a product of  [employer] and is FDA approved for in vitro diagnostic use.   Performed At: [place] [id number]  Shabnam Swanson PhD VY:[id number]         CULTURE, BLOOD [[id number]] Collected:  07/03/19 0515    Order Status:  Completed Specimen:  Blood Updated:  07/08/19 1137     Special Requests: CENTRAL LINE     Culture result: NO GROWTH 5 DAYS       CULTURE, BLOOD [[id number]] Collected:  07/03/19 0641    Order Status:  Completed Specimen:  Blood Updated:  07/08/19 1137     Special Requests: Render Ana     Culture result: NO GROWTH 5 DAYS       RESPIRATORY VIRAL PANEL, PCR [[id number]] Collected:  07/03/19 1245    Order Status:  Completed Specimen:  Respiratory sample Updated:  07/07/19 2335     Source BRONCHIAL LAVAGE        Influenza A NEGATIVE         Influenza B NEGATIVE         RSV A NEGATIVE         RSV B NEGATIVE         Parainfluenza 1 NEGATIVE         Parainfluenza 2 NEGATIVE         Parainfluenza 3 NEGATIVE         Rhinovirus NEGATIVE         Metapneumovirus NEGATIVE         Adenovirus NEGATIVE         Comment: (NOTE)  Performed At: [address] [id number]  Brenda Willams MD NO:[id number]         FUNGUS CULTURE AND SMEAR - Eloisa Ground [[id number]] Collected:  07/03/19 1245    Order Status:  Completed Specimen:  Other from Miscellaneous sample Updated:  07/06/19 0836     Source BRONCHIAL LAVAGE        Fungus stain Direct Inoculation     Fungus (Mycology) Culture Other source received     Comment: (NOTE)  Performed At: [address] [id number]  Brenda Willams MD UT:[id number]         CULTURE, RESPIRATORY/SPUTUM/BRONCH Mayte Arm [[id number]] Collected:  07/03/19 0643    Order Status:  Completed Specimen:  Sputum Updated:  07/05/19 1214     Special Requests: NO SPECIAL REQUESTS        GRAM STAIN 0 to 2 WBCS/OIF      NO EPITHELIAL CELLS SEEN         FEW GRAM POSITIVE COCCI         2+ MUCUS PRESENT        Culture result:       HEAVY NORMAL RESPIRATORY ROBEL          CULTURE, RESPIRATORY/SPUTUM/BRONCH Spohie Corona STAIN [[id number]] Collected:  07/03/19 1245    Order Status:  Completed Specimen:  Sputum from Bronchial lavage Updated:  07/05/19 0743     Special Requests: NO SPECIAL REQUESTS        GRAM STAIN NO WBCS SEEN         NO DEFINITE ORGANISM SEEN        Culture result: NO GROWTH 2 DAYS       AFB (MYCOBACTERIUM) CULTURE & SMEAR W/REFLEX ID Sadie Terrell NOCARDIA [[id number]] Collected:  07/03/19 1245    Order Status:  Completed Specimen:  Miscellaneous sample Updated:  07/04/19 1336     Source BRONCHIAL LAVAGE        AFB Specimen processing Concentration     Acid Fast Smear NEGATIVE         Comment: (NOTE)  Performed At: [address] [id number]  Brenda Willams MD ME:[id number]          Acid Fast Culture PENDING          Labs: Results:       BMP, Mg, Phos Recent Labs     07/15/19  0319 07/14/19  0319 07/13/19  0322    140 140   K 4.2 4.0 3.8    102 103   CO2 28 29 28   AGAP 7 9 9   BUN 25* 21 22   CREA 1.18* 1.22* 1.13*   CA 8.7 8.6 8.4   * 110* 103*   MG 1.9 1.9 2.0 PHOS 3.1 3.5 4.1*      CBC Recent Labs     07/15/19  0319 07/14/19  0319 07/13/19  0322   WBC 7.8 8.8 9.3   RBC 3.03* 3.24* 3.19*   HGB 9.2* 9.8* 9.8*   HCT 28.3* 30.5* 29.9*    255 244   GRANS 72 71 75   LYMPH 14 16 13   EOS 1 1 1   MONOS 11 10 10   BASOS 1 1 1   IG 1 1 1   ANEU 5.6 6.3 7.0   ABL 1.1 1.4 1.2   JESSICA 0.1 0.1 0.1   ABM 0.9 0.9 0.9   ABB 0.1 0.1 0.1   AIG 0.1 0.0 0.1      LFT No results for input(s): SGOT, ALT, TBIL, AP, TP, ALB, GLOB, AGRAT, GPT in the last 72 hours.    Cardiac Testing Lab Results   Component Value Date/Time     (H) 07/04/2019 03:55 AM     (H) 07/03/2019 09:42 AM     10/28/2018 02:00 PM    Troponin-I, Qt. <0.02 (L) 07/03/2019 07:24 PM    Troponin-I, Qt. <0.02 (L) 07/03/2019 09:42 AM      Coagulation Tests Lab Results   Component Value Date/Time    Prothrombin time 11.1 (H) 08/12/2013 06:55 AM    Prothrombin time 11.2 (H) 07/18/2013 02:49 PM    INR 1.1 08/12/2013 06:55 AM    INR 1.1 07/18/2013 02:49 PM    aPTT 28.9 07/07/2019 04:36 AM    aPTT 92.3 (H) 07/06/2019 04:59 AM    aPTT 103.6 (H) 07/05/2019 11:18 PM      A1c Lab Results   Component Value Date/Time    Hemoglobin A1c, External 5.6 06/06/2014      Lipid Panel Lab Results   Component Value Date/Time    Cholesterol, total 230 (H) 11/14/2018 10:15 AM    HDL Cholesterol 40 11/14/2018 10:15 AM    LDL, calculated 162 (H) 11/14/2018 10:15 AM    VLDL, calculated 28 11/14/2018 10:15 AM    Triglyceride 142 11/14/2018 10:15 AM      Thyroid Panel No results found for: T4, T3U, TSH, TSHEXT, TSHEXT     Most Recent UA Lab Results   Component Value Date/Time    Color YELLOW 07/03/2019 03:33 AM    Appearance CLEAR 07/03/2019 03:33 AM    pH (UA) 6.0 07/03/2019 03:33 AM    Protein 100 (A) 07/03/2019 03:33 AM    Glucose NEGATIVE  07/03/2019 03:33 AM    Ketone NEGATIVE  07/03/2019 03:33 AM    Bilirubin NEGATIVE  07/03/2019 03:33 AM    Blood TRACE (A) 07/03/2019 03:33 AM    Urobilinogen 0.2 07/03/2019 03:33 AM    Nitrites NEGATIVE  07/03/2019 03:33 AM    Leukocyte Esterase NEGATIVE  07/03/2019 03:33 AM        Allergies   Allergen Reactions    Celebrex [Celecoxib] Rash    Lisinopril Cough    Losartan Other (comments)     Burning sensation tongue.  Nifedipine Other (comments)     severe headache     Immunization History   Administered Date(s) Administered    Influenza Vaccine 10/06/2014    Influenza Vaccine PF 12/03/2015    Pneumococcal Conjugate (PCV-13) 10/06/2014    TB Skin Test (PPD) Intradermal 07/06/2019       All Labs from Last 24 Hrs:  Recent Results (from the past 24 hour(s))   METABOLIC PANEL, BASIC    Collection Time: 07/15/19  3:19 AM   Result Value Ref Range    Sodium 140 136 - 145 mmol/L    Potassium 4.2 3.5 - 5.1 mmol/L    Chloride 105 98 - 107 mmol/L    CO2 28 21 - 32 mmol/L    Anion gap 7 7 - 16 mmol/L    Glucose 113 (H) 65 - 100 mg/dL    BUN 25 (H) 8 - 23 MG/DL    Creatinine 1.18 (H) 0.6 - 1.0 MG/DL    GFR est AA 57 (L) >60 ml/min/1.73m2    GFR est non-AA 47 (L) >60 ml/min/1.73m2    Calcium 8.7 8.3 - 10.4 MG/DL   MAGNESIUM    Collection Time: 07/15/19  3:19 AM   Result Value Ref Range    Magnesium 1.9 1.8 - 2.4 mg/dL   PHOSPHORUS    Collection Time: 07/15/19  3:19 AM   Result Value Ref Range    Phosphorus 3.1 2.3 - 3.7 MG/DL   CBC WITH AUTOMATED DIFF    Collection Time: 07/15/19  3:19 AM   Result Value Ref Range    WBC 7.8 4.3 - 11.1 K/uL    RBC 3.03 (L) 4.05 - 5.2 M/uL    HGB 9.2 (L) 11.7 - 15.4 g/dL    HCT 28.3 (L) 35.8 - 46.3 %    MCV 93.4 79.6 - 97.8 FL    MCH 30.4 26.1 - 32.9 PG    MCHC 32.5 31.4 - 35.0 g/dL    RDW 15.1 (H) 11.9 - 14.6 %    PLATELET 492 178 - 774 K/uL    MPV 11.1 9.4 - 12.3 FL    ABSOLUTE NRBC 0.00 0.0 - 0.2 K/uL    DF AUTOMATED      NEUTROPHILS 72 43 - 78 %    LYMPHOCYTES 14 13 - 44 %    MONOCYTES 11 4.0 - 12.0 %    EOSINOPHILS 1 0.5 - 7.8 %    BASOPHILS 1 0.0 - 2.0 %    IMMATURE GRANULOCYTES 1 0.0 - 5.0 %    ABS. NEUTROPHILS 5.6 1.7 - 8.2 K/UL    ABS.  LYMPHOCYTES 1.1 0.5 - 4.6 K/UL ABS. MONOCYTES 0.9 0.1 - 1.3 K/UL    ABS. EOSINOPHILS 0.1 0.0 - 0.8 K/UL    ABS. BASOPHILS 0.1 0.0 - 0.2 K/UL    ABS. IMM. GRANS. 0.1 0.0 - 0.5 K/UL       Discharge Exam:  Patient Vitals for the past 24 hrs:   Temp Pulse Resp BP SpO2   07/15/19 0920 98.7 °F (37.1 °C) 80 20 154/73 99 %   07/15/19 0441 98.9 °F (37.2 °C) 75 18 152/70 96 %   07/15/19 0038 99.5 °F (37.5 °C) 79 18 126/51 96 %   07/14/19 2021 98.7 °F (37.1 °C) 81 18 112/61 97 %   07/14/19 1719 98.4 °F (36.9 °C) 74 18 120/57 97 %   07/14/19 1214 97.6 °F (36.4 °C) 77 18 119/71 96 %     Oxygen Therapy  O2 Sat (%): 99 % (07/15/19 0920)  Pulse via Oximetry: 85 beats per minute (07/12/19 1905)  O2 Device: Room air (07/15/19 0920)  O2 Flow Rate (L/min): 2 l/min (07/12/19 1310)  FIO2 (%): 28 % (07/08/19 0718)    Intake/Output Summary (Last 24 hours) at 7/15/2019 1020  Last data filed at 7/15/2019 0920  Gross per 24 hour   Intake 945 ml   Output 600 ml   Net 345 ml       General:    Well nourished. Alert. No distress. Eyes:   Normal sclera. Extraocular movements intact. ENT:  Normocephalic, atraumatic. Moist mucous membranes  CV:   Regular rate and rhythm. No murmur, rub, or gallop. Lungs:  Clear to auscultation bilaterally. No wheezing, rhonchi, or rales. Abdomen: Soft, nontender, nondistended. Bowel sounds normal.   Extremities: Warm and dry. No cyanosis or edema. Neurologic: CN II-XII grossly intact. Sensation intact. Skin:     No rashes or jaundice. Psych:  Normal mood and affect. Discharge Info:   Current Discharge Medication List      START taking these medications    Details   amiodarone (PACERONE) 400 mg tablet Take 1 Tab by mouth two (2) times a day. Qty: 60 Tab, Refills: 0      apixaban (ELIQUIS) 5 mg tablet Take 1 Tab by mouth every twelve (12) hours. Qty: 60 Tab, Refills: 0      carvedilol (COREG) 6.25 mg tablet Take 1 Tab by mouth two (2) times daily (with meals).   Qty: 60 Tab, Refills: 0      furosemide (LASIX) 20 mg tablet Take 1 Tab by mouth daily. Qty: 30 Tab, Refills: 0      spironolactone (ALDACTONE) 25 mg tablet Take 1 Tab by mouth daily. Qty: 30 Tab, Refills: 0      potassium chloride (KLOR-CON) 10 mEq tablet Take 1 Tab by mouth daily. Qty: 30 Tab, Refills: 0         CONTINUE these medications which have CHANGED    Details   COLCRYS 0.6 mg tablet Take 1 Tab by mouth as needed (gout) for up to 360 days. Qty: 7 Tab, Refills: 7    Associated Diagnoses: Chronic gout without tophus, unspecified cause, unspecified site         CONTINUE these medications which have NOT CHANGED    Details   albuterol (PROVENTIL HFA, VENTOLIN HFA, PROAIR HFA) 90 mcg/actuation inhaler Take 2 Puffs by inhalation every six (6) hours as needed for Wheezing. Qty: 1 Inhaler, Refills: 0    Associated Diagnoses: Bronchitis      magnesium oxide (MAG-OX) 400 mg tablet Take 400 mg by mouth two (2) times a day. STOP taking these medications       ibuprofen (MOTRIN) 200 mg tablet Comments:   Reason for Stopping:         predniSONE (DELTASONE) 5 mg tablet Comments:   Reason for Stopping:         nadolol (CORGARD) 40 mg tablet Comments:   Reason for Stopping:         amLODIPine (NORVASC) 5 mg tablet Comments:   Reason for Stopping:         multivitamin (ONE A DAY) tablet Comments:   Reason for Stopping:         aspirin delayed-release 81 mg tablet Comments:   Reason for Stopping:                 Disposition:rehab  Activity: As per rehab. Diet: DIET CARDIAC Regular    Follow-up Appointments   Procedures    FOLLOW UP VISIT Appointment in: One Week F/u with pcp in a week with pcp after discharge from rehab. F/u with cardiology as per there recommendations. Cbc,bmp and magnesium in a week- rehab physician to f/u on labs. Daily weights. F/u with pcp in a week with pcp after discharge from rehab. F/u with cardiology as per there recommendations. Cbc,bmp and magnesium in a week- rehab physician to f/u on labs. Daily weights.      Standing Status: Standing     Number of Occurrences:   1     Order Specific Question:   Appointment in     Answer: One Week         Follow-up Information     Follow up With Specialties Details Why 69 Kayy Kim MD Internal Medicine   [address] [facility]  [phone]            Time spent in patient discharge planning and coordination 35 minutes.     Signed:  Deandre Maki MD

## 2019-07-15 NOTE — PROGRESS NOTES
TRANSFER - OUT REPORT:    Verbal report given to Stephie Welch RN on Georgia Alyssa  being transferred to [id number] for routine progression of care       Report consisted of patients Situation, Background, Assessment and Recommendations(SBAR). Information from the following report(s) SBAR, Kardex and Procedure Summary was reviewed with the receiving nurse. Opportunity for questions and clarification was provided.

## 2019-07-15 NOTE — PROGRESS NOTES
Care Management Interventions  PCP Verified by CM: Yes(saw nurse practioner at Internal medicine states Dr. Delaney Gannon has left group)  Palliative Care Criteria Met (RRAT>21 & CHF Dx)?: No(RRAT 23 Dx Resp failure)  Mode of Transport at Discharge: Other (see comment)(wheelchair to 9th floor rehab)  Transition of Care Consult (CM Consult): Other(9th floor rehab)  Discharge Durable Medical Equipment: No(rollator, cane, shower chair and B/P cuff)  Physical Therapy Consult: Yes  Occupational Therapy Consult: Yes  Speech Therapy Consult: Yes  Current Support Network: Lives Alone  Confirm Follow Up Transport: Other (see comment)(she takes the [facility] [employer] service for handicap they come to her home and pick her up)  Plan discussed with Pt/Family/Caregiver: Yes  Freedom of Choice Offered: Yes  Discharge Location  Discharge Placement: Other:(9th floor inpatient rehab)  Patient medically stable to transfer to 9th floor for rehab. Patient in agreement with d/c plan. Patient to transfer to inpatient rehab room 907.

## 2019-07-15 NOTE — PROGRESS NOTES
Primary Nurse Gilford Bogus, RN and [facility] - NICOLA GILBERT performed a dual skin assessment on this patient. Patient has reddened area on her chest where she was cardioverted. Skin breakdown and excoriated under bilateral breast. Chantal area, thighs, sacrum, and buttocks red and excoriated. Dr. Kinsey Gudino was called to exam and a wound consult has been ordered.   Mark score is 16

## 2019-07-15 NOTE — PROGRESS NOTES
Problem: Pressure Injury - Risk of  Goal: *Prevention of pressure injury  Description  Document Mark Scale and appropriate interventions in the flowsheet. Outcome: Progressing Towards Goal   Allevyns remain intact. Problem: Patient Education: Go to Patient Education Activity  Goal: Patient/Family Education  Outcome: Progressing Towards Goal     Problem: Falls - Risk of  Goal: *Absence of Falls  Description  Document Ramone Brandon Fall Risk and appropriate interventions in the flowsheet. Outcome: Progressing Towards Goal   Pt calls for assistance when  needed to get up  Problem: Patient Education: Go to Patient Education Activity  Goal: Patient/Family Education  Outcome: Progressing Towards Goal     Problem: Breathing Pattern - Ineffective  Goal: *Absence of hypoxia  Outcome: Progressing Towards Goal  Pt remains without dyspnea on exertion or at rest. RA. Problem: Nutrition Deficit  Goal: *Optimize nutritional status  Outcome: Progressing Towards Goal   Pt has fair appetite. Improved.

## 2019-07-16 LAB
ANION GAP SERPL CALC-SCNC: 8 MMOL/L (ref 7–16)
BASOPHILS # BLD: 0.1 K/UL (ref 0–0.2)
BASOPHILS NFR BLD: 1 % (ref 0–2)
BUN SERPL-MCNC: 27 MG/DL (ref 8–23)
CALCIUM SERPL-MCNC: 9 MG/DL (ref 8.3–10.4)
CHLORIDE SERPL-SCNC: 106 MMOL/L (ref 98–107)
CO2 SERPL-SCNC: 26 MMOL/L (ref 21–32)
CREAT SERPL-MCNC: 1.31 MG/DL (ref 0.6–1)
DIFFERENTIAL METHOD BLD: ABNORMAL
EOSINOPHIL # BLD: 0.2 K/UL (ref 0–0.8)
EOSINOPHIL NFR BLD: 3 % (ref 0.5–7.8)
ERYTHROCYTE [DISTWIDTH] IN BLOOD BY AUTOMATED COUNT: 15.1 % (ref 11.9–14.6)
GLUCOSE SERPL-MCNC: 109 MG/DL (ref 65–100)
HCT VFR BLD AUTO: 29.5 % (ref 35.8–46.3)
HGB BLD-MCNC: 9.6 G/DL (ref 11.7–15.4)
IMM GRANULOCYTES # BLD AUTO: 0 K/UL (ref 0–0.5)
IMM GRANULOCYTES NFR BLD AUTO: 1 % (ref 0–5)
LYMPHOCYTES # BLD: 1.4 K/UL (ref 0.5–4.6)
LYMPHOCYTES NFR BLD: 22 % (ref 13–44)
MAGNESIUM SERPL-MCNC: 2.1 MG/DL (ref 1.8–2.4)
MCH RBC QN AUTO: 30.7 PG (ref 26.1–32.9)
MCHC RBC AUTO-ENTMCNC: 32.5 G/DL (ref 31.4–35)
MCV RBC AUTO: 94.2 FL (ref 79.6–97.8)
MONOCYTES # BLD: 0.8 K/UL (ref 0.1–1.3)
MONOCYTES NFR BLD: 12 % (ref 4–12)
NEUTS SEG # BLD: 3.9 K/UL (ref 1.7–8.2)
NEUTS SEG NFR BLD: 62 % (ref 43–78)
NRBC # BLD: 0 K/UL (ref 0–0.2)
PLATELET # BLD AUTO: 263 K/UL (ref 150–450)
PMV BLD AUTO: 10.5 FL (ref 9.4–12.3)
POTASSIUM SERPL-SCNC: 4 MMOL/L (ref 3.5–5.1)
RBC # BLD AUTO: 3.13 M/UL (ref 4.05–5.2)
SODIUM SERPL-SCNC: 140 MMOL/L (ref 136–145)
WBC # BLD AUTO: 6.3 K/UL (ref 4.3–11.1)

## 2019-07-16 PROCEDURE — 36415 COLL VENOUS BLD VENIPUNCTURE: CPT

## 2019-07-16 PROCEDURE — 74011250637 HC RX REV CODE- 250/637: Performed by: PHYSICAL MEDICINE & REHABILITATION

## 2019-07-16 PROCEDURE — 97535 SELF CARE MNGMENT TRAINING: CPT

## 2019-07-16 PROCEDURE — 83735 ASSAY OF MAGNESIUM: CPT

## 2019-07-16 PROCEDURE — 97110 THERAPEUTIC EXERCISES: CPT

## 2019-07-16 PROCEDURE — 85025 COMPLETE CBC W/AUTO DIFF WBC: CPT

## 2019-07-16 PROCEDURE — 80048 BASIC METABOLIC PNL TOTAL CA: CPT

## 2019-07-16 PROCEDURE — 97166 OT EVAL MOD COMPLEX 45 MIN: CPT

## 2019-07-16 PROCEDURE — 97530 THERAPEUTIC ACTIVITIES: CPT

## 2019-07-16 PROCEDURE — 97116 GAIT TRAINING THERAPY: CPT

## 2019-07-16 PROCEDURE — 97162 PT EVAL MOD COMPLEX 30 MIN: CPT

## 2019-07-16 PROCEDURE — 99232 SBSQ HOSP IP/OBS MODERATE 35: CPT | Performed by: PHYSICAL MEDICINE & REHABILITATION

## 2019-07-16 PROCEDURE — 65310000000 HC RM PRIVATE REHAB

## 2019-07-16 RX ADMIN — NYSTATIN: 100000 POWDER TOPICAL at 17:30

## 2019-07-16 RX ADMIN — APIXABAN 5 MG: 5 TABLET, FILM COATED ORAL at 20:44

## 2019-07-16 RX ADMIN — PANTOPRAZOLE SODIUM 40 MG: 40 TABLET, DELAYED RELEASE ORAL at 05:29

## 2019-07-16 RX ADMIN — SPIRONOLACTONE 25 MG: 25 TABLET ORAL at 09:00

## 2019-07-16 RX ADMIN — POTASSIUM CHLORIDE 20 MEQ: 1.5 POWDER, FOR SOLUTION ORAL at 09:00

## 2019-07-16 RX ADMIN — APIXABAN 5 MG: 5 TABLET, FILM COATED ORAL at 09:00

## 2019-07-16 RX ADMIN — NITROFURANTOIN MACROCRYSTALS 50 MG: 50 CAPSULE ORAL at 23:17

## 2019-07-16 RX ADMIN — NITROFURANTOIN MACROCRYSTALS 50 MG: 50 CAPSULE ORAL at 05:30

## 2019-07-16 RX ADMIN — NITROFURANTOIN MACROCRYSTALS 50 MG: 50 CAPSULE ORAL at 17:28

## 2019-07-16 RX ADMIN — NITROFURANTOIN MACROCRYSTALS 50 MG: 50 CAPSULE ORAL at 00:33

## 2019-07-16 RX ADMIN — NYSTATIN: 100000 POWDER TOPICAL at 09:01

## 2019-07-16 RX ADMIN — FUROSEMIDE 20 MG: 20 TABLET ORAL at 09:00

## 2019-07-16 RX ADMIN — NITROFURANTOIN MACROCRYSTALS 50 MG: 50 CAPSULE ORAL at 12:35

## 2019-07-16 RX ADMIN — AMIODARONE HYDROCHLORIDE 400 MG: 200 TABLET ORAL at 17:28

## 2019-07-16 RX ADMIN — IBUPROFEN 600 MG: 600 TABLET ORAL at 20:44

## 2019-07-16 RX ADMIN — AMIODARONE HYDROCHLORIDE 400 MG: 200 TABLET ORAL at 09:00

## 2019-07-16 RX ADMIN — FLUCONAZOLE 200 MG: 100 TABLET ORAL at 09:00

## 2019-07-16 NOTE — PROGRESS NOTES
CASE MANAGEMENT ASSESSMENT      AGE:   74            DIAGNOSIS:    Debility                     DPOA/ LIVING WILL:    None         PCP? LAST VISIT? Physician left practice needs PCP in the same group or she suggested [address]. PCP needs to be on the bus line schedule (patient use [employer] for the Handicap)    CURRENT FAMILY SUPPORT:    Daughter just came back into patient life, unable to count on. Granddaughter Constance White [phone]                        CURRENT LIVING ARRANGEMENTS:    Lives alone            HOUSE/ APARTMENT/ TRAILER:    Apartment   TUB-SHOWER COMBO OR WALK IN SHOWER:    Tub/shower combination         STEPS TO ENTER HOME:    0      PRIOR FUNCTION:    Independent          ADL'S:    Independent  DRIVING:    No    DME:    Cane, rollator, shower chair, BP cuff    PROVIDER FOR OXYGEN/ NEBULIZER:    None  AIDES/ SITTERS:    None    HOME HEALTH AGENCY PRIOR:    None  HOME HEALTH AGENCY DESIRED AT DISCHARGE IF NEEDED:    Patient agrees on [facility] if needed at discharge    :    None    FAMILY ASSESSMENT:    Not close with family except for her granddaughter Constance White. Constance White has two jobs and four children to take care of. 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 Patient enjoys traveling      CM to follow patient     Care Management Interventions  PCP Verified by CM: Yes(Patient needs a PCP at [facility] or [address])  Mode of Transport at Discharge:  Other (see comment)(Patient uses [employer] [facility] for transportation to doctors appointments.   Granddaughter will take patient home at time of discharge Leeton Ege [phone])  Transition of Care Consult (CM Consult): Discharge Planning  Physical Therapy Consult: Yes  Occupational Therapy Consult: Yes  Speech Therapy Consult: Yes  Current Support Network: Own Home, Lives Alone  Confirm Follow Up Transport: Other (see comment)([employer] for Handicap)  Plan discussed with Pt/Family/Caregiver: Yes  Freedom of Choice Offered: Yes  Discharge Location  Discharge Placement: Unable to determine at this time

## 2019-07-16 NOTE — PROGRESS NOTES
Recreational Therapy Daily Note    Time In 0915   Time Out 1000     Subjective: \"I want to build up my independence. \"  Precautions:Fall    Evaluation   Patient is a pleasant, motivated individual who is eager to gain her independence back. She enjoys staying active with the [address] and hopes to be able to get back there soon. Patient needs Min (A) with RW for standing and is aware of her hand placement for standing. Patient has decreased activity tolerance and with rest breaks needed during activity. Patient will benefit from recreational therapy services. Social Interaction: Cooperative, Appropriate and and sociable. Cognition: No issues noted and A&O X4    Education: Purpose of recreational therapy    Patient's Recreational Therapy evaluation completed under the [facility] navigation tool on 7/16/19. Please see for specifics regarding plan of care and goals. Patient prefers to be called \"Luly. \" Thank you for the referral.  Patient was handed off to Myrtie Abo tech at the end of the session.     Lilly Delong, CTRS  7/16/2019

## 2019-07-16 NOTE — PROGRESS NOTES
PHYSICAL THERAPY EXAMINATION    Time in: 1000  Time out: 65    Patient Name: Gayle Irizarry  Patient Age: 74 y.o.   Past Medical History:   Past Medical History:   Diagnosis Date    Arrhythmia     Arthritis     Asthma     Cellulitis     Chronic kidney disease     Hx renal failure    Depression     Gout     Gouty arthritis  NOS 12/2/2015    Hypercholesterolemia     Hypertension     Mitral valve prolapse     Morbid obesity (HCC)     Nausea & vomiting     Polymyalgia rheumatica (HCC) 12/2/2015    Psychiatric disorder     depression & anxiety    PUD (peptic ulcer disease)     Sjogren's syndrome ([facility] [address]     Temporal arteritis ([facility])        Medical Diagnosis:  CHF (congestive heart failure) (HCC) [I50.9]  Polymyalgia rheumatica (HCC) [M35.3]  Gout attack [M10.9]  Physical deconditioning [R53.81]  Chronic atrial fibrillation with rapid ventricular response (HCC) [I48.2]  Impaired gait and mobility [R26.89]  Candidiasis of perineum [B37.49]  Hypertension [I10] <principal problem not specified>    Precautions at Admission: Other (comment)(fall risk)    Therapy Diagnosis:   Difficulty with bed mobility  [x]     Difficulty with functional transfers  [x]     Difficulty with ambulation  [x]     Difficulty with stair negotiations  [x]       Problem List:    Decreased strength B LE  [x]     Decreased strength trunk/core  [x]     Decreased AROM   [x]     Decreased PROM  []    Decreased endurance  [x]     Decreased balance sitting  [x]     Decreased balance standing  [x]     Pain   []     Slow ambulation velocity  [x]    Decreased coordination  [x]    Decreased safety awareness  [x]      Functional Limitations:   Decreased independence with bed mobility  [x]     Decreased independence with functional transfers  [x]     Decreased independence with ambulation  [x]     Decreased independence with stair negotiation  [x]       Previous Functional Level: Patient reports independent with ADL and IADLs, furniture walking within apartment but using SPC/rollator PRN. Patient uses [employer]. Independent with medications, assist with laundry. Home Environment: Home Environment: Apartment  # Steps to Enter: 0  One/Two Story Residence: One story  Living Alone: Yes  Support Systems: Child(aubrey)  Patient Expects to be Discharged to[de-identified] Apartment  Current DME Used/Available at Home: Blood pressure cuff, Cane, straight, Grab bars, Shower chair, Walker, rolling  Tub or Shower Type: Tub/Shower combination         Outcome Measures: Vital Signs:   Patient Vitals for the past 8 hrs:   Temp Pulse Resp BP SpO2   07/16/19 0853 98.4 °F (36.9 °C) 80 18 147/64 96 %       Pain level: No pain reported. Pain location: n/a  Pain interventions: n/a    Patient education: Oriented patient to [facility] and daily schedule. Interdisciplinary Communication: Communicated with Easton Brand OT regarding patient's POC. Cognition: Verbal cues for safety with mobility.       MMT Initial Asssessment   Right Lower Extremity Left Lower Extremity   Hip Flexion 4- 4   Knee Extension 4- 4   Knee Flexion 4- 4   Ankle Dorsiflexion 4 4   0/5 No palpable muscle contraction  1/5 Palpable muscle contraction, no joint movement  2-/5 Less than full range of motion in gravity eliminated position  2/5 Able to complete full range of motion in gravity eliminated position  2+/5 Able to initiate movement against gravity  3-/5 More than half but not full range of motion against gravity  3/5 Able to complete full range of motion against gravity  3+/5 Completes full range of motion against gravity with minimal resistance  4-/5 Completes full range of motion against gravity with minimal-moderate resistance  4/5 Completes full range of motion against gravity with moderate resistance  4+/5 Completes full range of motion against gravity with moderate-maximum resistance  5/5 Completes full range of motion against gravity with maximum resistance     AROM: Generally decreased, functional    FIM SCORES Initial Assessment   Bed/Chair/Wheelchair Transfers 4   Wheelchair Mobility 0(Patient's primary mode of locomotion is ambulation)   Walking [place] 1   Steps/Stairs 0   PRIMARY MODE OF LOCOMOTION: Ambulation  Please see IRC Interdisciplinary Eval: Coordination/Balance Section for details regarding FIM score description.     BED/CHAIR/WHEELCHAIR TRANSFERS Initial Assessment   Rolling Right 4 (Contact guard assistance)   Rolling Left 4 (Contact guard assistance)   Supine to Sit 4 (Contact guard assistance)   Sit to Stand Minimal assistance   Sit to Supine 4 (Minimal assistance)   Transfer Assist Score 4   Transfer Type SPT without device   Comments Increased time and effort, moderate forward head posture, decreased step length B LE   Car Transfer Not tested   Car Type         WHEELCHAIR MOBILITY/MANAGEMENT Initial Assessment   Able to Propel 0 feet   Functional Level 0(Patient's primary mode of locomotion is ambulation)   Curbs/ramps assistance required     Wheelchair set up assistance required     Wheelchair management         WALKING INDEPENDENCE Initial Assessment   Assistive device Gait belt, Other (comment)(HHA x1)   Ambulation assistance - level surface 1 (Dependent/total assistance)(MIN A x 1, 2nd person w/c follow)   Distance 45 Feet (ft)   Functional Level 1   Comments Patient ambulated with slow partail step through gait pattern, decreased step clearance of B LE, mild forward head posture   Ambulation assistance - unlevel surface 0 (Not tested)(Secondary to safety concerns)       STEPS/STAIRS Initial Assessment   Steps/Stairs ambulated 0   Rail Use     Functional Level 0   Comments Secondary to safety concerns   Curbs/Ramps 0 (Not tested)(Secondary to safety concnerns)       QUALITY INDICATOR ASSIST COMMENTS   Walk 10 feet 4: Supervision or touching A    Walk 50 feet with 2 turns Not Tested: Not attempted due to safety concerns    Walk 150 feet Not Tested: Not attempted due to safety concerns Walk 10 feet on uneven  Not Tested: Not attempted due to safety concerns    1 step/curb Not Tested: Not attempted due to safety concerns    4 steps Not Tested: Not attempted due to safety concerns    12 steps Not Tested: Not attempted due to safety concerns     object Not Tested: Not attempted due to safety concerns    Wheel 48' w/2 turns Not Tested: Not applicable secondary to pt not completing activity previously    Wheel 150' Not Tested: Not applicable secondary to pt not completing activity previously      Patient returned to room sitting in bedside chair with all needs in reach. PHYSICAL THERAPY PLAN OF CARE    Therapy Diagnosis:   Please see table above    Order received from MD for physical therapy services and chart reviewed. Pt to be seen at least 5 times per week for at least 1.5 hours of physical therapy per day for 1 week. Thank you for the referral.    LTGs:  LTG 1. Patient transfer supine<>sit Independently in 1 week  LTG 2. Patient transfer sit<>stand and perform stand pivot transfer with LRAD and modified independence in 1 week  LTG 3. Patient ambulate 150 feet with LRAD and MOD I in 1 week  LTG 4. Patient ambulate up/down 4 steps with handrails and supervision in 1 week  LTG 5. Patient ambulate up/down 10 ft ramp with LRAD and MOD I in 1 week      Pt would benefit from skilled physical therapy in order to improve independent functional mobility within the home. Interventions may include range of motion (AROM, PROM B LE/trunk), motor function (B LE/trunk strengthening/coordination), activity tolerance (vitals, oxygen saturation levels), bed mobility training, balance activities, gait training (progressive ambulation program), and functional transfer training. Please see IRC; Interdisciplinary Eval, Care Plan, and Patient Education for further information regarding physical therapy examination and plan of care.      Pritesh Roman  7/16/2019

## 2019-07-16 NOTE — PROGRESS NOTES
Interdisciplinary Conference Notes    Interdisciplinary conference completed to discuss plan of care. Estimated D/C Date: 07/23/2019    Recommended Equipment: 4 Wheeled Walker    Recommended Follow-Up Therapy: Home Health  PT and OT    Communication with family/caregivers: Spoke with patient aware of discharge plan for the 23rd. No concerns voiced.

## 2019-07-16 NOTE — PROGRESS NOTES
Problem: Falls - Risk of  Goal: *Absence of Falls  Description  Document Brianne Late Fall Risk and appropriate interventions in the flowsheet. Outcome: Progressing Towards Goal     Problem: Patient Education: Go to Patient Education Activity  Goal: Patient/Family Education  Outcome: Progressing Towards Goal     Problem: Inpatient Rehab (Adult)  Goal: *LTG: Avoids injury/falls 100% of time related to deficits  Outcome: Progressing Towards Goal  Goal: *LTG: Avoids infection 100% of time related to deficits  Outcome: Progressing Towards Goal  Goal: *LTG: Verbalize understanding of diagnosis and risk factors for recurring stroke  Outcome: Progressing Towards Goal  Goal: *LTG: Absence of DVT during hospitalization  Outcome: Progressing Towards Goal  Goal: *LTG: Maintains Skin Integrity With No Evidence of Pressure Injury 100% of Time  Outcome: Progressing Towards Goal  Goal: Interventions  Outcome: Progressing Towards Goal     Problem: Pressure Injury - Risk of  Goal: *Prevention of pressure injury  Description  Document Mark Scale and appropriate interventions in the flowsheet.   Outcome: Progressing Towards Goal     Problem: Patient Education: Go to Patient Education Activity  Goal: Patient/Family Education  Outcome: Progressing Towards Goal

## 2019-07-16 NOTE — PROGRESS NOTES
[facility] OCCUPATIONAL THERAPY INITIAL EVALUATION    Time In: 5442  Time Out: 0830    Precautions: Falls    Vitals:   Patient Vitals for the past 8 hrs:   Temp Pulse Resp BP SpO2   07/16/19 0853 98.4 °F (36.9 °C) 80 18 147/64 96 %         Pain: Mild soreness in feet noted d/t gout this AM, therapy to tolerance    History of Presenting Illness (per previous reports): Agnes Pino has history of PMR on chronic steroids, WPW s/p ablation, gout, HLD, HTN presented with progressively worsening shortness of breath. Patient was found to be in atrial fibrillation with rapid ventricular rate. CXR showed patchy bilateral edema vs infiltrates. Patient developed progressive hypoxemia/ respiratory failure,required intubation, was admitted to ICU on ventilator support. Cardiology has started treatment for HR control, a.fib, started, continued on Cardizem atrial fibrillation and she was started on Cardizem gtt and  and heparin gtt. Patient continued on iv lasix, treatment of CHF.  Patient has been transitioned to Eliquis. Patient was extubated successfully on 7/5.  CXR (7/9)still shows bilateral pleural effusions and vascular congestion. No significant change. Patient is still requiring Cardizem drip and IV Lasix as HR still elevated.   Patient HR continued to be rapid, refractory to iv cardizem administration. On 7/12 She underwent a transesophageal echocardiogram and electrical cardioversion for persistent atrial fibrillation. Post op patient was started on amiodarone load iv. Patient is continued on lasix for volume overload. Patient is continued on Coreg per cardiology direction. Patiet started to have pain in bilateral feet. Acute gout flair is diagnosed, was started on colchicine. Patient reported to have severe intertrigo throughout her mary lou areas. topical and oral medications will be needed.        Past Medical History/ Co-morbidities:   Past Medical History:   Diagnosis Date    Arrhythmia     Arthritis     Asthma     Cellulitis     Chronic kidney disease     Hx renal failure    Depression     Gout     Gouty arthritis  NOS 12/2/2015    Hypercholesterolemia     Hypertension     Mitral valve prolapse     Morbid obesity (HCC)     Nausea & vomiting     Polymyalgia rheumatica ([facility] [address] 12/2/2015    Psychiatric disorder     depression & anxiety    PUD (peptic ulcer disease)     Sjogren's syndrome ([facility] [address]     Temporal arteritis ([facility] [address]        Patient's Goal: \"Just endurance really. \"    Previous Level of Function: Patient reports independent with ADL and IADLs, furniture walking within apartment but using SPC/rollator PRN. Patient uses [employer]. Independent with medications, assist with laundry.     Home Situation    Environment Comments   House Situation Apartment    Lives Alone Yes    Support Systems Child(aubrey)    Shower Situation Tub/Shower combination    Current DME Cane, straight, Walker, rollator, Shower chair, Raised toilet seat, Grab bars    Lift Chair      Stairs to "[employer] 0       Rails         W/C Ramp      Interior Steps        Upper Extremity Function   NIDA SANCHEZ   [address] [place] To assess To assess   [facility] Intact Intact   Light Touch No apparent deficit No apparent deficit   Sharp/Dull Discrimination       Hot/Cold No apparent deficit No apparent deficit   Proprioception       Stereognosis       9 Hole Peg Test       General Comments        NIDA SANCHEZ   General Evalutaion    Dominant   AROM       Shoulder Flexion 4 4   Shoulder Extension        Shoulder Abduction       Shoulder Adduction       Elbow Flexion 4 4+   Elbow Extension        Wrist Flexion/Extension 4  4    4 4   General Comments     0/5 No palpable muscle contraction  1/5 Palpable muscle contraction, no joint movement  2-/5 Less than full range of motion in gravity eliminated position  2/5 Able to complete full range of motion in gravity eliminated position  2+/5 Able to initiate movement against gravity  3-/5 More than half but not full range of motion against gravity  3/5 Able to complete full range of motion against gravity  3+/5 Completes full range of motion against gravity with minimal resistance  4-/5 Completes full range of motion against gravity with minimal-moderate resistance  4/5 Completes full range of motion against gravity with moderate resistance  4+/5 Completes full range of motion against gravity with moderate-maximum resistance  5/5 Completes full range of motion against gravity with maximum resistance    Cognition   Performance Comments   Orientation Level Oriented X4    Comprehension Level 6 Mode: Auditory  Hearing Aid:None(Reports some hearing loss)  Glasses:Glasses   Expression (Native Language) 7 Mode: Verbal  WNL   Social Interaction/Pragmatics 7 Pleasant, agreeable   Problem Solving 5 Solves complex problems with cues, or basic problems 90% or more of the time   Memory 5 Recognizes, recalls, or executes 3 steps of 3 step request 90% of the time    Comments       Activities of Daily Living    Score Comments   Self-Feeding 5 Setup at tray table   Grooming 5 Tasks completed by patient: Washed face  Setup   Bathing 4 Body Parts Bathe: Thigh, right, Thigh, left, Mary [place] area, Lower leg and foot, right, Lower leg and foot, left, Chest, Buttocks, Arm, right, Arm, left, Abdomen  CGA in stance as patient bathed and rinsed mary lou area/bottom, use of bed rail   Tub/Shower Transfer [place] 4 Type of Shower: Shower  Adaptive  Equipment:Tub transfer bench, Grab bars and Walker   Upper Body  Dressing/Undressing 4 Items Applied:Bra (3 steps), Pullover (4 steps)  Assist to help bra down back   Lower Body Dressing/Undressing 4 Items Applied:Shoe, right (1 step), Shoe, left (1 step), Underpants (3 steps), Elastic waist pants (3 steps)  Assist to help R shoe around heel, CGA in stance as patient manages clothing over hips   Toileting 4 Assist for standing balance   Toilet Transfer 4 Ambulating with RW     Vision/Perception: No deficits noted.   Wears glasses, legally blind in L eye. History of cataract surgery. Instrumental Activities of Daily Living   Performance Comments   Meal Preperation Maximum assistance    Homemaking Maximum assistance    Medication Management Setup    Financial Management Supervision        Session: Patient seen for ADL evaluation, OT interview and initiation of UE/vision assessment, see above for details. Patient is alert, oriented, pleasant, and was active at the [facility]. Patient lives alone in an apartment in a [address] [place] [address] in [place], independent with ADL and most IADLs at baseline. Reports use of SPC and rollator PRN, furniture walking within apartment. Patient reports some hearing loss and some memory loss (having \"Senior moments\") as of late but endorses no new vision problems and demonstrates no significant changes in cognitive status. Patient reports medical status and endurance as greatest barriers to functioning at this time. Granddaughter and Daughter living locally. Rehabilitation potential is good to meet goals as stated in POC. 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    Interdisciplinary Communication: Collaborated with PT and nursing for current level of function, plan of care, and measures to assure safety during their stay. Updated board with current level of function to increase carryover between disciplines. Patient/Family Education: Patient was/were educated On the role of OT, On POC and On IRC expectations. Problem List: [address] [place], Activity Tolerance, Strength, Pain, Standing Balance and Cognition    Functional Limitations: ADL, IADL, Functional Transfers and Functional Mobility    Goals: Please see Care Plan    OT order received and chart reviewed. OT orders have been acknowledged.  Patient will benefit from skilled OT services to address ADL, functional transfers, UE strength, UE coordination, balance, cognition and activity tolerance to maximize functional performance with daily self-care tasks and functional mobility. Treatment is likely to include ADL, Balance, Strength, Activity tolerance, Cognitive, DME, AE, Family  and Safety awareness training to increase independence with self-care. Patient will be seen for 1.5-2 hours of skilled OT services 5-6 days a week.   Thank you for this referral.    Alisa Gomez OT  7/16/2019

## 2019-07-16 NOTE — PROGRESS NOTES
Sitting up in recliner, patient states \"having good day\". To call when needing assistance OOB. No distress noted, call bell within reach. Hourly rounds completed this shift.

## 2019-07-16 NOTE — PROGRESS NOTES
PHYSICAL THERAPY DAILY NOTE  Time In: 1116  Time Out: 3350  Patient Seen For: AM;Transfer training;Gait training; Therapeutic exercise; Other (see progress notes)    Subjective: Patient had no complaints. Objective: NO pain noted. Other (comment)(Fall Risk)  GROSS ASSESSMENT Daily Assessment            COGNITION Daily Assessment    intact       BED/MAT MOBILITY Daily Assessment    Rolling Right : 4 (Contact guard assistance)  Rolling Left : 4 (Contact guard assistance)  Supine to Sit : 0 (Not tested)  Sit to Supine : 0 (Not tested)       TRANSFERS Daily Assessment    Transfer Type: SPT without device  Other: SPT with rollator  Transfer Assistance : 4 (Minimal assistance)  Sit to Stand Assistance: Contact guard assistance  Car Transfers: Not tested       GAIT Daily Assessment    Amount of Assistance: 4 (Minimal assistance)  Distance (ft): 100 Feet (ft)  Assistive Device: Walker, rollator;Gait belt       STEPS or STAIRS Daily Assessment    Steps/Stairs Ambulated (#): 0  Level of Assist : 0 (Not tested)       BALANCE Daily Assessment    Sitting - Static: Good (unsupported)  Sitting - Dynamic: Good (unsupported)  Standing - Static: Fair       WHEELCHAIR MOBILITY Daily Assessment    Able to Propel (ft): 0 feet  Functional Level: 0(Patient's primary mode of locomotion is ambulation)       LOWER EXTREMITY EXERCISES Daily Assessment    Extremity: Both  Exercise Type #1: Other (comment)(motomed x 10 minutes)  Sets Performed: 1  Reps Performed: 10  Level of Assist: Supervision          Assessment: Patient making good progress. Plan of Care: Continue with plan of care.     Mian Rodriguez, PTA  7/16/2019

## 2019-07-16 NOTE — PROGRESS NOTES
PHYSICAL THERAPY DAILY NOTE  Time In: 1300  Time Out: 7231  Patient Seen For: PM;Transfer training;Gait training; Therapeutic exercise; Other (see progress notes)    Subjective: Patient had no complaints. Objective: NO pain noted. Other (comment)(Fall Risk)  GROSS ASSESSMENT Daily Assessment            COGNITION Daily Assessment           BED/MAT MOBILITY Daily Assessment    Rolling Right : 4 (Contact guard assistance)  Rolling Left : 4 (Contact guard assistance)  Supine to Sit : 4 (Minimal assistance)  Sit to Supine : 4 (Minimal assistance)       TRANSFERS Daily Assessment    Transfer Type: SPT without device  Other: SPT with rollator  Transfer Assistance : 4 (Minimal assistance)  Sit to Stand Assistance: Contact guard assistance  Car Transfers: Not tested       GAIT Daily Assessment    Amount of Assistance: 4 (Minimal assistance)  Distance (ft): 100 Feet (ft)  Assistive Device: Walker, rollator;Gait belt       STEPS or STAIRS Daily Assessment    Steps/Stairs Ambulated (#): 0  Level of Assist : 0 (Not tested)       BALANCE Daily Assessment    Sitting - Static: Good (unsupported)  Sitting - Dynamic: Good (unsupported)  Standing - Static: Fair       WHEELCHAIR MOBILITY Daily Assessment    Able to Propel (ft): 0 feet  Functional Level: 0(Patient's primary mode of locomotion is ambulation)       LOWER EXTREMITY EXERCISES Daily Assessment    Extremity: Both  Exercise Type #1: Seated lower extremity strengthening  Sets Performed: 2  Reps Performed: 15  Level of Assist: Supervision          Assessment: Patient making good progress. Plan of Care: Continue with plan of care.     Hernandez Wilkinson, PTA  7/16/2019

## 2019-07-16 NOTE — PROGRESS NOTES
SFD PROGRESS NOTE    Debra Alfred  Admit Date: 7/15/2019  Admit Diagnosis: CHF (congestive heart failure) ([facility] [place] [facility] [I50.9]; Polymyalgia rheumatica ([facility] [M35.3]; Gout attack [M10.9]; Physical deconditioning [R53.81]; Chronic atrial fibrillation with rapid ventricular response ([facility] [I48.2]; Impaired gait and mobility [R26.89]; Candidiasis of perineum [B37.49]; Hypertension [I10]    Subjective     Afebrile. Vss. Feet pain feels mildly better. States she had gout attacks like this in the past. States colchicine helped in the past.  Case discussed in team conference. PT/OT well tolerated. Denies chest pain, SOB during therapy sessions. Objective:     Current Facility-Administered Medications   Medication Dose Route Frequency    sodium chloride (NS) flush 5-40 mL  5-40 mL IntraVENous PRN    amiodarone (CORDARONE) tablet 400 mg  400 mg Oral BID    apixaban (ELIQUIS) tablet 5 mg  5 mg Oral Q12H    bisacodyl (DULCOLAX) tablet 5 mg  5 mg Oral DAILY PRN    ibuprofen (MOTRIN) tablet 600 mg  600 mg Oral Q6H PRN    albuterol (PROVENTIL VENTOLIN) nebulizer solution 1.25 mg  1.25 mg Nebulization Q6H PRN    loperamide (IMODIUM) capsule 2 mg  2 mg Oral Q4H PRN    nystatin (MYCOSTATIN) 100,000 unit/gram powder   Topical BID    pantoprazole (PROTONIX) tablet 40 mg  40 mg Oral ACB    spironolactone (ALDACTONE) tablet 25 mg  25 mg Oral DAILY    temazepam (RESTORIL) capsule 15 mg  15 mg Oral QHS PRN    fluconazole (DIFLUCAN) tablet 200 mg  200 mg Oral DAILY    nitrofurantoin (MACRODANTIN) capsule 50 mg  50 mg Oral Q6H     Review of Systems: Denies chest pain, shortness of breath, cough, headache, visual problems, abdominal pain, dysurea, calf pain. Pertinent positives are as noted in the medical records and unremarkable otherwise.      Visit Vitals  /66   Pulse 74   Temp 97.9 °F (36.6 °C)   Resp 18   Wt 190 lb 12.8 oz (86.5 kg)   SpO2 96%   BMI 34.90 kg/m²      Physical Exam:         General:   Alert, appears stated age, No acute distress. HEENT:  Normocephalic, EOMI, facial symmetry  Intact. Oral mucosa pink and moist. No nasal discharge. noJVD   Lungs:  CTA Bilaterally,Respiration even and unlabored   No apparent use of accessory muscles for respiration. Heart:  Regular rate and rhythm, S1, S2, No obvious murmur, click, rub or gallop. Genitourinary: defered   Abdomen:  Soft, non-tender to palpation in all four quadrants. Bowel sounds present.      Neuromuscular:  PERRL, EOMI  + mild generalized weakness, more proxima> distal BLE  Sensory - intact  Plantars - down going   Skin:  + maculopapular rash with satellite lesions at folds at groin, perineum mary lou anal areas, intragluteal cleft    Edema: Trace BLE edema                                                                                      Functional Assessment:          Balance  Sitting - Static: Good (unsupported) (07/16/19 1100)  Sitting - Dynamic: Good (unsupported) (07/16/19 1100)  Standing - Static: Fair (07/16/19 1100)         Corinnegely Florez Fall Risk Assessment:  Corinne Vikki Fall Risk  Mobility: Ambulates or transfers with assist devices or assistance (07/16/19 0800)  Mobility Interventions: Communicate number of staff needed for ambulation/transfer (07/16/19 0800)  Mentation: Alert, oriented x 3 (07/16/19 0800)  Mentation Interventions: Evaluate medications/consider consulting pharmacy (07/16/19 0800)  Medication: Patient receiving anticonvulsants, sedatives(tranquilizers), psychotropics or hypnotics, hypoglycemics, narcotics, sleep aids, antihypertensives, laxatives, or diuretics (07/16/19 0800)  Medication Interventions: Patient to call before getting OOB (07/16/19 0800)  Elimination: Needs assistance with toileting (07/16/19 0800)  Elimination Interventions: Call light in reach (07/16/19 0800)  Prior Fall History: No (07/16/19 0800)  Total Score: 3 (07/16/19 0800)  High Fall Risk: Yes (07/16/19 0800)     Speech Assessment:         Ambulation:  Gait  Distance (ft): 100 Feet (ft) (07/16/19 9656)  Assistive Device: Nidia Volodymyr, rollator;Gait belt (07/16/19 0854)     Labs/Studies:  Recent Results (from the past 72 hour(s))   METABOLIC PANEL, BASIC    Collection Time: 07/14/19  3:19 AM   Result Value Ref Range    Sodium 140 136 - 145 mmol/L    Potassium 4.0 3.5 - 5.1 mmol/L    Chloride 102 98 - 107 mmol/L    CO2 29 21 - 32 mmol/L    Anion gap 9 7 - 16 mmol/L    Glucose 110 (H) 65 - 100 mg/dL    BUN 21 8 - 23 MG/DL    Creatinine 1.22 (H) 0.6 - 1.0 MG/DL    GFR est AA 55 (L) >60 ml/min/1.73m2    GFR est non-AA 46 (L) >60 ml/min/1.73m2    Calcium 8.6 8.3 - 10.4 MG/DL   MAGNESIUM    Collection Time: 07/14/19  3:19 AM   Result Value Ref Range    Magnesium 1.9 1.8 - 2.4 mg/dL   PHOSPHORUS    Collection Time: 07/14/19  3:19 AM   Result Value Ref Range    Phosphorus 3.5 2.3 - 3.7 MG/DL   CBC WITH AUTOMATED DIFF    Collection Time: 07/14/19  3:19 AM   Result Value Ref Range    WBC 8.8 4.3 - 11.1 K/uL    RBC 3.24 (L) 4.05 - 5.2 M/uL    HGB 9.8 (L) 11.7 - 15.4 g/dL    HCT 30.5 (L) 35.8 - 46.3 %    MCV 94.1 79.6 - 97.8 FL    MCH 30.2 26.1 - 32.9 PG    MCHC 32.1 31.4 - 35.0 g/dL    RDW 15.0 (H) 11.9 - 14.6 %    PLATELET 148 449 - 574 K/uL    MPV 10.7 9.4 - 12.3 FL    ABSOLUTE NRBC 0.00 0.0 - 0.2 K/uL    DF AUTOMATED      NEUTROPHILS 71 43 - 78 %    LYMPHOCYTES 16 13 - 44 %    MONOCYTES 10 4.0 - 12.0 %    EOSINOPHILS 1 0.5 - 7.8 %    BASOPHILS 1 0.0 - 2.0 %    IMMATURE GRANULOCYTES 1 0.0 - 5.0 %    ABS. NEUTROPHILS 6.3 1.7 - 8.2 K/UL    ABS. LYMPHOCYTES 1.4 0.5 - 4.6 K/UL    ABS. MONOCYTES 0.9 0.1 - 1.3 K/UL    ABS. EOSINOPHILS 0.1 0.0 - 0.8 K/UL    ABS. BASOPHILS 0.1 0.0 - 0.2 K/UL    ABS. IMM.  GRANS. 0.0 0.0 - 0.5 K/UL   METABOLIC PANEL, BASIC    Collection Time: 07/15/19  3:19 AM   Result Value Ref Range    Sodium 140 136 - 145 mmol/L    Potassium 4.2 3.5 - 5.1 mmol/L    Chloride 105 98 - 107 mmol/L    CO2 28 21 - 32 mmol/L    Anion gap 7 7 - 16 mmol/L    Glucose 113 (H) 65 - 100 mg/dL    BUN 25 (H) 8 - 23 MG/DL    Creatinine 1.18 (H) 0.6 - 1.0 MG/DL    GFR est AA 57 (L) >60 ml/min/1.73m2    GFR est non-AA 47 (L) >60 ml/min/1.73m2    Calcium 8.7 8.3 - 10.4 MG/DL   MAGNESIUM    Collection Time: 07/15/19  3:19 AM   Result Value Ref Range    Magnesium 1.9 1.8 - 2.4 mg/dL   PHOSPHORUS    Collection Time: 07/15/19  3:19 AM   Result Value Ref Range    Phosphorus 3.1 2.3 - 3.7 MG/DL   CBC WITH AUTOMATED DIFF    Collection Time: 07/15/19  3:19 AM   Result Value Ref Range    WBC 7.8 4.3 - 11.1 K/uL    RBC 3.03 (L) 4.05 - 5.2 M/uL    HGB 9.2 (L) 11.7 - 15.4 g/dL    HCT 28.3 (L) 35.8 - 46.3 %    MCV 93.4 79.6 - 97.8 FL    MCH 30.4 26.1 - 32.9 PG    MCHC 32.5 31.4 - 35.0 g/dL    RDW 15.1 (H) 11.9 - 14.6 %    PLATELET 503 743 - 698 K/uL    MPV 11.1 9.4 - 12.3 FL    ABSOLUTE NRBC 0.00 0.0 - 0.2 K/uL    DF AUTOMATED      NEUTROPHILS 72 43 - 78 %    LYMPHOCYTES 14 13 - 44 %    MONOCYTES 11 4.0 - 12.0 %    EOSINOPHILS 1 0.5 - 7.8 %    BASOPHILS 1 0.0 - 2.0 %    IMMATURE GRANULOCYTES 1 0.0 - 5.0 %    ABS. NEUTROPHILS 5.6 1.7 - 8.2 K/UL    ABS. LYMPHOCYTES 1.1 0.5 - 4.6 K/UL    ABS. MONOCYTES 0.9 0.1 - 1.3 K/UL    ABS. EOSINOPHILS 0.1 0.0 - 0.8 K/UL    ABS. BASOPHILS 0.1 0.0 - 0.2 K/UL    ABS. IMM.  GRANS. 0.1 0.0 - 0.5 K/UL   URINALYSIS W/ RFLX MICROSCOPIC    Collection Time: 07/15/19  7:59 PM   Result Value Ref Range    Color YELLOW      Appearance HAZY      Specific gravity 1.015 1.001 - 1.023      pH (UA) 5.5 5.0 - 9.0      Protein NEGATIVE  NEG mg/dL    Glucose NEGATIVE  NEG mg/dL    Ketone NEGATIVE  NEG mg/dL    Bilirubin NEGATIVE  NEG      Blood NEGATIVE  NEG      Urobilinogen 0.2 0.2 - 1.0 EU/dL    Nitrites NEGATIVE  NEG      Leukocyte Esterase TRACE (A) NEG     CULTURE, URINE    Collection Time: 07/15/19  7:59 PM   Result Value Ref Range    Special Requests: NO SPECIAL REQUESTS      Culture result: >100,000 COLONIES/mL GRAM NEGATIVE RODS (A)      Culture result: IDENTIFICATION AND SUSCEPTIBILITY TO FOLLOW     URINE MICROSCOPIC    Collection Time: 07/15/19  7:59 PM   Result Value Ref Range    WBC 20-50 0 /hpf    RBC 0 0 /hpf    Epithelial cells 0-3 0 /hpf    Bacteria 3+ (H) 0 /hpf    Casts 0 0 /lpf    Crystals, urine 0 0 /LPF    Mucus 0 0 /lpf    Other observations RESULTS VERIFIED MANUALLY     CBC WITH AUTOMATED DIFF    Collection Time: 07/16/19  6:28 AM   Result Value Ref Range    WBC 6.3 4.3 - 11.1 K/uL    RBC 3.13 (L) 4.05 - 5.2 M/uL    HGB 9.6 (L) 11.7 - 15.4 g/dL    HCT 29.5 (L) 35.8 - 46.3 %    MCV 94.2 79.6 - 97.8 FL    MCH 30.7 26.1 - 32.9 PG    MCHC 32.5 31.4 - 35.0 g/dL    RDW 15.1 (H) 11.9 - 14.6 %    PLATELET 314 966 - 677 K/uL    MPV 10.5 9.4 - 12.3 FL    ABSOLUTE NRBC 0.00 0.0 - 0.2 K/uL    DF AUTOMATED      NEUTROPHILS 62 43 - 78 %    LYMPHOCYTES 22 13 - 44 %    MONOCYTES 12 4.0 - 12.0 %    EOSINOPHILS 3 0.5 - 7.8 %    BASOPHILS 1 0.0 - 2.0 %    IMMATURE GRANULOCYTES 1 0.0 - 5.0 %    ABS. NEUTROPHILS 3.9 1.7 - 8.2 K/UL    ABS. LYMPHOCYTES 1.4 0.5 - 4.6 K/UL    ABS. MONOCYTES 0.8 0.1 - 1.3 K/UL    ABS. EOSINOPHILS 0.2 0.0 - 0.8 K/UL    ABS. BASOPHILS 0.1 0.0 - 0.2 K/UL    ABS. IMM.  GRANS. 0.0 0.0 - 0.5 K/UL   MAGNESIUM    Collection Time: 07/16/19  6:28 AM   Result Value Ref Range    Magnesium 2.1 1.8 - 2.4 mg/dL   METABOLIC PANEL, BASIC    Collection Time: 07/16/19  6:28 AM   Result Value Ref Range    Sodium 140 136 - 145 mmol/L    Potassium 4.0 3.5 - 5.1 mmol/L    Chloride 106 98 - 107 mmol/L    CO2 26 21 - 32 mmol/L    Anion gap 8 7 - 16 mmol/L    Glucose 109 (H) 65 - 100 mg/dL    BUN 27 (H) 8 - 23 MG/DL    Creatinine 1.31 (H) 0.6 - 1.0 MG/DL    GFR est AA 51 (L) >60 ml/min/1.73m2    GFR est non-AA 42 (L) >60 ml/min/1.73m2    Calcium 9.0 8.3 - 10.4 MG/DL       Assessment:     Problem List as of 7/16/2019 Date Reviewed: 7/5/2019          Codes Class Noted - Resolved    Stage 3 chronic kidney disease ([facility] ICD-10-CM: N18.3  ICD-9-CM: 585.3  7/15/2019 - Present        Systolic CHF, chronic ([facility] ICD-10-CM: I50.22  ICD-9-CM: 428.22, 428.0  7/15/2019 - Present        Acute systolic congestive heart failure ([facility] ICD-10-CM: I50.21  ICD-9-CM: 428.21, 428.0  7/15/2019 - Present        CHF (congestive heart failure) ([facility] ICD-10-CM: I50.9  ICD-9-CM: 428.0  7/15/2019 - Present        Hypertension ICD-10-CM: I10  ICD-9-CM: 401.9  7/15/2019 - Present        Physical deconditioning ICD-10-CM: R53.81  ICD-9-CM: 799.3  7/15/2019 - Present        Gout attack ICD-10-CM: M10.9  ICD-9-CM: 274.01  7/15/2019 - Present        Candidiasis of perineum ICD-10-CM: B37.49  ICD-9-CM: 112.2  7/15/2019 - Present        Impaired gait and mobility ICD-10-CM: R26.89  ICD-9-CM: 781.2  7/15/2019 - Present        Chronic atrial fibrillation with rapid ventricular response (HCC) ICD-10-CM: I48.2  ICD-9-CM: 427.31  7/15/2019 - Present        Acute hypoxemic respiratory failure (HCC) ICD-10-CM: J96.01  ICD-9-CM: 518.81  7/3/2019 - Present        Atrial fibrillation with rapid ventricular response (HCC) vs WPW in patient with history of ablation ICD-10-CM: I48.91  ICD-9-CM: 427.31  7/3/2019 - Present        Pulmonary infiltrates vs acute congestive heart failure ICD-10-CM: R91.8  ICD-9-CM: 793.19  7/3/2019 - Present        Severe sepsis ([facility] ICD-10-CM: A41.9, R65.20  ICD-9-CM: 038.9, 995.92  7/3/2019 - Present        Hypotension due to hypovolemia ICD-10-CM: I95.89, E86.1  ICD-9-CM: 458.8, 276.52  7/3/2019 - Present        Polymyalgia rheumatica ([facility] ICD-10-CM: M35.3  ICD-9-CM: 578  12/2/2015 - Present        Gouty arthritis  NOS ICD-10-CM: M10.9  ICD-9-CM: 274.00  12/2/2015 - Present        Mixed hyperlipidemia ICD-10-CM: E78.2  ICD-9-CM: 272.2  12/2/2015 - Present        WPW (Suresh-Parkinson-White syndrome) ICD-10-CM: I45.6  ICD-9-CM: 426.7  8/12/2013 - Present    Overview Signed 8/12/2013  2:46 PM by Natacha Pham     Posteroseptal pathway ablation 8/12/13             HTN (hypertension) ICD-10-CM: I10  ICD-9-CM: 401.9  8/12/2013 - Present Severe obesity with body mass index (BMI) of 35.0 to 39.9 with comorbidity ([facility] ICD-10-CM: E66.01  ICD-9-CM: 278.01  8/12/2013 - Present        Other and unspecified hyperlipidemia ICD-10-CM: E78.5  ICD-9-CM: 272.4  8/12/2013 - Present        Sjogren's syndrome ([facility] ICD-10-CM: M35.00  ICD-9-CM: 710.2  8/12/2013 - Present        Fibromyalgia ICD-10-CM: M79.7  ICD-9-CM: 729.1  8/12/2013 - Present        Peptic ulcer ICD-10-CM: K27.9  ICD-9-CM: 533.90  8/12/2013 - Present        Gout ICD-10-CM: M10.9  ICD-9-CM: 274.9  8/12/2013 - Present              Plan:     Rehabilitation Plan  The patient has shown the ability to tolerate and benefit from 3 hours of therapy daily and is being admitted to a comprehensive acute inpatient rehabilitation program consisting of at least 3 hours of combined physical and occupational therapies. Begin intensive Physical Therapy for a minimum of 1.5 hours a day, at least 5 out of 7 days per week to address bed mobility, transfers, ambulation, strengthening, balance, and endurance. Begin intensive Occupational Therapy for a minimum of 1.5 hours a day, at least 5 out of 7 days per week to address ADL ( bathing, LE dressing, toileting) and adaptive equipment as needed.     The patient will also require 24-hour skilled rehabilitation nursing for bowel and bladder management, skin care for decubitus ulcer prevention , pain management and ongoing medication administration      Continue daily physician medical management:  Acute hypoxemic respiratory failure/ CHF  -secondary to a.fib.   - continue oral lasix. Monitor volume status. Will wean diuretics as appropriate  7/16- cxR clear, no edema. will d/c lasix. Still continue aldactone for now. Monitor response.     HTN (hypertension)\  Hypotension due to hypovolemia  - continued on aldactone. Lasix.    - will add, adjust antihypertensives as needed  - -160s will adjust in am if trend higher.     Atrial fibrillation with rapid ventricular response (HCC) vs WPW in patient with history of ablation    - s/p electric cardioversion - continue amiodarone po.   - continue eliquis  - monitor for recurrent a.fib. HR control.   - continue remote telemetry. 7/16- RRR. Appears in sinus. Monitor. remote telemetry somehow removed. Will monitor clinically.      Severe sepsis ([address] (7/3/2019) / Pulmonary infiltrates vs acute congestive heart failure (7/3/2019)- treated. abx finished. Pneumonia prophylaxis- Insentive spirometer every hour while awake  - 7/16 - UTI -GNR. start macrodantin. Follow susceptability.      Severe obesity with body mass index (BMI) of 35.0 to 39.9 with comorbidity  - cardiac diet. Sjogren's syndrome/ Polymyalgia rheumatica - steroid course finished. Monitor for acute flair, increased weakness.         DVT risk / DVT Prophylaxis- Will require daily physician exam to assess for signs and symptoms as patient is at increased risk for of thromboembolism. Mobilization as tolerated. Intermittent pneumatic compression devices when in bed Thigh-high or knee-high thromboembolic deterrent hose when out of bed.   - covered with eliquis.      Pain Control: stable, mild-to-moderate joint symptoms intermittently, reasonably well controlled by PRN meds. Will require regular pain assessment and comprenhensive pain management,   - foot pain -   - acute gout flair - started on colchicine per prior service. Monitor. Treat until flair/ symptoms imroved. Wound Care: Monitor wound status daily per staff and physician. At risk for failure. Will require 24/7 rehab nursing. Keep wound clean and dry  - candidiasis periarea, genital.   - pt has been started on nystatin powder and barrier cream. Will add diflucan po.      Potential urinary retention/ neurogenic bladder - schedule voids q6-8 hrs. Check post-void residual every shift;  In and Out catheterize if post-void residual is more than 400 cc.  - c/o frequency following [prolonged covarrubias insertion, removal.   Send UA, UCx.     bowel program - as needed.      GERD - resume PPI. At times may need additional antacids, Maalox prn.        The patient's prognosis for significant practical improvement within a reasonable period of time appears good and the estimated length of stay is 7days and patient is expected to return home with spouse and continued rehabilitation with home health therapy.      Given the patient's complex neurologic/medical condition and the risk of further medical complications including: DVT, PE, skin breakdown, pneumonia due to decreased mobility, recurrent a.fib, CHF exacerbation, volume overload following cardioversion. PT at risk for CVA, MI,  increased risk of thromboembolism could potentially impact the IRF program. For these ongoing medical issues, rehabilitation services could not be safely provided at a lower level of care such as a skilled nursing facility or nursing home.          Time spent was 25 minutes with over 1/2 in direct patient care/examination, consultation and coordination of care.      Signed By: Burgess Munir MD     July 16, 2019

## 2019-07-17 ENCOUNTER — HOME HEALTH ADMISSION (OUTPATIENT)
Dept: HOME HEALTH SERVICES | Facility: HOME HEALTH | Age: 74
End: 2019-07-17
Payer: MEDICARE

## 2019-07-17 LAB
ANION GAP SERPL CALC-SCNC: 7 MMOL/L (ref 7–16)
BUN SERPL-MCNC: 30 MG/DL (ref 8–23)
CALCIUM SERPL-MCNC: 9.2 MG/DL (ref 8.3–10.4)
CHLORIDE SERPL-SCNC: 106 MMOL/L (ref 98–107)
CO2 SERPL-SCNC: 27 MMOL/L (ref 21–32)
CREAT SERPL-MCNC: 1.33 MG/DL (ref 0.6–1)
GLUCOSE SERPL-MCNC: 120 MG/DL (ref 65–100)
POTASSIUM SERPL-SCNC: 4.1 MMOL/L (ref 3.5–5.1)
SODIUM SERPL-SCNC: 140 MMOL/L (ref 136–145)

## 2019-07-17 PROCEDURE — 74011250637 HC RX REV CODE- 250/637: Performed by: PHYSICAL MEDICINE & REHABILITATION

## 2019-07-17 PROCEDURE — 97530 THERAPEUTIC ACTIVITIES: CPT

## 2019-07-17 PROCEDURE — 36415 COLL VENOUS BLD VENIPUNCTURE: CPT

## 2019-07-17 PROCEDURE — 97535 SELF CARE MNGMENT TRAINING: CPT

## 2019-07-17 PROCEDURE — 80048 BASIC METABOLIC PNL TOTAL CA: CPT

## 2019-07-17 PROCEDURE — 97116 GAIT TRAINING THERAPY: CPT

## 2019-07-17 PROCEDURE — 97110 THERAPEUTIC EXERCISES: CPT

## 2019-07-17 PROCEDURE — 99232 SBSQ HOSP IP/OBS MODERATE 35: CPT | Performed by: PHYSICAL MEDICINE & REHABILITATION

## 2019-07-17 PROCEDURE — 65310000000 HC RM PRIVATE REHAB

## 2019-07-17 PROCEDURE — 97150 GROUP THERAPEUTIC PROCEDURES: CPT

## 2019-07-17 RX ADMIN — IBUPROFEN 600 MG: 600 TABLET ORAL at 23:34

## 2019-07-17 RX ADMIN — NYSTATIN 1 UNITS: 100000 POWDER TOPICAL at 17:32

## 2019-07-17 RX ADMIN — NITROFURANTOIN MACROCRYSTALS 50 MG: 50 CAPSULE ORAL at 23:34

## 2019-07-17 RX ADMIN — AMIODARONE HYDROCHLORIDE 400 MG: 200 TABLET ORAL at 08:25

## 2019-07-17 RX ADMIN — NITROFURANTOIN MACROCRYSTALS 50 MG: 50 CAPSULE ORAL at 12:57

## 2019-07-17 RX ADMIN — PANTOPRAZOLE SODIUM 40 MG: 40 TABLET, DELAYED RELEASE ORAL at 05:32

## 2019-07-17 RX ADMIN — NYSTATIN: 100000 POWDER TOPICAL at 08:26

## 2019-07-17 RX ADMIN — AMIODARONE HYDROCHLORIDE 400 MG: 200 TABLET ORAL at 17:31

## 2019-07-17 RX ADMIN — SPIRONOLACTONE 25 MG: 25 TABLET ORAL at 08:25

## 2019-07-17 RX ADMIN — FLUCONAZOLE 200 MG: 100 TABLET ORAL at 08:25

## 2019-07-17 RX ADMIN — APIXABAN 5 MG: 5 TABLET, FILM COATED ORAL at 08:25

## 2019-07-17 RX ADMIN — NITROFURANTOIN MACROCRYSTALS 50 MG: 50 CAPSULE ORAL at 05:32

## 2019-07-17 RX ADMIN — NITROFURANTOIN MACROCRYSTALS 50 MG: 50 CAPSULE ORAL at 17:32

## 2019-07-17 RX ADMIN — APIXABAN 5 MG: 5 TABLET, FILM COATED ORAL at 21:51

## 2019-07-17 NOTE — PROGRESS NOTES
07/17/19 0933   Time Spent With Patient   Time In 0831   Time Out 1000   Patient Seen For: AM;ADLs   Grooming   Grooming Assistance  S   Comments Setup of supplies seated in wheelchair at sink   Upper Body Bathing   Bathing Assistance, Upper S   Position Performed Seated in chair   [address], [address] bar   Position Performed Seated in chair;Standing   Adaptive Equipment Tub bench   Comments Supervision in stance and use of grab bar as patient bathes/rinses mary lou area   [address] (FIM Score) S   Adaptive Equipment Grab bars   Upper Body Dressing    Dressing Assistance  Min A   Comments To help bra down back   Lower Body [address] [facility] A   Leg Crossed Method Used Yes   Position Performed Seated in chair;Standing   Adaptive Equipment Used Grab bar   Comments Assist to finish shoe around R heel, patient stands with supervision and use of grab bar to manage clothing over hips   Functional Transfers   Toilet Transfer  Grab bars;Stand pivot transfer with walker   Amount of Assistance Required S   Tub or Shower Type Shower   Amount of Assistance Required CGA   Adaptive Equipment Grab bars; Walker (comment); Tub transfer bench       S: \"I have trouble opening water bottles. \" Agreeable to therapy. Focus of session was on ADL and functional mobility followed by E strengthening and [facility] tasks in therapy gym. Patient was able to ambulate ~10 feet  X 2 using a RW with CGA-supervision. Pain not indicated. Completed formal [facility] assessment with patient seated in wheelchair in therapy gym, scores as follows:  9 Hole Peg Test  RUE (dominant): 28 seconds  LUE: 28 seconds    Patient provided blue (moderate) resistive sponge for practice in hospital room targeting BUE  strength (see Subjective), verbalized understanding of education regarding purpose and use.      Patient completed B hand strengthening task to manipulate medium-soft Theraputty, locate and retrieve x 20 medium sized beads from embedded in putty and replace into container on tabletop. Patient then used rolling pin in B hands to roll putty flat out onto tabletop, and R three-jaw kenzie grasp to press beads back into putty. Patient balled up putty using B hands upon completion of task to replace into container. Patient initiated BUE endurance task seated in wheelchair at tabletop with 1.5# cuff weights donned bilaterally. Patient retrieved rubber bands from R of tabletop and used B hands to attach to vertical target board at midline on tabletop according to visual pattern. Patient completed 50% of pattern before time  this session, minimal cues required for problem solving pattern. To continue next OT session. Collaborated with Rodolfo ESPINOZA Underwood regarding patient remaining off of Telemetry. Patient tolerated session well, but activity tolerance, balance, functional mobility, strength, cognition are still below baseline and require skilled facilitation to successfully and safely complete ADL's and transfers. Patient ended session in wheelchair with call remote and phone within reach.      Render JOSEFINA AlmarazR/L

## 2019-07-17 NOTE — PROGRESS NOTES
[address]  Face to Face Encounter    Patients Name: Ghislaine Trevino    YOB: 1945    Ordering Physician: Gunjan Lugo    Primary Diagnosis: CHF (congestive heart failure) ([facility] [I50.9]  Polymyalgia rheumatica ([facility] [M35.3]  Gout attack [M10.9]  Physical deconditioning [R53.81]  Chronic atrial fibrillation with rapid ventricular response ([facility] [I48.2]  Impaired gait and mobility [R26.89]  Candidiasis of perineum [B37.49]  Hypertension [I10]    Date of Face to Face:   7/17/2019                                  Face to Face Encounter findings are related to primary reason for home care:   yes. 1. I certify that the patient needs intermittent care as follows: physical therapy: strengthening, stretching/ROM, transfer training, gait/stair training, balance training and pt/caregiver education  occupational therapy:  ADL safety (ie. cooking, bathing, dressing), ROM and pt/caregiver education    2. I certify that this patient is homebound, that is: 1) patient requires the use of a walker device, special transportation, or assistance of another to leave the home; or 2) patient's condition makes leaving the home medically contraindicated; and 3) patient has a normal inability to leave the home and leaving the home requires considerable and taxing effort. Patient may leave the home for infrequent and short duration for medical reasons, and occasional absences for non-medical reasons. Homebound status is due to the following functional limitations: Patient with strength deficits limiting the performance of all ADL's without caregiver assistance or the use of an assistive device. Patient with poor safety awareness and is at risk for falls without assistance of another person and the use of an assistive device. Patient with poor ambulation endurance limiting their safe ability to ascend/descend the required number of steps to leave the home.     3. I certify that this patient is under my care and that I, or a nurse practitioner or [phone] assistant, or clinical nurse specialist, or certified nurse midwife, working with me, had a Face-to-Face Encounter that meets the physician Face-to-Face Encounter requirements. The following are the clinical findings from the [address] [place] [address] encounter that support the need for skilled services and is a summary of the encounter: [address] [place] [address]  7/17/2019      THE FOLLOWING TO BE COMPLETED BY THE COMMUNITY PHYSICIAN:    I concur with the findings described above from the F2F encounter that this patient is homebound and in need of a skilled service.     Certifying Physician: _____________________________________      Printed Certifying Physician Name: _____________________________________    Date: _________________

## 2019-07-17 NOTE — PROGRESS NOTES
SFD PROGRESS NOTE    Meaghan Toledo  Admit Date: 7/15/2019  Admit Diagnosis: CHF (congestive heart failure) ([facility] [I50.9]; Polymyalgia rheumatica ([facility] [M35.3]; Gout attack [M10.9]; Physical deconditioning [R53.81]; Chronic atrial fibrillation with rapid ventricular response ([facility] [I48.2]; Impaired gait and mobility [R26.89]; Candidiasis of perineum [B37.49]; Hypertension [I10]    Subjective     Afebrile. Vss. Denies chest pain SOB, cough. Feels more fatigued today. States she did not sleep well. PT/OT tolerated well without new major barriers. Denies chest pain, SOB, palpitations. Foot feels mildly better, but still uncomfortable. Objective:     Current Facility-Administered Medications   Medication Dose Route Frequency    sodium chloride (NS) flush 5-40 mL  5-40 mL IntraVENous PRN    amiodarone (CORDARONE) tablet 400 mg  400 mg Oral BID    apixaban (ELIQUIS) tablet 5 mg  5 mg Oral Q12H    bisacodyl (DULCOLAX) tablet 5 mg  5 mg Oral DAILY PRN    ibuprofen (MOTRIN) tablet 600 mg  600 mg Oral Q6H PRN    albuterol (PROVENTIL VENTOLIN) nebulizer solution 1.25 mg  1.25 mg Nebulization Q6H PRN    loperamide (IMODIUM) capsule 2 mg  2 mg Oral Q4H PRN    nystatin (MYCOSTATIN) 100,000 unit/gram powder   Topical BID    pantoprazole (PROTONIX) tablet 40 mg  40 mg Oral ACB    spironolactone (ALDACTONE) tablet 25 mg  25 mg Oral DAILY    temazepam (RESTORIL) capsule 15 mg  15 mg Oral QHS PRN    fluconazole (DIFLUCAN) tablet 200 mg  200 mg Oral DAILY    nitrofurantoin (MACRODANTIN) capsule 50 mg  50 mg Oral Q6H     Review of Systems: Denies chest pain, shortness of breath, cough, headache, visual problems, abdominal pain, dysurea, calf pain. Pertinent positives are as noted in the medical records and unremarkable otherwise.      Visit Vitals  /63   Pulse 69   Temp 98.2 °F (36.8 °C)   Resp 14   Wt 182 lb 1.6 oz (82.6 kg)   SpO2 93%   BMI 33.31 kg/m²      Physical Exam:         General:   Alert, appears stated age, No acute distress. Feels fatigued. HEENT:  Normocephalic, EOMI, facial symmetry  Intact. noJVD   Lungs:  CTA Bilaterally, Respiration even and unlabored   No apparent use of accessory muscles for respiration. Heart:  Regular rate and rhythm, S1, S2, No obvious murmur, click, rub or gallop. Genitourinary: defered   Abdomen:  Soft, non-tender to palpation in all four quadrants. Bowel sounds present.      Neuromuscular:  PERRL, EOMI  + mild generalized weakness, more proxima> distal BLE  Sensory - intact  Plantars - down going   Skin:  + maculopapular rash with satellite lesions at folds at groin, perineum mary lou anal areas, intragluteal cleft    Edema: Trace BLE edema                                                                                      Functional Assessment:          Balance  Sitting - Static: Good (unsupported) (07/16/19 1100)  Sitting - Dynamic: Good (unsupported) (07/16/19 1100)  Standing - Static: Fair (07/16/19 1100)         Josue Atkinson Fall Risk Assessment:  Josue Atkinson Fall Risk  Mobility: Ambulates or transfers with assist devices or assistance (07/16/19 1956)  Mobility Interventions: Communicate number of staff needed for ambulation/transfer (07/16/19 1956)  Mentation: Alert, oriented x 3 (07/16/19 1956)  Mentation Interventions: Evaluate medications/consider consulting pharmacy (07/16/19 1956)  Medication: Patient receiving anticonvulsants, sedatives(tranquilizers), psychotropics or hypnotics, hypoglycemics, narcotics, sleep aids, antihypertensives, laxatives, or diuretics (07/16/19 1956)  Medication Interventions: Patient to call before getting OOB (07/16/19 1956)  Elimination: Needs assistance with toileting (07/16/19 1956)  Elimination Interventions: Call light in reach (07/16/19 1956)  Prior Fall History: No (07/16/19 1956)  Total Score: 3 (07/16/19 1956)  High Fall Risk: Yes (07/16/19 1956)     Speech Assessment:         Ambulation:  Gait  Distance (ft): 100 Feet (ft) (07/16/19 3815)  Assistive Device: Walker, rollator;Gait belt (07/16/19 6597)     Labs/Studies:  Recent Results (from the past 72 hour(s))   METABOLIC PANEL, BASIC    Collection Time: 07/15/19  3:19 AM   Result Value Ref Range    Sodium 140 136 - 145 mmol/L    Potassium 4.2 3.5 - 5.1 mmol/L    Chloride 105 98 - 107 mmol/L    CO2 28 21 - 32 mmol/L    Anion gap 7 7 - 16 mmol/L    Glucose 113 (H) 65 - 100 mg/dL    BUN 25 (H) 8 - 23 MG/DL    Creatinine 1.18 (H) 0.6 - 1.0 MG/DL    GFR est AA 57 (L) >60 ml/min/1.73m2    GFR est non-AA 47 (L) >60 ml/min/1.73m2    Calcium 8.7 8.3 - 10.4 MG/DL   MAGNESIUM    Collection Time: 07/15/19  3:19 AM   Result Value Ref Range    Magnesium 1.9 1.8 - 2.4 mg/dL   PHOSPHORUS    Collection Time: 07/15/19  3:19 AM   Result Value Ref Range    Phosphorus 3.1 2.3 - 3.7 MG/DL   CBC WITH AUTOMATED DIFF    Collection Time: 07/15/19  3:19 AM   Result Value Ref Range    WBC 7.8 4.3 - 11.1 K/uL    RBC 3.03 (L) 4.05 - 5.2 M/uL    HGB 9.2 (L) 11.7 - 15.4 g/dL    HCT 28.3 (L) 35.8 - 46.3 %    MCV 93.4 79.6 - 97.8 FL    MCH 30.4 26.1 - 32.9 PG    MCHC 32.5 31.4 - 35.0 g/dL    RDW 15.1 (H) 11.9 - 14.6 %    PLATELET 131 045 - 539 K/uL    MPV 11.1 9.4 - 12.3 FL    ABSOLUTE NRBC 0.00 0.0 - 0.2 K/uL    DF AUTOMATED      NEUTROPHILS 72 43 - 78 %    LYMPHOCYTES 14 13 - 44 %    MONOCYTES 11 4.0 - 12.0 %    EOSINOPHILS 1 0.5 - 7.8 %    BASOPHILS 1 0.0 - 2.0 %    IMMATURE GRANULOCYTES 1 0.0 - 5.0 %    ABS. NEUTROPHILS 5.6 1.7 - 8.2 K/UL    ABS. LYMPHOCYTES 1.1 0.5 - 4.6 K/UL    ABS. MONOCYTES 0.9 0.1 - 1.3 K/UL    ABS. EOSINOPHILS 0.1 0.0 - 0.8 K/UL    ABS. BASOPHILS 0.1 0.0 - 0.2 K/UL    ABS. IMM.  GRANS. 0.1 0.0 - 0.5 K/UL   URINALYSIS W/ RFLX MICROSCOPIC    Collection Time: 07/15/19  7:59 PM   Result Value Ref Range    Color YELLOW      Appearance HAZY      Specific gravity 1.015 1.001 - 1.023      pH (UA) 5.5 5.0 - 9.0      Protein NEGATIVE  NEG mg/dL    Glucose NEGATIVE  NEG mg/dL    Ketone NEGATIVE  NEG mg/dL Bilirubin NEGATIVE  NEG      Blood NEGATIVE  NEG      Urobilinogen 0.2 0.2 - 1.0 EU/dL    Nitrites NEGATIVE  NEG      Leukocyte Esterase TRACE (A) NEG     CULTURE, URINE    Collection Time: 07/15/19  7:59 PM   Result Value Ref Range    Special Requests: NO SPECIAL REQUESTS      Culture result: >100,000 COLONIES/mL GRAM NEGATIVE RODS (A)      Culture result: REPEATING ID AND SUSCEPTIBILITY 7/17/19    URINE MICROSCOPIC    Collection Time: 07/15/19  7:59 PM   Result Value Ref Range    WBC 20-50 0 /hpf    RBC 0 0 /hpf    Epithelial cells 0-3 0 /hpf    Bacteria 3+ (H) 0 /hpf    Casts 0 0 /lpf    Crystals, urine 0 0 /LPF    Mucus 0 0 /lpf    Other observations RESULTS VERIFIED MANUALLY     CBC WITH AUTOMATED DIFF    Collection Time: 07/16/19  6:28 AM   Result Value Ref Range    WBC 6.3 4.3 - 11.1 K/uL    RBC 3.13 (L) 4.05 - 5.2 M/uL    HGB 9.6 (L) 11.7 - 15.4 g/dL    HCT 29.5 (L) 35.8 - 46.3 %    MCV 94.2 79.6 - 97.8 FL    MCH 30.7 26.1 - 32.9 PG    MCHC 32.5 31.4 - 35.0 g/dL    RDW 15.1 (H) 11.9 - 14.6 %    PLATELET 191 671 - 634 K/uL    MPV 10.5 9.4 - 12.3 FL    ABSOLUTE NRBC 0.00 0.0 - 0.2 K/uL    DF AUTOMATED      NEUTROPHILS 62 43 - 78 %    LYMPHOCYTES 22 13 - 44 %    MONOCYTES 12 4.0 - 12.0 %    EOSINOPHILS 3 0.5 - 7.8 %    BASOPHILS 1 0.0 - 2.0 %    IMMATURE GRANULOCYTES 1 0.0 - 5.0 %    ABS. NEUTROPHILS 3.9 1.7 - 8.2 K/UL    ABS. LYMPHOCYTES 1.4 0.5 - 4.6 K/UL    ABS. MONOCYTES 0.8 0.1 - 1.3 K/UL    ABS. EOSINOPHILS 0.2 0.0 - 0.8 K/UL    ABS. BASOPHILS 0.1 0.0 - 0.2 K/UL    ABS. IMM.  GRANS. 0.0 0.0 - 0.5 K/UL   MAGNESIUM    Collection Time: 07/16/19  6:28 AM   Result Value Ref Range    Magnesium 2.1 1.8 - 2.4 mg/dL   METABOLIC PANEL, BASIC    Collection Time: 07/16/19  6:28 AM   Result Value Ref Range    Sodium 140 136 - 145 mmol/L    Potassium 4.0 3.5 - 5.1 mmol/L    Chloride 106 98 - 107 mmol/L    CO2 26 21 - 32 mmol/L    Anion gap 8 7 - 16 mmol/L    Glucose 109 (H) 65 - 100 mg/dL    BUN 27 (H) 8 - 23 MG/DL Creatinine 1.31 (H) 0.6 - 1.0 MG/DL    GFR est AA 51 (L) >60 ml/min/1.73m2    GFR est non-AA 42 (L) >60 ml/min/1.73m2    Calcium 9.0 8.3 - 14.0 MG/DL   METABOLIC PANEL, BASIC    Collection Time: 07/17/19  7:50 AM   Result Value Ref Range    Sodium 140 136 - 145 mmol/L    Potassium 4.1 3.5 - 5.1 mmol/L    Chloride 106 98 - 107 mmol/L    CO2 27 21 - 32 mmol/L    Anion gap 7 7 - 16 mmol/L    Glucose 120 (H) 65 - 100 mg/dL    BUN 30 (H) 8 - 23 MG/DL    Creatinine 1.33 (H) 0.6 - 1.0 MG/DL    GFR est AA 50 (L) >60 ml/min/1.73m2    GFR est non-AA 41 (L) >60 ml/min/1.73m2    Calcium 9.2 8.3 - 10.4 MG/DL       Assessment:     Problem List as of 7/17/2019 Date Reviewed: 7/5/2019          Codes Class Noted - Resolved    Stage 3 chronic kidney disease ([facility] ICD-10-CM: N18.3  ICD-9-CM: 585.3  7/15/2019 - Present        Systolic CHF, chronic (HCC) ICD-10-CM: I50.22  ICD-9-CM: 428.22, 428.0  7/15/2019 - Present        Acute systolic congestive heart failure (HCC) ICD-10-CM: I50.21  ICD-9-CM: 428.21, 428.0  7/15/2019 - Present        CHF (congestive heart failure) (HCC) ICD-10-CM: I50.9  ICD-9-CM: 428.0  7/15/2019 - Present        Hypertension ICD-10-CM: I10  ICD-9-CM: 401.9  7/15/2019 - Present        Physical deconditioning ICD-10-CM: R53.81  ICD-9-CM: 799.3  7/15/2019 - Present        Gout attack ICD-10-CM: M10.9  ICD-9-CM: 274.01  7/15/2019 - Present        Candidiasis of perineum ICD-10-CM: B37.49  ICD-9-CM: 112.2  7/15/2019 - Present        Impaired gait and mobility ICD-10-CM: R26.89  ICD-9-CM: 781.2  7/15/2019 - Present        Chronic atrial fibrillation with rapid ventricular response (HCC) ICD-10-CM: I48.2  ICD-9-CM: 427.31  7/15/2019 - Present        Acute hypoxemic respiratory failure (HCC) ICD-10-CM: J96.01  ICD-9-CM: 518.81  7/3/2019 - Present        Atrial fibrillation with rapid ventricular response (HCC) vs WPW in patient with history of ablation ICD-10-CM: I48.91  ICD-9-CM: 427.31  7/3/2019 - Present Pulmonary infiltrates vs acute congestive heart failure ICD-10-CM: R91.8  ICD-9-CM: 793.19  7/3/2019 - Present        Severe sepsis ([facility] ICD-10-CM: A41.9, R65.20  ICD-9-CM: 038.9, 995.92  7/3/2019 - Present        Hypotension due to hypovolemia ICD-10-CM: I95.89, E86.1  ICD-9-CM: 458.8, 276.52  7/3/2019 - Present        Polymyalgia rheumatica ([facility] ICD-10-CM: M35.3  ICD-9-CM: 247  12/2/2015 - Present        Gouty arthritis  NOS ICD-10-CM: M10.9  ICD-9-CM: 274.00  12/2/2015 - Present        Mixed hyperlipidemia ICD-10-CM: E78.2  ICD-9-CM: 272.2  12/2/2015 - Present        WPW (Suresh-Parkinson-White syndrome) ICD-10-CM: I45.6  ICD-9-CM: 426.7  8/12/2013 - Present    Overview Signed 8/12/2013  2:46 PM by Iram Mckeon III     Posteroseptal pathway ablation 8/12/13             HTN (hypertension) ICD-10-CM: I10  ICD-9-CM: 401.9  8/12/2013 - Present        Severe obesity with body mass index (BMI) of 35.0 to 39.9 with comorbidity ([place] [address].) ICD-10-CM: E66.01  ICD-9-CM: 278.01  8/12/2013 - Present        Other and unspecified hyperlipidemia ICD-10-CM: E78.5  ICD-9-CM: 272.4  8/12/2013 - Present        Sjogren's syndrome ([facility] ICD-10-CM: M35.00  ICD-9-CM: 710.2  8/12/2013 - Present        Fibromyalgia ICD-10-CM: M79.7  ICD-9-CM: 729.1  8/12/2013 - Present        Peptic ulcer ICD-10-CM: K27.9  ICD-9-CM: 533.90  8/12/2013 - Present        Gout ICD-10-CM: M10.9  ICD-9-CM: 274.9  8/12/2013 - Present              Plan:     Rehabilitation Plan  The patient has shown the ability to tolerate and benefit from 3 hours of therapy daily and is being admitted to a comprehensive acute inpatient rehabilitation program consisting of at least 3 hours of combined physical and occupational therapies. Continue intensive Physical Therapy for a minimum of 1.5 hours a day, at least 5 out of 7 days per week to address bed mobility, transfers, ambulation, strengthening, balance, and endurance.    Continue intensive Occupational Therapy for a minimum of 1.5 hours a day, at least 5 out of 7 days per week to address ADL ( bathing, LE dressing, toileting) and adaptive equipment as needed. + fatigue, weakness, decreased activity tolerance noted. However sessions well tolerated, making gains.      The patient will also require 24-hour skilled rehabilitation nursing for bowel and bladder management, skin care for decubitus ulcer prevention , pain management and ongoing medication administration      Continue daily physician medical management:  Acute hypoxemic respiratory failure/ CHF  -secondary to a.fib.   - continue oral lasix. Monitor volume status. Will wean diuretics as appropriate  7/16- cxR clear, no edema. will d/c lasix. Still continue aldactone for now. Monitor response. 7/17 - no s/s of decompensation. + mild fatigue. Will watch closely. Still on aldactone. Lasix d/c'd     HTN (hypertension)\  Hypotension due to hypovolemia  - continued on aldactone. Lasix. - will add, adjust antihypertensives as needed  - 7/17 -120s/ HR 60-70s. No adjustments.        Atrial fibrillation with rapid ventricular response (HCC) vs WPW in patient with history of ablation    - s/p electric cardioversion - continue amiodarone po.   - continue eliquis  - monitor for recurrent a.fib. HR control.   - continue remote telemetry. 7/17- RRR. NSR.       Severe sepsis ([address] (7/3/2019) / Pulmonary infiltrates vs acute congestive heart failure (7/3/2019)- treated. abx finished. Pneumonia prophylaxis- Insentive spirometer every hour while awake  - 7/17 - UTI -GNR. started [employer]. Follow susceptability.      Severe obesity with body mass index (BMI) of 35.0 to 39.9 with comorbidity  - cardiac diet. Sjogren's syndrome/ Polymyalgia rheumatica - steroid course finished. Monitor for acute flair, increased weakness.         DVT risk / DVT Prophylaxis- Will require daily physician exam to assess for signs and symptoms as patient is at increased risk for of thromboembolism. Mobilization as tolerated. Intermittent pneumatic compression devices when in bed Thigh-high or knee-high thromboembolic deterrent hose when out of bed.   - covered with eliquis.      Pain Control: stable, mild-to-moderate joint symptoms intermittently, reasonably well controlled by PRN meds. Will require regular pain assessment and comprenhensive pain management,   - foot pain -   - acute gout flair - 7/17 - candice pain improving slowly with prn ibuprofen. Pt would like to hold off on colchicine due to past loose stools. Wound Care: Monitor wound status daily per staff and physician. At risk for failure. Will require 24/7 rehab nursing. Keep wound clean and dry  - candidiasis periarea, genital.   - 7/17 - on nystatin powder and barrier cream +diflucan po.      Potential urinary retention/ neurogenic bladder -   - c/o frequency following prolonged covarrubias insertion, removal. 7/17 secondary to likely UTI. abx started. bowel program - as needed.      GERD - resume PPI. At times may need additional antacids, Maalox prn.        The patient's prognosis for significant practical improvement within a reasonable period of time appears good and the estimated length of stay is 7days and patient is expected to return home with spouse and continued rehabilitation with home health therapy.      Given the patient's complex neurologic/medical condition and the risk of further medical complications including: DVT, PE, skin breakdown, pneumonia due to decreased mobility, recurrent a.fib, CHF exacerbation, volume overload following cardioversion.  PT at risk for CVA, MI,  increased risk of thromboembolism could potentially impact the IRF program. For these ongoing medical issues, rehabilitation services could not be safely provided at a lower level of care such as a skilled nursing facility or nursing home.          Time spent was 25 minutes with over 1/2 in direct patient care/examination, consultation and coordination of care.     Signed By: Madyson Godfrey MD     July 17, 2019

## 2019-07-17 NOTE — ROUTINE PROCESS
CHF note: CHF is secondary to AFIB; CHF is NOT Primary this admit.  Per Lorenzo Mckenna size is normal. Elevated BNP secondary to AFIB

## 2019-07-17 NOTE — PROGRESS NOTES
Patient agrees with [facility]. Order, referral, face to face, and placed in AVS.  [facility]. Made an appointment with Chantal ROB on July 29, 2019 at 1:20pm, placed on AVS.  Informed patient of appointment.

## 2019-07-17 NOTE — PROGRESS NOTES
OT Daily Note    Time In 1301   Time Out 1349     Subjective: Agreeable to therapy  Pain: None indicated    Precautions: Other (comment)(fall risk)    Strengthening   Patient completed 2 sets x 10-15 reps of BUE therapeutic exercises seated in wheelchair in prayer garden utilizing 3# weighted dowel. Patient completed overhead press, chest press, forward row, backward row, and elbow flexion/extension exercises with minimal cues to promote proper technique and intermittent rest breaks to complete. Assessment: Patient tolerated well. Education: Purpose of therapy  Interdisciplinary Communication: Collaborated with PT regarding patient's performance and POC. Plan: Continue to address ADL/IADL, functional mobility, activity tolerance, balance, strengthening, education, cognition.       Simon Coleman OTR/L

## 2019-07-17 NOTE — PROGRESS NOTES
PHYSICAL THERAPY DAILY NOTE  Time In: 8242  Time Out: 6016  Patient Seen For: PM;Transfer training;Gait training; Therapeutic exercise; Other (see progress notes)    Subjective: Patient complained of knee pain with exercises. Objective: She voiced that she has arthritis in her knees and the pain is normal.  Other (comment)(Fall Risk)  GROSS ASSESSMENT Daily Assessment            COGNITION Daily Assessment    intact       BED/MAT MOBILITY Daily Assessment    Supine to Sit : 4 (Contact guard assistance)  Sit to Supine : 4 (Minimal assistance)       TRANSFERS Daily Assessment    Transfer Type: SPT without device  Other: SPT with rollator  Transfer Assistance : 4 (Contact guard assistance)  Sit to Stand Assistance: Contact guard assistance  Car Transfers: Not tested       GAIT Daily Assessment    Amount of Assistance: 4 (Contact guard assistance)  Distance (ft): 120 Feet (ft)  Assistive Device: Walker, rollator;Gait belt       STEPS or STAIRS Daily Assessment    Level of Assist : 0 (Not tested)       BALANCE Daily Assessment            WHEELCHAIR MOBILITY Daily Assessment            LOWER EXTREMITY EXERCISES Daily Assessment    Extremity: Both  Exercise Type #1: Supine lower extremity strengthening  Sets Performed: 2  Reps Performed: 10  Level of Assist: Contact guard assistance          Assessment: Patient making great gains with mobility. Plan of Care: Continue with plan of care. Will perform stairs tomorrow.     Reji Amador, PTA  7/17/2019

## 2019-07-17 NOTE — PROGRESS NOTES
Sitting up in recliner talking on phone. Denies pain, no distress noted. Hourly rounds completed this shift.

## 2019-07-17 NOTE — PROGRESS NOTES
PHYSICAL THERAPY DAILY NOTE  Time In: 1030  Time Out: 2067  Patient Seen For: AM;Transfer training;Gait training; Therapeutic exercise; Other (see progress notes)    Subjective: Patient had no complaints. Objective: No pain noted. Other (comment)(Fall Risk)  GROSS ASSESSMENT Daily Assessment            COGNITION Daily Assessment           BED/MAT MOBILITY Daily Assessment    Supine to Sit : 4 (Minimal assistance)  Sit to Supine : 4 (Minimal assistance)       TRANSFERS Daily Assessment    Other: SPT with rollator  Transfer Assistance : 4 (Minimal assistance)  Sit to Stand Assistance: Contact guard assistance       GAIT Daily Assessment    Amount of Assistance: 4 (Minimal assistance)  Distance (ft): 100 Feet (ft)  Assistive Device: Walker, rollator;Gait belt       STEPS or STAIRS Daily Assessment    Level of Assist : 0 (Not tested)       BALANCE Daily Assessment            WHEELCHAIR MOBILITY Daily Assessment            LOWER EXTREMITY EXERCISES Daily Assessment    Extremity: Both  Exercise Type #1: Seated lower extremity strengthening  Sets Performed: 2  Reps Performed: 10  Level of Assist: Supervision          Assessment: Patient making good progress. Plan of Care: Continue with plan of care.     Yusuf Hoffman, MAXIM  7/17/2019

## 2019-07-17 NOTE — PROGRESS NOTES
Problem: Falls - Risk of  Goal: *Absence of Falls  Description  Document Shola Porras Fall Risk and appropriate interventions in the flowsheet. Outcome: Progressing Towards Goal  Note:   Fall Risk Interventions:  Mobility Interventions: Communicate number of staff needed for ambulation/transfer    Mentation Interventions: Door open when patient unattended, Evaluate medications/consider consulting pharmacy    Medication Interventions: Patient to call before getting OOB    Elimination Interventions: Call light in reach              Problem: Patient Education: Go to Patient Education Activity  Goal: Patient/Family Education  Outcome: Progressing Towards Goal     Problem: Inpatient Rehab (Adult)  Goal: *LTG: Avoids injury/falls 100% of time related to deficits  Outcome: Progressing Towards Goal  Goal: *LTG: Avoids infection 100% of time related to deficits  Outcome: Progressing Towards Goal  Goal: *LTG: Verbalize understanding of diagnosis and risk factors for recurring stroke  Outcome: Progressing Towards Goal  Goal: *LTG: Absence of DVT during hospitalization  Outcome: Progressing Towards Goal  Goal: *LTG: Maintains Skin Integrity With No Evidence of Pressure Injury 100% of Time  Outcome: Progressing Towards Goal  Goal: Interventions  Outcome: Progressing Towards Goal     Problem: Pressure Injury - Risk of  Goal: *Prevention of pressure injury  Description  Document Mark Scale and appropriate interventions in the flowsheet.   Outcome: Progressing Towards Goal  Note:   Pressure Injury Interventions:  Sensory Interventions: Assess changes in LOC    Moisture Interventions: Absorbent underpads    Activity Interventions: Increase time out of bed, Pressure redistribution bed/mattress(bed type)    Mobility Interventions: HOB 30 degrees or less, Pressure redistribution bed/mattress (bed type)    Nutrition Interventions: Document food/fluid/supplement intake    Friction and Shear Interventions: Apply protective barrier, creams and emollients                Problem: Patient Education: Go to Patient Education Activity  Goal: Patient/Family Education  Outcome: Progressing Towards Goal

## 2019-07-18 LAB
ANION GAP SERPL CALC-SCNC: 9 MMOL/L (ref 7–16)
BACTERIA SPEC CULT: ABNORMAL
BACTERIA SPEC CULT: ABNORMAL
BUN SERPL-MCNC: 30 MG/DL (ref 8–23)
CALCIUM SERPL-MCNC: 9.3 MG/DL (ref 8.3–10.4)
CHLORIDE SERPL-SCNC: 105 MMOL/L (ref 98–107)
CO2 SERPL-SCNC: 25 MMOL/L (ref 21–32)
CREAT SERPL-MCNC: 1.46 MG/DL (ref 0.6–1)
GLUCOSE SERPL-MCNC: 130 MG/DL (ref 65–100)
POTASSIUM SERPL-SCNC: 4.3 MMOL/L (ref 3.5–5.1)
SERVICE CMNT-IMP: ABNORMAL
SODIUM SERPL-SCNC: 139 MMOL/L (ref 136–145)

## 2019-07-18 PROCEDURE — 36415 COLL VENOUS BLD VENIPUNCTURE: CPT

## 2019-07-18 PROCEDURE — 97535 SELF CARE MNGMENT TRAINING: CPT

## 2019-07-18 PROCEDURE — 97110 THERAPEUTIC EXERCISES: CPT

## 2019-07-18 PROCEDURE — 99232 SBSQ HOSP IP/OBS MODERATE 35: CPT | Performed by: PHYSICAL MEDICINE & REHABILITATION

## 2019-07-18 PROCEDURE — 65310000000 HC RM PRIVATE REHAB

## 2019-07-18 PROCEDURE — 97116 GAIT TRAINING THERAPY: CPT

## 2019-07-18 PROCEDURE — 97530 THERAPEUTIC ACTIVITIES: CPT

## 2019-07-18 PROCEDURE — 80048 BASIC METABOLIC PNL TOTAL CA: CPT

## 2019-07-18 PROCEDURE — 74011250637 HC RX REV CODE- 250/637: Performed by: PHYSICAL MEDICINE & REHABILITATION

## 2019-07-18 RX ORDER — SULFAMETHOXAZOLE AND TRIMETHOPRIM 800; 160 MG/1; MG/1
1 TABLET ORAL EVERY 12 HOURS
Status: DISCONTINUED | OUTPATIENT
Start: 2019-07-18 | End: 2019-07-23 | Stop reason: HOSPADM

## 2019-07-18 RX ADMIN — AMIODARONE HYDROCHLORIDE 400 MG: 200 TABLET ORAL at 08:33

## 2019-07-18 RX ADMIN — SPIRONOLACTONE 25 MG: 25 TABLET ORAL at 08:33

## 2019-07-18 RX ADMIN — APIXABAN 5 MG: 5 TABLET, FILM COATED ORAL at 21:40

## 2019-07-18 RX ADMIN — SULFAMETHOXAZOLE AND TRIMETHOPRIM 1 TABLET: 800; 160 TABLET ORAL at 09:00

## 2019-07-18 RX ADMIN — PANTOPRAZOLE SODIUM 40 MG: 40 TABLET, DELAYED RELEASE ORAL at 05:27

## 2019-07-18 RX ADMIN — NITROFURANTOIN MACROCRYSTALS 50 MG: 50 CAPSULE ORAL at 05:27

## 2019-07-18 RX ADMIN — AMIODARONE HYDROCHLORIDE 400 MG: 200 TABLET ORAL at 17:32

## 2019-07-18 RX ADMIN — NYSTATIN 1 G: 100000 POWDER TOPICAL at 17:32

## 2019-07-18 RX ADMIN — SULFAMETHOXAZOLE AND TRIMETHOPRIM 1 TABLET: 800; 160 TABLET ORAL at 21:40

## 2019-07-18 RX ADMIN — APIXABAN 5 MG: 5 TABLET, FILM COATED ORAL at 08:34

## 2019-07-18 RX ADMIN — NYSTATIN 1 G: 100000 POWDER TOPICAL at 08:34

## 2019-07-18 RX ADMIN — FLUCONAZOLE 200 MG: 100 TABLET ORAL at 08:34

## 2019-07-18 NOTE — PROGRESS NOTES
Recreational Therapy Daily Note    Time In 0915   Time Out 1000     Subjective: \"I am doing alright. I just found out I have some kind of bug and have to wear this gown. \"  Precautions:Fall    Activity   Patient participated in standing horseshoe toss to help increase her overall standing tolerance and balance. Patient without c/o tiredness or fatigue. She took rest breaks between 2 rounds of horseshoes and was eager to stand back up. Patient was appreciative to be assisted to her room and into the recliner after having a busy morning of therapy. She was left with all her needs within reach. Social Interaction: Cooperative, Appropriate and Participatory    Cognition: No issues noted and A&O X4    Education: Purpose and benefit of standing activity    Interdisciplinary Communication: Communicated with [facility], PTA regarding patient's standing tolerance.       Ana Maria Moreno, CTRS  7/18/2019

## 2019-07-18 NOTE — PROGRESS NOTES
PHYSICAL THERAPY DAILY NOTE  Time In: 1441  Time Out: 1521  Patient Seen For: PM;Transfer training;Gait training; Therapeutic exercise; Other (see progress notes)    Subjective: Patient does complain of right knee pain at times with exs and stairs. Objective: Min pain with exs and stair climbing but patient says that this was prior also. After discussing with OT this AM patient had complained about dizziness with OT . When patient asked if she was dizzy she denied any . No dizziness during PT. Other (comment)(Fall Risk)  GROSS ASSESSMENT Daily Assessment            COGNITION Daily Assessment           BED/MAT MOBILITY Daily Assessment    Supine to Sit : 4 (Contact guard assistance)  Sit to Supine : 4 (Minimal assistance)       TRANSFERS Daily Assessment    Other: SPT with rollator  Transfer Assistance : 4 (Contact guard assistance)  Sit to Stand Assistance: Stand-by assistance  Car Transfers: Not tested       GAIT Daily Assessment    Amount of Assistance: 5 (Stand-by assistance)  Distance (ft): 120 Feet (ft)  Assistive Device: Walker, rollator       STEPS or STAIRS Daily Assessment    Steps/Stairs Ambulated (#): 4  Level of Assist : 4 (Contact guard assistance)  Rail Use: Both       BALANCE Daily Assessment            WHEELCHAIR MOBILITY Daily Assessment            LOWER EXTREMITY EXERCISES Daily Assessment    Extremity: Both  Exercise Type #1: Other (comment)(ambulating up/ down ramp and unlevel surfaces)  Sets Performed: 3  Reps Performed: 0  Level of Assist: Stand-by assistance          Assessment: Patient making good progress. She has rollator for home already. Plan of Care: Continue with plan of care.     Toro Barajas, PTA  7/18/2019

## 2019-07-18 NOTE — PROGRESS NOTES
[facility] PROGRESS NOTE    Alfred Solis  Admit Date: 7/15/2019  Admit Diagnosis: CHF (congestive heart failure) ([facility] [I50.9]; Polymyalgia rheumatica ([facility] [M35.3]; Gout attack [M10.9]; Physical deconditioning [R53.81]; Chronic atrial fibrillation with rapid ventricular response ([facility] [I48.2]; Impaired gait and mobility [R26.89]; Candidiasis of perineum [B37.49]; Hypertension [I10]    Subjective     Afebrile. Vss. Still with frequency. Denies chest pain SOB, cough. PT/OT progressing well. Foot pain appears resolved. ESB:L reported. Follow contact protocol  Objective:     Current Facility-Administered Medications   Medication Dose Route Frequency    trimethoprim-sulfamethoxazole (BACTRIM DS, SEPTRA DS) 160-800 mg per tablet 1 Tab  1 Tab Oral Q12H    sodium chloride (NS) flush 5-40 mL  5-40 mL IntraVENous PRN    amiodarone (CORDARONE) tablet 400 mg  400 mg Oral BID    apixaban (ELIQUIS) tablet 5 mg  5 mg Oral Q12H    bisacodyl (DULCOLAX) tablet 5 mg  5 mg Oral DAILY PRN    ibuprofen (MOTRIN) tablet 600 mg  600 mg Oral Q6H PRN    albuterol (PROVENTIL VENTOLIN) nebulizer solution 1.25 mg  1.25 mg Nebulization Q6H PRN    loperamide (IMODIUM) capsule 2 mg  2 mg Oral Q4H PRN    nystatin (MYCOSTATIN) 100,000 unit/gram powder   Topical BID    pantoprazole (PROTONIX) tablet 40 mg  40 mg Oral ACB    spironolactone (ALDACTONE) tablet 25 mg  25 mg Oral DAILY    temazepam (RESTORIL) capsule 15 mg  15 mg Oral QHS PRN    fluconazole (DIFLUCAN) tablet 200 mg  200 mg Oral DAILY     Review of Systems: Denies chest pain, shortness of breath, cough, headache, visual problems, abdominal pain, dysurea, calf pain. Pertinent positives are as noted in the medical records and unremarkable otherwise. Visit Vitals  /84   Pulse 95   Temp 98.6 °F (37 °C)   Resp 18   Wt 182 lb 1.6 oz (82.6 kg)   SpO2 91%   BMI 33.31 kg/m²      Physical Exam:         General:   Alert, appears stated age, No acute distress. Feels fatigued. HEENT:  Normocephalic, EOMI, facial symmetry  Intact. noJVD   Lungs:  CTA Bilaterally, Respiration even and unlabored   No apparent use of accessory muscles for respiration. Heart:  Regular rate and rhythm, S1, S2, No obvious murmur, click, rub or gallop. Genitourinary: defered   Abdomen:  Soft, non-tender to palpation in all four quadrants. Bowel sounds present.      Neuromuscular:  PERRL, EOMI  + mild generalized weakness, more proxima> distal BLE  Sensory - intact  Plantars - down going   Skin:  + maculopapular rash with satellite lesions at folds at groin, perineum mary lou anal areas, intragluteal cleft    Edema: Trace BLE edema                                                                                      Functional Assessment:          Balance  Sitting - Static: Good (unsupported) (07/16/19 1100)  Sitting - Dynamic: Good (unsupported) (07/16/19 1100)  Standing - Static: Fair (07/16/19 1100)         Candi Girt Fall Risk Assessment:  Candi Girt Fall Risk  Mobility: Ambulates or transfers with assist devices or assistance (07/18/19 0800)  Mobility Interventions: Patient to call before getting OOB (07/18/19 0800)  Mentation: Alert, oriented x 3 (07/18/19 0800)  Mentation Interventions: Adequate sleep, hydration, pain control (07/18/19 0800)  Medication: Patient receiving anticonvulsants, sedatives(tranquilizers), psychotropics or hypnotics, hypoglycemics, narcotics, sleep aids, antihypertensives, laxatives, or diuretics (07/18/19 0800)  Medication Interventions: Patient to call before getting OOB (07/18/19 0800)  Elimination: Needs assistance with toileting (07/18/19 0800)  Elimination Interventions: Call light in reach (07/18/19 0800)  Prior Fall History: No (07/18/19 0800)  Total Score: 3 (07/18/19 0800)  High Fall Risk: Yes (07/18/19 0800)     Speech Assessment:         Ambulation:  Gait  Distance (ft): 120 Feet (ft) (07/17/19 1634)  Assistive Device: Loralie , rollator;Gait belt (07/17/19 1634) Labs/Studies:  Recent Results (from the past 72 hour(s))   URINALYSIS W/ RFLX MICROSCOPIC    Collection Time: 07/15/19  7:59 PM   Result Value Ref Range    Color YELLOW      Appearance HAZY      Specific gravity 1.015 1.001 - 1.023      pH (UA) 5.5 5.0 - 9.0      Protein NEGATIVE  NEG mg/dL    Glucose NEGATIVE  NEG mg/dL    Ketone NEGATIVE  NEG mg/dL    Bilirubin NEGATIVE  NEG      Blood NEGATIVE  NEG      Urobilinogen 0.2 0.2 - 1.0 EU/dL    Nitrites NEGATIVE  NEG      Leukocyte Esterase TRACE (A) NEG     CULTURE, URINE    Collection Time: 07/15/19  7:59 PM   Result Value Ref Range    Special Requests: NO SPECIAL REQUESTS      Culture result: (A)       >100,000 COLONIES/mL KLEBSIELLA PNEUMONIAE ** (EXTENDED SPECTRUM BETA LACTAMASE ) **    Culture result:        RESULTS VERIFIED, PHONED TO AND READ BACK BY  JENNIFER POWELL ON 7/17/19 @3702, TA         Susceptibility    Klebsiella pneumoniae - MER     Trimeth-Sulfamethoxa <=0.5/9.5 Susceptible ug/mL     Ciprofloxacin ($) <=0.5 Susceptible ug/mL     Gentamicin ($) 1 Susceptible ug/mL     Tobramycin ($) 1 Susceptible ug/mL     Levofloxacin ($) <=1 Susceptible ug/mL     Meropenem ($$) <=0.125 Susceptible ug/mL   URINE MICROSCOPIC    Collection Time: 07/15/19  7:59 PM   Result Value Ref Range    WBC 20-50 0 /hpf    RBC 0 0 /hpf    Epithelial cells 0-3 0 /hpf    Bacteria 3+ (H) 0 /hpf    Casts 0 0 /lpf    Crystals, urine 0 0 /LPF    Mucus 0 0 /lpf    Other observations RESULTS VERIFIED MANUALLY     CBC WITH AUTOMATED DIFF    Collection Time: 07/16/19  6:28 AM   Result Value Ref Range    WBC 6.3 4.3 - 11.1 K/uL    RBC 3.13 (L) 4.05 - 5.2 M/uL    HGB 9.6 (L) 11.7 - 15.4 g/dL    HCT 29.5 (L) 35.8 - 46.3 %    MCV 94.2 79.6 - 97.8 FL    MCH 30.7 26.1 - 32.9 PG    MCHC 32.5 31.4 - 35.0 g/dL    RDW 15.1 (H) 11.9 - 14.6 %    PLATELET 891 880 - 634 K/uL    MPV 10.5 9.4 - 12.3 FL    ABSOLUTE NRBC 0.00 0.0 - 0.2 K/uL    DF AUTOMATED      NEUTROPHILS 62 43 - 78 % LYMPHOCYTES 22 13 - 44 %    MONOCYTES 12 4.0 - 12.0 %    EOSINOPHILS 3 0.5 - 7.8 %    BASOPHILS 1 0.0 - 2.0 %    IMMATURE GRANULOCYTES 1 0.0 - 5.0 %    ABS. NEUTROPHILS 3.9 1.7 - 8.2 K/UL    ABS. LYMPHOCYTES 1.4 0.5 - 4.6 K/UL    ABS. MONOCYTES 0.8 0.1 - 1.3 K/UL    ABS. EOSINOPHILS 0.2 0.0 - 0.8 K/UL    ABS. BASOPHILS 0.1 0.0 - 0.2 K/UL    ABS. IMM.  GRANS. 0.0 0.0 - 0.5 K/UL   MAGNESIUM    Collection Time: 07/16/19  6:28 AM   Result Value Ref Range    Magnesium 2.1 1.8 - 2.4 mg/dL   METABOLIC PANEL, BASIC    Collection Time: 07/16/19  6:28 AM   Result Value Ref Range    Sodium 140 136 - 145 mmol/L    Potassium 4.0 3.5 - 5.1 mmol/L    Chloride 106 98 - 107 mmol/L    CO2 26 21 - 32 mmol/L    Anion gap 8 7 - 16 mmol/L    Glucose 109 (H) 65 - 100 mg/dL    BUN 27 (H) 8 - 23 MG/DL    Creatinine 1.31 (H) 0.6 - 1.0 MG/DL    GFR est AA 51 (L) >60 ml/min/1.73m2    GFR est non-AA 42 (L) >60 ml/min/1.73m2    Calcium 9.0 8.3 - 95.0 MG/DL   METABOLIC PANEL, BASIC    Collection Time: 07/17/19  7:50 AM   Result Value Ref Range    Sodium 140 136 - 145 mmol/L    Potassium 4.1 3.5 - 5.1 mmol/L    Chloride 106 98 - 107 mmol/L    CO2 27 21 - 32 mmol/L    Anion gap 7 7 - 16 mmol/L    Glucose 120 (H) 65 - 100 mg/dL    BUN 30 (H) 8 - 23 MG/DL    Creatinine 1.33 (H) 0.6 - 1.0 MG/DL    GFR est AA 50 (L) >60 ml/min/1.73m2    GFR est non-AA 41 (L) >60 ml/min/1.73m2    Calcium 9.2 8.3 - 10.4 MG/DL       Assessment:     Problem List as of 7/18/2019 Date Reviewed: 7/5/2019          Codes Class Noted - Resolved    Stage 3 chronic kidney disease ([facility] ICD-10-CM: N18.3  ICD-9-CM: 585.3  7/15/2019 - Present        Systolic CHF, chronic (HCC) ICD-10-CM: I50.22  ICD-9-CM: 428.22, 428.0  7/15/2019 - Present        Acute systolic congestive heart failure (HCC) ICD-10-CM: I50.21  ICD-9-CM: 428.21, 428.0  7/15/2019 - Present        CHF (congestive heart failure) (HCC) ICD-10-CM: I50.9  ICD-9-CM: 428.0  7/15/2019 - Present        Hypertension ICD-10-CM: I10  ICD-9-CM: 401.9  7/15/2019 - Present        Physical deconditioning ICD-10-CM: R53.81  ICD-9-CM: 799.3  7/15/2019 - Present        Gout attack ICD-10-CM: M10.9  ICD-9-CM: 274.01  7/15/2019 - Present        Candidiasis of perineum ICD-10-CM: B37.49  ICD-9-CM: 112.2  7/15/2019 - Present        Impaired gait and mobility ICD-10-CM: R26.89  ICD-9-CM: 781.2  7/15/2019 - Present        Chronic atrial fibrillation with rapid ventricular response (HCC) ICD-10-CM: I48.2  ICD-9-CM: 427.31  7/15/2019 - Present        Acute hypoxemic respiratory failure (HCC) ICD-10-CM: J96.01  ICD-9-CM: 518.81  7/3/2019 - Present        Atrial fibrillation with rapid ventricular response (HCC) vs WPW in patient with history of ablation ICD-10-CM: I48.91  ICD-9-CM: 427.31  7/3/2019 - Present        Pulmonary infiltrates vs acute congestive heart failure ICD-10-CM: R91.8  ICD-9-CM: 793.19  7/3/2019 - Present        Severe sepsis ([facility] ICD-10-CM: A41.9, R65.20  ICD-9-CM: 038.9, 995.92  7/3/2019 - Present        Hypotension due to hypovolemia ICD-10-CM: I95.89, E86.1  ICD-9-CM: 458.8, 276.52  7/3/2019 - Present        Polymyalgia rheumatica ([facility] ICD-10-CM: M35.3  ICD-9-CM: 007  12/2/2015 - Present        Gouty arthritis  NOS ICD-10-CM: M10.9  ICD-9-CM: 274.00  12/2/2015 - Present        Mixed hyperlipidemia ICD-10-CM: E78.2  ICD-9-CM: 272.2  12/2/2015 - Present        WPW (Suresh-Parkinson-White syndrome) ICD-10-CM: I45.6  ICD-9-CM: 426.7  8/12/2013 - Present    Overview Signed 8/12/2013  2:46 PM by Maria Esther Bermudez III     Posteroseptal pathway ablation 8/12/13             HTN (hypertension) ICD-10-CM: I10  ICD-9-CM: 401.9  8/12/2013 - Present        Severe obesity with body mass index (BMI) of 35.0 to 39.9 with comorbidity (HCC) ICD-10-CM: E66.01  ICD-9-CM: 278.01  8/12/2013 - Present        Other and unspecified hyperlipidemia ICD-10-CM: E78.5  ICD-9-CM: 272.4  8/12/2013 - Present        Sjogren's syndrome ([facility] ICD-10-CM: M35.00  ICD-9-CM: 710.2  8/12/2013 - Present        Fibromyalgia ICD-10-CM: M79.7  ICD-9-CM: 729.1  8/12/2013 - Present        Peptic ulcer ICD-10-CM: K27.9  ICD-9-CM: 533.90  8/12/2013 - Present        Gout ICD-10-CM: M10.9  ICD-9-CM: 274.9  8/12/2013 - Present              Plan:     Rehabilitation Plan  The patient has shown the ability to tolerate and benefit from 3 hours of therapy daily and is being admitted to a comprehensive acute inpatient rehabilitation program consisting of at least 3 hours of combined physical and occupational therapies. Continue intensive Physical Therapy for a minimum of 1.5 hours a day, at least 5 out of 7 days per week to address bed mobility, transfers, ambulation, strengthening, balance, and endurance. Continue intensive Occupational Therapy for a minimum of 1.5 hours a day, at least 5 out of 7 days per week to address ADL ( bathing, LE dressing, toileting) and adaptive equipment as needed. + fatigue, weakness, decreased activity tolerance noted. However sessions well tolerated, making gains.      The patient will also require 24-hour skilled rehabilitation nursing for bowel and bladder management, skin care for decubitus ulcer prevention , pain management and ongoing medication administration      Continue daily physician medical management:  Acute hypoxemic respiratory failure/ CHF  -secondary to a.fib.   - continue oral lasix. Monitor volume status. Will wean diuretics as appropriate  7/16- cxR clear, no edema. will d/c lasix. Still continue aldactone for now. Monitor response. 7/17 - no s/s of decompensation. + mild fatigue. Will watch closely. Still on aldactone. Lasix d/c'd  7/18 - no signs of decompensation. Now off lasix.      HTN (hypertension)\  Hypotension due to hypovolemia  - continued on aldactone. Lasix. - will add, adjust antihypertensives as needed  - 7/17 -120s/ HR 60-70s.  No adjustments.        Atrial fibrillation with rapid ventricular response (HCC) vs WPW in patient with history of ablation    - s/p electric cardioversion - continue amiodarone po.   - continue eliquis  - monitor for recurrent a.fib. HR control.   - continue remote telemetry. 7/17- RRR. NSR.    7/18 - PAF continue- amiodarone, eliquis/  sCHF - continue Coreg and spironolactone. Lasix discontinued. Severe sepsis ([address] (7/3/2019) / Pulmonary infiltrates vs acute congestive heart failure (7/3/2019)- treated. abx finished. Pneumonia prophylaxis- Insentive spirometer every hour while awake  - 7/17 - UTI -GNR. started [employer]. Follow susceptability.      Severe obesity with body mass index (BMI) of 35.0 to 39.9 with comorbidity  - cardiac diet. Sjogren's syndrome/ Polymyalgia rheumatica - steroid course finished. Monitor for acute flair, increased weakness.         DVT risk / DVT Prophylaxis-    - covered with eliquis.      Pain Control: stable, mild-to-moderate joint symptoms intermittently, reasonably well controlled by PRN meds. Will require regular pain assessment and comprenhensive pain management,   - foot pain -   - acute gout flair - 7/17 - candice pain improving slowly with prn ibuprofen. Pt would like to hold off on colchicine due to past loose stools. Wound Care: Monitor wound status daily per staff and physician. At risk for failure. Will require 24/7 rehab nursing. Keep wound clean and dry  - candidiasis periarea, genital.   - 7/17 - on nystatin powder and barrier cream +diflucan po.   7/18 - continue po, topical antifungals.      Potential urinary retention/ neurogenic bladder -   - c/o frequency following prolonged covarrubias insertion, removal.   7/17 secondary to likely UTI. abx started. 7/18- ESBL reported. will change to Bactrim DS x 7 days. Pt still with frequency, bladder irriitation. bowel program - as needed.      GERD - resume PPI.  At times may need additional antacids, Maalox prn.        The patient's prognosis for significant practical improvement within a reasonable period of time appears good and the estimated length of stay is 7days and patient is expected to return home with spouse and continued rehabilitation with home health therapy.      Given the patient's complex neurologic/medical condition and the risk of further medical complications including: DVT, PE, skin breakdown, pneumonia due to decreased mobility, recurrent a.fib, CHF exacerbation, volume overload following cardioversion. PT at risk for CVA, MI,  increased risk of thromboembolism could potentially impact the IRF program. For these ongoing medical issues, rehabilitation services could not be safely provided at a lower level of care such as a skilled nursing facility or nursing home.          Time spent was 25 minutes with over 1/2 in direct patient care/examination, consultation and coordination of care.      Signed By: Sayda Jackson MD     July 18, 2019

## 2019-07-18 NOTE — PROGRESS NOTES
Problem: Falls - Risk of  Goal: *Absence of Falls  Description  Document Sherita Garcia Fall Risk and appropriate interventions in the flowsheet. Outcome: Progressing Towards Goal  Note:   Fall Risk Interventions:  Mobility Interventions: Patient to call before getting OOB, Strengthening exercises (ROM-active/passive), Utilize walker, cane, or other assistive device    Mentation Interventions: Door open when patient unattended, Adequate sleep, hydration, pain control, Increase mobility, More frequent rounding    Medication Interventions: Patient to call before getting OOB, Teach patient to arise slowly    Elimination Interventions: Call light in reach, Patient to call for help with toileting needs, Toileting schedule/hourly rounds              Problem: Pressure Injury - Risk of  Goal: *Prevention of pressure injury  Description  Document Mark Scale and appropriate interventions in the flowsheet.   Outcome: Progressing Towards Goal  Note:   Pressure Injury Interventions:  Sensory Interventions: Assess changes in LOC, Float heels, Keep linens dry and wrinkle-free, Maintain/enhance activity level, Minimize linen layers, Pressure redistribution bed/mattress (bed type)    Moisture Interventions: Absorbent underpads, Maintain skin hydration (lotion/cream), Minimize layers    Activity Interventions: Increase time out of bed, Pressure redistribution bed/mattress(bed type)    Mobility Interventions: HOB 30 degrees or less, Pressure redistribution bed/mattress (bed type)    Nutrition Interventions: Document food/fluid/supplement intake    Friction and Shear Interventions: Apply protective barrier, creams and emollients

## 2019-07-18 NOTE — PROGRESS NOTES
07/18/19 0804   Time Spent With Patient   Time In 0701   Time Out 0830   Patient Seen For: AM;ADLs   Feeding/Eating   Feeding/Eating Assistance I   Comments Does not require upper dentures to eat   Grooming   Grooming Assistance  S   Comments Setup, supervision standing at sink to brush teeth   Upper Body Bathing   Bathing Assistance, Upper SBA   Position Performed Seated in chair   Adaptive Equipment Tub bench   Lower Body Bathing   Bathing Assistance, Lower  S   Adaptive Equipment Grab bar   Position Performed Seated in chair;Standing   Adaptive Equipment Tub bench   Comments Supervision in stance as patient bathes mary lou area/bottom, use of grab bar for balance   Toileting   Toileting Assistance (FIM Score) S   Adaptive Equipment Grab bars   Upper Body Dressing    Dressing Assistance  Min A   Comments To help bra down back, discussed and patient reports she does not normally wear a bra at home   Lower Body [address]  S   Leg Crossed Method Used Yes   Position Performed Seated in chair;Standing   Adaptive Equipment Used Grab bar;Other(comment)  (Rollator)   Comments Stabilized on rollator in sitting/stance while managing LB clothing, stood with supervision and use of grab bar to manage clothing over hips. Able to don B slip on shoes today   Functional Transfers   Toilet Transfer  Stand pivot transfer with walker;Grab bars  (Rollator)   Amount of Assistance Required S   Tub or Shower Type Shower   Amount of Assistance Required S   Adaptive Equipment Grab bars; Tub transfer bench; Other (comment)  (Rollator)       S: \"I think I can do it myself. \" [anti-fungal ointment application to mary lou area/inner thighs, discussed whether patient would be able to complete at discharge and patient agreeable to attempting this AM] Agreeable to therapy. Focus of session was on ADL and functional mobility. Patient was able to ambulate ~10 feet x 6 using a rollator with CGA-supervision.    Pain not indicated, note ESBL contact precautions initiated during session and patient educated accordingly. Collaborated with Junior PAN regarding new contact precautions. Patient tolerated session well, but activity tolerance, balance, functional mobility, strength, cognition are still below baseline and require skilled facilitation to successfully and safely complete ADL's and transfers. Patient ended session seated EOB with RN administering medications.      Hitesh Nolan, OTR/L

## 2019-07-18 NOTE — PROGRESS NOTES
PHYSICAL THERAPY DAILY NOTE  Time In: 7949  Time Out: 8135  Patient Seen For: AM;Transfer training;Gait training; Therapeutic exercise; Other (see progress notes)    Subjective: Patient had no complaints. Objective: No pain noted. Other (comment)(Fall Risk)  GROSS ASSESSMENT Daily Assessment            COGNITION Daily Assessment           BED/MAT MOBILITY Daily Assessment    Supine to Sit : 4 (Contact guard assistance)  Sit to Supine : 4 (Minimal assistance)       TRANSFERS Daily Assessment    Other: SPT with rollator  Transfer Assistance : 4 (Contact guard assistance)  Sit to Stand Assistance: Stand-by assistance       GAIT Daily Assessment    Amount of Assistance: 4 (Contact guard assistance)  Distance (ft): 120 Feet (ft)  Assistive Device: Walker, rollator       STEPS or STAIRS Daily Assessment    Level of Assist : 0 (Not tested)       BALANCE Daily Assessment            WHEELCHAIR MOBILITY Daily Assessment            LOWER EXTREMITY EXERCISES Daily Assessment    Extremity: Both  Exercise Type #1: Supine lower extremity strengthening  Sets Performed: 2  Reps Performed: 10  Level of Assist: Minimal assistance          Assessment: Patient making good progress. Plan of Care: Continue with plan of care.     Patricia Suarez PTA  7/18/2019

## 2019-07-19 LAB
ANION GAP SERPL CALC-SCNC: 12 MMOL/L (ref 7–16)
ATRIAL RATE: 84 BPM
BUN SERPL-MCNC: 28 MG/DL (ref 8–23)
CALCIUM SERPL-MCNC: 9.1 MG/DL (ref 8.3–10.4)
CALCULATED P AXIS, ECG09: 58 DEGREES
CALCULATED R AXIS, ECG10: -25 DEGREES
CALCULATED T AXIS, ECG11: 80 DEGREES
CHLORIDE SERPL-SCNC: 107 MMOL/L (ref 98–107)
CO2 SERPL-SCNC: 21 MMOL/L (ref 21–32)
CREAT SERPL-MCNC: 1.55 MG/DL (ref 0.6–1)
DIAGNOSIS, 93000: NORMAL
GLUCOSE SERPL-MCNC: 141 MG/DL (ref 65–100)
P-R INTERVAL, ECG05: 148 MS
POTASSIUM SERPL-SCNC: 4 MMOL/L (ref 3.5–5.1)
Q-T INTERVAL, ECG07: 368 MS
QRS DURATION, ECG06: 94 MS
QTC CALCULATION (BEZET), ECG08: 434 MS
SODIUM SERPL-SCNC: 140 MMOL/L (ref 136–145)
VENTRICULAR RATE, ECG03: 84 BPM

## 2019-07-19 PROCEDURE — 99232 SBSQ HOSP IP/OBS MODERATE 35: CPT | Performed by: PHYSICAL MEDICINE & REHABILITATION

## 2019-07-19 PROCEDURE — 65310000000 HC RM PRIVATE REHAB

## 2019-07-19 PROCEDURE — 74011250637 HC RX REV CODE- 250/637: Performed by: PHYSICAL MEDICINE & REHABILITATION

## 2019-07-19 PROCEDURE — 97116 GAIT TRAINING THERAPY: CPT

## 2019-07-19 PROCEDURE — 36415 COLL VENOUS BLD VENIPUNCTURE: CPT

## 2019-07-19 PROCEDURE — 97535 SELF CARE MNGMENT TRAINING: CPT

## 2019-07-19 PROCEDURE — 97110 THERAPEUTIC EXERCISES: CPT

## 2019-07-19 PROCEDURE — 80048 BASIC METABOLIC PNL TOTAL CA: CPT

## 2019-07-19 PROCEDURE — 93005 ELECTROCARDIOGRAM TRACING: CPT | Performed by: PHYSICAL MEDICINE & REHABILITATION

## 2019-07-19 PROCEDURE — 97530 THERAPEUTIC ACTIVITIES: CPT

## 2019-07-19 RX ADMIN — SPIRONOLACTONE 25 MG: 25 TABLET ORAL at 08:06

## 2019-07-19 RX ADMIN — PANTOPRAZOLE SODIUM 40 MG: 40 TABLET, DELAYED RELEASE ORAL at 04:52

## 2019-07-19 RX ADMIN — SULFAMETHOXAZOLE AND TRIMETHOPRIM 1 TABLET: 800; 160 TABLET ORAL at 08:06

## 2019-07-19 RX ADMIN — AMIODARONE HYDROCHLORIDE 400 MG: 200 TABLET ORAL at 17:09

## 2019-07-19 RX ADMIN — FLUCONAZOLE 200 MG: 100 TABLET ORAL at 08:06

## 2019-07-19 RX ADMIN — NYSTATIN: 100000 POWDER TOPICAL at 17:10

## 2019-07-19 RX ADMIN — SULFAMETHOXAZOLE AND TRIMETHOPRIM 1 TABLET: 800; 160 TABLET ORAL at 21:31

## 2019-07-19 RX ADMIN — IBUPROFEN 600 MG: 600 TABLET ORAL at 00:47

## 2019-07-19 RX ADMIN — AMIODARONE HYDROCHLORIDE 400 MG: 200 TABLET ORAL at 08:06

## 2019-07-19 RX ADMIN — NYSTATIN: 100000 POWDER TOPICAL at 08:06

## 2019-07-19 RX ADMIN — APIXABAN 5 MG: 5 TABLET, FILM COATED ORAL at 21:31

## 2019-07-19 RX ADMIN — APIXABAN 5 MG: 5 TABLET, FILM COATED ORAL at 08:06

## 2019-07-19 NOTE — PROGRESS NOTES
07/19/19 1026   Time Spent With Patient   Time In 0832   Time Out 1000   Patient Seen For: AM;ADLs   Grooming   Grooming Assistance  S   Comments Standing at sink today with supervision for balance   Upper Body Bathing   Bathing Assistance, Upper SBA   Position Performed Seated in chair   Adaptive Equipment Tub bench   Lower Body Bathing   Bathing Assistance, Lower  S   Adaptive Equipment Grab bar   Position Performed Seated in chair;Standing   Adaptive Equipment Tub bench   Comments Supervision for balance and use of grab bar as patient bathes mary lou area/bottom   Toileting   Toileting Assistance (FIM Score) S   Adaptive Equipment Grab bars   Upper Body Dressing    Dressing Assistance  S   Comments Able to don overhead bra with setup this AM   Lower Body Dressing    Dressing Assistance  S   Leg Crossed Method Used Yes   Position Performed Seated in chair;Standing   Adaptive Equipment Used Grab bar   Comments Supervision for standing balance and stabilizing on grab bar/rollator in stance to manage clothing over hips, cue to use grab bar to step out of slip on sandals   Functional Transfers   Toilet Transfer  Stand pivot transfer with walker  (Rollator)   Amount of Assistance Required S   Tub or Shower Type Shower   Amount of Assistance Required S   Adaptive Equipment Grab bars; Other (comment); Tub transfer bench  (Rollator)       S: Agreeable to therapy. Focus of session was on ADL and functional mobility. Patient was able to ambulate ~10 feet x 4 and ~ 200 feet x 2 using a rollator with supervision. Pain not indicated. Patient completed 1 set x 12-15 reps of BUE therapeutic exercises seated in wheelchair at tabletop utilizing 5# weighted arya box. Patient completed shoulder flexion/extension, shoulder horizontal adduction/abduction, and v-shape exercises.     Collaborated with RN, Sarah De León regarding skin care   Patient tolerated session well, but activity tolerance, balance, functional mobility, strength, cognition are still below baseline and require skilled facilitation to successfully and safely complete ADL's and transfers. Patient ended session in recliner with call remote and phone within reach.      Chris Holman, OTR/L

## 2019-07-19 NOTE — PROGRESS NOTES
PHYSICAL THERAPY DAILY NOTE  Time In: 1116  Time Out: 0568  Patient Seen For: AM;Gait training; Therapeutic exercise;Transfer training    Subjective: \"I am doing much better and am looking forward to going home next week\"         Objective: Vital Signs:    Patient Vitals for the past 12 hrs:   Temp Pulse Resp BP SpO2   07/19/19 0746 98.3 °F (36.8 °C) 67 16 147/67 95 %     Pain level:  Patient had no complaints of pain during therapy this morning. Other (comment)(Fall Risk)  GROSS ASSESSMENT Daily Assessment    AROM: Generally decreased, functional  PROM: Generally decreased, functional  Strength: Generally decreased, functional  Coordination: Generally decreased, functional  Sensation: Intact     COGNITION Daily Assessment    Alert, oriented, able to follow instructions and converse appropriately during therapy. Pleasant and cooperative. TRANSFERS Daily Assessment   Steady with SPT using the rollator Transfer Type: SPT without device  Transfer Assistance : 4 (Contact guard assistance)  Sit to Stand Assistance: Stand-by assistance  Car Transfers: Not tested     GAIT Daily Assessment   Steady and controlled with gait with good endurance and no SOB noted. Amount of Assistance: 5 (Stand-by assistance)  Distance (ft): 120 Feet (ft)(120' x 2  including ramp on 2nd walk)  Assistive Device: Walker, rollator     BALANCE Daily Assessment    Sitting - Static: Good (unsupported)  Sitting - Dynamic: Good (unsupported)  Standing - Static: Fair  Standing - Dynamic : Impaired     LOWER EXTREMITY EXERCISES Daily Assessment    Extremity: Both  Exercise Type #1: Seated lower extremity strengthening  Sets Performed: 2  Reps Performed: 10  Level of Assist: Supervision  Exercise Type #2: Seated lower extremity strengthening(Motomed x 10 level 2)  Level of Assist: Supervision          Assessment: Patient participated and tolerated therapy well. She is pleasant and cooperative and appears to be making good progress in therapy.   She states she is looking forward to going home. Patient was taken back to her room via her w/c and was transferred to the bedside recliner and set up for lunch with the call light and her personal items within reach. Plan of Care: Continue to treat and progress as indicated.       Amol Samuels  7/19/2019

## 2019-07-19 NOTE — PROGRESS NOTES
[facility] PROGRESS NOTE    Ken Araiza  Admit Date: 7/15/2019  Admit Diagnosis: CHF (congestive heart failure) ([facility] [I50.9]; Polymyalgia rheumatica ([facility] [M35.3]; Gout attack [M10.9]; Physical deconditioning [R53.81]; Chronic atrial fibrillation with rapid ventricular response ([facility] [I48.2]; Impaired gait and mobility [R26.89]; Candidiasis of perineum [B37.49]; Hypertension [I10]    Subjective     Afebrile. Vss. Appears still in NSR. Denies chest pain SOB, cough. Denies foot pain. voiding better, without discomfort. Objective:     Current Facility-Administered Medications   Medication Dose Route Frequency    trimethoprim-sulfamethoxazole (BACTRIM DS, SEPTRA DS) 160-800 mg per tablet 1 Tab  1 Tab Oral Q12H    sodium chloride (NS) flush 5-40 mL  5-40 mL IntraVENous PRN    amiodarone (CORDARONE) tablet 400 mg  400 mg Oral BID    apixaban (ELIQUIS) tablet 5 mg  5 mg Oral Q12H    bisacodyl (DULCOLAX) tablet 5 mg  5 mg Oral DAILY PRN    ibuprofen (MOTRIN) tablet 600 mg  600 mg Oral Q6H PRN    albuterol (PROVENTIL VENTOLIN) nebulizer solution 1.25 mg  1.25 mg Nebulization Q6H PRN    loperamide (IMODIUM) capsule 2 mg  2 mg Oral Q4H PRN    nystatin (MYCOSTATIN) 100,000 unit/gram powder   Topical BID    pantoprazole (PROTONIX) tablet 40 mg  40 mg Oral ACB    spironolactone (ALDACTONE) tablet 25 mg  25 mg Oral DAILY    temazepam (RESTORIL) capsule 15 mg  15 mg Oral QHS PRN    fluconazole (DIFLUCAN) tablet 200 mg  200 mg Oral DAILY     Review of Systems: Denies chest pain, shortness of breath, cough, headache, visual problems, abdominal pain, dysurea, calf pain. Pertinent positives are as noted in the medical records and unremarkable otherwise. Visit Vitals  /67   Pulse 67   Temp 98.3 °F (36.8 °C)   Resp 16   Wt 182 lb 1.6 oz (82.6 kg)   SpO2 95%   BMI 33.31 kg/m²      Physical Exam:         General:   Alert, appears stated age,  Feels fatigued.     HEENT:  Normocephalic, EOMI,   noJVD   Lungs:  CTA Bilaterally, Respiration even and unlabored   No apparent use of accessory muscles for respiration. Heart:  Regular rate and rhythm, S1, S2, No obvious murmur, click, rub or gallop. Genitourinary: defered   Abdomen:  Soft, non-tender to palpation in all four quadrants. Bowel sounds present. Neuromuscular:  PERRL, EOMI  + mild generalized weakness, more proximal> distal BLE  Sensory - intact  Plantars - down going   Skin:  + maculopapular rash with satellite lesions at folds at groin, perineum mary lou anal areas, intragluteal cleft    Edema: none BLE                                                                                       Functional Assessment:          Balance  Sitting - Static: Good (unsupported) (07/16/19 1100)  Sitting - Dynamic: Good (unsupported) (07/16/19 1100)  Standing - Static: Fair (07/16/19 1100)         Nhung Joseph Fall Risk Assessment:  Nhung Joseph Fall Risk  Mobility: Ambulates or transfers with assist devices or assistance (07/19/19 0477)  Mobility Interventions: Patient to call before getting OOB;Utilize walker, cane, or other assistive device (07/19/19 2307)  Mentation: Alert, oriented x 3 (07/19/19 1439)  Mentation Interventions: Evaluate medications/consider consulting pharmacy; More frequent rounding; Toileting rounds (07/19/19 5537)  Medication: Patient receiving anticonvulsants, sedatives(tranquilizers), psychotropics or hypnotics, hypoglycemics, narcotics, sleep aids, antihypertensives, laxatives, or diuretics (07/19/19 0891)  Medication Interventions: Patient to call before getting OOB (07/19/19 1924)  Elimination: Needs assistance with toileting (07/19/19 0728)  Elimination Interventions: Call light in reach; Patient to call for help with toileting needs (07/19/19 0728)  Prior Fall History: No (07/19/19 0728)  Total Score: 3 (07/19/19 0728)  High Fall Risk: Yes (07/19/19 0728)     Speech Assessment:         Ambulation:  Gait  Distance (ft): 120 Feet (ft) (07/18/19 1617)  Assistive Device: Walker, rollator (07/18/19 1617)  Rail Use: Both (07/18/19 1617)     Labs/Studies:  Recent Results (from the past 72 hour(s))   METABOLIC PANEL, BASIC    Collection Time: 07/17/19  7:50 AM   Result Value Ref Range    Sodium 140 136 - 145 mmol/L    Potassium 4.1 3.5 - 5.1 mmol/L    Chloride 106 98 - 107 mmol/L    CO2 27 21 - 32 mmol/L    Anion gap 7 7 - 16 mmol/L    Glucose 120 (H) 65 - 100 mg/dL    BUN 30 (H) 8 - 23 MG/DL    Creatinine 1.33 (H) 0.6 - 1.0 MG/DL    GFR est AA 50 (L) >60 ml/min/1.73m2    GFR est non-AA 41 (L) >60 ml/min/1.73m2    Calcium 9.2 8.3 - 86.8 MG/DL   METABOLIC PANEL, BASIC    Collection Time: 07/18/19 12:48 PM   Result Value Ref Range    Sodium 139 136 - 145 mmol/L    Potassium 4.3 3.5 - 5.1 mmol/L    Chloride 105 98 - 107 mmol/L    CO2 25 21 - 32 mmol/L    Anion gap 9 7 - 16 mmol/L    Glucose 130 (H) 65 - 100 mg/dL    BUN 30 (H) 8 - 23 MG/DL    Creatinine 1.46 (H) 0.6 - 1.0 MG/DL    GFR est AA 45 (L) >60 ml/min/1.73m2    GFR est non-AA 37 (L) >60 ml/min/1.73m2    Calcium 9.3 8.3 - 10.4 MG/DL       Assessment:     Problem List as of 7/19/2019 Date Reviewed: 7/5/2019          Codes Class Noted - Resolved    Stage 3 chronic kidney disease ([facility] ICD-10-CM: N18.3  ICD-9-CM: 585.3  7/15/2019 - Present        Systolic CHF, chronic (HCC) ICD-10-CM: I50.22  ICD-9-CM: 428.22, 428.0  7/15/2019 - Present        Acute systolic congestive heart failure (HCC) ICD-10-CM: I50.21  ICD-9-CM: 428.21, 428.0  7/15/2019 - Present        CHF (congestive heart failure) (HCC) ICD-10-CM: I50.9  ICD-9-CM: 428.0  7/15/2019 - Present        Hypertension ICD-10-CM: I10  ICD-9-CM: 401.9  7/15/2019 - Present        Physical deconditioning ICD-10-CM: R53.81  ICD-9-CM: 799.3  7/15/2019 - Present        Gout attack ICD-10-CM: M10.9  ICD-9-CM: 274.01  7/15/2019 - Present        Candidiasis of perineum ICD-10-CM: B37.49  ICD-9-CM: 112.2  7/15/2019 - Present        Impaired gait and mobility ICD-10-CM: R26.89  ICD-9-CM: 781.2  7/15/2019 - Present        Chronic atrial fibrillation with rapid ventricular response (HCC) ICD-10-CM: I48.2  ICD-9-CM: 427.31  7/15/2019 - Present        Acute hypoxemic respiratory failure (HCC) ICD-10-CM: J96.01  ICD-9-CM: 518.81  7/3/2019 - Present        Atrial fibrillation with rapid ventricular response (HCC) vs WPW in patient with history of ablation ICD-10-CM: I48.91  ICD-9-CM: 427.31  7/3/2019 - Present        Pulmonary infiltrates vs acute congestive heart failure ICD-10-CM: R91.8  ICD-9-CM: 793.19  7/3/2019 - Present        Severe sepsis ([facility] ICD-10-CM: A41.9, R65.20  ICD-9-CM: 038.9, 995.92  7/3/2019 - Present        Hypotension due to hypovolemia ICD-10-CM: I95.89, E86.1  ICD-9-CM: 458.8, 276.52  7/3/2019 - Present        Polymyalgia rheumatica ([facility] ICD-10-CM: M35.3  ICD-9-CM: 866  12/2/2015 - Present        Gouty arthritis  NOS ICD-10-CM: M10.9  ICD-9-CM: 274.00  12/2/2015 - Present        Mixed hyperlipidemia ICD-10-CM: E78.2  ICD-9-CM: 272.2  12/2/2015 - Present        WPW (Suresh-Parkinson-White syndrome) ICD-10-CM: I45.6  ICD-9-CM: 426.7  8/12/2013 - Present    Overview Signed 8/12/2013  2:46 PM by Rudolpho Mall III     Posteroseptal pathway ablation 8/12/13             HTN (hypertension) ICD-10-CM: I10  ICD-9-CM: 401.9  8/12/2013 - Present        Severe obesity with body mass index (BMI) of 35.0 to 39.9 with comorbidity ([facility] ICD-10-CM: E66.01  ICD-9-CM: 278.01  8/12/2013 - Present        Other and unspecified hyperlipidemia ICD-10-CM: E78.5  ICD-9-CM: 272.4  8/12/2013 - Present        Sjogren's syndrome ([facility] ICD-10-CM: M35.00  ICD-9-CM: 710.2  8/12/2013 - Present        Fibromyalgia ICD-10-CM: M79.7  ICD-9-CM: 729.1  8/12/2013 - Present        Peptic ulcer ICD-10-CM: K27.9  ICD-9-CM: 533.90  8/12/2013 - Present        Gout ICD-10-CM: M10.9  ICD-9-CM: 274.9  8/12/2013 - Present              Plan:     Rehabilitation Plan  The patient has shown the ability to tolerate and benefit from 3 hours of therapy daily and is being admitted to a comprehensive acute inpatient rehabilitation program consisting of at least 3 hours of combined physical and occupational therapies. Continue intensive Physical Therapy for a minimum of 1.5 hours a day, at least 5 out of 7 days per week to address bed mobility, transfers, ambulation, strengthening, balance, and endurance. Continue intensive Occupational Therapy for a minimum of 1.5 hours a day, at least 5 out of 7 days per week to address ADL ( bathing, LE dressing, toileting) and adaptive equipment as needed. + fatigue, weakness, decreased activity tolerance noted. However sessions well tolerated, making gains.      The patient will also require 24-hour skilled rehabilitation nursing for bowel and bladder management, skin care for decubitus ulcer prevention , pain management and ongoing medication administration      Continue daily physician medical management:  Acute hypoxemic respiratory failure/ CHF  -secondary to a.fib.   - continue oral lasix. Monitor volume status. Will wean diuretics as appropriate  7/16- cxR clear, no edema. will d/c lasix. Still continue aldactone for now. Monitor response. 7/17 - no s/s of decompensation. + mild fatigue. Will watch closely. Still on aldactone. Lasix d/c'd  7/18 - no signs of decompensation. Now off lasix. 7/190- clinically euvolemic will hold aldactone. Monitor clinical volume status.        HTN (hypertension)\  Hypotension due to hypovolemia  - continued on aldactone. Lasix. - will add, adjust antihypertensives as needed  - 7/17 -120s/ HR 60-70s. No adjustments. 7/19 - will add antihypertensives as needed.        Atrial fibrillation with rapid ventricular response (HCC) vs WPW in patient with history of ablation    - s/p electric cardioversion - continue amiodarone po.   - continue eliquis  - monitor for recurrent a.fib. HR control.   - continue remote telemetry.   7/17- RRR. NSR.    7/18 - PAF continue- amiodarone, eliquis/  sCHF - continue Coreg and spironolactone. Lasix discontinued. 7/19 - continue amiodarone load. Severe sepsis ([address] (7/3/2019) / Pulmonary infiltrates vs acute congestive heart failure (7/3/2019)- treated. abx finished. Pneumonia prophylaxis- Insentive spirometer every hour while awake  -stable lung exam.      Severe obesity with body mass index (BMI) of 35.0 to 39.9 with comorbidity  - cardiac diet. Sjogren's syndrome/ Polymyalgia rheumatica - steroid course finished. Monitor for acute flair, increased weakness. - off steroids, immunosuppressants. Pt was weaned prior to admit per rheumatologist. Monitor.         DVT risk / DVT Prophylaxis-    - covered with eliquis.      Pain Control: stable, mild-to-moderate joint symptoms intermittently, reasonably well controlled by PRN meds. Will require regular pain assessment and comprenhensive pain management,   - foot pain -   - acute gout flair - 7/17 - candice pain improving slowly with prn ibuprofen. Pt would like to hold off on colchicine due to past loose stools. 7/19 foot pain resolved. Wound Care: Monitor wound status daily per staff and physician. At risk for failure. Will require 24/7 rehab nursing. Keep wound clean and dry  - candidiasis periarea, genital.   - 7/17 - on nystatin powder and barrier cream +diflucan po.   7/18 - continue po, topical antifungals.      Potential urinary retention/ neurogenic bladder -   - c/o frequency following prolonged covarrubias insertion, removal.   7/17 - UTI -GNR. started [employer]. Follow susceptability. 7/18- ESBL reported. will change to Bactrim DS x 7 days. Pt still with frequency, bladder irriitation. 7/19  Started Bactrim DS x 7 days. bowel program - as needed.      GERD - resume PPI. At times may need additional antacids, Maalox prn. Time spent was 25 minutes with over 1/2 in direct patient care/examination, consultation and coordination of care.      Signed By: Avinash Pop Ej Brenner MD     July 19, 2019

## 2019-07-19 NOTE — PROGRESS NOTES
PHYSICAL THERAPY DAILY NOTE  Time In: 3059  Time Out: 5928  Patient Seen For: PM;Gait training;Patient education; Therapeutic exercise;Transfer training; Other (see progress notes)    Subjective: patient reporting she is feeling better. Reports she has a rollator at home         Objective:Vital Signs:on room air, 02 sat 94 to 100% and HR 84 to 102 during treatment  Patient Vitals for the past 12 hrs:   Temp Pulse Resp BP SpO2   07/19/19 1552 98.3 °F (36.8 °C) 85 16 157/82 95 %   07/19/19 0746 98.3 °F (36.8 °C) 67 16 147/67 95 %     Pain level:No c/o pain during treatment  Pain location:NA  Pain interventions:NA    Patient education:,transfer training, gait training, fall precautions, activity pacing, body mechanics, rollator parts management, energy conservation, Patient verbalizing understanding and demonstrating understanding of patient education. Recommend follow up education.     Interdisciplinary Communication:NA    Other (comment)(Fall Risk)  GROSS ASSESSMENT Daily Assessment    NA       COGNITION Daily Assessment    Orientation:A+O x 3  Communication:able to express needs verbally, able to follow multi step commands  Social Interaction:cooperating, appropriate with PT, participating, motivated to improve  Problem Solving:NA  Memory:able to recall rollator parts management and body mechanics for transfers           BED/MAT MOBILITY Daily Assessment    Rolling Right : 0 (Not tested)  Rolling Left : 0 (Not tested)  Supine to Sit : 0 (Not tested)  Sit to Supine : 0 (Not tested)       TRANSFERS Daily Assessment   Increased time and effort to complete with cues for body mechanics   Transfer Type: SPT with walker(rollator)  Transfer Assistance : 5 (Stand-by assistance)  Sit to Stand Assistance: Stand-by assistance  Car Transfers: Not tested       GAIT Daily Assessment    Amount of Assistance: 5 (Stand-by assistance)  Distance (ft): 150 Feet (ft)(150ft x 2)  Assistive Device: Walker, rollator   Gait training with one time cue for posture correction and improved step clearance and step length    Gait training up/down 20 ft ramp with rollator and SBA    STEPS or STAIRS Daily Assessment   Single step at a time going up/down steps leading up with R LE and down with LLE. Increased time and effort to complete with 3 minute sitting rest break after gong up steps   Steps/Stairs Ambulated (#): 4  Level of Assist : 5 (Stand-by assistance)  Rail Use: Both       BALANCE Daily Assessment   Static standing with rollator and SBA to supervision with no loss of balance Sitting - Static: Good (unsupported)  Sitting - Dynamic: Good (unsupported)  Standing - Static: Fair  Standing - Dynamic : Impaired       WHEELCHAIR MOBILITY Daily Assessment     NA       LOWER EXTREMITY EXERCISES Daily Assessment    Extremity: Both  Exercise Type #1: Other (comment)(LE motomed x 10 mins at level 3)  Level of Assist: Supervision            Assessment: Progressing towards goals. Functional endurance and strength improving with 02 sat stable on room air during treatment       Patient returned to room at end of treatment and remained up in recliner with LEs elevated and with needs in reach. Plan of Care: Continue with POC and progress as tolerated.      Josef Larios, PT  7/19/2019

## 2019-07-19 NOTE — PROGRESS NOTES
Problem: Falls - Risk of  Goal: *Absence of Falls  Description  Document Anna Marie Thompson Fall Risk and appropriate interventions in the flowsheet.   Outcome: Progressing Towards Goal  Note:   Fall Risk Interventions:  Mobility Interventions: Patient to call before getting OOB, Utilize walker, cane, or other assistive device    Mentation Interventions: Evaluate medications/consider consulting pharmacy, More frequent rounding, Toileting rounds    Medication Interventions: Patient to call before getting OOB    Elimination Interventions: Call light in reach, Patient to call for help with toileting needs              Problem: Patient Education: Go to Patient Education Activity  Goal: Patient/Family Education  Outcome: Progressing Towards Goal     Problem: Inpatient Rehab (Adult)  Goal: *LTG: Avoids injury/falls 100% of time related to deficits  Outcome: Progressing Towards Goal

## 2019-07-19 NOTE — PROGRESS NOTES
Problem: Inpatient Rehab (Adult)  Goal: *LTG: Avoids injury/falls 100% of time related to deficits  Outcome: Progressing Towards Goal     Problem: Pressure Injury - Risk of  Goal: *Prevention of pressure injury  Description  Document Mark Scale and appropriate interventions in the flowsheet.   Outcome: Progressing Towards Goal  Note:   Pressure Injury Interventions:  Sensory Interventions: Assess changes in LOC, Check visual cues for pain, Float heels, Minimize linen layers, Pressure redistribution bed/mattress (bed type)    Moisture Interventions: Absorbent underpads, Maintain skin hydration (lotion/cream), Minimize layers    Activity Interventions: Increase time out of bed, Pressure redistribution bed/mattress(bed type)    Mobility Interventions: HOB 30 degrees or less, Pressure redistribution bed/mattress (bed type)    Nutrition Interventions: Document food/fluid/supplement intake    Friction and Shear Interventions: Apply protective barrier, creams and emollients, HOB 30 degrees or less, Lift sheet, Minimize layers

## 2019-07-19 NOTE — PROGRESS NOTES
Recreational Therapy Daily Note    Time In 1045   Time Out 1115     Subjective: \"I am doing better. \"  Precautions:Fall    Activity   Patient completed a sorting activity with 1 lb wrist weights to help increase her UE strength and overall activity tolerance. She tolerated the session well and without c/o fatigue. Patient is a very friendly and appreciative lady, willing to participate as needed. She ambulated at S with rollator. Patient was handed over to [facility], PT at the end of the session.      Social Interaction: Cooperative, Appropriate and Participatory    Cognition: No issues noted and A&OX4, pleasant and in good spirits, motivated to participate with therapy    Education:Purpose of activity    Yari Britt, CTRS  7/19/2019

## 2019-07-20 PROCEDURE — 74011250637 HC RX REV CODE- 250/637: Performed by: PHYSICAL MEDICINE & REHABILITATION

## 2019-07-20 PROCEDURE — 65310000000 HC RM PRIVATE REHAB

## 2019-07-20 PROCEDURE — 97110 THERAPEUTIC EXERCISES: CPT

## 2019-07-20 PROCEDURE — 99232 SBSQ HOSP IP/OBS MODERATE 35: CPT | Performed by: PHYSICAL MEDICINE & REHABILITATION

## 2019-07-20 PROCEDURE — 97530 THERAPEUTIC ACTIVITIES: CPT

## 2019-07-20 PROCEDURE — 97535 SELF CARE MNGMENT TRAINING: CPT

## 2019-07-20 PROCEDURE — 97116 GAIT TRAINING THERAPY: CPT

## 2019-07-20 RX ORDER — CARVEDILOL 6.25 MG/1
6.25 TABLET ORAL 2 TIMES DAILY WITH MEALS
Status: DISCONTINUED | OUTPATIENT
Start: 2019-07-20 | End: 2019-07-23 | Stop reason: HOSPADM

## 2019-07-20 RX ADMIN — IBUPROFEN 600 MG: 600 TABLET ORAL at 21:19

## 2019-07-20 RX ADMIN — AMIODARONE HYDROCHLORIDE 400 MG: 200 TABLET ORAL at 17:43

## 2019-07-20 RX ADMIN — PANTOPRAZOLE SODIUM 40 MG: 40 TABLET, DELAYED RELEASE ORAL at 05:17

## 2019-07-20 RX ADMIN — AMIODARONE HYDROCHLORIDE 400 MG: 200 TABLET ORAL at 08:39

## 2019-07-20 RX ADMIN — CARVEDILOL 6.25 MG: 6.25 TABLET, FILM COATED ORAL at 17:44

## 2019-07-20 RX ADMIN — APIXABAN 5 MG: 5 TABLET, FILM COATED ORAL at 08:39

## 2019-07-20 RX ADMIN — APIXABAN 5 MG: 5 TABLET, FILM COATED ORAL at 21:13

## 2019-07-20 RX ADMIN — SULFAMETHOXAZOLE AND TRIMETHOPRIM 1 TABLET: 800; 160 TABLET ORAL at 21:13

## 2019-07-20 RX ADMIN — NYSTATIN: 100000 POWDER TOPICAL at 08:39

## 2019-07-20 RX ADMIN — FLUCONAZOLE 200 MG: 100 TABLET ORAL at 08:38

## 2019-07-20 RX ADMIN — SULFAMETHOXAZOLE AND TRIMETHOPRIM 1 TABLET: 800; 160 TABLET ORAL at 08:39

## 2019-07-20 NOTE — PROGRESS NOTES
Problem: Falls - Risk of  Goal: *Absence of Falls  Description  Document Kemar Alas Fall Risk and appropriate interventions in the flowsheet. Outcome: Progressing Towards Goal  Note:   Fall Risk Interventions:  Mobility Interventions: Patient to call before getting OOB    Mentation Interventions: Bed/chair exit alarm, Door open when patient unattended    Medication Interventions: Patient to call before getting OOB    Elimination Interventions: Call light in reach              Problem: Patient Education: Go to Patient Education Activity  Goal: Patient/Family Education  Outcome: Progressing Towards Goal     Problem: Inpatient Rehab (Adult)  Goal: *LTG: Avoids injury/falls 100% of time related to deficits  Outcome: Progressing Towards Goal  Goal: *LTG: Avoids infection 100% of time related to deficits  Outcome: Progressing Towards Goal  Goal: *LTG: Verbalize understanding of diagnosis and risk factors for recurring stroke  Outcome: Progressing Towards Goal  Goal: *LTG: Absence of DVT during hospitalization  Outcome: Progressing Towards Goal  Goal: *LTG: Maintains Skin Integrity With No Evidence of Pressure Injury 100% of Time  Outcome: Progressing Towards Goal  Goal: Interventions  Outcome: Progressing Towards Goal     Problem: Pressure Injury - Risk of  Goal: *Prevention of pressure injury  Description  Document Mark Scale and appropriate interventions in the flowsheet.   Outcome: Progressing Towards Goal  Note:   Pressure Injury Interventions:  Sensory Interventions: Assess changes in LOC    Moisture Interventions: Absorbent underpads    Activity Interventions: Pressure redistribution bed/mattress(bed type)    Mobility Interventions: Pressure redistribution bed/mattress (bed type)    Nutrition Interventions: Document food/fluid/supplement intake    Friction and Shear Interventions: HOB 30 degrees or less                Problem: Patient Education: Go to Patient Education Activity  Goal: Patient/Family Education  Outcome: Progressing Towards Goal     Problem: Risk for Spread of Infection  Goal: Prevent transmission of infectious organism to others  Description  Prevent the transmission of infectious organisms to other patients, staff members, and visitors.   Outcome: Progressing Towards Goal     Problem: Patient Education:  Go to Education Activity  Goal: Patient/Family Education  Outcome: Progressing Towards Goal

## 2019-07-20 NOTE — PROGRESS NOTES
Sitting up in recliner, no distress noted, Call bell within reach, hourly rounds completed this shift.

## 2019-07-20 NOTE — PROGRESS NOTES
OT Daily Note  Time In 0843   Time Out 0941     Subjective: Patient stated, \"I'm going to need some help with the ointment and powder. \"  Pain: none reported, agreeable for therapy  Interdisciplinary Communication: handoff to PT  Precautions: Other (comment)(fall risk)  Location on arrival: lying supine in bed with HOB raised    Self-Care      07/20/19 0843   Time Spent With Patient   Time In 0843   Time Out 0941   Patient Seen For: AM;ADLs   Grooming   Grooming Assistance  S   Comments supervision while standing at sink to brush teeth, comb hair, and wash hands   Upper Body Bathing   Bathing Assistance, Upper S   Position Performed Seated in chair   [address]   Position Performed Seated in chair;Standing   Adaptive Equipment Tub bench   Comments supervision while standing    Toileting   Toileting Assistance (FIM Score) S   Adaptive Equipment Walker;Grab bars   Upper Body Dressing    Dressing Assistance  S   Comments able to don bra and pull over shirt   Lower Body [address]  S   Leg Crossed Method Used Yes   Position Performed Seated in chair;Standing   Adaptive Equipment Used Grab bar   Comments supervision while standing to pull up underwear and pants   Functional Transfers   Toilet Transfer  Stand pivot transfer with walker   Amount of Assistance Required S   Tub or Shower Type Shower   Amount of Assistance Required S   Adaptive Equipment Leg ;Walker (comment); Tub transfer bench          Cognition Daily Assessment   Orientation: alert and oriented x 4  Expression: able to express needs verbally  Comprehension: understands basic instructions, minimal assist with complex instructions  Social Interaction: cooperating, appropriate with OT, participating, motivated to improve  Problem Solving: solves routine problems, minimal assist with complex problems   Memory: recalls people frequently seen, able to complete 3/3 step commands  Good tolerance noted     Patient was left seated in therapy gym awaiting PT with call light/all needs in reach and in no distress. Assessment: Progressing towards current goals. Great participation and motivated to do well. Plan: Continue OT POC with focus on ADL/IADL skills, functional transfers, functional mobility, coordination, strength, static and dynamic balance, and activity tolerance to maximize safety and independence with ADLs and functional transfers.      Michelle Argueta, OTD, OTR/L  7/20/2019        Note may contain the following abbreviations:   Abbreviation Explanation   AROM Active range of motion   PROM Passive range of motion   SPT Stand pivot transfer   LPT Lateral pivot transfer   WC wheelchair   RW Rolling walker    BUE Bilateral upper extremities   BLE Bilateral lower extremities    WBAT Weight bearing as tolerated    TTWB Toe-touch weight bearing    AD Adaptive device   AE Adaptive equipment    NMES Neuro muscular electrical stimulation   UBE Upper body ergometer

## 2019-07-20 NOTE — PROGRESS NOTES
PHYSICAL THERAPY DAILY NOTE  Time In: 2286  Time Out: 8823  Patient Seen For: AM;Patient education; Therapeutic exercise;Transfer training;Gait training    Subjective: Pt agreeable for therapy. C/o R ankle discomfort. \"It's no worse than it has been in the past, just a little stiff today. \"           Objective: Other (comment)(Fall Risk)   O2 on RA 98%, HR 98    COGNITION Daily Assessment    Pt alert and able to follow all therapy commands on this date. BED/MAT MOBILITY Daily Assessment    Rolling Right : 0 (Not tested)  Rolling Left : 0 (Not tested)  Supine to Sit : 0 (Not tested)  Sit to Supine : 0 (Not tested)       TRANSFERS Daily Assessment    Transfer Type: SPT with walker  Other: Rollator  Transfer Assistance : 5 (Supervision/setup)  Sit to Stand Assistance: Supervision  Car Transfers: Not tested       GAIT Daily Assessment    Amount of Assistance: 5 (Stand-by assistance)  Distance (ft): 300 Feet (ft)(150 f t x 2)  Assistive Device: Walker, rollator   Verbal cues for pacing and to take a seated rest break before fatigued. STEPS or STAIRS Daily Assessment    Steps/Stairs Ambulated (#): 0  Level of Assist : 0 (Not tested)   Able to ascend and descend therapy ramp with sba x 1 assist.      BALANCE Daily Assessment    Sitting - Static: Good (unsupported)  Sitting - Dynamic: Good (unsupported)  Standing - Static: Good       WHEELCHAIR MOBILITY Daily Assessment    Wheelchair Setup Assist Required : 0 (Not tested)       LOWER EXTREMITY EXERCISES Daily Assessment    Extremity: Both  Exercise Type #1: Other (comment)(Motomed level 2 for 15 minutes with BLEs)  Sets Performed: 1  Level of Assist: Supervision   Pt returned to room at end of session and left seated in recliner with needs and call light in reach. BLEs elevated. Assessment: improving ambulation distance and endurance. Plan of Care: Continue with POC and progress as tolerated. Ricky Claudio  7/20/2019

## 2019-07-20 NOTE — PROGRESS NOTES
[facility] PROGRESS NOTE    Florence Romero  Admit Date: 7/15/2019  Admit Diagnosis: CHF (congestive heart failure) ([facility] [I50.9]; Polymyalgia rheumatica ([facility] [M35.3]; Gout attack [M10.9]; Physical deconditioning [R53.81]; Chronic atrial fibrillation with rapid ventricular response ([facility] [I48.2]; Impaired gait and mobility [R26.89]; Candidiasis of perineum [B37.49]; Hypertension [I10]    Subjective     Afebrile. Vss. In NSR. Denies chest pain SOB, cough. Progressing steadily with PT/OT, toward short term goals. Mild R ankle pain this morning. states she has arthritis there. Objective:     Current Facility-Administered Medications   Medication Dose Route Frequency    trimethoprim-sulfamethoxazole (BACTRIM DS, SEPTRA DS) 160-800 mg per tablet 1 Tab  1 Tab Oral Q12H    sodium chloride (NS) flush 5-40 mL  5-40 mL IntraVENous PRN    amiodarone (CORDARONE) tablet 400 mg  400 mg Oral BID    apixaban (ELIQUIS) tablet 5 mg  5 mg Oral Q12H    bisacodyl (DULCOLAX) tablet 5 mg  5 mg Oral DAILY PRN    ibuprofen (MOTRIN) tablet 600 mg  600 mg Oral Q6H PRN    albuterol (PROVENTIL VENTOLIN) nebulizer solution 1.25 mg  1.25 mg Nebulization Q6H PRN    loperamide (IMODIUM) capsule 2 mg  2 mg Oral Q4H PRN    nystatin (MYCOSTATIN) 100,000 unit/gram powder   Topical BID    pantoprazole (PROTONIX) tablet 40 mg  40 mg Oral ACB    temazepam (RESTORIL) capsule 15 mg  15 mg Oral QHS PRN    fluconazole (DIFLUCAN) tablet 200 mg  200 mg Oral DAILY     Review of Systems: Denies chest pain, shortness of breath, cough, headache, visual problems, abdominal pain, dysurea, calf pain. Pertinent positives are as noted in the medical records and unremarkable otherwise.      Visit Vitals  /66   Pulse 76   Temp 98.1 °F (36.7 °C)   Resp 18   Wt 182 lb 1.6 oz (82.6 kg)   SpO2 97%   BMI 33.31 kg/m²      Physical Exam:         General:   AAO x 3   HEENT:  Normocephalic, EOMI,   No JVD sitting   Lungs:  CTA Bilaterally, Respiration even and unlabored, no wheezing   No apparent use of accessory muscles for respiration. Heart:  Regular rate and rhythm, S1, S2, No obvious murmur    Genitourinary: defered   Abdomen:  Soft, non-tender to palpation in all four quadrants. Bowel sounds present. Neuromuscular:  PERRL, EOMI  + mild generalized weakness, more proximal> distal BLE  Sensory - intact  Plantars - down going   Skin:  + maculopapular rash with satellite lesions at folds at groin, perineum mary lou anal areas, intragluteal cleft    Edema: no pedal edema BLE                                                                                       Functional Assessment:  Gross Assessment  AROM: Generally decreased, functional (07/19/19 1200)  PROM: Generally decreased, functional (07/19/19 1200)  Strength: Generally decreased, functional (07/19/19 1200)  Coordination: Generally decreased, functional (07/19/19 1200)  Sensation: Intact (07/19/19 1200)       Balance  Sitting - Static: Good (unsupported) (07/19/19 1600)  Sitting - Dynamic: Good (unsupported) (07/19/19 1600)  Standing - Static: Fair (07/19/19 1600)  Standing - Dynamic : Impaired (07/19/19 1600)         Jacqueline Melgar Fall Risk Assessment:  Jacqueline Melgar Fall Risk  Mobility: Ambulates or transfers with assist devices or assistance (07/19/19 1932)  Mobility Interventions: Patient to call before getting OOB; Strengthening exercises (ROM-active/passive); Utilize walker, cane, or other assistive device (07/19/19 1932)  Mentation: Alert, oriented x 3 (07/19/19 1932)  Mentation Interventions: Adequate sleep, hydration, pain control;Door open when patient unattended;Eyeglasses and hearing aids; More frequent rounding (07/19/19 1932)  Medication: Patient receiving anticonvulsants, sedatives(tranquilizers), psychotropics or hypnotics, hypoglycemics, narcotics, sleep aids, antihypertensives, laxatives, or diuretics (07/19/19 1932)  Medication Interventions: Patient to call before getting OOB; Teach patient to arise slowly (07/19/19 1932)  Elimination: Needs assistance with toileting (07/19/19 1932)  Elimination Interventions: Call light in reach; Patient to call for help with toileting needs (07/19/19 1932)  Prior Fall History: No (07/19/19 1932)  Total Score: 3 (07/19/19 1932)  High Fall Risk: Yes (07/19/19 1932)     Speech Assessment:         Ambulation:  Gait  Distance (ft): 150 Feet (ft)(150ft x 2) (07/19/19 1600)  Assistive Device: Lorrane Fruit, rollator (07/19/19 1600)  Rail Use: Both (07/19/19 1600)     Labs/Studies:  Recent Results (from the past 72 hour(s))   METABOLIC PANEL, BASIC    Collection Time: 07/18/19 12:48 PM   Result Value Ref Range    Sodium 139 136 - 145 mmol/L    Potassium 4.3 3.5 - 5.1 mmol/L    Chloride 105 98 - 107 mmol/L    CO2 25 21 - 32 mmol/L    Anion gap 9 7 - 16 mmol/L    Glucose 130 (H) 65 - 100 mg/dL    BUN 30 (H) 8 - 23 MG/DL    Creatinine 1.46 (H) 0.6 - 1.0 MG/DL    GFR est AA 45 (L) >60 ml/min/1.73m2    GFR est non-AA 37 (L) >60 ml/min/1.73m2    Calcium 9.3 8.3 - 97.4 MG/DL   METABOLIC PANEL, BASIC    Collection Time: 07/19/19  1:08 PM   Result Value Ref Range    Sodium 140 136 - 145 mmol/L    Potassium 4.0 3.5 - 5.1 mmol/L    Chloride 107 98 - 107 mmol/L    CO2 21 21 - 32 mmol/L    Anion gap 12 7 - 16 mmol/L    Glucose 141 (H) 65 - 100 mg/dL    BUN 28 (H) 8 - 23 MG/DL    Creatinine 1.55 (H) 0.6 - 1.0 MG/DL    GFR est AA 42 (L) >60 ml/min/1.73m2    GFR est non-AA 35 (L) >60 ml/min/1.73m2    Calcium 9.1 8.3 - 10.4 MG/DL   EKG, 12 LEAD, INITIAL    Collection Time: 07/19/19  1:13 PM   Result Value Ref Range    Ventricular Rate 84 BPM    Atrial Rate 84 BPM    P-R Interval 148 ms    QRS Duration 94 ms    Q-T Interval 368 ms    QTC Calculation (Bezet) 434 ms    Calculated P Axis 58 degrees    Calculated R Axis -25 degrees    Calculated T Axis 80 degrees    Diagnosis       Normal sinus rhythm  Normal ECG  When compared with ECG of 03-JUL-2019 03:10,  Sinus rhythm has replaced Atrial fibrillation  Vent.  rate has decreased BY  54 BPM  Nonspecific T wave abnormality no longer evident in Inferior leads  T wave inversion less evident in Anterolateral leads  Confirmed by BRITTANIE ESPINOZA (), Shyanne Martir ([id number]) on 7/19/2019 2:52:22 PM         Assessment:     Problem List as of 7/20/2019 Date Reviewed: 7/5/2019          Codes Class Noted - Resolved    Stage 3 chronic kidney disease ([facility] ICD-10-CM: N18.3  ICD-9-CM: 585.3  7/15/2019 - Present        Systolic CHF, chronic (HCC) ICD-10-CM: I50.22  ICD-9-CM: 428.22, 428.0  7/15/2019 - Present        Acute systolic congestive heart failure ([facility] ICD-10-CM: I50.21  ICD-9-CM: 428.21, 428.0  7/15/2019 - Present        CHF (congestive heart failure) ([facility] ICD-10-CM: I50.9  ICD-9-CM: 428.0  7/15/2019 - Present        Hypertension ICD-10-CM: I10  ICD-9-CM: 401.9  7/15/2019 - Present        Physical deconditioning ICD-10-CM: R53.81  ICD-9-CM: 799.3  7/15/2019 - Present        Gout attack ICD-10-CM: M10.9  ICD-9-CM: 274.01  7/15/2019 - Present        Candidiasis of perineum ICD-10-CM: B37.49  ICD-9-CM: 112.2  7/15/2019 - Present        Impaired gait and mobility ICD-10-CM: R26.89  ICD-9-CM: 781.2  7/15/2019 - Present        Chronic atrial fibrillation with rapid ventricular response (HCC) ICD-10-CM: I48.2  ICD-9-CM: 427.31  7/15/2019 - Present        Acute hypoxemic respiratory failure (HCC) ICD-10-CM: J96.01  ICD-9-CM: 518.81  7/3/2019 - Present        Atrial fibrillation with rapid ventricular response (HCC) vs WPW in patient with history of ablation ICD-10-CM: I48.91  ICD-9-CM: 427.31  7/3/2019 - Present        Pulmonary infiltrates vs acute congestive heart failure ICD-10-CM: R91.8  ICD-9-CM: 793.19  7/3/2019 - Present        Severe sepsis ([facility] ICD-10-CM: A41.9, R65.20  ICD-9-CM: 038.9, 995.92  7/3/2019 - Present        Hypotension due to hypovolemia ICD-10-CM: I95.89, E86.1  ICD-9-CM: 458.8, 276.52  7/3/2019 - Present        Polymyalgia rheumatica ([facility] ICD-10-CM: M35.3  ICD-9-CM: 661  12/2/2015 - Present        Gouty arthritis  NOS ICD-10-CM: M10.9  ICD-9-CM: 274.00  12/2/2015 - Present        Mixed hyperlipidemia ICD-10-CM: E78.2  ICD-9-CM: 272.2  12/2/2015 - Present        WPW (Suresh-Parkinson-White syndrome) ICD-10-CM: I45.6  ICD-9-CM: 426.7  8/12/2013 - Present    Overview Signed 8/12/2013  2:46 PM by Hernandes Plan pathway ablation 8/12/13             HTN (hypertension) ICD-10-CM: I10  ICD-9-CM: 401.9  8/12/2013 - Present        Severe obesity with body mass index (BMI) of 35.0 to 39.9 with comorbidity ([facility] ICD-10-CM: E66.01  ICD-9-CM: 278.01  8/12/2013 - Present        Other and unspecified hyperlipidemia ICD-10-CM: E78.5  ICD-9-CM: 272.4  8/12/2013 - Present        Sjogren's syndrome ([facility] ICD-10-CM: M35.00  ICD-9-CM: 710.2  8/12/2013 - Present        Fibromyalgia ICD-10-CM: M79.7  ICD-9-CM: 729.1  8/12/2013 - Present        Peptic ulcer ICD-10-CM: K27.9  ICD-9-CM: 533.90  8/12/2013 - Present        Gout ICD-10-CM: M10.9  ICD-9-CM: 274.9  8/12/2013 - Present              Plan:     Rehabilitation Plan  The patient has shown the ability to tolerate and benefit from 3 hours of therapy daily and is being admitted to a comprehensive acute inpatient rehabilitation program consisting of at least 3 hours of combined physical and occupational therapies. Continue intensive Physical Therapy for a minimum of 1.5 hours a day, at least 5 out of 7 days per week to address bed mobility, transfers, ambulation, strengthening, balance, and endurance. Continue intensive Occupational Therapy for a minimum of 1.5 hours a day, at least 5 out of 7 days per week to address ADL ( bathing, LE dressing, toileting) and adaptive equipment as needed. + fatigue, weakness, decreased activity tolerance noted.  However sessions well tolerated, making gains.      The patient will also require 24-hour skilled rehabilitation nursing for bowel and bladder management, skin care for decubitus ulcer prevention , pain management and ongoing medication administration      Continue daily physician medical management:  Acute hypoxemic respiratory failure/ CHF  -secondary to a.fib.   - continue oral lasix. Monitor volume status. Will wean diuretics as appropriate  7/16- cxR clear, no edema. will d/c lasix. Still continue aldactone for now. Monitor response. 7/17 - no s/s of decompensation. + mild fatigue. Will watch closely. Still on aldactone. Lasix d/c'd  7/18 - no signs of decompensation. Now off lasix. 7/19-  clinically euvolemic will hold aldactone. Monitor clinical volume status. 7/20 - off diuretics. Resume coreg. Monitor HR/BP. Volume status.        HTN (hypertension)\  Hypotension due to hypovolemia  - continued on aldactone. Lasix. - will add, adjust antihypertensives as needed  - 7/17 -120s/ HR 60-70s. No adjustments. 7/19 - will add antihypertensives as needed. 7/20 - resume Coreg.        Atrial fibrillation with rapid ventricular response (HCC) vs WPW in patient with history of ablation    - s/p electric cardioversion - continue amiodarone po.   - continue eliquis  - monitor for recurrent a.fib. HR control.   - continue remote telemetry. 7/17- RRR. NSR.    7/18 - PAF continue- amiodarone, eliquis/  sCHF - continue Coreg and spironolactone. Lasix discontinued. 7/19 - continue amiodarone load. 7/20- HR controlled. Resume coreg 6.125 bid. Monitor response. Severe sepsis ([facility] [address] (7/3/2019) / Pulmonary infiltrates vs acute congestive heart failure (7/3/2019)- treated. abx finished. Pneumonia prophylaxis- Insentive spirometer every hour while awake  -stable lung exam.      Severe obesity with body mass index (BMI) of 35.0 to 39.9 with comorbidity  - cardiac diet. Sjogren's syndrome/ Polymyalgia rheumatica - steroid course finished. Monitor for acute flair, increased weakness. - off steroids, immunosuppressants.  Pt was weaned prior to admit per rheumatologist. Monitor.         DVT risk / DVT Prophylaxis-    - covered with eliquis.      Pain Control: stable, mild-to-moderate joint symptoms intermittently, reasonably well controlled by PRN meds. Will require regular pain assessment and comprenhensive pain management,   - foot pain -   - acute gout flair - 7/17 - candice pain improving slowly with prn ibuprofen. Pt would like to hold off on colchicine due to past loose stools. 7/19 foot pain resolved. Wound Care: Monitor wound status daily per staff and physician. At risk for failure. Will require 24/7 rehab nursing. Keep wound clean and dry  - candidiasis periarea, genital.   - 7/17 - on nystatin powder and barrier cream +diflucan po.   7/18 - continue po, topical antifungals.      Potential urinary retention/ neurogenic bladder -   - c/o frequency following prolonged covarrubias insertion, removal.   7/17 - UTI -GNR. started [employer]. Follow susceptability. 7/18- ESBL reported. will change to Bactrim DS x 7 days. Pt still with frequency, bladder irriitation. 7/19  Started Bactrim DS x 7 days. bowel program - as needed.      GERD - resume PPI. At times may need additional antacids, Maalox prn. Time spent was 25 minutes with over 1/2 in direct patient care/examination, consultation and coordination of care.      Signed By: Monisha Alex MD     July 20, 2019

## 2019-07-20 NOTE — PROGRESS NOTES
SFD PROGRESS NOTE    Therese Tanner  Admit Date: 7/15/2019  Admit Diagnosis: CHF (congestive heart failure) ([facility] [I50.9]; Polymyalgia rheumatica ([facility] [M35.3]; Gout attack [M10.9]; Physical deconditioning [R53.81]; Chronic atrial fibrillation with rapid ventricular response ([facility] [I48.2]; Impaired gait and mobility [R26.89]; Candidiasis of perineum [B37.49]; Hypertension [I10]    Subjective     Afebrile. Vss. Appears still in NSR. Denies chest pain SOB, cough. Denies foot pain. voiding better, without discomfort. Objective:     Current Facility-Administered Medications   Medication Dose Route Frequency    trimethoprim-sulfamethoxazole (BACTRIM DS, SEPTRA DS) 160-800 mg per tablet 1 Tab  1 Tab Oral Q12H    sodium chloride (NS) flush 5-40 mL  5-40 mL IntraVENous PRN    amiodarone (CORDARONE) tablet 400 mg  400 mg Oral BID    apixaban (ELIQUIS) tablet 5 mg  5 mg Oral Q12H    bisacodyl (DULCOLAX) tablet 5 mg  5 mg Oral DAILY PRN    ibuprofen (MOTRIN) tablet 600 mg  600 mg Oral Q6H PRN    albuterol (PROVENTIL VENTOLIN) nebulizer solution 1.25 mg  1.25 mg Nebulization Q6H PRN    loperamide (IMODIUM) capsule 2 mg  2 mg Oral Q4H PRN    nystatin (MYCOSTATIN) 100,000 unit/gram powder   Topical BID    pantoprazole (PROTONIX) tablet 40 mg  40 mg Oral ACB    temazepam (RESTORIL) capsule 15 mg  15 mg Oral QHS PRN    fluconazole (DIFLUCAN) tablet 200 mg  200 mg Oral DAILY     Review of Systems: Denies chest pain, shortness of breath, cough, headache, visual problems, abdominal pain, dysurea, calf pain. Pertinent positives are as noted in the medical records and unremarkable otherwise. Visit Vitals  /66   Pulse 76   Temp 98.1 °F (36.7 °C)   Resp 18   Wt 182 lb 1.6 oz (82.6 kg)   SpO2 97%   BMI 33.31 kg/m²      Physical Exam:         General:   Alert, appears stated age,  Feels fatigued.     HEENT:  Normocephalic, EOMI,   noJVD   Lungs:  CTA Bilaterally, Respiration even and unlabored   No apparent use of accessory muscles for respiration. Heart:  Regular rate and rhythm, S1, S2, No obvious murmur, click, rub or gallop. Genitourinary: defered   Abdomen:  Soft, non-tender to palpation in all four quadrants. Bowel sounds present. Neuromuscular:  PERRL, EOMI  + mild generalized weakness, more proximal> distal BLE  Sensory - intact  Plantars - down going   Skin:  + maculopapular rash with satellite lesions at folds at groin, perineum mary lou anal areas, intragluteal cleft    Edema: none BLE                                                                                       Functional Assessment:  Gross Assessment  AROM: Generally decreased, functional (07/19/19 1200)  PROM: Generally decreased, functional (07/19/19 1200)  Strength: Generally decreased, functional (07/19/19 1200)  Coordination: Generally decreased, functional (07/19/19 1200)  Sensation: Intact (07/19/19 1200)       Balance  Sitting - Static: Good (unsupported) (07/19/19 1600)  Sitting - Dynamic: Good (unsupported) (07/19/19 1600)  Standing - Static: Fair (07/19/19 1600)  Standing - Dynamic : Impaired (07/19/19 1600)         Marlen Valencia Fall Risk Assessment:  Marlen Valencia Fall Risk  Mobility: Ambulates or transfers with assist devices or assistance (07/19/19 1932)  Mobility Interventions: Patient to call before getting OOB; Strengthening exercises (ROM-active/passive); Utilize walker, cane, or other assistive device (07/19/19 1932)  Mentation: Alert, oriented x 3 (07/19/19 1932)  Mentation Interventions: Adequate sleep, hydration, pain control;Door open when patient unattended;Eyeglasses and hearing aids; More frequent rounding (07/19/19 1932)  Medication: Patient receiving anticonvulsants, sedatives(tranquilizers), psychotropics or hypnotics, hypoglycemics, narcotics, sleep aids, antihypertensives, laxatives, or diuretics (07/19/19 1932)  Medication Interventions: Patient to call before getting OOB; Teach patient to arise slowly (07/19/19 1932)  Elimination: Needs assistance with toileting (07/19/19 1932)  Elimination Interventions: Call light in reach; Patient to call for help with toileting needs (07/19/19 1932)  Prior Fall History: No (07/19/19 1932)  Total Score: 3 (07/19/19 1932)  High Fall Risk: Yes (07/19/19 1932)     Speech Assessment:         Ambulation:  Gait  Distance (ft): 150 Feet (ft)(150ft x 2) (07/19/19 1600)  Assistive Device: Francyne Boots, rollator (07/19/19 1600)  Rail Use: Both (07/19/19 1600)     Labs/Studies:  Recent Results (from the past 72 hour(s))   METABOLIC PANEL, BASIC    Collection Time: 07/18/19 12:48 PM   Result Value Ref Range    Sodium 139 136 - 145 mmol/L    Potassium 4.3 3.5 - 5.1 mmol/L    Chloride 105 98 - 107 mmol/L    CO2 25 21 - 32 mmol/L    Anion gap 9 7 - 16 mmol/L    Glucose 130 (H) 65 - 100 mg/dL    BUN 30 (H) 8 - 23 MG/DL    Creatinine 1.46 (H) 0.6 - 1.0 MG/DL    GFR est AA 45 (L) >60 ml/min/1.73m2    GFR est non-AA 37 (L) >60 ml/min/1.73m2    Calcium 9.3 8.3 - 11.3 MG/DL   METABOLIC PANEL, BASIC    Collection Time: 07/19/19  1:08 PM   Result Value Ref Range    Sodium 140 136 - 145 mmol/L    Potassium 4.0 3.5 - 5.1 mmol/L    Chloride 107 98 - 107 mmol/L    CO2 21 21 - 32 mmol/L    Anion gap 12 7 - 16 mmol/L    Glucose 141 (H) 65 - 100 mg/dL    BUN 28 (H) 8 - 23 MG/DL    Creatinine 1.55 (H) 0.6 - 1.0 MG/DL    GFR est AA 42 (L) >60 ml/min/1.73m2    GFR est non-AA 35 (L) >60 ml/min/1.73m2    Calcium 9.1 8.3 - 10.4 MG/DL   EKG, 12 LEAD, INITIAL    Collection Time: 07/19/19  1:13 PM   Result Value Ref Range    Ventricular Rate 84 BPM    Atrial Rate 84 BPM    P-R Interval 148 ms    QRS Duration 94 ms    Q-T Interval 368 ms    QTC Calculation (Bezet) 434 ms    Calculated P Axis 58 degrees    Calculated R Axis -25 degrees    Calculated T Axis 80 degrees    Diagnosis       Normal sinus rhythm  Normal ECG  When compared with ECG of 03-JUL-2019 03:10,  Sinus rhythm has replaced Atrial fibrillation  Vent.  rate has decreased BY  54 BPM  Nonspecific T wave abnormality no longer evident in Inferior leads  T wave inversion less evident in Anterolateral leads  Confirmed by BRITTANIE ESPINOZA (), Beth Graham ([id number]) on 7/19/2019 2:52:22 PM         Assessment:     Problem List as of 7/20/2019 Date Reviewed: 7/5/2019          Codes Class Noted - Resolved    Stage 3 chronic kidney disease ([facility] ICD-10-CM: N18.3  ICD-9-CM: 585.3  7/15/2019 - Present        Systolic CHF, chronic (HCC) ICD-10-CM: I50.22  ICD-9-CM: 428.22, 428.0  7/15/2019 - Present        Acute systolic congestive heart failure ([facility] ICD-10-CM: I50.21  ICD-9-CM: 428.21, 428.0  7/15/2019 - Present        CHF (congestive heart failure) ([facility] ICD-10-CM: I50.9  ICD-9-CM: 428.0  7/15/2019 - Present        Hypertension ICD-10-CM: I10  ICD-9-CM: 401.9  7/15/2019 - Present        Physical deconditioning ICD-10-CM: R53.81  ICD-9-CM: 799.3  7/15/2019 - Present        Gout attack ICD-10-CM: M10.9  ICD-9-CM: 274.01  7/15/2019 - Present        Candidiasis of perineum ICD-10-CM: B37.49  ICD-9-CM: 112.2  7/15/2019 - Present        Impaired gait and mobility ICD-10-CM: R26.89  ICD-9-CM: 781.2  7/15/2019 - Present        Chronic atrial fibrillation with rapid ventricular response (HCC) ICD-10-CM: I48.2  ICD-9-CM: 427.31  7/15/2019 - Present        Acute hypoxemic respiratory failure (HCC) ICD-10-CM: J96.01  ICD-9-CM: 518.81  7/3/2019 - Present        Atrial fibrillation with rapid ventricular response (HCC) vs WPW in patient with history of ablation ICD-10-CM: I48.91  ICD-9-CM: 427.31  7/3/2019 - Present        Pulmonary infiltrates vs acute congestive heart failure ICD-10-CM: R91.8  ICD-9-CM: 793.19  7/3/2019 - Present        Severe sepsis ([facility] ICD-10-CM: A41.9, R65.20  ICD-9-CM: 038.9, 995.92  7/3/2019 - Present        Hypotension due to hypovolemia ICD-10-CM: I95.89, E86.1  ICD-9-CM: 458.8, 276.52  7/3/2019 - Present        Polymyalgia rheumatica ([facility] ICD-10-CM: M35.3  ICD-9-CM: 725  12/2/2015 - Present Gouty arthritis  NOS ICD-10-CM: M10.9  ICD-9-CM: 274.00  12/2/2015 - Present        Mixed hyperlipidemia ICD-10-CM: E78.2  ICD-9-CM: 272.2  12/2/2015 - Present        WPW (Suresh-Parkinson-White syndrome) ICD-10-CM: I45.6  ICD-9-CM: 426.7  8/12/2013 - Present    Overview Signed 8/12/2013  2:46 PM by Giovanny Butt pathway ablation 8/12/13             HTN (hypertension) ICD-10-CM: I10  ICD-9-CM: 401.9  8/12/2013 - Present        Severe obesity with body mass index (BMI) of 35.0 to 39.9 with comorbidity ([facility] ICD-10-CM: E66.01  ICD-9-CM: 278.01  8/12/2013 - Present        Other and unspecified hyperlipidemia ICD-10-CM: E78.5  ICD-9-CM: 272.4  8/12/2013 - Present        Sjogren's syndrome ([facility] ICD-10-CM: M35.00  ICD-9-CM: 710.2  8/12/2013 - Present        Fibromyalgia ICD-10-CM: M79.7  ICD-9-CM: 729.1  8/12/2013 - Present        Peptic ulcer ICD-10-CM: K27.9  ICD-9-CM: 533.90  8/12/2013 - Present        Gout ICD-10-CM: M10.9  ICD-9-CM: 274.9  8/12/2013 - Present              Plan:     Rehabilitation Plan  The patient has shown the ability to tolerate and benefit from 3 hours of therapy daily and is being admitted to a comprehensive acute inpatient rehabilitation program consisting of at least 3 hours of combined physical and occupational therapies. Continue intensive Physical Therapy for a minimum of 1.5 hours a day, at least 5 out of 7 days per week to address bed mobility, transfers, ambulation, strengthening, balance, and endurance. Continue intensive Occupational Therapy for a minimum of 1.5 hours a day, at least 5 out of 7 days per week to address ADL ( bathing, LE dressing, toileting) and adaptive equipment as needed. + fatigue, weakness, decreased activity tolerance noted.  However sessions well tolerated, making gains.      The patient will also require 24-hour skilled rehabilitation nursing for bowel and bladder management, skin care for decubitus ulcer prevention , pain management and ongoing medication administration      Continue daily physician medical management:  Acute hypoxemic respiratory failure/ CHF  -secondary to a.fib.   - continue oral lasix. Monitor volume status. Will wean diuretics as appropriate  7/16- cxR clear, no edema. will d/c lasix. Still continue aldactone for now. Monitor response. 7/17 - no s/s of decompensation. + mild fatigue. Will watch closely. Still on aldactone. Lasix d/c'd  7/18 - no signs of decompensation. Now off lasix. 7/19- clinically euvolemic will hold aldactone. Monitor clinical volume status.        HTN (hypertension)\  Hypotension due to hypovolemia  - continued on aldactone. Lasix. - will add, adjust antihypertensives as needed  - 7/17 -120s/ HR 60-70s. No adjustments. 7/19 - will add antihypertensives as needed.        Atrial fibrillation with rapid ventricular response (HCC) vs WPW in patient with history of ablation    - s/p electric cardioversion - continue amiodarone po.   - continue eliquis  - monitor for recurrent a.fib. HR control.   - continue remote telemetry. 7/17- RRR. NSR.    7/18 - PAF continue- amiodarone, eliquis/  sCHF - continue Coreg and spironolactone. Lasix discontinued. 7/19 - continue amiodarone load. Severe sepsis ([facility] [facility] (7/3/2019) / Pulmonary infiltrates vs acute congestive heart failure (7/3/2019)- treated. abx finished. Pneumonia prophylaxis- Insentive spirometer every hour while awake  -stable lung exam.      Severe obesity with body mass index (BMI) of 35.0 to 39.9 with comorbidity  - cardiac diet. Sjogren's syndrome/ Polymyalgia rheumatica - steroid course finished. Monitor for acute flair, increased weakness. - off steroids, immunosuppressants. Pt was weaned prior to admit per rheumatologist. Monitor.         DVT risk / DVT Prophylaxis-    - covered with eliquis.      Pain Control: stable, mild-to-moderate joint symptoms intermittently, reasonably well controlled by PRN meds.  Will require regular pain assessment and comprenhensive pain management,   - foot pain -   - acute gout flair - 7/17 - candice pain improving slowly with prn ibuprofen. Pt would like to hold off on colchicine due to past loose stools. 7/19 foot pain resolved. Wound Care: Monitor wound status daily per staff and physician. At risk for failure. Will require 24/7 rehab nursing. Keep wound clean and dry  - candidiasis periarea, genital.   - 7/17 - on nystatin powder and barrier cream +diflucan po.   7/18 - continue po, topical antifungals.      Potential urinary retention/ neurogenic bladder -   - c/o frequency following prolonged covarrubias insertion, removal.   7/17 - UTI -GNR. started [employer]. Follow susceptability. 7/18- ESBL reported. will change to Bactrim DS x 7 days. Pt still with frequency, bladder irriitation. 7/19  Started Bactrim DS x 7 days. bowel program - as needed.      GERD - resume PPI. At times may need additional antacids, Maalox prn. Time spent was 25 minutes with over 1/2 in direct patient care/examination, consultation and coordination of care.      Signed By: Kwame Bautista MD     July 20, 2019

## 2019-07-21 PROCEDURE — 99232 SBSQ HOSP IP/OBS MODERATE 35: CPT | Performed by: PHYSICAL MEDICINE & REHABILITATION

## 2019-07-21 PROCEDURE — 65310000000 HC RM PRIVATE REHAB

## 2019-07-21 PROCEDURE — 74011250637 HC RX REV CODE- 250/637: Performed by: PHYSICAL MEDICINE & REHABILITATION

## 2019-07-21 RX ORDER — NYSTATIN 100000 [USP'U]/G
POWDER TOPICAL
Status: DISCONTINUED | OUTPATIENT
Start: 2019-07-21 | End: 2019-07-23 | Stop reason: HOSPADM

## 2019-07-21 RX ADMIN — SULFAMETHOXAZOLE AND TRIMETHOPRIM 1 TABLET: 800; 160 TABLET ORAL at 21:30

## 2019-07-21 RX ADMIN — AMIODARONE HYDROCHLORIDE 400 MG: 200 TABLET ORAL at 09:18

## 2019-07-21 RX ADMIN — PANTOPRAZOLE SODIUM 40 MG: 40 TABLET, DELAYED RELEASE ORAL at 06:39

## 2019-07-21 RX ADMIN — CARVEDILOL 6.25 MG: 6.25 TABLET, FILM COATED ORAL at 09:18

## 2019-07-21 RX ADMIN — FLUCONAZOLE 200 MG: 100 TABLET ORAL at 09:17

## 2019-07-21 RX ADMIN — AMIODARONE HYDROCHLORIDE 400 MG: 200 TABLET ORAL at 17:58

## 2019-07-21 RX ADMIN — SULFAMETHOXAZOLE AND TRIMETHOPRIM 1 TABLET: 800; 160 TABLET ORAL at 09:17

## 2019-07-21 RX ADMIN — APIXABAN 5 MG: 5 TABLET, FILM COATED ORAL at 09:17

## 2019-07-21 RX ADMIN — APIXABAN 5 MG: 5 TABLET, FILM COATED ORAL at 21:30

## 2019-07-21 RX ADMIN — CARVEDILOL 6.25 MG: 6.25 TABLET, FILM COATED ORAL at 18:00

## 2019-07-21 NOTE — PROGRESS NOTES
SFD PROGRESS NOTE    Cindy Roman  Admit Date: 7/15/2019  Admit Diagnosis: CHF (congestive heart failure) ([facility] [I50.9]; Polymyalgia rheumatica ([facility] [M35.3]; Gout attack [M10.9]; Physical deconditioning [R53.81]; Chronic atrial fibrillation with rapid ventricular response ([facility] [I48.2]; Impaired gait and mobility [R26.89]; Candidiasis of perineum [B37.49]; Hypertension [I10]    Subjective     Afebrile. Vss. Bladder frequency resolved. Did not sleep well last night. No new functional barriers, setbacks. Objective:     Current Facility-Administered Medications   Medication Dose Route Frequency    nystatin (MYCOSTATIN) 100,000 unit/gram powder   Topical BID PRN    carvedilol (COREG) tablet 6.25 mg  6.25 mg Oral BID WITH MEALS    trimethoprim-sulfamethoxazole (BACTRIM DS, SEPTRA DS) 160-800 mg per tablet 1 Tab  1 Tab Oral Q12H    sodium chloride (NS) flush 5-40 mL  5-40 mL IntraVENous PRN    amiodarone (CORDARONE) tablet 400 mg  400 mg Oral BID    apixaban (ELIQUIS) tablet 5 mg  5 mg Oral Q12H    bisacodyl (DULCOLAX) tablet 5 mg  5 mg Oral DAILY PRN    ibuprofen (MOTRIN) tablet 600 mg  600 mg Oral Q6H PRN    albuterol (PROVENTIL VENTOLIN) nebulizer solution 1.25 mg  1.25 mg Nebulization Q6H PRN    loperamide (IMODIUM) capsule 2 mg  2 mg Oral Q4H PRN    pantoprazole (PROTONIX) tablet 40 mg  40 mg Oral ACB    temazepam (RESTORIL) capsule 15 mg  15 mg Oral QHS PRN    fluconazole (DIFLUCAN) tablet 200 mg  200 mg Oral DAILY     Review of Systems: Denies chest pain, shortness of breath, cough, headache, visual problems, abdominal pain, dysurea, calf pain. Pertinent positives are as noted in the medical records and unremarkable otherwise.      Visit Vitals  /71   Pulse 70   Temp 97.9 °F (36.6 °C)   Resp 20   Wt 182 lb 1.6 oz (82.6 kg)   SpO2 95%   BMI 33.31 kg/m²      Physical Exam:         General:   AAO x 3   HEENT:  Normocephalic, EOMI,   No JVD sitting   Lungs:  CTA Bilaterally, no wheezing Heart:  Regular rate and rhythm, S1, S2, No murmur    Genitourinary: defered   Abdomen:  Soft, non-tender to palpation in all four quadrants. Bowel sounds present.      Neuromuscular:  PERRL, EOMI  + mild generalized weakness, more proximal> distal BLE  Sensory - intact   Skin:  + maculopapular rash with satellite lesions at folds at groin, perineum mary lou anal areas, intragluteal cleft    Edema: no pedal edema BLE                                                                                       Functional Assessment:  Gross Assessment  AROM: Generally decreased, functional (07/19/19 1200)  PROM: Generally decreased, functional (07/19/19 1200)  Strength: Generally decreased, functional (07/19/19 1200)  Coordination: Generally decreased, functional (07/19/19 1200)  Sensation: Intact (07/19/19 1200)       Balance  Sitting - Static: Good (unsupported) (07/20/19 1118)  Sitting - Dynamic: Good (unsupported) (07/20/19 1118)  Standing - Static: Good (07/20/19 1118)  Standing - Dynamic : Impaired (07/19/19 1600)         Deangelo Drake Fall Risk Assessment:  Deangelo Drake Fall Risk  Mobility: Ambulates or transfers with assist devices or assistance (07/21/19 0800)  Mobility Interventions: Communicate number of staff needed for ambulation/transfer;Patient to call before getting OOB (07/21/19 0800)  Mentation: Alert, oriented x 3 (07/21/19 0800)  Mentation Interventions: Adequate sleep, hydration, pain control (07/21/19 0800)  Medication: Patient receiving anticonvulsants, sedatives(tranquilizers), psychotropics or hypnotics, hypoglycemics, narcotics, sleep aids, antihypertensives, laxatives, or diuretics (07/21/19 0800)  Medication Interventions: Evaluate medications/consider consulting pharmacy (07/21/19 0800)  Elimination: Needs assistance with toileting (07/21/19 0800)  Elimination Interventions: Call light in reach (07/21/19 0800)  Prior Fall History: No (07/21/19 0800)  Total Score: 3 (07/21/19 0800)  High Fall Risk: Yes (07/21/19 0800)     Speech Assessment:         Ambulation:  Gait  Distance (ft): 300 Feet (ft)(150 f t x 2) (07/20/19 1118)  Assistive Device: [facility], rollator (07/20/19 1118)  Rail Use: Both (07/19/19 1600)     Labs/Studies:  Recent Results (from the past 72 hour(s))   METABOLIC PANEL, BASIC    Collection Time: 07/18/19 12:48 PM   Result Value Ref Range    Sodium 139 136 - 145 mmol/L    Potassium 4.3 3.5 - 5.1 mmol/L    Chloride 105 98 - 107 mmol/L    CO2 25 21 - 32 mmol/L    Anion gap 9 7 - 16 mmol/L    Glucose 130 (H) 65 - 100 mg/dL    BUN 30 (H) 8 - 23 MG/DL    Creatinine 1.46 (H) 0.6 - 1.0 MG/DL    GFR est AA 45 (L) >60 ml/min/1.73m2    GFR est non-AA 37 (L) >60 ml/min/1.73m2    Calcium 9.3 8.3 - 16.3 MG/DL   METABOLIC PANEL, BASIC    Collection Time: 07/19/19  1:08 PM   Result Value Ref Range    Sodium 140 136 - 145 mmol/L    Potassium 4.0 3.5 - 5.1 mmol/L    Chloride 107 98 - 107 mmol/L    CO2 21 21 - 32 mmol/L    Anion gap 12 7 - 16 mmol/L    Glucose 141 (H) 65 - 100 mg/dL    BUN 28 (H) 8 - 23 MG/DL    Creatinine 1.55 (H) 0.6 - 1.0 MG/DL    GFR est AA 42 (L) >60 ml/min/1.73m2    GFR est non-AA 35 (L) >60 ml/min/1.73m2    Calcium 9.1 8.3 - 10.4 MG/DL   EKG, 12 LEAD, INITIAL    Collection Time: 07/19/19  1:13 PM   Result Value Ref Range    Ventricular Rate 84 BPM    Atrial Rate 84 BPM    P-R Interval 148 ms    QRS Duration 94 ms    Q-T Interval 368 ms    QTC Calculation (Bezet) 434 ms    Calculated P Axis 58 degrees    Calculated R Axis -25 degrees    Calculated T Axis 80 degrees    Diagnosis       Normal sinus rhythm  Normal ECG  When compared with ECG of 03-JUL-2019 03:10,  Sinus rhythm has replaced Atrial fibrillation  Vent.  rate has decreased BY  54 BPM  Nonspecific T wave abnormality no longer evident in Inferior leads  T wave inversion less evident in Anterolateral leads  Confirmed by CEBE  MD (), Fernando Arauz ([id number]) on 7/19/2019 2:52:22 PM         Assessment:     Problem List as of 7/21/2019 Date Reviewed: 7/5/2019 Codes Class Noted - Resolved    Stage 3 chronic kidney disease ([facility] ICD-10-CM: N18.3  ICD-9-CM: 585.3  7/15/2019 - Present        Systolic CHF, chronic (HCC) ICD-10-CM: I50.22  ICD-9-CM: 428.22, 428.0  7/15/2019 - Present        Acute systolic congestive heart failure ([facility] ICD-10-CM: I50.21  ICD-9-CM: 428.21, 428.0  7/15/2019 - Present        CHF (congestive heart failure) ([facility] ICD-10-CM: I50.9  ICD-9-CM: 428.0  7/15/2019 - Present        Hypertension ICD-10-CM: I10  ICD-9-CM: 401.9  7/15/2019 - Present        Physical deconditioning ICD-10-CM: R53.81  ICD-9-CM: 799.3  7/15/2019 - Present        Gout attack ICD-10-CM: M10.9  ICD-9-CM: 274.01  7/15/2019 - Present        Candidiasis of perineum ICD-10-CM: B37.49  ICD-9-CM: 112.2  7/15/2019 - Present        Impaired gait and mobility ICD-10-CM: R26.89  ICD-9-CM: 781.2  7/15/2019 - Present        Chronic atrial fibrillation with rapid ventricular response (HCC) ICD-10-CM: I48.2  ICD-9-CM: 427.31  7/15/2019 - Present        Acute hypoxemic respiratory failure (HCC) ICD-10-CM: J96.01  ICD-9-CM: 518.81  7/3/2019 - Present        Atrial fibrillation with rapid ventricular response ([facility] vs WPW in patient with history of ablation ICD-10-CM: I48.91  ICD-9-CM: 427.31  7/3/2019 - Present        Pulmonary infiltrates vs acute congestive heart failure ICD-10-CM: R91.8  ICD-9-CM: 793.19  7/3/2019 - Present        Severe sepsis ([facility] ICD-10-CM: A41.9, R65.20  ICD-9-CM: 038.9, 995.92  7/3/2019 - Present        Hypotension due to hypovolemia ICD-10-CM: I95.89, E86.1  ICD-9-CM: 458.8, 276.52  7/3/2019 - Present        Polymyalgia rheumatica ([facility] ICD-10-CM: M35.3  ICD-9-CM: 538  12/2/2015 - Present        Gouty arthritis  NOS ICD-10-CM: M10.9  ICD-9-CM: 274.00  12/2/2015 - Present        Mixed hyperlipidemia ICD-10-CM: E78.2  ICD-9-CM: 272.2  12/2/2015 - Present        WPW (Suresh-Parkinson-White syndrome) ICD-10-CM: I45.6  ICD-9-CM: 426.7  8/12/2013 - Present    Overview Signed 8/12/2013  2:46 PM by Javad Burns pathway ablation 8/12/13             HTN (hypertension) ICD-10-CM: I10  ICD-9-CM: 401.9  8/12/2013 - Present        Severe obesity with body mass index (BMI) of 35.0 to 39.9 with comorbidity ([facility] ICD-10-CM: E66.01  ICD-9-CM: 278.01  8/12/2013 - Present        Other and unspecified hyperlipidemia ICD-10-CM: E78.5  ICD-9-CM: 272.4  8/12/2013 - Present        Sjogren's syndrome ([facility] ICD-10-CM: M35.00  ICD-9-CM: 710.2  8/12/2013 - Present        Fibromyalgia ICD-10-CM: M79.7  ICD-9-CM: 729.1  8/12/2013 - Present        Peptic ulcer ICD-10-CM: K27.9  ICD-9-CM: 533.90  8/12/2013 - Present        Gout ICD-10-CM: M10.9  ICD-9-CM: 274.9  8/12/2013 - Present              Plan:     Rehabilitation Plan  The patient has shown the ability to tolerate and benefit from 3 hours of therapy daily and is being admitted to a comprehensive acute inpatient rehabilitation program consisting of at least 3 hours of combined physical and occupational therapies. Continue intensive Physical Therapy for a minimum of 1.5 hours a day, at least 5 out of 7 days per week to address bed mobility, transfers, ambulation, strengthening, balance, and endurance. Continue intensive Occupational Therapy for a minimum of 1.5 hours a day, at least 5 out of 7 days per week to address ADL ( bathing, LE dressing, toileting) and adaptive equipment as needed. + fatigue, weakness, decreased activity tolerance noted. However sessions well tolerated, making gains.      The patient will also require 24-hour skilled rehabilitation nursing for bowel and bladder management, skin care for decubitus ulcer prevention , pain management and ongoing medication administration      Continue daily physician medical management:  Acute hypoxemic respiratory failure/ CHF  -secondary to a.fib.   - continue oral lasix. Monitor volume status. Will wean diuretics as appropriate  7/16- cxR clear, no edema.  will d/c lasix. Still continue aldactone for now. Monitor response. 7/17 - no s/s of decompensation. + mild fatigue. Will watch closely. Still on aldactone. Lasix d/c'd  7/18 - no signs of decompensation. Now off lasix. 7/19-  clinically euvolemic will hold aldactone. Monitor clinical volume status. 7/20 - off diuretics. Resume coreg. Monitor HR/BP. Volume status. 7/21- comfortable on RA.               Atrial fibrillation with rapid ventricular response (HCC) vs WPW in patient with history of ablation    HTN (hypertension)\  Hypotension due to hypovolemia  - s/p electric cardioversion - continue amiodarone po.   - continue eliquis  - monitor for recurrent a.fib. HR control.   - continue remote telemetry. 7/17- RRR. NSR.    7/18 - PAF continue- amiodarone, eliquis/  sCHF - continue Coreg and spironolactone. Lasix discontinued. 7/19 - continue amiodarone load. 7/20- HR controlled. Resume coreg 6.125 bid. Monitor response. Off diuretics. 7/21 - resume Coreg. HR/BP on fair range. Monitor. Severe sepsis ([facility] (7/3/2019) / Pulmonary infiltrates vs acute congestive heart failure (7/3/2019)- treated. abx finished. Pneumonia prophylaxis- Insentive spirometer every hour while awake  -stable lung exam.      Severe obesity with body mass index (BMI) of 35.0 to 39.9 with comorbidity  - cardiac diet. Sjogren's syndrome/ Polymyalgia rheumatica - steroid course finished. Monitor for acute flair, increased weakness. - off steroids, immunosuppressants. Pt was weaned prior to admit per rheumatologist. Monitor.         DVT risk / DVT Prophylaxis-    - covered with eliquis.      Pain Control: stable, mild-to-moderate joint symptoms intermittently, reasonably well controlled by PRN meds. Will require regular pain assessment and comprenhensive pain management,   - foot pain -   - acute gout flair - 7/17 - candice pain improving slowly with prn ibuprofen. Pt would like to hold off on colchicine due to past loose stools. 7/19 foot pain resolved. Wound Care: Monitor wound status daily per staff and physician. At risk for failure. Will require 24/7 rehab nursing. Keep wound clean and dry  - candidiasis periarea, genital.   - 7/17 - on nystatin powder and barrier cream +diflucan po.   7/18 - continue po, topical antifungals.      Potential urinary retention/ neurogenic bladder -   - c/o frequency following prolonged covarrubias insertion, removal.   7/17 - UTI -GNR. started [employer]. Follow susceptability. 7/18- ESBL reported. will change to Bactrim DS x 7 days. Pt still with frequency, bladder irriitation. 7/19  Started Bactrim DS x 7 days. bowel program - as needed.      GERD - resume PPI. At times may need additional antacids, Maalox prn. Time spent was 25 minutes with over 1/2 in direct patient care/examination, consultation and coordination of care.      Signed By: Angelita Will MD     July 21, 2019

## 2019-07-21 NOTE — PROGRESS NOTES
Problem: Falls - Risk of  Goal: *Absence of Falls  Description  Document Nhung Jake Fall Risk and appropriate interventions in the flowsheet. Outcome: Progressing Towards Goal  Note:   Fall Risk Interventions:  Mobility Interventions: Communicate number of staff needed for ambulation/transfer, Patient to call before getting OOB, PT Consult for mobility concerns, Utilize walker, cane, or other assistive device    Mentation Interventions: Adequate sleep, hydration, pain control, Door open when patient unattended, Evaluate medications/consider consulting pharmacy, Eyeglasses and hearing aids, Gait belt with transfers/ambulation    Medication Interventions: Evaluate medications/consider consulting pharmacy, Patient to call before getting OOB, Teach patient to arise slowly    Elimination Interventions: Call light in reach, Patient to call for help with toileting needs, Stay With Me (per policy)              Problem: Inpatient Rehab (Adult)  Goal: *LTG: Avoids injury/falls 100% of time related to deficits  Outcome: Progressing Towards Goal  Goal: *LTG: Avoids infection 100% of time related to deficits  Outcome: Progressing Towards Goal  Goal: *LTG: Absence of DVT during hospitalization  Outcome: Progressing Towards Goal  Goal: *LTG: Maintains Skin Integrity With No Evidence of Pressure Injury 100% of Time  Outcome: Progressing Towards Goal     Problem: Pressure Injury - Risk of  Goal: *Prevention of pressure injury  Description  Document Mark Scale and appropriate interventions in the flowsheet.   Outcome: Progressing Towards Goal  Note:   Pressure Injury Interventions:  Sensory Interventions: Assess changes in LOC, Check visual cues for pain    Moisture Interventions: Absorbent underpads, Apply protective barrier, creams and emollients, Maintain skin hydration (lotion/cream), Minimize layers, Moisture barrier    Activity Interventions: Increase time out of bed, Pressure redistribution bed/mattress(bed type)    Mobility Interventions: Float heels, HOB 30 degrees or less, Pressure redistribution bed/mattress (bed type)    Nutrition Interventions: Document food/fluid/supplement intake    Friction and Shear Interventions: Apply protective barrier, creams and emollients, Feet elevated on foot rest, HOB 30 degrees or less, Minimize layers

## 2019-07-21 NOTE — PROGRESS NOTES
Nutrition   Reason for assessment:   Consult: Pt requesting CHF education (Ismael Glass MD)    Assessment:  Food/Nutrition and Pertinent History: Pt lives alone and reports she typically relies on packaged/proicessed foods at home for ease of preparation. She also reports at times she is too fatigued for meal prep. She had appropriate questions about sodium content of foods, alternatives for easy prep and long term meal solutions at RD visit. DIET CARDIAC Regular      Anthropometrics: 62\",  Weight: 82.6 kg (182 lb 1.6 oz), Weight Source: Standing scale (comment), Body mass index is 33.31 kg/m². BMI class of obesity. Macronutrient needs: 83kg listed body wt  EER:  1476-4290 kcal /day (18-20 kcal/kg)  EPR:  66-83 grams protein/day (0.8-1 grams/kg)    Intake/Comparative Standards: Recorded meal(s): %. This potentially meets ~100% of kcal and ~100% of protein needs                                                                         Nutrition Diagnosis: Food and nutrition knowledge deficit related to sodium content of foods as evidenced by pt with minimal prior exposure to information with recall of home intake excessive in sodium content. Intervention:   Meals and snacks: Continue current diet. Nutrition education: Provided patient with written and verbal instruction on 2 gram sodium diet. Handouts provided: Heart Failure, Low Sodium, Ways to [employer]. Patient verbalizes good understanding of diet. Expect good compliance. Discharge Nutrition Plan: Pt likely to benefit from meals on wheels or similar program to assist with meals with transition to home.       [place] [address] [facility] [place], [address], [address]

## 2019-07-21 NOTE — PROGRESS NOTES
Sitting up in recliner, nutritional consult ordered per patient request regarding education on discharge. Call bell within reach, no distress noted. Hourly rounds completed this shift.

## 2019-07-21 NOTE — PROGRESS NOTES
Problem: Falls - Risk of  Goal: *Absence of Falls  Description  Document Brianne Late Fall Risk and appropriate interventions in the flowsheet. Outcome: Progressing Towards Goal  Note:   Fall Risk Interventions:  Mobility Interventions: Communicate number of staff needed for ambulation/transfer, Patient to call before getting OOB    Mentation Interventions: Adequate sleep, hydration, pain control    Medication Interventions: Evaluate medications/consider consulting pharmacy    Elimination Interventions: Call light in reach              Problem: Patient Education: Go to Patient Education Activity  Goal: Patient/Family Education  Outcome: Progressing Towards Goal     Problem: Inpatient Rehab (Adult)  Goal: *LTG: Avoids injury/falls 100% of time related to deficits  Outcome: Progressing Towards Goal  Goal: *LTG: Avoids infection 100% of time related to deficits  Outcome: Progressing Towards Goal  Goal: *LTG: Verbalize understanding of diagnosis and risk factors for recurring stroke  Outcome: Progressing Towards Goal  Goal: *LTG: Absence of DVT during hospitalization  Outcome: Progressing Towards Goal  Goal: *LTG: Maintains Skin Integrity With No Evidence of Pressure Injury 100% of Time  Outcome: Progressing Towards Goal  Goal: Interventions  Outcome: Progressing Towards Goal     Problem: Pressure Injury - Risk of  Goal: *Prevention of pressure injury  Description  Document Mark Scale and appropriate interventions in the flowsheet.   Outcome: Progressing Towards Goal  Note:   Pressure Injury Interventions:  Sensory Interventions: Assess changes in LOC    Moisture Interventions: Absorbent underpads    Activity Interventions: Increase time out of bed, Pressure redistribution bed/mattress(bed type)    Mobility Interventions: Pressure redistribution bed/mattress (bed type)    Nutrition Interventions: Document food/fluid/supplement intake    Friction and Shear Interventions: Apply protective barrier, creams and emollients, HOB 30 degrees or less                Problem: Patient Education: Go to Patient Education Activity  Goal: Patient/Family Education  Outcome: Progressing Towards Goal     Problem: Risk for Spread of Infection  Goal: Prevent transmission of infectious organism to others  Description  Prevent the transmission of infectious organisms to other patients, staff members, and visitors.   Outcome: Progressing Towards Goal     Problem: Patient Education:  Go to Education Activity  Goal: Patient/Family Education  Outcome: Progressing Towards Goal

## 2019-07-21 NOTE — PROGRESS NOTES
No change in patient condition during hourly nursing rounds. Patient complained of insomnia even after receiving  the scheduled restoril.

## 2019-07-22 LAB
ANION GAP SERPL CALC-SCNC: 11 MMOL/L (ref 7–16)
BUN SERPL-MCNC: 25 MG/DL (ref 8–23)
CALCIUM SERPL-MCNC: 8.9 MG/DL (ref 8.3–10.4)
CHLORIDE SERPL-SCNC: 106 MMOL/L (ref 98–107)
CO2 SERPL-SCNC: 22 MMOL/L (ref 21–32)
CREAT SERPL-MCNC: 1.73 MG/DL (ref 0.6–1)
GLUCOSE SERPL-MCNC: 108 MG/DL (ref 65–100)
POTASSIUM SERPL-SCNC: 4.7 MMOL/L (ref 3.5–5.1)
SODIUM SERPL-SCNC: 139 MMOL/L (ref 136–145)

## 2019-07-22 PROCEDURE — 97535 SELF CARE MNGMENT TRAINING: CPT

## 2019-07-22 PROCEDURE — 80048 BASIC METABOLIC PNL TOTAL CA: CPT

## 2019-07-22 PROCEDURE — 97110 THERAPEUTIC EXERCISES: CPT

## 2019-07-22 PROCEDURE — 65310000000 HC RM PRIVATE REHAB

## 2019-07-22 PROCEDURE — 99232 SBSQ HOSP IP/OBS MODERATE 35: CPT | Performed by: PHYSICAL MEDICINE & REHABILITATION

## 2019-07-22 PROCEDURE — 97116 GAIT TRAINING THERAPY: CPT

## 2019-07-22 PROCEDURE — 74011250637 HC RX REV CODE- 250/637: Performed by: PHYSICAL MEDICINE & REHABILITATION

## 2019-07-22 PROCEDURE — 36415 COLL VENOUS BLD VENIPUNCTURE: CPT

## 2019-07-22 PROCEDURE — 97150 GROUP THERAPEUTIC PROCEDURES: CPT

## 2019-07-22 PROCEDURE — 97530 THERAPEUTIC ACTIVITIES: CPT

## 2019-07-22 RX ORDER — AMIODARONE HYDROCHLORIDE 200 MG/1
200 TABLET ORAL 2 TIMES DAILY
Status: DISCONTINUED | OUTPATIENT
Start: 2019-07-22 | End: 2019-07-23 | Stop reason: HOSPADM

## 2019-07-22 RX ADMIN — AMIODARONE HYDROCHLORIDE 400 MG: 200 TABLET ORAL at 08:09

## 2019-07-22 RX ADMIN — APIXABAN 5 MG: 5 TABLET, FILM COATED ORAL at 21:08

## 2019-07-22 RX ADMIN — IBUPROFEN 600 MG: 600 TABLET ORAL at 04:09

## 2019-07-22 RX ADMIN — FLUCONAZOLE 200 MG: 100 TABLET ORAL at 08:08

## 2019-07-22 RX ADMIN — PANTOPRAZOLE SODIUM 40 MG: 40 TABLET, DELAYED RELEASE ORAL at 04:10

## 2019-07-22 RX ADMIN — SULFAMETHOXAZOLE AND TRIMETHOPRIM 1 TABLET: 800; 160 TABLET ORAL at 21:08

## 2019-07-22 RX ADMIN — APIXABAN 5 MG: 5 TABLET, FILM COATED ORAL at 08:08

## 2019-07-22 RX ADMIN — AMIODARONE HYDROCHLORIDE 200 MG: 200 TABLET ORAL at 17:08

## 2019-07-22 RX ADMIN — SULFAMETHOXAZOLE AND TRIMETHOPRIM 1 TABLET: 800; 160 TABLET ORAL at 08:08

## 2019-07-22 RX ADMIN — CARVEDILOL 6.25 MG: 6.25 TABLET, FILM COATED ORAL at 08:09

## 2019-07-22 RX ADMIN — CARVEDILOL 6.25 MG: 6.25 TABLET, FILM COATED ORAL at 17:08

## 2019-07-22 NOTE — PROGRESS NOTES
Spoke with patient this am, patient indicated she would like to receive MOW because there is no one to cook for her. Filled out the referral for MOW and fax it to [phone].

## 2019-07-22 NOTE — PROGRESS NOTES
PHYSICAL THERAPY DAILY NOTE  Time In: 5339  Time Out: 9539  Patient Seen For: PM;Gait training; Therapeutic exercise;Transfer training    Subjective: \"I hate that I felt so badly this morning! \"         Objective: Other (comment)(Fall Risk)    COGNITION Daily Assessment    No impairments noted. BED/MAT MOBILITY Daily Assessment    Rolling Right : 0 (Not tested)  Rolling Left : 0 (Not tested)  Supine to Sit : 0 (Not tested)  Sit to Supine : 5 (Supervision)       TRANSFERS Daily Assessment    Transfer Type: SPT with walker  Other: rollator  Transfer Assistance : 6 (Modified independent)  Sit to Stand Assistance: Modified independent  Car Transfers: Supervision  Car Type: rehab car       GAIT Daily Assessment   Flexed posture, decreased arlette, foot flat gait Amount of Assistance: 5 (Supervision/setup)  Distance (ft): 300 Feet (ft)(x2)  Assistive Device: Walker, rollator       STEPS or STAIRS Daily Assessment    Steps/Stairs Ambulated (#): 0  Level of Assist : 0 (Not tested)  Rail Use: None       BALANCE Daily Assessment    Sitting - Static: Good (unsupported)  Sitting - Dynamic: Good (unsupported)  Standing - Static: Good  Standing - Dynamic : Impaired       WHEELCHAIR MOBILITY Daily Assessment    Able to Propel (ft): 0 feet  Curbs/Ramps Assist Required (FIM Score): 0 (Not tested)  Wheelchair Setup Assist Required : 0 (Not tested)       LOWER EXTREMITY EXERCISES Daily Assessment    Pt. Performed NuStep @ resistance level 1 x15 minutes to increase strength & endurance B UEs/LEs     Vital Signs: /67   Pulse 65   Temp 98.5 °F (36.9 °C)   Resp 14   Wt 87.4 kg (192 lb 9.6 oz)   SpO2 98%   BMI 35.23 kg/m²      Pain level: no c/o pain    Patient education: pt. Educated on car transfer technique    Interdisciplinary Communication: collaborated w/ OT regarding pt's progress    Pt. Left supine in NAD, call bell in reach. Assessment: Pt.  Demonstrating improved endurance w/ increased gait distance this session. No SOB or s/o fatigue noted. Recommend HHPT to follow up upon d/c.         Plan of Care: Continue with POC and progress as tolerated.      Jensen Patino, PT  7/22/2019

## 2019-07-22 NOTE — PROGRESS NOTES
Problem: Falls - Risk of  Goal: *Absence of Falls  Description  Document Corinne Payment Fall Risk and appropriate interventions in the flowsheet. Outcome: Progressing Towards Goal  Note:   Fall Risk Interventions:  Mobility Interventions: Communicate number of staff needed for ambulation/transfer, Patient to call before getting OOB, Strengthening exercises (ROM-active/passive), Utilize walker, cane, or other assistive device    Mentation Interventions: Adequate sleep, hydration, pain control, Door open when patient unattended, Increase mobility, Toileting rounds    Medication Interventions: Patient to call before getting OOB, Teach patient to arise slowly    Elimination Interventions: Call light in reach    History of Falls Interventions: Consult care management for discharge planning, Door open when patient unattended         Problem: Pressure Injury - Risk of  Goal: *Prevention of pressure injury  Description  Document Mark Scale and appropriate interventions in the flowsheet.   Outcome: Progressing Towards Goal  Note:   Pressure Injury Interventions:  Sensory Interventions: Assess changes in LOC, Check visual cues for pain, Float heels, Maintain/enhance activity level, Minimize linen layers, Pressure redistribution bed/mattress (bed type)    Moisture Interventions: Absorbent underpads, Maintain skin hydration (lotion/cream), Minimize layers    Activity Interventions: Increase time out of bed, Pressure redistribution bed/mattress(bed type)    Mobility Interventions: Pressure redistribution bed/mattress (bed type), Float heels    Nutrition Interventions: Document food/fluid/supplement intake    Friction and Shear Interventions: Apply protective barrier, creams and emollients, HOB 30 degrees or less, Lift sheet, Minimize layers

## 2019-07-22 NOTE — PROGRESS NOTES
OT Daily Note    Time In 0917   Time Out 1000     Subjective: \"I had terrible nightmares the other night. \"  Pain: Not indicated    Precautions: Other (comment)(fall risk)    Education   Patient educated regarding Zidelmyhia Shielae for BUE seated in wheelchair. Patient verbalized and demonstrated understanding for 6 BUE therapeutic exercises included in HEP utilizing green Theraband. Patient completed 2 sets x 10 reps of scapular retraction, shoulder flexion, shoulder extension, shoulder horizontal abduction, anterior punch, elbow flexion, and elbow extension exercises with ~moderate verbal/visual/tactile cues to promote proper technique. Printed copy of HEP and green Theraband provided to patient. Assessment: Patient tolerated well. Decreased recall/carryover requiring supervision to follow HEP. Education: HEP for BUE  Interdisciplinary Communication: Collaborated with Maya Sanchez regarding patient's performance and POC. Plan: Continue to address ADL/IADL, functional mobility, activity tolerance, balance, strengthening, education, cognition.       Melisa Lopez OTR/L

## 2019-07-22 NOTE — PROGRESS NOTES
OT Daily Note    Time In 1030   Time Out 1116     Subjective: \"I shouldn't have turned my head so fast... I just feel a little wobbly. \" (vitals taken and stable, patient appears to be anxious about going home)  Pain: Not indicated    Vitals taken (see Subjective) seated on rollator, /62, O2 97%, HR 68    Precautions: Other (comment)(fall risk)    Education   Patient educated regarding environmental organization, safe item transport, and functional mobility for kitchen management ambulating with rollator at discharge. Patient verbalized and demonstrated understanding, completing 3-part scavenger hunt in kitchen to retrieve items from refrigerator, drawer, and high shelf ambulating with rollator with supervision, increased time for visual scanning to locate needed item but fairly good safety awareness for rollator positioning and use of brakes. See Subjective, patient educated and verbalized understanding of taking seated rest breaks/completing tasks at seated level if feeling abnormal for any reason and discussed smart phone case with camryn for making calls in case of need for assistance/emergency. Patient educated regarding safe transfer technique <> shower chair in tub shower according to home setup. Patient verbalized and demonstrated understanding, transferred <> chair in bathtub shower training room side-stepping over tub ledge with minimal assist and use of grab bar, increased time and effort to manage feet over tub ledge. Patient educated regarding need for assistance with side-stepping transfer <> tub/shower at this time, ability of HHOT to assess and assist with tub/shower transfer prior to patient attempting bathing in shower at discharge, as well as option for TTB to increase safety and independence with tub/shower transfer, including shower curtain liner adaptation and reimbursement issues. Patient verbalized understanding, to check with family to see if TTB is still available. Assessment: Patient tolerated well. Recommending HHOT assessment/initial assist with tub/shower transfer for decreased fall risk at discharge. Patient demonstrates good safety awareness ambulating with rollator for IADLs but appears anxious regarding discharge. Discussed Meals on Wheels application with Social Work and Social Work to check in on application status. Education: IADL with rollator, tub/shower transfer  Interdisciplinary Communication: Collaborated with Social WorkPastora regarding Meals on [employer]. Plan:  To complete Discharge ADL next OT session    Alicja Keith OTR/L

## 2019-07-22 NOTE — PROGRESS NOTES
Recreational Therapy Daily Note    Time In 1000   Time Out 1030     Subjective: \"I am ready to go home tomorrow! \"  Precautions:Fall    Activity   Patient participated in tennis/balloon hit to help increase her overall activity tolerance and UE strength. Patient was motivated and eager to participate during the session. She was in good spirits and without c/o tiredness or fatigue. Patient needs S with rollator for ambulation and takes her time for safety. Patient was handed over to Marisel Arenas at the end of the session. Social Interaction: Cooperative, Appropriate and Participatory    Cognition: No issues noted and A&O X4    Education: Purpose of recreational therapy    Interdisciplinary Communication: Discussed with Dejah Miller patient's progress. Pt D/C summary completed on 7/22/19. Please see for specifics in [facility] [address] Patient expected to be d/c'd to home on 7/23/19.       Lopez Grullon, CTRS  7/22/2019

## 2019-07-22 NOTE — PROGRESS NOTES
PHYSICAL THERAPY DAILY NOTE  Time In: 5704  Time Out: 0915  Patient Seen For: AM;Gait training; Therapeutic exercise;Transfer training    Subjective: Pt. Sharing about how her air conditioning in her apartment was broken & how she was without A/C for the 2 weeks leading up to her hospitalization. \"I think it will be working when I get back. \"         Objective: Other (comment)(Fall Risk)    COGNITION Daily Assessment    No deficits noted       BED/MAT MOBILITY Daily Assessment    Rolling Right : 0 (Not tested)  Rolling Left : 0 (Not tested)  Supine to Sit : 0 (Not tested)  Sit to Supine : 0 (Not tested)       TRANSFERS Daily Assessment    Transfer Type: SPT with walker  Other: rollator  Transfer Assistance : 5 (Supervision/setup)  Sit to Stand Assistance: Supervision  Car Transfers: Not tested       GAIT Daily Assessment   Flexed posture, decreased arlette, increased B stance time Amount of Assistance: 5 (Supervision/setup)  Distance (ft): 150 Feet (ft)  Assistive Device: Walker, rollator       STEPS or STAIRS Daily Assessment   Practiced single curb step w/ rollator, requiring supervision for VCs for technique Steps/Stairs Ambulated (#): 1(x2)  Level of Assist : 5 (Supervision/setup)  Rail Use: None       BALANCE Daily Assessment    Sitting - Static: Good (unsupported)  Sitting - Dynamic: Good (unsupported)  Standing - Static: Good  Standing - Dynamic : Impaired       WHEELCHAIR MOBILITY Daily Assessment    Able to Propel (ft): 0 feet  Curbs/Ramps Assist Required (FIM Score): 0 (Not tested)  Wheelchair Setup Assist Required : 0 (Not tested)       LOWER EXTREMITY EXERCISES Daily Assessment   Pt.  Performed w/ B UE support for balance as well as VCs for technique & to modify to avoid discomfort from B hip bursitis Extremity: Both  Exercise Type #1: Standing lower extremity strengthening  Sets Performed: 1  Reps Performed: 10  Level of Assist: Supervision     STANDING EXERCISES Sets Reps Comments   Heel Raises 1 10 Toe Raises 1 10    Hip Flexion/Marching 1 10    Hamstring Curls 1 10    Hip Abduction 1 10    Hip Extension 1 10    Mini Squats 1 10      Vital Signs: /67   Pulse 65   Temp 98.5 °F (36.9 °C)   Resp 14   Wt 87.4 kg (192 lb 9.6 oz)   SpO2 98%   BMI 35.23 kg/m²     Pain level: no c/o pain    Patient education: pt. Educated on standing exercises - will review one additional time & issue handout tomorroww    Interdisciplinary Communication: handoff communication w/ OT    Pt. Left in chair in therapy gym w/ OT           Assessment: Pt. Cont. To make excellent progress w/ mobility using rollator - no SOB or fatigue noted. Pt. Tolerated standing exercises w/o difficulty - did not need any rest break. Pt. Also able to demonstrate a single curb step. Pt. Is on track for d/c tomorrow. Plan of Care: Continue with POC and progress as tolerated.      Dougie Rees, PT  7/22/2019

## 2019-07-22 NOTE — PROGRESS NOTES
SFD PROGRESS NOTE    Kobi English  Admit Date: 7/15/2019  Admit Diagnosis: [facility]congestive [facility] failure) [facility] [I50.9]; Polymyalgia rheumatica ([facility] [address] [M35.3]; Gout attack [M10.9]; Physical deconditioning [R53.81]; Chronic atrial fibrillation with rapid ventricular response ([facility] [I48.2]; Impaired gait and mobility [R26.89]; Candidiasis of perineum [B37.49]; Hypertension [I10]    Subjective     Afebrile. Vss. Therapies well tolerated. No new barriers. Denies chest pain palpitations, SOB. Objective:     Current Facility-Administered Medications   Medication Dose Route Frequency    nystatin (MYCOSTATIN) 100,000 unit/gram powder   Topical BID PRN    carvedilol (COREG) tablet 6.25 mg  6.25 mg Oral BID WITH MEALS    trimethoprim-sulfamethoxazole (BACTRIM DS, SEPTRA DS) 160-800 mg per tablet 1 Tab  1 Tab Oral Q12H    sodium chloride (NS) flush 5-40 mL  5-40 mL IntraVENous PRN    amiodarone (CORDARONE) tablet 400 mg  400 mg Oral BID    apixaban (ELIQUIS) tablet 5 mg  5 mg Oral Q12H    bisacodyl (DULCOLAX) tablet 5 mg  5 mg Oral DAILY PRN    ibuprofen (MOTRIN) tablet 600 mg  600 mg Oral Q6H PRN    albuterol (PROVENTIL VENTOLIN) nebulizer solution 1.25 mg  1.25 mg Nebulization Q6H PRN    loperamide (IMODIUM) capsule 2 mg  2 mg Oral Q4H PRN    pantoprazole (PROTONIX) tablet 40 mg  40 mg Oral ACB    temazepam (RESTORIL) capsule 15 mg  15 mg Oral QHS PRN     Review of Systems: Denies chest pain, shortness of breath, cough, headache, visual problems, abdominal pain, dysurea, calf pain. Pertinent positives are as noted in the medical records and unremarkable otherwise.      Visit Vitals  /67   Pulse 65   Temp 98.5 °F (36.9 °C)   Resp 14   Wt 182 lb 1.6 oz (82.6 kg)   SpO2 98%   BMI 33.31 kg/m²      Physical Exam:         General:   AAO x 3   HEENT:  Normocephalic, EOMI,   No JVD sitting   Lungs:  CTA Bilaterally, no wheezing   Heart:  Regular rate and rhythm, S1, S2, No murmur    Genitourinary: defered Abdomen:  Soft, non-tender to palpation in all four quadrants. Bowel sounds present. Neuromuscular:  PERRL, EOMI  + mild generalized weakness, more proximal> distal BLE  Sensory - intact   Skin:  + maculopapular rash with satellite lesions at folds at groin, perineum mary lou anal areas, intragluteal cleft    Edema: no pedal edema BLE                                                                                       Functional Assessment:  Gross Assessment  AROM: Generally decreased, functional (07/19/19 1200)  PROM: Generally decreased, functional (07/19/19 1200)  Strength: Generally decreased, functional (07/19/19 1200)  Coordination: Generally decreased, functional (07/19/19 1200)  Sensation: Intact (07/19/19 1200)       Balance  Sitting - Static: Good (unsupported) (07/20/19 1118)  Sitting - Dynamic: Good (unsupported) (07/20/19 1118)  Standing - Static: Good (07/20/19 1118)  Standing - Dynamic : Impaired (07/19/19 1600)         Margrett Bernheim Fall Risk Assessment:  [facility]  Mobility: Ambulates or transfers with assist devices or assistance (07/22/19 0727)  Mobility Interventions: Patient to call before getting OOB;Utilize walker, cane, or other assistive device (07/22/19 0727)  Mentation: Alert, oriented x 3 (07/22/19 0727)  Mentation Interventions: Door open when patient unattended;More frequent rounding; Toileting rounds (07/22/19 0919)  Medication: Patient receiving anticonvulsants, sedatives(tranquilizers), psychotropics or hypnotics, hypoglycemics, narcotics, sleep aids, antihypertensives, laxatives, or diuretics (07/22/19 0727)  Medication Interventions: Patient to call before getting OOB (07/22/19 1415)  Elimination: Needs assistance with toileting (07/22/19 0727)  Elimination Interventions: Call light in reach; Patient to call for help with toileting needs (07/22/19 0727)  Prior Fall History: No (07/22/19 0727)  History of Falls Interventions: Consult care management for discharge planning;Door open when patient unattended (07/21/19 1923)  Total Score: 3 (07/22/19 0727)  High Fall Risk: Yes (07/22/19 0727)     Speech Assessment:         Ambulation:  Gait  Distance (ft): 300 Feet (ft)(150 f t x 2) (07/20/19 1118)  Assistive Device: Coletta Brownie, rollator (07/20/19 1118)  Rail Use: Both (07/19/19 1600)     Labs/Studies:  Recent Results (from the past 72 hour(s))   METABOLIC PANEL, BASIC    Collection Time: 07/19/19  1:08 PM   Result Value Ref Range    Sodium 140 136 - 145 mmol/L    Potassium 4.0 3.5 - 5.1 mmol/L    Chloride 107 98 - 107 mmol/L    CO2 21 21 - 32 mmol/L    Anion gap 12 7 - 16 mmol/L    Glucose 141 (H) 65 - 100 mg/dL    BUN 28 (H) 8 - 23 MG/DL    Creatinine 1.55 (H) 0.6 - 1.0 MG/DL    GFR est AA 42 (L) >60 ml/min/1.73m2    GFR est non-AA 35 (L) >60 ml/min/1.73m2    Calcium 9.1 8.3 - 10.4 MG/DL   EKG, 12 LEAD, INITIAL    Collection Time: 07/19/19  1:13 PM   Result Value Ref Range    Ventricular Rate 84 BPM    Atrial Rate 84 BPM    P-R Interval 148 ms    QRS Duration 94 ms    Q-T Interval 368 ms    QTC Calculation (Bezet) 434 ms    Calculated P Axis 58 degrees    Calculated R Axis -25 degrees    Calculated T Axis 80 degrees    Diagnosis       Normal sinus rhythm  Normal ECG  When compared with ECG of 03-JUL-2019 03:10,  Sinus rhythm has replaced Atrial fibrillation  Vent.  rate has decreased BY  54 BPM  Nonspecific T wave abnormality no longer evident in Inferior leads  T wave inversion less evident in Anterolateral leads  Confirmed by BRITTANIE ESPINOZA (), Cleo Prom ([id number]) on 7/19/2019 2:52:22 PM         Assessment:     Problem List as of 7/22/2019 Date Reviewed: 7/5/2019          Codes Class Noted - Resolved    Stage 3 chronic kidney disease ([facility] [address] ICD-10-CM: N18.3  ICD-9-CM: 585.3  7/15/2019 - Present        Systolic CHF, chronic (HCC) ICD-10-CM: I50.22  ICD-9-CM: 428.22, 428.0  7/15/2019 - Present        Acute systolic congestive heart failure ([facility] ICD-10-CM: I50.21  ICD-9-CM: 428.21, 428.0  7/15/2019 - Present CHF (congestive heart failure) ([facility] ICD-10-CM: I50.9  ICD-9-CM: 428.0  7/15/2019 - Present        Hypertension ICD-10-CM: I10  ICD-9-CM: 401.9  7/15/2019 - Present        Physical deconditioning ICD-10-CM: R53.81  ICD-9-CM: 799.3  7/15/2019 - Present        Gout attack ICD-10-CM: M10.9  ICD-9-CM: 274.01  7/15/2019 - Present        Candidiasis of perineum ICD-10-CM: B37.49  ICD-9-CM: 112.2  7/15/2019 - Present        Impaired gait and mobility ICD-10-CM: R26.89  ICD-9-CM: 781.2  7/15/2019 - Present        Chronic atrial fibrillation with rapid ventricular response (HCC) ICD-10-CM: I48.2  ICD-9-CM: 427.31  7/15/2019 - Present        Acute hypoxemic respiratory failure (HCC) ICD-10-CM: J96.01  ICD-9-CM: 518.81  7/3/2019 - Present        Atrial fibrillation with rapid ventricular response (HCC) vs WPW in patient with history of ablation ICD-10-CM: I48.91  ICD-9-CM: 427.31  7/3/2019 - Present        Pulmonary infiltrates vs acute congestive heart failure ICD-10-CM: R91.8  ICD-9-CM: 793.19  7/3/2019 - Present        Severe sepsis ([facility] ICD-10-CM: A41.9, R65.20  ICD-9-CM: 038.9, 995.92  7/3/2019 - Present        Hypotension due to hypovolemia ICD-10-CM: I95.89, E86.1  ICD-9-CM: 458.8, 276.52  7/3/2019 - Present        Polymyalgia rheumatica ([facility] ICD-10-CM: M35.3  ICD-9-CM: 817  12/2/2015 - Present        Gouty arthritis  NOS ICD-10-CM: M10.9  ICD-9-CM: 274.00  12/2/2015 - Present        Mixed hyperlipidemia ICD-10-CM: E78.2  ICD-9-CM: 272.2  12/2/2015 - Present        WPW (Suresh-Parkinson-White syndrome) ICD-10-CM: I45.6  ICD-9-CM: 426.7  8/12/2013 - Present    Overview Signed 8/12/2013  2:46 PM by Delia Jenkins III     Posteroseptal pathway ablation 8/12/13             HTN (hypertension) ICD-10-CM: I10  ICD-9-CM: 401.9  8/12/2013 - Present        Severe obesity with body mass index (BMI) of 35.0 to 39.9 with comorbidity ([facility] [address] ICD-10-CM: E66.01  ICD-9-CM: 278.01  8/12/2013 - Present        Other and unspecified hyperlipidemia ICD-10-CM: E78.5  ICD-9-CM: 272.4  8/12/2013 - Present        Sjogren's syndrome ([facility] [facility] ICD-10-CM: M35.00  ICD-9-CM: 710.2  8/12/2013 - Present        Fibromyalgia ICD-10-CM: M79.7  ICD-9-CM: 729.1  8/12/2013 - Present        Peptic ulcer ICD-10-CM: K27.9  ICD-9-CM: 533.90  8/12/2013 - Present        Gout ICD-10-CM: M10.9  ICD-9-CM: 274.9  8/12/2013 - Present              Plan:     Rehabilitation Plan  The patient has shown the ability to tolerate and benefit from 3 hours of therapy daily and is being admitted to a comprehensive acute inpatient rehabilitation program consisting of at least 3 hours of combined physical and occupational therapies. Continue intensive Physical Therapy for a minimum of 1.5 hours a day, at least 5 out of 7 days per week to address bed mobility, transfers, ambulation, strengthening, balance, and endurance. Continue intensive Occupational Therapy for a minimum of 1.5 hours a day, at least 5 out of 7 days per week to address ADL ( bathing, LE dressing, toileting) and adaptive equipment as needed. + fatigue, weakness, decreased activity tolerance noted. However sessions well tolerated, making gains.      The patient will also require 24-hour skilled rehabilitation nursing for bowel and bladder management, skin care for decubitus ulcer prevention , pain management and ongoing medication administration      Continue daily physician medical management:  Acute hypoxemic respiratory failure/ CHF  -secondary to a.fib.   - continue oral lasix. Monitor volume status. Will wean diuretics as appropriate  7/16- cxR clear, no edema. will d/c lasix. Still continue aldactone for now. Monitor response. 7/17 - no s/s of decompensation. + mild fatigue. Will watch closely. Still on aldactone. Lasix d/c'd  7/18 - no signs of decompensation. Now off lasix. 7/19-  clinically euvolemic will hold aldactone. Monitor clinical volume status. 7/20 - off diuretics. Resume coreg. Monitor HR/BP. Volume status. 7/21- comfortable on RA.   7/22- hypoxia resolved. SaO2 remains normal at therapies. Off diuretics.        Atrial fibrillation with rapid ventricular response (HCC) vs WPW in patient with history of ablation    HTN (hypertension)\  Hypotension due to hypovolemia  - s/p electric cardioversion - continue amiodarone po.   - continue eliquis  - monitor for recurrent a.fib. HR control.   - continue remote telemetry. 7/17- RRR. NSR.    7/18 - PAF continue- amiodarone, eliquis/  sCHF - continue Coreg and spironolactone. Lasix discontinued. 7/19 - continue amiodarone load. 7/20- HR controlled. Resume coreg 6.125 bid. Monitor response. Off diuretics. 7/21 - resume Coreg. HR/BP on fair range. Monitor. 7/22- appears in NSR. EKG showed no arrhythmia, QT int wnl. Loaded on amiodarone post ECCV. Will reduce amiodarone dose to 200 bid. continue Coreg 6.125 bid. Monitor rate. Severe sepsis ([facility] [address] (7/3/2019) / Pulmonary infiltrates vs acute congestive heart failure (7/3/2019)- treated. abx finished. Pneumonia prophylaxis- Insentive spirometer every hour while awake  -stable lung exam.      Severe obesity with body mass index (BMI) of 35.0 to 39.9 with comorbidity  - cardiac diet. Sjogren's syndrome/ Polymyalgia rheumatica - steroid course finished. Monitor for acute flair, increased weakness. - off steroids, immunosuppressants. Pt was weaned prior to admit per rheumatologist. Monitor.   - 7/22- remians asymptomatic off steroids, immunosuppresants.      DVT risk / DVT Prophylaxis-    - covered with eliquis.      Pain Control: stable, mild-to-moderate joint symptoms intermittently, reasonably well controlled by PRN meds. Will require regular pain assessment and comprenhensive pain management,   - foot pain -   - acute gout flair - 7/17 - candice pain improving slowly with prn ibuprofen. Pt would like to hold off on colchicine due to past loose stools. 7/19 foot pain resolved.       Wound Care: Monitor wound status daily per staff and physician. At risk for failure. Will require 24/7 rehab nursing. Keep wound clean and dry  - candidiasis periarea, genital.   - 7/17 - on nystatin powder and barrier cream +diflucan po.   7/18 - continue po, topical antifungals. 7/22- diflucan course completed. Continue topical treatment daily.      Potential urinary retention/ neurogenic bladder -   - c/o frequency following prolonged covarrubias insertion, removal.   7/17 - UTI -GNR. started [employer]. Follow susceptability. 7/18- ESBL reported. will change to Bactrim DS x 7 days. Pt still with frequency, bladder irriitation. 7/19  Started Bactrim DS x 7 days. 7/22- bactrim until 7/25      bowel program - as needed.      GERD - resume PPI. At times may need additional antacids, Maalox prn. Time spent was 25 minutes with over 1/2 in direct patient care/examination, consultation and coordination of care.      Signed By: Angelita Will MD     July 22, 2019

## 2019-07-23 VITALS
RESPIRATION RATE: 14 BRPM | DIASTOLIC BLOOD PRESSURE: 64 MMHG | WEIGHT: 192.6 LBS | SYSTOLIC BLOOD PRESSURE: 155 MMHG | OXYGEN SATURATION: 97 % | BODY MASS INDEX: 35.23 KG/M2 | HEART RATE: 66 BPM | TEMPERATURE: 98.7 F

## 2019-07-23 LAB
BACTERIA SPEC CULT: ABNORMAL
Lab: NORMAL
REFERENCE LAB,REFLB: NORMAL
SERVICE CMNT-IMP: ABNORMAL
TEST DESCRIPTION:,ATST: NORMAL

## 2019-07-23 PROCEDURE — 97116 GAIT TRAINING THERAPY: CPT

## 2019-07-23 PROCEDURE — 99239 HOSP IP/OBS DSCHRG MGMT >30: CPT | Performed by: PHYSICAL MEDICINE & REHABILITATION

## 2019-07-23 PROCEDURE — 74011250637 HC RX REV CODE- 250/637: Performed by: PHYSICAL MEDICINE & REHABILITATION

## 2019-07-23 PROCEDURE — 97535 SELF CARE MNGMENT TRAINING: CPT

## 2019-07-23 PROCEDURE — 97530 THERAPEUTIC ACTIVITIES: CPT

## 2019-07-23 RX ORDER — NYSTATIN 100000 [USP'U]/G
POWDER TOPICAL
Qty: 1 BOTTLE | Refills: 2 | Status: SHIPPED | OUTPATIENT
Start: 2019-07-23 | End: 2019-10-23

## 2019-07-23 RX ORDER — SULFAMETHOXAZOLE AND TRIMETHOPRIM 800; 160 MG/1; MG/1
1 TABLET ORAL EVERY 12 HOURS
Qty: 4 TAB | Refills: 0 | Status: SHIPPED | OUTPATIENT
Start: 2019-07-23 | End: 2019-07-25

## 2019-07-23 RX ORDER — AMIODARONE HYDROCHLORIDE 200 MG/1
200 TABLET ORAL 2 TIMES DAILY
Qty: 60 TAB | Refills: 1 | Status: SHIPPED | OUTPATIENT
Start: 2019-07-23 | End: 2019-08-01

## 2019-07-23 RX ORDER — CARVEDILOL 6.25 MG/1
6.25 TABLET ORAL 2 TIMES DAILY WITH MEALS
Qty: 60 TAB | Refills: 1 | Status: SHIPPED | OUTPATIENT
Start: 2019-07-23 | End: 2019-09-20 | Stop reason: SDUPTHER

## 2019-07-23 RX ADMIN — CARVEDILOL 6.25 MG: 6.25 TABLET, FILM COATED ORAL at 08:06

## 2019-07-23 RX ADMIN — IBUPROFEN 600 MG: 600 TABLET ORAL at 08:08

## 2019-07-23 RX ADMIN — SULFAMETHOXAZOLE AND TRIMETHOPRIM 1 TABLET: 800; 160 TABLET ORAL at 08:05

## 2019-07-23 RX ADMIN — AMIODARONE HYDROCHLORIDE 200 MG: 200 TABLET ORAL at 08:05

## 2019-07-23 RX ADMIN — APIXABAN 5 MG: 5 TABLET, FILM COATED ORAL at 08:05

## 2019-07-23 RX ADMIN — PANTOPRAZOLE SODIUM 40 MG: 40 TABLET, DELAYED RELEASE ORAL at 05:15

## 2019-07-23 NOTE — PROGRESS NOTES
CASE MANAGEMENT DISCHARGE NOTE      Discharge Destination: Home patient discharged with daughter    Discharge Date: 07/23/2019      DME:None        [facility]: [facility] for PT/OT/ST/SW     Out Patient Facility:  [place], [place] both referred    STR: none

## 2019-07-23 NOTE — PROGRESS NOTES
Problem: Mobility Impaired (Adult and Pediatric)  Goal: *Therapy Goal (Edit Goal, Insert Text)  Description  LTG 1. Patient transfer supine<>sit Independently in 1 week (7/23/19: MET)      Outcome: Resolved/Met     Problem: Mobility Impaired (Adult and Pediatric)  Goal: *Therapy Goal (Edit Goal, Insert Text)  Description  LTG 2. Patient transfer sit<>stand and perform stand pivot transfer with LRAD and modified independence in 1 week (7/23/19: MET)      Outcome: Resolved/Met     Problem: Mobility Impaired (Adult and Pediatric)  Goal: *Therapy Goal (Edit Goal, Insert Text)  Description  LTG 3. Patient ambulate 150 feet with LRAD and MOD I in 1 week  (7/23/19: MET)      Outcome: Resolved/Met     Problem: Mobility Impaired (Adult and Pediatric)  Goal: *Therapy Goal (Edit Goal, Insert Text)  Description  LTG 4. Patient ambulate up/down 4 steps with handrails and supervision in 1 week  (7/23/19: MET)      Outcome: Resolved/Met     Problem: Mobility Impaired (Adult and Pediatric)  Goal: *Therapy Goal (Edit Goal, Insert Text)  Description  LTG 5.  Patient ambulate up/down 10 ft ramp with LRAD and MOD I in 1 week  (7/23/19: MET)   Outcome: Resolved/Met     Problem: Patient Education: Go to Patient Education Activity  Goal: Patient/Family Education  Description  Patient / Patient's family will verbalize understanding of PT safety recommendations, demonstrate appropriate assist for current functional mobility status, safety, and home exercise program by time of discharge. (7/23/19: MET)      Outcome: Resolved/Met

## 2019-07-23 NOTE — PROGRESS NOTES
Problem: Falls - Risk of  Goal: *Absence of Falls  Description  Document Denzel Robison Fall Risk and appropriate interventions in the flowsheet. 7/23/2019 0755 by Edinson Fletcher RN  Outcome: Progressing Towards Goal  Note:   Fall Risk Interventions:  Mobility Interventions: Patient to call before getting OOB, Utilize walker, cane, or other assistive device    Mentation Interventions: Door open when patient unattended, Room close to nurse's station    Medication Interventions: Patient to call before getting OOB    Elimination Interventions: Call light in reach, Patient to call for help with toileting needs, Toilet paper/wipes in reach    History of Falls Interventions: Door open when patient unattended      7/23/2019 0744 by Edinson Fletcher RN  Outcome: Progressing Towards Goal  Note:   Fall Risk Interventions:  Mobility Interventions: Patient to call before getting OOB, Utilize walker, cane, or other assistive device    Mentation Interventions: Door open when patient unattended, Room close to nurse's station    Medication Interventions: Patient to call before getting OOB    Elimination Interventions: Call light in reach, Patient to call for help with toileting needs, Toilet paper/wipes in reach    History of Falls Interventions: Door open when patient unattended         Problem: Patient Education: Go to Patient Education Activity  Goal: Patient/Family Education  Outcome: Progressing Towards Goal     Problem: Inpatient Rehab (Adult)  Goal: *LTG: Avoids injury/falls 100% of time related to deficits  Outcome: Progressing Towards Goal  Goal: *LTG: Avoids infection 100% of time related to deficits  Outcome: Progressing Towards Goal     Problem: Pressure Injury - Risk of  Goal: *Prevention of pressure injury  Description  Document Mark Scale and appropriate interventions in the flowsheet.   7/23/2019 0755 by Edinson Fletcher RN  Outcome: Progressing Towards Goal  Note:   Pressure Injury Interventions:  Sensory Interventions: Assess changes in LOC, Pressure redistribution bed/mattress (bed type)    Moisture Interventions: Absorbent underpads    Activity Interventions: Increase time out of bed, Pressure redistribution bed/mattress(bed type)    Mobility Interventions: Pressure redistribution bed/mattress (bed type)    Nutrition Interventions: Document food/fluid/supplement intake    Friction and Shear Interventions: Apply protective barrier, creams and emollients             7/23/2019 0744 by Mirta Hairston RN  Outcome: Progressing Towards Goal  Note:   Pressure Injury Interventions:  Sensory Interventions: Assess changes in LOC, Pressure redistribution bed/mattress (bed type)    Moisture Interventions: Absorbent underpads    Activity Interventions: Increase time out of bed, Pressure redistribution bed/mattress(bed type)    Mobility Interventions: Pressure redistribution bed/mattress (bed type)    Nutrition Interventions: Document food/fluid/supplement intake    Friction and Shear Interventions: Apply protective barrier, creams and emollients, Foam dressings/transparent film/skin sealants

## 2019-07-23 NOTE — PROGRESS NOTES
Spoke with patient requested information on \"[employer]\". Gave her the information regarding this service. Called granddaughter Modesto Showers she stated Argenis Alejandra is a hoarder and won't let anybody touch anything to clean for her. I offered to bring her meals. \"  Spoke with Dr.Choe samuel, ordered social work with home health

## 2019-07-23 NOTE — PROGRESS NOTES
OT DISCHARGE REPORT    Time In: 0915  Time Out: Rolando Mukherjee 64 Initial Assessment Discharge Assessment 7/23/2019   Score 6 7     EXPRESSION Initial Assessment Discharge Assessment 7/23/2019   Primary Mode of Expression Verbal Verbal   Score 7 7   Comments WNL WNL     SOCIAL INTERACTION/ PRAGMATICS Initial Assessment Discharge Assessment 7/23/2019   Score 7 7   Comments Pleasant, agreeable Pleasant     PROBLEM SOLVING Initial Assessment Discharge Assessment 7/23/2019   Score 5 6   Comments Solves complex problems with cues, or basic problems 90% or more of the time Makes decisions with mild difficulty or solves complex problems with extra time     MEMORY Initial Assessment Discharge Assessment 7/23/2019   Score 5 5   Comments Recognizes, recalls, or executes 3 steps of 3 step request 90% of the time  Recognizes, recalls, or executes 3 steps of 3 step request 90% of the time (cueing, reminders less than 10%, loses track of time) or cueing and coaxing under stressful/unfamiliar conditions     EATING Initial Assessment Discharge Assessment 7/23/2019   Functional Level 5 7   Comments Setup at tray table Independent     GROOMING Initial Assessment Discharge Assessment 7/23/2019   Functional Level 5 7   Tasks completed by patient Washed face Brushed teeth;Brushed hair   Comments Setup Completes all tasks     BATHING Initial Assessment Discharge Assessment 7/23/2019   Functional Level 4 6   Body parts patient bathed Thigh, right, Thigh, left, Chantal area, Lower leg and foot, right, Lower leg and foot, left, Chest, Buttocks, Arm, right, Arm, left, Abdomen Thigh, right;Thigh, left;Chantal area; Lower leg and foot, left; Lower leg and foot, right;Chest;Buttocks;Arm, right;Arm, left; Abdomen   Comments CGA in stance as patient bathed and rinsed chantal area/bottom, use of bed rail Seated on TTB/standing with use of grab bar     TUB/SHOWER TRANSFER INDEPENDENCE Initial Assessment Discharge Assessment 7/23/2019   Score 4 5  Type of Shower: Shower  Adaptive  Equipment:Tub transfer bench, Grab bars and Rollator   Comments Ambulating with RW SBA ambulating with rollator     UPPER BODY DRESSING/UNDRESSING Initial Assessment Discharge Assessment 7/23/2019   Functional Level 4 6   Items applied/Steps completed Bra (3 steps), Pullover (4 steps) Bra (3 steps); Pullover (4 steps)   Comments Assist to help bra down back Increased time for bra, patient gathers clothing     LOWER BODY DRESSING/UNDRESSING Initial Assessment Discharge Assessment 7/23/2019   Functional Level 4 6   Items applied/Steps completed Shoe, right (1 step), Shoe, left (1 step), Underpants (3 steps), Elastic waist pants (3 steps) Underpants (3 steps); Shoe, right (1 step); Shoe, left (1 step); Elastic waist pants (3 steps)   Comments Assist to help R shoe around heel, CGA in stance as patient manages clothing over hips Stabilizing on grab bar/rollator in stance PRN to manage clothing over hips     TOILETING Initial Assessment Discharge Assessment 7/23/2019   Functional Level 4 6   Comments Assist for standing balance Use of grab bar     TOILET TRANSFER INDEPENDENCE Initial Assessment Discharge Assessment 7/23/2019   Transfer score 4 6   Comments Ambulating with RW Ambulating with rollator     Plan of Care: Please see Care Plan for progress towards goals during stay  Precautions at Discharge: Falls  Discharge Location: Private Residence  Safety/Supervision Recommendations/Functional Level:    Patient is modified independent for ADL and functional mobility ambulating with rollator. Recommending initial supervision with IADLs and shower transfer at discharge for increased safety  Family Training: N/A    Patient educated regarding DME options for bathing in bathtub shower (shower chair and TTB available at home/from family) at discharge, as well as recommendation for use of TTB for increased safety and independence at this time.   Verbalized and demonstrated understanding of safe transfer techniques, DME setup, and shower curtain liner modification (provided printed diagram) for TTB. Patient provided printed copy of Theraband HEP for BUE and green Theraband for discharge. Patient demonstrated and verbalized understanding of techniques, frequency, and intensity for 6 BUE therapeutic exercises included in HEP. Recommended Continuing Therapy: Home Health OT  Residual Deficits:  Decreased balance/functional mobility, decreased cognition, decreased activity tolerance  Progress over LOS: Patient demonstrates progress with the above noted deficits for performance of ADL and functional transfers since [facility] to [facility], see above for details. Recommending HHOT to continue to address above noted deficits and progress patient towards PLOF with IADL and functional mobility. Summary of Session:   S: \"Can you give me some instructions for how to cut my shower curtain? \" [written instructions/diagram provided] Agreeable to therapy. Focus of session was on Discharge ADL evaluation. Patient was able to ambulate ~10 feet x 3 using a rollator with modified independence. Pain not indicated. Collaborated with PT, Marquis Diamond and agreed patient is ready for discharge. Patient ended session in recliner with Dawn Olivia assuming care.      Suni Scale, OTR/L

## 2019-07-23 NOTE — PROGRESS NOTES
Problem: Self Care Deficits Care Plan (Adult)  Goal: *Therapy Goal (Edit Goal, Insert Text)  Description  LTG 1: Patient will be modified independent with dressing using AE/DME PRN within 7 days. GOAL MET 7/23/2019      Outcome: Resolved/Met  Goal: *Therapy Goal (Edit Goal, Insert Text)  Description  LTG 2: Patient will be modified independent with bathing using AE/DME PRN within 7 days  GOAL MET 7/23/2019        Outcome: Resolved/Met  Goal: *Therapy Goal (Edit Goal, Insert Text)  Description  LTG 3: Patient will be modified independent with toilet transfer using AE/DME PRN within 7 days  GOAL MET 7/23/2019        Outcome: Resolved/Met  Goal: *Therapy Goal (Edit Goal, Insert Text)  Description  LTG 4: Patient will be SBA-modified independent with tub/shower transfer using AE/DME PRN within 7 days   GOAL MET 7/23/2019     Outcome: Resolved/Met  Goal: *Therapy Goal (Edit Goal, Insert Text)  Description  Patient will complete simple meal preparation/kitchen management simulation using AD PRN with SBA by discharge. GOAL MET 7/23/2019        Outcome: Resolved/Met     Problem: Patient Education: Go to Patient Education Activity  Goal: Patient/Family Education  Description  Pt/caregiver will demonstrate and verbalize good understanding of OT recommendations regarding ADL status, functional transfer status, home safety, DME, AE, energy conservation techniques, follow-up therapy, for increasing safety with functional tasks upon discharge.   GOAL MET 7/23/2019     Outcome: Resolved/Met

## 2019-07-23 NOTE — PROGRESS NOTES
PHYSICAL THERAPY DISCHARGE SUMMARY    Time in: 1003  Time otu: 1048     Precautions at discharge: Other (comment)(fall risk)    Problem List:    Decreased strength B LE  [x]     Decreased strength trunk/core  [x]     Decreased AROM   []     Decreased PROM  []     Decreased balance sitting  []     Decreased balance standing  []     Decreased endurance  [x]     Pain  []       Functional Limitations:   Decreased independence with bed mobility  []     Decreased independence with functional transfers  []     Decreased independence with ambulation  []     Decreased independence with stair negotiation  []            Outcome Measures: Vital Signs:   Patient Vitals for the past 8 hrs:   Temp Pulse Resp BP SpO2   07/23/19 0751 98.7 °F (37.1 °C) 66 14 155/64 97 %       Pain level: No pain reported. Pain location: n/a  Pain interventions: n/a    Patient education: Educated patient on safety with home environment, pacing with activities around the house. Interdisciplinary Communication: Communicated with Latonya Peralta OT regarding patient's POC. Cognition: Intact.      MMT Initial Asssessment   Right Lower Extremity Left Lower Extremity   Hip Flexion 4- 4   Knee Extension 4- 4   Knee Flexion 4- 4   Ankle Dorsiflexion 4 4      MMT Discharge Assessment   Right Lower Extremity Left Lower Extremity   Hip Flexion 4+ 4+   Knee Extension 4+ 4+   Knee Flexion 4+ 4+   Ankle Dorsiflexion 4+ 4+   0/5 No palpable muscle contraction  1/5 Palpable muscle contraction, no joint movement  2-/5 Less than full range of motion in gravity eliminated position  2/5 Able to complete full range of motion in gravity eliminated position  2+/5 Able to initiate movement against gravity  3-/5 More than half but not full range of motion against gravity  3/5 Able to complete full range of motion against gravity  3+/5 Completes full range of motion against gravity with minimal resistance  4-/5 Completes full range of motion against gravity with minimal-moderate resistance  4/5 Completes full range of motion against gravity with moderate resistance  4+/5 Completes full range of motion against gravity with moderate-maximum resistance  5/5 Completes full range of motion against gravity with maximum resistance     AROM: WFL    FIM SCORES Initial Assessment Discharge Assessment   Bed/Chair/Wheelchair Transfers 4 6   Wheelchair Mobility 0(Patient's primary mode of locomotion is ambulation) 0(Patient's primary mode of locomotion is ambulation)   Walking [place] 1 6   Steps/Stairs 0 5   PRIMARY MODE OF LOCOMOTION: Ambulation  Please see [facility] Interdisciplinary Eval: Coordination/Balance Section for details regarding FIM score description.     BED/CHAIR/WHEELCHAIR TRANSFERS Initial Assessment Discharge Assessment   Rolling Right 4 (Contact guard assistance) 7 (Independent)   Rolling Left 4 (Contact guard assistance) 7 (Independent)   Supine to Sit 4 (Contact guard assistance) 7 (Independent)   Sit to Stand Minimal assistance Modified independent   Sit to Supine 4 (Minimal assistance) 7 (Independent)   Transfer Assist Score 4 6   Transfer Type SPT without device SPT with walker   Comments Increased time and effort, moderate forward head posture, decreased step length B LE rollator   Car Transfer Not tested Independent   Car Type rehab car rehab car       [facility] MOBILITY/MANAGEMENT Initial Assessment Discharge Assessment   Able to Propel 0 feet 0 feet   Functional Level 0(Patient's primary mode of locomotion is ambulation) 0(Patient's primary mode of locomotion is ambulation)   Curbs/ramps assistance required 0 (Not tested) 0 (Not tested)   Wheelchair set up assistance required 0 (Not tested) 0 (Not tested)   Wheelchair management           WALKING INDEPENDENCE Initial Assessment Discharge Assessment   Assistive device Gait belt, Other (comment)(HHA x1) Walker, rollator   Ambulation assistance - level surface 1 (Dependent/total assistance)(MIN A x 1, 2nd person w/c follow) 6 (Modified independent)   Distance 45 Feet (ft) 300 Feet (ft)(x1, 150' x2)   Functional Level 1 6   Comments Patient ambulated with slow partail step through gait pattern, decreased step clearance of B LE, mild forward head posture Patient ambulated with improved upright posture and step through gait pattern   Ambulation assistance - unlevel surface 0 (Not tested)(Secondary to safety concerns) 6 (Modified independent)       STEPS/STAIRS Initial Assessment Discharge Assessment   Steps/Stairs ambulated 0 4   Rail Use Both Both   Functional Level 0 5   Comments Secondary to safety concerns Patient ambulated up/down steps with one step at a time method. Curbs/Ramps 0 (Not tested)(Secondary to safety concnerns) 6 (Modified independent)(with rollator)       QUALITY INDICATOR ASSIST COMMENTS   Walk 10 feet 6: Independent    Walk 50 feet with 2 turns 6: Independent    Walk 150 feet 6: Independent    Walk 10 feet on uneven  6: Independent    1 step/curb 4: Supervision or touching A    4 steps 6: Independent    12 steps Not Tested: Pt refused Secondary to fatigue.  object 6: Independent    Wheel 48' w/2 turns Not Tested: Not applicable secondary to pt not completing activity previously    Wheel 150' Not Tested: Not applicable secondary to pt not completing activity previously      Patient returned to room sitting in bedside chair with all needs in reach. PHYSICAL THERAPY PLAN OF CARE    LTGs: For updated LTG's please see Care Plan. Pt would benefit from continued skilled physical therapy in order to improve independent functional mobility within the home with use of least restrictive device. Interventions may include range of motion (AROM, PROM B LE/trunk), motor function (B LE/trunk strengthening/coordination), activity tolerance (vitals, oxygen saturation levels), bed mobility training, balance activities, gait training (progressive ambulation program), and functional transfer training. HEP handout: Given to patient on 7/23/19. Therapy Recommendations upon discharge: Bebe Velazquez needs at discharge:    None. Please see IRC; Interdisciplinary Eval, Care Plan, and Patient Education for further information regarding physical therapy discharge summary and plan of care.      Vitaly Amato  7/23/2019

## 2019-07-23 NOTE — PROGRESS NOTES
Problem: Falls - Risk of  Goal: *Absence of Falls  Description  Document [facility] Fall Risk and appropriate interventions in the flowsheet. Outcome: Progressing Towards Goal  Note:   Fall Risk Interventions:  Mobility Interventions: Patient to call before getting OOB, Utilize walker, cane, or other assistive device    Mentation Interventions: Door open when patient unattended, Room close to nurse's station    Medication Interventions: Patient to call before getting OOB    Elimination Interventions: Call light in reach, Patient to call for help with toileting needs, Toilet paper/wipes in reach    History of Falls Interventions: Door open when patient unattended         Problem: Pressure Injury - Risk of  Goal: *Prevention of pressure injury  Description  Document Mark Scale and appropriate interventions in the flowsheet.   Outcome: Progressing Towards Goal  Note:   Pressure Injury Interventions:  Sensory Interventions: Assess changes in LOC, Pressure redistribution bed/mattress (bed type)    Moisture Interventions: Absorbent underpads    Activity Interventions: Increase time out of bed, Pressure redistribution bed/mattress(bed type)    Mobility Interventions: Pressure redistribution bed/mattress (bed type)    Nutrition Interventions: Document food/fluid/supplement intake    Friction and Shear Interventions: Apply protective barrier, creams and emollients, Foam dressings/transparent film/skin sealants

## 2019-07-23 NOTE — PROGRESS NOTES
Problem: Falls - Risk of  Goal: *Absence of Falls  Description  Document Travis Boeck Fall Risk and appropriate interventions in the flowsheet. Outcome: Progressing Towards Goal  Note:   Fall Risk Interventions:  Mobility Interventions: Patient to call before getting OOB    Mentation Interventions: Door open when patient unattended    Medication Interventions: Patient to call before getting OOB    Elimination Interventions: Call light in reach    History of Falls Interventions: Consult care management for discharge planning, Door open when patient unattended         Problem: Patient Education: Go to Patient Education Activity  Goal: Patient/Family Education  Outcome: Progressing Towards Goal     Problem: Inpatient Rehab (Adult)  Goal: *LTG: Avoids injury/falls 100% of time related to deficits  Outcome: Progressing Towards Goal  Goal: *LTG: Avoids infection 100% of time related to deficits  Outcome: Progressing Towards Goal  Goal: *LTG: Verbalize understanding of diagnosis and risk factors for recurring stroke  Outcome: Progressing Towards Goal  Goal: *LTG: Absence of DVT during hospitalization  Outcome: Progressing Towards Goal  Goal: *LTG: Maintains Skin Integrity With No Evidence of Pressure Injury 100% of Time  Outcome: Progressing Towards Goal     Problem: Pressure Injury - Risk of  Goal: *Prevention of pressure injury  Description  Document Mark Scale and appropriate interventions in the flowsheet.   Outcome: Progressing Towards Goal  Note:   Pressure Injury Interventions:  Sensory Interventions: Assess changes in LOC, Pressure redistribution bed/mattress (bed type)    Moisture Interventions: Absorbent underpads    Activity Interventions: Increase time out of bed, Pressure redistribution bed/mattress(bed type)    Mobility Interventions: Pressure redistribution bed/mattress (bed type)    Nutrition Interventions: Document food/fluid/supplement intake    Friction and Shear Interventions: Apply protective barrier, creams and emollients, Foam dressings/transparent film/skin sealants                Problem: Patient Education: Go to Patient Education Activity  Goal: Patient/Family Education  Outcome: Progressing Towards Goal

## 2019-07-23 NOTE — DISCHARGE SUMMARY
REHABILITATION DISCHARGE SUMMARY     Patient: Jean Alberto MRN: [id number]  SSN: [id number]    YOB: 1945  Age: 74 y.o. Sex: female      Date: 7/23/2019  Admit Date: 7/15/2019  Discharge Date: 7/23/2019    Admitting Physician: Sayda Jackson MD   Primary Care Physician: Alice Pena MD     Admission Condition: good    Chief Complaint : Gait dysfunction secondary to below. Admit Diagnosis: CHF (congestive heart failure) ([address] [I50.9]; Polymyalgia rheumatica ([address] [M35.3]; Gout attack [M10.9]; Physical deconditioning [R53.81]; Chronic atrial fibrillation with rapid ventricular response ([address] [I48.2]; Impaired gait and mobility [R26.89]; Candidiasis of perineum [B37.49]; Hypertension [I10]  Acute hypoxemic respiratory failure ([address] [J96.01]; Severe sepsis ([address] [A41.9, R65.20]  Acute hypoxemic respiratory failure  HTN (hypertension)\  Hypotension due to hypovolemia\  Atrial fibrillation with rapid ventricular response ([address] vs WPW in patient with history of ablation    S/p electric cardioversion of a.fib. Pulmonary infiltrates vs acute congestive heart failure (7/3/2019)  Severe obesity with body mass index (BMI) of 35.0 to 39.9 with comorbidity ([address] (8/12/2013)  Severe sepsis ([address] (7/3/2019)   Sjogren's syndrome ([address] (8/12/2013)  Polymyalgia rheumatica ([address] (12/2/2015)  weakness  Pain  DVT risk  Acute Rehab Dx:  weakness  deconditioning  Mobility and ambulation deficits  Self Care/ADL deficits     HPI: Luly Nova is a 74 y. o. female patient at [address] [place] [address] was admitted on 7/3/2019  by [address] [place], [place] [address]ion [facility] [place] Sarwat below mentioned medical history ,is being seen for Physical Medicine and Rehabilitation consult.       Eren Boyd has history of PMR on chronic steroids, WPW s/p ablation, gout, HLD, HTN presented with progressively worsening shortness of breath. Patient was found to be in atrial fibrillation with rapid ventricular rate.  CXR showed patchy bilateral edema vs infiltrates. Patient developed progressive hypoxemia/ respiratory failure,required intubation, was admitted to ICU on ventilator support. Cardiology has started treatment for HR control, a.fib, started, continued on Cardizem atrial fibrillation and she was started on Cardizem gtt and  and heparin gtt. Patient continued on iv lasix, treatment of CHF.  Patient has been transitioned to Eliquis. Patient was extubated successfully on 7/5.  CXR (7/9)still shows bilateral pleural effusions and vascular congestion. No significant change. Patient is still requiring Cardizem drip and IV Lasix as HR still elevated.   Patient HR continued to be rapid, refractory to iv cardizem administration. On 7/12 She underwent a transesophageal echocardiogram and electrical cardioversion for persistent atrial fibrillation. Post op patient was started on amiodarone load iv. Patient is continued on lasix for volume overload. Patient is continued on Coreg per cardiology direction. Patiet started to have pain in bilateral feet. Acute gout flair is diagnosed, was started on colchicine. Patient reported to have severe intertrigo throughout her mary lou areas. topical and oral medications will be needed. Acute PT, OT and ST has started evaluation and treatment as tolerated.  Patient's symptoms have improved and she is participating fairly with acute therapy. she is able to negotiate mobility, transfers and walking short distances with CGA/ min A using a RW. She is still limited by weakness and decreased activity tolerance. Luly Nova is seen and examined today. Medical Records reviewed. At baseline patient is independent with all activities using a sc or a rollator.      Physical therapy was initiated and patient was starting to mobilize. However, Patient shows significant functional deficits due to prolonged immobility, hospitalization and on goinmg a.fib, CHF, pulmonary congestion. .  Our service was consulted for rehab needs and we recommended inpatient hospital rehabilitation is reasonable and necessary due to ongoing acute medical issues which have not changed since initial pre-admission evaluation. I reviewed the preadmission screening and have approved for admission to the [facility]. The patient was cleared for transfer to rehab today. Patient continues to have ongoing  medical issues outlined above requiring physician medical supervision and functional deficits which would benefit from continued intensive therapies.        Rehabiitation Course:   Patient was admitted to acute medical rehab unit continued on medical management for acute hypoxemic respiratory failure, CHF. Patient was eventually weaned off of diuretics, Lasix but good response. Patient eventually deemed you bulimic stable on room air off diuretics. Atrial fibrillation status post electrocardioversion monitored closely. Patient continued on call rag reduced amiodarone does. Last EKG showed nsr, QT within normal limits. Steroid courses have been finished. UTI diagnosed while at [facility]. Patient is treated with Bactrim DS for seven days to finish course at home. Add IRC patient was treated with PO and topical antifungals for candidiasis. Functionally patient has had steady improvements and mobility and ambulation transfer abilities as well as ADLs. Patient has reached her Worship and goals to be safely discharged home. Patients prescriptions and medications and follow ups as below. Home Architecture: Home Situation  Home Environment: Apartment (07/16/19 1100)  # Steps to Enter: 0 (07/16/19 1100)  One/Two Story Residence: One story (07/16/19 1100)  Living Alone: Yes (07/16/19 1100)  Support Systems: Child(aubrey) (07/16/19 1100)  Patient [facility] (07/16/19 1100)  Current DME Used/Available at Home: [address] [facility], straight;Grab bars;Raised toilet seat; Shower chair;Walker, rollator (07/16/19 1100)  Tub or Shower Type: Shower (07/20/19 5130)     Past Medical History:   Diagnosis Date    Arrhythmia     Arthritis     Asthma     Cellulitis     Chronic kidney disease     Hx renal failure    Depression     Gout     Gouty arthritis  NOS 12/2/2015    Hypercholesterolemia     Hypertension     Mitral valve prolapse     Morbid obesity (HCC)     Nausea & vomiting     Polymyalgia rheumatica (HCC) 12/2/2015    Psychiatric disorder     depression & anxiety    PUD (peptic ulcer disease)     [facility]ogren's syndrome [facility]     Temporal arteritis ([facility]       Past Surgical History:   Procedure Laterality Date    CARDIAC SURG PROCEDURE UNLIST  4/07    STRESS TEST    CARDIAC SURG PROCEDURE UNLIST  8/13    HEART ABLATION    HX COLONOSCOPY  6/08    HX ENDOSCOPY  8/08    HX HYSTERECTOMY      HX TUBAL LIGATION        Family History   Problem Relation Age of Onset    Cancer Mother     Breast Cancer Mother 80    Hypertension Mother     Dementia Mother     Cancer Father         LUNG    Heart Disease Father 62    Diabetes Son     Other Son         PSYCHIATRIC ILLNESS    Hypertension Sister       Social History     Tobacco Use    Smoking status: Never Smoker    Smokeless tobacco: Never Used   Substance Use Topics    Alcohol use: No       Allergies   Allergen Reactions    Celebrex [Celecoxib] Rash    Lisinopril Cough    Losartan Other (comments)     Burning sensation tongue.  Nifedipine Other (comments)     severe headache       Prior to Admission medications    Medication Sig Start Date End Date Taking? Authorizing Provider   amiodarone (PACERONE) 400 mg tablet Take 1 Tab by mouth two (2) times a day. 7/15/19  Yes Yuridia West MD   apixaban (ELIQUIS) 5 mg tablet Take 1 Tab by mouth every twelve (12) hours. 7/15/19  Yes Yuridia West MD   carvedilol (COREG) 6.25 mg tablet Take 1 Tab by mouth two (2) times daily (with meals). 7/15/19  Yes Yuridia West MD   furosemide (LASIX) 20 mg tablet Take 1 Tab by mouth daily.  7/15/19  Yes Yuridia West MD   COLCRYS 0.6 mg tablet Take 1 Tab by mouth as needed (gout) for up to 360 days. 7/15/19 7/9/20  Marysol Fournier MD   spironolactone (ALDACTONE) 25 mg tablet Take 1 Tab by mouth daily. 7/16/19   Marysol Fournier MD   potassium chloride (KLOR-CON) 10 mEq tablet Take 1 Tab by mouth daily. 7/15/19   Marysol Fournier MD   albuterol (PROVENTIL HFA, VENTOLIN HFA, PROAIR HFA) 90 mcg/actuation inhaler Take 2 Puffs by inhalation every six (6) hours as needed for Wheezing. 6/3/19   Gretel Lima NP   magnesium oxide (MAG-OX) 400 mg tablet Take 400 mg by mouth two (2) times a day.     Provider, Historical       Current Medications:  Current Facility-Administered Medications   Medication Dose Route Frequency Provider Last Rate Last Dose    amiodarone (CORDARONE) tablet 200 mg  200 mg Oral BID Berta ABDUL MD   200 mg at 07/23/19 0805    nystatin (MYCOSTATIN) 100,000 unit/gram powder   Topical BID PRN Zuri Pennington MD        carvedilol (COREG) tablet 6.25 mg  6.25 mg Oral BID WITH MEALS Berta ABDUL MD   6.25 mg at 07/23/19 0806    trimethoprim-sulfamethoxazole (BACTRIM DS, SEPTRA DS) 160-800 mg per tablet 1 Tab  1 Tab Oral Q12H Zuri Pennington MD   1 Tab at 07/23/19 0805    sodium chloride (NS) flush 5-40 mL  5-40 mL IntraVENous PRN Zuri Pennington MD        apixaban (ELIQUIS) tablet 5 mg  5 mg Oral Q12H Berta ABDUL MD   5 mg at 07/23/19 0805    bisacodyl (DULCOLAX) tablet 5 mg  5 mg Oral DAILY PRN Zuri Pennington MD        ibuprofen (MOTRIN) tablet 600 mg  600 mg Oral Q6H PRN Zuri Pennington MD   600 mg at 07/23/19 5834    albuterol (PROVENTIL VENTOLIN) nebulizer solution 1.25 mg  1.25 mg Nebulization Q6H PRN Zuri Pennington MD        loperamide (IMODIUM) capsule 2 mg  2 mg Oral Q4H PRN Zuri Pennington MD        pantoprazole (PROTONIX) tablet 40 mg  40 mg Oral ACB Berta ABDUL MD   40 mg at 07/23/19 0515    temazepam (RESTORIL) capsule 15 mg  15 mg Oral QHS PRN Zuri Pennington MD            Review of Systems: Denies chest pain, shortness of breath, cough, headache, visual problems, abdominal pain, dysurea, calf pain. Pertinent positives are as noted in the medical records and unremarkable otherwise. Vital Signs:   Patient Vitals for the past 8 hrs:   BP Temp Pulse Resp SpO2   07/23/19 0751 155/64 98.7 °F (37.1 °C) 66 14 97 %        Physical Exam:         General:   AAO x 3   HEENT:  Normocephalic, EOMI,   No JVD sitting   Lungs:  CTA Bilaterally, no wheezing   Heart:  Regular rate and rhythm, S1, S2, No murmur    Genitourinary: defered   Abdomen:  Soft, non-tender to palpation in all four quadrants.  Bowel sounds present.     Neuromuscular:  PERRL, EOMI  + mild generalized weakness, more proximal> distal BLE  Sensory - intact   Skin:  + maculopapular rash with satellite lesions at folds at groin, perineum mary lou anal areas, intragluteal cleft    Edema: no pedal edema BLE                  Lab Review:   Recent Results (from the past 72 hour(s))   METABOLIC PANEL, BASIC    Collection Time: 07/22/19 12:23 PM   Result Value Ref Range    Sodium 139 136 - 145 mmol/L    Potassium 4.7 3.5 - 5.1 mmol/L    Chloride 106 98 - 107 mmol/L    CO2 22 21 - 32 mmol/L    Anion gap 11 7 - 16 mmol/L    Glucose 108 (H) 65 - 100 mg/dL    BUN 25 (H) 8 - 23 MG/DL    Creatinine 1.73 (H) 0.6 - 1.0 MG/DL    GFR est AA 37 (L) >60 ml/min/1.73m2    GFR est non-AA 31 (L) >60 ml/min/1.73m2    Calcium 8.9 8.3 - 10.4 MG/DL       PT Initial  PT Most Recent   AROM: Generally decreased, functional (07/19/19 1200) Generally decreased, functional (07/19/19 1200)   PROM: Generally decreased, functional (07/19/19 1200) Generally decreased, functional (07/19/19 1200)   Strength: Generally decreased, functional (07/19/19 1200) Generally decreased, functional (07/19/19 1200)   Coordination: Generally decreased, functional (07/19/19 1200) Generally decreased, functional (07/19/19 1200)         Sensation: Intact (07/19/19 1200) Intact (07/19/19 1200)   Amount of Assistance: 4 (Minimal assistance) (07/16/19 1230) 5 (Supervision/setup) (07/22/19 1500)   Distance (ft): 45 Feet (ft) (07/16/19 1100) 300 Feet (ft)(x2) (07/22/19 1500)   Assistive Device: Gait belt; Other (comment)(HHA x1) (07/16/19 1100) Walker, rollator (07/22/19 1500)       OT Initial OT Most Recent   Feeding/Eating Assistance: 7 (Independent) (07/18/19 0804) 7 (Independent) (07/18/19 0804)   Grooming Assistance : 5 (Supervision) (07/17/19 0933) 5 (Supervision) (07/20/19 0843)   [address], Upper: 5 (Supervision) (07/17/19 0933) 5 (Supervision) (07/20/19 0843)   Bathing Assistance, Lower : 5 (Supervision) (07/17/19 0933) 5 (Supervision) (07/20/19 0843)   Toileting Assistance (FIM Score): 5 (Supervision) (07/17/19 0933) 5 (Supervision) (07/20/19 0843)   Dressing Assistance : 4 (Minimal assistance) (07/17/19 0933) 5 (Supervision) (07/20/19 0843)   Dressing Assistance : 4 (Minimal assistance) (07/17/19 0933) 5 (Supervision) (07/20/19 0843)     ST Initial ST Most Recent                        Active Problems:    Polymyalgia rheumatica ([facility] (12/2/2015)      CHF (congestive heart failure) ([facility] (7/15/2019)      Hypertension (7/15/2019)      Physical deconditioning (7/15/2019)      Gout attack (7/15/2019)      Candidiasis of perineum (7/15/2019)      Impaired gait and mobility (7/15/2019)      Chronic atrial fibrillation with rapid ventricular response ([facility] (7/15/2019)          Condition on discharge :  good  Rehabilitation  potential : Good     Discharge Instructions:  Rehabilitation Management/ Medical Management:   Acute hypoxemic respiratory failure/ CHF  - secondary to a.fib.   - continue oral lasix. Monitor volume status. Will wean diuretics as appropriate  7/16- cxR clear, no edema. will d/c lasix. Still continue aldactone for now. Monitor response. 7/17 - no s/s of decompensation. + mild fatigue. Will watch closely. Still on aldactone. Lasix d/c'd  7/18 - no signs of decompensation. Now off lasix.    7/19- clinically euvolemic will hold aldactone. Monitor clinical volume status. 7/20 - off diuretics. Resume coreg. Monitor HR/BP. Volume status. 7/21- comfortable on RA.   7/22- hypoxia resolved. SaO2 remains normal at therapies. Off diuretics. 7/23 - stable pulmonary exam. Stable off lasix, O2.         Atrial fibrillation with rapid ventricular response (HCC) vs WPW in patient with history of ablation    HTN (hypertension)\  Hypotension due to hypovolemia  - s/p electric cardioversion - continue amiodarone po.   - continue eliquis  - monitor for recurrent a.fib. HR control.   - continue remote telemetry. 7/17- RRR. NSR.    7/18 - PAF continue- amiodarone, eliquis/  sCHF - continue Coreg and spironolactone. Lasix discontinued. 7/19 - continue amiodarone load. 7/20- HR controlled. Resume coreg 6.125 bid. Monitor response. Off diuretics. 7/21 - resume Coreg. HR/BP on fair range. Monitor. 7/22- appears in NSR. EKG showed no arrhythmia, QT int wnl. Loaded on amiodarone post ECCV. Will reduce amiodarone dose to 200 bid. continue Coreg 6.125 bid. Monitor rate.         Severe sepsis (HCC) (7/3/2019) / Pulmonary infiltrates vs acute congestive heart failure (7/3/2019)- treated. abx finished. Pneumonia prophylaxis- Insentive spirometer every hour while awake  -stable lung exam.      Severe obesity with body mass index (BMI) of 35.0 to 39.9 with comorbidity  - cardiac diet.      Sjogren's syndrome/ Polymyalgia rheumatica - steroid course finished. Monitor for acute flair, increased weakness.   - off steroids, immunosuppressants. Pt was weaned prior to admit per rheumatologist. Monitor.   - 7/22- remians asymptomatic off steroids, immunosuppresants.      DVT risk / DVT Prophylaxis-    - covered with eliquis.      Pain Control: stable, mild-to-moderate joint symptoms intermittently, reasonably well controlled by PRN meds.  Will require regular pain assessment and comprenhensive pain management,   - foot pain -   - acute gout flair - 7/17 - candice pain improving slowly with prn ibuprofen. Pt would like to hold off on colchicine due to past loose stools.    7/19 foot pain resolved.      Wound Care: Monitor wound status daily per staff and physician. At risk for failure. Will require 24/7 rehab nursing. Keep wound clean and dry  - candidiasis periarea, genital.   - 7/17 - on nystatin powder and barrier cream +diflucan po.   7/18 - continue po, topical antifungals. 7/22- diflucan course completed. Continue topical treatment daily. 7/23 -      Potential urinary retention/ neurogenic bladder -   - c/o frequency following prolonged covarrubias insertion, removal.   7/17 - UTI -GNR. started [employer]. Follow susceptability. 7/18- ESBL reported. will change to Bactrim DS x 7 days. Pt still with frequency, bladder irriitation. 7/19  Started Bactrim DS x 7 days. 7/23- bactrim until 7/25       Follow up with Cardiology per instructions. Follow up with PCP 1-2 weeks      Discharge Medications:            trimethoprim-sulfamethoxazole (BACTRIM DS, SEPTRA DS) 160-800 mg per tablet 1 Tab Ordered Dose: 160/800mg   Route: Oral    Frequency: EVERY 12 HOURS x 4 doses          Current Discharge Medication List      CONTINUE these medications which have NOT CHANGED    Details   amiodarone (PACERONE)200 mg tablet Take 1 Tab by mouth two (2) times a day. apixaban (ELIQUIS) 5 mg tablet Take 1 Tab by mouth every twelve (12) hours. carvedilol (COREG) 6.25 mg tablet Take 1 Tab by mouth two (2) times daily (with meals). furosemide (LASIX) 20 mg tablet Take 1 Tab by mouth daily. HOLD. COLCRYS 0.6 mg tablet Take 1 Tab by mouth as needed (gout) for up to 360 days. HOLD. Associated Diagnoses: Chronic gout without tophus, unspecified cause, unspecified site      spironolactone (ALDACTONE) 25 mg tablet Take 1 Tab by mouth daily.   Qty: 30 Tab, Refills: 0      potassium chloride (KLOR-CON) 10 mEq tablet Take 1 Tab by mouth daily. HOLD.      albuterol (PROVENTIL HFA, VENTOLIN HFA, PROAIR HFA) 90 mcg/actuation inhaler Take 2 Puffs by inhalation every six (6) hours as needed for Wheezing. Qty: 1 Inhaler, Refills: 0    Associated Diagnoses: Bronchitis      magnesium oxide (MAG-OX) 400 mg tablet Take 400 mg by mouth two (2) times a day. HOLD. Discharge time: 35 minutes.     Signed By: Gaudencio Livingston MD     July 23, 2019

## 2019-07-24 ENCOUNTER — HOME CARE VISIT (OUTPATIENT)
Dept: SCHEDULING | Facility: HOME HEALTH | Age: 74
End: 2019-07-24
Payer: MEDICARE

## 2019-07-24 ENCOUNTER — PATIENT OUTREACH (OUTPATIENT)
Dept: CASE MANAGEMENT | Age: 74
End: 2019-07-24

## 2019-07-24 VITALS
RESPIRATION RATE: 18 BRPM | SYSTOLIC BLOOD PRESSURE: 128 MMHG | TEMPERATURE: 97.6 F | DIASTOLIC BLOOD PRESSURE: 62 MMHG | HEART RATE: 64 BPM

## 2019-07-24 PROCEDURE — 400013 HH SOC

## 2019-07-24 PROCEDURE — 3331090002 HH PPS REVENUE DEBIT

## 2019-07-24 PROCEDURE — 3331090001 HH PPS REVENUE CREDIT

## 2019-07-24 PROCEDURE — G0151 HHCP-SERV OF PT,EA 15 MIN: HCPCS

## 2019-07-24 NOTE — PROGRESS NOTES
Transition of Care Discharge Follow-up Questionnaire   Date/Time of Call:   7/24/19 2386   What was the patient hospitalized for? [facility] CHF 7/3-7/15/19  [facility] 9th floor [facility] 7/15-7/23/19    Does the patient understand his/her diagnosis and/or treatment and what happened during the hospitalization? Agreeable to [facility] [place] call, no questions about hospitalization   Did the patient receive discharge instructions? No questions   CM Assessed Risk for Readmission:       Patient stated Risk for Readmission:      Low-mod due to age, prolonged hospitalization, diagnoses      None stated   Review any discharge instructions (see discharge instructions/AVS in [facility]). Ask patient if they understand these. Do they have any questions? No questions   Were home services ordered (nursing, PT, OT, ST, etc.)? [facility]     If so, has the first visit occurred? If not, why? (Assist with coordination of services if necessary. )   PT in home during assessment   Was any DME ordered? No   If so, has it been received? If not, why?  (Assist patient in obtaining DME orders &/or equipment if necessary. )   NA       Complete a review of all medications (new, continued and discontinued meds per the D/C instructions and medication tab in [facility]). Meds reviewed and questioning no RX for prn lasix. Encouraged to call PCP. Were all new prescriptions filled? If not, why?  (Assist patient in obtaining medications if necessary  escalate for CCM &/or SW if ongoing issues are verbalized by pt or anticipated)     Yes       Does the patient understand the purpose and dosing instructions for all medications? (If patient has questions, provide explanation and education.)   Verbalizes understanding and compliance   Does the patient have any problems in performing ADLs? (If patient is unable to perform ADLs  what is the limiting factor(s)?   Do they have a support system that can assist? If no support system is present, discuss possible assistance that they may be able to obtain. Escalate for CCM/SW if ongoing issues are verbalized by pt or anticipated)     2 children live in area    Will assess         Does the patient have all follow-up appointments scheduled? 7 day f/up with PCP?   (f/up with PCP may be w/in 14 days if patient has a f/up with their specialist w/in 7 days)    7-14 day f/up with specialist?   (or per discharge instructions)    If f/up has not been made  what actions has the care coordinator made to accomplish this? Has transportation been arranged? 7/29 PCPChantal NP    8/1 Cardiology, Wu ESPINOZA                        Pt uses [employer] for transport   Any other questions or concerns expressed by the patient? No   Schedule next appointment with CHRISTOPHER IVERSON Coordinator or refer to RN Case Manager/ per the workflow guidelines. When is care coordinators next follow-up call scheduled? If referred for CCM  what RN care manager was the referral assigned? Agreeable to CHRISTOPHER IVERSON, call, plan to f/u for CCM assessment next week                    Yesy IVERSON Call Completed By:   Yesy Mckenzie RN             This note will not be viewable in [address]

## 2019-07-25 ENCOUNTER — HOME CARE VISIT (OUTPATIENT)
Dept: SCHEDULING | Facility: HOME HEALTH | Age: 74
End: 2019-07-25
Payer: MEDICARE

## 2019-07-25 PROCEDURE — 3331090001 HH PPS REVENUE CREDIT

## 2019-07-25 PROCEDURE — 3331090002 HH PPS REVENUE DEBIT

## 2019-07-25 PROCEDURE — G0155 HHCP-SVS OF CSW,EA 15 MIN: HCPCS

## 2019-07-26 ENCOUNTER — HOME CARE VISIT (OUTPATIENT)
Dept: SCHEDULING | Facility: HOME HEALTH | Age: 74
End: 2019-07-26
Payer: MEDICARE

## 2019-07-26 VITALS
RESPIRATION RATE: 16 BRPM | SYSTOLIC BLOOD PRESSURE: 142 MMHG | DIASTOLIC BLOOD PRESSURE: 58 MMHG | TEMPERATURE: 98.5 F | HEART RATE: 72 BPM

## 2019-07-26 PROCEDURE — 3331090001 HH PPS REVENUE CREDIT

## 2019-07-26 PROCEDURE — G0152 HHCP-SERV OF OT,EA 15 MIN: HCPCS

## 2019-07-26 PROCEDURE — 3331090002 HH PPS REVENUE DEBIT

## 2019-07-27 PROCEDURE — 3331090001 HH PPS REVENUE CREDIT

## 2019-07-27 PROCEDURE — 3331090002 HH PPS REVENUE DEBIT

## 2019-07-28 PROCEDURE — 3331090002 HH PPS REVENUE DEBIT

## 2019-07-28 PROCEDURE — 3331090001 HH PPS REVENUE CREDIT

## 2019-07-29 PROCEDURE — 3331090002 HH PPS REVENUE DEBIT

## 2019-07-29 PROCEDURE — 3331090001 HH PPS REVENUE CREDIT

## 2019-07-30 ENCOUNTER — PATIENT OUTREACH (OUTPATIENT)
Dept: CASE MANAGEMENT | Age: 74
End: 2019-07-30

## 2019-07-30 PROCEDURE — 3331090001 HH PPS REVENUE CREDIT

## 2019-07-30 PROCEDURE — 3331090002 HH PPS REVENUE DEBIT

## 2019-07-30 NOTE — PROGRESS NOTES
Community Care Management  Follow up Outreach Note   Outreach type: Phone call: Yes   Date/Time of Outreach: 7/30/19, 2072     Reason for follow-up:   Continued outreach   Disease specific complaints/issues: \"Feeling better today\"     Patient progress towards goals set from last contact:   Stable   Has patient attended any PCP or specialist follow-up appointments since last contact? What was outcome of appointment? When is next follow-up scheduled? 7/29 PCP, Chantal NP - RX for prn lasix    8/1 Cardiology, Wu ESPINOZA   Review medications. Any medication changes since last outreach? Does patient have any questions or issues related to their medications? Meds reviewed, pt states understanding, compliance. Reviewed daily weights, when to notify MD. Reports getting new scales. MOW starts Wed. Home health active? If yes  any issue? Progress? [facility]   Referrals needed?  (SW, Diabetes education, HH, etc. ) No   Other issues/Miscellaneous? (Transportation, access to meals, ability to perform ADLs, adequate caregiver support, etc.)     None         Next Outreach Scheduled: Next week     Next Steps/Goals:   F/U   Community Care Manager: Carmela Campos RN         This note will not be viewable in [address]

## 2019-07-31 PROCEDURE — 3331090002 HH PPS REVENUE DEBIT

## 2019-07-31 PROCEDURE — 3331090001 HH PPS REVENUE CREDIT

## 2019-08-01 ENCOUNTER — HOSPITAL ENCOUNTER (OUTPATIENT)
Dept: LAB | Age: 74
Discharge: HOME | End: 2019-08-01
Payer: MEDICARE

## 2019-08-01 DIAGNOSIS — Z79.899 HIGH RISK MEDICATION USE: ICD-10-CM

## 2019-08-01 PROBLEM — I48.0 PAROXYSMAL ATRIAL FIBRILLATION (HCC): Status: ACTIVE | Noted: 2019-08-01

## 2019-08-01 LAB
FUNGUS CULTURE, RFCO2T: NORMAL
FUNGUS SMEAR, RFCO1T: NORMAL
FUNGUS SPEC CULT: NORMAL
FUNGUS STAIN, 188244: NORMAL
REFLEX TO ID, RFCO3T: NORMAL
SPECIMEN SOURCE: NORMAL
SPECIMEN SOURCE: NORMAL
T4 FREE SERPL-MCNC: 1.2 NG/DL (ref 0.78–1.46)
TSH SERPL DL<=0.005 MIU/L-ACNC: 2.5 UIU/ML (ref 0.36–3.74)

## 2019-08-01 PROCEDURE — 84443 ASSAY THYROID STIM HORMONE: CPT

## 2019-08-01 PROCEDURE — 3331090002 HH PPS REVENUE DEBIT

## 2019-08-01 PROCEDURE — 84439 ASSAY OF FREE THYROXINE: CPT

## 2019-08-01 PROCEDURE — 36415 COLL VENOUS BLD VENIPUNCTURE: CPT

## 2019-08-01 PROCEDURE — 3331090001 HH PPS REVENUE CREDIT

## 2019-08-02 ENCOUNTER — HOME CARE VISIT (OUTPATIENT)
Dept: SCHEDULING | Facility: HOME HEALTH | Age: 74
End: 2019-08-02
Payer: MEDICARE

## 2019-08-02 VITALS
RESPIRATION RATE: 18 BRPM | DIASTOLIC BLOOD PRESSURE: 72 MMHG | TEMPERATURE: 97.5 F | HEART RATE: 81 BPM | SYSTOLIC BLOOD PRESSURE: 132 MMHG

## 2019-08-02 PROCEDURE — 3331090002 HH PPS REVENUE DEBIT

## 2019-08-02 PROCEDURE — 3331090001 HH PPS REVENUE CREDIT

## 2019-08-02 PROCEDURE — G0157 HHC PT ASSISTANT EA 15: HCPCS

## 2019-08-03 ENCOUNTER — HOME CARE VISIT (OUTPATIENT)
Dept: SCHEDULING | Facility: HOME HEALTH | Age: 74
End: 2019-08-03
Payer: MEDICARE

## 2019-08-03 VITALS
DIASTOLIC BLOOD PRESSURE: 72 MMHG | SYSTOLIC BLOOD PRESSURE: 134 MMHG | RESPIRATION RATE: 18 BRPM | HEART RATE: 82 BPM | TEMPERATURE: 97.9 F

## 2019-08-03 PROCEDURE — G0157 HHC PT ASSISTANT EA 15: HCPCS

## 2019-08-03 PROCEDURE — 3331090002 HH PPS REVENUE DEBIT

## 2019-08-03 PROCEDURE — 3331090001 HH PPS REVENUE CREDIT

## 2019-08-04 PROCEDURE — 3331090002 HH PPS REVENUE DEBIT

## 2019-08-04 PROCEDURE — 3331090001 HH PPS REVENUE CREDIT

## 2019-08-05 PROCEDURE — 3331090002 HH PPS REVENUE DEBIT

## 2019-08-05 PROCEDURE — 3331090001 HH PPS REVENUE CREDIT

## 2019-08-06 ENCOUNTER — HOME CARE VISIT (OUTPATIENT)
Dept: SCHEDULING | Facility: HOME HEALTH | Age: 74
End: 2019-08-06
Payer: MEDICARE

## 2019-08-06 VITALS
DIASTOLIC BLOOD PRESSURE: 68 MMHG | HEART RATE: 71 BPM | RESPIRATION RATE: 17 BRPM | TEMPERATURE: 97.8 F | SYSTOLIC BLOOD PRESSURE: 123 MMHG

## 2019-08-06 PROCEDURE — 3331090001 HH PPS REVENUE CREDIT

## 2019-08-06 PROCEDURE — 3331090002 HH PPS REVENUE DEBIT

## 2019-08-06 PROCEDURE — G0157 HHC PT ASSISTANT EA 15: HCPCS

## 2019-08-07 PROCEDURE — 3331090001 HH PPS REVENUE CREDIT

## 2019-08-07 PROCEDURE — 3331090002 HH PPS REVENUE DEBIT

## 2019-08-08 PROCEDURE — 3331090001 HH PPS REVENUE CREDIT

## 2019-08-08 PROCEDURE — 3331090002 HH PPS REVENUE DEBIT

## 2019-08-09 ENCOUNTER — PATIENT OUTREACH (OUTPATIENT)
Dept: CASE MANAGEMENT | Age: 74
End: 2019-08-09

## 2019-08-09 ENCOUNTER — HOME CARE VISIT (OUTPATIENT)
Dept: SCHEDULING | Facility: HOME HEALTH | Age: 74
End: 2019-08-09
Payer: MEDICARE

## 2019-08-09 PROCEDURE — 3331090002 HH PPS REVENUE DEBIT

## 2019-08-09 PROCEDURE — G0157 HHC PT ASSISTANT EA 15: HCPCS

## 2019-08-09 PROCEDURE — 3331090001 HH PPS REVENUE CREDIT

## 2019-08-09 NOTE — PROGRESS NOTES
Community Care Management  Follow up Outreach Note   Outreach type: Phone call: Yes   Date/Time of Outreach: 8/9/19,      Reason for follow-up:   Continued outreach   Disease specific complaints/issues:   None     Patient progress towards goals set from last contact:   Stable   Has patient attended any PCP or specialist follow-up appointments since last contact? What was outcome of appointment? When is next follow-up scheduled? 8/1 Cardiology, Arnold-Faile, Amiodarone decreased to 200mg daily - verbalizes understanding - PFTs ordered, scheduled for 8/15, has ride reserved   Review medications. Any medication changes since last outreach? Does patient have any questions or issues related to their medications? Reviewed all meds, pt states compliance, understanding   Home health active? If yes  any issue? Progress? [facility]   Referrals needed?  (SW, Diabetes education, HH, etc. ) No   Other issues/Miscellaneous? (Transportation, access to meals, ability to perform ADLs, adequate caregiver support, etc.)   Reports MOW going well, hoping to get back to [facility] soon           Next Outreach Scheduled: Next week     Next Steps/Goals:   F/U   Community Care Manager: Yesy Mckenzie RN         This note will not be viewable in [address]

## 2019-08-10 VITALS
HEART RATE: 73 BPM | DIASTOLIC BLOOD PRESSURE: 72 MMHG | RESPIRATION RATE: 19 BRPM | SYSTOLIC BLOOD PRESSURE: 132 MMHG | TEMPERATURE: 97.1 F

## 2019-08-10 PROCEDURE — 3331090002 HH PPS REVENUE DEBIT

## 2019-08-10 PROCEDURE — 3331090001 HH PPS REVENUE CREDIT

## 2019-08-11 PROCEDURE — 3331090001 HH PPS REVENUE CREDIT

## 2019-08-11 PROCEDURE — 3331090002 HH PPS REVENUE DEBIT

## 2019-08-12 ENCOUNTER — HOME CARE VISIT (OUTPATIENT)
Dept: SCHEDULING | Facility: HOME HEALTH | Age: 74
End: 2019-08-12
Payer: MEDICARE

## 2019-08-12 VITALS — DIASTOLIC BLOOD PRESSURE: 62 MMHG | TEMPERATURE: 98 F | HEART RATE: 72 BPM | SYSTOLIC BLOOD PRESSURE: 106 MMHG

## 2019-08-12 PROCEDURE — 3331090001 HH PPS REVENUE CREDIT

## 2019-08-12 PROCEDURE — 3331090002 HH PPS REVENUE DEBIT

## 2019-08-12 PROCEDURE — G0157 HHC PT ASSISTANT EA 15: HCPCS

## 2019-08-13 PROCEDURE — 3331090002 HH PPS REVENUE DEBIT

## 2019-08-13 PROCEDURE — 3331090001 HH PPS REVENUE CREDIT

## 2019-08-14 ENCOUNTER — HOME CARE VISIT (OUTPATIENT)
Dept: SCHEDULING | Facility: HOME HEALTH | Age: 74
End: 2019-08-14
Payer: MEDICARE

## 2019-08-14 VITALS
SYSTOLIC BLOOD PRESSURE: 122 MMHG | DIASTOLIC BLOOD PRESSURE: 71 MMHG | TEMPERATURE: 98.2 F | RESPIRATION RATE: 18 BRPM | HEART RATE: 61 BPM

## 2019-08-14 PROCEDURE — G0151 HHCP-SERV OF PT,EA 15 MIN: HCPCS

## 2019-08-14 PROCEDURE — 3331090001 HH PPS REVENUE CREDIT

## 2019-08-14 PROCEDURE — 3331090002 HH PPS REVENUE DEBIT

## 2019-08-15 ENCOUNTER — HOSPITAL ENCOUNTER (OUTPATIENT)
Dept: RESPIRATORY THERAPY | Age: 74
Discharge: HOME | End: 2019-08-15
Attending: INTERNAL MEDICINE
Payer: MEDICARE

## 2019-08-15 DIAGNOSIS — Z79.899 HIGH RISK MEDICATION USE: ICD-10-CM

## 2019-08-15 PROCEDURE — 94729 DIFFUSING CAPACITY: CPT

## 2019-08-15 PROCEDURE — 94726 PLETHYSMOGRAPHY LUNG VOLUMES: CPT

## 2019-08-15 PROCEDURE — 94010 BREATHING CAPACITY TEST: CPT

## 2019-08-16 LAB
ACID FAST STN SPEC: NEGATIVE
MYCOBACTERIUM SPEC QL CULT: NEGATIVE
SPECIMEN PREPARATION: NORMAL
SPECIMEN SOURCE: NORMAL

## 2019-08-19 ENCOUNTER — PATIENT OUTREACH (OUTPATIENT)
Dept: CASE MANAGEMENT | Age: 74
End: 2019-08-19

## 2019-08-19 NOTE — PROCEDURES
[facility] NOTE    Name:  Jay Lewis  MR#:  [id number]  :  1945  ACCOUNT #:  [de-identified]  DATE OF SERVICE:  08/15/2019    COMPLETE PULMONARY FUNCTION INTERPRETATION:    SPIROMETRY:  FVC 2.30 liters and 93% of predicted. FEV1 1.93 liters and 113% of predicted. Ratio 84%. LUNG VOLUMES:  TLC 3.43 liters and 78% of predicted. RV 1.13 liters and 62% of predicted. DIFFUSION:  The diffusion capacity was not corrected for hemoglobin and was 12.4 mL/mmHg per minute and 60% of predicted. IMPRESSION:  Although the spirometry is normal, lung volume analysis indicates mild restrictive defect with decreased residual volume. This could be early interstitial lung disease. In addition, the patient's diffusion capacity is decreased. Please correlate clinically.       Dk Beckford MD      [id number]  D:  2019 9:31  T:  2019 10:34  JOB #:  [id number]

## 2019-08-19 NOTE — PROGRESS NOTES
Community Care Management  Follow up Outreach Note   Outreach type: Phone call: YES   Date/Time of Outreach: 8/19/19, 1018     Reason for follow-up:   Continued outreach   Disease specific complaints/issues: Had more UTI symptoms, went to Urgent Care       Patient progress towards goals set from last contact:   Stable   Has patient attended any PCP or specialist follow-up appointments since last contact? What was outcome of appointment? When is next follow-up scheduled? 8/15 PFTs  8/16 Urgent Care - UTI symptoms, Cipro 500 mg BID X 7 days RX    10/23 New PCP, Dr Rashmi Lomeli   Review medications. Any medication changes since last outreach? Does patient have any questions or issues related to their medications? Reports compliance w/ meds, weight today 192.4   Home health active? If yes  any issue? Progress? [facility] has discharged   Referrals needed?  (SW, Diabetes education, HH, etc. ) No   Other issues/Miscellaneous? (Transportation, access to meals, ability to perform ADLs, adequate caregiver support, etc.)   Discussed need for f/u, fluid restriction and daily weights. Educated on appropriate amt of fluids. Next Outreach Scheduled: Next week     Next Steps/Goals:   F/U   Community Care Manager: Rachael Cabrales RN               This note will not be viewable in [address]

## 2019-08-27 ENCOUNTER — PATIENT OUTREACH (OUTPATIENT)
Dept: CASE MANAGEMENT | Age: 74
End: 2019-08-27

## 2019-08-27 NOTE — PROGRESS NOTES
Community Care Management  Follow up Outreach Note   Outreach type: Phone call: Yes   Date/Time of Outreach: 8/27/19, 1123     Reason for follow-up:   Continued outreach   Disease specific complaints/issues:   C/O some \"symptoms\" this week     Patient progress towards goals set from last contact:   Stable   Has patient attended any PCP or specialist follow-up appointments since last contact? What was outcome of appointment? When is next follow-up scheduled? 10/23 New PCP appmt, Car Santillan     Review medications. Any medication changes since last outreach? Does patient have any questions or issues related to their medications? All meds reviewed, states compliance    BP = 161/70  Wt = 191.2   Home health active? If yes  any issue? Progress? No   Referrals needed?  (SW, Diabetes education, HH, etc. ) No   Other issues/Miscellaneous? (Transportation, access to meals, ability to perform ADLs, adequate caregiver support, etc.)   Reports some dizziness, \"heart pounding\" after leaning over, more than once in the past week. Encouraged to call Cardiology to discuss w/ Triage nurse, pt noncommittal, though reports she is \"concerned\". Next Outreach Scheduled: Next week or before     Next Steps/Goals:   F/U   Community Care Manager: Bernice Barlow RN       This note will not be viewable in [address]

## 2019-08-30 ENCOUNTER — TELEPHONE (OUTPATIENT)
Dept: CASE MANAGEMENT | Age: 74
End: 2019-08-30

## 2019-09-03 ENCOUNTER — PATIENT OUTREACH (OUTPATIENT)
Dept: CASE MANAGEMENT | Age: 74
End: 2019-09-03

## 2019-09-03 ENCOUNTER — HOME HEALTH ADMISSION (OUTPATIENT)
Dept: HOME HEALTH SERVICES | Facility: HOME HEALTH | Age: 74
End: 2019-09-03

## 2019-09-03 NOTE — PROGRESS NOTES
Community Care Management  Follow up Outreach Note   Outreach type: Phone call: Yes   Date/Time of Outreach: 9/3/19, [phone]     Reason for follow-up:   Continued outreach   Disease specific complaints/issues: ? Yeast symptoms       Patient progress towards goals set from last contact:   Stable   Has patient attended any PCP or specialist follow-up appointments since last contact? What was outcome of appointment? When is next follow-up scheduled? 10/23 New PCP appmt, Dr Florida Maurer   Review medications. Any medication changes since last outreach? Does patient have any questions or issues related to their medications? Pt reports compliance   Home health active? If yes  any issue? Progress? No   Referrals needed?  (SW, Diabetes education, HH, etc. ) No   Other issues/Miscellaneous? (Transportation, access to meals, ability to perform ADLs, adequate caregiver support, etc.)   Transportation - will f/u            Next Outreach Scheduled: Later in week     Next Steps/Goals:   F/U - plan to discuss other transportation options besides neighbor or [place] [id number] Manager: Sherri Mercedes RN     Noted Dr Dar Doshi note, [place] referral. Call to [facility] to ensure orders received and they declined admission due to no \"skilled need\" and pt has never seen Dr Wisdom. Advised that pt saw NP for [facility] CORNELIO Cornejo on 7/31 for the same complaint (UTI) and a needs assessment could be done. Again they declined admission. Call to pt. She is aware of order for \"visiting nurse\". Advised most probably will not be seen at home, strongly encouraged visit to Urgent Care. She reports her symptoms are \"external\" , believes may be \"yeast\". \"I have been eating yogurt to help\". No symptoms \"like I had before\". Plan to f/u later in week. Message to Dr Wisdom re [place] referral, pt symptoms. This note will not be viewable in [address]

## 2019-09-05 ENCOUNTER — PATIENT OUTREACH (OUTPATIENT)
Dept: CASE MANAGEMENT | Age: 74
End: 2019-09-05

## 2019-09-05 NOTE — PROGRESS NOTES
RNCM call to pt. She reports visit to Urgent Care yesterday, had blood drawn and UA/ culture. Message to Dr Sifuentes Crew to inform him. Requested pt call this RN once she learns results, agreeable. Feeling well, on way out to [facility] [place]. Plan to f/u later in week or next. This note will not be viewable in [address]

## 2019-09-11 ENCOUNTER — PATIENT OUTREACH (OUTPATIENT)
Dept: CASE MANAGEMENT | Age: 74
End: 2019-09-11

## 2019-09-11 NOTE — PROGRESS NOTES
Community Care Management  Follow up Outreach Note   Outreach type: Phone call: Yes   Date/Time of Outreach: 9/11/19, 1000     Reason for follow-up:   Continued outreach   Disease specific complaints/issues:   Feeling good     Patient progress towards goals set from last contact:   Stable   Has patient attended any PCP or specialist follow-up appointments since last contact? What was outcome of appointment? When is next follow-up scheduled? 10/23 PCP, Dr Reanna Araujo   Review medications. Any medication changes since last outreach? Does patient have any questions or issues related to their medications? Reports no new RXs needed after Urgent Care checkup last week. Reports compliance w/ meds. Wt = 192.2 today   Home health active? If yes  any issue? Progress? No   Referrals needed?  (SW, Diabetes education, HH, etc. ) No   Other issues/Miscellaneous? (Transportation, access to meals, ability to perform ADLs, adequate caregiver support, etc.)   Reports feeling well, attending [employer] activities. Next Outreach Scheduled: Next week     Next Steps/Goals:   ? D/c   Community Care Manager: Oswaldo De La Garza RN         This note will not be viewable in [address]

## 2019-09-20 ENCOUNTER — PATIENT OUTREACH (OUTPATIENT)
Dept: CASE MANAGEMENT | Age: 74
End: 2019-09-20

## 2019-09-20 NOTE — PROGRESS NOTES
Community Care Management  Follow up Outreach Note   Outreach type: Phone call: Yes   Date/Time of Outreach: 9/20/19, 8404     Reason for follow-up:   Continued outreach   Disease specific complaints/issues: None       Patient progress towards goals set from last contact:   Stable   Has patient attended any PCP or specialist follow-up appointments since last contact? What was outcome of appointment? When is next follow-up scheduled? 10/23 New PCP appmt w/ Dr Eb Flores - plans to visit Urgent Care if any needs before then    11/6 Cardiology, Wu ESPINOZA   Review medications. Any medication changes since last outreach? Does patient have any questions or issues related to their medications? Reports med compliance, weight today = 191.4, no edema   Home health active? If yes  any issue? Progress? No   Referrals needed?  (SW, Diabetes education, HH, etc. ) No   Other issues/Miscellaneous? (Transportation, access to meals, ability to perform ADLs, adequate caregiver support, etc.)   Continues to receive MOWs           Next Outreach Scheduled: Next week     Next Steps/Goals:   F/U, ? D/c   Community Care Manager: Mauricio Arango RN         This note will not be viewable in [address]

## 2019-09-30 ENCOUNTER — PATIENT OUTREACH (OUTPATIENT)
Dept: CASE MANAGEMENT | Age: 74
End: 2019-09-30

## 2019-10-15 ENCOUNTER — PATIENT OUTREACH (OUTPATIENT)
Dept: CASE MANAGEMENT | Age: 74
End: 2019-10-15

## 2019-10-15 NOTE — PROGRESS NOTES
Community Care Management  Follow up Outreach Note   Outreach type: Phone call: Yes   Date/Time of Outreach: 10/15/19, [phone]     Reason for follow-up:   Continued outreach, d/c   Disease specific complaints/issues:   None     Patient progress towards goals set from last contact:   Stable   Has patient attended any PCP or specialist follow-up appointments since last contact? What was outcome of appointment? When is next follow-up scheduled? 10/23 New PCP appmt, Dr Radha Urrutia    11/6 Cardiology, Dr Fisher Both   Review medications. Any medication changes since last outreach? Does patient have any questions or issues related to their medications? No med questions, issues, reports compliance   Home health active? If yes  any issue? Progress? No   Referrals needed?  (SW, Diabetes education, HH, etc. ) No   Other issues/Miscellaneous? (Transportation, access to meals, ability to perform ADLs, adequate caregiver support, etc.)   Aware of s/s to report to MD         Next Outreach Scheduled: None     Next Steps/Goals:   None   Community Care Manager: Kellie Cruz RN     No further outreach planned, agreeable, appreciative. This note will not be viewable in [address]

## 2019-10-23 PROBLEM — M10.9 GOUT ATTACK: Status: RESOLVED | Noted: 2019-07-15 | Resolved: 2019-10-23

## 2019-10-23 PROBLEM — I48.20 CHRONIC ATRIAL FIBRILLATION WITH RAPID VENTRICULAR RESPONSE (HCC): Status: RESOLVED | Noted: 2019-07-15 | Resolved: 2019-10-23

## 2019-10-23 PROBLEM — E86.1 HYPOTENSION DUE TO HYPOVOLEMIA: Status: RESOLVED | Noted: 2019-07-03 | Resolved: 2019-10-23

## 2019-10-23 PROBLEM — R91.8 PULMONARY INFILTRATES: Status: RESOLVED | Noted: 2019-07-03 | Resolved: 2019-10-23

## 2019-10-23 PROBLEM — J96.01 ACUTE HYPOXEMIC RESPIRATORY FAILURE (HCC): Status: RESOLVED | Noted: 2019-07-03 | Resolved: 2019-10-23

## 2019-10-23 PROBLEM — I95.89 HYPOTENSION DUE TO HYPOVOLEMIA: Status: RESOLVED | Noted: 2019-07-03 | Resolved: 2019-10-23

## 2019-10-23 PROBLEM — A41.9 SEVERE SEPSIS (HCC): Status: RESOLVED | Noted: 2019-07-03 | Resolved: 2019-10-23

## 2019-10-23 PROBLEM — R65.20 SEVERE SEPSIS (HCC): Status: RESOLVED | Noted: 2019-07-03 | Resolved: 2019-10-23

## 2019-10-23 PROBLEM — B37.49 CANDIDIASIS OF PERINEUM: Status: RESOLVED | Noted: 2019-07-15 | Resolved: 2019-10-23

## 2019-10-23 PROBLEM — Z79.899 HIGH RISK MEDICATION USE: Status: RESOLVED | Noted: 2019-08-01 | Resolved: 2019-10-23

## 2019-10-31 ENCOUNTER — PATIENT OUTREACH (OUTPATIENT)
Dept: CASE MANAGEMENT | Age: 74
End: 2019-10-31

## 2019-11-06 PROBLEM — I50.21 ACUTE SYSTOLIC CONGESTIVE HEART FAILURE (HCC): Status: RESOLVED | Noted: 2019-07-15 | Resolved: 2019-11-06

## 2019-11-06 PROBLEM — I50.9 CHF (CONGESTIVE HEART FAILURE) (HCC): Status: RESOLVED | Noted: 2019-07-15 | Resolved: 2019-11-06

## 2019-11-06 PROBLEM — I48.91 ATRIAL FIBRILLATION WITH RAPID VENTRICULAR RESPONSE (HCC): Status: RESOLVED | Noted: 2019-07-03 | Resolved: 2019-11-06

## 2020-01-27 ENCOUNTER — HOSPITAL ENCOUNTER (OUTPATIENT)
Dept: LAB | Age: 75
Discharge: HOME | End: 2020-01-27
Payer: MEDICARE

## 2020-01-27 DIAGNOSIS — I50.41 ACUTE COMBINED SYSTOLIC AND DIASTOLIC (CONGESTIVE) HRT FAIL (HCC): ICD-10-CM

## 2020-01-27 DIAGNOSIS — Z79.899 HIGH RISK MEDICATION USE: ICD-10-CM

## 2020-01-27 DIAGNOSIS — I10 ESSENTIAL HYPERTENSION: ICD-10-CM

## 2020-01-27 DIAGNOSIS — I48.91 ATRIAL FIBRILLATION WITH RAPID VENTRICULAR RESPONSE (HCC): ICD-10-CM

## 2020-01-27 LAB
ALBUMIN SERPL-MCNC: 3.6 G/DL (ref 3.2–4.6)
ALBUMIN/GLOB SERPL: 1 {RATIO} (ref 1.2–3.5)
ALP SERPL-CCNC: 107 U/L (ref 50–136)
ALT SERPL-CCNC: 21 U/L (ref 12–65)
AST SERPL-CCNC: 17 U/L (ref 15–37)
BILIRUB DIRECT SERPL-MCNC: 0.2 MG/DL
BILIRUB SERPL-MCNC: 0.8 MG/DL (ref 0.2–1.1)
GLOBULIN SER CALC-MCNC: 3.5 G/DL (ref 2.3–3.5)
PROT SERPL-MCNC: 7.1 G/DL (ref 6.3–8.2)
T4 FREE SERPL-MCNC: 1.5 NG/DL (ref 0.78–1.46)
TSH SERPL DL<=0.005 MIU/L-ACNC: 2.44 UIU/ML (ref 0.36–3.74)

## 2020-01-27 PROCEDURE — 80076 HEPATIC FUNCTION PANEL: CPT

## 2020-01-27 PROCEDURE — 84439 ASSAY OF FREE THYROXINE: CPT

## 2020-01-27 PROCEDURE — 36415 COLL VENOUS BLD VENIPUNCTURE: CPT

## 2020-01-27 PROCEDURE — 84443 ASSAY THYROID STIM HORMONE: CPT

## 2020-07-27 ENCOUNTER — HOSPITAL ENCOUNTER (OUTPATIENT)
Dept: LAB | Age: 75
Discharge: HOME | End: 2020-07-27
Payer: MEDICARE

## 2020-07-27 DIAGNOSIS — Z79.899 HIGH RISK MEDICATION USE: ICD-10-CM

## 2020-07-27 LAB
ALBUMIN SERPL-MCNC: 3.6 G/DL (ref 3.2–4.6)
ALBUMIN/GLOB SERPL: 1 {RATIO} (ref 1.2–3.5)
ALP SERPL-CCNC: 112 U/L (ref 50–136)
ALT SERPL-CCNC: 24 U/L (ref 12–65)
AST SERPL-CCNC: 22 U/L (ref 15–37)
BILIRUB DIRECT SERPL-MCNC: 0.1 MG/DL
BILIRUB SERPL-MCNC: 0.5 MG/DL (ref 0.2–1.1)
GLOBULIN SER CALC-MCNC: 3.7 G/DL (ref 2.3–3.5)
PROT SERPL-MCNC: 7.3 G/DL (ref 6.3–8.2)
T4 FREE SERPL-MCNC: 1.3 NG/DL (ref 0.78–1.46)
TSH SERPL DL<=0.005 MIU/L-ACNC: 2.54 UIU/ML (ref 0.36–3.74)

## 2020-07-27 PROCEDURE — 80076 HEPATIC FUNCTION PANEL: CPT

## 2020-07-27 PROCEDURE — 36415 COLL VENOUS BLD VENIPUNCTURE: CPT

## 2020-07-27 PROCEDURE — 84443 ASSAY THYROID STIM HORMONE: CPT

## 2020-07-27 PROCEDURE — 84439 ASSAY OF FREE THYROXINE: CPT

## 2020-08-08 ENCOUNTER — HOSPITAL ENCOUNTER (EMERGENCY)
Age: 75
Discharge: HOME | End: 2020-08-09
Attending: EMERGENCY MEDICINE
Payer: MEDICARE

## 2020-08-08 ENCOUNTER — APPOINTMENT (OUTPATIENT)
Dept: GENERAL RADIOLOGY | Age: 75
End: 2020-08-08
Attending: EMERGENCY MEDICINE
Payer: MEDICARE

## 2020-08-08 VITALS
HEART RATE: 67 BPM | TEMPERATURE: 98.6 F | RESPIRATION RATE: 16 BRPM | OXYGEN SATURATION: 100 % | SYSTOLIC BLOOD PRESSURE: 183 MMHG | DIASTOLIC BLOOD PRESSURE: 75 MMHG

## 2020-08-08 DIAGNOSIS — R55 VASOVAGAL SYNCOPE: Primary | ICD-10-CM

## 2020-08-08 LAB
ALBUMIN SERPL-MCNC: 4 G/DL (ref 3.2–4.6)
ALBUMIN/GLOB SERPL: 1 {RATIO} (ref 1.2–3.5)
ALP SERPL-CCNC: 110 U/L (ref 50–136)
ALT SERPL-CCNC: 19 U/L (ref 12–65)
ANION GAP SERPL CALC-SCNC: 11 MMOL/L (ref 7–16)
AST SERPL-CCNC: 17 U/L (ref 15–37)
BASOPHILS # BLD: 0.1 K/UL (ref 0–0.2)
BASOPHILS NFR BLD: 1 % (ref 0–2)
BILIRUB SERPL-MCNC: 0.9 MG/DL (ref 0.2–1.1)
BUN SERPL-MCNC: 27 MG/DL (ref 8–23)
CALCIUM SERPL-MCNC: 9.2 MG/DL (ref 8.3–10.4)
CHLORIDE SERPL-SCNC: 107 MMOL/L (ref 98–107)
CO2 SERPL-SCNC: 24 MMOL/L (ref 21–32)
CREAT SERPL-MCNC: 1.29 MG/DL (ref 0.6–1)
DIFFERENTIAL METHOD BLD: ABNORMAL
EOSINOPHIL # BLD: 0.2 K/UL (ref 0–0.8)
EOSINOPHIL NFR BLD: 2 % (ref 0.5–7.8)
ERYTHROCYTE [DISTWIDTH] IN BLOOD BY AUTOMATED COUNT: 13.9 % (ref 11.9–14.6)
GLOBULIN SER CALC-MCNC: 4 G/DL (ref 2.3–3.5)
GLUCOSE SERPL-MCNC: 115 MG/DL (ref 65–100)
HCT VFR BLD AUTO: 35.8 % (ref 35.8–46.3)
HGB BLD-MCNC: 12.6 G/DL (ref 11.7–15.4)
IMM GRANULOCYTES # BLD AUTO: 0 K/UL (ref 0–0.5)
IMM GRANULOCYTES NFR BLD AUTO: 1 % (ref 0–5)
LYMPHOCYTES # BLD: 1.5 K/UL (ref 0.5–4.6)
LYMPHOCYTES NFR BLD: 17 % (ref 13–44)
MAGNESIUM SERPL-MCNC: 2.2 MG/DL (ref 1.8–2.4)
MCH RBC QN AUTO: 31.6 PG (ref 26.1–32.9)
MCHC RBC AUTO-ENTMCNC: 35.2 G/DL (ref 31.4–35)
MCV RBC AUTO: 89.7 FL (ref 79.6–97.8)
MONOCYTES # BLD: 0.7 K/UL (ref 0.1–1.3)
MONOCYTES NFR BLD: 8 % (ref 4–12)
NEUTS SEG # BLD: 6.2 K/UL (ref 1.7–8.2)
NEUTS SEG NFR BLD: 72 % (ref 43–78)
NRBC # BLD: 0 K/UL (ref 0–0.2)
PLATELET # BLD AUTO: 262 K/UL (ref 150–450)
PMV BLD AUTO: 10.4 FL (ref 9.4–12.3)
POTASSIUM SERPL-SCNC: 3.7 MMOL/L (ref 3.5–5.1)
PROT SERPL-MCNC: 8 G/DL (ref 6.3–8.2)
RBC # BLD AUTO: 3.99 M/UL (ref 4.05–5.2)
SODIUM SERPL-SCNC: 142 MMOL/L (ref 136–145)
TROPONIN-HIGH SENSITIVITY: 10.4 PG/ML (ref 0–14)
WBC # BLD AUTO: 8.6 K/UL (ref 4.3–11.1)

## 2020-08-08 PROCEDURE — 83735 ASSAY OF MAGNESIUM: CPT

## 2020-08-08 PROCEDURE — 99284 EMERGENCY DEPT VISIT MOD MDM: CPT

## 2020-08-08 PROCEDURE — 93005 ELECTROCARDIOGRAM TRACING: CPT | Performed by: EMERGENCY MEDICINE

## 2020-08-08 PROCEDURE — 84484 ASSAY OF TROPONIN QUANT: CPT

## 2020-08-08 PROCEDURE — 96374 THER/PROPH/DIAG INJ IV PUSH: CPT

## 2020-08-08 PROCEDURE — 80053 COMPREHEN METABOLIC PANEL: CPT

## 2020-08-08 PROCEDURE — 96361 HYDRATE IV INFUSION ADD-ON: CPT

## 2020-08-08 PROCEDURE — 85025 COMPLETE CBC W/AUTO DIFF WBC: CPT

## 2020-08-08 PROCEDURE — 71045 X-RAY EXAM CHEST 1 VIEW: CPT

## 2020-08-08 PROCEDURE — 74011250636 HC RX REV CODE- 250/636: Performed by: EMERGENCY MEDICINE

## 2020-08-08 RX ORDER — ONDANSETRON 2 MG/ML
4 INJECTION INTRAMUSCULAR; INTRAVENOUS ONCE
Status: COMPLETED | OUTPATIENT
Start: 2020-08-08 | End: 2020-08-08

## 2020-08-08 RX ADMIN — ONDANSETRON 4 MG: 2 INJECTION INTRAMUSCULAR; INTRAVENOUS at 23:09

## 2020-08-08 RX ADMIN — SODIUM CHLORIDE 500 ML: 900 INJECTION, SOLUTION INTRAVENOUS at 23:09

## 2020-08-09 LAB
ATRIAL RATE: 74 BPM
CALCULATED P AXIS, ECG09: 49 DEGREES
CALCULATED R AXIS, ECG10: -32 DEGREES
CALCULATED T AXIS, ECG11: 71 DEGREES
DIAGNOSIS, 93000: NORMAL
P-R INTERVAL, ECG05: 184 MS
Q-T INTERVAL, ECG07: 392 MS
QRS DURATION, ECG06: 100 MS
QTC CALCULATION (BEZET), ECG08: 435 MS
TROPONIN-HIGH SENSITIVITY: 10.8 PG/ML (ref 0–14)
VENTRICULAR RATE, ECG03: 74 BPM

## 2020-08-09 PROCEDURE — 84484 ASSAY OF TROPONIN QUANT: CPT

## 2020-08-09 NOTE — DISCHARGE INSTRUCTIONS
Your blood work, EKG, imaging are all unremarkable today. I think your abdominal cramping resulting in a slowing of your heart rate and drop in blood pressure. Contact your doctor and request a follow-up appointment. Return to the ER should something change or worsen between then and now.

## 2020-08-09 NOTE — ED NOTES
I have reviewed discharge instructions with the patient. The patient verbalized understanding. Patient left ED via Discharge Method: ambulatory to Home with daughter. Opportunity for questions and clarification provided. Patient given 0 scripts. To continue your aftercare when you leave the hospital, you may receive an automated call from our care team to check in on how you are doing. This is a free service and part of our promise to provide the best care and service to meet your aftercare needs.  If you have questions, or wish to unsubscribe from this service please call [phone]. Thank you for Choosing our [facility] Emergency Department.

## 2020-08-09 NOTE — ED PROVIDER NOTES
68-year-old female presenting for a syncopal episode. Reports that she was standing in the foyer of her home speaking with family. It was getting late and she needed to take her medications so she went inside and took those. She ate a piece of bread to go along with it and went back out to talk with family. She felt nauseated so she went inside. Became very clammy and lightheaded. She felt better after few minutes went back out to talk with family. The episode happened again but this time it was more pronounced. She turned around to go back inside but did not make it. The next thing she knows her daughter is talking to her and she is on the ground. They reported to EMS that she began to fall, they caught her and guided her to the ground so she did not have a hard landing. This episode lasted less than a minute before she returned to some symptoms of consciousness. The patient still states she feels somewhat weak but better now. She feels little bit gassy but otherwise the nausea and abdominal pain is gone away. The history is provided by the patient. Syncope    This is a new problem. The current episode started 1 to 2 hours ago. The problem has been gradually improving. She lost consciousness for a period of less than one minute. The problem is associated with standing up. Associated symptoms include abdominal pain and nausea. Pertinent negatives include no visual change, no chest pain, no palpitations, no clumsiness, no confusion, no fever, no malaise/fatigue, no bowel incontinence, no bladder incontinence, no congestion, no headaches, no back pain, no focal weakness, no light-headedness, no seizures, no slurred speech, no melena, no anal bleeding and no head injury. She has tried position for the symptoms. The treatment provided significant relief. Her past medical history is significant for HTN and syncope.         Past Medical History:   Diagnosis Date    Arrhythmia     Arthritis     Asthma  Cellulitis     Chronic kidney disease     Hx renal failure    Depression     Gout     Gouty arthritis  NOS 12/2/2015    Hypercholesterolemia     Hypertension     Mitral valve prolapse     Morbid obesity (HCC)     Nausea & vomiting     Polymyalgia rheumatica (HCC) 12/2/2015    Psychiatric disorder     depression & anxiety    PUD (peptic ulcer disease)     [facility]'s syndrome [facility] [address]     Temporal arteritis ([facility])        Past Surgical History:   Procedure Laterality Date    CARDIAC SURG PROCEDURE UNLIST  4/07    STRESS TEST    CARDIAC SURG PROCEDURE UNLIST  8/13    HEART ABLATION    HX COLONOSCOPY  6/08    HX ENDOSCOPY  8/08    HX HYSTERECTOMY      HX TUBAL LIGATION           Family History:   Problem Relation Age of Onset    Cancer Mother     Breast Cancer Mother 80    Hypertension Mother     Dementia Mother     Cancer Father         LUNG    Heart Disease Father 62    Diabetes Son     Other Son         PSYCHIATRIC ILLNESS    Hypertension Sister        Social History     Socioeconomic History    Marital status:      Spouse name: Not on file    Number of children: Not on file    Years of education: Not on file    Highest education level: Not on file   Occupational History    Not on file   Social Needs    Financial resource strain: Not on file    Food insecurity     Worry: Not on file     Inability: Not on file    Transportation needs     Medical: Not on file     Non-medical: Not on file   Tobacco Use    Smoking status: Never Smoker    Smokeless tobacco: Never Used   Substance and Sexual Activity    Alcohol use: No    Drug use: No    Sexual activity: Never   Lifestyle    Physical activity     Days per week: Not on file     Minutes per session: Not on file    Stress: Not on file   Relationships    Social connections     Talks on phone: Not on file     Gets together: Not on file     Attends Jainism service: Not on file     Active member of club or organization: Not on file     Attends meetings of clubs or organizations: Not on file     Relationship status: Not on file    Intimate partner violence     Fear of current or ex partner: Not on file     Emotionally abused: Not on file     Physically abused: Not on file     Forced sexual activity: Not on file   Other Topics Concern     Service No    Blood Transfusions No    Caffeine Concern No    Occupational Exposure No    Hobby Hazards No    Sleep Concern No    Stress Concern Yes    Weight Concern Yes    Special Diet No    Back Care No    Exercise No    Bike Helmet No    Seat Belt Yes    Self-Exams Not Asked   Social History Narrative    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, 12 great grandchildren         ALLERGIES: Celebrex [celecoxib]; Lisinopril; Losartan; and Nifedipine    Review of Systems   Constitutional: Negative for fever and malaise/fatigue. HENT: Negative for congestion. Cardiovascular: Positive for syncope. Negative for chest pain and palpitations. Gastrointestinal: Positive for abdominal pain and nausea. Negative for anal bleeding, bowel incontinence and melena. Genitourinary: Negative for bladder incontinence. Musculoskeletal: Negative for back pain. Neurological: Positive for syncope. Negative for focal weakness, seizures, light-headedness and headaches. Psychiatric/Behavioral: Negative for confusion. All other systems reviewed and are negative. Vitals:    08/08/20 2230 08/08/20 2243   BP: 183/75    Pulse: 67    Resp: 16    Temp: 98.6 °F (37 °C)    SpO2: 100% 100%            Physical Exam  Vitals signs and nursing note reviewed. Constitutional:       Appearance: She is well-developed. HENT:      Head: Normocephalic and atraumatic. Eyes:      Conjunctiva/sclera: Conjunctivae normal.      Pupils: Pupils are equal, round, and reactive to light. Neck:      Musculoskeletal: Neck supple.    Cardiovascular:      Rate and Rhythm: Normal rate and regular rhythm. Pulmonary:      Effort: Pulmonary effort is normal.      Breath sounds: Normal breath sounds. Abdominal:      General: Bowel sounds are normal.      Palpations: Abdomen is soft. Musculoskeletal: Normal range of motion. Skin:     General: Skin is warm and dry. Neurological:      Mental Status: She is alert and oriented to person, place, and time. MDM  Number of Diagnoses or Management Options  Vasovagal syncope:   Diagnosis management comments: 53-year-old female presenting for syncopal episode in the setting of abdominal cramping. This likely was a vasovagal episode but we will do a 2 troponin ACS rule out. EKG was performed and shows a sinus rhythm rate 74, left axis deviation, GA is 184, QRS is 100, QTc is 435 with no acute ischemic change.        Amount and/or Complexity of Data Reviewed  Clinical lab tests: ordered and reviewed (Results for orders placed or performed during the hospital encounter of 08/08/20  -CBC WITH AUTOMATED DIFF       Result                      Value             Ref Range           WBC                         8.6               4.3 - 11.1 K*       RBC                         3.99 (L)          4.05 - 5.2 M*       HGB                         12.6              11.7 - 15.4 *       HCT                         35.8              35.8 - 46.3 %       MCV                         89.7              79.6 - 97.8 *       MCH                         31.6              26.1 - 32.9 *       MCHC                        35.2 (H)          31.4 - 35.0 *       RDW                         13.9              11.9 - 14.6 %       PLATELET                    262               150 - 450 K/*       MPV                         10.4              9.4 - 12.3 FL       ABSOLUTE NRBC               0.00              0.0 - 0.2 K/*       DF                          AUTOMATED                             NEUTROPHILS                 72                43 - 78 %           LYMPHOCYTES 17                13 - 44 %           MONOCYTES                   8                 4.0 - 12.0 %        EOSINOPHILS                 2                 0.5 - 7.8 %         BASOPHILS                   1                 0.0 - 2.0 %         IMMATURE GRANULOCYTES       1                 0.0 - 5.0 %         ABS. NEUTROPHILS            6.2               1.7 - 8.2 K/*       ABS. LYMPHOCYTES            1.5               0.5 - 4.6 K/*       ABS. MONOCYTES              0.7               0.1 - 1.3 K/*       ABS. EOSINOPHILS            0.2               0.0 - 0.8 K/*       ABS. BASOPHILS              0.1               0.0 - 0.2 K/*       ABS. IMM. GRANS.            0.0               0.0 - 0.5 K/*  -METABOLIC PANEL, COMPREHENSIVE       Result                      Value             Ref Range           Sodium                      142               136 - 145 mm*       Potassium                   3.7               3.5 - 5.1 mm*       Chloride                    107               98 - 107 mmo*       CO2                         24                21 - 32 mmol*       Anion gap                   11                7 - 16 mmol/L       Glucose                     115 (H)           65 - 100 mg/*       BUN                         27 (H)            8 - 23 MG/DL        Creatinine                  1.29 (H)          0.6 - 1.0 MG*       GFR est AA                  52 (L)            >60 ml/min/1*       GFR est non-AA              43 (L)            >60 ml/min/1*       Calcium                     9.2               8.3 - 10.4 M*       Bilirubin, total            0.9               0.2 - 1.1 MG*       ALT (SGPT)                  19                12 - 65 U/L         AST (SGOT)                  17                15 - 37 U/L         Alk.  phosphatase            110               50 - 136 U/L        Protein, total              8.0               6.3 - 8.2 g/*       Albumin                     4.0               3.2 - 4.6 g/*       Globulin 4.0 (H)           2.3 - 3.5 g/*       A-G Ratio                   1.0 (L)           1.2 - 3.5      -TROPONIN-HIGH SENSITIVITY       Result                      Value             Ref Range           Troponin-High Sensitiv*     10.4              0 - 14 pg/mL   -MAGNESIUM       Result                      Value             Ref Range           Magnesium                   2.2               1.8 - 2.4 mg*  -TROPONIN-HIGH SENSITIVITY       Result                      Value             Ref Range           Troponin-High Sensitiv*     10.8              0 - 14 pg/mL   )  Tests in the radiology section of CPT®: ordered and reviewed (Xr Chest Port    Result Date: 8/9/2020  Portable chest xray  COMPARISON: July 15, 2019 INDICATION: Syncope FINDINGS: The heart is enlarged. Mediastinal contour is within normal limits. There is no focal pulmonary consolidation, pleural effusion or pneumothorax. No pulmonary edema. Surrounding bones are unremarkable. IMPRESSION: 1. Stable cardiomegaly. 2. No evidence of acute pulmonary disease.     )  Decide to obtain previous medical records or to obtain history from someone other than the patient: yes  Independent visualization of images, tracings, or specimens: yes    Risk of Complications, Morbidity, and/or Mortality  Presenting problems: high  Diagnostic procedures: high  Management options: moderate  General comments: I personally reviewed the patient's vital signs, laboratory tests, and/or radiological findings. I discussed these findings with the patient and their significance. I answered all questions and gave the patient clear return precautions. The patient was discharged from the emergency department in stable condition        Patient Progress  Patient progress: improved    ED Course as of Aug 09 0104   Sun Aug 09, 2020   0000 Reviewing the patient's blood work and slight elevation in BUN and creatinine.     [JS]      ED Course User Index  [JS] Courtney Garcia MD Procedures

## 2020-08-09 NOTE — ED TRIAGE NOTES
Pt arrives via EMS from home wearing a mask. EMS called out for syncopal episode 45 minutes PTA. Pt began to feel nauseas and weak just prior to the syncope. Pt was standing at the time and guided to the floor by family. Pt denies any pain at the time. Pt states she feels slightly weak right now but nothing compared to how she felt prior to the syncope.

## 2020-08-10 ENCOUNTER — PATIENT OUTREACH (OUTPATIENT)
Dept: CASE MANAGEMENT | Age: 75
End: 2020-08-10

## 2020-08-10 NOTE — PROGRESS NOTES
Patient contacted regarding recent discharge and COVID-19 risk. Discussed COVID-19 related testing which was not done at this time. Test results were not done. Patient informed of results, if available? Test not done    Care Transition Nurse/ Ambulatory Care Manager/ LPN Care Coordinator contacted the patient by telephone to perform post discharge assessment. Verified name and  with patient as identifiers. Patient has following risk factors of: diabetes and htn. CTN/ACM/LPN reviewed discharge instructions, medical action plan and red flags related to discharge diagnosis. Reviewed and educated them on any new and changed medications related to discharge diagnosis. Advised obtaining a 90-day supply of all daily and as-needed medications. Advance Care Planning:   Does patient have an Advance Directive: patient declined education     Education provided regarding infection prevention, and signs and symptoms of COVID-19 and when to seek medical attention with patient who verbalized understanding. Discussed exposure protocols and quarantine from [address] you at higher risk for severe illness  and given an opportunity for questions and concerns. The patient agrees to contact the COVID-19 hotline [phone] or PCP office for questions related to their healthcare. CTN/ACM/LPN provided contact information for future reference. From CDC: Are you at higher risk for severe illness?  Wash your hands often.  Avoid close contact (6 feet, which is about two arm lengths) with people who are sick.  Put distance between yourself and other people if COVID-19 is spreading in your community.  Clean and disinfect frequently touched surfaces.  Avoid all cruise travel and non-essential air travel.  Call your healthcare professional if you have concerns about COVID-19 and your underlying condition or if you are sick.     For more information on steps you can take to protect yourself, see CDC's How to Protect Yourself      Patient/family/caregiver given information for [employer] and agrees to enroll no  Patient's preferred e-mail:  n/a  Patient's preferred phone number: n/a  Based on Loop alert triggers, patient will be contacted by nurse care manager for worsening symptoms. Plan for follow-up call in 7-14 days based on severity of symptoms and risk factors.

## 2020-08-24 ENCOUNTER — PATIENT OUTREACH (OUTPATIENT)
Dept: CASE MANAGEMENT | Age: 75
End: 2020-08-24

## 2020-08-24 NOTE — PROGRESS NOTES
Date/Time:  8/24/2020 8:48 AM  Attempted to reach patient by telephone. Unable to leave message. CCM will follow up tomorrow.

## 2020-08-25 ENCOUNTER — PATIENT OUTREACH (OUTPATIENT)
Dept: CASE MANAGEMENT | Age: 75
End: 2020-08-25

## 2020-08-25 NOTE — PROGRESS NOTES
Patient resolved from Transition of Care episode on 8/25/20  Discussed COVID-19 related testing which was not done at this time. Test results were not done. Patient informed of results, if available? Test not done     Patient/family has been provided the following resources and education related to COVID-19:                         Signs, symptoms and red flags related to COVID-19            [facility] exposure and quarantine guidelines            [employer] exposure contact - [phone]            Contact for their local Department of Health                 Patient currently reports that the following symptoms have improved:  no new symptoms and no worsening symptoms. No further outreach scheduled with this CTN/ACM/LPN/HC/ MA. Episode of Care resolved. Patient has this CTN/ACM/LPN/HC/MA contact information if future needs arise.

## 2021-01-29 ENCOUNTER — HOSPITAL ENCOUNTER (OUTPATIENT)
Dept: LAB | Age: 76
Discharge: HOME | End: 2021-01-29
Payer: MEDICARE

## 2021-01-29 DIAGNOSIS — Z79.899 HIGH RISK MEDICATION USE: ICD-10-CM

## 2021-01-29 LAB
ALBUMIN SERPL-MCNC: 3.8 G/DL (ref 3.2–4.6)
ALBUMIN/GLOB SERPL: 1 {RATIO} (ref 1.2–3.5)
ALP SERPL-CCNC: 119 U/L (ref 50–136)
ALT SERPL-CCNC: 27 U/L (ref 12–65)
AST SERPL-CCNC: 17 U/L (ref 15–37)
BILIRUB DIRECT SERPL-MCNC: 0.1 MG/DL
BILIRUB SERPL-MCNC: 0.6 MG/DL (ref 0.2–1.1)
GLOBULIN SER CALC-MCNC: 3.7 G/DL (ref 2.3–3.5)
PROT SERPL-MCNC: 7.5 G/DL (ref 6.3–8.2)
TSH SERPL DL<=0.005 MIU/L-ACNC: 2.68 UIU/ML (ref 0.36–3.74)

## 2021-01-29 PROCEDURE — 80076 HEPATIC FUNCTION PANEL: CPT

## 2021-01-29 PROCEDURE — 36415 COLL VENOUS BLD VENIPUNCTURE: CPT

## 2021-01-29 PROCEDURE — 84443 ASSAY THYROID STIM HORMONE: CPT

## 2021-03-25 PROBLEM — I50.22 SYSTOLIC CHF, CHRONIC (HCC): Status: RESOLVED | Noted: 2019-07-15 | Resolved: 2021-03-25

## 2021-05-10 NOTE — CONSULTS
[facility] Trans[facility] Follow Up Call    5/10/2021    Patient: Rigo Park  Patient : 1943   MRN: [id number]  Reason for Admission:  Lung cancer, primary, with metastasis from lung to other site,  Discharge Date: 21 RARS: Readmission Risk Score: 9         Spoke with: Wife Aleksandra Welsh states that José Miguel Young Rheumatology History and Physical    Subjective:      Meaghan Toledo is a 74 y.o.  female who is being admitted for evaluation of pneumonia. Patient is F 64 y.o.  female presents with worsening dyspnea from home. All information from ER attending/chart -- no family here.      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 Oxygen needs increased per EMS and in ER noted in AFIB with RVR and worsening dyspnea. Started Cardizem and then had to intubate patient since worsening hypoxemia and could not even speak/moaning per ER attending.      Patient had WBC of 28.5 K. CXR with infiltrates more on Right side. procal is <0.1. Per charts by Rheumatology Dr. Jomar Torres has been having bronchitis and he gave her azithromycin.          Previous Report(s) Reviewed: historical medical records     Past Medical History:   Diagnosis Date    Arrhythmia     Arthritis     Asthma     Cellulitis     Chronic kidney disease     Hx renal failure    Depression     Gout     Gouty arthritis  NOS 12/2/2015    Hypercholesterolemia     Hypertension     Mitral valve prolapse     Morbid obesity (HCC)     Nausea & vomiting     Polymyalgia rheumatica (HCC) 12/2/2015    Psychiatric disorder     depression & anxiety    PUD (peptic ulcer disease)     Sjogren's syndrome ([address]     Temporal arteritis ([address]      Past Surgical History:   Procedure Laterality Date    CARDIAC SURG PROCEDURE UNLIST  4/07    STRESS TEST    CARDIAC SURG PROCEDURE UNLIST  8/13    HEART ABLATION    HX COLONOSCOPY  6/08    HX ENDOSCOPY  8/08    HX HYSTERECTOMY      HX TUBAL LIGATION       Family History   Problem Relation Age of Onset    Cancer Mother     Breast Cancer Mother 80    Hypertension Mother     Dementia Mother     Cancer Father LUNG    Heart Disease Father 62    Diabetes Son    Gerald Other Son         PSYCHIATRIC ILLNESS    Hypertension Sister      Prior to Admission medications    Medication Sig Start Date End Date Taking? Authorizing Provider   ibuprofen (MOTRIN) 200 mg tablet Take  by mouth as needed for Pain. Provider, Historical   allopurinol (ZYLOPRIM) 100 mg tablet Take 1 Tab by mouth daily. 6/5/19   Niru Alicea MD   predniSONE (DELTASONE) 5 mg tablet Take 3 Tabs by mouth daily. 6/5/19   Niru Alicea MD   hydroxychloroquine (PLAQUENIL) 200 mg tablet Take 1 Tab by mouth two (2) times a day. 6/5/19   Niru Alicea MD   albuterol (PROVENTIL HFA, VENTOLIN HFA, PROAIR HFA) 90 mcg/actuation inhaler Take 2 Puffs by inhalation every six (6) hours as needed for Wheezing. 6/3/19   Sharon De La Cruz NP   nadolol (CORGARD) 40 mg tablet Take 2 Tabs by mouth daily for 360 days. 3/29/19 3/23/20  Alen Jalloh MD   amLODIPine (NORVASC) 5 mg tablet Take 1 Tab by mouth daily. 3/22/19   Carol Lux MD   COLCRYS 0.6 mg tablet Take 1 Tab by mouth daily for 360 days. 2/15/19 2/10/20  Alen Jalloh MD   multivitamin (ONE A DAY) tablet Take 1 Tab by mouth daily. Provider, Historical   magnesium oxide (MAG-OX) 400 mg tablet Take 400 mg by mouth two (2) times a day. Provider, Historical   aspirin delayed-release 81 mg tablet Take 81 mg by mouth daily. Provider, Historical      Allergies   Allergen Reactions    Celebrex [Celecoxib] Rash    Lisinopril Cough    Losartan Other (comments)     Burning sensation tongue.     Nifedipine Other (comments)     severe headache     Social History     Socioeconomic History    Marital status:      Spouse name: Not on file    Number of children: Not on file    Years of education: Not on file    Highest education level: Not on file   Occupational History    Not on file   Social Needs    Financial resource strain: Not on file    Food insecurity:     Worry: Not on file     Inability: Not on file    Transportation needs:     Medical: Not on file     Non-medical: Not on file   Tobacco Use    Smoking status: Never Smoker    Smokeless tobacco: Never Used   Substance and Sexual Activity    Alcohol use: No    Drug use: No    Sexual activity: Never   Lifestyle    Physical activity:     Days per week: Not on file     Minutes per session: Not on file    Stress: Not on file   Relationships    Social connections:     Talks on phone: Not on file     Gets together: Not on file     Attends Bahai service: Not on file     Active member of club or organization: Not on file     Attends meetings of clubs or organizations: Not on file     Relationship status: Not on file    Intimate partner violence:     Fear of current or ex partner: Not on file     Emotionally abused: Not on file     Physically abused: Not on file     Forced sexual activity: Not on file   Other Topics Concern     Service No    Blood Transfusions No    Caffeine Concern No    Occupational Exposure No    Hobby Hazards No    Sleep Concern No    Stress Concern Yes    Weight Concern Yes    Special Diet No    Back Care No    Exercise No    Bike Helmet No    Seat Belt Yes    Self-Exams Not Asked   Social History Narrative    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, 12 great grandchildren       Review of Systems:  Pertinent items are noted in the History of Present Illness. Objective:       General: intubate   Lungs: rales   Heart:  regualr   Abdomen:  Normal bowel sounds   Swollen joints:   no   Tender joints:   no   Scalp Tenderness:   no   Normal Neck ROM:  yes   Systemic Bruit:  no    Right Left   Temp. Art. Pulsation normal normal   Temp. Art.  Tender no no   Normal ROM shoulder not applicable not applicable   Normal ROM hip not applicable not applicable     Patient Vitals for the past 8 hrs:   BP Temp Pulse Resp SpO2   07/03/19 1631     98 %   07/03/19 1530 112/59  89 20 100 %   07/03/19 1516   93 21    07/03/19 1515 128/59    100 %   07/03/19 1501   89 19    07/03/19 1500 128/57 97.5 °F (36.4 °C)   100 %   07/03/19 1445 136/63  92 20 100 %   07/03/19 1430 100/57  85 20 100 %   07/03/19 1415 (!) 89/42  90 18 100 %   07/03/19 1400 102/56  92 21 100 %   07/03/19 1345 104/55  90 20 100 %   07/03/19 1330 109/53    100 %   07/03/19 1328   90 20    07/03/19 1315 107/53  92 20 100 %   07/03/19 1250 90/54  93 20 100 %   07/03/19 1245 98/56  91 20 100 %   07/03/19 1230 (!) 88/53  100 20 100 %   07/03/19 1216   93 20    07/03/19 1215 94/57    100 %   07/03/19 1201 96/63  95 20 99 %   07/03/19 1145 (!) 88/51  98 20 100 %   07/03/19 1130 109/51  (!) 105 20 100 %   07/03/19 1119   (!) 110 20 100 %   07/03/19 1115 100/57  (!) 101 20 100 %   07/03/19 1101 91/52 99 °F (37.2 °C) (!) 113 20 100 %   07/03/19 1045 (!) 89/54  (!) 113 (!) 45 100 %   07/03/19 1030 99/57  (!) 116 20 100 %   07/03/19 1015 138/63  (!) 105 20 100 %   07/03/19 1001 105/60  (!) 110 20 100 %   07/03/19 0945 (!) 88/52  (!) 106 20 100 %   07/03/19 0930 99/60  (!) 147 20 100 %   07/03/19 0915 109/79  (!) 141 20 (!) 86 %   07/03/19 0901 105/70  (!) 111 20 100 %   07/03/19 0845 94/56  (!) 118 20 100 %       Imaging      Lab Review      Assessment:     Polymyalgia Rheumatica  stable, without evidence of temporal arteritis. Plan:     Would continue Hydroxychloroquine. You might want to call her Bibiana Esqueda to see is she would come to visit the   patient in the hospital.  You could start Solumedrol 15 mg IV qday. I will see the patient PRN.

## 2021-08-08 NOTE — PROGRESS NOTES
Problem: Falls - Risk of  Goal: *Absence of Falls  Description  Document Lorbolivar Hunger Fall Risk and appropriate interventions in the flowsheet. Outcome: Progressing Towards Goal  Note:   Fall Risk Interventions:  Mobility Interventions: Patient to call before getting OOB, Utilize walker, cane, or other assistive device    Mentation Interventions: Door open when patient unattended, More frequent rounding, Toileting rounds    Medication Interventions: Patient to call before getting OOB    Elimination Interventions: Call light in reach, Patient to call for help with toileting needs    History of Falls Interventions: Consult care management for discharge planning, Door open when patient unattended         Problem: Patient Education: Go to Patient Education Activity  Goal: Patient/Family Education  Outcome: Progressing Towards Goal     Problem: Inpatient Rehab (Adult)  Goal: *LTG: Avoids injury/falls 100% of time related to deficits  Outcome: Progressing Towards Goal  Goal: *LTG: Avoids infection 100% of time related to deficits  Outcome: Progressing Towards Goal     Problem: Pressure Injury - Risk of  Goal: *Prevention of pressure injury  Description  Document Mark Scale and appropriate interventions in the flowsheet.   Outcome: Progressing Towards Goal  Note:   Pressure Injury Interventions:  Sensory Interventions: Assess changes in LOC, Pressure redistribution bed/mattress (bed type)    Moisture Interventions: Absorbent underpads    Activity Interventions: Increase time out of bed, Pressure redistribution bed/mattress(bed type)    Mobility Interventions: Pressure redistribution bed/mattress (bed type)    Nutrition Interventions: Document food/fluid/supplement intake    Friction and Shear Interventions: Apply protective barrier, creams and emollients, Foam dressings/transparent film/skin sealants Yes

## 2021-09-28 ENCOUNTER — HOSPITAL ENCOUNTER (OUTPATIENT)
Dept: LAB | Age: 76
Discharge: HOME | End: 2021-09-28
Payer: MEDICARE

## 2021-09-28 DIAGNOSIS — Z79.899 LONG TERM CURRENT USE OF AMIODARONE: ICD-10-CM

## 2021-09-28 LAB
ALBUMIN SERPL-MCNC: 3.4 G/DL (ref 3.2–4.6)
ALBUMIN/GLOB SERPL: 0.9 {RATIO} (ref 1.2–3.5)
ALP SERPL-CCNC: 110 U/L (ref 50–136)
ALT SERPL-CCNC: 17 U/L (ref 12–65)
AST SERPL-CCNC: 17 U/L (ref 15–37)
BILIRUB DIRECT SERPL-MCNC: 0.1 MG/DL
BILIRUB SERPL-MCNC: 0.5 MG/DL (ref 0.2–1.1)
GLOBULIN SER CALC-MCNC: 3.8 G/DL (ref 2.3–3.5)
PROT SERPL-MCNC: 7.2 G/DL (ref 6.3–8.2)
TSH SERPL DL<=0.005 MIU/L-ACNC: 2.62 UIU/ML (ref 0.36–3.74)

## 2021-09-28 PROCEDURE — 36415 COLL VENOUS BLD VENIPUNCTURE: CPT

## 2021-09-28 PROCEDURE — 84443 ASSAY THYROID STIM HORMONE: CPT

## 2021-09-28 PROCEDURE — 80076 HEPATIC FUNCTION PANEL: CPT

## 2022-03-18 PROBLEM — N18.30 STAGE 3 CHRONIC KIDNEY DISEASE (HCC): Status: ACTIVE | Noted: 2019-07-15

## 2022-03-19 PROBLEM — I48.0 PAROXYSMAL ATRIAL FIBRILLATION (HCC): Status: ACTIVE | Noted: 2019-08-01

## 2022-03-19 PROBLEM — I10 HYPERTENSION: Status: ACTIVE | Noted: 2019-07-15

## 2022-03-19 PROBLEM — R53.81 PHYSICAL DECONDITIONING: Status: ACTIVE | Noted: 2019-07-15

## 2022-03-19 PROBLEM — R26.89 IMPAIRED GAIT AND MOBILITY: Status: ACTIVE | Noted: 2019-07-15

## 2022-03-19 PROBLEM — Z79.899 HIGH RISK MEDICATION USE: Status: ACTIVE | Noted: 2019-08-01

## 2022-04-06 ENCOUNTER — APPOINTMENT (OUTPATIENT)
Dept: NON INVASIVE DIAGNOSTICS | Age: 77
End: 2022-04-06
Attending: PHYSICIAN ASSISTANT
Payer: MEDICARE

## 2022-04-06 ENCOUNTER — HOSPITAL ENCOUNTER (OUTPATIENT)
Age: 77
Setting detail: OBSERVATION
Discharge: HOME | End: 2022-04-07
Attending: EMERGENCY MEDICINE | Admitting: INTERNAL MEDICINE
Payer: MEDICARE

## 2022-04-06 ENCOUNTER — APPOINTMENT (OUTPATIENT)
Dept: GENERAL RADIOLOGY | Age: 77
End: 2022-04-06
Attending: EMERGENCY MEDICINE
Payer: MEDICARE

## 2022-04-06 DIAGNOSIS — I48.91 ATRIAL FIBRILLATION WITH RVR (HCC): Primary | ICD-10-CM

## 2022-04-06 DIAGNOSIS — R06.02 SOB (SHORTNESS OF BREATH): ICD-10-CM

## 2022-04-06 DIAGNOSIS — R53.83 FATIGUE, UNSPECIFIED TYPE: ICD-10-CM

## 2022-04-06 DIAGNOSIS — I10 PRIMARY HYPERTENSION: ICD-10-CM

## 2022-04-06 LAB
ALBUMIN SERPL-MCNC: 3.8 G/DL (ref 3.2–4.6)
ALBUMIN/GLOB SERPL: 1 {RATIO} (ref 1.2–3.5)
ALP SERPL-CCNC: 117 U/L (ref 50–136)
ALT SERPL-CCNC: 21 U/L (ref 12–65)
ANION GAP SERPL CALC-SCNC: 8 MMOL/L (ref 7–16)
APPEARANCE UR: CLEAR
AST SERPL-CCNC: 19 U/L (ref 15–37)
ATRIAL RATE: 143 BPM
ATRIAL RATE: 64 BPM
BACTERIA URNS QL MICRO: 0 /HPF
BASOPHILS # BLD: 0.1 K/UL (ref 0–0.2)
BASOPHILS NFR BLD: 1 % (ref 0–2)
BILIRUB SERPL-MCNC: 0.9 MG/DL (ref 0.2–1.1)
BILIRUB UR QL: NEGATIVE
BNP SERPL-MCNC: 586 PG/ML
BUN SERPL-MCNC: 20 MG/DL (ref 8–23)
CALCIUM SERPL-MCNC: 9.5 MG/DL (ref 8.3–10.4)
CALCULATED P AXIS, ECG09: 23 DEGREES
CALCULATED R AXIS, ECG10: -29 DEGREES
CALCULATED R AXIS, ECG10: -36 DEGREES
CALCULATED T AXIS, ECG11: 113 DEGREES
CALCULATED T AXIS, ECG11: 72 DEGREES
CASTS URNS QL MICRO: 0 /LPF
CHLORIDE SERPL-SCNC: 109 MMOL/L (ref 98–107)
CO2 SERPL-SCNC: 26 MMOL/L (ref 21–32)
COLOR UR: YELLOW
CREAT SERPL-MCNC: 1 MG/DL (ref 0.6–1)
DIAGNOSIS, 93000: NORMAL
DIAGNOSIS, 93000: NORMAL
DIFFERENTIAL METHOD BLD: NORMAL
ECHO AO ASC DIAM: 3.8 CM
ECHO AO ASCENDING AORTA INDEX: 2.12 CM/M2
ECHO AO ROOT DIAM: 3.3 CM
ECHO AO ROOT INDEX: 1.84 CM/M2
ECHO AV AREA PEAK VELOCITY: 2.1 CM2
ECHO AV AREA VTI: 2.2 CM2
ECHO AV AREA/BSA PEAK VELOCITY: 1.2 CM2/M2
ECHO AV AREA/BSA VTI: 1.2 CM2/M2
ECHO AV MEAN GRADIENT: 5 MMHG
ECHO AV MEAN VELOCITY: 1.1 M/S
ECHO AV PEAK GRADIENT: 11 MMHG
ECHO AV PEAK VELOCITY: 1.6 M/S
ECHO AV VELOCITY RATIO: 0.69
ECHO AV VTI: 32.4 CM
ECHO EST RA PRESSURE: 3 MMHG
ECHO IVC PROX: 1.8 CM
ECHO LA AREA 2C: 29.4 CM2
ECHO LA AREA 4C: 31.5 CM2
ECHO LA DIAMETER INDEX: 2.35 CM/M2
ECHO LA DIAMETER: 4.2 CM
ECHO LA MAJOR AXIS: 6.8 CM
ECHO LA MINOR AXIS: 6.2 CM
ECHO LA TO AORTIC ROOT RATIO: 1.27
ECHO LA VOL BP: 120 ML (ref 22–52)
ECHO LA VOL/BSA BIPLANE: 67 ML/M2 (ref 16–34)
ECHO LV E' LATERAL VELOCITY: 8 CM/S
ECHO LV E' SEPTAL VELOCITY: 10 CM/S
ECHO LV EDV A2C: 93 ML
ECHO LV EDV A4C: 106 ML
ECHO LV EDV INDEX A4C: 59 ML/M2
ECHO LV EDV NDEX A2C: 52 ML/M2
ECHO LV EJECTION FRACTION A2C: 51 %
ECHO LV EJECTION FRACTION A4C: 53 %
ECHO LV EJECTION FRACTION BIPLANE: 54 % (ref 55–100)
ECHO LV ESV A2C: 46 ML
ECHO LV ESV A4C: 50 ML
ECHO LV ESV INDEX A2C: 26 ML/M2
ECHO LV ESV INDEX A4C: 28 ML/M2
ECHO LV FRACTIONAL SHORTENING: 32 % (ref 28–44)
ECHO LV INTERNAL DIMENSION DIASTOLE INDEX: 2.79 CM/M2
ECHO LV INTERNAL DIMENSION DIASTOLIC: 5 CM (ref 3.9–5.3)
ECHO LV INTERNAL DIMENSION SYSTOLIC INDEX: 1.9 CM/M2
ECHO LV INTERNAL DIMENSION SYSTOLIC: 3.4 CM
ECHO LV IVSD: 1 CM (ref 0.6–0.9)
ECHO LV MASS 2D: 182 G (ref 67–162)
ECHO LV MASS INDEX 2D: 101.7 G/M2 (ref 43–95)
ECHO LV POSTERIOR WALL DIASTOLIC: 1 CM (ref 0.6–0.9)
ECHO LV RELATIVE WALL THICKNESS RATIO: 0.4
ECHO LVOT AREA: 3.1 CM2
ECHO LVOT AV VTI INDEX: 0.71
ECHO LVOT DIAM: 2 CM
ECHO LVOT MEAN GRADIENT: 2 MMHG
ECHO LVOT PEAK GRADIENT: 5 MMHG
ECHO LVOT PEAK VELOCITY: 1.1 M/S
ECHO LVOT STROKE VOLUME INDEX: 40.3 ML/M2
ECHO LVOT SV: 72.2 ML
ECHO LVOT VTI: 23 CM
ECHO MV A VELOCITY: 1.03 M/S
ECHO MV AREA VTI: 2.7 CM2
ECHO MV E DECELERATION TIME (DT): 306 MS
ECHO MV E VELOCITY: 0.79 M/S
ECHO MV E/A RATIO: 0.77
ECHO MV E/E' LATERAL: 9.88
ECHO MV E/E' RATIO (AVERAGED): 8.89
ECHO MV E/E' SEPTAL: 7.9
ECHO MV LVOT VTI INDEX: 1.17
ECHO MV MAX VELOCITY: 0.9 M/S
ECHO MV MEAN GRADIENT: 1 MMHG
ECHO MV MEAN VELOCITY: 0.6 M/S
ECHO MV PEAK GRADIENT: 3 MMHG
ECHO MV VTI: 26.8 CM
ECHO PULMONARY ARTERY END DIASTOLIC PRESSURE: 3 MMHG
ECHO PV ACCELERATION TIME (AT): 78 MS
ECHO PV MAX VELOCITY: 1.2 M/S
ECHO PV PEAK GRADIENT: 6 MMHG
ECHO PV REGURGITANT MAX VELOCITY: 0.9 M/S
ECHO RIGHT VENTRICULAR SYSTOLIC PRESSURE (RVSP): 20 MMHG
ECHO RV BASAL DIMENSION: 3 CM
ECHO RV INTERNAL DIMENSION: 4.1 CM
ECHO RV TAPSE: 3.8 CM (ref 1.5–2)
ECHO TV REGURGITANT MAX VELOCITY: 2.07 M/S
ECHO TV REGURGITANT PEAK GRADIENT: 17 MMHG
EOSINOPHIL # BLD: 0.1 K/UL (ref 0–0.8)
EOSINOPHIL NFR BLD: 2 % (ref 0.5–7.8)
EPI CELLS #/AREA URNS HPF: 0 /HPF
ERYTHROCYTE [DISTWIDTH] IN BLOOD BY AUTOMATED COUNT: 13.8 % (ref 11.9–14.6)
GLOBULIN SER CALC-MCNC: 3.8 G/DL (ref 2.3–3.5)
GLUCOSE SERPL-MCNC: 117 MG/DL (ref 65–100)
GLUCOSE UR STRIP.AUTO-MCNC: NEGATIVE MG/DL
HCT VFR BLD AUTO: 42 % (ref 35.8–46.3)
HGB BLD-MCNC: 14 G/DL (ref 11.7–15.4)
HGB UR QL STRIP: ABNORMAL
IMM GRANULOCYTES # BLD AUTO: 0 K/UL (ref 0–0.5)
IMM GRANULOCYTES NFR BLD AUTO: 1 % (ref 0–5)
KETONES UR QL STRIP.AUTO: 15 MG/DL
LEUKOCYTE ESTERASE UR QL STRIP.AUTO: NEGATIVE
LIPASE SERPL-CCNC: 86 U/L (ref 73–393)
LYMPHOCYTES # BLD: 1.3 K/UL (ref 0.5–4.6)
LYMPHOCYTES NFR BLD: 17 % (ref 13–44)
MAGNESIUM SERPL-MCNC: 1.9 MG/DL (ref 1.8–2.4)
MCH RBC QN AUTO: 31.1 PG (ref 26.1–32.9)
MCHC RBC AUTO-ENTMCNC: 33.3 G/DL (ref 31.4–35)
MCV RBC AUTO: 93.3 FL (ref 79.6–97.8)
MONOCYTES # BLD: 0.5 K/UL (ref 0.1–1.3)
MONOCYTES NFR BLD: 7 % (ref 4–12)
NEUTS SEG # BLD: 5.5 K/UL (ref 1.7–8.2)
NEUTS SEG NFR BLD: 73 % (ref 43–78)
NITRITE UR QL STRIP.AUTO: NEGATIVE
NRBC # BLD: 0 K/UL (ref 0–0.2)
P-R INTERVAL, ECG05: 150 MS
PH UR STRIP: 7.5 [PH] (ref 5–9)
PLATELET # BLD AUTO: 246 K/UL (ref 150–450)
PMV BLD AUTO: 11.1 FL (ref 9.4–12.3)
POTASSIUM SERPL-SCNC: 3.6 MMOL/L (ref 3.5–5.1)
PROT SERPL-MCNC: 7.6 G/DL (ref 6.3–8.2)
PROT UR STRIP-MCNC: ABNORMAL MG/DL
Q-T INTERVAL, ECG07: 313 MS
Q-T INTERVAL, ECG07: 434 MS
QRS DURATION, ECG06: 102 MS
QRS DURATION, ECG06: 88 MS
QTC CALCULATION (BEZET), ECG08: 447 MS
QTC CALCULATION (BEZET), ECG08: 475 MS
RBC # BLD AUTO: 4.5 M/UL (ref 4.05–5.2)
RBC #/AREA URNS HPF: ABNORMAL /HPF
SODIUM SERPL-SCNC: 143 MMOL/L (ref 136–145)
SP GR UR REFRACTOMETRY: 1.01 (ref 1–1.02)
TROPONIN-HIGH SENSITIVITY: 13.2 PG/ML (ref 0–14)
TROPONIN-HIGH SENSITIVITY: 43.4 PG/ML (ref 0–14)
TSH SERPL DL<=0.005 MIU/L-ACNC: 3.11 UIU/ML (ref 0.36–3.74)
UROBILINOGEN UR QL STRIP.AUTO: 0.2 EU/DL (ref 0.2–1)
VENTRICULAR RATE, ECG03: 138 BPM
VENTRICULAR RATE, ECG03: 64 BPM
WBC # BLD AUTO: 7.6 K/UL (ref 4.3–11.1)
WBC URNS QL MICRO: ABNORMAL /HPF

## 2022-04-06 PROCEDURE — 74011250636 HC RX REV CODE- 250/636: Performed by: EMERGENCY MEDICINE

## 2022-04-06 PROCEDURE — 74011000250 HC RX REV CODE- 250: Performed by: PHYSICIAN ASSISTANT

## 2022-04-06 PROCEDURE — 2709999900 HC NON-CHARGEABLE SUPPLY

## 2022-04-06 PROCEDURE — 74011000250 HC RX REV CODE- 250: Performed by: EMERGENCY MEDICINE

## 2022-04-06 PROCEDURE — 93306 TTE W/DOPPLER COMPLETE: CPT

## 2022-04-06 PROCEDURE — 84484 ASSAY OF TROPONIN QUANT: CPT

## 2022-04-06 PROCEDURE — 84443 ASSAY THYROID STIM HORMONE: CPT

## 2022-04-06 PROCEDURE — G0378 HOSPITAL OBSERVATION PER HR: HCPCS

## 2022-04-06 PROCEDURE — 96374 THER/PROPH/DIAG INJ IV PUSH: CPT

## 2022-04-06 PROCEDURE — 99285 EMERGENCY DEPT VISIT HI MDM: CPT

## 2022-04-06 PROCEDURE — 36415 COLL VENOUS BLD VENIPUNCTURE: CPT

## 2022-04-06 PROCEDURE — 93005 ELECTROCARDIOGRAM TRACING: CPT | Performed by: PHYSICIAN ASSISTANT

## 2022-04-06 PROCEDURE — 96365 THER/PROPH/DIAG IV INF INIT: CPT

## 2022-04-06 PROCEDURE — 93005 ELECTROCARDIOGRAM TRACING: CPT | Performed by: EMERGENCY MEDICINE

## 2022-04-06 PROCEDURE — 83690 ASSAY OF LIPASE: CPT

## 2022-04-06 PROCEDURE — 71045 X-RAY EXAM CHEST 1 VIEW: CPT

## 2022-04-06 PROCEDURE — 81001 URINALYSIS AUTO W/SCOPE: CPT

## 2022-04-06 PROCEDURE — 85025 COMPLETE CBC W/AUTO DIFF WBC: CPT

## 2022-04-06 PROCEDURE — 96366 THER/PROPH/DIAG IV INF ADDON: CPT

## 2022-04-06 PROCEDURE — 80053 COMPREHEN METABOLIC PANEL: CPT

## 2022-04-06 PROCEDURE — 83735 ASSAY OF MAGNESIUM: CPT

## 2022-04-06 PROCEDURE — 83880 ASSAY OF NATRIURETIC PEPTIDE: CPT

## 2022-04-06 PROCEDURE — 96376 TX/PRO/DX INJ SAME DRUG ADON: CPT

## 2022-04-06 PROCEDURE — 87088 URINE BACTERIA CULTURE: CPT

## 2022-04-06 PROCEDURE — 99220 PR INITIAL OBSERVATION CARE/DAY 70 MINUTES: CPT | Performed by: INTERNAL MEDICINE

## 2022-04-06 PROCEDURE — 87086 URINE CULTURE/COLONY COUNT: CPT

## 2022-04-06 PROCEDURE — 87186 SC STD MICRODIL/AGAR DIL: CPT

## 2022-04-06 PROCEDURE — 74011000258 HC RX REV CODE- 258: Performed by: EMERGENCY MEDICINE

## 2022-04-06 PROCEDURE — 74011250637 HC RX REV CODE- 250/637: Performed by: PHYSICIAN ASSISTANT

## 2022-04-06 RX ORDER — DILTIAZEM HYDROCHLORIDE 5 MG/ML
20 INJECTION INTRAVENOUS
Status: COMPLETED | OUTPATIENT
Start: 2022-04-06 | End: 2022-04-06

## 2022-04-06 RX ORDER — CARVEDILOL 6.25 MG/1
6.25 TABLET ORAL 2 TIMES DAILY WITH MEALS
Status: DISCONTINUED | OUTPATIENT
Start: 2022-04-06 | End: 2022-04-07 | Stop reason: HOSPADM

## 2022-04-06 RX ORDER — AMIODARONE HYDROCHLORIDE 200 MG/1
200 TABLET ORAL DAILY
Status: DISCONTINUED | OUTPATIENT
Start: 2022-04-06 | End: 2022-04-07 | Stop reason: HOSPADM

## 2022-04-06 RX ORDER — AMIODARONE HYDROCHLORIDE 200 MG/1
200 TABLET ORAL DAILY
Status: DISCONTINUED | OUTPATIENT
Start: 2022-04-07 | End: 2022-04-06

## 2022-04-06 RX ORDER — SODIUM CHLORIDE 0.9 % (FLUSH) 0.9 %
5-40 SYRINGE (ML) INJECTION EVERY 8 HOURS
Status: DISCONTINUED | OUTPATIENT
Start: 2022-04-06 | End: 2022-04-07 | Stop reason: HOSPADM

## 2022-04-06 RX ORDER — HYDROCODONE BITARTRATE AND ACETAMINOPHEN 5; 325 MG/1; MG/1
1 TABLET ORAL
Status: DISCONTINUED | OUTPATIENT
Start: 2022-04-06 | End: 2022-04-07 | Stop reason: HOSPADM

## 2022-04-06 RX ORDER — MORPHINE SULFATE 2 MG/ML
2 INJECTION, SOLUTION INTRAMUSCULAR; INTRAVENOUS
Status: DISCONTINUED | OUTPATIENT
Start: 2022-04-06 | End: 2022-04-07 | Stop reason: HOSPADM

## 2022-04-06 RX ORDER — SODIUM CHLORIDE 0.9 % (FLUSH) 0.9 %
5-10 SYRINGE (ML) INJECTION EVERY 8 HOURS
Status: DISCONTINUED | OUTPATIENT
Start: 2022-04-06 | End: 2022-04-07 | Stop reason: HOSPADM

## 2022-04-06 RX ORDER — SODIUM CHLORIDE 0.9 % (FLUSH) 0.9 %
5-40 SYRINGE (ML) INJECTION AS NEEDED
Status: DISCONTINUED | OUTPATIENT
Start: 2022-04-06 | End: 2022-04-07 | Stop reason: HOSPADM

## 2022-04-06 RX ORDER — SODIUM CHLORIDE 0.9 % (FLUSH) 0.9 %
5-10 SYRINGE (ML) INJECTION AS NEEDED
Status: DISCONTINUED | OUTPATIENT
Start: 2022-04-06 | End: 2022-04-07 | Stop reason: HOSPADM

## 2022-04-06 RX ORDER — ACETAMINOPHEN 325 MG/1
650 TABLET ORAL
Status: DISCONTINUED | OUTPATIENT
Start: 2022-04-06 | End: 2022-04-07 | Stop reason: HOSPADM

## 2022-04-06 RX ADMIN — APIXABAN 5 MG: 5 TABLET, FILM COATED ORAL at 21:55

## 2022-04-06 RX ADMIN — AMIODARONE HYDROCHLORIDE 200 MG: 200 TABLET ORAL at 14:55

## 2022-04-06 RX ADMIN — SODIUM CHLORIDE 2.5 MG/HR: 900 INJECTION, SOLUTION INTRAVENOUS at 10:50

## 2022-04-06 RX ADMIN — SODIUM CHLORIDE, PRESERVATIVE FREE 5 ML: 5 INJECTION INTRAVENOUS at 13:27

## 2022-04-06 RX ADMIN — SODIUM CHLORIDE, PRESERVATIVE FREE 10 ML: 5 INJECTION INTRAVENOUS at 21:56

## 2022-04-06 RX ADMIN — CARVEDILOL 6.25 MG: 6.25 TABLET, FILM COATED ORAL at 14:55

## 2022-04-06 RX ADMIN — DILTIAZEM HYDROCHLORIDE 20 MG: 5 INJECTION INTRAVENOUS at 08:52

## 2022-04-06 NOTE — PROGRESS NOTES
TRANSFER - IN REPORT:    Verbal report received from April, NICOLA on Lizeth Arango being received from ED for routine progression of care      Report consisted of patients Situation, Background, Assessment and Recommendations(SBAR). Information from the following report(s) SBAR, Kardex, ED Summary, Intake/Output, MAR and Recent Results was reviewed with the receiving nurse. Opportunity for questions and clarification was provided. Assessment completed upon patients arrival to unit and care assumed.

## 2022-04-06 NOTE — ROUTINE PROCESS
Bedside and Verbal shift change report to be given to Rashid Lagos RN and [employer], RN (oncoming nurse) by self (offgoing nurse). Report included the following information SBAR, Kardex, Intake/Output and MAR.

## 2022-04-06 NOTE — PROGRESS NOTES
Problem: Falls - Risk of  Goal: *Absence of Falls  Description: Document Lindsey Goldberg Fall Risk and appropriate interventions in the flowsheet. Outcome: Progressing Towards Goal  Note: Fall Risk Interventions:  Mobility Interventions: Utilize walker, cane, or other assistive device,Communicate number of staff needed for ambulation/transfer,Patient to call before getting OOB         Medication Interventions: Patient to call before getting OOB,Teach patient to arise slowly    Elimination Interventions:  Toileting schedule/hourly rounds,Call light in reach,Patient to call for help with toileting needs

## 2022-04-06 NOTE — ED NOTES
TRANSFER - OUT REPORT:    Verbal report given to NICOLA Davis on Mook Ellis  being transferred to [facility] for routine progression of care       Report consisted of patients Situation, Background, Assessment and   Recommendations(SBAR). Information from the following report(s) ED Summary was reviewed with the receiving nurse. Lines:   Peripheral IV 04/06/22 Right Antecubital (Active)       Peripheral IV 04/06/22 Left Antecubital (Active)        Opportunity for questions and clarification was provided.       Patient transported with:   Monitor  Patient-specific medications from Pharmacy  Registered Nurse

## 2022-04-06 NOTE — PROGRESS NOTES
Skin assessment completed. Skin CDI and fragile. No bruising or redness, skin is free of wounds. Sacrum and heels CDI. Will continue to monitor.

## 2022-04-06 NOTE — PROGRESS NOTES
Patient appearing to be in NSR. KRISTIE Oneal notified. Order received for EKG to confirm rhythm change and to stop cardizem gtt. Will continue to monitor. Order also received to re-time amio PO dose to be given today since patient has not had any home medications today. Also received order to re-time coreg for now due to patient's high BP. PA aware of BP.

## 2022-04-06 NOTE — H&P
[facility] Cardiology Initial Cardiac Evaluation      Date of  Admission: 4/6/2022  8:13 AM     Primary Care Physician: Radha Ingram MD  Primary Cardiologist: Dr Esther Aguilar  Referring Physician: Dr Darrick Goyal Physician: Dr Nilesh Smith    CC/Reason for evaluation: atrial fibrillation with RVR    HPI:  Sowmya Cheney is a 64 y.o. 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 Patient reports awaking around 5 am to use bathroom and noticed heart fluttering. Admitted to associated SOB and generalized weakness/fatigue. Feels she may have over done it over last couple of days. Upon evaluation in ED, patient was hypertensive with /94 and tachycardic with . EKG showed atrial fibrillation with RVR. Labs showed WBC 7.4, H/H 14/42, Ptl 246, Na 143, K 3.6, BUN/Cr 20/1.00, pBNP 586, hs trop 13.2, and TSH 3.110. In ED, patient was given Cardizem push and started on cardizem gtt. Cardiology was consulted for further care.      Past Medical History:   Diagnosis Date    Arrhythmia     Arthritis     Asthma     Cellulitis     Chronic kidney disease     Hx renal failure    Depression     Gout     Gouty arthritis  NOS 12/2/2015    Hypercholesterolemia     Hypertension     Mitral valve prolapse     Morbid obesity (HCC)     Nausea & vomiting     Polymyalgia rheumatica (HCC) 12/2/2015    Psychiatric disorder     depression & anxiety    PUD (peptic ulcer disease)     Sjogren's syndrome ([address]     Temporal arteritis ([facility] [address]       Past Surgical History:   Procedure Laterality Date    HX COLONOSCOPY  6/08    HX ENDOSCOPY  8/08    HX HYSTERECTOMY      HX TUBAL LIGATION      [place] CARDIAC SURG PROCEDURE UNLIST  4/07    STRESS TEST    [place] CARDIAC SURG PROCEDURE UNLIST  8/13    HEART ABLATION       Allergies   Allergen Reactions    Celebrex [Celecoxib] Rash    Lisinopril Cough    Losartan Other (comments)     Burning sensation tongue.  Nifedipine Other (comments)     severe headache      Social History     Socioeconomic History    Marital status:      Spouse name: Not on file    Number of children: Not on file    Years of education: Not on file    Highest education level: Not on file   Occupational History    Not on file   Tobacco Use    Smoking status: Never Smoker    Smokeless tobacco: Never Used   Substance and Sexual Activity    Alcohol use: No    Drug use: No    Sexual activity: Never   Other Topics Concern     Service No    Blood Transfusions No    Caffeine Concern No    Occupational Exposure No    Hobby Hazards No    Sleep Concern No    Stress Concern Yes    Weight Concern Yes    Special Diet No    Back Care No    Exercise No    Bike Helmet No    Seat Belt Yes    Self-Exams Not Asked   Social History Narrative    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, 12 great grandchildren     Social Determinants of Health     Financial Resource Strain:     Difficulty of Paying Living Expenses: Not on file   Food Insecurity:     Worried About Running Out of Food in the Last Year: Not on file    Del of Food in the Last Year: Not on file   Transportation Needs:     Lack of Transportation (Medical): Not on file    Lack of Transportation (Non-Medical):  Not on file   Physical Activity:     Days of Exercise per Week: Not on file    Minutes of Exercise per Session: Not on file   Stress:     Feeling of Stress : Not on file   Social Connections:     Frequency of Communication with Friends and Family: Not on file    Frequency of Social Gatherings with Friends and Family: Not on file    Attends [employer] Services: Not on file    Active Member of Clubs or Organizations: Not on file    Attends Club or Organization Meetings: Not on file    Marital Status: Not on file   Intimate Partner Violence:     Fear of Current or Ex-Partner: Not on file    Emotionally Abused: Not on file    Physically Abused: Not on file    Sexually Abused: Not on file   Housing Stability:     Unable to Pay for Housing in the Last Year: Not on file    Number of [place] in the Last Year: Not on file    Unstable Housing in the Last Year: Not on file     Family History   Problem Relation Age of Onset    Cancer Mother     Breast Cancer Mother 80    Hypertension Mother     Dementia Mother     Cancer Father         LUNG    Heart Disease Father 62    Diabetes Son    Carlita Triana Other Son         PSYCHIATRIC ILLNESS    Hypertension Sister         Current Facility-Administered Medications   Medication Dose Route Frequency    sodium chloride (NS) flush 5-10 mL  5-10 mL IntraVENous Q8H    sodium chloride (NS) flush 5-10 mL  5-10 mL IntraVENous PRN    dilTIAZem (CARDIZEM) 100 mg in 0.9% sodium chloride (MBP/ADV) 100 mL infusion  0-15 mg/hr IntraVENous TITRATE     Current Outpatient Medications   Medication Sig    carvediloL (COREG) 6.25 mg tablet Take 1 Tablet by mouth two (2) times daily (with meals).  amiodarone (CORDARONE) 200 mg tablet Take 1 tablet by mouth once daily    apixaban (ELIQUIS) 5 mg tablet Take 1 Tablet by mouth every twelve (12) hours. Indications: treatment to prevent blood clots in chronic atrial fibrillation    colchicine (Colcrys) 0.6 mg tablet Take 1 Tablet by mouth daily. 1 tablet as directed as needed for gout (Patient not taking: Reported on 9/28/2021)    cyclobenzaprine (FLEXERIL) 10 mg tablet Take 1 Tablet by mouth nightly. (Patient not taking: Reported on 9/28/2021)    PARoxetine (PAXIL) 10 mg tablet Take 1 Tablet by mouth daily.  (Patient not taking: Reported on 9/28/2021)       Review of Symptoms:  General: Positive for generalized weakness/fatigue. No weight changes, fever or chills  Skin: no rashes, lumps, or other skin changes  HEENT: no headache, dizziness, lightheadedness, vision changes, hearing changes, tinnitus, vertigo, sinus pressure/pain, bleeding gums, sore throat, or hoarseness  Neck: no swollen glands, goiter, pain or stiffness  Respiratory: Positive for dyspnea. No cough, sputum, hemoptysis, wheezing  Cardiovascular: + as per HPI  Gastrointestinal: no GERD, constipation, diarrhea, liver problems, or h/o GI bleed  Urinary: no frequency, urgency , hematuria, burning/pain with urination, recent flank pain, polyuria, nocturia, or difficulty urinating  Peripheral Vascular: no claudication, leg cramps, prior DVTs, swelling of calves, legs, or feet, color change, or swelling with redness or tenderness  Musculoskeletal: no muscle or joint pain/stiffness, joint swelling, erythema of joints, or back pain  Psychiatric: no depression or excessive stress  Neurological: no sensory or motor loss, seizures, syncope, tremors, numbness, no dementia  Hematologic: no anemia, easy bruising or bleeding  Endocrine: No thyroid problems, heat or cold intolerance, excessive sweating, polyuria, polydipsia, diabetes.        Subjective:     Physical Exam:    Vitals:    04/06/22 0933 04/06/22 0948 04/06/22 1003 04/06/22 1018   BP: (!) 189/93 (!) 176/102 (!) 159/81 (!) 174/88   Pulse: (!) 115 (!) 128 (!) 118 (!) 124   Resp: 26 18 17 17   Temp:    98.7 °F (37.1 °C)   SpO2: 97% 99% 98% 99%   Weight:       Height:         General: Well Developed, Well Nourished, No Acute Distress  HEENT: pupils equal and round, no abnormalities noted  Neck: supple, no JVD, no carotid bruits  Heart: IRR without murmurs or gallops  Lungs: Clear throughout auscultation bilaterally without adventitious sounds  Abd: soft, nontender, nondistended, with good bowel sounds  Ext: warm, no edema, calves supple/nontender, pulses 2+ bilaterally  Skin: warm and dry  Psychiatric: Normal mood and affect  Neurologic: Alert and oriented X 3    Cardiographics    Telemetry: AFIB  ECG: atrial fibrillation, rate 138  Echocardiogram: ordered    Labs:   Recent Results (from the past 24 hour(s))   EKG, 12 LEAD, INITIAL    Collection Time: 04/06/22  8:24 AM   Result Value Ref Range    Ventricular Rate 138 BPM    Atrial Rate 143 BPM    QRS Duration 102 ms    Q-T Interval 313 ms    QTC Calculation (Bezet) 475 ms    Calculated R Axis -36 degrees    Calculated T Axis 113 degrees    Diagnosis       Atrial fibrillation  Poor R wave progression  Left axis deviation    Confirmed by Nav Carrera ([id number]) on 4/6/2022 10:32:01 AM     TROPONIN-HIGH SENSITIVITY    Collection Time: 04/06/22  8:30 AM   Result Value Ref Range    Troponin-High Sensitivity 13.2 0 - 14 pg/mL   CBC WITH AUTOMATED DIFF    Collection Time: 04/06/22  8:30 AM   Result Value Ref Range    WBC 7.6 4.3 - 11.1 K/uL    RBC 4.50 4.05 - 5.2 M/uL    HGB 14.0 11.7 - 15.4 g/dL    HCT 42.0 35.8 - 46.3 %    MCV 93.3 79.6 - 97.8 FL    MCH 31.1 26.1 - 32.9 PG    MCHC 33.3 31.4 - 35.0 g/dL    RDW 13.8 11.9 - 14.6 %    PLATELET 008 296 - 226 K/uL    MPV 11.1 9.4 - 12.3 FL    ABSOLUTE NRBC 0.00 0.0 - 0.2 K/uL    DF AUTOMATED      NEUTROPHILS 73 43 - 78 %    LYMPHOCYTES 17 13 - 44 %    MONOCYTES 7 4.0 - 12.0 %    EOSINOPHILS 2 0.5 - 7.8 %    BASOPHILS 1 0.0 - 2.0 %    IMMATURE GRANULOCYTES 1 0.0 - 5.0 %    ABS. NEUTROPHILS 5.5 1.7 - 8.2 K/UL    ABS. LYMPHOCYTES 1.3 0.5 - 4.6 K/UL    ABS. MONOCYTES 0.5 0.1 - 1.3 K/UL    ABS. EOSINOPHILS 0.1 0.0 - 0.8 K/UL    ABS. BASOPHILS 0.1 0.0 - 0.2 K/UL    ABS. IMM.  GRANS. 0.0 0.0 - 0.5 K/UL   METABOLIC PANEL, COMPREHENSIVE    Collection Time: 04/06/22  8:30 AM   Result Value Ref Range    Sodium 143 136 - 145 mmol/L    Potassium 3.6 3.5 - 5.1 mmol/L    Chloride 109 (H) 98 - 107 mmol/L    CO2 26 21 - 32 mmol/L    Anion gap 8 7 - 16 mmol/L Glucose 117 (H) 65 - 100 mg/dL    BUN 20 8 - 23 MG/DL    Creatinine 1.00 0.6 - 1.0 MG/DL    GFR est AA >60 >60 ml/min/1.73m2    GFR est non-AA 57 (L) >60 ml/min/1.73m2    Calcium 9.5 8.3 - 10.4 MG/DL    Bilirubin, total 0.9 0.2 - 1.1 MG/DL    ALT (SGPT) 21 12 - 65 U/L    AST (SGOT) 19 15 - 37 U/L    Alk. phosphatase 117 50 - 136 U/L    Protein, total 7.6 6.3 - 8.2 g/dL    Albumin 3.8 3.2 - 4.6 g/dL    Globulin 3.8 (H) 2.3 - 3.5 g/dL    A-G Ratio 1.0 (L) 1.2 - 3.5     LIPASE    Collection Time: 04/06/22  8:30 AM   Result Value Ref Range    Lipase 86 73 - 393 U/L   MAGNESIUM    Collection Time: 04/06/22  8:30 AM   Result Value Ref Range    Magnesium 1.9 1.8 - 2.4 mg/dL   NT-PRO BNP    Collection Time: 04/06/22  8:30 AM   Result Value Ref Range    NT pro- (H) <450 PG/ML   TSH 3RD GENERATION    Collection Time: 04/06/22  8:30 AM   Result Value Ref Range    TSH 3.110 0.358 - 3.740 uIU/mL   URINALYSIS W/ RFLX MICROSCOPIC    Collection Time: 04/06/22  9:22 AM   Result Value Ref Range    Color YELLOW      Appearance CLEAR      Specific gravity 1.015 1.001 - 1.023      pH (UA) 7.5 5.0 - 9.0      Protein TRACE (A) NEG mg/dL    Glucose Negative mg/dL    Ketone 15 (A) NEG mg/dL    Bilirubin Negative NEG      Blood TRACE (A) NEG      Urobilinogen 0.2 0.2 - 1.0 EU/dL    Nitrites Negative NEG      Leukocyte Esterase Negative NEG      WBC 5-10 0 /hpf    RBC 0-3 0 /hpf    Epithelial cells 0 0 /hpf    Bacteria 0 0 /hpf    Casts 0 0 /lpf     Pt has been seen and examined by Dr. Gabbie Monaco. He agrees with the following assessment and plan.      Assessment/Plan:      Atrial fibrillation with RVR  - initially dx in 2019  - admit to telemetry   - continue Eliquis for CVA protection   - s/p IV cardizem push/gtt in ED, continue cardizem gtt for rate control  - ECHO   - NPO after midnight for possible DCCV    HTN  - continue carvediolol   - monitor and titrate medications as needed    Hx tachycardia induced CM  - check ECHO     Hx WPW s/p ablation     Thank you for requesting cardiac evaluation and allowing us to participate in the care of this patient. We will continue to follow along with you.       Pebbles Figueredo PA-C  Supervising Physician: Dr Radha Toledo

## 2022-04-06 NOTE — ED PROVIDER NOTES
Patient with a history of atrial fibrillation diagnosed years ago. Is on Eliquis and amiodarone for this along with Coreg. She states for the past couple days has had increased fatigue and weakness. Worse with exertion. She got up at 5 AM this morning to use the restroom and noticed and irregular fluttering heartbeat. Has some mild shortness of breath and increased fatigue and weakness with this. No nausea numbness or diaphoresis. No chest pain. The history is provided by the patient. No  was used. Palpitations   This is a new problem. The current episode started 3 to 5 hours ago. The problem has not changed since onset. The problem occurs constantly. The problem is associated with nothing. Associated symptoms include malaise/fatigue, irregular heartbeat, weakness and shortness of breath. Pertinent negatives include no diaphoresis, no fever, no numbness, no chest pain, no chest pressure, no exertional chest pressure, no near-syncope, no orthopnea, no syncope, no abdominal pain, no nausea, no vomiting, no headaches, no back pain, no leg pain, no lower extremity edema, no dizziness and no cough. Risk factors include cardiac disease and hypertension. Her past medical history is significant for atrial fibrillation.         Past Medical History:   Diagnosis Date    Arrhythmia     Arthritis     Asthma     Cellulitis     Chronic kidney disease     Hx renal failure    Depression     Gout     Gouty arthritis  NOS 12/2/2015    Hypercholesterolemia     Hypertension     Mitral valve prolapse     Morbid obesity (HCC)     Nausea & vomiting     Polymyalgia rheumatica (HCC) 12/2/2015    Psychiatric disorder     depression & anxiety    PUD (peptic ulcer disease)     Sjogren's syndrome ([address]     Temporal arteritis ([address]        Past Surgical History:   Procedure Laterality Date    HX COLONOSCOPY  6/08    HX ENDOSCOPY  8/08    HX HYSTERECTOMY      HX TUBAL LIGATION      [place] CARDIAC SURG PROCEDURE UNLIST  4/07    STRESS TEST    [place] CARDIAC SURG PROCEDURE UNLIST  8/13    HEART ABLATION         Family History:   Problem Relation Age of Onset    Cancer Mother     Breast Cancer Mother 80    Hypertension Mother     Dementia Mother     Cancer Father         LUNG    Heart Disease Father 62    Diabetes Son     Other Son         PSYCHIATRIC ILLNESS    Hypertension Sister        Social History     Socioeconomic History    Marital status:      Spouse name: Not on file    Number of children: Not on file    Years of education: Not on file    Highest education level: Not on file   Occupational History    Not on file   Tobacco Use    Smoking status: Never Smoker    Smokeless tobacco: Never Used   Substance and Sexual Activity    Alcohol use: No    Drug use: No    Sexual activity: Never   Other Topics Concern     Service No    Blood Transfusions No    Caffeine Concern No    Occupational Exposure No    Hobby Hazards No    Sleep Concern No    Stress Concern Yes    Weight Concern Yes    Special Diet No    Back Care No    Exercise No    Bike Helmet No    Seat Belt Yes    Self-Exams Not Asked   Social History Narrative    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, 12 great grandchildren     Social Determinants of Health     Financial Resource Strain:     Difficulty of Paying Living Expenses: Not on file   Food Insecurity:     Worried About Running Out of Food in the Last Year: Not on file    Del of Food in the Last Year: Not on file   Transportation Needs:     Lack of Transportation (Medical): Not on file    Lack of Transportation (Non-Medical):  Not on file   Physical Activity:     Days of Exercise per Week: Not on file    Minutes of Exercise per Session: Not on file   Stress:     Feeling of Stress : Not on file   Social Connections:     Frequency of Communication with Friends and Family: Not on file    Frequency of Social Gatherings with Friends and Family: Not on file    Attends [employer] Services: Not on file    Active Member of Clubs or Organizations: Not on file    Attends Club or Organization Meetings: Not on file    Marital Status: Not on file   Intimate Partner Violence:     Fear of Current or Ex-Partner: Not on file    Emotionally Abused: Not on file    Physically Abused: Not on file    Sexually Abused: Not on file   Housing Stability:     Unable to Pay for Housing in the Last Year: Not on file    Number of [place] in the Last Year: Not on file    Unstable Housing in the Last Year: Not on file         ALLERGIES: Celebrex [celecoxib], Lisinopril, Losartan, and Nifedipine    Review of Systems   Constitutional: Positive for fatigue and malaise/fatigue. Negative for chills, diaphoresis and fever. HENT: Negative for congestion, rhinorrhea and sore throat. Eyes: Negative for pain and redness. Respiratory: Positive for shortness of breath. Negative for cough, chest tightness and wheezing. Cardiovascular: Positive for palpitations. Negative for chest pain, orthopnea, leg swelling, syncope and near-syncope. Gastrointestinal: Negative for abdominal pain, diarrhea, nausea and vomiting. Genitourinary: Negative for dysuria and hematuria. Musculoskeletal: Negative for back pain, gait problem, neck pain and neck stiffness. Skin: Negative for color change and rash. Neurological: Positive for weakness. Negative for dizziness, numbness and headaches. Vitals:    04/06/22 0818   BP: (!) 223/94   Pulse: (!) 129   Resp: 20   SpO2: 98%   Weight: 78.1 kg (172 lb 3.2 oz)   Height: 5' 2\" (1.575 m)            Physical Exam  Constitutional:       Appearance: Normal appearance. She is well-developed. HENT:      Head: Normocephalic and atraumatic. Cardiovascular:      Rate and Rhythm: Tachycardia present. Rhythm irregular. Pulmonary:      Effort: Pulmonary effort is normal. No respiratory distress. Breath sounds: Normal breath sounds. No wheezing. Abdominal:      General: Bowel sounds are normal.      Palpations: Abdomen is soft. Tenderness: There is no abdominal tenderness. Musculoskeletal:         General: No swelling. Normal range of motion. Cervical back: Normal range of motion and neck supple. Skin:     General: Skin is warm and dry. Neurological:      Mental Status: She is alert and oriented to person, place, and time. MDM  Number of Diagnoses or Management Options  Diagnosis management comments: Patient presents with A. fib RVR. Past 2 years has been in normal sinus rhythm on EKGs. Has fatigue weakness and shortness of breath with this. Cardizem bolus slowed heart rate but still above 100. Start a Cardizem drip and will admit for further treatment. Patient on amiodarone and Eliquis.        Amount and/or Complexity of Data Reviewed  Clinical lab tests: ordered and reviewed  Tests in the radiology section of CPT®: ordered and reviewed  Tests in the medicine section of CPT®: ordered and reviewed    Patient Progress  Patient progress: stable         Critical Care  Performed by: Jeramy Serna MD  Authorized by: Jeramy Serna MD     Critical care provider statement:     Critical care time (minutes):  45    Critical care time was exclusive of:  Separately billable procedures and treating other patients    Critical care was necessary to treat or prevent imminent or life-threatening deterioration of the following conditions:  Circulatory failure    Critical care was time spent personally by me on the following activities:  Blood draw for specimens, development of treatment plan with patient or surrogate, discussions with consultants, evaluation of patient's response to treatment, examination of patient, interpretation of cardiac output measurements, obtaining history from patient or surrogate, ordering and performing treatments and interventions, ordering and review of laboratory studies, ordering and review of radiographic studies, pulse oximetry, re-evaluation of patient's condition and review of old charts    I assumed direction of critical care for this patient from another provider in my specialty: no                EKG: nonspecific ST and T waves changes, atrial fibrillation, rate 146. XR CHEST PORT (Final result)  Result time 04/06/22 09:11:21  Final result by Adriane Shone, MD (04/06/22 09:11:21)                Impression:      1. Unchanged enlargement of the cardiac silhouette. 2. No focal airspace disease. Narrative:    EXAMINATION: CHEST RADIOGRAPH 4/6/2022 8:49 AM     ACCESSION NUMBER: [id number]     INDICATION: Chest Pain     COMPARISON: Chest x-ray 8/8/2020, 7/15/2019     TECHNIQUE: A single view of the chest was obtained. FINDINGS:     Support Lines and Tubes: None     Cardiac Silhouette: Unchanged enlargement of the cardiac silhouette. Lungs: No focal airspace disease. Pleura: No pleural effusion. No pneumothorax. Osseous Structures: Unremarkable.      Upper Abdomen: Unremarkable.                     Results Include:    Recent Results (from the past 24 hour(s))   EKG, 12 LEAD, INITIAL    Collection Time: 04/06/22  8:24 AM   Result Value Ref Range    Ventricular Rate 138 BPM    Atrial Rate 143 BPM    QRS Duration 102 ms    Q-T Interval 313 ms    QTC Calculation (Bezet) 475 ms    Calculated R Axis -36 degrees    Calculated T Axis 113 degrees    Diagnosis       Atrial fibrillation  Poor R wave progression  Left axis deviation    Confirmed by Rajeev Oakes ([id number]) on 4/6/2022 10:32:01 AM     TROPONIN-HIGH SENSITIVITY    Collection Time: 04/06/22  8:30 AM   Result Value Ref Range    Troponin-High Sensitivity 13.2 0 - 14 pg/mL   CBC WITH AUTOMATED DIFF    Collection Time: 04/06/22  8:30 AM   Result Value Ref Range    WBC 7.6 4.3 - 11.1 K/uL    RBC 4.50 4.05 - 5.2 M/uL    HGB 14.0 11.7 - 15.4 g/dL    HCT 42.0 35.8 - 46.3 % MCV 93.3 79.6 - 97.8 FL    MCH 31.1 26.1 - 32.9 PG    MCHC 33.3 31.4 - 35.0 g/dL    RDW 13.8 11.9 - 14.6 %    PLATELET 550 732 - 782 K/uL    MPV 11.1 9.4 - 12.3 FL    ABSOLUTE NRBC 0.00 0.0 - 0.2 K/uL    DF AUTOMATED      NEUTROPHILS 73 43 - 78 %    LYMPHOCYTES 17 13 - 44 %    MONOCYTES 7 4.0 - 12.0 %    EOSINOPHILS 2 0.5 - 7.8 %    BASOPHILS 1 0.0 - 2.0 %    IMMATURE GRANULOCYTES 1 0.0 - 5.0 %    ABS. NEUTROPHILS 5.5 1.7 - 8.2 K/UL    ABS. LYMPHOCYTES 1.3 0.5 - 4.6 K/UL    ABS. MONOCYTES 0.5 0.1 - 1.3 K/UL    ABS. EOSINOPHILS 0.1 0.0 - 0.8 K/UL    ABS. BASOPHILS 0.1 0.0 - 0.2 K/UL    ABS. IMM. GRANS. 0.0 0.0 - 0.5 K/UL   METABOLIC PANEL, COMPREHENSIVE    Collection Time: 04/06/22  8:30 AM   Result Value Ref Range    Sodium 143 136 - 145 mmol/L    Potassium 3.6 3.5 - 5.1 mmol/L    Chloride 109 (H) 98 - 107 mmol/L    CO2 26 21 - 32 mmol/L    Anion gap 8 7 - 16 mmol/L    Glucose 117 (H) 65 - 100 mg/dL    BUN 20 8 - 23 MG/DL    Creatinine 1.00 0.6 - 1.0 MG/DL    GFR est AA >60 >60 ml/min/1.73m2    GFR est non-AA 57 (L) >60 ml/min/1.73m2    Calcium 9.5 8.3 - 10.4 MG/DL    Bilirubin, total 0.9 0.2 - 1.1 MG/DL    ALT (SGPT) 21 12 - 65 U/L    AST (SGOT) 19 15 - 37 U/L    Alk.  phosphatase 117 50 - 136 U/L    Protein, total 7.6 6.3 - 8.2 g/dL    Albumin 3.8 3.2 - 4.6 g/dL    Globulin 3.8 (H) 2.3 - 3.5 g/dL    A-G Ratio 1.0 (L) 1.2 - 3.5     LIPASE    Collection Time: 04/06/22  8:30 AM   Result Value Ref Range    Lipase 86 73 - 393 U/L   MAGNESIUM    Collection Time: 04/06/22  8:30 AM   Result Value Ref Range    Magnesium 1.9 1.8 - 2.4 mg/dL   TSH 3RD GENERATION    Collection Time: 04/06/22  8:30 AM   Result Value Ref Range    TSH 3.110 0.358 - 3.740 uIU/mL   URINALYSIS W/ RFLX MICROSCOPIC    Collection Time: 04/06/22  9:22 AM   Result Value Ref Range    Color YELLOW      Appearance CLEAR      Specific gravity 1.015 1.001 - 1.023      pH (UA) 7.5 5.0 - 9.0      Protein TRACE (A) NEG mg/dL    Glucose Negative mg/dL    Ketone 15 (A) NEG mg/dL    Bilirubin Negative NEG      Blood TRACE (A) NEG      Urobilinogen 0.2 0.2 - 1.0 EU/dL    Nitrites Negative NEG      Leukocyte Esterase Negative NEG      WBC 5-10 0 /hpf    RBC 0-3 0 /hpf    Epithelial cells 0 0 /hpf    Bacteria 0 0 /hpf    Casts 0 0 /lpf

## 2022-04-06 NOTE — ED TRIAGE NOTES
Pt arrives via ems from home with complaints of rapid heart rate and palpations. Pt reports fatigue x 6 months. States she woke up this morning with fluttering in her chest. Denies cp. Reports feeling bloated, Pt a/o x 4. EMS VS: bp 196/100 manual hr 120 irregular  temp 97.9.

## 2022-04-07 VITALS
HEIGHT: 62 IN | OXYGEN SATURATION: 97 % | HEART RATE: 68 BPM | BODY MASS INDEX: 33.71 KG/M2 | TEMPERATURE: 98.3 F | DIASTOLIC BLOOD PRESSURE: 62 MMHG | SYSTOLIC BLOOD PRESSURE: 140 MMHG | WEIGHT: 183.2 LBS | RESPIRATION RATE: 19 BRPM

## 2022-04-07 LAB
ANION GAP SERPL CALC-SCNC: 6 MMOL/L (ref 7–16)
BUN SERPL-MCNC: 30 MG/DL (ref 8–23)
CALCIUM SERPL-MCNC: 8.4 MG/DL (ref 8.3–10.4)
CHLORIDE SERPL-SCNC: 113 MMOL/L (ref 98–107)
CO2 SERPL-SCNC: 26 MMOL/L (ref 21–32)
CREAT SERPL-MCNC: 1.2 MG/DL (ref 0.6–1)
ERYTHROCYTE [DISTWIDTH] IN BLOOD BY AUTOMATED COUNT: 14.2 % (ref 11.9–14.6)
GLUCOSE SERPL-MCNC: 95 MG/DL (ref 65–100)
HCT VFR BLD AUTO: 32.3 % (ref 35.8–46.3)
HGB BLD-MCNC: 10.7 G/DL (ref 11.7–15.4)
MCH RBC QN AUTO: 30.9 PG (ref 26.1–32.9)
MCHC RBC AUTO-ENTMCNC: 33.1 G/DL (ref 31.4–35)
MCV RBC AUTO: 93.4 FL (ref 79.6–97.8)
NRBC # BLD: 0 K/UL (ref 0–0.2)
PLATELET # BLD AUTO: 196 K/UL (ref 150–450)
PMV BLD AUTO: 11.1 FL (ref 9.4–12.3)
POTASSIUM SERPL-SCNC: 3.5 MMOL/L (ref 3.5–5.1)
RBC # BLD AUTO: 3.46 M/UL (ref 4.05–5.2)
SODIUM SERPL-SCNC: 145 MMOL/L (ref 136–145)
WBC # BLD AUTO: 6 K/UL (ref 4.3–11.1)

## 2022-04-07 PROCEDURE — 85027 COMPLETE CBC AUTOMATED: CPT

## 2022-04-07 PROCEDURE — 74011000250 HC RX REV CODE- 250: Performed by: PHYSICIAN ASSISTANT

## 2022-04-07 PROCEDURE — 74011250637 HC RX REV CODE- 250/637: Performed by: PHYSICIAN ASSISTANT

## 2022-04-07 PROCEDURE — 36415 COLL VENOUS BLD VENIPUNCTURE: CPT

## 2022-04-07 PROCEDURE — 99217 PR OBSERVATION CARE DISCHARGE MANAGEMENT: CPT | Performed by: INTERNAL MEDICINE

## 2022-04-07 PROCEDURE — 80048 BASIC METABOLIC PNL TOTAL CA: CPT

## 2022-04-07 PROCEDURE — G0378 HOSPITAL OBSERVATION PER HR: HCPCS

## 2022-04-07 RX ORDER — CEFPODOXIME PROXETIL 100 MG/1
100 TABLET, FILM COATED ORAL 2 TIMES DAILY
Qty: 10 TABLET | Refills: 0 | Status: SHIPPED | OUTPATIENT
Start: 2022-04-07 | End: 2022-04-12

## 2022-04-07 RX ORDER — AMIODARONE HYDROCHLORIDE 200 MG/1
200 TABLET ORAL 2 TIMES DAILY
Qty: 60 TABLET | Refills: 5 | Status: SHIPPED | OUTPATIENT
Start: 2022-04-07 | End: 2022-04-11 | Stop reason: SDUPTHER

## 2022-04-07 RX ADMIN — AMIODARONE HYDROCHLORIDE 200 MG: 200 TABLET ORAL at 09:38

## 2022-04-07 RX ADMIN — APIXABAN 5 MG: 5 TABLET, FILM COATED ORAL at 09:38

## 2022-04-07 RX ADMIN — SODIUM CHLORIDE, PRESERVATIVE FREE 10 ML: 5 INJECTION INTRAVENOUS at 05:54

## 2022-04-07 RX ADMIN — CARVEDILOL 6.25 MG: 6.25 TABLET, FILM COATED ORAL at 09:38

## 2022-04-07 NOTE — ROUTINE PROCESS
Bedside and Verbal shift change report given to Bong Brunner RN (oncoming nurse) by myself (offgoing nurse).

## 2022-04-07 NOTE — DISCHARGE INSTRUCTIONS
Patient Education      The patient is being discharged home in stable condition on a low saturated fat, low cholesterol and low salt diet. The patient is instructed to advance activities as tolerated to the limit of fatigue or shortness of breath. The patient is instructed to call the office or return to the ER for immediate evaluation for any severe shortness of breath, chest pain, prolonged palpitations, near syncope or syncope.     Atrial Fibrillation: Care Instructions  Your Care Instructions     Atrial fibrillation is an irregular and often fast heartbeat. Treating this condition is important for several reasons. It can cause blood clots, which can travel from your heart to your brain and cause a stroke. If you have a fast heartbeat, you may feel lightheaded, dizzy, and weak. An irregular heartbeat can also increase your risk for heart failure. Atrial fibrillation is often the result of another heart condition, such as high blood pressure or coronary artery disease. Making changes to improve your heart condition will help you stay healthy and active. Follow-up care is a key part of your treatment and safety. Be sure to make and go to all appointments, and call your doctor if you are having problems. It's also a good idea to know your test results and keep a list of the medicines you take. How can you care for yourself at home? Medicines    · Take your medicines exactly as prescribed. Call your doctor if you think you are having a problem with your medicine. You will get more details on the specific medicines your doctor prescribes.     · If your doctor has given you a blood thinner to prevent a stroke, be sure you get instructions about how to take your medicine safely. Blood thinners can cause serious bleeding problems.     · Do not take any vitamins, over-the-counter drugs, or herbal products without talking to your doctor first.   Lifestyle changes    · Do not smoke.  Smoking can increase your chance of a stroke and heart attack. If you need help quitting, talk to your doctor about stop-smoking programs and medicines. These can increase your chances of quitting for good.     · Eat a heart-healthy diet.     · Stay at a healthy weight. Lose weight if you need to.     · Limit alcohol to 2 drinks a day for men and 1 drink a day for women. Too much alcohol can cause health problems.     · Avoid colds and flu. Get a pneumococcal vaccine shot. If you have had one before, ask your doctor whether you need another dose. Get a flu shot every year. If you must be around people with colds or flu, wash your hands often. Activity    · If your doctor recommends it, get more exercise. Walking is a good choice. Bit by bit, increase the amount you walk every day. Try for at least 30 minutes on most days of the week. You also may want to swim, bike, or do other activities. Your doctor may suggest that you join a cardiac rehabilitation program so that you can have help increasing your physical activity safely.     · Start light exercise if your doctor says it is okay. Even a small amount will help you get stronger, have more energy, and manage stress. Walking is an easy way to get exercise. Start out by walking a little more than you did in the hospital. Gradually increase the amount you walk.     · When you exercise, watch for signs that your heart is working too hard. You are pushing too hard if you cannot talk while you are exercising. If you become short of breath or dizzy or have chest pain, sit down and rest immediately.     · Check your pulse regularly. Place two fingers on the artery at the palm side of your wrist, in line with your thumb. If your heartbeat seems uneven or fast, talk to your doctor. When should you call for help? Call 911 anytime you think you may need emergency care. For example, call if:    · You have symptoms of a heart attack. These may include:  ?  Chest pain or pressure, or a strange feeling in the chest.  ? Sweating. ? Shortness of breath. ? Nausea or vomiting. ? Pain, pressure, or a strange feeling in the back, neck, jaw, or upper belly or in one or both shoulders or arms. ? Lightheadedness or sudden weakness. ? A fast or irregular heartbeat. After you call 911, the  may tell you to chew 1 adult-strength or 2 to 4 low-dose aspirin. Wait for an ambulance. Do not try to drive yourself.     · You have symptoms of a stroke. These may include:  ? Sudden numbness, tingling, weakness, or loss of movement in your face, arm, or leg, especially on only one side of your body. ? Sudden vision changes. ? Sudden trouble speaking. ? Sudden confusion or trouble understanding simple statements. ? Sudden problems with walking or balance. ? A sudden, severe headache that is different from past headaches.     · You passed out (lost consciousness). Call your doctor now or seek immediate medical care if:    · You have new or increased shortness of breath.     · You feel dizzy or lightheaded, or you feel like you may faint.     · Your heart rate becomes irregular.     · You can feel your heart flutter in your chest or skip heartbeats. Tell your doctor if these symptoms are new or worse. Watch closely for changes in your health, and be sure to contact your doctor if you have any problems. Where can you learn more? Go to http://www.gray.com/  Enter U020 in the search box to learn more about \"Atrial Fibrillation: Care Instructions. \"  Current as of: January 10, 2022               Content Version: 13.2  © 4136-3379 [employer]. Care instructions adapted under license by [employer] (which disclaims liability or warranty for this information). If you have questions about a medical condition or this instruction, always ask your healthcare professional. [place] [address] any warranty or liability for your use of this information.        Patient Education   Amiodarone (By mouth)   Amiodarone Hydrochloride (c-een-NT-da-marques zonia-droe-KLOR-reji)  Treats heart rhythm problems. Brand Name(s): Cordarone, Pacerone   There may be other brand names for this medicine. When This Medicine Should Not Be Used: This medicine is not right for everyone. Do not use it if you had an allergic reaction to amiodarone or iodine, or you are pregnant or breastfeeding. How to Use This Medicine:   Tablet  · Take your medicine as directed. Your dose may need to be changed several times to find what works best for you. · Take this medicine the same way every day. This means take it at the same time and take it consistently with or without food. · This medicine should come with a Medication Guide. Ask your pharmacist for a copy if you do not have one. · Missed dose: Take a dose as soon as you remember. If it is almost time for your next dose, wait until then and take a regular dose. Do not take extra medicine to make up for a missed dose. · Store the medicine in a closed container at room temperature, away from heat, moisture, and direct light. Drugs and Foods to Avoid:   Ask your doctor or pharmacist before using any other medicine, including over-the-counter medicines, vitamins, and herbal products. · Some foods and medicines can affect how amiodarone works.  Tell your doctor if you are using any of the following:  ¨ Cimetidine, clopidogrel, cyclosporine, dabigatran, dextromethorphan, digoxin, fentanyl, ledipasvir/sofosbuvir, lithium, loratadine, phenytoin, rifampin, simeprevir, sofosbuvir, or Nuria's wort  ¨ Blood pressure medicines (including diltiazem, verapamil)  ¨ Blood thinner (including warfarin)  ¨ Medicine to lower cholesterol (including atorvastatin, cholestyramine, lovastatin, simvastatin)  ¨ Medicine to treat depression (including trazodone)  ¨ Medicine to treat heart rhythm problems (including flecainide, lidocaine, procainamide, quinidine)  ¨ Medicine to treat HIV/AIDS  ¨ Medicine to treat an infection  ¨ Phenothiazine medicine (including chlorpromazine, perphenazine, promethazine, thioridazine)  · Do not eat grapefruit or drink grapefruit juice while you are using this medicine. Warnings While Using This Medicine:   · It is not safe to take this medicine during pregnancy. It could harm an unborn baby. Tell your doctor right away if you become pregnant. · Tell your doctor if you have liver problems, heart disease, heart failure, thyroid problems, or lung disease or breathing problems. Tell your doctor if you have a pacemaker or another implanted heart device. · This medicine may cause the following problems:  ¨ Lung problems  ¨ Worsening heart rhythm problems  ¨ Thyroid problems  ¨ Liver problems  ¨ Changes in vision  · Do not stop using this medicine suddenly. Your doctor will need to slowly decrease your dose before you stop it completely. · This medicine may make your skin more sensitive to sunlight. Wear sunscreen. Do not use sunlamps or tanning beds. Your skin may become discolored if you use this medicine for a long time. · Your doctor will do lab tests at regular visits to check on the effects of this medicine. Keep all appointments. · Keep all medicine out of the reach of children. Never share your medicine with anyone.   Possible Side Effects While Using This Medicine:   Call your doctor right away if you notice any of these side effects:  · Allergic reaction: Itching or hives, swelling in your face or hands, swelling or tingling in your mouth or throat, chest tightness, trouble breathing  · Blistering, peeling, or red skin rash  · Blurred vision or other vision changes, eye pain  · Chest pain, cough, trouble breathing  · Dark urine or pale stools, nausea, vomiting, loss of appetite, stomach pain, yellow skin or eyes  · Fast, slow, pounding, or uneven heartbeat (new or worsening)  · Lightheadedness, dizziness, or fainting  · Numbness, tingling, or burning pain in your hands, arms, legs, or feet  · Weight gain or loss, nervousness, tremors, trouble sleeping, unusual tiredness, hair loss  If you notice these less serious side effects, talk with your doctor:   · Mild nausea, vomiting, or constipation  If you notice other side effects that you think are caused by this medicine, tell your doctor. Call your doctor for medical advice about side effects. You may report side effects to FDA at 9-612-JTL-1070  © 2017 [facility] Information is for End User's use only and may not be sold, redistributed or otherwise used for commercial purposes. The above information is an  only. It is not intended as medical advice for individual conditions or treatments. Talk to your doctor, nurse or pharmacist before following any medical regimen to see if it is safe and effective for you.

## 2022-04-07 NOTE — DISCHARGE SUMMARY
[facility] Cardiology Discharge Summary     Patient ID:  Sowmya Cheney  [id number]  70 y.o.  1945    Admit date: 4/6/2022    Discharge date:  04/07/22     Admitting Physician: Vinton Mcardle, MD     Discharge Physician: Eldon Nuñez NP/Dr. 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 Patient reports awaking around 5 am to use bathroom and noticed heart fluttering. Admitted to associated SOB and generalized weakness/fatigue. Feels she may have over done it over last couple of days. Upon evaluation in ED, patient was hypertensive with /94 and tachycardic with . EKG showed atrial fibrillation with RVR. Labs showed WBC 7.4, H/H 14/42, Ptl 246, Na 143, K 3.6, BUN/Cr 20/1.00, pBNP 586, hs trop 13.2, and TSH 3.110. In ED, patient was given Cardizem push and started on cardizem gtt. Patient was continued home med eliquis as well. The afternoon of 4/6 patient converted back to NSR and her cardizem gtt was stopped. An echocardiogram showed   Left Ventricle: Left ventricle size is normal. Mildly increased wall thickness. Normal wall motion. Low normal left ventricular systolic function with a visually estimated EF of 50 - 55%. Abnormal diastolic function.   Left Atrium: Left atrium is severely dilated.   Right Atrium: Right atrium is mildly dilated.   Pericardium: Moderate (1-2 cm) circumferential pericardial effusion present. No indication of cardiac tamponade.       The morning of 4/7/2022, the patient was feeling much better and remained in sinus rhythm. The patient was seen and examined by Dr. Shira Pearson and was determined stable and ready for discharge home. The patient was instructed on the importance of medication compliance and outpatient follow up. The patient was d/c home on amiodarone 200 mg bid. The patient will keep already scheduled appt on 4/12 at 1045 am in [place] office with Dr. Miguel Walsh then  will need repeat limited echo to reassess pericardial effusion on 5/13/2022 at 830 am in [place] office. The patient will have consult with Dr. Enrique Escobar on 4/27/2022 at 3 pm in [place] office to consider ablation. DISPOSITION: The patient is being discharged home in stable condition on a low saturated fat, low cholesterol and low salt diet. The patient is instructed to advance activities as tolerated to the limit of fatigue or shortness of breath. The patient is instructed to call the office or return to the ER for immediate evaluation for any severe shortness of breath, chest pain, prolonged palpitations, near syncope or syncope. Discharge Exam:   Visit Vitals  BP (!) 140/56   Pulse (!) 59   Temp 98.3 °F (36.8 °C)   Resp 19   Ht 5' 2\" (1.575 m)   Wt 83.1 kg (183 lb 3.2 oz)   SpO2 97%   BMI 33.51 kg/m²     Patient has been seen by Dr. Shira Pearson: see his progress note for exam details.     Recent Results (from the past 24 hour(s))   URINALYSIS W/ RFLX MICROSCOPIC    Collection Time: 04/06/22  9:22 AM   Result Value Ref Range    Color YELLOW      Appearance CLEAR      Specific gravity 1.015 1.001 - 1.023      pH (UA) 7.5 5.0 - 9.0      Protein TRACE (A) NEG mg/dL    Glucose Negative mg/dL    Ketone 15 (A) NEG mg/dL Bilirubin Negative NEG      Blood TRACE (A) NEG      Urobilinogen 0.2 0.2 - 1.0 EU/dL    Nitrites Negative NEG      Leukocyte Esterase Negative NEG      WBC 5-10 0 /hpf    RBC 0-3 0 /hpf    Epithelial cells 0 0 /hpf    Bacteria 0 0 /hpf    Casts 0 0 /lpf   TROPONIN-HIGH SENSITIVITY    Collection Time: 04/06/22 11:33 AM   Result Value Ref Range    Troponin-High Sensitivity 43.4 (H) 0 - 14 pg/mL   EKG, 12 LEAD, SUBSEQUENT    Collection Time: 04/06/22  2:07 PM   Result Value Ref Range    Ventricular Rate 64 BPM    Atrial Rate 64 BPM    P-R Interval 150 ms    QRS Duration 88 ms    Q-T Interval 434 ms    QTC Calculation (Bezet) 447 ms    Calculated P Axis 23 degrees    Calculated R Axis -29 degrees    Calculated T Axis 72 degrees    Diagnosis       Normal sinus rhythm  Nonspecific ST abnormality  Abnormal ECG  When compared with ECG of 06-APR-2022 05:52,  Vent.  rate has decreased BY  33 BPM  ST now depressed in Anterior leads  Confirmed by Damari Kapoor ([id number]) on 4/6/2022 3:00:31 PM     ECHO ADULT COMPLETE    Collection Time: 04/06/22  2:36 PM   Result Value Ref Range    LV EDV A2C 93 mL    LV EDV A4C 106 mL    LV ESV A2C 46 mL    LV ESV A4C 50 mL    IVSd 1.0 (A) 0.6 - 0.9 cm    LVIDd 5.0 3.9 - 5.3 cm    LVIDs 3.4 cm    LVOT Diameter 2.0 cm    LVOT Mean Gradient 2 mmHg    LVOT VTI 23.0 cm    LVOT Peak Velocity 1.1 m/s    LVOT Peak Gradient 5 mmHg    LVPWd 1.0 (A) 0.6 - 0.9 cm    LV E' Lateral Velocity 8 cm/s    LV E' Septal Velocity 10 cm/s    LV Ejection Fraction A2C 51 %    LV Ejection Fraction A4C 53 %    EF BP 54 (A) 55 - 100 %    LVOT Area 3.1 cm2    LVOT SV 72.2 ml    LA Minor Axis 6.2 cm    LA Major Axis 6.8 cm    LA Area 2C 29.4 cm2    LA Area 4C 31.5 cm2    LA Volume  (A) 22 - 52 mL    LA Diameter 4.2 cm    AV Mean Velocity 1.1 m/s    AV Mean Gradient 5 mmHg    AV VTI 32.4 cm    AV Peak Velocity 1.6 m/s    AV Peak Gradient 11 mmHg    AV Area by VTI 2.2 cm2    AV Area by Peak Velocity 2.1 cm2    Aortic Root 3.3 cm    Ascending Aorta 3.8 cm    IVC Proxmal 1.8 cm    MV E Wave Deceleration Time 306.0 ms    MV A Velocity 1.03 m/s    MV E Velocity 0.79 m/s    MV Mean Gradient 1 mmHg    MV VTI 26.8 cm    MV Mean Velocity 0.6 m/s    MV Max Velocity 0.9 m/s    MV Peak Gradient 3 mmHg    MV Area by VTI 2.7 cm2    SC Max Velocity 0.9 m/s    Pulmonary Artery EDP 3 mmHg    PV AT 78.0 ms    PV Max Velocity 1.2 m/s    PV Peak Gradient 6 mmHg    Est. RA Pressure 3 mmHg    RVIDd 4.1 cm    RV Basal Dimension 3.0 cm    TAPSE 3.8 (A) 1.5 - 2.0 cm    TR Max Velocity 2.07 m/s    TR Peak Gradient 17 mmHg    Fractional Shortening 2D 32 28 - 44 %    LV ESV Index A4C 28 mL/m2    LV EDV Index A4C 59 mL/m2    LV ESV Index A2C 26 mL/m2    LV EDV Index A2C 52 mL/m2    LVIDd Index 2.79 cm/m2    LVIDs Index 1.90 cm/m2    LV RWT Ratio 0.40     LV Mass 2D 182.0 (A) 67 - 162 g    LV Mass 2D Index 101.7 (A) 43 - 95 g/m2    MV E/A 0.77     E/E' Ratio (Averaged) 8.89     E/E' Lateral 9.88     E/E' Septal 7.90     LA Volume Index BP 67 (A) 16 - 34 ml/m2    LVOT Stroke Volume Index 40.3 mL/m2    LA Size Index 2.35 cm/m2    LA/AO Root Ratio 1.27     Ao Root Index 1.84 cm/m2    Ascending Aorta Index 2.12 cm/m2    AV Velocity Ratio 0.69     LVOT:AV VTI Index 0.71     MAUREEN/BSA VTI 1.2 cm2/m2    MAUREEN/BSA Peak Velocity 1.2 cm2/m2    MV:LVOT VTI Index 1.17     RVSP 20 mmHg   METABOLIC PANEL, BASIC    Collection Time: 04/07/22  5:47 AM   Result Value Ref Range    Sodium 145 136 - 145 mmol/L    Potassium 3.5 3.5 - 5.1 mmol/L    Chloride 113 (H) 98 - 107 mmol/L    CO2 26 21 - 32 mmol/L    Anion gap 6 (L) 7 - 16 mmol/L    Glucose 95 65 - 100 mg/dL    BUN 30 (H) 8 - 23 MG/DL    Creatinine 1.20 (H) 0.6 - 1.0 MG/DL    GFR est AA 56 (L) >60 ml/min/1.73m2    GFR est non-AA 46 (L) >60 ml/min/1.73m2    Calcium 8.4 8.3 - 10.4 MG/DL   CBC W/O DIFF    Collection Time: 04/07/22  5:47 AM   Result Value Ref Range    WBC 6.0 4.3 - 11.1 K/uL    RBC 3.46 (L) 4.05 - 5.2 M/uL    HGB 10.7 (L) 11.7 - 15.4 g/dL    HCT 32.3 (L) 35.8 - 46.3 %    MCV 93.4 79.6 - 97.8 FL    MCH 30.9 26.1 - 32.9 PG    MCHC 33.1 31.4 - 35.0 g/dL    RDW 14.2 11.9 - 14.6 %    PLATELET 409 590 - 426 K/uL    MPV 11.1 9.4 - 12.3 FL    ABSOLUTE NRBC 0.00 0.0 - 0.2 K/uL         Patient Instructions:     Current Discharge Medication List      CONTINUE these medications which have CHANGED    Details   amiodarone (CORDARONE) 200 mg tablet Take 1 Tablet by mouth two (2) times a day. Qty: 60 Tablet, Refills: 5  Start date: 4/7/2022         CONTINUE these medications which have NOT CHANGED    Details   carvediloL (COREG) 6.25 mg tablet Take 1 Tablet by mouth two (2) times daily (with meals). Qty: 180 Tablet, Refills: 3    Comments: MD is aware of pt's colchicine RX. Pt has not taken in 2 years. Pt will contact PCP for alternate prn tx for gout. Please fill pt's colchicine. Pt will contact you when she needs this prescription filled. Associated Diagnoses: Long term current use of amiodarone      apixaban (ELIQUIS) 5 mg tablet Take 1 Tablet by mouth every twelve (12) hours. Indications: treatment to prevent blood clots in chronic atrial fibrillation  Qty: 180 Tablet, Refills: 3    Comments: Pt will contact you when she needs this prescription filled. Associated Diagnoses: Long term current use of amiodarone      colchicine (Colcrys) 0.6 mg tablet Take 1 Tablet by mouth daily. 1 tablet as directed as needed for gout  Qty: 30 Tablet, Refills: 0      cyclobenzaprine (FLEXERIL) 10 mg tablet Take 1 Tablet by mouth nightly. Qty: 7 Tablet, Refills: 0      PARoxetine (PAXIL) 10 mg tablet Take 1 Tablet by mouth daily.   Qty: 30 Tablet, Refills: 5               Signed:  Donna Wakefield NP  4/7/2022  8:53 AM

## 2022-04-07 NOTE — PROGRESS NOTES
After d/c done patient's urine culture showed 10,000 to 50,000 GNR sensitivity report pending. Will d/c home on vantin x5 day.

## 2022-04-07 NOTE — PROGRESS NOTES
Discharge instructions were reviewed with patient. An opportunity was given for questions. All medications were reviewed, and information was given on new medications, if any. Patient verbalized understanding, and has no questions at this time. IV's and telemetry box removed.

## 2022-04-09 LAB
BACTERIA SPEC CULT: ABNORMAL
BACTERIA SPEC CULT: ABNORMAL
SERVICE CMNT-IMP: ABNORMAL

## 2022-04-11 ENCOUNTER — APPOINTMENT (OUTPATIENT)
Dept: GENERAL RADIOLOGY | Age: 77
End: 2022-04-11
Payer: MEDICARE

## 2022-04-11 ENCOUNTER — APPOINTMENT (OUTPATIENT)
Dept: CT IMAGING | Age: 77
End: 2022-04-11
Attending: EMERGENCY MEDICINE
Payer: MEDICARE

## 2022-04-11 ENCOUNTER — HOSPITAL ENCOUNTER (EMERGENCY)
Age: 77
Discharge: HOME | End: 2022-04-11
Attending: EMERGENCY MEDICINE
Payer: MEDICARE

## 2022-04-11 VITALS
HEART RATE: 70 BPM | RESPIRATION RATE: 16 BRPM | WEIGHT: 172 LBS | SYSTOLIC BLOOD PRESSURE: 178 MMHG | BODY MASS INDEX: 31.65 KG/M2 | OXYGEN SATURATION: 98 % | HEIGHT: 62 IN | DIASTOLIC BLOOD PRESSURE: 86 MMHG

## 2022-04-11 DIAGNOSIS — R42 LIGHT HEADEDNESS: Primary | ICD-10-CM

## 2022-04-11 LAB
ALBUMIN SERPL-MCNC: 3.1 G/DL (ref 3.2–4.6)
ALBUMIN/GLOB SERPL: 0.9 {RATIO} (ref 1.2–3.5)
ALP SERPL-CCNC: 92 U/L (ref 50–136)
ALT SERPL-CCNC: 19 U/L (ref 12–65)
ANION GAP SERPL CALC-SCNC: 7 MMOL/L (ref 7–16)
AST SERPL-CCNC: 14 U/L (ref 15–37)
ATRIAL RATE: 68 BPM
BASOPHILS # BLD: 0.1 K/UL (ref 0–0.2)
BASOPHILS NFR BLD: 1 % (ref 0–2)
BILIRUB SERPL-MCNC: 0.5 MG/DL (ref 0.2–1.1)
BILIRUB UR QL: NEGATIVE
BUN SERPL-MCNC: 19 MG/DL (ref 8–23)
CALCIUM SERPL-MCNC: 8.9 MG/DL (ref 8.3–10.4)
CALCULATED P AXIS, ECG09: 13 DEGREES
CALCULATED R AXIS, ECG10: -33 DEGREES
CALCULATED T AXIS, ECG11: 68 DEGREES
CHLORIDE SERPL-SCNC: 112 MMOL/L (ref 98–107)
CO2 SERPL-SCNC: 25 MMOL/L (ref 21–32)
CREAT SERPL-MCNC: 0.8 MG/DL (ref 0.6–1)
DIAGNOSIS, 93000: NORMAL
DIFFERENTIAL METHOD BLD: ABNORMAL
EOSINOPHIL # BLD: 0.1 K/UL (ref 0–0.8)
EOSINOPHIL NFR BLD: 3 % (ref 0.5–7.8)
ERYTHROCYTE [DISTWIDTH] IN BLOOD BY AUTOMATED COUNT: 13.5 % (ref 11.9–14.6)
FERRITIN SERPL-MCNC: 197 NG/ML (ref 8–388)
FOLATE SERPL-MCNC: 5.4 NG/ML (ref 3.1–17.5)
GLOBULIN SER CALC-MCNC: 3.3 G/DL (ref 2.3–3.5)
GLUCOSE SERPL-MCNC: 103 MG/DL (ref 65–100)
GLUCOSE UR QL STRIP.AUTO: NEGATIVE MG/DL
HCT VFR BLD AUTO: 29.8 % (ref 35.8–46.3)
HGB BLD-MCNC: 9.9 G/DL (ref 11.7–15.4)
IMM GRANULOCYTES # BLD AUTO: 0 K/UL (ref 0–0.5)
IMM GRANULOCYTES NFR BLD AUTO: 0 % (ref 0–5)
IRON SATN MFR SERPL: 19 %
IRON SERPL-MCNC: 59 UG/DL (ref 35–150)
KETONES UR-MCNC: NEGATIVE MG/DL
LEUKOCYTE ESTERASE UR QL STRIP: NEGATIVE
LYMPHOCYTES # BLD: 1 K/UL (ref 0.5–4.6)
LYMPHOCYTES NFR BLD: 25 % (ref 13–44)
MCH RBC QN AUTO: 31.4 PG (ref 26.1–32.9)
MCHC RBC AUTO-ENTMCNC: 33.2 G/DL (ref 31.4–35)
MCV RBC AUTO: 94.6 FL (ref 79.6–97.8)
MONOCYTES # BLD: 0.4 K/UL (ref 0.1–1.3)
MONOCYTES NFR BLD: 9 % (ref 4–12)
NEUTS SEG # BLD: 2.7 K/UL (ref 1.7–8.2)
NEUTS SEG NFR BLD: 62 % (ref 43–78)
NITRITE UR QL: NEGATIVE
NRBC # BLD: 0 K/UL (ref 0–0.2)
P-R INTERVAL, ECG05: 156 MS
PH UR: 7.5 [PH] (ref 5–9)
PLATELET # BLD AUTO: 177 K/UL (ref 150–450)
PMV BLD AUTO: 11 FL (ref 9.4–12.3)
POTASSIUM SERPL-SCNC: 4 MMOL/L (ref 3.5–5.1)
PROT SERPL-MCNC: 6.4 G/DL (ref 6.3–8.2)
PROT UR QL: NEGATIVE MG/DL
Q-T INTERVAL, ECG07: 425 MS
QRS DURATION, ECG06: 105 MS
QTC CALCULATION (BEZET), ECG08: 449 MS
RBC # BLD AUTO: 3.15 M/UL (ref 4.05–5.2)
RBC # UR STRIP: NEGATIVE /UL
SERVICE CMNT-IMP: NORMAL
SODIUM SERPL-SCNC: 144 MMOL/L (ref 136–145)
SP GR UR: 1.02 (ref 1–1.02)
TIBC SERPL-MCNC: 305 UG/DL (ref 250–450)
UROBILINOGEN UR QL: 0.2 EU/DL (ref 0.2–1)
VENTRICULAR RATE, ECG03: 67 BPM
VIT B12 SERPL-MCNC: 325 PG/ML (ref 193–986)
WBC # BLD AUTO: 4.3 K/UL (ref 4.3–11.1)

## 2022-04-11 PROCEDURE — 85025 COMPLETE CBC W/AUTO DIFF WBC: CPT

## 2022-04-11 PROCEDURE — 99285 EMERGENCY DEPT VISIT HI MDM: CPT

## 2022-04-11 PROCEDURE — 71045 X-RAY EXAM CHEST 1 VIEW: CPT

## 2022-04-11 PROCEDURE — 83540 ASSAY OF IRON: CPT

## 2022-04-11 PROCEDURE — 82746 ASSAY OF FOLIC ACID SERUM: CPT

## 2022-04-11 PROCEDURE — 70450 CT HEAD/BRAIN W/O DYE: CPT

## 2022-04-11 PROCEDURE — 82607 VITAMIN B-12: CPT

## 2022-04-11 PROCEDURE — 82728 ASSAY OF FERRITIN: CPT

## 2022-04-11 PROCEDURE — 93005 ELECTROCARDIOGRAM TRACING: CPT | Performed by: EMERGENCY MEDICINE

## 2022-04-11 PROCEDURE — 81003 URINALYSIS AUTO W/O SCOPE: CPT

## 2022-04-11 PROCEDURE — 80053 COMPREHEN METABOLIC PANEL: CPT

## 2022-04-11 RX ORDER — SODIUM CHLORIDE 0.9 % (FLUSH) 0.9 %
5-10 SYRINGE (ML) INJECTION EVERY 8 HOURS
Status: DISCONTINUED | OUTPATIENT
Start: 2022-04-11 | End: 2022-04-11 | Stop reason: HOSPADM

## 2022-04-11 RX ORDER — AMIODARONE HYDROCHLORIDE 100 MG/1
100 TABLET ORAL DAILY
Qty: 30 TABLET | Refills: 0 | Status: SHIPPED | OUTPATIENT
Start: 2022-04-11 | End: 2022-04-12 | Stop reason: CLARIF

## 2022-04-11 RX ORDER — SODIUM CHLORIDE 0.9 % (FLUSH) 0.9 %
5-10 SYRINGE (ML) INJECTION AS NEEDED
Status: DISCONTINUED | OUTPATIENT
Start: 2022-04-11 | End: 2022-04-11 | Stop reason: HOSPADM

## 2022-04-11 NOTE — ED TRIAGE NOTES
Pt arrived via EMS from home with c/o dizziness and generalized weakness. D/c from hosptial on 4/7 after Afib and UTI. EMS gave 1\" nitro. Initial /96 and last /palp.  HR 70 NSR

## 2022-04-11 NOTE — ED NOTES
I have reviewed discharge instructions with the patient. The patient verbalized understanding. Patient left ED via Discharge Method: wheelchair to Home with daughter. Opportunity for questions and clarification provided. Patient given 2 scripts. To continue your aftercare when you leave the hospital, you may receive an automated call from our care team to check in on how you are doing. This is a free service and part of our promise to provide the best care and service to meet your aftercare needs.  If you have questions, or wish to unsubscribe from this service please call [phone]. Thank you for Choosing our [facility].

## 2022-04-12 NOTE — ED PROVIDER NOTES
Patient complains of fatigue and light headedness since leaving the hospital on April 7. Was admitted with A fib with RVR. Converted spontaneously to SR. Amiodarone was increased to 200 mg bid. Was found to have UTI and treated with Vantin. States she had nausea yesterday with no vomiting. No cough. Little SOB. Felt off balance when she tried to stand. No spinning sensation. No chest pain. No diarrhea. No dysuria. No melena. Is on Eliquis. States her BP was 108/40 yesterday and today it was very high. She lives alone. Daughters accompany today.             Past Medical History:   Diagnosis Date    Arrhythmia     Arthritis     Asthma     Cellulitis     Chronic kidney disease     Hx renal failure    Depression     Gout     Gouty arthritis  NOS 12/2/2015    Hypercholesterolemia     Hypertension     Mitral valve prolapse     Morbid obesity (HCC)     Nausea & vomiting     Polymyalgia rheumatica (HCC) 12/2/2015    Psychiatric disorder     depression & anxiety    PUD (peptic ulcer disease)     Sjogren's syndrome (HCC)     Temporal arteritis (HCC)        Past Surgical History:   Procedure Laterality Date    HX COLONOSCOPY  6/08    HX ENDOSCOPY  8/08    HX HYSTERECTOMY      HX TUBAL LIGATION      GA CARDIAC SURG PROCEDURE UNLIST  4/07    STRESS TEST    GA CARDIAC SURG PROCEDURE UNLIST  8/13    HEART ABLATION         Family History:   Problem Relation Age of Onset    Cancer Mother     Breast Cancer Mother 80    Hypertension Mother     Dementia Mother     Cancer Father         LUNG    Heart Disease Father 62    Diabetes Son     Other Son         PSYCHIATRIC ILLNESS    Hypertension Sister        Social History     Socioeconomic History    Marital status:      Spouse name: Not on file    Number of children: Not on file    Years of education: Not on file    Highest education level: Not on file   Occupational History    Not on file   Tobacco Use    Smoking status: Never Smoker    Smokeless tobacco: Never Used   Substance and Sexual Activity    Alcohol use: No    Drug use: No    Sexual activity: Never   Other Topics Concern     Service No    Blood Transfusions No    Caffeine Concern No    Occupational Exposure No    Hobby Hazards No    Sleep Concern No    Stress Concern Yes    Weight Concern Yes    Special Diet No    Back Care No    Exercise No    Bike Helmet No    Seat Belt Yes    Self-Exams Not Asked   Social History Narrative    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, 12 great grandchildren     Social Determinants of Health     Financial Resource Strain:     Difficulty of Paying Living Expenses: Not on file   Food Insecurity:     Worried About Running Out of Food in the Last Year: Not on file    Del of Food in the Last Year: Not on file   Transportation Needs:     Lack of Transportation (Medical): Not on file    Lack of Transportation (Non-Medical):  Not on file   Physical Activity:     Days of Exercise per Week: Not on file    Minutes of Exercise per Session: Not on file   Stress:     Feeling of Stress : Not on file   Social Connections:     Frequency of Communication with Friends and Family: Not on file    Frequency of Social Gatherings with Friends and Family: Not on file    Attends Hoahaoism Services: Not on file    Active Member of [address] [place] [address] or Organizations: Not on file    Attends Club or Organization Meetings: Not on file    Marital Status: Not on file   Intimate Partner Violence:     Fear of Current or Ex-Partner: Not on file    Emotionally Abused: Not on file    Physically Abused: Not on file    Sexually Abused: Not on file   Housing Stability:     Unable to Pay for Housing in the Last Year: Not on file    Number of [place] in the Last Year: Not on file    Unstable Housing in the Last Year: Not on file         ALLERGIES: Celebrex [celecoxib], Lisinopril, Losartan, and Nifedipine    Review of Systems   Constitutional: Positive for fatigue. Negative for appetite change, chills and fever. HENT: Negative. Respiratory: Positive for shortness of breath. Negative for cough. Cardiovascular: Negative for chest pain, palpitations and leg swelling. Gastrointestinal: Positive for nausea. Negative for abdominal pain, diarrhea and vomiting. Genitourinary: Negative for difficulty urinating and dysuria. Voided a lot of urine today. Musculoskeletal: Positive for arthralgias and gait problem. Skin: Negative. Neurological: Positive for weakness (generalized) and light-headedness. Negative for speech difficulty, numbness and headaches. Hematological:        Eliquis   Psychiatric/Behavioral: Positive for dysphoric mood. Vitals:    04/11/22 1518 04/11/22 1627 04/11/22 1632 04/11/22 1633   BP: (!) 175/71 (!) 197/63 (!) 186/76 (!) 178/86   Pulse: (!) 59 (!) 59 62 70   Resp: 13 16 16 16   SpO2: 98%      Weight:       Height:                Physical Exam  Vitals and nursing note reviewed. Constitutional:       Appearance: She is well-developed. She is obese. HENT:      Head: Normocephalic and atraumatic. Right Ear: Tympanic membrane and external ear normal.      Left Ear: Tympanic membrane and external ear normal.      Mouth/Throat:      Mouth: Mucous membranes are moist.   Eyes:      General: No scleral icterus. Conjunctiva/sclera: Conjunctivae normal.      Pupils: Pupils are equal, round, and reactive to light. Neck:      Vascular: No JVD. Cardiovascular:      Rate and Rhythm: Normal rate and regular rhythm. Pulses: Normal pulses. Heart sounds: Normal heart sounds. Pulmonary:      Effort: Pulmonary effort is normal.      Breath sounds: Normal breath sounds. Abdominal:      General: Bowel sounds are normal.      Palpations: Abdomen is soft. There is no mass. Tenderness: There is no abdominal tenderness. Genitourinary:     Rectum: Guaiac result negative. Comments: Stool brown and heme negative  Musculoskeletal:         General: No tenderness. Normal range of motion. Cervical back: Normal range of motion and neck supple. Skin:     General: Skin is warm and dry. Neurological:      General: No focal deficit present. Mental Status: She is alert and oriented to person, place, and time. Coordination: Coordination normal.   Psychiatric:         Behavior: Behavior normal.          MDM  Number of Diagnoses or Management Options  Light headedness  Diagnosis management comments: Light headed an fatigue  EKG sinus rate 67 no ischemia - read by me  CXR cardiomegaly. Lungs clear. I viewed image  Labs reviewed  Urine clear  ECHO 4/6 with pericardial effusion with no tamponade. Discussed with Imani. Anemia - iron and B 12 studies ordered  Not orthostatic  Results and instructions discussed with patient and daughters  Follow up with Dr. Gabo Anderson as scheduled  Follow up with PCP  Return with new or worse symptoms.          Amount and/or Complexity of Data Reviewed  Clinical lab tests: ordered  Tests in the radiology section of CPT®: ordered and reviewed  Decide to obtain previous medical records or to obtain history from someone other than the patient: yes  Obtain history from someone other than the patient: yes (daughters)  Review and summarize past medical records: yes  Discuss the patient with other providers: yes (Benita Shi)           Procedures

## 2022-04-27 PROBLEM — I48.19 PERSISTENT ATRIAL FIBRILLATION (HCC): Status: ACTIVE | Noted: 2019-08-01

## 2022-05-20 ENCOUNTER — HOSPITAL ENCOUNTER (OUTPATIENT)
Dept: LAB | Age: 77
Discharge: HOME | End: 2022-05-20
Payer: MEDICARE

## 2022-05-20 DIAGNOSIS — I48.19 PERSISTENT ATRIAL FIBRILLATION (HCC): ICD-10-CM

## 2022-05-20 LAB
ANION GAP SERPL CALC-SCNC: 8 MMOL/L (ref 7–16)
BASOPHILS # BLD: 0.1 K/UL (ref 0–0.2)
BASOPHILS NFR BLD: 1 % (ref 0–2)
BUN SERPL-MCNC: 18 MG/DL (ref 8–23)
CALCIUM SERPL-MCNC: 9 MG/DL (ref 8.3–10.4)
CHLORIDE SERPL-SCNC: 109 MMOL/L (ref 98–107)
CO2 SERPL-SCNC: 25 MMOL/L (ref 21–32)
CREAT SERPL-MCNC: 0.9 MG/DL (ref 0.6–1)
DIFFERENTIAL METHOD BLD: ABNORMAL
EOSINOPHIL # BLD: 0.1 K/UL (ref 0–0.8)
EOSINOPHIL NFR BLD: 2 % (ref 0.5–7.8)
ERYTHROCYTE [DISTWIDTH] IN BLOOD BY AUTOMATED COUNT: 13.6 % (ref 11.9–14.6)
GLUCOSE SERPL-MCNC: 89 MG/DL (ref 65–100)
HCT VFR BLD AUTO: 36 % (ref 35.8–46.3)
HGB BLD-MCNC: 11.6 G/DL (ref 11.7–15.4)
IMM GRANULOCYTES # BLD AUTO: 0 K/UL (ref 0–0.5)
IMM GRANULOCYTES NFR BLD AUTO: 0 % (ref 0–5)
LYMPHOCYTES # BLD: 1.3 K/UL (ref 0.5–4.6)
LYMPHOCYTES NFR BLD: 20 % (ref 13–44)
MAGNESIUM SERPL-MCNC: 2.2 MG/DL (ref 1.8–2.4)
MCH RBC QN AUTO: 31.5 PG (ref 26.1–32.9)
MCHC RBC AUTO-ENTMCNC: 32.2 G/DL (ref 31.4–35)
MCV RBC AUTO: 97.8 FL (ref 79.6–97.8)
MONOCYTES # BLD: 0.5 K/UL (ref 0.1–1.3)
MONOCYTES NFR BLD: 7 % (ref 4–12)
NEUTS SEG # BLD: 4.4 K/UL (ref 1.7–8.2)
NEUTS SEG NFR BLD: 70 % (ref 43–78)
NRBC # BLD: 0 K/UL (ref 0–0.2)
PLATELET # BLD AUTO: 250 K/UL (ref 150–450)
PMV BLD AUTO: 11.3 FL (ref 9.4–12.3)
POTASSIUM SERPL-SCNC: 4.2 MMOL/L (ref 3.5–5.1)
RBC # BLD AUTO: 3.68 M/UL (ref 4.05–5.2)
SODIUM SERPL-SCNC: 142 MMOL/L (ref 136–145)
WBC # BLD AUTO: 6.3 K/UL (ref 4.3–11.1)

## 2022-05-20 PROCEDURE — 80048 BASIC METABOLIC PNL TOTAL CA: CPT

## 2022-05-20 PROCEDURE — 36415 COLL VENOUS BLD VENIPUNCTURE: CPT

## 2022-05-20 PROCEDURE — 83735 ASSAY OF MAGNESIUM: CPT

## 2022-05-20 PROCEDURE — 85025 COMPLETE CBC W/AUTO DIFF WBC: CPT

## 2022-05-23 NOTE — PROGRESS NOTES
Patient pre-assessment complete for ESTELLA- Atrial Fib ablation with Dr Gray Keith scheduled for 22 at 7:30am, arrival time 6am. Patient verified using . Patient instructed to bring a list of home medications. NPO status reinforced. Patient instructed to HOLD eliquis (last dose 22). Instructed they can take all other medications excluding vitamins & supplements. Patient verbalizes understanding of all instructions & denies any questions at this time.

## 2022-05-24 ENCOUNTER — HOSPITAL ENCOUNTER (OUTPATIENT)
Age: 77
Discharge: HOME | End: 2022-05-25
Attending: INTERNAL MEDICINE | Admitting: INTERNAL MEDICINE
Payer: MEDICARE

## 2022-05-24 ENCOUNTER — HOSPITAL ENCOUNTER (OUTPATIENT)
Dept: CARDIAC CATH/INVASIVE PROCEDURES | Age: 77
Setting detail: OUTPATIENT SURGERY
Discharge: HOME | End: 2022-05-26
Attending: INTERNAL MEDICINE
Payer: MEDICARE

## 2022-05-24 ENCOUNTER — ANESTHESIA (OUTPATIENT)
Dept: CARDIAC CATH/INVASIVE PROCEDURES | Age: 77
End: 2022-05-24
Payer: MEDICARE

## 2022-05-24 ENCOUNTER — ANESTHESIA EVENT (OUTPATIENT)
Dept: CARDIAC CATH/INVASIVE PROCEDURES | Age: 77
End: 2022-05-24
Payer: MEDICARE

## 2022-05-24 DIAGNOSIS — I48.91 ATRIAL FIBRILLATION, UNSPECIFIED TYPE (HCC): ICD-10-CM

## 2022-05-24 PROBLEM — I48.19 PERSISTENT ATRIAL FIBRILLATION (HCC): Status: ACTIVE | Noted: 2019-08-01

## 2022-05-24 PROBLEM — Z98.890 STATUS POST ABLATION OF ATRIAL FIBRILLATION: Status: ACTIVE | Noted: 2022-05-24

## 2022-05-24 PROBLEM — Z86.79 STATUS POST ABLATION OF ATRIAL FIBRILLATION: Status: ACTIVE | Noted: 2022-05-24

## 2022-05-24 LAB
ACT BLD: 306 SECS (ref 70–128)
ECHO BSA: 1.83 M2
ECHO BSA: 1.83 M2
LV EF: 58 %
LVEF MODALITY: NORMAL

## 2022-05-24 PROCEDURE — 3700000000 HC ANESTHESIA ATTENDED CARE: Performed by: INTERNAL MEDICINE

## 2022-05-24 PROCEDURE — 93656 COMPRE EP EVAL ABLTJ ATR FIB: CPT | Performed by: INTERNAL MEDICINE

## 2022-05-24 PROCEDURE — 93005 ELECTROCARDIOGRAM TRACING: CPT | Performed by: INTERNAL MEDICINE

## 2022-05-24 PROCEDURE — C1732 CATH, EP, DIAG/ABL, 3D/VECT: HCPCS | Performed by: INTERNAL MEDICINE

## 2022-05-24 PROCEDURE — 93320 DOPPLER ECHO COMPLETE: CPT | Performed by: INTERNAL MEDICINE

## 2022-05-24 PROCEDURE — 93655 ICAR CATH ABLTJ DSCRT ARRHYT: CPT | Performed by: INTERNAL MEDICINE

## 2022-05-24 PROCEDURE — G0378 HOSPITAL OBSERVATION PER HR: HCPCS

## 2022-05-24 PROCEDURE — 93325 DOPPLER ECHO COLOR FLOW MAPG: CPT | Performed by: INTERNAL MEDICINE

## 2022-05-24 PROCEDURE — 93657 TX L/R ATRIAL FIB ADDL: CPT | Performed by: INTERNAL MEDICINE

## 2022-05-24 PROCEDURE — 6370000000 HC RX 637 (ALT 250 FOR IP): Performed by: INTERNAL MEDICINE

## 2022-05-24 PROCEDURE — 2720000010 HC SURG SUPPLY STERILE: Performed by: INTERNAL MEDICINE

## 2022-05-24 PROCEDURE — 93622 COMP EP EVAL L VENTR PAC&REC: CPT | Performed by: INTERNAL MEDICINE

## 2022-05-24 PROCEDURE — 6360000002 HC RX W HCPCS: Performed by: INTERNAL MEDICINE

## 2022-05-24 PROCEDURE — 94760 N-INVAS EAR/PLS OXIMETRY 1: CPT

## 2022-05-24 PROCEDURE — 2500000003 HC RX 250 WO HCPCS: Performed by: NURSE ANESTHETIST, CERTIFIED REGISTERED

## 2022-05-24 PROCEDURE — C1894 INTRO/SHEATH, NON-LASER: HCPCS | Performed by: INTERNAL MEDICINE

## 2022-05-24 PROCEDURE — 85347 COAGULATION TIME ACTIVATED: CPT

## 2022-05-24 PROCEDURE — C1893 INTRO/SHEATH, FIXED,NON-PEEL: HCPCS | Performed by: INTERNAL MEDICINE

## 2022-05-24 PROCEDURE — 93613 INTRACARDIAC EPHYS 3D MAPG: CPT | Performed by: INTERNAL MEDICINE

## 2022-05-24 PROCEDURE — 6370000000 HC RX 637 (ALT 250 FOR IP): Performed by: ANESTHESIOLOGY

## 2022-05-24 PROCEDURE — G0378 HOSPITAL OBSERVATION PER HR: HCPCS | Performed by: INTERNAL MEDICINE

## 2022-05-24 PROCEDURE — C1730 CATH, EP, 19 OR FEW ELECT: HCPCS | Performed by: INTERNAL MEDICINE

## 2022-05-24 PROCEDURE — 93662 INTRACARDIAC ECG (ICE): CPT | Performed by: INTERNAL MEDICINE

## 2022-05-24 PROCEDURE — 2709999900 HC NON-CHARGEABLE SUPPLY: Performed by: INTERNAL MEDICINE

## 2022-05-24 PROCEDURE — C1760 CLOSURE DEV, VASC: HCPCS | Performed by: INTERNAL MEDICINE

## 2022-05-24 PROCEDURE — 93320 DOPPLER ECHO COMPLETE: CPT

## 2022-05-24 PROCEDURE — 93325 DOPPLER ECHO COLOR FLOW MAPG: CPT

## 2022-05-24 PROCEDURE — C1759 CATH, INTRA ECHOCARDIOGRAPHY: HCPCS | Performed by: INTERNAL MEDICINE

## 2022-05-24 PROCEDURE — 2580000003 HC RX 258: Performed by: NURSE ANESTHETIST, CERTIFIED REGISTERED

## 2022-05-24 PROCEDURE — 6360000002 HC RX W HCPCS: Performed by: NURSE ANESTHETIST, CERTIFIED REGISTERED

## 2022-05-24 PROCEDURE — 7100000000 HC PACU RECOVERY - FIRST 15 MIN: Performed by: INTERNAL MEDICINE

## 2022-05-24 PROCEDURE — 93005 ELECTROCARDIOGRAM TRACING: CPT

## 2022-05-24 PROCEDURE — 93623 PRGRMD STIMJ&PACG IV RX NFS: CPT | Performed by: INTERNAL MEDICINE

## 2022-05-24 PROCEDURE — 7100000001 HC PACU RECOVERY - ADDTL 15 MIN: Performed by: INTERNAL MEDICINE

## 2022-05-24 PROCEDURE — 93312 ECHO TRANSESOPHAGEAL: CPT | Performed by: INTERNAL MEDICINE

## 2022-05-24 PROCEDURE — 3700000001 HC ADD 15 MINUTES (ANESTHESIA): Performed by: INTERNAL MEDICINE

## 2022-05-24 RX ORDER — DEXAMETHASONE SODIUM PHOSPHATE 4 MG/ML
INJECTION, SOLUTION INTRA-ARTICULAR; INTRALESIONAL; INTRAMUSCULAR; INTRAVENOUS; SOFT TISSUE PRN
Status: DISCONTINUED | OUTPATIENT
Start: 2022-05-24 | End: 2022-05-24 | Stop reason: SDUPTHER

## 2022-05-24 RX ORDER — SUCRALFATE 1 G/1
1 TABLET ORAL EVERY 6 HOURS SCHEDULED
Status: DISCONTINUED | OUTPATIENT
Start: 2022-05-24 | End: 2022-05-25 | Stop reason: HOSPADM

## 2022-05-24 RX ORDER — CARVEDILOL 12.5 MG/1
6.25 TABLET ORAL
Status: COMPLETED | OUTPATIENT
Start: 2022-05-24 | End: 2022-05-24

## 2022-05-24 RX ORDER — LIDOCAINE HYDROCHLORIDE 20 MG/ML
INJECTION, SOLUTION EPIDURAL; INFILTRATION; INTRACAUDAL; PERINEURAL PRN
Status: DISCONTINUED | OUTPATIENT
Start: 2022-05-24 | End: 2022-05-24 | Stop reason: SDUPTHER

## 2022-05-24 RX ORDER — PANTOPRAZOLE SODIUM 40 MG/1
40 TABLET, DELAYED RELEASE ORAL
Status: DISCONTINUED | OUTPATIENT
Start: 2022-05-24 | End: 2022-05-25 | Stop reason: HOSPADM

## 2022-05-24 RX ORDER — CARVEDILOL 12.5 MG/1
6.25 TABLET ORAL 2 TIMES DAILY WITH MEALS
Status: DISCONTINUED | OUTPATIENT
Start: 2022-05-24 | End: 2022-05-25 | Stop reason: HOSPADM

## 2022-05-24 RX ORDER — COLCHICINE 0.6 MG/1
0.6 TABLET ORAL DAILY
Qty: 30 TABLET | Refills: 0 | Status: SHIPPED | OUTPATIENT
Start: 2022-05-24 | End: 2022-05-27 | Stop reason: SINTOL

## 2022-05-24 RX ORDER — LIDOCAINE HYDROCHLORIDE 10 MG/ML
1 INJECTION, SOLUTION EPIDURAL; INFILTRATION; INTRACAUDAL; PERINEURAL
Status: DISPENSED | OUTPATIENT
Start: 2022-05-24 | End: 2022-05-24

## 2022-05-24 RX ORDER — COLCHICINE 0.6 MG/1
0.6 TABLET ORAL DAILY
Status: DISCONTINUED | OUTPATIENT
Start: 2022-05-24 | End: 2022-05-25 | Stop reason: HOSPADM

## 2022-05-24 RX ORDER — MIDAZOLAM HYDROCHLORIDE 2 MG/2ML
2 INJECTION, SOLUTION INTRAMUSCULAR; INTRAVENOUS
Status: ACTIVE | OUTPATIENT
Start: 2022-05-24 | End: 2022-05-24

## 2022-05-24 RX ORDER — ONDANSETRON 2 MG/ML
INJECTION INTRAMUSCULAR; INTRAVENOUS PRN
Status: DISCONTINUED | OUTPATIENT
Start: 2022-05-24 | End: 2022-05-24 | Stop reason: SDUPTHER

## 2022-05-24 RX ORDER — OXYCODONE HYDROCHLORIDE 5 MG/1
5 TABLET ORAL
Status: ACTIVE | OUTPATIENT
Start: 2022-05-24 | End: 2022-05-24

## 2022-05-24 RX ORDER — HEPARIN SODIUM AND DEXTROSE 5000; 5 [USP'U]/100ML; G/100ML
INJECTION INTRAVENOUS CONTINUOUS PRN
Status: DISCONTINUED | OUTPATIENT
Start: 2022-05-24 | End: 2022-05-24 | Stop reason: SDUPTHER

## 2022-05-24 RX ORDER — AMIODARONE HYDROCHLORIDE 200 MG/1
200 TABLET ORAL DAILY
Status: DISCONTINUED | OUTPATIENT
Start: 2022-05-24 | End: 2022-05-25 | Stop reason: HOSPADM

## 2022-05-24 RX ORDER — SUCRALFATE 1 G/1
1 TABLET ORAL 4 TIMES DAILY
Qty: 120 TABLET | Refills: 0 | Status: SHIPPED | OUTPATIENT
Start: 2022-05-24 | End: 2022-08-18 | Stop reason: ALTCHOICE

## 2022-05-24 RX ORDER — FAMOTIDINE 20 MG/1
20 TABLET, FILM COATED ORAL
Status: COMPLETED | OUTPATIENT
Start: 2022-05-24 | End: 2022-05-24

## 2022-05-24 RX ORDER — HYDROMORPHONE HYDROCHLORIDE 2 MG/ML
0.5 INJECTION, SOLUTION INTRAMUSCULAR; INTRAVENOUS; SUBCUTANEOUS EVERY 5 MIN PRN
Status: DISCONTINUED | OUTPATIENT
Start: 2022-05-24 | End: 2022-05-25 | Stop reason: HOSPADM

## 2022-05-24 RX ORDER — SODIUM CHLORIDE, SODIUM LACTATE, POTASSIUM CHLORIDE, CALCIUM CHLORIDE 600; 310; 30; 20 MG/100ML; MG/100ML; MG/100ML; MG/100ML
INJECTION, SOLUTION INTRAVENOUS CONTINUOUS PRN
Status: DISCONTINUED | OUTPATIENT
Start: 2022-05-24 | End: 2022-05-24 | Stop reason: SDUPTHER

## 2022-05-24 RX ORDER — HEPARIN SODIUM 1000 [USP'U]/ML
INJECTION, SOLUTION INTRAVENOUS; SUBCUTANEOUS PRN
Status: DISCONTINUED | OUTPATIENT
Start: 2022-05-24 | End: 2022-05-24 | Stop reason: SDUPTHER

## 2022-05-24 RX ORDER — MORPHINE SULFATE 2 MG/ML
2 INJECTION, SOLUTION INTRAMUSCULAR; INTRAVENOUS ONCE
Status: COMPLETED | OUTPATIENT
Start: 2022-05-24 | End: 2022-05-24

## 2022-05-24 RX ORDER — ACETAMINOPHEN 325 MG/1
650 TABLET ORAL EVERY 4 HOURS PRN
Status: DISCONTINUED | OUTPATIENT
Start: 2022-05-24 | End: 2022-05-25 | Stop reason: HOSPADM

## 2022-05-24 RX ORDER — ROCURONIUM BROMIDE 10 MG/ML
INJECTION, SOLUTION INTRAVENOUS PRN
Status: DISCONTINUED | OUTPATIENT
Start: 2022-05-24 | End: 2022-05-24 | Stop reason: SDUPTHER

## 2022-05-24 RX ORDER — ADENOSINE 3 MG/ML
INJECTION, SOLUTION INTRAVENOUS PRN
Status: DISCONTINUED | OUTPATIENT
Start: 2022-05-24 | End: 2022-05-24 | Stop reason: HOSPADM

## 2022-05-24 RX ORDER — HEPARIN SODIUM 200 [USP'U]/100ML
INJECTION, SOLUTION INTRAVENOUS CONTINUOUS PRN
Status: COMPLETED | OUTPATIENT
Start: 2022-05-24 | End: 2022-05-24

## 2022-05-24 RX ORDER — PROPOFOL 10 MG/ML
INJECTION, EMULSION INTRAVENOUS PRN
Status: DISCONTINUED | OUTPATIENT
Start: 2022-05-24 | End: 2022-05-24 | Stop reason: SDUPTHER

## 2022-05-24 RX ORDER — EPHEDRINE SULFATE/0.9% NACL/PF 50 MG/5 ML
SYRINGE (ML) INTRAVENOUS PRN
Status: DISCONTINUED | OUTPATIENT
Start: 2022-05-24 | End: 2022-05-24 | Stop reason: SDUPTHER

## 2022-05-24 RX ORDER — PANTOPRAZOLE SODIUM 40 MG/1
40 TABLET, DELAYED RELEASE ORAL
Qty: 180 TABLET | Refills: 1 | Status: SHIPPED | OUTPATIENT
Start: 2022-05-24 | End: 2022-08-18 | Stop reason: ALTCHOICE

## 2022-05-24 RX ORDER — PROCHLORPERAZINE EDISYLATE 5 MG/ML
5 INJECTION INTRAMUSCULAR; INTRAVENOUS
Status: ACTIVE | OUTPATIENT
Start: 2022-05-24 | End: 2022-05-24

## 2022-05-24 RX ORDER — PROTAMINE SULFATE 10 MG/ML
INJECTION, SOLUTION INTRAVENOUS PRN
Status: DISCONTINUED | OUTPATIENT
Start: 2022-05-24 | End: 2022-05-24 | Stop reason: SDUPTHER

## 2022-05-24 RX ADMIN — PANTOPRAZOLE SODIUM 40 MG: 40 TABLET, DELAYED RELEASE ORAL at 16:37

## 2022-05-24 RX ADMIN — PHENYLEPHRINE HYDROCHLORIDE 100 MCG: 0.1 INJECTION, SOLUTION INTRAVENOUS at 07:59

## 2022-05-24 RX ADMIN — ONDANSETRON 4 MG: 2 INJECTION INTRAMUSCULAR; INTRAVENOUS at 08:54

## 2022-05-24 RX ADMIN — HEPARIN SODIUM 40 UNITS/KG/HR: 5000 INJECTION, SOLUTION INTRAVENOUS at 08:13

## 2022-05-24 RX ADMIN — CARVEDILOL 6.25 MG: 12.5 TABLET, FILM COATED ORAL at 16:37

## 2022-05-24 RX ADMIN — PHENYLEPHRINE HYDROCHLORIDE 100 MCG: 0.1 INJECTION, SOLUTION INTRAVENOUS at 08:16

## 2022-05-24 RX ADMIN — SUGAMMADEX 200 MG: 100 INJECTION, SOLUTION INTRAVENOUS at 09:13

## 2022-05-24 RX ADMIN — SUCRALFATE 1 G: 1 TABLET ORAL at 16:37

## 2022-05-24 RX ADMIN — PROTAMINE SULFATE 20 MG: 10 INJECTION, SOLUTION INTRAVENOUS at 09:08

## 2022-05-24 RX ADMIN — CARVEDILOL 6.25 MG: 6.25 TABLET, FILM COATED ORAL at 13:51

## 2022-05-24 RX ADMIN — APIXABAN 5 MG: 5 TABLET, FILM COATED ORAL at 16:36

## 2022-05-24 RX ADMIN — PHENYLEPHRINE HYDROCHLORIDE 200 MCG: 0.1 INJECTION, SOLUTION INTRAVENOUS at 08:18

## 2022-05-24 RX ADMIN — PROTAMINE SULFATE 10 MG: 10 INJECTION, SOLUTION INTRAVENOUS at 09:07

## 2022-05-24 RX ADMIN — PROTAMINE SULFATE 20 MG: 10 INJECTION, SOLUTION INTRAVENOUS at 09:09

## 2022-05-24 RX ADMIN — DEXAMETHASONE SODIUM PHOSPHATE 4 MG: 4 INJECTION, SOLUTION INTRAMUSCULAR; INTRAVENOUS at 08:54

## 2022-05-24 RX ADMIN — PROPOFOL 100 MG: 10 INJECTION, EMULSION INTRAVENOUS at 07:37

## 2022-05-24 RX ADMIN — SODIUM CHLORIDE, SODIUM LACTATE, POTASSIUM CHLORIDE, AND CALCIUM CHLORIDE: 600; 310; 30; 20 INJECTION, SOLUTION INTRAVENOUS at 07:20

## 2022-05-24 RX ADMIN — Medication 10 MG: at 08:04

## 2022-05-24 RX ADMIN — LIDOCAINE HYDROCHLORIDE 100 MG: 20 INJECTION, SOLUTION EPIDURAL; INFILTRATION; INTRACAUDAL; PERINEURAL at 07:37

## 2022-05-24 RX ADMIN — ROCURONIUM BROMIDE 50 MG: 50 INJECTION, SOLUTION INTRAVENOUS at 07:37

## 2022-05-24 RX ADMIN — PROTAMINE SULFATE 20 MG: 10 INJECTION, SOLUTION INTRAVENOUS at 09:11

## 2022-05-24 RX ADMIN — PROTAMINE SULFATE 10 MG: 10 INJECTION, SOLUTION INTRAVENOUS at 09:12

## 2022-05-24 RX ADMIN — MORPHINE SULFATE 2 MG: 2 INJECTION, SOLUTION INTRAMUSCULAR; INTRAVENOUS at 11:16

## 2022-05-24 RX ADMIN — HEPARIN SODIUM 7000 UNITS: 1000 INJECTION INTRAVENOUS; SUBCUTANEOUS at 08:13

## 2022-05-24 RX ADMIN — FAMOTIDINE 20 MG: 20 TABLET, FILM COATED ORAL at 07:18

## 2022-05-24 RX ADMIN — HEPARIN SODIUM 6500 UNITS: 1000 INJECTION INTRAVENOUS; SUBCUTANEOUS at 08:02

## 2022-05-24 RX ADMIN — Medication 10 MG: at 07:47

## 2022-05-24 RX ADMIN — PROTAMINE SULFATE 20 MG: 10 INJECTION, SOLUTION INTRAVENOUS at 09:10

## 2022-05-24 RX ADMIN — PHENYLEPHRINE HYDROCHLORIDE 100 MCG: 0.1 INJECTION, SOLUTION INTRAVENOUS at 07:47

## 2022-05-24 ASSESSMENT — PAIN - FUNCTIONAL ASSESSMENT
PAIN_FUNCTIONAL_ASSESSMENT: NONE - DENIES PAIN
PAIN_FUNCTIONAL_ASSESSMENT: 0-10

## 2022-05-24 ASSESSMENT — PAIN SCALES - GENERAL
PAINLEVEL_OUTOF10: 6
PAINLEVEL_OUTOF10: 0

## 2022-05-24 ASSESSMENT — PAIN DESCRIPTION - LOCATION: LOCATION: BACK

## 2022-05-24 ASSESSMENT — PAIN DESCRIPTION - DESCRIPTORS
DESCRIPTORS: ACHING
DESCRIPTORS: ACHING

## 2022-05-24 ASSESSMENT — PAIN DESCRIPTION - ORIENTATION: ORIENTATION: MID

## 2022-05-24 NOTE — PROGRESS NOTES
TRANSFER - IN REPORT:    Verbal report received from CHANA Magallanes on Laine Miller being received from Cath lab for routine progression of care. Report consisted of patients Situation, Background, Assessment and Recommendations(SBAR). Opportunity for questions and clarification was provided. Assessment completed upon patients arrival to unit and care assumed. Patient received to room 308. Patient connected to monitor and assessment completed. Plan of care reviewed. Patient oriented to room and call light. Patient aware to use call light to communicate any chest pain or needs.      Admission skin assessment completed with second RN Cristina Salazar RN) and reveals the following: -skin warm, dry, and fragile  -coccyx and sacrum intact, no sign of redness or breakdown  -heels intact, no sign of breakdown  -bilateral groin sites post ablation- dry, clean, and intact with no oozing

## 2022-05-24 NOTE — PROGRESS NOTES
Patient's BP found to be significantly elevated. Dr. Kassie Mortimer and Dr. Taina Renner made aware. Patient given AM dose of home Coreg at this time.

## 2022-05-24 NOTE — Clinical Note
Prepped: bilateral groin. Site was marked, clipped and prepped. Prepped with: ChloraPrep. Patient was draped after wet prep solution dried.

## 2022-05-24 NOTE — PROCEDURES
Pre-Electrophysiology Diagnosis  1. Persistent Atrial fibrillation     Procedure Performed  1. EPS afib ablation/pulmonary vein isolation  2. Left atrial pacing recording from the coronary sinus. 3. 3-D Electroanatomical mapping  4. Intracardiac echo  5. Ablation of second arrhythmia  6. LV pacing and recording  7. Transesophageal echo  8. Drug infusion  9. Ablation of additional linear lines x 1    Anesthesia: General     Estimated Blood Loss: Less than 10 mL     Procedure in Detail:  The patient was brought to the electrophysiology lab in the fasting state. The patient was intubated by anesthesiology and an esophageal temperature probe inserted and advanced to a location directly posterior to the LA at the level of the pulmonary veins. The esophageal temperature probe was repositioned throughout the case to a location as close the the ablation catheter as possible. If the esophageal temperature increased 0.5 degrees Celsius ablation was stopped until the temperature returned to baseline. A tranesophageal echocardiogram was performed directly prior to the procedure and was negative for a EDILBERTO thrombus (see full report in chart). A Ref-Star CARTO patch was placed, the patient was then prepped and draped in sterile fashion. Venous access was obtained under ultrasound guidance x4 using modified Seldinger technique, with placement of one 8Fr short sidearm sheath and two 8.5 Fr SL-O 63cm long braided sheaths in the right femoral vein and placement of a 10Fr sheath into the left femoral vein. An intra-cardiac echo probe was prepped, and then inserted into an 10 Fr short sheath and used to localize the fossa ovalis and create LA and pulmonary vein anatomy utilizing [facility]. A [employer] Nicole Pentaray catheter was then inserted into an 8.5 SLO Fr sheath and used to create an electroanatomical map of the right atrium, including attempts at His localization and the os of the CS.   Then a Smart Touch porous irrigated BW 3.5mm ablation catheter was used to map out the body of the CS. A decapolar [employer] Nicole CS catheter was inserted via an 8Fr sheath and positioned in the mid coronary sinus. Next, a trans-septal needle was inserted into the SLO and was used to perform a trans-septal puncture with assistance from ICE (intra-cardiac echo) as well as the [address] [place] [address],[address] map and the right atrial impedence map. The SLO sheath was advanced into the LA. Total weight based heparin bolus was administered just after transeptal access, and systemic blood pressure monitored invasively. The patient was then placed on our heparin weight based nomogram for targeted ACT of 300-350 during the ablation procedure. A second trans-septal access was obtained with the ablation catheter. The Pentaray catheter was then inserted into the LA via the SL-O sheath and was used to create a detail electroanatomical voltage map of the left atrium including the pulmonary veins to identify the pulmonary vein sleeves and further guide additional ablation as pictured below. The Pentaray was used to map pulmonary vein potentials and target ablation. An 8 Fr, 3.5mm tip saline irrigated bidirectional D/F curve Thermo-cool SmartTouch catheter was used for RF ablation and ablation was performed at 35-50W with reduction in power as needed if ablation was performed in close proximity to the esophagus. Antral pulmonary vein isolation was then performed for all 4 pulmonary veins. Entrance and exit block was demonstrated by left atrial pacing and within the pulmonary veins. Lack of any conducted atrial EGMs into the pulmonary veins was documented. Prior to ablation of the right antral pulmonary veins, the ablation catheter was used to pace at high output to try to localize the right phrenic nerve and map its location prior to ablation.  After full wide area circumferential ablation of the right antrum, pulmonary vein signals/afib triggers remained in the hoang. Ablation of an additional linear line was performed across the right carinal to obtain full PVI. Adenosine was injected (12 mg) to demonstrate the lack of early reconnection with the Pentaray in the PVs. There was no early reconnection with adenosine. The ablation catheter was inserted into the LV, documented LV electrograms and was used to pace the LV for the EP study and for rescue pacing during adenosine infusion. Post PVI, the Pentaray was used to perform a second LA voltage map post ablation to demonstrate pulmonary vein isolation and post ablation voltage as pictured below. Baseline LA Voltage Map      Post Ablation LA Voltage Map      Cavotricuspid Isthmus ablation (right atrial flutter)  Linear ablation across the cavo-tricuspid isthmus was performed starting with 1:2 A:V EGMs along the isthmus at 6pm Malay. The local electrogram activation sequence, differential pacing maneuvers and electrogram timing was used to demonstrate bidirectional block along the cavotricuspid isthmus. Post-Ablation trans-isthmus time: 198 msec    Next, baseline recordings and a diagnostic EP study was performed. The coronary sinus multipolar catheter was used to pace the left atrium during the EP study. The LA CS electrograms were documented and interpreted during the procedure. A comprehensive EP study was performed with 1:1 AV decremental pacing, atrial extrastimuli and ventricular pacing to assess retrograde conduction. The patient did not have sustained slow pathway conduction or evidence of an accessory pathway. Ventricular pacing revealed retrograde AV conduction which was concentric and decremental.    At the completion of the ablation and EPS, all long sheaths were exchanged for short 8 Fr sheaths and 100 units of Protamine was delivered to reverse the effect of heparin. Four VASCADE MVP devices were used to close the venotomy sites.  The MVP tools were advanced into the 8 Fr and 10 Fr sheaths, respectively; the sheaths were then removed. The collagen was then deployed and a 30 second dwell time was performed to allow for expansion of the collagen. The delivery tools were then removed with adequate hemostasis. The patient tolerated the procedure well with no acute complications recognized. The patient left the EP lab in stable condition, in normal sinus rhythm. Just prior to pulling shealths, the ICE catheter was used to obtain ultrasound images and revealed stable known prior pericardial effusion. Ablation Data:  Total procedure time: 75 minutes  LA Volume: 152 cc     Baseline Intervals    QRS duration: 91 msec  MI interval: 181 msec  RR interval: 988 msec  AH interval: 62 msec  HV interval: 42 msec    EP Study    AV Wenchebach: 390 msec  AV audi ERP: < or equal to 250 msec  Atrial ERP: 250 msec  VA Wenchebach: 815 msec    Complications: None    Summary:   1. Successful pulmonary vein isolation x4.  2. Creation of a line of bidirectional block at the cavotricuspid isthmus. 3.         Comprehensive EP study. 4. Pt tolerated the procedure well. 5. Family updated. Efren Sanches MD, MS  Clinical Cardiac Electrophysiology  5/24/2022  9:21 AM

## 2022-05-24 NOTE — PROGRESS NOTES
Pt would like to speak with case management when able. It sounds like she needs some home health care of some sort. She has received some info before about it, but has some questions moving forward with the paperwork she was given.

## 2022-05-24 NOTE — Clinical Note
Sheath was removed. Site closed by manual compression.  All sheaths removed and vascade closure device used to obtain hemostasis

## 2022-05-24 NOTE — PROGRESS NOTES
TRANSFER - IN REPORT:    Verbal report received from Tiffanie Santos rn on [facility]  being received from PACU for routine progression of patient care      Report consisted of patient's Situation, Background, Assessment and   Recommendations(SBAR). Information from the following report(s) Nurse Handoff Report was reviewed with the receiving nurse. Opportunity for questions and clarification was provided. Assessment completed upon patient's arrival to unit and care assumed.

## 2022-05-24 NOTE — Clinical Note
Transseptal Cath Performed under hemodynamic and ICE, utilizing a standard needle, [place] 71cm via a guiding sheath. Needle inserted.

## 2022-05-24 NOTE — ANESTHESIA POSTPROCEDURE EVALUATION
Department of Anesthesiology  Postprocedure Note    Patient: Maxi Kaur  MRN: [id number]  YOB: 1945  Date of evaluation: 5/24/2022  Time:  10:30 AM     Procedure Summary     Date: 05/24/22 Room / Location: [facility] EP/CATH 4 ALL EVENTS / [facility] CARDIAC CATH LAB    Anesthesia Start: 0720 Anesthesia Stop: 1431    Procedure: ABLATION A-FIB W COMPLETE EP STUDY (N/A ) Diagnosis:       Atrial fibrillation, unspecified type ([address]      (afib)    Providers: Toya Bosworth, MD Responsible Provider: Ministerio Grimes MD    Anesthesia Type: general ASA Status: 4          Anesthesia Type: No value filed. Charu Phase I: Charu Score: 10    Charu Phase II:      Last vitals: Reviewed and per EMR flowsheets.        Anesthesia Post Evaluation    Patient location during evaluation: PACU  Patient participation: complete - patient participated  Level of consciousness: awake and alert  Airway patency: patent  Nausea & Vomiting: no nausea and no vomiting  Complications: no  Cardiovascular status: hemodynamically stable  Respiratory status: acceptable, nonlabored ventilation and spontaneous ventilation  Hydration status: euvolemic  Comments: BP (!) 202/81   Pulse 73   Temp 97.8 °F (36.6 °C) (Temporal)   Resp 16   Ht 5' 2\" (1.575 m)   Wt 169 lb (76.7 kg)   SpO2 100%   Breastfeeding No   BMI 30.91 kg/m²     Multimodal analgesia pain management approach

## 2022-05-24 NOTE — Clinical Note
Vessel: left femoral vein. Closed using: manual pressure and Vascade. Site secured by Tegaderm and transparent dressing. Manual pressure held for: 5 minutes.

## 2022-05-24 NOTE — ANESTHESIA PRE PROCEDURE
Department of Anesthesiology  Preprocedure Note       Name:  Stacey Suarez   Age:  64 y.o.  :  1945                                          MRN:  [id number]         Date:  2022      Surgeon: Sincere De Leon):  Serina Lara MD    Procedure: Procedure(s):  ABLATION A-FIB W COMPLETE EP STUDY    Medications prior to admission:   Prior to Admission medications    Medication Sig Start Date End Date Taking? Authorizing Provider   amiodarone (CORDARONE) 200 MG tablet Take 200 mg by mouth daily  22   Ar Automatic Reconciliation   apixaban (ELIQUIS) 5 MG TABS tablet Take 5 mg by mouth every 12 hours 21   Ar Automatic Reconciliation   carvedilol (COREG) 6.25 MG tablet Take 6.25 mg by mouth 2 times daily (with meals) 3/16/22   Ar Automatic Reconciliation   colchicine (COLCRYS) 0.6 MG tablet Take 0.6 mg by mouth daily as needed for Pain (Gout flare)  22   Ar Automatic Reconciliation       Current medications:    No current facility-administered medications for this encounter. Allergies: Allergies   Allergen Reactions    Lisinopril Other (See Comments)    Losartan Other (See Comments)     Burning sensation tongue.     Nifedipine Other (See Comments)     severe headache    Celecoxib Rash       Problem List:    Patient Active Problem List   Diagnosis Code    Stage 3 chronic kidney disease ([facility] N18.30    Severe obesity with body mass index (BMI) of 35.0 to 39.9 with comorbidity (HCC) E66.01    Peptic ulcer K27.9    Mixed hyperlipidemia E78.2    Impaired gait and mobility R26.89    Hypertension I10    High risk medication use Z79.899    WPW (Grace-Parkinson-White syndrome) I45.6    Physical deconditioning R53.81    Polymyalgia rheumatica (HCC) M35.3    Fibromyalgia M79.7    Sjogren's syndrome ([facility] [address] M35.00    Gouty arthritis M10.9    Atrial fibrillation with RVR (HCC) I48.91    Persistent atrial fibrillation (HCC) I48.19       Past Medical History:        Diagnosis Date    Arrhythmia     Arthritis     Asthma     Cellulitis     CHF (congestive heart failure) (HCC)     Chronic kidney disease     Hx renal failure    Depression     Gout     Gouty arthritis 12/2/2015    Hypercholesterolemia     Hypertension     Mitral valve prolapse     Morbid obesity (HCC)     Nausea & vomiting     Polymyalgia rheumatica (HCC) 12/2/2015    PONV (postoperative nausea and vomiting)     Psychiatric disorder     depression & anxiety    PUD (peptic ulcer disease)     Sjogren's syndrome ([address]     Temporal arteritis ([address]        Past Surgical History:        Procedure Laterality Date    COLONOSCOPY  6/08    EP STUDY/ABLATION N/A 2013    WPW Ablation    HYSTERECTOMY      NV CARDIAC SURG PROCEDURE UNLIST  8/13    HEART ABLATION    NV CARDIAC SURG PROCEDURE UNLIST  4/07    STRESS TEST    TUBAL LIGATION      UPPER GASTROINTESTINAL ENDOSCOPY  8/08       Social History:    Social History     Tobacco Use    Smoking status: Never Smoker    Smokeless tobacco: Never Used   Substance Use Topics    Alcohol use: No                                Counseling given: Not Answered      Vital Signs (Current):   Vitals:    05/23/22 1811   Weight: 169 lb (76.7 kg)   Height: 5' 2\" (1.575 m)                                              BP Readings from Last 3 Encounters:   05/13/22 (!) 220/100   04/27/22 (!) 140/88   04/12/22 (!) 144/80       NPO Status:                                                                                 BMI:   Wt Readings from Last 3 Encounters:   05/23/22 169 lb (76.7 kg)   05/13/22 174 lb (78.9 kg)   04/27/22 174 lb (78.9 kg)     Body mass index is 30.91 kg/m².     CBC:   Lab Results   Component Value Date    WBC 6.3 05/20/2022    RBC 3.68 05/20/2022    HGB 11.6 05/20/2022    HCT 36.0 05/20/2022    MCV 97.8 05/20/2022    RDW 13.6 05/20/2022     05/20/2022       CMP:   Lab Results   Component Value Date     05/20/2022    K 4.2 05/20/2022     05/20/2022 CO2 25 05/20/2022    BUN 18 05/20/2022    CREATININE 0.90 05/20/2022    GFRAA >60 05/20/2022    AGRATIO 0.9 04/11/2022    GLUCOSE 89 05/20/2022    PROT 6.4 04/11/2022    CALCIUM 9.0 05/20/2022    BILITOT 0.5 04/11/2022    ALKPHOS 92 04/11/2022    AST 14 04/11/2022    ALT 19 04/11/2022       POC Tests: No results for input(s): POCGLU, POCNA, POCK, POCCL, POCBUN, POCHEMO, POCHCT in the last 72 hours. Coags:   Lab Results   Component Value Date    APTT 28.9 07/07/2019       HCG (If Applicable): No results found for: PREGTESTUR, PREGSERUM, HCG, HCGQUANT     ABGs:   Lab Results   Component Value Date    PHART 7.361 07/05/2019    PO2ART 89 07/05/2019    MYK5JMK 37.8 07/05/2019    XZX2PDT 21.4 07/05/2019        Type & Screen (If Applicable):  No results found for: LABABO, LABRH    Drug/Infectious Status (If Applicable):  No results found for: HIV, HEPCAB    COVID-19 Screening (If Applicable): No results found for: COVID19        Anesthesia Evaluation    Airway: Mallampati: II  TM distance: >3 FB   Neck ROM: full  Mouth opening: > = 3 FB   Dental:    (+) upper dentures      Pulmonary:   (+) rales, bibasilar asthma: seasonal asthma,                            Cardiovascular:    (+) hypertension:, dysrhythmias: atrial fibrillation and SVT, CHF:,         Rhythm: regular  Rate: normal                 ROS comment: EF 50%, moderate pericardial effusion  2019 came to ER afib, CHF, coded, ventilated 3 days  Very weak     Neuro/Psych:               GI/Hepatic/Renal:   (+) PUD, renal disease: CRI,           Endo/Other:    (+) : arthritis:., .                  ROS comment: Sjogren's syndrome Abdominal:             Vascular: Other Findings:           Anesthesia Plan      general     ASA 4     (Seems very fraile)        Anesthetic plan and risks discussed with patient. Plan discussed with CRNA.                     Syed High MD   5/24/2022

## 2022-05-24 NOTE — ANESTHESIA PROCEDURE NOTES
Airway  Date/Time: 5/24/2022 7:39 AM  Urgency: elective    Airway not difficult    General Information and Staff    Patient location during procedure: procedure area  Performed: resident/CRNA     Indications and Patient Condition  Indications for airway management: anesthesia  Spontaneous ventilation: present  Preoxygenated: yes  Mask difficulty assessment: vent by bag mask + OA or adjuvant +/- NMBA    Final Airway Details  Final airway type: endotracheal airway      Successful airway: ETT  Cuffed: yes   Successful intubation technique: direct laryngoscopy  Endotracheal tube insertion site: oral  Blade: Alyssa  Blade size: #4  ETT size (mm): 7.0  Cormack-Lehane Classification: grade I - full view of glottis  Measured from: lips  Number of attempts at approach: 1  Ventilation between attempts: bag mask

## 2022-05-24 NOTE — PATIENT CHOICE
TRANSFER - OUT REPORT:    Verbal report given to Magnus Neal RN on Rafi Colbert  being transferred to Cath Lab for routine post-op       Report consisted of patients Situation, Background, Assessment and   Recommendations(SBAR). Information from the following report(s) Nurse Handoff Report, Intake/Output, MAR, Cardiac Rhythm SR and Quality Measures was reviewed with the receiving nurse. Lines:   Peripheral IV 05/24/22 Right Antecubital (Active)        Opportunity for questions and clarification was provided. Patient transported with:   Monitor  Registered Nurse    VTE prophylaxis orders have been written for Rafi Colbert. Patient and family given floor number and nurses name. Family updated re: pt status after security code verified.

## 2022-05-24 NOTE — PROGRESS NOTES
TRANSFER - OUT REPORT:    Verbal report given to Ayush Nolan on Iam Gaines  being transferred to [facility] for routine progression of patient care       Report consisted of patient's Situation, Background, Assessment and   Recommendations(SBAR). Information from the following report(s) Nurse Handoff Report was reviewed with the receiving nurse. Lines:   Peripheral IV 05/24/22 Right Antecubital (Active)        Opportunity for questions and clarification was provided.

## 2022-05-24 NOTE — PROGRESS NOTES
Pt presented for ESTELLA- A. Fib ablation with Dr Olinda Larios today. CM chart reviewed. PCP confirmed. Insurance verified. No d/c needs identified currently but will remain available.

## 2022-05-24 NOTE — Clinical Note
Vessel: right femoral vein. Closed using: manual pressure and Vascade. Site secured by Tegaderm and transparent dressing. Manual pressure held for: 5 minutes.

## 2022-05-24 NOTE — PROGRESS NOTES
The patient has a fraility score of 5-MILDLY FRAIL, based on need for walker and/or cane for ambulation. Patient's last dose of Eliquis was 5/22/2022 AM dose.

## 2022-05-25 VITALS
DIASTOLIC BLOOD PRESSURE: 63 MMHG | TEMPERATURE: 99.5 F | HEIGHT: 62 IN | BODY MASS INDEX: 30.84 KG/M2 | WEIGHT: 167.6 LBS | HEART RATE: 68 BPM | RESPIRATION RATE: 18 BRPM | OXYGEN SATURATION: 98 % | SYSTOLIC BLOOD PRESSURE: 150 MMHG

## 2022-05-25 PROBLEM — I48.91 ATRIAL FIBRILLATION (HCC): Status: ACTIVE | Noted: 2022-05-25

## 2022-05-25 LAB
ANION GAP SERPL CALC-SCNC: 7 MMOL/L (ref 7–16)
BUN SERPL-MCNC: 24 MG/DL (ref 8–23)
CALCIUM SERPL-MCNC: 8.9 MG/DL (ref 8.3–10.4)
CHLORIDE SERPL-SCNC: 108 MMOL/L (ref 98–107)
CO2 SERPL-SCNC: 26 MMOL/L (ref 21–32)
CREAT SERPL-MCNC: 1.3 MG/DL (ref 0.6–1)
ERYTHROCYTE [DISTWIDTH] IN BLOOD BY AUTOMATED COUNT: 13.5 % (ref 11.9–14.6)
GLUCOSE SERPL-MCNC: 118 MG/DL (ref 65–100)
HCT VFR BLD AUTO: 33.6 % (ref 35.8–46.3)
HGB BLD-MCNC: 11.2 G/DL (ref 11.7–15.4)
MCH RBC QN AUTO: 31.2 PG (ref 26.1–32.9)
MCHC RBC AUTO-ENTMCNC: 33.3 G/DL (ref 31.4–35)
MCV RBC AUTO: 93.6 FL (ref 79.6–97.8)
NRBC # BLD: 0 K/UL (ref 0–0.2)
PLATELET # BLD AUTO: 267 K/UL (ref 150–450)
PMV BLD AUTO: 10.8 FL (ref 9.4–12.3)
POTASSIUM SERPL-SCNC: 4.2 MMOL/L (ref 3.5–5.1)
RBC # BLD AUTO: 3.59 M/UL (ref 4.05–5.2)
SODIUM SERPL-SCNC: 141 MMOL/L (ref 136–145)
WBC # BLD AUTO: 10.1 K/UL (ref 4.3–11.1)

## 2022-05-25 PROCEDURE — 80048 BASIC METABOLIC PNL TOTAL CA: CPT

## 2022-05-25 PROCEDURE — 36415 COLL VENOUS BLD VENIPUNCTURE: CPT

## 2022-05-25 PROCEDURE — 99238 HOSP IP/OBS DSCHRG MGMT 30/<: CPT | Performed by: INTERNAL MEDICINE

## 2022-05-25 PROCEDURE — 85027 COMPLETE CBC AUTOMATED: CPT

## 2022-05-25 PROCEDURE — G0378 HOSPITAL OBSERVATION PER HR: HCPCS

## 2022-05-25 PROCEDURE — 6370000000 HC RX 637 (ALT 250 FOR IP): Performed by: INTERNAL MEDICINE

## 2022-05-25 RX ADMIN — AMIODARONE HYDROCHLORIDE 200 MG: 200 TABLET ORAL at 08:45

## 2022-05-25 RX ADMIN — SUCRALFATE 1 G: 1 TABLET ORAL at 12:30

## 2022-05-25 RX ADMIN — SUCRALFATE 1 G: 1 TABLET ORAL at 05:38

## 2022-05-25 RX ADMIN — COLCHICINE 0.6 MG: 0.6 TABLET, FILM COATED ORAL at 08:45

## 2022-05-25 RX ADMIN — APIXABAN 5 MG: 5 TABLET, FILM COATED ORAL at 00:05

## 2022-05-25 RX ADMIN — SUCRALFATE 1 G: 1 TABLET ORAL at 00:05

## 2022-05-25 RX ADMIN — CARVEDILOL 6.25 MG: 12.5 TABLET, FILM COATED ORAL at 08:45

## 2022-05-25 RX ADMIN — PANTOPRAZOLE SODIUM 40 MG: 40 TABLET, DELAYED RELEASE ORAL at 05:38

## 2022-05-25 RX ADMIN — APIXABAN 5 MG: 5 TABLET, FILM COATED ORAL at 12:30

## 2022-05-25 ASSESSMENT — PAIN SCALES - GENERAL
PAINLEVEL_OUTOF10: 0
PAINLEVEL_OUTOF10: 0

## 2022-05-25 NOTE — PROGRESS NOTES
[facility] CARDIOLOGY PROGRESS NOTE           5/25/2022 7:17 AM    Admit Date: 5/24/2022    Admit Diagnosis: Atrial fibrillation, unspecified type ([address] [I48.91]  Status post ablation of atrial fibrillation [Z98.890, Z86.79]  Persistent atrial fibrillation ([address] [I48.19]    Assessment:   Principal Problem:    Status post ablation of atrial fibrillation  Active Problems:    Persistent atrial fibrillation ([address]  Resolved Problems:    * No resolved hospital problems. *      Plan:   1. AF - s/p ablation. Stable hemodynamics. Continue medical therapy. 2. Stroke ppx - Eliquis. 3. Dispo - anticipate today. Thank you for allowing me to participate in the electrophysiologic care of this most pleasant patient. Please feel free to contact me if there are any questions or concerns. Toshia Kelly MD, MS  Clinical Cardiac Electrophysiology  [facility]    Subjective:   No complaints this AM, no chest pain or shortness of breath    Interval History: (History of pertinent interval events obtained from nursing staff)    ROS:  GEN:  No fever or chills  Cardiovascular:  As noted above  Pulmonary:  As noted above  Neuro:  No new focal motor or sensory loss      Objective:     Vitals:    05/24/22 1945 05/25/22 0000 05/25/22 0330 05/25/22 0530   BP: (!) 145/64 (!) 132/59 (!) 161/62 (!) 125/55   Pulse: 65 67 66    Resp: 16 18 15    Temp: 98 °F (36.7 °C) 97.8 °F (36.6 °C) 97.9 °F (36.6 °C)    TempSrc: Oral Oral Oral    SpO2: 96% 96% 98%    Weight:   167 lb 9.6 oz (76 kg)    Height:           Physical Exam:  General-Well Developed, Well Nourished, No Acute Distress, Alert & Oriented x 3, appropriate mood. Neck- supple, no JVD  CV- regular rate and rhythm no MRG  Lung- clear bilaterally  Abd- soft, nontender, nondistended  Ext- no edema bilaterally.   Skin- warm and dry    Current Facility-Administered Medications   Medication Dose Route Frequency    HYDROmorphone HCl PF (DILAUDID) injection 0.5 mg  0.5 mg IntraVENous Q5 Min PRN    amiodarone (CORDARONE) tablet 200 mg  200 mg Oral Daily    apixaban (ELIQUIS) tablet 5 mg  5 mg Oral Q12H    carvedilol (COREG) tablet 6.25 mg  6.25 mg Oral BID WC    acetaminophen (TYLENOL) tablet 650 mg  650 mg Oral Q4H PRN    pantoprazole (PROTONIX) tablet 40 mg  40 mg Oral BID AC    sucralfate (CARAFATE) tablet 1 g  1 g Oral 4 times per day    colchicine (COLCRYS) tablet 0.6 mg  0.6 mg Oral Daily       Data Review:   Recent Results (from the past 24 hour(s))   Transesophageal echocardiogram (ESTELLA) with contrast and 3D PRN    Collection Time: 05/24/22  8:00 AM   Result Value Ref Range    Body Surface Area 1.83 m2   POCT activated clotting time    Collection Time: 05/24/22  8:27 AM   Result Value Ref Range    Activated Clotting Time 306 (H) 70 - 128 SECS   Electrophysiology procedure    Collection Time: 05/24/22  9:30 AM   Result Value Ref Range    Body Surface Area 1.83 m2   Basic Metabolic Panel    Collection Time: 05/25/22  5:23 AM   Result Value Ref Range    Sodium 141 136 - 145 mmol/L    Potassium 4.2 3.5 - 5.1 mmol/L    Chloride 108 (H) 98 - 107 mmol/L    CO2 26 21 - 32 mmol/L    Anion Gap 7 7 - 16 mmol/L    Glucose 118 (H) 65 - 100 mg/dL    BUN 24 (H) 8 - 23 MG/DL    CREATININE 1.30 (H) 0.6 - 1.0 MG/DL    GFR  51 (L) >60 ml/min/1.73m2    GFR Non- 42 (L) >60 ml/min/1.73m2    Calcium 8.9 8.3 - 10.4 MG/DL   CBC    Collection Time: 05/25/22  5:23 AM   Result Value Ref Range    WBC 10.1 4.3 - 11.1 K/uL    RBC 3.59 (L) 4.05 - 5.2 M/uL    Hemoglobin 11.2 (L) 11.7 - 15.4 g/dL    Hematocrit 33.6 (L) 35.8 - 46.3 %    MCV 93.6 79.6 - 97.8 FL    MCH 31.2 26.1 - 32.9 PG    MCHC 33.3 31.4 - 35.0 g/dL    RDW 13.5 11.9 - 14.6 %    Platelets 749 742 - 139 K/uL    MPV 10.8 9.4 - 12.3 FL    nRBC 0.00 0.0 - 0.2 K/uL       EKG:  (EKG has been independently visualized by me with interpretation below): NSR, normal axis, no ischemia.

## 2022-05-25 NOTE — PROGRESS NOTES
Discharge instructions reviewed with patient including follow up appoints, medication changes and activity restrictions. Case management assisting with finding transport for patient and assistance in picking up prescriptions. Patient verbalized understanding of all instructions.

## 2022-05-25 NOTE — DISCHARGE SUMMARY
[address] [place] [address] Cardiology Discharge Summary     Patient ID:  Denis Rider  [id number]  88 y.o.  1945    Admit date: 5/24/2022    Discharge date:  5/25/2022    Admitting Physician: Yfn Esquivel MD     Discharge Physician: Dr. Angel Murdock    Admission Diagnoses: Atrial fibrillation, unspecified type [facility]) [I48.91]  Status post ablation of atrial fibrillation [Z98.890, Z86.79]  Persistent atrial fibrillation ([facility] [address] [address] [I48.19]    Discharge Diagnoses:    Diagnosis    Status post ablation of atrial fibrillation    Atrial fibrillation with RVR ([facility] [address]    High risk medication use       Cardiology Procedures this admission:  AFIB ablation  Consults: none    Hospital Course: Patient was seen at the office of [address] [place] [address] Cardiology by Dr. Trinity Longoria for management of AFIB and was subsequently scheduled for an AM Admission ablation at [facility] on 5/24/22. Patient tolerated the procedure well and was taken to telemetry for recovery. The morning of discharge, patient was up feeling well without any complaints of chest pain or shortness of breath. Patient's bilateral groin cath sites were clean, dry and intact without hematoma or bruit. Patient's labs were WNL. Patient was seen and examined by Dr. Angel Murdock and determined stable and ready for discharge. Patient was instructed on the importance of medication compliance including taking carafate, protonix, colchicine,  and Eliquis everyday without missing a dose. The patient will follow up with [address] [place] [address] Cardiology -- Dr. Trinity Longoria 6/29/2022 at [phone] am in [place] office. DISPOSITION: The patient is being discharged home in stable condition on a low saturated fat, low cholesterol and low salt diet. The patient is instructed to advance activities as tolerated to the limit of fatigue or shortness of breath. The patient is instructed to avoid all heavy lifting, straining, stooping or squatting for 3-5 days.  The patient is instructed to watch the cath site for bleeding/oozing; if seen, the patient is instructed to apply firm pressure with a clean cloth and call [facility] at [phone]. The patient is instructed to watch for signs of infection which include: increasing area of redness, fever/hot to touch or purulent drainage at the catheterization site. The patient is instructed not to soak in a bathtub for 7-10 days, but is cleared to shower. Discharge Exam:Patient has been seen by Dr. Flavia Hughes: see his progress note for exam details.    BP (!) 125/55   Pulse 66   Temp 97.9 °F (36.6 °C) (Oral)   Resp 15   Ht 5' 2\" (1.575 m)   Wt 167 lb 9.6 oz (76 kg)   SpO2 98%   Breastfeeding No   BMI 30.65 kg/m²       Recent Results (from the past 24 hour(s))   Transesophageal echocardiogram (ESTELLA) with contrast and 3D PRN    Collection Time: 05/24/22  8:00 AM   Result Value Ref Range    Body Surface Area 1.83 m2   POCT activated clotting time    Collection Time: 05/24/22  8:27 AM   Result Value Ref Range    Activated Clotting Time 306 (H) 70 - 128 SECS   Electrophysiology procedure    Collection Time: 05/24/22  9:30 AM   Result Value Ref Range    Body Surface Area 1.83 m2   Basic Metabolic Panel    Collection Time: 05/25/22  5:23 AM   Result Value Ref Range    Sodium 141 136 - 145 mmol/L    Potassium 4.2 3.5 - 5.1 mmol/L    Chloride 108 (H) 98 - 107 mmol/L    CO2 26 21 - 32 mmol/L    Anion Gap 7 7 - 16 mmol/L    Glucose 118 (H) 65 - 100 mg/dL    BUN 24 (H) 8 - 23 MG/DL    CREATININE 1.30 (H) 0.6 - 1.0 MG/DL    GFR  51 (L) >60 ml/min/1.73m2    GFR Non- 42 (L) >60 ml/min/1.73m2    Calcium 8.9 8.3 - 10.4 MG/DL   CBC    Collection Time: 05/25/22  5:23 AM   Result Value Ref Range    WBC 10.1 4.3 - 11.1 K/uL    RBC 3.59 (L) 4.05 - 5.2 M/uL    Hemoglobin 11.2 (L) 11.7 - 15.4 g/dL    Hematocrit 33.6 (L) 35.8 - 46.3 %    MCV 93.6 79.6 - 97.8 FL    MCH 31.2 26.1 - 32.9 PG    MCHC 33.3 31.4 - 35.0 g/dL    RDW 13.5 11.9 - 14.6 %    Platelets 233 150 - 450 K/uL    MPV 10.8 9.4 - 12.3 FL    nRBC 0.00 0.0 - 0.2 K/uL         Patient Instructions:     Current Discharge Medication List      START taking these medications    Details   pantoprazole (PROTONIX) 40 MG tablet Take 1 tablet by mouth 2 times daily (before meals)  Qty: 180 tablet, Refills: 1      sucralfate (CARAFATE) 1 GM tablet Take 1 tablet by mouth 4 times daily  Qty: 120 tablet, Refills: 0         CONTINUE these medications which have CHANGED    Details   colchicine (COLCRYS) 0.6 MG tablet Take 1 tablet by mouth daily  Qty: 30 tablet, Refills: 0         CONTINUE these medications which have NOT CHANGED    Details   amiodarone (CORDARONE) 200 MG tablet Take 200 mg by mouth daily       apixaban (ELIQUIS) 5 MG TABS tablet Take 5 mg by mouth every 12 hours      carvedilol (COREG) 6.25 MG tablet Take 6.25 mg by mouth 2 times daily (with meals)

## 2022-05-25 NOTE — PLAN OF CARE
Problem: Chronic Conditions and Co-morbidities  Goal: Patient's chronic conditions and co-morbidity symptoms are monitored and maintained or improved  Outcome: Progressing  Flowsheets (Taken 5/24/2022 2015)  Care Plan - Patient's Chronic Conditions and Co-Morbidity Symptoms are Monitored and Maintained or Improved: Monitor and assess patient's chronic conditions and comorbid symptoms for stability, deterioration, or improvement     Problem: Safety - Adult  Goal: Free from fall injury  Outcome: Progressing     Problem: ABCDS Injury Assessment  Goal: Absence of physical injury  Outcome: Progressing     Problem: Pain  Goal: Verbalizes/displays adequate comfort level or baseline comfort level  Outcome: Progressing  Flowsheets (Taken 5/24/2022 2015)  Verbalizes/displays adequate comfort level or baseline comfort level:   Encourage patient to monitor pain and request assistance   Assess pain using appropriate pain scale     Problem: Skin/Tissue Integrity  Goal: Absence of new skin breakdown  Description: 1. Monitor for areas of redness and/or skin breakdown  2. Assess vascular access sites hourly  3. Every 4-6 hours minimum:  Change oxygen saturation probe site  4. Every 4-6 hours:  If on nasal continuous positive airway pressure, respiratory therapy assess nares and determine need for appliance change or resting period.   Outcome: Progressing

## 2022-05-25 NOTE — PROGRESS NOTES
Discharge order is in. Pt is discharging home in stable condition. Pt asking about [facility] services. She reported that she has a [facility] application at home that she has not filled out. CM explained the difference between [facility] services and [facility] and decided that she did not need HH and would complete the paperwork for [facility]. Pt reported that she needs transportation home. Pt has Medicaid but does not want to wait (3) hours or use the service. Pt understands and agrees to pay out-of-pocket for the [employer]. CM arranged for transportation to take her to [facility] to p/u her prescriptions. Pts nurse confirmed that her medications wereready for p/u. Transport will then take her home. The transportation service will bill her; estimated cost was listed as $17-26. Her address was provide and pt aware. Estimated p/u time is 1230, however, at the time of this note, a  has not picked up her ride. Will f/u and make pt aware. No other discharge needs were identified. Tx goals have been met. 1208-Pt reported that her neighbor will p/u her prescriptions now. CM cancelled the medical Celena bobbi and reordered the [employer]. Tentative p/u time is still 1230 and will transport her directly home. Pt paid OOP for the ride and endorses that she does not want to wait or use [employer]. Pts [employer] receipt was emailed to her at [email or web address]. Total was estimated between $15-17. Pt aware and reported understanding.

## 2022-05-27 ENCOUNTER — TELEPHONE (OUTPATIENT)
Dept: CARDIOLOGY CLINIC | Age: 77
End: 2022-05-27

## 2022-05-27 NOTE — TELEPHONE ENCOUNTER
Reviewed Dr. Peoples Cease recommendation with pt. Verb understanding. Pt will call back with any chest pain or concerns. Verb understanding.

## 2022-06-01 LAB
EKG ATRIAL RATE: 79 BPM
EKG DIAGNOSIS: NORMAL
EKG P AXIS: 54 DEGREES
EKG P-R INTERVAL: 166 MS
EKG Q-T INTERVAL: 392 MS
EKG QRS DURATION: 102 MS
EKG QTC CALCULATION (BAZETT): 449 MS
EKG R AXIS: -21 DEGREES
EKG T AXIS: 78 DEGREES
EKG VENTRICULAR RATE: 79 BPM

## 2022-06-15 ENCOUNTER — TELEPHONE (OUTPATIENT)
Dept: CARDIOLOGY CLINIC | Age: 77
End: 2022-06-15

## 2022-06-15 RX ORDER — FUROSEMIDE 20 MG/1
20 TABLET ORAL 2 TIMES DAILY
Qty: 4 TABLET | Refills: 0 | Status: SHIPPED | OUTPATIENT
Start: 2022-06-15

## 2022-06-15 NOTE — TELEPHONE ENCOUNTER
Ablation done 5/24/2022. Has experienced 7 pound weight gain that she cannot attribute to any change in eating, etc.  She is very meticulous about watching sodium intake and fluids. Questions if pantoprazole and sucralfate cause edema and weight gain. States bilateral feet, ankle and abdominal swelling that she states is minimal.  Had one episode of sob while sleeping. Medications:  Pantoprazole 40 mg bid                         Sucralfate 1 gm qid                         Amiodarone 200 mg qd                         Eliquis 5 mg bid                         Carvedilol 6.25 mg bid    Please advise regarding leg, ankle, abdominal swelling and weight gain of 7 pounds.

## 2022-06-29 ENCOUNTER — OFFICE VISIT (OUTPATIENT)
Dept: CARDIOLOGY CLINIC | Age: 77
End: 2022-06-29
Payer: MEDICARE

## 2022-06-29 VITALS
HEART RATE: 72 BPM | HEIGHT: 62 IN | SYSTOLIC BLOOD PRESSURE: 230 MMHG | WEIGHT: 166.2 LBS | DIASTOLIC BLOOD PRESSURE: 114 MMHG | BODY MASS INDEX: 30.59 KG/M2

## 2022-06-29 DIAGNOSIS — I48.19 PERSISTENT ATRIAL FIBRILLATION (HCC): Primary | ICD-10-CM

## 2022-06-29 DIAGNOSIS — I10 PRIMARY HYPERTENSION: ICD-10-CM

## 2022-06-29 PROCEDURE — 1090F PRES/ABSN URINE INCON ASSESS: CPT | Performed by: INTERNAL MEDICINE

## 2022-06-29 PROCEDURE — G8417 CALC BMI ABV UP PARAM F/U: HCPCS | Performed by: INTERNAL MEDICINE

## 2022-06-29 PROCEDURE — G8427 DOCREV CUR MEDS BY ELIG CLIN: HCPCS | Performed by: INTERNAL MEDICINE

## 2022-06-29 PROCEDURE — 93000 ELECTROCARDIOGRAM COMPLETE: CPT | Performed by: INTERNAL MEDICINE

## 2022-06-29 PROCEDURE — 99214 OFFICE O/P EST MOD 30 MIN: CPT | Performed by: INTERNAL MEDICINE

## 2022-06-29 PROCEDURE — G8400 PT W/DXA NO RESULTS DOC: HCPCS | Performed by: INTERNAL MEDICINE

## 2022-06-29 PROCEDURE — 1036F TOBACCO NON-USER: CPT | Performed by: INTERNAL MEDICINE

## 2022-06-29 PROCEDURE — 1123F ACP DISCUSS/DSCN MKR DOCD: CPT | Performed by: INTERNAL MEDICINE

## 2022-06-29 RX ORDER — CARVEDILOL 25 MG/1
25 TABLET ORAL 2 TIMES DAILY WITH MEALS
Qty: 60 TABLET | Refills: 5 | Status: SHIPPED | OUTPATIENT
Start: 2022-06-29 | End: 2022-09-22 | Stop reason: SDUPTHER

## 2022-06-29 NOTE — PROGRESS NOTES
[address], [address] [place] [address]  PHONE: [phone]  1945    SUBJECTIVE:   Mariluz Goldberg is a 64 y.o. female seen for a follow up visit regarding the following:     Chief Complaint   Patient presents with    Irregular Heart Beat       HPI:    Mariluz Goldberg is a very pleasant 64 y.o. female with a past medical and cardiac history significant for HTN, persistent atrial fibrillation failing amiodarone, CKD III, obesity, PMR s/p afib ablation. No known recurrent AF, no chest pain, worsening SOB, blood pressures have been high. Cardiac PMH: (Old records have been reviewed and summarized below)  TTE (4/6/22): EF 50-55%, severe LAE, moderate sized pericardial effusion     Reviewed office note Dr. Patricia Mcginnis 4/12/22    Past Medical History, Past Surgical History, Family history, Social History, and Medications were all reviewed with the patient today and updated as necessary. Current Outpatient Medications   Medication Sig Dispense Refill    carvedilol (COREG) 25 MG tablet Take 1 tablet by mouth 2 times daily (with meals) 60 tablet 5    pantoprazole (PROTONIX) 40 MG tablet Take 1 tablet by mouth 2 times daily (before meals) 180 tablet 1    apixaban (ELIQUIS) 5 MG TABS tablet Take 5 mg by mouth every 12 hours      furosemide (LASIX) 20 MG tablet Take 1 tablet by mouth 2 times daily (Patient not taking: Reported on 6/29/2022) 4 tablet 0    sucralfate (CARAFATE) 1 GM tablet Take 1 tablet by mouth 4 times daily 120 tablet 0     No current facility-administered medications for this visit. Allergies   Allergen Reactions    Lisinopril Other (See Comments)    Losartan Other (See Comments)     Burning sensation tongue.     Nifedipine Other (See Comments)     severe headache    Celecoxib Rash     [unfilled]  Past Medical History:   Diagnosis Date    Arrhythmia     Arthritis     Asthma     Cellulitis     CH[facility]congest[facility] failure[facility])  Chronic kidney disease     Hx renal failure    Depression     Gout     Gouty arthritis 12/2/2015    Hypercholesterolemia     Hypertension     Mitral valve prolapse     Morbid obesity (HCC)     Nausea & vomiting     Polymyalgia rheumatica (HCC) 12/2/2015    PONV (postoperative nausea and vomiting)     Psychiatric disorder     depression & anxiety    PUD (peptic ulcer disease)     Sjogren's syndrome (HCC)     Temporal arteritis (HCC)      Past Surgical History:   Procedure Laterality Date    COLONOSCOPY  6/08    EP DEVICE PROCEDURE N/A 5/24/2022    ABLATION A-FIB W COMPLETE EP STUDY performed by Serina Lara MD at [address] [place] [address] CATH LAB    EP STUDY/ABLATION N/A 2013    WPW Ablation    HYSTERECTOMY (CERVIX STATUS UNKNOWN)      LA CARDIAC SURG PROCEDURE UNLIST  8/13    HEART ABLATION    LA CARDIAC SURG PROCEDURE UNLIST  4/07    STRESS TEST    TUBAL LIGATION      UPPER GASTROINTESTINAL ENDOSCOPY  8/08     Family History   Problem Relation Age of Onset    Hypertension Sister     Other Son         PSYCHIATRIC ILLNESS    Diabetes Son     Cancer Father         LUNG    Dementia Mother     Hypertension Mother     Breast Cancer Mother 80    Cancer Mother     Heart Disease Father 62     Social History     Tobacco Use    Smoking status: Never Smoker    Smokeless tobacco: Never Used   Substance Use Topics    Alcohol use: No       PHYSICAL EXAM:    BP (!) 230/114   Pulse 72   Ht 5' 2\" (1.575 m)   Wt 166 lb 3.2 oz (75.4 kg)   BMI 30.40 kg/m²      Wt Readings from Last 5 Encounters:   06/29/22 166 lb 3.2 oz (75.4 kg)   05/25/22 167 lb 9.6 oz (76 kg)   05/13/22 174 lb (78.9 kg)   04/27/22 174 lb (78.9 kg)   04/12/22 175 lb (79.4 kg)       BP Readings from Last 5 Encounters:   06/29/22 (!) 230/114   05/25/22 (!) 150/63   05/13/22 (!) 220/100   04/27/22 (!) 140/88   04/12/22 (!) 144/80       General appearance: Alert, well appearing, and in no distress   CV: RRR, no M/R/G, no JVD, normal distal pulses, no lower extremity edema  Pulm: Clear to auscultation, no wheezes, no crackles, no accessory muscle use  GI: Soft, nontender, nondistended  Neuro: Alert and oriented    Medical problems and test results were reviewed with the patient today. Lab Results   Component Value Date    K 4.2 05/25/2022     CREATININE   Date Value Ref Range Status   05/25/2022 1.30 (H) 0.6 - 1.0 MG/DL Final     Lab Results   Component Value Date    HGB 11.2 05/25/2022     No results found for: INR, PTMR, PT1    Lab Results   Component Value Date    WBC 10.1 05/25/2022    HGB 11.2 (L) 05/25/2022    HCT 33.6 (L) 05/25/2022    MCV 93.6 05/25/2022     05/25/2022      Lab Results   Component Value Date     05/25/2022    K 4.2 05/25/2022     05/25/2022    CO2 26 05/25/2022    BUN 24 05/25/2022    CREATININE 1.30 05/25/2022    GLUCOSE 118 05/25/2022    CALCIUM 8.9 05/25/2022         EKG: (EKG has been independently visualized by me with interpretation below)  Sinus  Rhythm, rate 72  Low voltage in precordial leads. - Anterior -lateral infarct  Old. Michael Nunez was seen today for irregular heart beat. Diagnoses and all orders for this visit:    Persistent atrial fibrillation ([address]  -     EKG 12 Lead    Primary hypertension    Other orders  -     carvedilol (COREG) 25 MG tablet; Take 1 tablet by mouth 2 times daily (with meals)        ASSESSMENT and PLAN  1. Persistent atrial fibrillation, failing amiodarone: doing well s/p ablation, stop amio today     2. Amiodarone: stop amio today      3. CVA protection: eliquis 5mg     4. HTN: increase coreg to 25mg Q12H, instructed to call/go to ER with chest pain, worsening SOB, headache, or any other concerns. To follow up with PCP within the next few days     Patient has been instructed and agrees to call our office with any issues or other concerns related to their cardiac condition(s) and/or complaint(s). No follow-ups on file. Penelope Cutler MD, MS  Cardiology/Electrophysiology  6/29/2022  11:10 AM

## 2022-08-18 ENCOUNTER — OFFICE VISIT (OUTPATIENT)
Dept: CARDIOLOGY CLINIC | Age: 77
End: 2022-08-18
Payer: MEDICARE

## 2022-08-18 VITALS
WEIGHT: 169 LBS | BODY MASS INDEX: 31.1 KG/M2 | DIASTOLIC BLOOD PRESSURE: 72 MMHG | HEIGHT: 62 IN | SYSTOLIC BLOOD PRESSURE: 138 MMHG | HEART RATE: 62 BPM

## 2022-08-18 DIAGNOSIS — I48.19 PERSISTENT ATRIAL FIBRILLATION (HCC): Primary | ICD-10-CM

## 2022-08-18 PROCEDURE — 93000 ELECTROCARDIOGRAM COMPLETE: CPT | Performed by: INTERNAL MEDICINE

## 2022-08-18 PROCEDURE — 99213 OFFICE O/P EST LOW 20 MIN: CPT | Performed by: INTERNAL MEDICINE

## 2022-08-18 PROCEDURE — G8427 DOCREV CUR MEDS BY ELIG CLIN: HCPCS | Performed by: INTERNAL MEDICINE

## 2022-08-18 PROCEDURE — 1036F TOBACCO NON-USER: CPT | Performed by: INTERNAL MEDICINE

## 2022-08-18 PROCEDURE — G8417 CALC BMI ABV UP PARAM F/U: HCPCS | Performed by: INTERNAL MEDICINE

## 2022-08-18 PROCEDURE — 1090F PRES/ABSN URINE INCON ASSESS: CPT | Performed by: INTERNAL MEDICINE

## 2022-08-18 PROCEDURE — 1123F ACP DISCUSS/DSCN MKR DOCD: CPT | Performed by: INTERNAL MEDICINE

## 2022-08-18 PROCEDURE — G8400 PT W/DXA NO RESULTS DOC: HCPCS | Performed by: INTERNAL MEDICINE

## 2022-08-18 NOTE — PROGRESS NOTES
[address], [facility], [address] [place] [address]  PHONE: [phone]  1945    SUBJECTIVE:   Nancy Blake is a 64 y.o. female seen for a follow up visit regarding the following:     Chief Complaint   Patient presents with    Atrial Fibrillation     10-12 weeks post afib abl       HPI:    Nancy Blake is a very pleasant 64 y.o. female with a past medical and cardiac history significant for HTN, persistent atrial fibrillation failing amiodarone, CKD III, obesity, PMR s/p afib ablation. No known recurrent AF, no chest pain, worsening SOB, blood pressures have been high. Cardiac PMH: (Old records have been reviewed and summarized below)  TTE (4/6/22): EF 50-55%, severe LAE, moderate sized pericardial effusion     Reviewed office note Dr. Damon Nguyen 4/12/22    Past Medical History, Past Surgical History, Family history, Social History, and Medications were all reviewed with the patient today and updated as necessary. Current Outpatient Medications   Medication Sig Dispense Refill    carvedilol (COREG) 25 MG tablet Take 1 tablet by mouth 2 times daily (with meals) 60 tablet 5    furosemide (LASIX) 20 MG tablet Take 1 tablet by mouth 2 times daily 4 tablet 0    apixaban (ELIQUIS) 5 MG TABS tablet Take 5 mg by mouth every 12 hours       No current facility-administered medications for this visit. Allergies   Allergen Reactions    Lisinopril Other (See Comments)    Losartan Other (See Comments)     Burning sensation tongue.     Nifedipine Other (See Comments)     severe headache    Celecoxib Rash     [unfilled]  Past Medical History:   Diagnosis Date    Arrhythmia     Arthritis     Asthma     Cellulitis     CHF (congestive heart failure) (HCC)     Chronic kidney disease     Hx renal failure    Depression     Gout     Gouty arthritis 12/2/2015    Hypercholesterolemia     Hypertension     Mitral valve prolapse     Morbid obesity (HCC)     Nausea & vomiting Polymyalgia rheumatica (HCC) 12/2/2015    PONV (postoperative nausea and vomiting)     Psychiatric disorder     depression & anxiety    PUD (peptic ulcer disease)     Sjogren's syndrome (HCC)     Temporal arteritis (HCC)      Past Surgical History:   Procedure Laterality Date    COLONOSCOPY  6/08    EP DEVICE PROCEDURE N/A 5/24/2022    ABLATION A-FIB W COMPLETE EP STUDY performed by Micky Carl MD at [facility] CATH LAB    EP STUDY/ABLATION N/A 2013    WPW Ablation    HYSTERECTOMY (CERVIX STATUS UNKNOWN)      NE CARDIAC SURG PROCEDURE UNLIST  8/13    HEART ABLATION    NE CARDIAC SURG PROCEDURE UNLIST  4/07    STRESS TEST    TUBAL LIGATION      UPPER GASTROINTESTINAL ENDOSCOPY  8/08     Family History   Problem Relation Age of Onset    Hypertension Sister     Other Son         PSYCHIATRIC ILLNESS    Diabetes Son     Cancer Father         LUNG    Dementia Mother     Hypertension Mother     Breast Cancer Mother 80    Cancer Mother     Heart Disease Father 62     Social History     Tobacco Use    Smoking status: Never    Smokeless tobacco: Never   Substance Use Topics    Alcohol use: No       PHYSICAL EXAM:    /72   Pulse 62   Ht 5' 2\" (1.575 m)   Wt 169 lb (76.7 kg)   BMI 30.91 kg/m²      Wt Readings from Last 5 Encounters:   08/18/22 169 lb (76.7 kg)   08/10/22 166 lb (75.3 kg)   06/29/22 166 lb 3.2 oz (75.4 kg)   05/25/22 167 lb 9.6 oz (76 kg)   05/13/22 174 lb (78.9 kg)       BP Readings from Last 5 Encounters:   08/18/22 138/72   08/10/22 (!) 176/100   06/29/22 (!) 230/114   05/25/22 (!) 150/63   05/13/22 (!) 220/100       General appearance: Alert, well appearing, and in no distress   CV: RRR, no M/R/G, no JVD, normal distal pulses, no lower extremity edema  Pulm: Clear to auscultation, no wheezes, no crackles, no accessory muscle use  GI: Soft, nontender, nondistended  Neuro: Alert and oriented    Medical problems and test results were reviewed with the patient today.      Lab Results   Component Value Date/Time    K 4.2 05/25/2022 05:23 AM     Creatinine   Date Value Ref Range Status   05/25/2022 1.30 (H) 0.6 - 1.0 MG/DL Final     Lab Results   Component Value Date/Time    HGB 11.2 05/25/2022 05:23 AM     No results found for: INR, PTMR, PT1    Lab Results   Component Value Date    WBC 10.1 05/25/2022    HGB 11.2 (L) 05/25/2022    HCT 33.6 (L) 05/25/2022    MCV 93.6 05/25/2022     05/25/2022      Lab Results   Component Value Date/Time     05/25/2022 05:23 AM    K 4.2 05/25/2022 05:23 AM     05/25/2022 05:23 AM    CO2 26 05/25/2022 05:23 AM    BUN 24 05/25/2022 05:23 AM    CREATININE 1.30 05/25/2022 05:23 AM    GLUCOSE 118 05/25/2022 05:23 AM    CALCIUM 8.9 05/25/2022 05:23 AM         EKG: (EKG has been independently visualized by me with interpretation below)  Sinus  Rhythm   Low voltage in precordial leads.    -Poor R-wave progression -may be secondary to pulmonary disease   consider old anterior infarct. Beatrice Prasad was seen today for atrial fibrillation. Diagnoses and all orders for this visit:    Atrial fibrillation ([address]  -     EKG 12 lead      ASSESSMENT and PLAN  1. Persistent atrial fibrillation, failing amiodarone: doing well s/p ablation, maintaining NSR off antiarrhythmics     2. Pericardial effusion: moderate effusion that has been present for months, will need repeat echo, will discuss with Dr. Missy Raza regarding further work up     3. CVA protection: eliquis 5mg     4. HTN: cont meds    Patient has been instructed and agrees to call our office with any issues or other concerns related to their cardiac condition(s) and/or complaint(s). No follow-ups on file. Umu Erwin MD, MS  Cardiology/Electrophysiology  8/18/2022  10:21 AM

## 2022-09-22 ENCOUNTER — OFFICE VISIT (OUTPATIENT)
Dept: CARDIOLOGY CLINIC | Age: 77
End: 2022-09-22
Payer: MEDICARE

## 2022-09-22 VITALS
SYSTOLIC BLOOD PRESSURE: 172 MMHG | HEART RATE: 60 BPM | WEIGHT: 166.4 LBS | BODY MASS INDEX: 30.62 KG/M2 | DIASTOLIC BLOOD PRESSURE: 98 MMHG | HEIGHT: 62 IN

## 2022-09-22 DIAGNOSIS — I10 ESSENTIAL HYPERTENSION: ICD-10-CM

## 2022-09-22 DIAGNOSIS — I95.1 ORTHOSTATIC HYPOTENSION: ICD-10-CM

## 2022-09-22 DIAGNOSIS — Z98.890 STATUS POST ABLATION OF ATRIAL FIBRILLATION: Primary | ICD-10-CM

## 2022-09-22 DIAGNOSIS — Z86.79 STATUS POST ABLATION OF ATRIAL FIBRILLATION: Primary | ICD-10-CM

## 2022-09-22 DIAGNOSIS — I31.39 PERICARDIAL EFFUSION: ICD-10-CM

## 2022-09-22 DIAGNOSIS — M35.3 POLYMYALGIA RHEUMATICA (HCC): ICD-10-CM

## 2022-09-22 PROBLEM — I48.91 ATRIAL FIBRILLATION (HCC): Status: RESOLVED | Noted: 2022-05-25 | Resolved: 2022-09-22

## 2022-09-22 PROBLEM — I48.91 ATRIAL FIBRILLATION WITH RVR (HCC): Status: RESOLVED | Noted: 2022-04-06 | Resolved: 2022-09-22

## 2022-09-22 PROCEDURE — G8400 PT W/DXA NO RESULTS DOC: HCPCS | Performed by: INTERNAL MEDICINE

## 2022-09-22 PROCEDURE — 1090F PRES/ABSN URINE INCON ASSESS: CPT | Performed by: INTERNAL MEDICINE

## 2022-09-22 PROCEDURE — G8427 DOCREV CUR MEDS BY ELIG CLIN: HCPCS | Performed by: INTERNAL MEDICINE

## 2022-09-22 PROCEDURE — 1036F TOBACCO NON-USER: CPT | Performed by: INTERNAL MEDICINE

## 2022-09-22 PROCEDURE — 1123F ACP DISCUSS/DSCN MKR DOCD: CPT | Performed by: INTERNAL MEDICINE

## 2022-09-22 PROCEDURE — G8417 CALC BMI ABV UP PARAM F/U: HCPCS | Performed by: INTERNAL MEDICINE

## 2022-09-22 PROCEDURE — 99214 OFFICE O/P EST MOD 30 MIN: CPT | Performed by: INTERNAL MEDICINE

## 2022-09-22 RX ORDER — CARVEDILOL 25 MG/1
25 TABLET ORAL 2 TIMES DAILY WITH MEALS
Qty: 180 TABLET | Refills: 3 | Status: SHIPPED | OUTPATIENT
Start: 2022-09-22

## 2022-09-22 NOTE — PROGRESS NOTES
[facility] CARDIOLOGY  [address] [place] [address], [address]  PHONE: [phone]      22    NAME:  Darrel Gaviria  : 1945  MRN: [id number]         SUBJECTIVE:   Darrel Gaviria is a 64 y.o. female seen for a follow up visit regarding the following:     Chief Complaint   Patient presents with    Hypertension    Atrial Fibrillation            HPI:  Follow up  Hypertension and Atrial Fibrillation   . Follow up afib with transient tachycardia induced CM (resolved), chronic amiodarone use, Reports intolerances to  ACE and ARB and higher dose amlodipine as well as Entresto Severe anxiety, see prior notes. Declined PFTs and pulmonary evaluation, had mild restrictive disease noted prior to amiodarone administration which she was gratefully able to stop post afib ablation with Dr Shiv Sanches. Effusion remains stable and posteriorly located, patient with PMR and prior temporal arteritis    Chronically feels badly. Stopped Entresto last visit d/t symptomatic hypotension with severe white coat hypertension. Also currently suffering with arthritic pain. Has been afraid to take anything because of eliquis but may take ES tylenol which she is assured of today. She does not see a rheumatologist.  Her pain involves multiple joints and her back and spine. Worse after she had to move some furniture for a required apartment inspection. She thinks she may be getting to the end of it based on her prior ones. Continues to suffer with anxiety but seems relatively stable.                 Past cardiac history:    - normal perfusion study   13-WPW pattern on the EKG-ablation of posteroseptal pathway   Oct 2013 - 2 week monitor occasional pac's   Mar 2019 - normal renal artery doppler   2019- afib/RVR-ESTELLA cardioversion, amiodarone   Echo EF 40-45%, normal LV size, mild RVE, marked LAE, mild MR/TR, small to moderate pericardial effusion   ESTELLA- normal biventricular size and function, EF 55-60%   8/19/19 - PFT's normal spirometry but reduced volumes and mild reduced DLCO possible early interstitial lung disease   Mar 2021- Echo - normal EF, ind DF, marked LAE (FERNANDO 56), RVSP 35   Apr 2022- normal EF, abn DF, DIONNE, moderate circumferential effusion  May 2022     Afib ablation with Dr Rick Lopez  Sep 2022      Limited echo - Normal SF, mild MR, TR, RVSP 43, stable mild to moderate posterior effusion (no longer circumferential)--patient with known autoimmune disease          Key CAD CHF Meds            apixaban (ELIQUIS) 5 MG TABS tablet (Taking)    Sig - Route: Take 1 tablet by mouth in the morning and 1 tablet in the evening. - Oral    carvedilol (COREG) 25 MG tablet (Taking)    Sig - Route: Take 1 tablet by mouth 2 times daily (with meals) - Oral    furosemide (LASIX) 20 MG tablet    Sig - Route: Take 1 tablet by mouth 2 times daily - Oral    Patient not taking: Reported on 9/22/2022              Past Medical History, Past Surgical History, Family history, Social History, and Medications were all reviewed with the patient today and updated as necessary. Prior to Admission medications    Medication Sig Start Date End Date Taking? Authorizing Provider   apixaban (ELIQUIS) 5 MG TABS tablet Take 1 tablet by mouth in the morning and 1 tablet in the evening. 9/22/22  Yes Nima Montalvo MD   carvedilol (COREG) 25 MG tablet Take 1 tablet by mouth 2 times daily (with meals) 9/22/22  Yes Nima Montalvo MD   furosemide (LASIX) 20 MG tablet Take 1 tablet by mouth 2 times daily  Patient not taking: Reported on 9/22/2022 6/15/22   Nima Montalvo MD     Allergies   Allergen Reactions    Lisinopril Other (See Comments)    Losartan Other (See Comments)     Burning sensation tongue.     Nifedipine Other (See Comments)     severe headache    Celecoxib Rash     Past Medical History:   Diagnosis Date    Arrhythmia     Arthritis     Asthma     Cellulitis     CHF (congestive heart failure) ([facility] Chronic kidney disease     Hx renal failure    Depression     Gout     Gouty arthritis 12/2/2015    Hypercholesterolemia     Hypertension     Morbid obesity (HCC)     Nausea & vomiting     Polymyalgia rheumatica ([facility] 12/2/2015    PONV (postoperative nausea and vomiting)     Psychiatric disorder     depression & anxiety    PUD (peptic ulcer disease)     Sjogren's syndrome (HCC)     Temporal arteritis ([facility]      Past Surgical History:   Procedure Laterality Date    COLONOSCOPY  6/08    EP DEVICE PROCEDURE N/A 5/24/2022    ABLATION A-FIB W COMPLETE EP STUDY performed by Tiffani Mireles MD at [address] [place] [address] CATH LAB    EP STUDY/ABLATION N/A 2013    WPW Ablation    HYSTERECTOMY (CERVIX STATUS UNKNOWN)      ME CARDIAC SURG PROCEDURE UNLIST  8/13    HEART ABLATION    ME CARDIAC SURG PROCEDURE UNLIST  4/07    STRESS TEST    TUBAL LIGATION      UPPER GASTROINTESTINAL ENDOSCOPY  8/08     Family History   Problem Relation Age of Onset    Hypertension Sister     Other Son         PSYCHIATRIC ILLNESS    Diabetes Son     Cancer Father         LUNG    Dementia Mother     Hypertension Mother     Breast Cancer Mother 80    Cancer Mother     Heart Disease Father 62     Social History     Tobacco Use    Smoking status: Never    Smokeless tobacco: Never   Substance Use Topics    Alcohol use: No       ROS:    Review of Systems   Constitutional: Positive for malaise/fatigue. Cardiovascular:  Negative for leg swelling and palpitations. Musculoskeletal:  Positive for arthritis and joint pain. Psychiatric/Behavioral:  The patient is nervous/anxious.          PHYSICAL EXAM:   BP (!) 172/98   Pulse 60   Ht 5' 2\" (1.575 m)   Wt 166 lb 6.4 oz (75.5 kg)   BMI 30.43 kg/m²      Wt Readings from Last 3 Encounters:   09/22/22 166 lb 6.4 oz (75.5 kg)   08/18/22 169 lb (76.7 kg)   08/10/22 166 lb (75.3 kg)     BP Readings from Last 3 Encounters:   09/22/22 (!) 172/98   08/18/22 138/72   08/10/22 (!) 176/100     Pulse Readings from Last 3 Encounters:   09/22/22 60   08/18/22 62   06/29/22 72           Physical Exam  Constitutional:       Appearance: Normal appearance. HENT:      Head: Normocephalic and atraumatic. Eyes:      Conjunctiva/sclera: Conjunctivae normal.   Neck:      Vascular: No carotid bruit. Cardiovascular:      Rate and Rhythm: Normal rate and regular rhythm. Heart sounds: No murmur heard. No friction rub. No gallop. Pulmonary:      Effort: No respiratory distress. Breath sounds: No wheezing or rales. Musculoskeletal:         General: No swelling. Cervical back: Neck supple. Skin:     General: Skin is warm and dry. Neurological:      General: No focal deficit present. Mental Status: She is alert. Psychiatric:         Mood and Affect: Mood normal.         Behavior: Behavior normal.       Medical problems and test results were reviewed with the patient today. DATA REVIEW    LIPID PANEL     Lab Results   Component Value Date    CHOL 237 (H) 10/23/2019     Lab Results   Component Value Date    TRIG 113 10/23/2019     Lab Results   Component Value Date    HDL 47 10/23/2019     Lab Results   Component Value Date    LDLCALC 167 (H) 10/23/2019     Lab Results   Component Value Date    LABVLDL 23 10/23/2019     No results found for: CHOLHDLRATIO    BMP  Lab Results   Component Value Date/Time     05/25/2022 05:23 AM    K 4.2 05/25/2022 05:23 AM     05/25/2022 05:23 AM    CO2 26 05/25/2022 05:23 AM    BUN 24 05/25/2022 05:23 AM    CREATININE 1.30 05/25/2022 05:23 AM    GLUCOSE 118 05/25/2022 05:23 AM    CALCIUM 8.9 05/25/2022 05:23 AM          EKG        CXR/IMAGING        DEVICE INTERROGATION        OUTSIDE RECORDS REVIEW    Records from outside providers have been reviewed and summarized as noted in the HPI, past history and data review sections of this note, and reviewed with patient. .       ASSESSMENT and Austin Pack was seen today for hypertension and atrial fibrillation.     Diagnoses and all orders for this visit:    Status post ablation of atrial fibrillation    Polymyalgia rheumatica (HCC)    Pericardial effusion    Essential hypertension    Orthostatic hypotension    Other orders  -     apixaban (ELIQUIS) 5 MG TABS tablet; Take 1 tablet by mouth in the morning and 1 tablet in the evening.  -     carvedilol (COREG) 25 MG tablet; Take 1 tablet by mouth 2 times daily (with meals)        IMPRESSION:    Afib controlled/resolved post ablation. Lifelong anticoagulation remains recommended d/t risk of recurrence    Transient tachycardia mediated CM resolved    Chronic, stable posterior pericardial effusion present for years and likely related to autoimminue phenomena (PMR, sjogren's and prior temporal arteritis) managed by PCP    BP known to be low outside office with prior near syncope, had to stop Entresto, continue to monitor, she's seen it much better outside office and is experiencing joint pain today. Return in about 1 year (around 9/22/2023). Thank you for allowing me to participate in this patient's care. Please call or contact me if there are any questions or concerns regarding the above.       Ori Pizarro MD  09/22/22  10:33 AM

## 2022-11-28 ENCOUNTER — APPOINTMENT (OUTPATIENT)
Dept: GENERAL RADIOLOGY | Age: 77
End: 2022-11-28
Payer: MEDICARE

## 2022-11-28 ENCOUNTER — HOSPITAL ENCOUNTER (EMERGENCY)
Age: 77
Discharge: HOME | End: 2022-11-29
Attending: EMERGENCY MEDICINE
Payer: MEDICARE

## 2022-11-28 VITALS
WEIGHT: 165 LBS | HEART RATE: 57 BPM | SYSTOLIC BLOOD PRESSURE: 141 MMHG | DIASTOLIC BLOOD PRESSURE: 61 MMHG | RESPIRATION RATE: 13 BRPM | BODY MASS INDEX: 30.36 KG/M2 | TEMPERATURE: 97.8 F | OXYGEN SATURATION: 96 % | HEIGHT: 62 IN

## 2022-11-28 DIAGNOSIS — R06.02 SHORTNESS OF BREATH: Primary | ICD-10-CM

## 2022-11-28 LAB
ALBUMIN SERPL-MCNC: 3.5 G/DL (ref 3.2–4.6)
ALBUMIN/GLOB SERPL: 1 {RATIO} (ref 0.4–1.6)
ALP SERPL-CCNC: 101 U/L (ref 50–136)
ALT SERPL-CCNC: 17 U/L (ref 12–65)
ANION GAP SERPL CALC-SCNC: 4 MMOL/L (ref 2–11)
AST SERPL-CCNC: 18 U/L (ref 15–37)
BASOPHILS # BLD: 0.1 K/UL (ref 0–0.2)
BASOPHILS NFR BLD: 1 % (ref 0–2)
BILIRUB SERPL-MCNC: 0.9 MG/DL (ref 0.2–1.1)
BUN SERPL-MCNC: 19 MG/DL (ref 8–23)
CALCIUM SERPL-MCNC: 9 MG/DL (ref 8.3–10.4)
CHLORIDE SERPL-SCNC: 111 MMOL/L (ref 101–110)
CO2 SERPL-SCNC: 25 MMOL/L (ref 21–32)
CREAT SERPL-MCNC: 0.8 MG/DL (ref 0.6–1)
DIFFERENTIAL METHOD BLD: ABNORMAL
EOSINOPHIL # BLD: 0.2 K/UL (ref 0–0.8)
EOSINOPHIL NFR BLD: 2 % (ref 0.5–7.8)
ERYTHROCYTE [DISTWIDTH] IN BLOOD BY AUTOMATED COUNT: 13.2 % (ref 11.9–14.6)
GLOBULIN SER CALC-MCNC: 3.6 G/DL (ref 2.8–4.5)
GLUCOSE SERPL-MCNC: 106 MG/DL (ref 65–100)
HCT VFR BLD AUTO: 36.3 % (ref 35.8–46.3)
HGB BLD-MCNC: 12.1 G/DL (ref 11.7–15.4)
IMM GRANULOCYTES # BLD AUTO: 0 K/UL (ref 0–0.5)
IMM GRANULOCYTES NFR BLD AUTO: 0 % (ref 0–5)
LYMPHOCYTES # BLD: 1.1 K/UL (ref 0.5–4.6)
LYMPHOCYTES NFR BLD: 13 % (ref 13–44)
MCH RBC QN AUTO: 31.4 PG (ref 26.1–32.9)
MCHC RBC AUTO-ENTMCNC: 33.3 G/DL (ref 31.4–35)
MCV RBC AUTO: 94.3 FL (ref 82–102)
MONOCYTES # BLD: 0.5 K/UL (ref 0.1–1.3)
MONOCYTES NFR BLD: 6 % (ref 4–12)
NEUTS SEG # BLD: 6.3 K/UL (ref 1.7–8.2)
NEUTS SEG NFR BLD: 78 % (ref 43–78)
NRBC # BLD: 0 K/UL (ref 0–0.2)
PLATELET # BLD AUTO: 207 K/UL (ref 150–450)
PMV BLD AUTO: 11.2 FL (ref 9.4–12.3)
POTASSIUM SERPL-SCNC: 3.7 MMOL/L (ref 3.5–5.1)
PROT SERPL-MCNC: 7.1 G/DL (ref 6.3–8.2)
RBC # BLD AUTO: 3.85 M/UL (ref 4.05–5.2)
SODIUM SERPL-SCNC: 140 MMOL/L (ref 133–143)
TROPONIN I SERPL HS-MCNC: 20.5 PG/ML (ref 0–14)
TROPONIN I SERPL HS-MCNC: 35.5 PG/ML (ref 0–14)
WBC # BLD AUTO: 8.1 K/UL (ref 4.3–11.1)

## 2022-11-28 PROCEDURE — 93005 ELECTROCARDIOGRAM TRACING: CPT | Performed by: EMERGENCY MEDICINE

## 2022-11-28 PROCEDURE — 84484 ASSAY OF TROPONIN QUANT: CPT

## 2022-11-28 PROCEDURE — 80053 COMPREHEN METABOLIC PANEL: CPT

## 2022-11-28 PROCEDURE — 6370000000 HC RX 637 (ALT 250 FOR IP): Performed by: EMERGENCY MEDICINE

## 2022-11-28 PROCEDURE — 71045 X-RAY EXAM CHEST 1 VIEW: CPT

## 2022-11-28 PROCEDURE — 99285 EMERGENCY DEPT VISIT HI MDM: CPT

## 2022-11-28 PROCEDURE — 85025 COMPLETE CBC W/AUTO DIFF WBC: CPT

## 2022-11-28 RX ORDER — CLONIDINE HYDROCHLORIDE 0.1 MG/1
0.2 TABLET ORAL
Status: COMPLETED | OUTPATIENT
Start: 2022-11-28 | End: 2022-11-28

## 2022-11-28 RX ADMIN — CLONIDINE HYDROCHLORIDE 0.2 MG: 0.1 TABLET ORAL at 19:59

## 2022-11-28 ASSESSMENT — PAIN - FUNCTIONAL ASSESSMENT: PAIN_FUNCTIONAL_ASSESSMENT: NONE - DENIES PAIN

## 2022-11-28 ASSESSMENT — ENCOUNTER SYMPTOMS
ABDOMINAL PAIN: 0
SORE THROAT: 0
WHEEZING: 0
HEMOPTYSIS: 0
VOMITING: 0
SHORTNESS OF BREATH: 1
COUGH: 0
SWOLLEN GLANDS: 0
SPUTUM PRODUCTION: 0

## 2022-11-28 NOTE — ED TRIAGE NOTES
Pt arrives from home via EMS. Pt C/o of weakness and SOB starting around 2 this afternoon. Pt thought her afib was \"acting up\". Ems gave 2 sublinigual nitro for hypertension. No LOC, Headache, NVD.  Pt denies chest pain

## 2022-11-29 ENCOUNTER — TELEPHONE (OUTPATIENT)
Dept: CARDIOLOGY CLINIC | Age: 77
End: 2022-11-29

## 2022-11-29 DIAGNOSIS — I48.91 ATRIAL FIBRILLATION (HCC): Primary | ICD-10-CM

## 2022-11-29 LAB
EKG ATRIAL RATE: 80 BPM
EKG DIAGNOSIS: NORMAL
EKG P AXIS: 66 DEGREES
EKG P-R INTERVAL: 178 MS
EKG Q-T INTERVAL: 398 MS
EKG QRS DURATION: 96 MS
EKG QTC CALCULATION (BAZETT): 457 MS
EKG R AXIS: 45 DEGREES
EKG T AXIS: 58 DEGREES
EKG VENTRICULAR RATE: 79 BPM

## 2022-11-29 NOTE — TELEPHONE ENCOUNTER
Coming in tomorrow to have a monitor and wanted to see Dr Arlene Torrez too since its so hard to get a way here. Went to [facility] ER by ambulance with  over more than a 100 in the ambulance .  Please call

## 2022-11-29 NOTE — ED NOTES
I have reviewed discharge instructions with the patient. The patient verbalized understanding. Patient left ED via Discharge Method: stretcher to Home with [facility] ambulance. Opportunity for questions and clarification provided. Patient given 0 scripts. To continue your aftercare when you leave the hospital, you may receive an automated call from our care team to check in on how you are doing. This is a free service and part of our promise to provide the best care and service to meet your aftercare needs.  If you have questions, or wish to unsubscribe from this service please call [phone]. Thank you for Choosing our [facility] Emergency Department.         Alayna Ornelas RN  11/29/22 [id number]

## 2022-12-04 PROBLEM — I48.19 PERSISTENT ATRIAL FIBRILLATION (HCC): Status: RESOLVED | Noted: 2019-08-01 | Resolved: 2022-12-04

## 2022-12-04 NOTE — PROGRESS NOTES
[facility] CARDIOLOGY  [address], [address]  PHONE: [phone]      22    NAME:  Darline Garcia  : 1945  MRN: [id number]         SUBJECTIVE:   Darline Garcia is a 76 y.o. female seen for a follow up visit regarding the following:     Chief Complaint   Patient presents with    Follow-Up from Hospital     ED              HPI:  Follow up  Follow-Up from Hospital (ED)   . Follow up afib with transient tachycardia induced CM (resolved), chronic posterior pericardial effusion, afib s/t afib ablation ([place]), remote WPW ablation. Went to the ER  with palpitations, dyspnea CP for 2 hours, was given clonidine in ER with  (reported 250 en route), dropped to 140 with longstanding history orthostatic hypotension and severe white coat hypertension. ER evaluation otherwise negative. They sent her out to get an ambulatory monitor which is in process. Reports intolerances to  ACE/ARB/ARNI and higher dose amlodipine. Severe anxiety, see prior notes. Chronic joint pain. She states she went to the ER because she checked her heart rate on a home pulse ox because she \"felt really bad\", saw it was irregular and started to be worried she was back in afib. She has been inactive, out of shape. Her BP at home this morning was 119/52 after medications, she checked it when she got up to go to the bathroom at 2 am.  Then she checked it this morning before her medications at 190, she nearly fell when she saw her BP was 233 systolic a few days ago. She is actually seeing her PCP tomorrow with the specific request to finally treat her anxiety, she has long resisted this and it is commendable that she is finally thinking about it.                  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   Oct 2013 - 2 week monitor occasional pac's   Mar 2019 - normal renal artery doppler   Jul 2019- afib/RVR-ESTELLA cardioversion, amiodarone   Echo EF 40-45%, normal LV size, mild RVE, marked LAE, mild MR/TR, small to moderate pericardial effusion   ESTELLA- normal biventricular size and function, EF 55-60%   8/19/19 - PFT's normal spirometry but reduced volumes and mild reduced DLCO possible early interstitial lung disease   Mar 2021- Echo - normal EF, ind DF, marked LAE (FERNANDO 56), RVSP 35   Apr 2022- normal EF, abn DF, DIONNE, moderate circumferential effusion  May 2022     Afib ablation with Dr Halley Marie  Sep 2022      Limited echo - Normal SF, mild MR, TR, RVSP 43, stable mild to moderate posterior effusion (no longer circumferential)--patient with known autoimmune disease                      Key CAD CHF Meds            apixaban (ELIQUIS) 5 MG TABS tablet (Taking)    Sig - Route: Take 1 tablet by mouth in the morning and 1 tablet in the evening. - Oral    carvedilol (COREG) 25 MG tablet (Taking)    Sig - Route: Take 1 tablet by mouth 2 times daily (with meals) - Oral    furosemide (LASIX) 20 MG tablet    Sig - Route: Take 1 tablet by mouth 2 times daily - Oral    Patient not taking: No sig reported              Past Medical History, Past Surgical History, Family history, Social History, and Medications were all reviewed with the patient today and updated as necessary. Prior to Admission medications    Medication Sig Start Date End Date Taking?  Authorizing Provider   apixaban (ELIQUIS) 5 MG TABS tablet Take 1 tablet by mouth in the morning and 1 tablet in the evening. 9/22/22  Yes Jane Bowers MD   carvedilol (COREG) 25 MG tablet Take 1 tablet by mouth 2 times daily (with meals) 9/22/22  Yes Jane Bowers MD   furosemide (LASIX) 20 MG tablet Take 1 tablet by mouth 2 times daily  Patient not taking: No sig reported 6/15/22   Jane Bowers MD     Allergies   Allergen Reactions    Lisinopril Other (See Comments)    Losartan Other (See Comments)     Burning sensation tongue. Nifedipine Other (See Comments)     severe headache    Celecoxib Rash     Past Medical History:   Diagnosis Date    Arrhythmia     Arthritis     Asthma     Cellulitis     CHF (congestive heart failure) (HCC)     Chronic kidney disease     Hx renal failure    Depression     Gout     Gouty arthritis 12/2/2015    Hypercholesterolemia     Hypertension     Morbid obesity ([address]     Nausea & vomiting     Polymyalgia rheumatica ([address] 12/2/2015    PONV (postoperative nausea and vomiting)     Psychiatric disorder     depression & anxiety    PUD (peptic ulcer disease)     Sjogren's syndrome (HCC)     Temporal arteritis ([address]      Past Surgical History:   Procedure Laterality Date    COLONOSCOPY  6/08    EP DEVICE PROCEDURE N/A 5/24/2022    ABLATION A-FIB W COMPLETE EP STUDY performed by Orion Rodriguez MD at [address] [place] [address] CATH LAB    EP STUDY/ABLATION N/A 2013    WPW Ablation    HYSTERECTOMY (CERVIX STATUS UNKNOWN)      AL CARDIAC SURG PROCEDURE UNLIST  8/13    HEART ABLATION    AL CARDIAC SURG PROCEDURE UNLIST  4/07    STRESS TEST    TUBAL LIGATION      UPPER GASTROINTESTINAL ENDOSCOPY  8/08     Family History   Problem Relation Age of Onset    Hypertension Sister     Other Son         PSYCHIATRIC ILLNESS    Diabetes Son     Cancer Father         LUNG    Dementia Mother     Hypertension Mother     Breast Cancer Mother 80    Cancer Mother     Heart Disease Father 62     Social History     Tobacco Use    Smoking status: Never    Smokeless tobacco: Never   Substance Use Topics    Alcohol use: No       ROS:    Review of Systems   Constitutional: Positive for malaise/fatigue. Neurological:  Positive for light-headedness and loss of balance. Psychiatric/Behavioral:  The patient is nervous/anxious.          PHYSICAL EXAM:   BP (!) 200/120   Pulse 72   Ht 5' 2\" (1.575 m)   Wt 165 lb (74.8 kg)   BMI 30.18 kg/m²      Wt Readings from Last 3 Encounters:   12/05/22 165 lb (74.8 kg)   11/28/22 165 lb (74.8 kg)   09/22/22 166 lb 6.4 oz (75.5 kg)     BP Readings from Last 3 Encounters:   12/05/22 (!) 200/120   11/28/22 (!) 141/61   09/22/22 (!) 172/98     Pulse Readings from Last 3 Encounters:   12/05/22 72   11/28/22 57   09/22/22 60           Physical Exam  Constitutional:       Appearance: Normal appearance. HENT:      Head: Normocephalic and atraumatic. Eyes:      Conjunctiva/sclera: Conjunctivae normal.   Neck:      Vascular: No carotid bruit. Cardiovascular:      Rate and Rhythm: Normal rate and regular rhythm. Heart sounds: No murmur heard. No friction rub. No gallop. Pulmonary:      Effort: No respiratory distress. Breath sounds: No wheezing or rales. Musculoskeletal:         General: No swelling. Cervical back: Neck supple. Skin:     General: Skin is warm and dry. Neurological:      General: No focal deficit present. Mental Status: She is alert. Psychiatric:         Mood and Affect: Mood normal.         Behavior: Behavior normal.       Medical problems and test results were reviewed with the patient today.      DATA REVIEW    LIPID PANEL     Lab Results   Component Value Date    CHOL 237 (H) 10/23/2019     Lab Results   Component Value Date    TRIG 113 10/23/2019     Lab Results   Component Value Date    HDL 47 10/23/2019     Lab Results   Component Value Date    LDLCALC 167 (H) 10/23/2019     Lab Results   Component Value Date    LABVLDL 23 10/23/2019     No results found for: CHOLHDLRATIO    BMP  Lab Results   Component Value Date/Time     11/28/2022 07:51 PM    K 3.7 11/28/2022 07:51 PM     11/28/2022 07:51 PM    CO2 25 11/28/2022 07:51 PM    BUN 19 11/28/2022 07:51 PM    CREATININE 0.80 11/28/2022 07:51 PM    GLUCOSE 106 11/28/2022 07:51 PM    CALCIUM 9.0 11/28/2022 07:51 PM          EKG    11/28/22 - EKG - NSR, normal axis, intervals, ST And  T waves, rate 70's    CXR/IMAGING        DEVICE INTERROGATION        OUTSIDE RECORDS REVIEW    Records from outside providers have been reviewed and summarized as noted in the HPI, past history and data review sections of this note, and reviewed with patient. .       ASSESSMENT and Matthew Vann was seen today for follow-up from hospital.    Diagnoses and all orders for this visit:    Essential hypertension    Status post ablation of atrial fibrillation    Shortness of breath    Chest discomfort        IMPRESSION:    Severe anxiety driven white coat hypertension. To her credit, she has come to the conclusion after years of reticence that she needs help with her anxiety and is seeing her PCP tomorrow to help with this. Meanwhile, outside office is seeing BP as low as 119/50's with prior history of iatrogenic hypotension d/t over treatment and at risk for syncope. We had a long talk again today, asked her not to be checking her BP so frequently but begin checking no more than 2-3 days a week, always at least an hour or 2 after meds. Last visit, we demonstrated a significant BP drop after box breathing exercise and she is again educated about this as a form of daily hygiene at least 2-3 times a day, and as an adjunct to antidepressant or anxiolytic therapy. She will return BP diary to me in a few weeks    Resume some regular physical activity which will help as well. Afib controlled post ablation, monitor in place for Dr Christina Leal' review. Return in about 6 months (around 6/5/2023) for SEND IN BP DIARY AFTER 4-6 WEEKS. Thank you for allowing me to participate in this patient's care. Please call or contact me if there are any questions or concerns regarding the above.       Tino Bajwa MD  12/05/22  11:02 AM

## 2022-12-05 ENCOUNTER — OFFICE VISIT (OUTPATIENT)
Dept: CARDIOLOGY CLINIC | Age: 77
End: 2022-12-05
Payer: MEDICARE

## 2022-12-05 VITALS
BODY MASS INDEX: 30.36 KG/M2 | HEART RATE: 72 BPM | DIASTOLIC BLOOD PRESSURE: 120 MMHG | SYSTOLIC BLOOD PRESSURE: 200 MMHG | WEIGHT: 165 LBS | HEIGHT: 62 IN

## 2022-12-05 DIAGNOSIS — Z86.79 STATUS POST ABLATION OF ATRIAL FIBRILLATION: ICD-10-CM

## 2022-12-05 DIAGNOSIS — R07.89 CHEST DISCOMFORT: ICD-10-CM

## 2022-12-05 DIAGNOSIS — I10 ESSENTIAL HYPERTENSION: Primary | ICD-10-CM

## 2022-12-05 DIAGNOSIS — R06.02 SHORTNESS OF BREATH: ICD-10-CM

## 2022-12-05 DIAGNOSIS — Z98.890 STATUS POST ABLATION OF ATRIAL FIBRILLATION: ICD-10-CM

## 2022-12-05 PROCEDURE — 3074F SYST BP LT 130 MM HG: CPT | Performed by: INTERNAL MEDICINE

## 2022-12-05 PROCEDURE — 1090F PRES/ABSN URINE INCON ASSESS: CPT | Performed by: INTERNAL MEDICINE

## 2022-12-05 PROCEDURE — 99214 OFFICE O/P EST MOD 30 MIN: CPT | Performed by: INTERNAL MEDICINE

## 2022-12-05 PROCEDURE — 1036F TOBACCO NON-USER: CPT | Performed by: INTERNAL MEDICINE

## 2022-12-05 PROCEDURE — G8417 CALC BMI ABV UP PARAM F/U: HCPCS | Performed by: INTERNAL MEDICINE

## 2022-12-05 PROCEDURE — 3078F DIAST BP <80 MM HG: CPT | Performed by: INTERNAL MEDICINE

## 2022-12-05 PROCEDURE — G8484 FLU IMMUNIZE NO ADMIN: HCPCS | Performed by: INTERNAL MEDICINE

## 2022-12-05 PROCEDURE — 1123F ACP DISCUSS/DSCN MKR DOCD: CPT | Performed by: INTERNAL MEDICINE

## 2022-12-05 PROCEDURE — G8427 DOCREV CUR MEDS BY ELIG CLIN: HCPCS | Performed by: INTERNAL MEDICINE

## 2022-12-05 PROCEDURE — G8400 PT W/DXA NO RESULTS DOC: HCPCS | Performed by: INTERNAL MEDICINE

## 2022-12-05 NOTE — PATIENT INSTRUCTIONS
1. Monitor BP at home 2-3 days a week, twice on those days, at random times, at least 2 hours after medications  2. Keep a diary to include date, time, and BP reading  3. Bring your BP diary and monitor to each office visit  4. Notify us if BP readings are consistently greater than 140/90 or less than 95/50.  5. To help keep BP regulated, aim to get at least 30 minutes of moderate physical activity daily, and follow a low sodium diet regularly by avoiding processed foods or added salt. Patient Education        Learning About Mindfulness for Stress  What are mindfulness and stress? Stress is what you feel when you have to handle more than you are used to. A lot of things can cause stress. You may feel stress when you go on a job interview, take a test, or run a race. This kind of short-term stress is normal and even useful. It can help you if you need to work hard or react quickly. Stress also can last a long time. Long-term stress is caused by stressful situations or events. Examples of long-term stress include long-term health problems, ongoing problems at work, and conflicts in your family. Long-term stress can harm your health. Mindfulness is a focus only on things happening in the present moment. It's a process of purposefully paying attention to and being aware of your surroundings, your emotions, your thoughts, and how your body feels. You are aware of these things, but you aren't judging these experiences as \"good\" or \"bad. \" Mindfulness can help you learn to calm your mind and body to help you cope with illness, pain, and stress. How does mindfulness help to relieve stress? Mindfulness can help quiet your mind and relax your body. Studies show that it can help some people sleep better, feel less anxious, and bring their blood pressure down. And it's been shown to help some people live and cope better with certain health problems like heart disease, depression, chronic pain, and cancer.   How do you practice mindfulness? To be mindful is to pay attention, to be present, and to be accepting. When you're mindful, you do just one thing and you pay close attention to that one thing. For example, you may sit quietly and notice your emotions or how your food tastes and smells. When you're present, you focus on the things that are happening right now. You let go of your thoughts about the past and the future. When you dwell on the past or the future, you miss moments that can heal and strengthen you. You may miss moments like hearing a child laugh or seeing a friendly face when you think you're all alone. When you're accepting, you don't  the present moment. Instead you accept your thoughts and feelings as they come. You can practice anytime, anywhere, and in any way you choose. You can practice in many ways. Here are a few ideas:  While doing your chores, like washing the dishes, let your mind focus on what's in your hand. What does the dish feel like? Is the water warm or cold? Go outside and take a few deep breaths. What is the air like? Is it warm or cold? When you can, take some time at the start of your day to sit alone and think. Take a slow walk by yourself. Count your steps while you breathe in and out. Try yoga breathing exercises, stretches, and poses to strengthen and relax your muscles. At work, if you can, try to stop for a few moments each hour. Note how your body feels. Let yourself regroup and let your mind settle before you return to what you were doing. If you struggle with anxiety or \"worry thoughts,\" imagine your mind as a blue amparo and your worry thoughts as clouds. Now imagine those worry thoughts floating across your mind's amparo. Just let them pass by as you watch. Follow-up care is a key part of your treatment and safety. Be sure to make and go to all appointments, and call your doctor if you are having problems.  It's also a good idea to know your test results and keep a list of the medicines you take. Where can you learn more? Go to [email or web address] org and sign in to your [employer] account. Enter Z669 in the [employer] box to learn more about \"Learning About Mindfulness for Stress. \"     If you do not have an account, please click on the \"Sign Up Now\" link. Current as of: February 9, 2022               Content Version: 13.4  © 2006-2022 [employer]. Care instructions adapted under license by [facility] ([facility]). If you have questions about a medical condition or this instruction, always ask your healthcare professional. [address] any warranty or liability for your use of this information. Please visit www.cardiosmart. org for more information regarding cardiovascular disease prevention and treatment.

## 2022-12-28 ENCOUNTER — TELEPHONE (OUTPATIENT)
Dept: CARDIOLOGY CLINIC | Age: 77
End: 2022-12-28

## 2022-12-28 NOTE — TELEPHONE ENCOUNTER
----- Message from Richard Burgos MD sent at 12/27/2022  5:51 PM EST -----  Normal monitor, no atrial fibrillation

## 2022-12-29 ENCOUNTER — TELEPHONE (OUTPATIENT)
Dept: CARDIOLOGY CLINIC | Age: 77
End: 2022-12-29

## 2022-12-29 NOTE — TELEPHONE ENCOUNTER
I spoke with pt told her that her monitor results were normal, no atrial fibrillation. Pt understood.

## 2023-02-07 ENCOUNTER — APPOINTMENT (OUTPATIENT)
Dept: GENERAL RADIOLOGY | Age: 78
End: 2023-02-07
Payer: MEDICARE

## 2023-02-07 ENCOUNTER — HOSPITAL ENCOUNTER (INPATIENT)
Age: 78
LOS: 3 days | Discharge: NURSING, REHAB OR LONG-TERM CARE | End: 2023-02-10
Attending: EMERGENCY MEDICINE | Admitting: HOSPITALIST
Payer: MEDICARE

## 2023-02-07 ENCOUNTER — APPOINTMENT (OUTPATIENT)
Dept: NON INVASIVE DIAGNOSTICS | Age: 78
End: 2023-02-07
Payer: MEDICARE

## 2023-02-07 DIAGNOSIS — I50.1: ICD-10-CM

## 2023-02-07 DIAGNOSIS — R06.89 DYSPNEA AND RESPIRATORY ABNORMALITIES: Primary | ICD-10-CM

## 2023-02-07 DIAGNOSIS — R06.00 DYSPNEA AND RESPIRATORY ABNORMALITIES: Primary | ICD-10-CM

## 2023-02-07 DIAGNOSIS — N18.30 STAGE 3 CHRONIC KIDNEY DISEASE, UNSPECIFIED WHETHER STAGE 3A OR 3B CKD (HCC): ICD-10-CM

## 2023-02-07 PROBLEM — I10 HYPERTENSION: Status: ACTIVE | Noted: 2019-07-15

## 2023-02-07 LAB
ALBUMIN SERPL-MCNC: 3.5 G/DL (ref 3.2–4.6)
ALBUMIN/GLOB SERPL: 0.9 (ref 0.4–1.6)
ALP SERPL-CCNC: 112 U/L (ref 50–136)
ALT SERPL-CCNC: 14 U/L (ref 12–65)
ANION GAP SERPL CALC-SCNC: 4 MMOL/L (ref 2–11)
APPEARANCE UR: CLEAR
AST SERPL-CCNC: 13 U/L (ref 15–37)
BACTERIA URNS QL MICRO: NEGATIVE /HPF
BASOPHILS # BLD: 0.1 K/UL (ref 0–0.2)
BASOPHILS NFR BLD: 1 % (ref 0–2)
BILIRUB SERPL-MCNC: 0.9 MG/DL (ref 0.2–1.1)
BILIRUB UR QL: NEGATIVE
BUN SERPL-MCNC: 11 MG/DL (ref 8–23)
CALCIUM SERPL-MCNC: 9.1 MG/DL (ref 8.3–10.4)
CASTS URNS QL MICRO: ABNORMAL /LPF
CHLORIDE SERPL-SCNC: 110 MMOL/L (ref 101–110)
CO2 SERPL-SCNC: 28 MMOL/L (ref 21–32)
COLOR UR: ABNORMAL
CREAT SERPL-MCNC: 0.7 MG/DL (ref 0.6–1)
CRP SERPL-MCNC: 0.5 MG/DL (ref 0–0.9)
DIFFERENTIAL METHOD BLD: ABNORMAL
ECHO BSA: 1.78 M2
ECHO IVC PROX: 1.9 CM
ECHO LV EDV A2C: 98 ML
ECHO LV EDV A4C: 92 ML
ECHO LV EDV INDEX A4C: 53 ML/M2
ECHO LV EDV NDEX A2C: 57 ML/M2
ECHO LV EJECTION FRACTION A2C: 51 %
ECHO LV EJECTION FRACTION A4C: 51 %
ECHO LV EJECTION FRACTION BIPLANE: 50 % (ref 55–100)
ECHO LV ESV A2C: 48 ML
ECHO LV ESV A4C: 45 ML
ECHO LV ESV INDEX A2C: 28 ML/M2
ECHO LV ESV INDEX A4C: 26 ML/M2
ECHO LV FRACTIONAL SHORTENING: 27 % (ref 28–44)
ECHO LV INTERNAL DIMENSION DIASTOLE INDEX: 2.6 CM/M2
ECHO LV INTERNAL DIMENSION DIASTOLIC: 4.5 CM (ref 3.9–5.3)
ECHO LV INTERNAL DIMENSION SYSTOLIC INDEX: 1.91 CM/M2
ECHO LV INTERNAL DIMENSION SYSTOLIC: 3.3 CM
ECHO LV IVSD: 1.2 CM (ref 0.6–0.9)
ECHO LV MASS 2D: 198.1 G (ref 67–162)
ECHO LV MASS INDEX 2D: 114.5 G/M2 (ref 43–95)
ECHO LV POSTERIOR WALL DIASTOLIC: 1.2 CM (ref 0.6–0.9)
ECHO LV RELATIVE WALL THICKNESS RATIO: 0.53
ECHO RV INTERNAL DIMENSION: 3.8 CM
ECHO TV REGURGITANT MAX VELOCITY: 2.43 M/S
ECHO TV REGURGITANT PEAK GRADIENT: 24 MMHG
EOSINOPHIL # BLD: 0.1 K/UL (ref 0–0.8)
EOSINOPHIL NFR BLD: 1 % (ref 0.5–7.8)
EPI CELLS #/AREA URNS HPF: ABNORMAL /HPF
ERYTHROCYTE [DISTWIDTH] IN BLOOD BY AUTOMATED COUNT: 12.8 % (ref 11.9–14.6)
ERYTHROCYTE [SEDIMENTATION RATE] IN BLOOD: 15 MM/HR (ref 0–30)
EST. AVERAGE GLUCOSE BLD GHB EST-MCNC: 97 MG/DL
FLUAV RNA SPEC QL NAA+PROBE: NOT DETECTED
FLUBV RNA SPEC QL NAA+PROBE: NOT DETECTED
GLOBULIN SER CALC-MCNC: 3.8 G/DL (ref 2.8–4.5)
GLUCOSE SERPL-MCNC: 131 MG/DL (ref 65–100)
GLUCOSE UR STRIP.AUTO-MCNC: NEGATIVE MG/DL
HBA1C MFR BLD: 5 % (ref 4.8–5.6)
HCT VFR BLD AUTO: 39.8 % (ref 35.8–46.3)
HGB BLD-MCNC: 12.9 G/DL (ref 11.7–15.4)
HGB UR QL STRIP: NEGATIVE
IMM GRANULOCYTES # BLD AUTO: 0 K/UL (ref 0–0.5)
IMM GRANULOCYTES NFR BLD AUTO: 0 % (ref 0–5)
KETONES UR QL STRIP.AUTO: ABNORMAL MG/DL
LACTATE SERPL-SCNC: 0.7 MMOL/L (ref 0.4–2)
LACTATE SERPL-SCNC: 0.8 MMOL/L (ref 0.4–2)
LEUKOCYTE ESTERASE UR QL STRIP.AUTO: NEGATIVE
LV EF: 58 %
LVEF MODALITY: ABNORMAL
LYMPHOCYTES # BLD: 1.1 K/UL (ref 0.5–4.6)
LYMPHOCYTES NFR BLD: 13 % (ref 13–44)
MAGNESIUM SERPL-MCNC: 1.7 MG/DL (ref 1.8–2.4)
MCH RBC QN AUTO: 30.3 PG (ref 26.1–32.9)
MCHC RBC AUTO-ENTMCNC: 32.4 G/DL (ref 31.4–35)
MCV RBC AUTO: 93.4 FL (ref 82–102)
MONOCYTES # BLD: 0.5 K/UL (ref 0.1–1.3)
MONOCYTES NFR BLD: 6 % (ref 4–12)
NEUTS SEG # BLD: 6.5 K/UL (ref 1.7–8.2)
NEUTS SEG NFR BLD: 79 % (ref 43–78)
NITRITE UR QL STRIP.AUTO: NEGATIVE
NRBC # BLD: 0 K/UL (ref 0–0.2)
NT PRO BNP: 1966 PG/ML
PH UR STRIP: 7 (ref 5–9)
PLATELET # BLD AUTO: 223 K/UL (ref 150–450)
PMV BLD AUTO: 11 FL (ref 9.4–12.3)
POTASSIUM SERPL-SCNC: 3.4 MMOL/L (ref 3.5–5.1)
PROCALCITONIN SERPL-MCNC: <0.05 NG/ML (ref 0–0.49)
PROT SERPL-MCNC: 7.3 G/DL (ref 6.3–8.2)
PROT UR STRIP-MCNC: 100 MG/DL
RBC # BLD AUTO: 4.26 M/UL (ref 4.05–5.2)
RBC #/AREA URNS HPF: ABNORMAL /HPF
SARS-COV-2 RDRP RESP QL NAA+PROBE: NOT DETECTED
SODIUM SERPL-SCNC: 142 MMOL/L (ref 133–143)
SOURCE: NORMAL
SP GR UR REFRACTOMETRY: 1.01 (ref 1–1.02)
TROPONIN I SERPL HS-MCNC: 15.6 PG/ML (ref 0–14)
UROBILINOGEN UR QL STRIP.AUTO: 0.2 EU/DL (ref 0.2–1)
WBC # BLD AUTO: 8.3 K/UL (ref 4.3–11.1)
WBC URNS QL MICRO: ABNORMAL /HPF

## 2023-02-07 PROCEDURE — 6360000004 HC RX CONTRAST MEDICATION: Performed by: HOSPITALIST

## 2023-02-07 PROCEDURE — 99285 EMERGENCY DEPT VISIT HI MDM: CPT

## 2023-02-07 PROCEDURE — 93325 DOPPLER ECHO COLOR FLOW MAPG: CPT | Performed by: INTERNAL MEDICINE

## 2023-02-07 PROCEDURE — 85652 RBC SED RATE AUTOMATED: CPT

## 2023-02-07 PROCEDURE — 84484 ASSAY OF TROPONIN QUANT: CPT

## 2023-02-07 PROCEDURE — 84145 PROCALCITONIN (PCT): CPT

## 2023-02-07 PROCEDURE — C8924 2D TTE W OR W/O FOL W/CON,FU: HCPCS

## 2023-02-07 PROCEDURE — 86140 C-REACTIVE PROTEIN: CPT

## 2023-02-07 PROCEDURE — 71045 X-RAY EXAM CHEST 1 VIEW: CPT

## 2023-02-07 PROCEDURE — 93308 TTE F-UP OR LMTD: CPT | Performed by: INTERNAL MEDICINE

## 2023-02-07 PROCEDURE — 6360000002 HC RX W HCPCS: Performed by: EMERGENCY MEDICINE

## 2023-02-07 PROCEDURE — 2580000003 HC RX 258: Performed by: EMERGENCY MEDICINE

## 2023-02-07 PROCEDURE — 83735 ASSAY OF MAGNESIUM: CPT

## 2023-02-07 PROCEDURE — 36415 COLL VENOUS BLD VENIPUNCTURE: CPT

## 2023-02-07 PROCEDURE — 93321 DOPPLER ECHO F-UP/LMTD STD: CPT | Performed by: INTERNAL MEDICINE

## 2023-02-07 PROCEDURE — 6370000000 HC RX 637 (ALT 250 FOR IP): Performed by: HOSPITALIST

## 2023-02-07 PROCEDURE — 6370000000 HC RX 637 (ALT 250 FOR IP): Performed by: NURSE PRACTITIONER

## 2023-02-07 PROCEDURE — 2500000003 HC RX 250 WO HCPCS: Performed by: EMERGENCY MEDICINE

## 2023-02-07 PROCEDURE — 87040 BLOOD CULTURE FOR BACTERIA: CPT

## 2023-02-07 PROCEDURE — 6360000002 HC RX W HCPCS: Performed by: HOSPITALIST

## 2023-02-07 PROCEDURE — 87635 SARS-COV-2 COVID-19 AMP PRB: CPT

## 2023-02-07 PROCEDURE — 87502 INFLUENZA DNA AMP PROBE: CPT

## 2023-02-07 PROCEDURE — 96365 THER/PROPH/DIAG IV INF INIT: CPT

## 2023-02-07 PROCEDURE — 80053 COMPREHEN METABOLIC PANEL: CPT

## 2023-02-07 PROCEDURE — 2580000003 HC RX 258: Performed by: HOSPITALIST

## 2023-02-07 PROCEDURE — 1100000003 HC PRIVATE W/ TELEMETRY

## 2023-02-07 PROCEDURE — 83880 ASSAY OF NATRIURETIC PEPTIDE: CPT

## 2023-02-07 PROCEDURE — 85025 COMPLETE CBC W/AUTO DIFF WBC: CPT

## 2023-02-07 PROCEDURE — 83036 HEMOGLOBIN GLYCOSYLATED A1C: CPT

## 2023-02-07 PROCEDURE — 99222 1ST HOSP IP/OBS MODERATE 55: CPT | Performed by: INTERNAL MEDICINE

## 2023-02-07 PROCEDURE — 83605 ASSAY OF LACTIC ACID: CPT

## 2023-02-07 PROCEDURE — 81001 URINALYSIS AUTO W/SCOPE: CPT

## 2023-02-07 PROCEDURE — A4216 STERILE WATER/SALINE, 10 ML: HCPCS | Performed by: HOSPITALIST

## 2023-02-07 RX ORDER — ONDANSETRON 2 MG/ML
4 INJECTION INTRAMUSCULAR; INTRAVENOUS EVERY 6 HOURS PRN
Status: DISCONTINUED | OUTPATIENT
Start: 2023-02-07 | End: 2023-02-10 | Stop reason: HOSPADM

## 2023-02-07 RX ORDER — POTASSIUM CHLORIDE 20 MEQ/1
40 TABLET, EXTENDED RELEASE ORAL
Status: COMPLETED | OUTPATIENT
Start: 2023-02-08 | End: 2023-02-09

## 2023-02-07 RX ORDER — FUROSEMIDE 10 MG/ML
20 INJECTION INTRAMUSCULAR; INTRAVENOUS 2 TIMES DAILY
Status: DISCONTINUED | OUTPATIENT
Start: 2023-02-07 | End: 2023-02-10 | Stop reason: HOSPADM

## 2023-02-07 RX ORDER — SODIUM CHLORIDE 0.9 % (FLUSH) 0.9 %
5-40 SYRINGE (ML) INJECTION PRN
Status: DISCONTINUED | OUTPATIENT
Start: 2023-02-07 | End: 2023-02-10 | Stop reason: HOSPADM

## 2023-02-07 RX ORDER — HYDRALAZINE HYDROCHLORIDE 20 MG/ML
10 INJECTION INTRAMUSCULAR; INTRAVENOUS EVERY 6 HOURS PRN
Status: DISCONTINUED | OUTPATIENT
Start: 2023-02-07 | End: 2023-02-10 | Stop reason: HOSPADM

## 2023-02-07 RX ORDER — ACETAMINOPHEN 325 MG/1
650 TABLET ORAL EVERY 6 HOURS PRN
Status: DISCONTINUED | OUTPATIENT
Start: 2023-02-07 | End: 2023-02-10 | Stop reason: HOSPADM

## 2023-02-07 RX ORDER — POLYETHYLENE GLYCOL 3350 17 G/17G
17 POWDER, FOR SOLUTION ORAL DAILY PRN
Status: DISCONTINUED | OUTPATIENT
Start: 2023-02-07 | End: 2023-02-10 | Stop reason: HOSPADM

## 2023-02-07 RX ORDER — SODIUM CHLORIDE 0.9 % (FLUSH) 0.9 %
5-40 SYRINGE (ML) INJECTION EVERY 12 HOURS SCHEDULED
Status: DISCONTINUED | OUTPATIENT
Start: 2023-02-07 | End: 2023-02-10 | Stop reason: HOSPADM

## 2023-02-07 RX ORDER — LANOLIN ALCOHOL/MO/W.PET/CERES
400 CREAM (GRAM) TOPICAL DAILY
Status: DISCONTINUED | OUTPATIENT
Start: 2023-02-07 | End: 2023-02-10 | Stop reason: HOSPADM

## 2023-02-07 RX ORDER — SODIUM CHLORIDE 9 MG/ML
INJECTION, SOLUTION INTRAVENOUS PRN
Status: DISCONTINUED | OUTPATIENT
Start: 2023-02-07 | End: 2023-02-10 | Stop reason: HOSPADM

## 2023-02-07 RX ORDER — SPIRONOLACTONE 25 MG/1
25 TABLET ORAL DAILY
Status: DISCONTINUED | OUTPATIENT
Start: 2023-02-07 | End: 2023-02-10 | Stop reason: HOSPADM

## 2023-02-07 RX ORDER — ONDANSETRON 4 MG/1
4 TABLET, ORALLY DISINTEGRATING ORAL EVERY 8 HOURS PRN
Status: DISCONTINUED | OUTPATIENT
Start: 2023-02-07 | End: 2023-02-10 | Stop reason: HOSPADM

## 2023-02-07 RX ORDER — CARVEDILOL 25 MG/1
25 TABLET ORAL 2 TIMES DAILY WITH MEALS
Status: DISCONTINUED | OUTPATIENT
Start: 2023-02-07 | End: 2023-02-10 | Stop reason: HOSPADM

## 2023-02-07 RX ORDER — LABETALOL HYDROCHLORIDE 5 MG/ML
10 INJECTION, SOLUTION INTRAVENOUS ONCE
Status: COMPLETED | OUTPATIENT
Start: 2023-02-07 | End: 2023-02-07

## 2023-02-07 RX ORDER — FUROSEMIDE 10 MG/ML
40 INJECTION INTRAMUSCULAR; INTRAVENOUS ONCE
Status: COMPLETED | OUTPATIENT
Start: 2023-02-07 | End: 2023-02-07

## 2023-02-07 RX ORDER — ACETAMINOPHEN 650 MG/1
650 SUPPOSITORY RECTAL EVERY 6 HOURS PRN
Status: DISCONTINUED | OUTPATIENT
Start: 2023-02-07 | End: 2023-02-10 | Stop reason: HOSPADM

## 2023-02-07 RX ORDER — ONDANSETRON 2 MG/ML
4 INJECTION INTRAMUSCULAR; INTRAVENOUS ONCE
Status: COMPLETED | OUTPATIENT
Start: 2023-02-07 | End: 2023-02-07

## 2023-02-07 RX ORDER — CLONIDINE HYDROCHLORIDE 0.2 MG/1
0.2 TABLET ORAL 2 TIMES DAILY PRN
Status: DISCONTINUED | OUTPATIENT
Start: 2023-02-07 | End: 2023-02-10 | Stop reason: HOSPADM

## 2023-02-07 RX ADMIN — FUROSEMIDE 40 MG: 10 INJECTION, SOLUTION INTRAMUSCULAR; INTRAVENOUS at 10:58

## 2023-02-07 RX ADMIN — AZITHROMYCIN MONOHYDRATE 500 MG: 500 INJECTION, POWDER, LYOPHILIZED, FOR SOLUTION INTRAVENOUS at 10:37

## 2023-02-07 RX ADMIN — SODIUM CHLORIDE 5 MG/HR: 9 INJECTION, SOLUTION INTRAVENOUS at 11:00

## 2023-02-07 RX ADMIN — LABETALOL HYDROCHLORIDE 10 MG: 5 INJECTION INTRAVENOUS at 09:09

## 2023-02-07 RX ADMIN — APIXABAN 5 MG: 5 TABLET, FILM COATED ORAL at 20:40

## 2023-02-07 RX ADMIN — CARVEDILOL 25 MG: 25 TABLET, FILM COATED ORAL at 12:15

## 2023-02-07 RX ADMIN — PERFLUTREN 0.45 ML: 6.52 INJECTION, SUSPENSION INTRAVENOUS at 15:01

## 2023-02-07 RX ADMIN — CARVEDILOL 25 MG: 25 TABLET, FILM COATED ORAL at 17:17

## 2023-02-07 RX ADMIN — ONDANSETRON 4 MG: 2 INJECTION INTRAMUSCULAR; INTRAVENOUS at 10:42

## 2023-02-07 RX ADMIN — MAGNESIUM GLUCONATE 500 MG ORAL TABLET 400 MG: 500 TABLET ORAL at 12:15

## 2023-02-07 RX ADMIN — CLONIDINE HYDROCHLORIDE 0.2 MG: 0.2 TABLET ORAL at 22:12

## 2023-02-07 RX ADMIN — FUROSEMIDE 20 MG: 10 INJECTION, SOLUTION INTRAMUSCULAR; INTRAVENOUS at 17:17

## 2023-02-07 RX ADMIN — SODIUM CHLORIDE, PRESERVATIVE FREE 5 ML: 5 INJECTION INTRAVENOUS at 21:14

## 2023-02-07 RX ADMIN — CEFTRIAXONE 1000 MG: 1 INJECTION, POWDER, FOR SOLUTION INTRAMUSCULAR; INTRAVENOUS at 09:41

## 2023-02-07 RX ADMIN — SPIRONOLACTONE 25 MG: 25 TABLET ORAL at 13:41

## 2023-02-07 ASSESSMENT — LIFESTYLE VARIABLES
HOW MANY STANDARD DRINKS CONTAINING ALCOHOL DO YOU HAVE ON A TYPICAL DAY: PATIENT DOES NOT DRINK
HOW OFTEN DO YOU HAVE A DRINK CONTAINING ALCOHOL: NEVER

## 2023-02-07 ASSESSMENT — ENCOUNTER SYMPTOMS
EYES NEGATIVE: 1
ALLERGIC/IMMUNOLOGIC NEGATIVE: 1
SHORTNESS OF BREATH: 1
GASTROINTESTINAL NEGATIVE: 1

## 2023-02-07 ASSESSMENT — PAIN - FUNCTIONAL ASSESSMENT: PAIN_FUNCTIONAL_ASSESSMENT: NONE - DENIES PAIN

## 2023-02-07 NOTE — ED PROVIDER NOTES
Emergency Department Provider Note                   PCP:                Osmani Garcia MD               Age: 76 y.o. Sex: female       ICD-10-CM    1. Dyspnea and respiratory abnormalities  R06.00     R06.89           DISPOSITION Decision To Admit 02/07/2023 10:34:13 AM        Medical Decision Making  Patient presenting with shortness of breath. She was hypertensive systolic of 251 via EMS. She was borderline hypoxic and at point had 87% started on 2 L nasal cannula. She is otherwise nontoxic in appearance. Patient received 10 mg labetalol did not have much effect she is still 210/115 at which point I started Cardene. Chest x-ray states pneumonia versus pulmonary edema. Feeling this is more likely hypertensive emergency rather than infection however I did give her Rocephin azithromycin. Patient started on Cardene drip. On 2 L nasal cannula. Otherwise appears nontoxic. Patient does not have a white count, denies fever. She is afebrile here. Lactic acid is normal unlikely to be infection. She does have an elevated BNP more supportive of hypertensive emergency. We will also give 40 of Lasix in addition of the Cardene. Patient will be admitted to the hospital team.            EKG is interpreted me:  Sinus rate of 78 normal axis normal intervals no acute ST-T abnormalities    Amount and/or Complexity of Data Reviewed  Labs: ordered. Radiology: ordered. ECG/medicine tests: ordered. Risk  Prescription drug management. Decision regarding hospitalization.                                 Orders Placed This Encounter   Procedures    COVID-19, Rapid    Blood Culture 1    Blood Culture 2    Influenza A/B, Molecular    XR CHEST PORTABLE    CBC with Auto Differential    CMP    Lactate, Sepsis    Troponin    Magnesium    Urinalysis w rflx microscopic    Brain Natriuretic Peptide    EKG 12 Lead        Medications   azithromycin (ZITHROMAX) 500 mg in sodium chloride 0.9 % 250 mL IVPB ([id number]) (has no administration in time range)   niCARdipine (CARDENE) 25 mg in sodium chloride 0.9 % 250 mL infusion ([id number]) (has no administration in time range)   ondansetron (ZOFRAN) injection 4 mg (has no administration in time range)   labetalol (NORMODYNE;TRANDATE) injection 10 mg (10 mg IntraVENous Given 2/7/23 0936)   cefTRIAXone (ROCEPHIN) 1,000 mg in sodium chloride 0.9 % 50 mL IVPB (mini-bag) (1,000 mg IntraVENous New Bag 2/7/23 0924)       New Prescriptions    No medications on file        Eli Choudhury is a 76 y.o. female who presents to the Emergency Department with chief complaint of    Chief Complaint   Patient presents with    Fatigue      70-year-old female presenting with generalized weakness, shortness of breath. She does not have shortness of breath when laying supine in the gurney but says when she exerts herself can barely move because of the shortness of breath. Denies any chest pain or pressure. Denies any focal neurodeficit. Denies any fevers. Denies any abdominal pain. Review of Systems   HENT: Negative. Eyes: Negative. Respiratory:  Positive for shortness of breath. Cardiovascular: Negative. Gastrointestinal: Negative. Genitourinary: Negative. Musculoskeletal: Negative. Skin: Negative. Neurological:  Positive for weakness. Psychiatric/Behavioral: Negative.        Past Medical History:   Diagnosis Date    Arrhythmia     Arthritis     Asthma     Cellulitis     CHF (congestive heart failure) (HCC)     Chronic kidney disease     Hx renal failure    Depression     Gout     Gouty arthritis 12/2/2015    Hypercholesterolemia     Hypertension     Morbid obesity ([facility])     Nausea & vomiting     Polymyalgia rheumatica ([facility] [address] 12/2/2015    PONV (postoperative nausea and vomiting)     Psychiatric disorder     depression & anxiety    PUD (peptic ulcer disease)     Sjogren's syndrome (HCC)     Temporal arteritis ([address]         Past Surgical History:   Procedure Laterality Date COLONOSCOPY  6/08    EP DEVICE PROCEDURE N/A 5/24/2022    ABLATION A-FIB W COMPLETE EP STUDY performed by Benna Osler, MD at [facility] CATH LAB    EP STUDY/ABLATION N/A 2013    WPW Ablation    HYSTERECTOMY (CERVIX STATUS UNKNOWN)      ME CARDIAC SURG PROCEDURE UNLIST  8/13    HEART ABLATION    ME CARDIAC SURG PROCEDURE UNLIST  4/07    STRESS TEST    TUBAL LIGATION      UPPER GASTROINTESTINAL ENDOSCOPY  8/08        Family History   Problem Relation Age of Onset    Hypertension Sister     Other Son         PSYCHIATRIC ILLNESS    Diabetes Son     Cancer Father         LUNG    Dementia Mother     Hypertension Mother     Breast Cancer Mother 80    Cancer Mother     Heart Disease Father 62        Social History     Socioeconomic History    Marital status:    Tobacco Use    Smoking status: Never    Smokeless tobacco: Never   Vaping Use    Vaping Use: Never used   Substance and Sexual Activity    Alcohol use: No    Drug use: No   Social History Narrative    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, 12 great grandchildren         Lisinopril, Losartan, Nifedipine, and Celecoxib     Previous Medications    APIXABAN (ELIQUIS) 5 MG TABS TABLET    Take 1 tablet by mouth in the morning and 1 tablet in the evening. CARVEDILOL (COREG) 25 MG TABLET    Take 1 tablet by mouth 2 times daily (with meals)    FUROSEMIDE (LASIX) 20 MG TABLET    Take 1 tablet by mouth 2 times daily        Vitals signs and nursing note reviewed. Patient Vitals for the past 4 hrs:   Temp Pulse Resp BP SpO2   02/07/23 0909 -- 84 -- (!) 210/115 --   02/07/23 0845 -- 84 21 (!) 209/101 95 %   02/07/23 0830 -- 88 21 (!) 199/98 95 %   02/07/23 0815 -- 84 28 (!) 189/159 96 %   02/07/23 0800 -- 79 17 (!) 233/99 95 %   02/07/23 0756 97.8 °F (36.6 °C) 78 16 (!) 237/107 95 %          Physical Exam  Constitutional:       General: She is not in acute distress. Appearance: Normal appearance.  She is not ill-appearing. HENT:      Head: Normocephalic and atraumatic. Nose: No rhinorrhea. Mouth/Throat:      Mouth: Mucous membranes are moist.   Eyes:      Extraocular Movements: Extraocular movements intact. Cardiovascular:      Rate and Rhythm: Normal rate and regular rhythm. Pulmonary:      Effort: Pulmonary effort is normal.      Breath sounds: Rales present. Abdominal:      General: Abdomen is flat. Bowel sounds are normal. There is no distension. Palpations: Abdomen is soft. Tenderness: There is no abdominal tenderness. Musculoskeletal:         General: Normal range of motion. Cervical back: Normal range of motion. Skin:     General: Skin is warm and dry. Neurological:      General: No focal deficit present. Mental Status: She is alert. Psychiatric:         Mood and Affect: Mood normal.        Procedures    Results for orders placed or performed during the hospital encounter of 02/07/23   COVID-19, Rapid    Specimen: Nasopharyngeal   Result Value Ref Range    Source NASAL      SARS-CoV-2, Rapid Not detected NOTD     Influenza A/B, Molecular    Specimen: Not Specified   Result Value Ref Range    Influenza A, [place] Not detected NOTD      Influenza B, [place] Not detected NOTD     XR CHEST PORTABLE    Narrative    CHEST X-RAY, single portable view  2/7/2023    History: Shortness of breath. Hypertension. Technique: Single frontal view of the chest.    Comparison: Chest x-ray 11/28/2022    Findings: The cardiac silhouette is moderate to severely enlarged although stable. The  lungs are expanded without evidence for pneumothorax. No evolving dense  consolidative airspace process is seen. However, persistent interstitial  prominence is seen in the central lungs most evident in the right lower lung  field which could represent early pulmonary edema. This does appear slightly  worsened from the prior study.  Stable blunting of the left costophrenic angle is  seen which could indicate pleural scarring or a stable small effusion. Impression    1. Questionable worsening central interstitial infiltrate which could represent  early pulmonary edema. Additional cardiomegaly and small left effusion show no  significant interval change.        CBC with Auto Differential   Result Value Ref Range    WBC 8.3 4.3 - 11.1 K/uL    RBC 4.26 4.05 - 5.2 M/uL    Hemoglobin 12.9 11.7 - 15.4 g/dL    Hematocrit 39.8 35.8 - 46.3 %    MCV 93.4 82 - 102 FL    MCH 30.3 26.1 - 32.9 PG    MCHC 32.4 31.4 - 35.0 g/dL    RDW 12.8 11.9 - 14.6 %    Platelets 500 414 - 382 K/uL    MPV 11.0 9.4 - 12.3 FL    nRBC 0.00 0.0 - 0.2 K/uL    Differential Type AUTOMATED      Seg Neutrophils 79 (H) 43 - 78 %    Lymphocytes 13 13 - 44 %    Monocytes 6 4.0 - 12.0 %    Eosinophils % 1 0.5 - 7.8 %    Basophils 1 0.0 - 2.0 %    Immature Granulocytes 0 0.0 - 5.0 %    Segs Absolute 6.5 1.7 - 8.2 K/UL    Absolute Lymph # 1.1 0.5 - 4.6 K/UL    Absolute Mono # 0.5 0.1 - 1.3 K/UL    Absolute Eos # 0.1 0.0 - 0.8 K/UL    Basophils Absolute 0.1 0.0 - 0.2 K/UL    Absolute Immature Granulocyte 0.0 0.0 - 0.5 K/UL   CMP   Result Value Ref Range    Sodium 142 133 - 143 mmol/L    Potassium 3.4 (L) 3.5 - 5.1 mmol/L    Chloride 110 101 - 110 mmol/L    CO2 28 21 - 32 mmol/L    Anion Gap 4 2 - 11 mmol/L    Glucose 131 (H) 65 - 100 mg/dL    BUN 11 8 - 23 MG/DL    Creatinine 0.70 0.6 - 1.0 MG/DL    Est, Glom Filt Rate >60 >60 ml/min/1.73m2    Calcium 9.1 8.3 - 10.4 MG/DL    Total Bilirubin 0.9 0.2 - 1.1 MG/DL    ALT 14 12 - 65 U/L    AST 13 (L) 15 - 37 U/L    Alk Phosphatase 112 50 - 136 U/L    Total Protein 7.3 6.3 - 8.2 g/dL    Albumin 3.5 3.2 - 4.6 g/dL    Globulin 3.8 2.8 - 4.5 g/dL    Albumin/Globulin Ratio 0.9 0.4 - 1.6     Lactate, Sepsis   Result Value Ref Range    Lactic Acid, Sepsis 0.8 0.4 - 2.0 MMOL/L   Troponin   Result Value Ref Range    Troponin, High Sensitivity 15.6 (H) 0 - 14 pg/mL   Magnesium   Result Value Ref Range    Magnesium 1.7 (L) 1.8 - 2.4 mg/dL   Urinalysis w rflx microscopic   Result Value Ref Range    Color, UA YELLOW/STRAW      Appearance CLEAR      Specific Gravity, UA 1.013 1.001 - 1.023      pH, Urine 7.0 5.0 - 9.0      Protein,  (A) NEG mg/dL    Glucose, UA Negative mg/dL    Ketones, Urine TRACE (A) NEG mg/dL    Bilirubin Urine Negative NEG      Blood, Urine Negative NEG      Urobilinogen, Urine 0.2 0.2 - 1.0 EU/dL    Nitrite, Urine Negative NEG      Leukocyte Esterase, Urine Negative NEG      WBC, UA 0-4 U4 /hpf    RBC, UA 0-5 U5 /hpf    Epithelial Cells UA 0-5 U5 /hpf    BACTERIA, URINE Negative NEG /hpf    Casts 0-2 U2 /lpf   Brain Natriuretic Peptide   Result Value Ref Range    NT Pro-BNP 1,966 (H) <450 PG/ML        XR CHEST PORTABLE   Final Result   1. Questionable worsening central interstitial infiltrate which could represent   early pulmonary edema. Additional cardiomegaly and small left effusion show no   significant interval change. Voice dictation software was used during the making of this note. This software is not perfect and grammatical and other typographical errors may be present. This note has not been completely proofread for errors.      Zena Harrison MD  02/07/23 [id number]

## 2023-02-07 NOTE — ED TRIAGE NOTES
Pt arrives to er from home. Hx of chf, generalized weakness and sob with movement. Gave 0.4 of nitro. 1 inch of nitro paste on chest. Normal sinus. 260/120 1st bp.  All other vs wnl/

## 2023-02-07 NOTE — ED NOTES
Patient had a moment of desat. Was in the high to mid 80's with 2 l nc. Non rebreather was applied at 15 l brought her back above 95. After patient was calm was removed and has been stable since.       Cande Bain RN  02/07/23 [id number]

## 2023-02-07 NOTE — H&P
[facility] Cardiology Evaluation                 Date of  Admission: 2/7/2023  7:53 AM     Primary Care Physician:  Dr. Georgiana Lopez   Primary Cardiologist:  Dr. Kathy Saab   Referring Physician:  Dr. Jim Garcia cardiologist:  Dr. Kervin Rubio    CC/Reason for consult:  heart failure management and HTN       Mackenzie Vidal is a 76 y.o. female with PMH of  transient tachycardia induced CM (resolved), chronic posterior pericardial effusion, afib s/t afib ablation (Flaquito Gaitan), remote WPW ablation, uncontrolled HTN, PMR, Sjogren, and PUD, who presented with c/o shortness of breath and palpitations. She states weakness has been present for a month. On arrival to the ED her BP was 237/107. CXR showed pulmonary edema. Labs showed , 3.4, BUN 11, creatinine 0.70, mag 1.7, hs trop 15.6, CBC wnl. A cardene drip was started with improved BP and has now been weaned off. She was also treated with IVP lasix with good UOP per patient.           Past cardiac history:   2007 - normal perfusion study   8/12/13-WPW pattern on the EKG-ablation of posteroseptal pathway   Oct 2013 - 2 week monitor occasional pac's   Mar 2019 - normal renal artery doppler   Jul 2019- afib/RVR-ESTELLA cardioversion, amiodarone   Echo EF 40-45%, normal LV size, mild RVE, marked LAE, mild MR/TR, small to moderate pericardial effusion   ESTELLA- normal biventricular size and function, EF 55-60%   8/19/19 - PFT's normal spirometry but reduced volumes and mild reduced DLCO possible early interstitial lung disease   Mar 2021- Echo - normal EF, ind DF, marked LAE (FERNANDO 56), RVSP 35   Apr 2022- normal EF, abn DF, DIONNE, moderate circumferential effusion  May 2022     Afib ablation with Dr Flaquito Gaitan  Sep 2022      Limited echo - Normal SF, mild MR, TR, RVSP 43, stable mild to moderate posterior effusion (no longer circumferential)--patient with known autoimmune disease      Past Medical History:   Diagnosis Date    Arrhythmia     Arthritis     Asthma     Cellulitis     CHF (congestive heart failure) (HCC)     Chronic kidney disease     Hx renal failure    Depression     Gout     Gouty arthritis 12/2/2015    Hypercholesterolemia     Hypertension     Morbid obesity (HCC)     Nausea & vomiting     Polymyalgia rheumatica ([address] 12/2/2015    PONV (postoperative nausea and vomiting)     Psychiatric disorder     depression & anxiety    PUD (peptic ulcer disease)     Sjogren's syndrome ([address]     Temporal arteritis ([address]       Past Surgical History:   Procedure Laterality Date    COLONOSCOPY  6/08    EP DEVICE PROCEDURE N/A 5/24/2022    ABLATION A-FIB W COMPLETE EP STUDY performed by Hong Tinoco MD at [address] [place] [address] CATH LAB    EP STUDY/ABLATION N/A 2013    WPW Ablation    HYSTERECTOMY (CERVIX STATUS UNKNOWN)      [facility] CARDIAC SURG PROCEDURE UNLIST  8/13    HEART ABLATION    MO CARDIAC SURG PROCEDURE UNLIST  4/07    STRESS TEST    TUBAL LIGATION      UPPER GASTROINTESTINAL ENDOSCOPY  8/08     Allergies   Allergen Reactions    Lisinopril Other (See Comments)    Losartan Other (See Comments)     Burning sensation tongue.     Nifedipine Other (See Comments)     severe headache    Celecoxib Rash      Family History   Problem Relation Age of Onset    Hypertension Sister     Other Son         PSYCHIATRIC ILLNESS    Diabetes Son     Cancer Father         LUNG    Dementia Mother     Hypertension Mother     Breast Cancer Mother 80    Cancer Mother     Heart Disease Father 62      Social History     Socioeconomic History    Marital status:      Spouse name: Not on file    Number of children: Not on file    Years of education: Not on file    Highest education level: Not on file   Occupational History    Not on file   Tobacco Use    Smoking status: Never    Smokeless tobacco: Never   Vaping Use    Vaping Use: Never used   Substance and Sexual Activity    Alcohol use: No    Drug use: No    Sexual activity: Not on file   Other Topics Concern    Not on file   Social History Narrative    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, 12 great grandchildren     Social Determinants of Health     Financial Resource Strain: Not on file   Food Insecurity: Not on file   Transportation Needs: Not on file   Physical Activity: Not on file   Stress: Not on file   Social Connections: Not on file   Intimate Partner Violence: Not on file   Housing Stability: Not on file       Current Facility-Administered Medications   Medication Dose Route Frequency    carvedilol (COREG) tablet 25 mg  25 mg Oral BID WC    cloNIDine (CATAPRES) tablet 0.2 mg  0.2 mg Oral BID PRN    hydrALAZINE (APRESOLINE) injection 10 mg  10 mg IntraVENous Q6H PRN    furosemide (LASIX) injection 20 mg  20 mg IntraVENous BID    [START ON 2/8/2023] potassium chloride (KLOR-CON M) extended release tablet 40 mEq  40 mEq Oral Daily with breakfast    magnesium oxide (MAG-OX) tablet 400 mg  400 mg Oral Daily    sodium chloride flush 0.9 % injection 5-40 mL  5-40 mL IntraVENous 2 times per day    sodium chloride flush 0.9 % injection 5-40 mL  5-40 mL IntraVENous PRN    0.9 % sodium chloride infusion   IntraVENous PRN    ondansetron (ZOFRAN-ODT) disintegrating tablet 4 mg  4 mg Oral Q8H PRN    Or    ondansetron (ZOFRAN) injection 4 mg  4 mg IntraVENous Q6H PRN    polyethylene glycol (GLYCOLAX) packet 17 g  17 g Oral Daily PRN    acetaminophen (TYLENOL) tablet 650 mg  650 mg Oral Q6H PRN    Or    acetaminophen (TYLENOL) suppository 650 mg  650 mg Rectal Q6H PRN     Current Outpatient Medications   Medication Sig    apixaban (ELIQUIS) 5 MG TABS tablet Take 1 tablet by mouth in the morning and 1 tablet in the evening. carvedilol (COREG) 25 MG tablet Take 1 tablet by mouth 2 times daily (with meals)    furosemide (LASIX) 20 MG tablet Take 1 tablet by mouth 2 times daily (Patient not taking: No sig reported)       Review of Systems   Constitutional:  Positive for fatigue. Eyes: Negative.     Respiratory:  Positive for shortness of breath. Cardiovascular:  Positive for palpitations. Gastrointestinal: Negative. Endocrine: Negative. Genitourinary: Negative. Musculoskeletal: Negative. Skin: Negative. Allergic/Immunologic: Negative. Neurological:  Positive for weakness. Hematological: Negative. Psychiatric/Behavioral: Negative. Physical Exam  Vitals:    02/07/23 0845 02/07/23 0909 02/07/23 1058 02/07/23 1215   BP: (!) 209/101 (!) 210/115 (!) 210/113 (!) 153/71   Pulse: 84 84  82   Resp: 21      Temp:       TempSrc:       SpO2: 95%      Weight:       Height:           Physical Exam:  Physical Exam  Constitutional:       Appearance: She is ill-appearing. Interventions: Nasal cannula in place. Eyes:      Pupils: Pupils are equal, round, and reactive to light. Cardiovascular:      Rate and Rhythm: Normal rate and regular rhythm. Pulmonary:      Effort: Pulmonary effort is normal.      Breath sounds: Examination of the right-lower field reveals decreased breath sounds. Examination of the left-lower field reveals decreased breath sounds. Decreased breath sounds and rales (basilar) present. Abdominal:      General: Bowel sounds are normal.   Musculoskeletal:         General: Normal range of motion. Skin:     General: Skin is warm and dry. Neurological:      General: No focal deficit present. Mental Status: She is alert and oriented to person, place, and time. Psychiatric:         Mood and Affect: Mood normal.         Behavior: Behavior normal.         Thought Content:  Thought content normal.         Judgment: Judgment normal.        Cardiographics    Telemetry: normal sinus rhythm  ECG: normal sinus rhythm  Echocardiogram:  pending     Labs:   Recent Results (from the past 24 hour(s))   CBC with Auto Differential    Collection Time: 02/07/23  8:12 AM   Result Value Ref Range    WBC 8.3 4.3 - 11.1 K/uL    RBC 4.26 4.05 - 5.2 M/uL    Hemoglobin 12.9 11.7 - 15.4 g/dL    Hematocrit 39.8 35.8 - 46.3 %    MCV 93.4 82 - 102 FL    MCH 30.3 26.1 - 32.9 PG    MCHC 32.4 31.4 - 35.0 g/dL    RDW 12.8 11.9 - 14.6 %    Platelets 723 485 - 793 K/uL    MPV 11.0 9.4 - 12.3 FL    nRBC 0.00 0.0 - 0.2 K/uL    Differential Type AUTOMATED      Seg Neutrophils 79 (H) 43 - 78 %    Lymphocytes 13 13 - 44 %    Monocytes 6 4.0 - 12.0 %    Eosinophils % 1 0.5 - 7.8 %    Basophils 1 0.0 - 2.0 %    Immature Granulocytes 0 0.0 - 5.0 %    Segs Absolute 6.5 1.7 - 8.2 K/UL    Absolute Lymph # 1.1 0.5 - 4.6 K/UL    Absolute Mono # 0.5 0.1 - 1.3 K/UL    Absolute Eos # 0.1 0.0 - 0.8 K/UL    Basophils Absolute 0.1 0.0 - 0.2 K/UL    Absolute Immature Granulocyte 0.0 0.0 - 0.5 K/UL   CMP    Collection Time: 02/07/23  8:12 AM   Result Value Ref Range    Sodium 142 133 - 143 mmol/L    Potassium 3.4 (L) 3.5 - 5.1 mmol/L    Chloride 110 101 - 110 mmol/L    CO2 28 21 - 32 mmol/L    Anion Gap 4 2 - 11 mmol/L    Glucose 131 (H) 65 - 100 mg/dL    BUN 11 8 - 23 MG/DL    Creatinine 0.70 0.6 - 1.0 MG/DL    Est, Glom Filt Rate >60 >60 ml/min/1.73m2    Calcium 9.1 8.3 - 10.4 MG/DL    Total Bilirubin 0.9 0.2 - 1.1 MG/DL    ALT 14 12 - 65 U/L    AST 13 (L) 15 - 37 U/L    Alk Phosphatase 112 50 - 136 U/L    Total Protein 7.3 6.3 - 8.2 g/dL    Albumin 3.5 3.2 - 4.6 g/dL    Globulin 3.8 2.8 - 4.5 g/dL    Albumin/Globulin Ratio 0.9 0.4 - 1.6     Lactate, Sepsis    Collection Time: 02/07/23  8:12 AM   Result Value Ref Range    Lactic Acid, Sepsis 0.8 0.4 - 2.0 MMOL/L   Troponin    Collection Time: 02/07/23  8:12 AM   Result Value Ref Range    Troponin, High Sensitivity 15.6 (H) 0 - 14 pg/mL   Magnesium    Collection Time: 02/07/23  8:12 AM   Result Value Ref Range    Magnesium 1.7 (L) 1.8 - 2.4 mg/dL   Brain Natriuretic Peptide    Collection Time: 02/07/23  8:12 AM   Result Value Ref Range    NT Pro-BNP 1,966 (H) <450 PG/ML   C-Reactive Protein    Collection Time: 02/07/23  8:12 AM   Result Value Ref Range    CRP 0.5 0.0 - 0.9 mg/dL   COVID-19, Rapid    Collection Time: 02/07/23  8:23 AM    Specimen: Nasopharyngeal   Result Value Ref Range    Source NASAL      SARS-CoV-2, Rapid Not detected NOTD     Urinalysis w rflx microscopic    Collection Time: 02/07/23  8:25 AM   Result Value Ref Range    Color, UA YELLOW/STRAW      Appearance CLEAR      Specific Gravity, UA 1.013 1.001 - 1.023      pH, Urine 7.0 5.0 - 9.0      Protein,  (A) NEG mg/dL    Glucose, UA Negative mg/dL    Ketones, Urine TRACE (A) NEG mg/dL    Bilirubin Urine Negative NEG      Blood, Urine Negative NEG      Urobilinogen, Urine 0.2 0.2 - 1.0 EU/dL    Nitrite, Urine Negative NEG      Leukocyte Esterase, Urine Negative NEG      WBC, UA 0-4 U4 /hpf    RBC, UA 0-5 U5 /hpf    Epithelial Cells UA 0-5 U5 /hpf    BACTERIA, URINE Negative NEG /hpf    Casts 0-2 U2 /lpf   Influenza A/B, Molecular    Collection Time: 02/07/23  8:30 AM    Specimen: Not Specified   Result Value Ref Range    Influenza A, [place] Not detected NOTD      Influenza B, [place] Not detected NOTD         Assessment/Plan:     Assessment:      Principal Problem:    Pulmonary edema with congestive heart failure  -- continue diuresis  -- better BP control needed, add aldactone and continue coreg   -- repeat echo    Active Problems:    Status post ablation of atrial fibrillation  -- continue Eliquis       Stage 3 chronic kidney disease   -- monitor closely       Hypertension  -- see above         Thank you very much for this referral. We appreciate the opportunity to participate in this patient's care. We will follow along with above stated plan. TYREL Fernando - CNP  Attending MD: Andrew Burrows    I have personally seen and examined patient and agree with above assessment. I agree and confirm with findings with additional details/exceptions as listed below:    Prior history of tachycardic induced cardiomyopathy with subsequently resolved; history of atrial fibrillation ablation from 5/2022. Also noted chronic pericardial effusion.   Other history of hypertension which has been difficult to control given multiple intolerances. Has a history of PMR/Sjorgen syndrome. Patient presented to the ED due to increased dyspnea/palpitations. Also some increased weakness. On initial presentation, BP is noted at 237/107 with chest x-ray suggestive of pulmonary edema. Patient was initially started on a Cardene drip and also received some IV Lasix. At the time of my evaluation, off Cardene drip and noted on supplemental O2. Appears home regimen includes Coreg 25 mg twice daily, Lasix 20 mg twice daily and Eliquis. Uncertain regarding compliance. Physical Exam  Constitutional:       Appearance: She is ill-appearing. Interventions: Nasal cannula in place. Eyes:      Pupils: Pupils are equal, round, and reactive to light. Cardiovascular:      Rate and Rhythm: Normal rate and regular rhythm. Pulmonary:      Effort: Pulmonary effort is normal.      Breath sounds: Examination of the right-lower field reveals decreased breath sounds. Examination of the left-lower field reveals decreased breath sounds. Decreased breath sounds and rales (basilar) present. Abdominal:      General: Bowel sounds are normal.   Musculoskeletal:         General: Normal range of motion. Skin:     General: Skin is warm and dry. Neurological:      General: No focal deficit present. Mental Status: She is alert and oriented to person, place, and time. Psychiatric:         Mood and Affect: Mood normal.         Behavior: Behavior normal.         Thought Content: Thought content normal.         Judgment: Judgment normal.        Assessment/plan    Dyspnea  -Related to hypertensive emergency on presentation. Driven in the setting of questionable compliance/multiple intolerances in the past.  Reported intolerances to ACEI/ARBs/ARNI/higher Norvasc doses. Currently with improved volume status.  -Continue home Coreg and add on Aldactone.   Titrate/add on therapy based on response. -Repeat echocardiogram.    Persistent atrial fibrillation  -Prior ablation history; currently in sinus rhythm. Continue Eliquis.     Further recommendations pending clinical course    Vi Salmeron MD

## 2023-02-07 NOTE — H&P
[unfilled]    INTERNAL MEDICINE H&P/CONSULT    Subjective:     76years old female with history of sCHF/ ef50%, chronically uncontrolled HTN, PMR, Sjogren dz, afib on eliquis, PUD, presents with SOB for last 2 days. She was hypertensive systolic of 127 via EMS. She was borderline hypoxic and at point had 87% started on 2 L nasal cannula. She is otherwise nontoxic in appearance. Patient received 10 mg labetalol did not have much effect she is still 210/115 at which point I started Cardene. Chest x-ray states pneumonia versus pulmonary edema. Feeling this is more likely hypertensive emergency rather than infection however I did give her Rocephin azithromycin. Patient started on Cardene drip. On 2 L nasal cannula. Otherwise appears nontoxic. Patient does not have a white count, denies fever. She is afebrile here. Lactic acid is normal unlikely to be infection. She does have an elevated BNP more supportive of hypertensive emergency. We will also give 40 of Lasix in addition of the Cardene. Patient will be admitted to the hospital team.    On further questioning, she denies fever, chills, sore throat, runny nose, cheat pain or significant cough. No weight changes as she weigh herself daily. BP in uncontrolled for at least 1.5 years now. She is very compliant w/ meds.     Past Medical History:   Diagnosis Date    Arrhythmia     Arthritis     Asthma     Cellulitis     CHF (congestive heart failure) (HCC)     Chronic kidney disease     Hx renal failure    Depression     Gout     Gouty arthritis 12/2/2015    Hypercholesterolemia     Hypertension     Morbid obesity ([address]     Nausea & vomiting     Polymyalgia rheumatica ([address] 12/2/2015    PONV (postoperative nausea and vomiting)     Psychiatric disorder     depression & anxiety    PUD (peptic ulcer disease)     Sjogren's syndrome ([address]     Temporal arteritis ([address]       Past Surgical History:   Procedure Laterality Date    COLONOSCOPY  6/08    EP DEVICE PROCEDURE N/A 5/24/2022    ABLATION A-FIB W COMPLETE EP STUDY performed by Hong Tinoco MD at [facility] CATH LAB    EP STUDY/ABLATION N/A 2013    WPW Ablation    HYSTERECTOMY (CERVIX STATUS UNKNOWN)      [facility] CARDIAC SURG PROCEDURE UNLIST  8/13    HEART ABLATION    [facility] CARDIAC SURG PROCEDURE UNLIST  4/07    STRESS TEST    TUBAL LIGATION      UPPER GASTROINTESTINAL ENDOSCOPY  8/08      Prior to Admission medications    Medication Sig Start Date End Date Taking? Authorizing Provider   apixaban (ELIQUIS) 5 MG TABS tablet Take 1 tablet by mouth in the morning and 1 tablet in the evening. 9/22/22   Ni Sellers MD   carvedilol (COREG) 25 MG tablet Take 1 tablet by mouth 2 times daily (with meals) 9/22/22   Ni Sellers MD   furosemide (LASIX) 20 MG tablet Take 1 tablet by mouth 2 times daily  Patient not taking: No sig reported 6/15/22   Ni Sellers MD     Allergies   Allergen Reactions    Lisinopril Other (See Comments)    Losartan Other (See Comments)     Burning sensation tongue. Nifedipine Other (See Comments)     severe headache    Celecoxib Rash      Social History     Tobacco Use    Smoking status: Never    Smokeless tobacco: Never   Substance Use Topics    Alcohol use: No        Family History:  HTN    Review of Systems   A comprehensive review of systems was negative except for that written in the HPI. Objective: Intake / Output:  No intake/output data recorded. No intake/output data recorded. Physical Exam:  BP (!) 210/113   Pulse 84   Temp 97.8 °F (36.6 °C) (Oral)   Resp 21   Ht 5' 2\" (1.575 m)   Wt 159 lb (72.1 kg)   SpO2 95%   BMI 29.08 kg/m²   General appearance: awake, alert, cooperative, moderate distress, appears stated age   Head: Normocephalic, without obvious abnormality, atraumatic  Back: symmetric, no curvature. ROM normal. No CVA tenderness.   Lungs: max scattered crackles  Heart: regular rate and rhythm, S1, S2 normal, no murmur, click, rub or gallop. Abdomen: soft, no tenderness, no distension, normal bowel sound, no masses, no organomegaly  Extremities: atraumatic, no cyanosis - Bilateral lower limbs edema none. Skin: No rashes or ulceration.   Neurologic: Grossly intact  ECG: sinus rhythm     Data Review (Labs):   Recent Results (from the past 24 hour(s))   CBC with Auto Differential    Collection Time: 02/07/23  8:12 AM   Result Value Ref Range    WBC 8.3 4.3 - 11.1 K/uL    RBC 4.26 4.05 - 5.2 M/uL    Hemoglobin 12.9 11.7 - 15.4 g/dL    Hematocrit 39.8 35.8 - 46.3 %    MCV 93.4 82 - 102 FL    MCH 30.3 26.1 - 32.9 PG    MCHC 32.4 31.4 - 35.0 g/dL    RDW 12.8 11.9 - 14.6 %    Platelets 863 566 - 215 K/uL    MPV 11.0 9.4 - 12.3 FL    nRBC 0.00 0.0 - 0.2 K/uL    Differential Type AUTOMATED      Seg Neutrophils 79 (H) 43 - 78 %    Lymphocytes 13 13 - 44 %    Monocytes 6 4.0 - 12.0 %    Eosinophils % 1 0.5 - 7.8 %    Basophils 1 0.0 - 2.0 %    Immature Granulocytes 0 0.0 - 5.0 %    Segs Absolute 6.5 1.7 - 8.2 K/UL    Absolute Lymph # 1.1 0.5 - 4.6 K/UL    Absolute Mono # 0.5 0.1 - 1.3 K/UL    Absolute Eos # 0.1 0.0 - 0.8 K/UL    Basophils Absolute 0.1 0.0 - 0.2 K/UL    Absolute Immature Granulocyte 0.0 0.0 - 0.5 K/UL   CMP    Collection Time: 02/07/23  8:12 AM   Result Value Ref Range    Sodium 142 133 - 143 mmol/L    Potassium 3.4 (L) 3.5 - 5.1 mmol/L    Chloride 110 101 - 110 mmol/L    CO2 28 21 - 32 mmol/L    Anion Gap 4 2 - 11 mmol/L    Glucose 131 (H) 65 - 100 mg/dL    BUN 11 8 - 23 MG/DL    Creatinine 0.70 0.6 - 1.0 MG/DL    Est, Glom Filt Rate >60 >60 ml/min/1.73m2    Calcium 9.1 8.3 - 10.4 MG/DL    Total Bilirubin 0.9 0.2 - 1.1 MG/DL    ALT 14 12 - 65 U/L    AST 13 (L) 15 - 37 U/L    Alk Phosphatase 112 50 - 136 U/L    Total Protein 7.3 6.3 - 8.2 g/dL    Albumin 3.5 3.2 - 4.6 g/dL    Globulin 3.8 2.8 - 4.5 g/dL    Albumin/Globulin Ratio 0.9 0.4 - 1.6     Lactate, Sepsis    Collection Time: 02/07/23  8:12 AM   Result Value Ref Range    Lactic Acid, Sepsis 0.8 0.4 - 2.0 MMOL/L   Troponin    Collection Time: 02/07/23  8:12 AM   Result Value Ref Range    Troponin, High Sensitivity 15.6 (H) 0 - 14 pg/mL   Magnesium    Collection Time: 02/07/23  8:12 AM   Result Value Ref Range    Magnesium 1.7 (L) 1.8 - 2.4 mg/dL   Brain Natriuretic Peptide    Collection Time: 02/07/23  8:12 AM   Result Value Ref Range    NT Pro-BNP 1,966 (H) <450 PG/ML   COVID-19, Rapid    Collection Time: 02/07/23  8:23 AM    Specimen: Nasopharyngeal   Result Value Ref Range    Source NASAL      SARS-CoV-2, Rapid Not detected NOTD     Urinalysis w rflx microscopic    Collection Time: 02/07/23  8:25 AM   Result Value Ref Range    Color, UA YELLOW/STRAW      Appearance CLEAR      Specific Gravity, UA 1.013 1.001 - 1.023      pH, Urine 7.0 5.0 - 9.0      Protein,  (A) NEG mg/dL    Glucose, UA Negative mg/dL    Ketones, Urine TRACE (A) NEG mg/dL    Bilirubin Urine Negative NEG      Blood, Urine Negative NEG      Urobilinogen, Urine 0.2 0.2 - 1.0 EU/dL    Nitrite, Urine Negative NEG      Leukocyte Esterase, Urine Negative NEG      WBC, UA 0-4 U4 /hpf    RBC, UA 0-5 U5 /hpf    Epithelial Cells UA 0-5 U5 /hpf    BACTERIA, URINE Negative NEG /hpf    Casts 0-2 U2 /lpf   Influenza A/B, Molecular    Collection Time: 02/07/23  8:30 AM    Specimen: Not Specified   Result Value Ref Range    Influenza A, [place] Not detected NOTD      Influenza B, [place] Not detected NOTD         Assessment:     1- HTN urgency/ emergency, chronically uncontrolled  2- Ac Pulmonary edema with congestive heart failure, due to #1, hx of sCHF/ ef 15%  3- Acute hypoxic respiratory failure, due to #2, on 2L  4- Hypokalemia and hypoMg, replaced  5- History of PMR, Sjogren dz, afib on eliquis, PUD. Stable. Plan:     Telemetry  BP control, currently on cardene gtt, down to 140/75  IV diuresis  IO and weight monitor  Cardiology consulted  AM lab   O2 as needed    Continue essential home medications.   Patient is full code.  Further management depends on patient progress. Thank you for the oppourtinity to contribute in the care of your patient. Time 35 minutes.     Signed By: Rebekah Pacheco MD     February 7, 2023

## 2023-02-08 ENCOUNTER — APPOINTMENT (OUTPATIENT)
Dept: ULTRASOUND IMAGING | Age: 78
End: 2023-02-08
Payer: MEDICARE

## 2023-02-08 PROBLEM — R06.00 DYSPNEA AND RESPIRATORY ABNORMALITIES: Status: ACTIVE | Noted: 2023-02-08

## 2023-02-08 PROBLEM — R06.89 DYSPNEA AND RESPIRATORY ABNORMALITIES: Status: ACTIVE | Noted: 2023-02-08

## 2023-02-08 PROCEDURE — 94760 N-INVAS EAR/PLS OXIMETRY 1: CPT

## 2023-02-08 PROCEDURE — 6360000002 HC RX W HCPCS: Performed by: HOSPITALIST

## 2023-02-08 PROCEDURE — 97535 SELF CARE MNGMENT TRAINING: CPT

## 2023-02-08 PROCEDURE — 99232 SBSQ HOSP IP/OBS MODERATE 35: CPT | Performed by: INTERNAL MEDICINE

## 2023-02-08 PROCEDURE — 97112 NEUROMUSCULAR REEDUCATION: CPT

## 2023-02-08 PROCEDURE — 2700000000 HC OXYGEN THERAPY PER DAY

## 2023-02-08 PROCEDURE — 93975 VASCULAR STUDY: CPT

## 2023-02-08 PROCEDURE — 2580000003 HC RX 258: Performed by: HOSPITALIST

## 2023-02-08 PROCEDURE — 6370000000 HC RX 637 (ALT 250 FOR IP): Performed by: NURSE PRACTITIONER

## 2023-02-08 PROCEDURE — 97530 THERAPEUTIC ACTIVITIES: CPT

## 2023-02-08 PROCEDURE — 6370000000 HC RX 637 (ALT 250 FOR IP): Performed by: HOSPITALIST

## 2023-02-08 PROCEDURE — 97162 PT EVAL MOD COMPLEX 30 MIN: CPT

## 2023-02-08 PROCEDURE — 1100000003 HC PRIVATE W/ TELEMETRY

## 2023-02-08 PROCEDURE — 83835 ASSAY OF METANEPHRINES: CPT

## 2023-02-08 PROCEDURE — 97166 OT EVAL MOD COMPLEX 45 MIN: CPT

## 2023-02-08 RX ADMIN — APIXABAN 5 MG: 5 TABLET, FILM COATED ORAL at 07:45

## 2023-02-08 RX ADMIN — SODIUM CHLORIDE, PRESERVATIVE FREE 10 ML: 5 INJECTION INTRAVENOUS at 07:46

## 2023-02-08 RX ADMIN — SPIRONOLACTONE 25 MG: 25 TABLET ORAL at 07:45

## 2023-02-08 RX ADMIN — CARVEDILOL 25 MG: 25 TABLET, FILM COATED ORAL at 17:08

## 2023-02-08 RX ADMIN — MAGNESIUM GLUCONATE 500 MG ORAL TABLET 400 MG: 500 TABLET ORAL at 07:45

## 2023-02-08 RX ADMIN — APIXABAN 5 MG: 5 TABLET, FILM COATED ORAL at 20:58

## 2023-02-08 RX ADMIN — FUROSEMIDE 20 MG: 10 INJECTION, SOLUTION INTRAMUSCULAR; INTRAVENOUS at 07:46

## 2023-02-08 RX ADMIN — SODIUM CHLORIDE, PRESERVATIVE FREE 5 ML: 5 INJECTION INTRAVENOUS at 20:58

## 2023-02-08 RX ADMIN — POTASSIUM CHLORIDE 40 MEQ: 1500 TABLET, EXTENDED RELEASE ORAL at 07:46

## 2023-02-08 RX ADMIN — CARVEDILOL 25 MG: 25 TABLET, FILM COATED ORAL at 07:45

## 2023-02-08 RX ADMIN — FUROSEMIDE 20 MG: 10 INJECTION, SOLUTION INTRAMUSCULAR; INTRAVENOUS at 17:08

## 2023-02-08 ASSESSMENT — ENCOUNTER SYMPTOMS: APNEA: 0

## 2023-02-08 ASSESSMENT — PAIN SCALES - GENERAL: PAINLEVEL_OUTOF10: 0

## 2023-02-08 NOTE — PROGRESS NOTES
TRANSFER - IN REPORT:    Verbal report received from [facility] on Shira Luis Armando  being received from ED for routine progression of patient care      Report consisted of patient's Situation, Background, Assessment and   Recommendations(SBAR). Information from the following report(s) Nurse Handoff Report, ED Encounter Summary, ED SBAR, Intake/Output, MAR, and Recent Results was reviewed with the receiving nurse. Opportunity for questions and clarification was provided. Assessment completed upon patient's arrival to unit and care assumed. Patient's skin is overall clean, dry, and intact. Patient has noted non-pitting edema in all extremeities. Patient has scattered scarring and ecchymosis. Patient also has scattered moles. Patient's sacrum and heels are clean, dry, and intact. Verified with Ayden ZAYAS.

## 2023-02-08 NOTE — PROGRESS NOTES
[facility] CARDIOLOGY PROGRESS NOTE           2/8/2023 7:06 AM    Admit Date: 2/7/2023      Subjective:     Breathing better    Review of Systems   Constitutional:  Negative for activity change. HENT:  Negative for congestion. Respiratory:  Negative for apnea. Cardiovascular:  Negative for chest pain. Genitourinary:  Negative for difficulty urinating. Objective:      Vitals:    02/07/23 2122 02/07/23 2212 02/08/23 0034 02/08/23 0519   BP: (!) 160/108 (!) 186/76 (!) 123/53 (!) 120/58   Pulse: 71  64 61   Resp: 18  20 18   Temp: 97.5 °F (36.4 °C)  98.8 °F (37.1 °C) 97.6 °F (36.4 °C)   TempSrc: Temporal  Temporal Temporal   SpO2: 96%  96% 99%   Weight: 155 lb 9.6 oz (70.6 kg)      Height: 5' 2\" (1.575 m)            Physical Exam  Vitals reviewed. HENT:      Head: Normocephalic. Right Ear: External ear normal.      Left Ear: External ear normal.      Nose: Nose normal.   Eyes:      General: No scleral icterus. Pulmonary:      Effort: Pulmonary effort is normal.      Breath sounds: Examination of the right-lower field reveals decreased breath sounds. Examination of the left-lower field reveals decreased breath sounds. Decreased breath sounds present. Abdominal:      General: There is no distension. Musculoskeletal:      Cervical back: Neck supple. Skin:     General: Skin is warm. Neurological:      Mental Status: She is alert. Mental status is at baseline.        Data Review:   Recent Labs     02/07/23  0812      K 3.4*   MG 1.7*   BUN 11   WBC 8.3   HGB 12.9   HCT 39.8          [unfilled]  Current Facility-Administered Medications   Medication Dose Route Frequency    apixaban (ELIQUIS) tablet 5 mg  5 mg Oral Q12H    carvedilol (COREG) tablet 25 mg  25 mg Oral BID WC    cloNIDine (CATAPRES) tablet 0.2 mg  0.2 mg Oral BID PRN    hydrALAZINE (APRESOLINE) injection 10 mg  10 mg IntraVENous Q6H PRN    furosemide (LASIX) injection 20 mg  20 mg IntraVENous BID potassium chloride (KLOR-CON M) extended release tablet 40 mEq  40 mEq Oral Daily with breakfast    magnesium oxide (MAG-OX) tablet 400 mg  400 mg Oral Daily    sodium chloride flush 0.9 % injection 5-40 mL  5-40 mL IntraVENous 2 times per day    sodium chloride flush 0.9 % injection 5-40 mL  5-40 mL IntraVENous PRN    0.9 % sodium chloride infusion   IntraVENous PRN    ondansetron (ZOFRAN-ODT) disintegrating tablet 4 mg  4 mg Oral Q8H PRN    Or    ondansetron (ZOFRAN) injection 4 mg  4 mg IntraVENous Q6H PRN    polyethylene glycol (GLYCOLAX) packet 17 g  17 g Oral Daily PRN    acetaminophen (TYLENOL) tablet 650 mg  650 mg Oral Q6H PRN    Or    acetaminophen (TYLENOL) suppository 650 mg  650 mg Rectal Q6H PRN    spironolactone (ALDACTONE) tablet 25 mg  25 mg Oral Daily        Past cardiac history:   2007 - normal perfusion study   8/12/13-WPW pattern on the EKG-ablation of posteroseptal pathway   Oct 2013 - 2 week monitor occasional pac's   Mar 2019 - normal renal artery doppler   Jul 2019- afib/RVR-ESTELLA cardioversion, amiodarone   Echo EF 40-45%, normal LV size, mild RVE, marked LAE, mild MR/TR, small to moderate pericardial effusion   ESTELLA- normal biventricular size and function, EF 55-60%   8/19/19 - PFT's normal spirometry but reduced volumes and mild reduced DLCO possible early interstitial lung disease   Mar 2021- Echo - normal EF, ind DF, marked LAE (FERNANDO 56), RVSP 35   Apr 2022- normal EF, abn DF, DIONNE, moderate circumferential effusion  May 2022     Afib ablation with Dr Bee Christy  Sep 2022      Limited echo - Normal SF, mild MR, TR, RVSP 43, stable mild to moderate posterior effusion (no longer circumferential)--patient with known autoimmune disease        Limited study. Left Ventricle: Normal left ventricular systolic function with a visually estimated EF of 55 - 60%. Left ventricle size is normal. Mildly increased wall thickness. Normal wall motion.     Pericardium: There is a moderate pericardial effusion most prominent posterolaterally/anterolaterally and appears to have increased in size compared to prior. There is no evidence of hemodynamic significance. There is overlying fibrinous appearing material noted as well. There is a pleural effusion noted. Technical qualifiers: Color flow Doppler was performed and pulse wave and/or continuous wave Doppler was performed. Contrast used: Definity. Assessment/Plan:     76 y.o. female dyspnea, hypertensive emergency persistent atrial fibrillation    Hypertensive emergency  Likely due to medication noncompliance  Patient is reported inability to tolerate ACE ARB Norvasc  Patient is on carvedilol and Aldactone  Blood pressures improved since admission  Repeat echocardiogram performed showing normal left ventricular systolic function  Renal Dopplers urine metanephrines a.m. cortisol and aldosterone levels will not be obtained due to use of ARB  Additional cause for secondary cause of hypertension may be reasonable in the future however noncompliance may be underlying cause    Persistent atrial fibrillation  Telemetry reviewed sinus rhythm  Patient is on Eliquis therapy for anticoagulation.       Rosa Maria Ross MD  2/8/2023 7:06 AM

## 2023-02-08 NOTE — CARE COORDINATION
Patient admitted with pulmonary edema with CHF. Currently on 4 LPM NC. US kidney completed. PT/OT evaluating patient. CM met with patient to assess discharge needs. Patient's daughters are present in the room. Patient reports she lives alone in an apartment. Patient reports she is independent of ADLs but needs help with her home. Over the last year, patient reports she has no been \"keeping up\". Patient reports she has applied for \"help\" but did not qualify for CLTC. DME: Jevon Medina, ONDINA, shower bench. No history of home health or STR. Patient has history in [facility] placement and prefers referral to [facility] at this discharge. CM placed order to physical medicine. CM will follow for new discharge needs. 02/08/23 1645   Service Assessment   Patient Orientation Alert and Oriented   Cognition Alert   History Provided By Patient   Primary Caregiver Self   Accompanied By/Relationship Daughters (2)   [address] [facility]  (3 children: 2 daughters and a son.)   PCP Verified by CM Yes  Zeke Donnelly)   Last Visit to PCP Within last 3 months   Prior Functional Level Assistance with the following:;Shopping;Housework   Current Functional Level Assistance with the following:;Shopping;Housework   Can patient return to prior living arrangement Yes   Ability to make needs known: Good   Family able to assist with home care needs: Yes   Would you like for me to discuss the discharge plan with any other family members/significant others, and if so, who?  No   Financial Resources Medicare;Medicaid   Social/Functional History   Lives With Alone   Type of Home Apartment   Home Layout One level   Home Access Level entry   Bathroom Shower/Tub Tub/Shower unit   [facility] transfer [facility] Help From [facility] Needs assistance  (Jevon Medina or ziaator)   Transfer Assistance Independent   Active  No   Mode of Transportation Car   Discharge Planning Potential Assistance Needed Home Care;Emergency Call  System   DME Ordered? No   Potential Assistance Purchasing Medications No   Type of Home Care Services PT   Patient expects to be discharged to: [facility] Discharge   Transition of Care Consult (CM Consult) [address] Resource Information Provided? No   Confirm Follow Up Transport Family   Condition of Participation: Discharge Planning   The Plan for Transition of Care is related to the following treatment goals: IRC vs home health   The Patient and/or Patient Representative was provided with a Choice of Provider? Patient   The Patient and/Or Patient Representative agree with the Discharge Plan? Yes   Freedom of Choice list was provided with basic dialogue that supports the patient's individualized plan of care/goals, treatment preferences, and shares the quality data associated with the providers?   Yes

## 2023-02-08 NOTE — PROGRESS NOTES
Hospitalist Progress Note   Admit Date:  2023  7:53 AM   Name:  Michele Monroe   Age:  76 y.o. Sex:  female  :  1945   MRN:  [id number]   Room:  [facility]/    Presenting Complaint: Fatigue     Reason(s) for Admission: Dyspnea and respiratory abnormalities [R06.00, R06.89]  Pulmonary edema with congestive heart failure, NYHA class 2 ([address] [I50.1]     Hospital Course:   76years old female with history of sCHF/ ef50%, chronically uncontrolled HTN, PMR, Sjogren dz, afib on eliquis, PUD, presents with SOB for last 2 days. Chest x-ray states pneumonia versus pulmonary edema. Subjective & 24hr Events (23): Was seen and examined at the bedside. No overnight events. Patient had questions about blood pressure today. Patient did undergo renal ultrasound. Assessment & Plan:   Pulmonary edema with congestive heart failure, NYHA class 2/hypertensive emergency  - Likely secondary to noncompliance  -Cardiology consulted, appreciate recommendation  - Telemetry  - Tight blood pressure control  - IV diuresis  - Strict I's and O's  - Daily weights  - As needed oxygen  - Dyspnea likely related to hypertensive emergency on presentation  - Patient has intolerance to ACE inhibitors and ARB's or higher Norvasc doses  - Continue Coreg  - Cardiology added Aldactone  - Repeat echo with normal left ventricular systolic function  - Renal ultrasound with medical renal disease. Simple cyst.    Persistent atrial fibrillation  History of ablation  - Currently sinus rhythm  - Continue Eliquis      Anticipated discharge needs:    Dispo pending clinical course    Diet:  ADULT DIET; Regular; Low Fat/Low Chol/High Fiber/2 gm Na;  No Added Salt (3-4 gm); 2000 ml  ADULT ORAL NUTRITION SUPPLEMENT; Dinner, Breakfast; Standard High Calorie/High Protein Oral Supplement  DVT PPx: Eliquis  Code status: Full Code    Hospital Problems:  Principal Problem:    Pulmonary edema with congestive heart failure, NYHA class 2 [facility])  Active Problems:    Status post ablation of atrial fibrillation    Dyspnea and respiratory abnormalities    Stage 3 chronic kidney disease ([facility]    Hypertension  Resolved Problems:    * No resolved hospital problems. *      Objective:   Patient Vitals for the past 24 hrs:   Temp Pulse Resp BP SpO2   02/08/23 1708 -- 63 -- (!) 141/65 --   02/08/23 1237 98.1 °F (36.7 °C) 61 -- (!) 119/50 95 %   02/08/23 0906 98.4 °F (36.9 °C) 60 16 (!) 108/56 98 %   02/08/23 0805 -- -- -- (!) 152/70 --   02/08/23 0745 -- 64 -- (!) 177/77 --   02/08/23 0519 97.6 °F (36.4 °C) 61 18 (!) 120/58 99 %   02/08/23 0034 98.8 °F (37.1 °C) 64 20 (!) 123/53 96 %   02/07/23 2212 -- -- -- (!) 186/76 --   02/07/23 2122 97.5 °F (36.4 °C) 71 18 (!) 160/108 96 %   02/07/23 2015 -- 63 17 (!) 136/59 97 %   02/07/23 2000 -- 60 16 122/65 97 %   02/07/23 1945 -- 60 17 (!) 131/55 97 %   02/07/23 1930 -- 64 17 132/66 98 %   02/07/23 1915 -- 67 15 122/66 97 %   02/07/23 1900 -- 72 16 122/61 98 %   02/07/23 1845 -- 72 20 (!) 143/63 97 %   02/07/23 1830 -- 66 17 138/64 97 %   02/07/23 1815 -- 68 18 (!) 127/59 98 %   02/07/23 1800 -- 69 19 (!) 156/74 98 %   02/07/23 1745 -- 71 27 (!) 146/82 97 %       Oxygen Therapy  SpO2: 95 %  Pulse via Oximetry: 63 beats per minute  O2 Device: Nasal cannula  Skin Assessment: Clean, dry, & intact  Skin Protection for O2 Device: No  PEEP/CPAP (cm H2O): 4 cm H20  O2 Flow Rate (L/min): 4 L/min    Estimated body mass index is 28.42 kg/m² as calculated from the following:    Height as of this encounter: 5' 2\" (1.575 m). Weight as of this encounter: 155 lb 6.4 oz (70.5 kg). Intake/Output Summary (Last 24 hours) at 2/8/2023 1732  Last data filed at 2/8/2023 1725  Gross per 24 hour   Intake 590 ml   Output 600 ml   Net -10 ml         Physical Exam:   Blood pressure (!) 141/65, pulse 63, temperature 98.1 °F (36.7 °C), temperature source Oral, resp.  rate 16, height 5' 2\" (1.575 m), weight 155 lb 6.4 oz (70.5 kg), SpO2 95 %, not currently breastfeeding. General:    Well nourished. Head:  Normocephalic, atraumatic  Eyes:  Sclerae appear normal.  Pupils equally round. ENT:  Nares appear normal.  Moist oral mucosa  Neck:  No restricted ROM. Trachea midline   CV:   RRR. No m/r/g. No jugular venous distension. Lungs:   Decreased breath sounds bilaterally. No wheezing, rhonchi, or rales. Symmetric expansion. Abdomen:   Soft, nontender, nondistended. Extremities: No cyanosis or clubbing. No edema  Skin:     No rashes and normal coloration. Warm and dry. Neuro:  CN II-XII grossly intact. Psych:  Normal mood and affect.       I have personally reviewed labs and tests:  Recent Labs:  Recent Results (from the past 48 hour(s))   CBC with Auto Differential    Collection Time: 02/07/23  8:12 AM   Result Value Ref Range    WBC 8.3 4.3 - 11.1 K/uL    RBC 4.26 4.05 - 5.2 M/uL    Hemoglobin 12.9 11.7 - 15.4 g/dL    Hematocrit 39.8 35.8 - 46.3 %    MCV 93.4 82 - 102 FL    MCH 30.3 26.1 - 32.9 PG    MCHC 32.4 31.4 - 35.0 g/dL    RDW 12.8 11.9 - 14.6 %    Platelets 133 564 - 444 K/uL    MPV 11.0 9.4 - 12.3 FL    nRBC 0.00 0.0 - 0.2 K/uL    Differential Type AUTOMATED      Seg Neutrophils 79 (H) 43 - 78 %    Lymphocytes 13 13 - 44 %    Monocytes 6 4.0 - 12.0 %    Eosinophils % 1 0.5 - 7.8 %    Basophils 1 0.0 - 2.0 %    Immature Granulocytes 0 0.0 - 5.0 %    Segs Absolute 6.5 1.7 - 8.2 K/UL    Absolute Lymph # 1.1 0.5 - 4.6 K/UL    Absolute Mono # 0.5 0.1 - 1.3 K/UL    Absolute Eos # 0.1 0.0 - 0.8 K/UL    Basophils Absolute 0.1 0.0 - 0.2 K/UL    Absolute Immature Granulocyte 0.0 0.0 - 0.5 K/UL   CMP    Collection Time: 02/07/23  8:12 AM   Result Value Ref Range    Sodium 142 133 - 143 mmol/L    Potassium 3.4 (L) 3.5 - 5.1 mmol/L    Chloride 110 101 - 110 mmol/L    CO2 28 21 - 32 mmol/L    Anion Gap 4 2 - 11 mmol/L    Glucose 131 (H) 65 - 100 mg/dL    BUN 11 8 - 23 MG/DL    Creatinine 0.70 0.6 - 1.0 MG/DL    Est, Glom Filt Rate >60 >60 ml/min/1.73m2    Calcium 9.1 8.3 - 10.4 MG/DL    Total Bilirubin 0.9 0.2 - 1.1 MG/DL    ALT 14 12 - 65 U/L    AST 13 (L) 15 - 37 U/L    Alk Phosphatase 112 50 - 136 U/L    Total Protein 7.3 6.3 - 8.2 g/dL    Albumin 3.5 3.2 - 4.6 g/dL    Globulin 3.8 2.8 - 4.5 g/dL    Albumin/Globulin Ratio 0.9 0.4 - 1.6     Lactate, Sepsis    Collection Time: 02/07/23  8:12 AM   Result Value Ref Range    Lactic Acid, Sepsis 0.8 0.4 - 2.0 MMOL/L   Troponin    Collection Time: 02/07/23  8:12 AM   Result Value Ref Range    Troponin, High Sensitivity 15.6 (H) 0 - 14 pg/mL   Magnesium    Collection Time: 02/07/23  8:12 AM   Result Value Ref Range    Magnesium 1.7 (L) 1.8 - 2.4 mg/dL   Brain Natriuretic Peptide    Collection Time: 02/07/23  8:12 AM   Result Value Ref Range    NT Pro-BNP 1,966 (H) <450 PG/ML   Procalcitonin    Collection Time: 02/07/23  8:12 AM   Result Value Ref Range    Procalcitonin <0.05 0.00 - 0.49 ng/mL   C-Reactive Protein    Collection Time: 02/07/23  8:12 AM   Result Value Ref Range    CRP 0.5 0.0 - 0.9 mg/dL   Hemoglobin A1C    Collection Time: 02/07/23  8:12 AM   Result Value Ref Range    Hemoglobin A1C 5.0 4.8 - 5.6 %    eAG 97 mg/dL   COVID-19, Rapid    Collection Time: 02/07/23  8:23 AM    Specimen: Nasopharyngeal   Result Value Ref Range    Source NASAL      SARS-CoV-2, Rapid Not detected NOTD     Urinalysis w rflx microscopic    Collection Time: 02/07/23  8:25 AM   Result Value Ref Range    Color, UA YELLOW/STRAW      Appearance CLEAR      Specific Gravity, UA 1.013 1.001 - 1.023      pH, Urine 7.0 5.0 - 9.0      Protein,  (A) NEG mg/dL    Glucose, UA Negative mg/dL    Ketones, Urine TRACE (A) NEG mg/dL    Bilirubin Urine Negative NEG      Blood, Urine Negative NEG      Urobilinogen, Urine 0.2 0.2 - 1.0 EU/dL    Nitrite, Urine Negative NEG      Leukocyte Esterase, Urine Negative NEG      WBC, UA 0-4 U4 /hpf    RBC, UA 0-5 U5 /hpf    Epithelial Cells UA 0-5 U5 /hpf    BACTERIA, URINE Negative NEG /hpf Casts 0-2 U2 /lpf   Influenza A/B, Molecular    Collection Time: 02/07/23  8:30 AM    Specimen: Not Specified   Result Value Ref Range    Influenza A, [place] Not detected NOTD      Influenza B, [place] Not detected NOTD     Transthoracic echocardiogram (TTE) limited with contrast, bubble, strain, and 3D PRN    Collection Time: 02/07/23  2:20 PM   Result Value Ref Range    LV EDV A2C 98 mL    LV EDV A4C 92 mL    LV ESV A2C 48 mL    LV ESV A4C 45 mL    IVSd 1.2 (A) 0.6 - 0.9 cm    LVIDd 4.5 3.9 - 5.3 cm    LVIDs 3.3 cm    LVPWd 1.2 (A) 0.6 - 0.9 cm    LV Ejection Fraction A2C 51 %    LV Ejection Fraction A4C 51 %    EF BP 50 (A) 55 - 100 %    IVC Proxmal 1.9 cm    RVIDd 3.8 cm    TR Max Velocity 2.43 m/s    TR Peak Gradient 24 mmHg    Body Surface Area 1.78 m2    Fractional Shortening 2D 27 28 - 44 %    LV ESV Index A4C 26 mL/m2    LV EDV Index A4C 53 mL/m2    LV ESV Index A2C 28 mL/m2    LV EDV Index A2C 57 mL/m2    LVIDd Index 2.60 cm/m2    LVIDs Index 1.91 cm/m2    LV RWT Ratio 0.53     LV Mass 2D 198.1 (A) 67 - 162 g    LV Mass 2D Index 114.5 (A) 43 - 95 g/m2    Left Ventricular Ejection Fraction 58     LVEF MODALITY ECHO    Sedimentation Rate    Collection Time: 02/07/23  3:37 PM   Result Value Ref Range    Sed Rate, Automated 15 0 - 30 mm/hr   Lactate, Sepsis    Collection Time: 02/07/23  9:52 PM   Result Value Ref Range    Lactic Acid, Sepsis 0.7 0.4 - 2.0 MMOL/L       I have personally reviewed imaging studies:  Other Studies:  Vascular duplex renal arterial complete   Final Result   Challenging exam due to large body habitus and patient is on 6 L of   O2. Respiratory motion. Mild increased velocities in the right main renal   artery. The aortic to renal artery ratio is 1.9 on the right and 0.5 on the   left. No significant renal artery stenosis seen. Bilateral simple renal cyst as described. Medical renal disease. XR CHEST PORTABLE   Final Result   1.   Questionable worsening central interstitial infiltrate which could represent   early pulmonary edema. Additional cardiomegaly and small left effusion show no   significant interval change. Current Meds:  Current Facility-Administered Medications   Medication Dose Route Frequency    apixaban (ELIQUIS) tablet 5 mg  5 mg Oral Q12H    carvedilol (COREG) tablet 25 mg  25 mg Oral BID WC    cloNIDine (CATAPRES) tablet 0.2 mg  0.2 mg Oral BID PRN    hydrALAZINE (APRESOLINE) injection 10 mg  10 mg IntraVENous Q6H PRN    furosemide (LASIX) injection 20 mg  20 mg IntraVENous BID    potassium chloride (KLOR-CON M) extended release tablet 40 mEq  40 mEq Oral Daily with breakfast    magnesium oxide (MAG-OX) tablet 400 mg  400 mg Oral Daily    sodium chloride flush 0.9 % injection 5-40 mL  5-40 mL IntraVENous 2 times per day    sodium chloride flush 0.9 % injection 5-40 mL  5-40 mL IntraVENous PRN    0.9 % sodium chloride infusion   IntraVENous PRN    ondansetron (ZOFRAN-ODT) disintegrating tablet 4 mg  4 mg Oral Q8H PRN    Or    ondansetron (ZOFRAN) injection 4 mg  4 mg IntraVENous Q6H PRN    polyethylene glycol (GLYCOLAX) packet 17 g  17 g Oral Daily PRN    acetaminophen (TYLENOL) tablet 650 mg  650 mg Oral Q6H PRN    Or    acetaminophen (TYLENOL) suppository 650 mg  650 mg Rectal Q6H PRN    spironolactone (ALDACTONE) tablet 25 mg  25 mg Oral Daily       Signed:  Jose Winn MD    Part of this note may have been written by using a voice dictation software. The note has been proof read but may still contain some grammatical/other typographical errors.

## 2023-02-08 NOTE — PROGRESS NOTES
ACUTE PHYSICAL THERAPY GOALS:   (Developed with and agreed upon by patient and/or caregiver.)    (1.) Fred Perez  will move from supine to sit and sit to supine , scoot up and down, and roll side to side with MODIFIED INDEPENDENCE within 7 treatment day(s). (2.) Fred Perez will transfer from bed to chair and chair to bed with STAND BY ASSIST using the least restrictive device within 7 treatment day(s). (3.) Fred Perez will ambulate with STAND BY ASSIST for 250 feet with the least restrictive device within 7 treatment day(s). (4.) Fred Perez will perform standing static and dynamic balance activities x 8 minutes with STAND BY ASSIST to improve safety within 7 treatment day(s). (5.) Fred Perez will perform bilateral lower extremity exercises x 15 min for HEP with SUPERVISION to improve strength, endurance, and functional mobility within 7 treatment day(s). PHYSICAL THERAPY Initial Assessment, Daily Note, and PM  (Link to Caseload Tracking: PT Visit Days : 1  Acknowledge Orders  Time In/Out  PT Charge Capture  Rehab Caseload Tracker    Fred Perez is a 76 y.o. female   PRIMARY DIAGNOSIS: Pulmonary edema with congestive heart failure, NYHA class 2 ([facility])  Dyspnea and respiratory abnormalities [R06.00, R06.89]  Pulmonary edema with congestive heart failure, NYHA class 2 ([address] [I50.1]       Reason for Referral: Generalized Muscle Weakness (M62.81)  Difficulty in walking, Not elsewhere classified (R26.2)  Inpatient: Payor: MEDICARE / Plan: MEDICARE PART A AND B / Product Type: *No Product type* /     ASSESSMENT:     REHAB RECOMMENDATIONS:   Recommendation to date pending progress:  Setting:  Inpatient Rehab Facility    Equipment:    To Be Determined     ASSESSMENT:  Ms. Jacqueline Vela is a 76year old female who presents to ED with worsening SOB due to CHF exacerbation and pulmonary edema.  At baseline she lives alone and performs mobility independently using either a cane or rollator. Today patient performs bed mobility, transfers, and standing tasks in her room with CGA/Blanka. She then ambulates in hallway for about 75 ft using 2WW and CGA. Patient moves very slowly and fatigues quickly with mobility. She remains on 4L O2 throughout session and O2 remains at 98%. Pt presents as functioning below her baseline, with deficits in mobility including transfers, gait, balance, strength, and activity tolerance. Patient reports increased difficulty performing tasks at home and would benefit from more rehab prior to d/c to maximize safety and mobility since patient lives alone. Patient motivated and participates well throughout session despite states deficits. Will continue to follow.       [address] [facility] [address] [id number] AM-PAC 6 Clicks Basic Mobility Inpatient Short Form  AM-PAC Basic Mobility - Inpatient   How much help is needed turning from your back to your side while in a flat bed without using bedrails?: A Little  How much help is needed moving from lying on your back to sitting on the side of a flat bed without using bedrails?: A Little  How much help is needed moving to and from a bed to a chair?: A Little  How much help is needed standing up from a chair using your arms?: A Little  How much help is needed walking in hospital room?: A Little  How much help is needed climbing 3-5 steps with a railing?: A Little  [facility] Inpatient Mobility Raw Score : 18  AM-PAC Inpatient T-Scale Score : 43.63  Mobility Inpatient CMS 0-100% Score: 46.58  Mobility Inpatient CMS G-Code Modifier : CK    SUBJECTIVE:   Ms. Jagjit Umanzor states, \"I feel fine, just moving slow\"     Social/Functional Lives With: Alone  Type of Home: Apartment  Home Layout: One level  Home Access: Level entry  Bathroom Shower/Tub: Tub/Shower unit  Bathroom Equipment: Tub transfer bench  Home Equipment: [employer]  Has the patient had two or more falls in the past year or any fall with injury in the past year?: No    OBJECTIVE:     PAIN: Magalie Zavaleta / O2: PRECAUTION / Thomas Spillers / DRAINS:   Pre Treatment: 0/10         Post Treatment: 0/10 Vitals        Oxygen: 98% on 4L at rest and following activity       [place]    RESTRICTIONS/PRECAUTIONS:                    GROSS EVALUATION: Intact Impaired (Comments):   AROM [x]     PROM [x]    Strength []  Grossly 4/5 in BLE    Balance [] Sitting - Static: Good  Sitting - Dynamic: Good  Standing - Static: Fair  Standing - Dynamic: Fair   Posture [] N/A   Sensation []     Coordination []      Tone []     Edema []    Activity Tolerance []  Very limited from baseline     []      COGNITION/  PERCEPTION: Intact Impaired (Comments):   Orientation [x]     Vision [x]     Hearing [x]     Cognition  [x]       MOBILITY: I Mod I S SBA CGA Min Mod Max Total  NT x2 Comments:   Bed Mobility    Rolling [] [] [] [] [x] [] [] [] [] [] []    Supine to Sit [] [] [] [] [] [x] [] [] [] [] []    Scooting [] [] [] [] [x] [] [] [] [] [] []    Sit to Supine [] [] [] [] [] [] [] [] [] [] []    Transfers    Sit to Stand [] [] [] [] [x] [x] [] [] [] [] []    Bed to Chair [] [] [] [] [x] [] [] [] [] [] []    Stand to Sit [] [] [] [] [x] [] [] [] [] [] []     [] [] [] [] [] [] [] [] [] [] []    I=Independent, Mod I=Modified Independent, S=Supervision, SBA=Standby Assistance, CGA=Contact Guard Assistance,   Min=Minimal Assistance, Mod=Moderate Assistance, Max=Maximal Assistance, Total=Total Assistance, NT=Not Tested    GAIT: I Mod I S SBA CGA Min Mod Max Total  NT x2 Comments:   Level of Assistance [] [] [] [] [x] [] [] [] [] [] []    Distance 75 feet    DME Rolling Walker    Gait Quality Decreased sintia , Decreased step clearance, and Decreased step length    Weightbearing Status      Stairs      I=Independent, Mod I=Modified Independent, S=Supervision, SBA=Standby Assistance, CGA=Contact Guard Assistance,   Min=Minimal Assistance, Mod=Moderate Assistance, Max=Maximal Assistance, Total=Total Assistance, NT=Not Tested    PLAN:   FREQUENCY AND DURATION: 3 times/week for duration of hospital stay or until stated goals are met, whichever comes first.    THERAPY PROGNOSIS: Good    PROBLEM LIST:   (Skilled intervention is medically necessary to address:)  Decreased Activity Tolerance  Decreased AROM/PROM  Decreased Balance  Decreased Gait Ability  Decreased Safety Awareness  Decreased Strength  Decreased Transfer Abilities INTERVENTIONS PLANNED:   (Benefits and precautions of physical therapy have been discussed with the patient.)  Therapeutic Activity  Therapeutic Exercise/HEP  Neuromuscular Re-education  Gait Training  Education       TREATMENT:   EVALUATION: LOW COMPLEXITY: (Untimed Charge)    TREATMENT:   Co-Treatment PT/OT necessary due to patient's decreased overall endurance/tolerance levels, as well as need for high level skilled assistance to complete functional transfers/mobility and functional tasks  Therapeutic Activity (30 Minutes): Therapeutic activity included Rolling, Supine to Sit, Scooting, Transfer Training, Ambulation on level ground, Sitting balance , and Standing balance to improve functional Activity tolerance, Balance, Mobility, and Strength.     TREATMENT GRID:  N/A    AFTER TREATMENT PRECAUTIONS: Bed/Chair Locked, Call light within reach, Chair, Needs within reach, and RN notified    INTERDISCIPLINARY COLLABORATION:  RN/ PCT, PT/ PTA, and OT/ CASILLAS    EDUCATION: Education Given To: Patient  Education Provided: Role of Therapy;Plan of Care  Education Method: Verbal  Barriers to Learning: None  Education Outcome: Verbalized understanding    TIME IN/OUT:  Time In: 1305  Time Out: Jose Manuel Chance 47  Minutes: [facility], [address] [facility]

## 2023-02-08 NOTE — PROGRESS NOTES
ACUTE OCCUPATIONAL THERAPY GOALS:   (Developed with and agreed upon by patient and/or caregiver.)  1. Patient will complete lower body bathing and dressing with Mod I and adaptive equipment as   needed. 2. Patient will completed upper body bathing and dressing with Mod I and adaptive equipment as needed. 3. Patient will complete grooming standing at sink with Mod I and adaptive equipment as needed. 4. Patient will complete toileting with Mod I and adaptive equipment as needed. 5. Patient will tolerate 30 minutes of OT treatment with no rest breaks to increase activity tolerance for ADLs. 6. Patient will complete functional transfers with Mod I and adaptive equipment as needed. Timeframe: 7 visits       OCCUPATIONAL THERAPY Initial Assessment, Daily Note, and PM       OT Visit Days: 1  Acknowledge Orders  Time  OT Charge Capture  Rehab Caseload Tracker      Mainor Ambriz is a 76 y.o. female   PRIMARY DIAGNOSIS: Pulmonary edema with congestive heart failure, NYHA class 2 ([facility])  Dyspnea and respiratory abnormalities [R06.00, R06.89]  Pulmonary edema with congestive heart failure, NYHA class 2 ([address] [I50.1]       Reason for Referral: Generalized Muscle Weakness (M62.81)  Other abnormalities of gait and mobility (R26.89)  Inpatient: Payor: MEDICARE / Plan: MEDICARE PART A AND B / Product Type: *No Product type* /     ASSESSMENT:     REHAB RECOMMENDATIONS:   Recommendation to date pending progress:  Setting:  Inpatient Rehab Facility    Equipment:    To Be Determined     ASSESSMENT:  Ms. Tylor Peoples is a 75 y/o F presenting with pulmonary edema with CHF. Pt supine on entry on 4L o2 via NC, A/O x 4. Applicable PMHx: CHF EF 50%, uncontrolled HTN, A Fib. Pt denied light headedness, dizziness or SOB. Pt reports no fall(s) in past 6 months. PLOF Mod I, living alone with family support as needed. DME present in home; TTB, 4WRW, SPC.  Pt currently CGA with UB ADLs, Mod A with LB ADLs, Mod - Min A for toileting and Min - CGA for functional t/fs and household mobility for ADLs with RW. Rest breaks utilized as needed throughout session. Pt presents with deficits in ADLs, functional t/fs, household mobility for ADLs and activity tolerance compared to reported PLOF. Pt tolerated session well. Pt presents pleasant and motivated with strong rehab potential. Pt would benefit from continued skilled OT services for duration of hospital stay and after t/f to next level of care or until all stated goals met. Pt reports prior admission to [facility] and pt greatly wants to complete rehab at [facility]. Pt highly motivated and would greatly benefit from intensive therapy 3hrs/day 5x/wk to maximize pt safe return to (I).       [address] [facility] [address] [address] [facility] 6 Clicks Daily Activity Inpatient Short Form:    AM-PAC Daily Activity - Inpatient   How much help is needed for putting on and taking off regular lower body clothing?: A Lot  How much help is needed for bathing (which includes washing, rinsing, drying)?: A Lot  How much help is needed for toileting (which includes using toilet, bedpan, or urinal)?: A Little  How much help is needed for putting on and taking off regular upper body clothing?: A Little  How much help is needed for taking care of personal grooming?: A Little  How much help for eating meals?: None  AM-[facility] Inpatient Daily Activity Raw Score: 17  AM-PAC Inpatient ADL T-Scale Score : 37.26  ADL Inpatient CMS 0-100% Score: 50.11  ADL Inpatient CMS G-Code Modifier : CK           SUBJECTIVE:     Ms. Casie Hawkins states, \"I really want to go back upstairs [IRC]\"     Social/Functional Lives With: Alone  Type of Home: Apartment  Home Layout: One level  Home Access: Level entry  Bathroom Shower/Tub: Tub/Shower unit  Bathroom Equipment: Tub transfer bench  Home Equipment: [employer]  Has the patient had two or more falls in the past year or any fall with injury in the past year?: No    OBJECTIVE:     Thomas Lozano / Virginia Andrade / Howie Ramp: IV and Telemetry RESTRICTIONS/PRECAUTIONS:       PAIN: VITALS / O2:   Pre Treatment:   Pain Assessment: None - Denies Pain      Post Treatment: None       Vitals          Oxygen  O2 Device: Nasal cannula  O2 Flow Rate (L/min): 4 L/min         GROSS EVALUATION: INTACT IMPAIRED   (See Comments)   UE AROM [x] []   UE PROM [] []N/T   Strength [] LUE Strength  Gross LUE Strength: WFL  LUE Strength Comment: 3+/5  RUE Strength  Gross RUE Strength: WFL  RUE Strength Comment: 3+/5     Posture / Balance [] Sitting - Static: Good  Sitting - Dynamic: Good  Standing - Static: Fair  Standing - Dynamic: Fair   Sensation []  WFL   Coordination []  Generally decreased     Tone []  N/A     Edema [] N/A   Activity Tolerance [] Patient limited by fatigue     Hand Dominance R [x] L []      COGNITION/  PERCEPTION: INTACT IMPAIRED   (See Comments)   Orientation [x]     Vision [x]     Hearing [x]     Cognition  [x]     Perception []       MOBILITY: I Mod I S SBA CGA Min Mod Max Total  NT x2 Comments:   Bed Mobility    Rolling [] [] [] [] [x] [] [] [] [] [] []    Supine to Sit [] [] [] [] [] [x] [] [] [] [] []    Scooting [] [] [] [] [x] [] [] [] [] [] []    Sit to Supine [] [] [] [] [x] [] [] [] [] [] []    Transfers    Sit to Stand [] [] [] [] [x] [x] [] [] [] [] []    Bed to Chair [] [] [] [] [x] [] [] [] [] [] [] RW   Stand to Sit [] [] [] [] [x] [] [] [] [] [] []    Tub/Shower [] [] [] [] [] [] [] [] [] [x] []     Toilet [] [] [] [] [] [] [] [] [] [x] []      [] [] [] [] [] [] [] [] [] [] []    I=Independent, Mod I=Modified Independent, S=Supervision/Setup, SBA=Standby Assistance, CGA=Contact Guard Assistance, Min=Minimal Assistance, Mod=Moderate Assistance, Max=Maximal Assistance, Total=Total Assistance, NT=Not Tested    ACTIVITIES OF DAILY LIVING: I Mod I S SBA CGA Min Mod Max Total NT Comments   BASIC ADLs:              Upper Body Bathing  [] [] [] [] [x] [] [] [] [] [] UB bathing seated EOB   Lower Body Bathing [] [] [] [] [] [] [x] [] [] [] LB bathing seated EOB   Toileting [] [] [] [] [] [x] [x] [] [] []    Upper Body Dressing [] [] [] [] [x] [] [] [] [] [] Doff/don gown on front and around back   Lower Body Dressing [] [] [] [] [] [] [x] [] [] [] Simulated doff/don socks   Feeding [] [x] [] [] [] [] [] [] [] []    Grooming [] [] [] [] [x] [] [] [] [] [] Hand hygiene and face washing seated EOB   Personal Device Care [] [] [] [] [] [] [] [] [] []    Functional Mobility [] [] [] [] [x] [x] [] [] [] [] Household mobility for ADLs in room and on unit with RW   I=Independent, Mod I=Modified Independent, S=Supervision/Setup, SBA=Standby Assistance, CGA=Contact Guard Assistance, Min=Minimal Assistance, Mod=Moderate Assistance, Max=Maximal Assistance, Total=Total Assistance, NT=Not Tested    PLAN:   [facility]: 3 times/week for duration of hospital stay or until stated goals are met, whichever comes first.    PROBLEM LIST:   (Skilled intervention is medically necessary to address:)  Decreased ADL/Functional Activities  Decreased Activity Tolerance  Decreased Balance  Decreased Coordination  Decreased Gait Ability  Decreased Strength  Decreased Transfer Abilities   INTERVENTIONS PLANNED:  (Benefits and precautions of occupational therapy have been discussed with the patient.)  Self Care Training  Therapeutic Activity  Therapeutic Exercise/HEP  Neuromuscular Re-education  Manual Therapy  Education         TREATMENT:     EVALUATION: MODERATE COMPLEXITY: (Untimed Charge)    TREATMENT:   Co-Treatment PT/OT necessary due to patient's decreased overall endurance/tolerance levels, as well as need for high level skilled assistance to complete functional transfers/mobility and functional tasks  Neuromuscular Re-education (15 Minutes): Patient participated in neuromuscular re-education including functional reaching, weight shifting, postural training, standing tolerance activity , and sitting balance activity   with minimal verbal cues to improve sitting balance, standing balance, posture, and coordination in order to prepare for functional task, prepare for seated ADLs, prepare for standing ADLs, and prepare for functional transfer. Self Care (15 minutes): Patient participated in upper body bathing, lower body bathing, upper body dressing, lower body dressing, and grooming ADLs in supported sitting, unsupported sitting, and standing with minimal verbal cueing to increase independence, decrease assistance required, and increase activity tolerance. Patient also participated in functional mobility, functional transfer, energy conservation, and adaptive equipment training to increase independence, decrease assistance required, and increase activity tolerance.      TREATMENT GRID:  N/A    AFTER TREATMENT PRECAUTIONS: Bed/Chair Locked, Call light within reach, Chair, Needs within reach, and RN notified    INTERDISCIPLINARY COLLABORATION:  RN/ PCT, PT/ PTA, and OT/ CASILLAS    EDUCATION:  Education Given To: Patient  Education Provided: Role of Therapy;Plan of Care;Energy Conservation  Education Method: Verbal  Barriers to Learning: None  Education Outcome: Verbalized understanding;Continued education needed    TOTAL TREATMENT DURATION AND TIME:  Time In: [address]  Time Out: Jose [address]  Minutes: [facility], OT

## 2023-02-08 NOTE — PROGRESS NOTES
Comprehensive Nutrition Assessment    Type and Reason for Visit: Initial, Positive Nutrition Screen  Malnutrition Screening Tool: Malnutrition Screen  Have you recently lost weight without trying?: 2 to 13 pounds (1 point)  Have you been eating poorly because of a decreased appetite?: Yes (1 point)  Malnutrition Screening Tool Score: 2    Nutrition Recommendations/Plan:   Meals and Snacks:  Diet: Continue current order  Nutrition Supplement Therapy:  Medical food supplement therapy:  Initiate Ensure Enlive twice per day (this provides 350 kcal and 20 grams protein per bottle)     Malnutrition Assessment:  Malnutrition Status: At risk for malnutrition (Comment) (5.7% decrease in body weight over the last two months)  no jailyn muscle or fat wasting noted    Nutrition Assessment:  Nutrition History: Pt reports slow weight loss over the last several years. She admits to intentionally losing some weight but reports it has been in combination with poor appetite and medication changes. EMR demonstrates a 5.7% decrease in body weight over the last 2 months (previous weight of 74.8 kg on 12/5/22). Pt relates this weight loss to starting a new anxiety/depression medication that caused her to have persistent diarrhea. Pt states her weight loss slowed and diarrhea stopped when her doctor removed her from this medication. States her appetite was decreased also during this time but does not necessarily relate that to the medication she was put on. Reports hx of decreased appetite when she is holding more fluid and suspects this to be the cause. Pt states she eats 3 smaller meals/day at baseline, one of which is from meals on wheels, and has continued to follow this eating pattern right up until admission. Do You Have Any Cultural, Zoroastrianism, or Ethnic Food Preferences?: No   Nutrition Background:       PMH significant for CHF, CKD, depression, hypercholesterolemia, HTN, morbid obesity, N/V, PUD, and sjogren's syndrome.  Pt presents this admission for SOB and palpitations. Nutrition Interval:  RD met w/pt at bedside. Pt reports her appetite now improved. States she had a few bites of food in the ED last night but otherwise her first meal was a sandwich nursing brought her for lunch (ate 100%). Pt is agreeable to consuming nutrition supplements during admission to help increase kcal/protein intake. EE BID added. Pt voices surprise over how low her weight is after noticing a decrease in the fluid she is holding. RD encouraged adding nutrition supplements in 1-2x/day at home to help prevent further weight loss. Current Nutrition Therapies:  ADULT DIET; Regular; Low Fat/Low Chol/High Fiber/2 gm Na; No Added Salt (3-4 gm); 2000 ml    Current Intake:   Average Meal Intake: % (per patient, ate 100% of sandwich brought by nursing) Average Supplements Intake: None Ordered      Anthropometric Measures:  Height: 5' 2\" (157.5 cm)  Current Body Wt: 155 lb 6.8 oz (70.5 kg) (2/8), Weight source: Not Specified  BMI: 28.4, Overweight (BMI 25.0-29. 9)  Admission Body Weight: 155 lb 6.8 oz (70.5 kg)  Ideal Body Weight (Kg) (Calculated): 50 kg (110 lbs), 141.3 %  Usual Body Wt:  (per patient, weight has been slowly declining over the last several years), Percent weight change:         BMI Category Overweight (BMI 25.0-29. 9)    Estimated Daily Nutrient Needs:  Energy (kcal/day): 2812-9700 (20-25 kcal/kg) (Kcal/kg Weight used: 70.5 kg Current  Protein (g/day): 71-85 (1-1.2 g/kg) Weight Used: (Current) 70.5 kg  Fluid (ml/day):   (1 ml/kcal)    Nutrition Diagnosis:   Predicted inadequate energy intake related to  (intermittent poor appetite) as evidenced by weight loss (5.7% weight loss in the last 2 months and pt reporting decreased appetite intermittently over the last 2-3 months)    Nutrition Interventions:   Food and/or Nutrient Delivery: Continue Current Diet, Start Oral Nutrition Supplement  Nutrition Education/Counseling: No recommendation at this time  Coordination of Nutrition Care: Continue to monitor while inpatient  Plan of Care discussed with: patient    Goals:       Active Goal: Meet at least 75% of estimated needs, by next RD assessment       Nutrition Monitoring and Evaluation:      Food/Nutrient Intake Outcomes: Food and Nutrient Intake, Supplement Intake  Physical Signs/Symptoms Outcomes: Weight, Meal Time Behavior    Discharge Planning:    Continue current diet, Continue Oral Nutrition Supplement    Maksim Barney RD

## 2023-02-08 NOTE — PLAN OF CARE
Problem: Discharge Planning  Goal: Discharge to home or other facility with appropriate resources  Outcome: Progressing  Flowsheets  Taken 2/8/2023 0745 by Zafar Bernal RN  Discharge to home or other facility with appropriate resources:   Identify barriers to discharge with patient and caregiver   Arrange for needed discharge resources and transportation as appropriate   Identify discharge learning needs (meds, wound care, etc)  Taken 2/7/2023 2115 by Milvia Scott RN  Discharge to home or other facility with appropriate resources:   Identify barriers to discharge with patient and caregiver   Arrange for needed discharge resources and transportation as appropriate   Identify discharge learning needs (meds, wound care, etc)   Refer to discharge planning if patient needs post-hospital services based on physician order or complex needs related to functional status, cognitive ability or social support system

## 2023-02-08 NOTE — PROGRESS NOTES
Physical Therapy Note:    Attempted to see patient this AM for physical therapy evaluation session. Patient is currently off the floor at [address] [facility]. Will follow and re-attempt as schedule permits/patient available.  Thank you,    Nellie Varela, PT     Rehab Caseload Tracker

## 2023-02-08 NOTE — PROGRESS NOTES
Pt 24 hour urine collection started at [phone] today. Will pass along to techs and Night Shift RN.

## 2023-02-08 NOTE — PROGRESS NOTES
Occupational Therapy Note:    Evaluation order received and chart reviewed. Attempted to see patient this AM for occupational therapy evaluation session. Patient with transport moving [facility] to [address]. Will follow and re-attempt as schedule permits/patient available.      Thank you,    Alphonse Gunter, OT    Rehab Caseload Tracker

## 2023-02-09 LAB
ALBUMIN SERPL-MCNC: 3.1 G/DL (ref 3.2–4.6)
ALBUMIN/GLOB SERPL: 1 (ref 0.4–1.6)
ALP SERPL-CCNC: 89 U/L (ref 50–136)
ALT SERPL-CCNC: 12 U/L (ref 12–65)
ANION GAP SERPL CALC-SCNC: 14 MMOL/L (ref 2–11)
AST SERPL-CCNC: 14 U/L (ref 15–37)
BASOPHILS # BLD: 0.1 K/UL (ref 0–0.2)
BASOPHILS NFR BLD: 1 % (ref 0–2)
BILIRUB SERPL-MCNC: 0.8 MG/DL (ref 0.2–1.1)
BUN SERPL-MCNC: 32 MG/DL (ref 8–23)
CALCIUM SERPL-MCNC: 9.2 MG/DL (ref 8.3–10.4)
CHLORIDE SERPL-SCNC: 108 MMOL/L (ref 101–110)
CO2 SERPL-SCNC: 24 MMOL/L (ref 21–32)
CREAT SERPL-MCNC: 1.1 MG/DL (ref 0.6–1)
DIFFERENTIAL METHOD BLD: ABNORMAL
EOSINOPHIL # BLD: 0.1 K/UL (ref 0–0.8)
EOSINOPHIL NFR BLD: 2 % (ref 0.5–7.8)
ERYTHROCYTE [DISTWIDTH] IN BLOOD BY AUTOMATED COUNT: 13 % (ref 11.9–14.6)
GLOBULIN SER CALC-MCNC: 3.2 G/DL (ref 2.8–4.5)
GLUCOSE SERPL-MCNC: 89 MG/DL (ref 65–100)
HCT VFR BLD AUTO: 34.3 % (ref 35.8–46.3)
HGB BLD-MCNC: 11.1 G/DL (ref 11.7–15.4)
IMM GRANULOCYTES # BLD AUTO: 0 K/UL (ref 0–0.5)
IMM GRANULOCYTES NFR BLD AUTO: 0 % (ref 0–5)
LYMPHOCYTES # BLD: 1.2 K/UL (ref 0.5–4.6)
LYMPHOCYTES NFR BLD: 20 % (ref 13–44)
MAGNESIUM SERPL-MCNC: 2 MG/DL (ref 1.8–2.4)
MCH RBC QN AUTO: 30.8 PG (ref 26.1–32.9)
MCHC RBC AUTO-ENTMCNC: 32.4 G/DL (ref 31.4–35)
MCV RBC AUTO: 95.3 FL (ref 82–102)
MONOCYTES # BLD: 0.7 K/UL (ref 0.1–1.3)
MONOCYTES NFR BLD: 11 % (ref 4–12)
NEUTS SEG # BLD: 3.9 K/UL (ref 1.7–8.2)
NEUTS SEG NFR BLD: 66 % (ref 43–78)
NRBC # BLD: 0 K/UL (ref 0–0.2)
PLATELET # BLD AUTO: 179 K/UL (ref 150–450)
PMV BLD AUTO: 10.8 FL (ref 9.4–12.3)
POTASSIUM SERPL-SCNC: 3.9 MMOL/L (ref 3.5–5.1)
PROT SERPL-MCNC: 6.3 G/DL (ref 6.3–8.2)
RBC # BLD AUTO: 3.6 M/UL (ref 4.05–5.2)
SODIUM SERPL-SCNC: 146 MMOL/L (ref 133–143)
WBC # BLD AUTO: 5.9 K/UL (ref 4.3–11.1)

## 2023-02-09 PROCEDURE — 80053 COMPREHEN METABOLIC PANEL: CPT

## 2023-02-09 PROCEDURE — 85025 COMPLETE CBC W/AUTO DIFF WBC: CPT

## 2023-02-09 PROCEDURE — 83735 ASSAY OF MAGNESIUM: CPT

## 2023-02-09 PROCEDURE — 36415 COLL VENOUS BLD VENIPUNCTURE: CPT

## 2023-02-09 PROCEDURE — 6360000002 HC RX W HCPCS: Performed by: HOSPITALIST

## 2023-02-09 PROCEDURE — 6370000000 HC RX 637 (ALT 250 FOR IP): Performed by: HOSPITALIST

## 2023-02-09 PROCEDURE — 2700000000 HC OXYGEN THERAPY PER DAY

## 2023-02-09 PROCEDURE — 99232 SBSQ HOSP IP/OBS MODERATE 35: CPT | Performed by: INTERNAL MEDICINE

## 2023-02-09 PROCEDURE — 6370000000 HC RX 637 (ALT 250 FOR IP): Performed by: NURSE PRACTITIONER

## 2023-02-09 PROCEDURE — 2580000003 HC RX 258: Performed by: HOSPITALIST

## 2023-02-09 PROCEDURE — 94760 N-INVAS EAR/PLS OXIMETRY 1: CPT

## 2023-02-09 PROCEDURE — 1100000003 HC PRIVATE W/ TELEMETRY

## 2023-02-09 RX ADMIN — FUROSEMIDE 20 MG: 10 INJECTION, SOLUTION INTRAMUSCULAR; INTRAVENOUS at 08:57

## 2023-02-09 RX ADMIN — APIXABAN 5 MG: 5 TABLET, FILM COATED ORAL at 06:45

## 2023-02-09 RX ADMIN — SODIUM CHLORIDE, PRESERVATIVE FREE 5 ML: 5 INJECTION INTRAVENOUS at 19:20

## 2023-02-09 RX ADMIN — SPIRONOLACTONE 25 MG: 25 TABLET ORAL at 08:57

## 2023-02-09 RX ADMIN — CARVEDILOL 25 MG: 25 TABLET, FILM COATED ORAL at 08:57

## 2023-02-09 RX ADMIN — CARVEDILOL 25 MG: 25 TABLET, FILM COATED ORAL at 17:00

## 2023-02-09 RX ADMIN — APIXABAN 5 MG: 5 TABLET, FILM COATED ORAL at 19:20

## 2023-02-09 RX ADMIN — POTASSIUM CHLORIDE 40 MEQ: 1500 TABLET, EXTENDED RELEASE ORAL at 08:57

## 2023-02-09 RX ADMIN — MAGNESIUM GLUCONATE 500 MG ORAL TABLET 400 MG: 500 TABLET ORAL at 08:56

## 2023-02-09 RX ADMIN — FUROSEMIDE 20 MG: 10 INJECTION, SOLUTION INTRAMUSCULAR; INTRAVENOUS at 17:00

## 2023-02-09 ASSESSMENT — PAIN SCALES - GENERAL
PAINLEVEL_OUTOF10: 0

## 2023-02-09 NOTE — PROGRESS NOTES
Hospitalist Progress Note   Admit Date:  2023  7:53 AM   Name:  Sotero Blair   Age:  76 y.o. Sex:  female  :  1945   MRN:  [id number]   Room:  [facility]/    Presenting Complaint: Fatigue     Reason(s) for Admission: Dyspnea and respiratory abnormalities [R06.00, R06.89]  Pulmonary edema with congestive heart failure, NYHA class 2 ([address] [I50.1]     Hospital Course:   76years old female with history of sCHF/ ef50%, chronically uncontrolled HTN, PMR, Sjogren dz, afib on eliquis, PUD, presents with SOB for last 2 days. Chest x-ray states pneumonia versus pulmonary edema. Subjective & 24hr Events (23): Was seen and examined at the bedside. No overnight events. Patient's only complaint this morning with some mild shortness of breath. Other than that patient was feeling better. Lying in bed comfortably. Assessment & Plan:   Pulmonary edema with congestive heart failure, NYHA class 2/hypertensive emergency  - Likely secondary to noncompliance  -Cardiology consulted, appreciate recommendation  - Telemetry  - Tight blood pressure control  - IV diuresis  - Strict I's and O's  - Daily weights  - As needed oxygen  - Dyspnea likely related to hypertensive emergency on presentation  - Patient has intolerance to ACE inhibitors and ARB's or higher Norvasc doses  - Continue Coreg  - Cardiology added Aldactone  - Repeat echo with normal left ventricular systolic function  - Renal ultrasound with medical renal disease. Simple cyst.  -Continue Aldactone, Coreg, Lasix    Persistent atrial fibrillation  History of ablation  - Currently sinus rhythm  - Continue Eliquis    Hypertension  - Uncontrolled  - No renal artery stenosis on renal ultrasound  - Cardiology making adjustments to medications  - Follow-up vitals    CKD stage III  - Continue to monitor daily BMP  - Avoid nephrotoxic agents      Anticipated discharge needs:    Dispo pending clinical course    Diet:  ADULT DIET; Regular;  Low Fat/Low Chol/High Fiber/2 gm Na; No Added Salt (3-4 gm); 2000 ml  ADULT ORAL NUTRITION SUPPLEMENT; Dinner, Breakfast; Standard High Calorie/High Protein Oral Supplement  DVT PPx: Eliquis  Code status: Full Code    Hospital Problems:  Principal Problem:    Pulmonary edema with congestive heart failure, NYHA class 2 (HCC)  Active Problems:    Status post ablation of atrial fibrillation    Dyspnea and respiratory abnormalities    Stage 3 chronic kidney disease (HCC)    Hypertension  Resolved Problems:    * No resolved hospital problems. *      Objective:   Patient Vitals for the past 24 hrs:   Temp Pulse Resp BP SpO2   02/09/23 1209 99.5 °F (37.5 °C) 63 -- (!) 158/69 98 %   02/09/23 0757 98.8 °F (37.1 °C) 65 16 (!) 188/79 98 %   02/09/23 0400 97.6 °F (36.4 °C) 61 16 (!) 158/68 98 %   02/09/23 0006 97.6 °F (36.4 °C) 62 16 133/63 99 %   02/08/23 1956 97.8 °F (36.6 °C) 66 18 124/60 99 %   02/08/23 1746 98.1 °F (36.7 °C) 62 -- (!) 123/47 99 %   02/08/23 1708 -- 63 -- (!) 141/65 --       Oxygen Therapy  SpO2: 98 %  Pulse via Oximetry: 63 beats per minute  Pulse Oximeter Device Mode: Intermittent  O2 Device: Nasal cannula  Skin Assessment: Clean, dry, & intact  Skin Protection for O2 Device: No  PEEP/CPAP (cm H2O): 4 cm H20  O2 Flow Rate (L/min): 4 L/min    Estimated body mass index is 28.5 kg/m² as calculated from the following:    Height as of this encounter: 5' 2\" (1.575 m). Weight as of this encounter: 155 lb 12.8 oz (70.7 kg). Intake/Output Summary (Last 24 hours) at 2/9/2023 1311  Last data filed at 2/9/2023 0451  Gross per 24 hour   Intake 240 ml   Output 350 ml   Net -110 ml         Physical Exam:   Blood pressure (!) 158/69, pulse 63, temperature 99.5 °F (37.5 °C), temperature source Oral, resp. rate 16, height 5' 2\" (1.575 m), weight 155 lb 12.8 oz (70.7 kg), SpO2 98 %, not currently breastfeeding. General:    Well nourished.     Head:  Normocephalic, atraumatic  Eyes:  Sclerae appear normal.  Pupils equally round.  ENT:  Nares appear normal.  Moist oral mucosa  Neck:  No restricted ROM. Trachea midline   CV:   RRR. No m/r/g. No jugular venous distension. Lungs:   Decreased breath sounds bilaterally. No wheezing, rhonchi, or rales. Symmetric expansion. Abdomen:   Soft, nontender, nondistended. Extremities: No cyanosis or clubbing. No edema  Skin:     No rashes and normal coloration. Warm and dry. Neuro:  CN II-XII grossly intact. Psych:  Normal mood and affect.       I have personally reviewed labs and tests:  Recent Labs:  Recent Results (from the past 48 hour(s))   Transthoracic echocardiogram (TTE) limited with contrast, bubble, strain, and 3D PRN    Collection Time: 02/07/23  2:20 PM   Result Value Ref Range    LV EDV A2C 98 mL    LV EDV A4C 92 mL    LV ESV A2C 48 mL    LV ESV A4C 45 mL    IVSd 1.2 (A) 0.6 - 0.9 cm    LVIDd 4.5 3.9 - 5.3 cm    LVIDs 3.3 cm    LVPWd 1.2 (A) 0.6 - 0.9 cm    LV Ejection Fraction A2C 51 %    LV Ejection Fraction A4C 51 %    EF BP 50 (A) 55 - 100 %    IVC Proxmal 1.9 cm    RVIDd 3.8 cm    TR Max Velocity 2.43 m/s    TR Peak Gradient 24 mmHg    Body Surface Area 1.78 m2    Fractional Shortening 2D 27 28 - 44 %    LV ESV Index A4C 26 mL/m2    LV EDV Index A4C 53 mL/m2    LV ESV Index A2C 28 mL/m2    LV EDV Index A2C 57 mL/m2    LVIDd Index 2.60 cm/m2    LVIDs Index 1.91 cm/m2    LV RWT Ratio 0.53     LV Mass 2D 198.1 (A) 67 - 162 g    LV Mass 2D Index 114.5 (A) 43 - 95 g/m2    Left Ventricular Ejection Fraction 58     LVEF MODALITY ECHO    Sedimentation Rate    Collection Time: 02/07/23  3:37 PM   Result Value Ref Range    Sed Rate, Automated 15 0 - 30 mm/hr   Lactate, Sepsis    Collection Time: 02/07/23  9:52 PM   Result Value Ref Range    Lactic Acid, Sepsis 0.7 0.4 - 2.0 MMOL/L   CBC with Auto Differential    Collection Time: 02/09/23  5:39 AM   Result Value Ref Range    WBC 5.9 4.3 - 11.1 K/uL    RBC 3.60 (L) 4.05 - 5.2 M/uL    Hemoglobin 11.1 (L) 11.7 - 15.4 g/dL Hematocrit 34.3 (L) 35.8 - 46.3 %    MCV 95.3 82 - 102 FL    MCH 30.8 26.1 - 32.9 PG    MCHC 32.4 31.4 - 35.0 g/dL    RDW 13.0 11.9 - 14.6 %    Platelets 863 366 - 164 K/uL    MPV 10.8 9.4 - 12.3 FL    nRBC 0.00 0.0 - 0.2 K/uL    Differential Type AUTOMATED      Seg Neutrophils 66 43 - 78 %    Lymphocytes 20 13 - 44 %    Monocytes 11 4.0 - 12.0 %    Eosinophils % 2 0.5 - 7.8 %    Basophils 1 0.0 - 2.0 %    Immature Granulocytes 0 0.0 - 5.0 %    Segs Absolute 3.9 1.7 - 8.2 K/UL    Absolute Lymph # 1.2 0.5 - 4.6 K/UL    Absolute Mono # 0.7 0.1 - 1.3 K/UL    Absolute Eos # 0.1 0.0 - 0.8 K/UL    Basophils Absolute 0.1 0.0 - 0.2 K/UL    Absolute Immature Granulocyte 0.0 0.0 - 0.5 K/UL   Magnesium    Collection Time: 02/09/23  5:39 AM   Result Value Ref Range    Magnesium 2.0 1.8 - 2.4 mg/dL   Comprehensive Metabolic Panel    Collection Time: 02/09/23  5:39 AM   Result Value Ref Range    Sodium 146 (H) 133 - 143 mmol/L    Potassium 3.9 3.5 - 5.1 mmol/L    Chloride 108 101 - 110 mmol/L    CO2 24 21 - 32 mmol/L    Anion Gap 14 (H) 2 - 11 mmol/L    Glucose 89 65 - 100 mg/dL    BUN 32 (H) 8 - 23 MG/DL    Creatinine 1.10 (H) 0.6 - 1.0 MG/DL    Est, Glom Filt Rate 51 (L) >60 ml/min/1.73m2    Calcium 9.2 8.3 - 10.4 MG/DL    Total Bilirubin 0.8 0.2 - 1.1 MG/DL    ALT 12 12 - 65 U/L    AST 14 (L) 15 - 37 U/L    Alk Phosphatase 89 50 - 136 U/L    Total Protein 6.3 6.3 - 8.2 g/dL    Albumin 3.1 (L) 3.2 - 4.6 g/dL    Globulin 3.2 2.8 - 4.5 g/dL    Albumin/Globulin Ratio 1.0 0.4 - 1.6         I have personally reviewed imaging studies:  Other Studies:  Vascular duplex renal arterial complete   Final Result   Challenging exam due to large body habitus and patient is on 6 L of   O2. Respiratory motion. Mild increased velocities in the right main renal   artery. The aortic to renal artery ratio is 1.9 on the right and 0.5 on the   left. No significant renal artery stenosis seen. Bilateral simple renal cyst as described.  Medical renal disease. XR CHEST PORTABLE   Final Result   1. Questionable worsening central interstitial infiltrate which could represent   early pulmonary edema. Additional cardiomegaly and small left effusion show no   significant interval change. Current Meds:  Current Facility-Administered Medications   Medication Dose Route Frequency    apixaban (ELIQUIS) tablet 5 mg  5 mg Oral Q12H    carvedilol (COREG) tablet 25 mg  25 mg Oral BID WC    cloNIDine (CATAPRES) tablet 0.2 mg  0.2 mg Oral BID PRN    hydrALAZINE (APRESOLINE) injection 10 mg  10 mg IntraVENous Q6H PRN    furosemide (LASIX) injection 20 mg  20 mg IntraVENous BID    magnesium oxide (MAG-OX) tablet 400 mg  400 mg Oral Daily    sodium chloride flush 0.9 % injection 5-40 mL  5-40 mL IntraVENous 2 times per day    sodium chloride flush 0.9 % injection 5-40 mL  5-40 mL IntraVENous PRN    0.9 % sodium chloride infusion   IntraVENous PRN    ondansetron (ZOFRAN-ODT) disintegrating tablet 4 mg  4 mg Oral Q8H PRN    Or    ondansetron (ZOFRAN) injection 4 mg  4 mg IntraVENous Q6H PRN    polyethylene glycol (GLYCOLAX) packet 17 g  17 g Oral Daily PRN    acetaminophen (TYLENOL) tablet 650 mg  650 mg Oral Q6H PRN    Or    acetaminophen (TYLENOL) suppository 650 mg  650 mg Rectal Q6H PRN    spironolactone (ALDACTONE) tablet 25 mg  25 mg Oral Daily       Signed:  Merced Blair MD    Part of this note may have been written by using a voice dictation software. The note has been proof read but may still contain some grammatical/other typographical errors.

## 2023-02-09 NOTE — PROGRESS NOTES
[facility] CARDIOLOGY PROGRESS NOTE           2/9/2023 9:05 AM    Admit Date: 2/7/2023      Subjective:   Patient reports that she still has some dyspnea and cough this AM. No chest pain, nausea, vomiting, palpitations, leg swelling or other complaints. Has not been out of bed today yet. ROS:  Cardiovascular:  As noted above    Objective:      Vitals:    02/09/23 0006 02/09/23 0345 02/09/23 0400 02/09/23 0757   BP: 133/63  (!) 158/68 (!) 188/79   Pulse: 62  61 65   Resp: 16  16 16   Temp: 97.6 °F (36.4 °C)  97.6 °F (36.4 °C) 98.8 °F (37.1 °C)   TempSrc: Oral  Oral Oral   SpO2: 99%  98% 98%   Weight:  155 lb 12.8 oz (70.7 kg)     Height:           Physical Exam:  General-No Acute Distress, awake, alert   Neck- supple, no JVD  CV- regular rate and rhythm no MRG  Lung- non labored, decreased in lower fields   Abd- soft, nontender, nondistended  Ext- no edema bilaterally in legs   Skin- warm and dry    Data Review:   Recent Labs     02/07/23  0812 02/09/23  0539     --    K 3.4*  --    MG 1.7*  --    BUN 11  --    WBC 8.3 5.9   HGB 12.9 11.1*   HCT 39.8 34.3*    179       Assessment/Plan:     Active Hospital Problems. Dyspnea and respiratory abnormalities    Acute hypoxic respiratory failure     Pulmonary edema with congestive heart failure, NYHA class 2 ([facility])  - diuresis as tolerated, wean oxygen  - LVEF normal   - on Coreg, aldactone, lasix. Status post ablation of atrial fibrillation  - in sinus, continue coreg/Eliquis. Hypertension  - uncontrolled  - No significant renal artery stenosis on renal US   - see what response to AM medications is today, may benefit from additional therapy. Reported intolerance of ACE/ARB,calcium channel blockers. - long acting nitrate or hydralazine could be options. Stage 3 chronic kidney disease ([address]  - monitor renal function and electrolytes, no AM renal function check today at this time.        Patrick Tavera, DO  2/9/2023 9:05 AM

## 2023-02-09 NOTE — PROGRESS NOTES
24 hour urine taken to lab and handed off to , only around 900 in container, per night shift RN and patient there had been several times the purewick failed and urine ended up in patients brief rather than in container.

## 2023-02-10 ENCOUNTER — HOSPITAL ENCOUNTER (INPATIENT)
Age: 78
LOS: 7 days | Discharge: HOME | DRG: 292 | End: 2023-02-17
Attending: PHYSICAL MEDICINE & REHABILITATION | Admitting: PHYSICAL MEDICINE & REHABILITATION
Payer: MEDICARE

## 2023-02-10 VITALS
BODY MASS INDEX: 28.67 KG/M2 | HEART RATE: 62 BPM | DIASTOLIC BLOOD PRESSURE: 55 MMHG | TEMPERATURE: 97.5 F | RESPIRATION RATE: 19 BRPM | OXYGEN SATURATION: 99 % | HEIGHT: 62 IN | SYSTOLIC BLOOD PRESSURE: 124 MMHG | WEIGHT: 155.8 LBS

## 2023-02-10 PROBLEM — R53.81 PHYSICAL DECONDITIONING: Status: ACTIVE | Noted: 2019-07-15

## 2023-02-10 PROBLEM — J96.01 ACUTE RESPIRATORY FAILURE WITH HYPOXIA (HCC): Status: ACTIVE | Noted: 2023-02-10

## 2023-02-10 PROBLEM — I50.1 PULMONARY EDEMA WITH CONGESTIVE HEART FAILURE (HCC): Status: ACTIVE | Noted: 2023-02-10

## 2023-02-10 PROBLEM — R26.89 IMPAIRED GAIT AND MOBILITY: Status: ACTIVE | Noted: 2019-07-15

## 2023-02-10 LAB
ALBUMIN SERPL-MCNC: 3 G/DL (ref 3.2–4.6)
ALBUMIN/GLOB SERPL: 0.9 (ref 0.4–1.6)
ALP SERPL-CCNC: 84 U/L (ref 50–136)
ALT SERPL-CCNC: 26 U/L (ref 12–65)
ANION GAP SERPL CALC-SCNC: 8 MMOL/L (ref 2–11)
AST SERPL-CCNC: 16 U/L (ref 15–37)
BASOPHILS # BLD: 0.1 K/UL (ref 0–0.2)
BASOPHILS NFR BLD: 1 % (ref 0–2)
BILIRUB SERPL-MCNC: 1 MG/DL (ref 0.2–1.1)
BUN SERPL-MCNC: 39 MG/DL (ref 8–23)
CALCIUM SERPL-MCNC: 9.3 MG/DL (ref 8.3–10.4)
CHLORIDE SERPL-SCNC: 106 MMOL/L (ref 101–110)
CO2 SERPL-SCNC: 26 MMOL/L (ref 21–32)
CREAT SERPL-MCNC: 1 MG/DL (ref 0.6–1)
DIFFERENTIAL METHOD BLD: ABNORMAL
EOSINOPHIL # BLD: 0.1 K/UL (ref 0–0.8)
EOSINOPHIL NFR BLD: 2 % (ref 0.5–7.8)
ERYTHROCYTE [DISTWIDTH] IN BLOOD BY AUTOMATED COUNT: 12.9 % (ref 11.9–14.6)
GLOBULIN SER CALC-MCNC: 3.3 G/DL (ref 2.8–4.5)
GLUCOSE SERPL-MCNC: 99 MG/DL (ref 65–100)
HCT VFR BLD AUTO: 33.8 % (ref 35.8–46.3)
HGB BLD-MCNC: 11 G/DL (ref 11.7–15.4)
IMM GRANULOCYTES # BLD AUTO: 0 K/UL (ref 0–0.5)
IMM GRANULOCYTES NFR BLD AUTO: 1 % (ref 0–5)
LYMPHOCYTES # BLD: 1.2 K/UL (ref 0.5–4.6)
LYMPHOCYTES NFR BLD: 18 % (ref 13–44)
MAGNESIUM SERPL-MCNC: 2.1 MG/DL (ref 1.8–2.4)
MCH RBC QN AUTO: 30.6 PG (ref 26.1–32.9)
MCHC RBC AUTO-ENTMCNC: 32.5 G/DL (ref 31.4–35)
MCV RBC AUTO: 94.2 FL (ref 82–102)
MONOCYTES # BLD: 0.7 K/UL (ref 0.1–1.3)
MONOCYTES NFR BLD: 10 % (ref 4–12)
NEUTS SEG # BLD: 4.4 K/UL (ref 1.7–8.2)
NEUTS SEG NFR BLD: 68 % (ref 43–78)
NRBC # BLD: 0 K/UL (ref 0–0.2)
PLATELET # BLD AUTO: 193 K/UL (ref 150–450)
PMV BLD AUTO: 11.3 FL (ref 9.4–12.3)
POTASSIUM SERPL-SCNC: 4.3 MMOL/L (ref 3.5–5.1)
PROT SERPL-MCNC: 6.3 G/DL (ref 6.3–8.2)
RBC # BLD AUTO: 3.59 M/UL (ref 4.05–5.2)
SODIUM SERPL-SCNC: 140 MMOL/L (ref 133–143)
WBC # BLD AUTO: 6.4 K/UL (ref 4.3–11.1)

## 2023-02-10 PROCEDURE — 6360000002 HC RX W HCPCS: Performed by: HOSPITALIST

## 2023-02-10 PROCEDURE — 99223 1ST HOSP IP/OBS HIGH 75: CPT | Performed by: PHYSICIAN ASSISTANT

## 2023-02-10 PROCEDURE — 1180000000 HC REHAB R&B

## 2023-02-10 PROCEDURE — 99232 SBSQ HOSP IP/OBS MODERATE 35: CPT | Performed by: INTERNAL MEDICINE

## 2023-02-10 PROCEDURE — 36415 COLL VENOUS BLD VENIPUNCTURE: CPT

## 2023-02-10 PROCEDURE — 6370000000 HC RX 637 (ALT 250 FOR IP): Performed by: PHYSICIAN ASSISTANT

## 2023-02-10 PROCEDURE — 85025 COMPLETE CBC W/AUTO DIFF WBC: CPT

## 2023-02-10 PROCEDURE — 6370000000 HC RX 637 (ALT 250 FOR IP): Performed by: HOSPITALIST

## 2023-02-10 PROCEDURE — 6370000000 HC RX 637 (ALT 250 FOR IP): Performed by: INTERNAL MEDICINE

## 2023-02-10 PROCEDURE — 83735 ASSAY OF MAGNESIUM: CPT

## 2023-02-10 PROCEDURE — 97530 THERAPEUTIC ACTIVITIES: CPT

## 2023-02-10 PROCEDURE — 2580000003 HC RX 258: Performed by: HOSPITALIST

## 2023-02-10 PROCEDURE — 80053 COMPREHEN METABOLIC PANEL: CPT

## 2023-02-10 PROCEDURE — 6370000000 HC RX 637 (ALT 250 FOR IP): Performed by: NURSE PRACTITIONER

## 2023-02-10 RX ORDER — CARVEDILOL 25 MG/1
25 TABLET ORAL 2 TIMES DAILY WITH MEALS
Status: CANCELLED | OUTPATIENT
Start: 2023-02-10

## 2023-02-10 RX ORDER — LANOLIN ALCOHOL/MO/W.PET/CERES
400 CREAM (GRAM) TOPICAL DAILY
Status: CANCELLED | OUTPATIENT
Start: 2023-02-11 | End: 2023-02-15

## 2023-02-10 RX ORDER — ONDANSETRON 4 MG/1
4 TABLET, ORALLY DISINTEGRATING ORAL EVERY 8 HOURS PRN
Status: DISCONTINUED | OUTPATIENT
Start: 2023-02-10 | End: 2023-02-17 | Stop reason: HOSPADM

## 2023-02-10 RX ORDER — ACETAMINOPHEN 325 MG/1
650 TABLET ORAL EVERY 6 HOURS PRN
Status: CANCELLED | OUTPATIENT
Start: 2023-02-10

## 2023-02-10 RX ORDER — CARVEDILOL 25 MG/1
25 TABLET ORAL 2 TIMES DAILY WITH MEALS
Status: DISCONTINUED | OUTPATIENT
Start: 2023-02-10 | End: 2023-02-17 | Stop reason: HOSPADM

## 2023-02-10 RX ORDER — CLONIDINE HYDROCHLORIDE 0.2 MG/1
0.2 TABLET ORAL 2 TIMES DAILY PRN
Status: DISCONTINUED | OUTPATIENT
Start: 2023-02-10 | End: 2023-02-17 | Stop reason: HOSPADM

## 2023-02-10 RX ORDER — ACETAMINOPHEN 650 MG/1
650 SUPPOSITORY RECTAL EVERY 6 HOURS PRN
Status: CANCELLED | OUTPATIENT
Start: 2023-02-10

## 2023-02-10 RX ORDER — FUROSEMIDE 20 MG/1
20 TABLET ORAL 2 TIMES DAILY WITH MEALS
Status: CANCELLED | OUTPATIENT
Start: 2023-02-10

## 2023-02-10 RX ORDER — ONDANSETRON 4 MG/1
4 TABLET, ORALLY DISINTEGRATING ORAL EVERY 8 HOURS PRN
Status: CANCELLED | OUTPATIENT
Start: 2023-02-10

## 2023-02-10 RX ORDER — HYDRALAZINE HYDROCHLORIDE 25 MG/1
25 TABLET, FILM COATED ORAL EVERY 8 HOURS SCHEDULED
Status: CANCELLED | OUTPATIENT
Start: 2023-02-10

## 2023-02-10 RX ORDER — HYDRALAZINE HYDROCHLORIDE 50 MG/1
25 TABLET, FILM COATED ORAL EVERY 6 HOURS PRN
Status: DISCONTINUED | OUTPATIENT
Start: 2023-02-10 | End: 2023-02-17 | Stop reason: HOSPADM

## 2023-02-10 RX ORDER — HYDRALAZINE HYDROCHLORIDE 25 MG/1
25 TABLET, FILM COATED ORAL EVERY 8 HOURS SCHEDULED
Qty: 90 TABLET | Refills: 0 | Status: SHIPPED | OUTPATIENT
Start: 2023-02-10

## 2023-02-10 RX ORDER — LANOLIN ALCOHOL/MO/W.PET/CERES
400 CREAM (GRAM) TOPICAL DAILY
Status: COMPLETED | OUTPATIENT
Start: 2023-02-11 | End: 2023-02-14

## 2023-02-10 RX ORDER — POLYETHYLENE GLYCOL 3350 17 G/17G
17 POWDER, FOR SOLUTION ORAL DAILY PRN
Status: CANCELLED | OUTPATIENT
Start: 2023-02-10

## 2023-02-10 RX ORDER — POLYETHYLENE GLYCOL 3350 17 G/17G
17 POWDER, FOR SOLUTION ORAL DAILY PRN
Status: DISCONTINUED | OUTPATIENT
Start: 2023-02-10 | End: 2023-02-17 | Stop reason: HOSPADM

## 2023-02-10 RX ORDER — HYDRALAZINE HYDROCHLORIDE 25 MG/1
25 TABLET, FILM COATED ORAL EVERY 6 HOURS PRN
Status: CANCELLED | OUTPATIENT
Start: 2023-02-10

## 2023-02-10 RX ORDER — FUROSEMIDE 20 MG/1
20 TABLET ORAL 2 TIMES DAILY WITH MEALS
Status: DISCONTINUED | OUTPATIENT
Start: 2023-02-10 | End: 2023-02-12

## 2023-02-10 RX ORDER — ACETAMINOPHEN 650 MG/1
650 SUPPOSITORY RECTAL EVERY 6 HOURS PRN
Status: DISCONTINUED | OUTPATIENT
Start: 2023-02-10 | End: 2023-02-17 | Stop reason: HOSPADM

## 2023-02-10 RX ORDER — HYDRALAZINE HYDROCHLORIDE 25 MG/1
25 TABLET, FILM COATED ORAL EVERY 8 HOURS SCHEDULED
Status: DISCONTINUED | OUTPATIENT
Start: 2023-02-10 | End: 2023-02-10 | Stop reason: HOSPADM

## 2023-02-10 RX ORDER — CLONIDINE HYDROCHLORIDE 0.2 MG/1
0.2 TABLET ORAL 2 TIMES DAILY PRN
Qty: 60 TABLET | Refills: 0 | Status: SHIPPED | OUTPATIENT
Start: 2023-02-10

## 2023-02-10 RX ORDER — SPIRONOLACTONE 25 MG/1
25 TABLET ORAL DAILY
Status: DISCONTINUED | OUTPATIENT
Start: 2023-02-11 | End: 2023-02-17 | Stop reason: HOSPADM

## 2023-02-10 RX ORDER — ACETAMINOPHEN 325 MG/1
650 TABLET ORAL EVERY 6 HOURS PRN
Status: DISCONTINUED | OUTPATIENT
Start: 2023-02-10 | End: 2023-02-17 | Stop reason: HOSPADM

## 2023-02-10 RX ORDER — HYDRALAZINE HYDROCHLORIDE 50 MG/1
25 TABLET, FILM COATED ORAL EVERY 8 HOURS SCHEDULED
Status: DISCONTINUED | OUTPATIENT
Start: 2023-02-10 | End: 2023-02-17 | Stop reason: HOSPADM

## 2023-02-10 RX ORDER — SPIRONOLACTONE 25 MG/1
25 TABLET ORAL DAILY
Status: CANCELLED | OUTPATIENT
Start: 2023-02-11

## 2023-02-10 RX ORDER — SPIRONOLACTONE 25 MG/1
25 TABLET ORAL DAILY
Qty: 30 TABLET | Refills: 0 | Status: SHIPPED | OUTPATIENT
Start: 2023-02-11

## 2023-02-10 RX ORDER — CLONIDINE HYDROCHLORIDE 0.2 MG/1
0.2 TABLET ORAL 2 TIMES DAILY PRN
Status: CANCELLED | OUTPATIENT
Start: 2023-02-10

## 2023-02-10 RX ADMIN — CARVEDILOL 25 MG: 25 TABLET, FILM COATED ORAL at 18:21

## 2023-02-10 RX ADMIN — HYDRALAZINE HYDROCHLORIDE 10 MG: 20 INJECTION INTRAMUSCULAR; INTRAVENOUS at 05:07

## 2023-02-10 RX ADMIN — APIXABAN 5 MG: 5 TABLET, FILM COATED ORAL at 20:59

## 2023-02-10 RX ADMIN — FUROSEMIDE 20 MG: 20 TABLET ORAL at 18:19

## 2023-02-10 RX ADMIN — CARVEDILOL 25 MG: 25 TABLET, FILM COATED ORAL at 09:31

## 2023-02-10 RX ADMIN — HYDRALAZINE HYDROCHLORIDE 25 MG: 50 TABLET, FILM COATED ORAL at 20:59

## 2023-02-10 RX ADMIN — HYDRALAZINE HYDROCHLORIDE 25 MG: 25 TABLET, FILM COATED ORAL at 14:33

## 2023-02-10 RX ADMIN — APIXABAN 5 MG: 5 TABLET, FILM COATED ORAL at 09:31

## 2023-02-10 RX ADMIN — HYDRALAZINE HYDROCHLORIDE 25 MG: 25 TABLET, FILM COATED ORAL at 09:31

## 2023-02-10 RX ADMIN — FUROSEMIDE 20 MG: 10 INJECTION, SOLUTION INTRAMUSCULAR; INTRAVENOUS at 09:31

## 2023-02-10 RX ADMIN — SODIUM CHLORIDE, PRESERVATIVE FREE 10 ML: 5 INJECTION INTRAVENOUS at 09:31

## 2023-02-10 RX ADMIN — MAGNESIUM GLUCONATE 500 MG ORAL TABLET 400 MG: 500 TABLET ORAL at 09:31

## 2023-02-10 RX ADMIN — SPIRONOLACTONE 25 MG: 25 TABLET ORAL at 09:31

## 2023-02-10 ASSESSMENT — PAIN SCALES - GENERAL
PAINLEVEL_OUTOF10: 0
PAINLEVEL_OUTOF10: 0

## 2023-02-10 NOTE — DISCHARGE SUMMARY
Hospitalist Discharge Summary   Admit Date:  2023  7:53 AM   DC Note date: 2/10/2023  Name:  Mainor Ambriz   Age:  76 y.o. Sex:  female  :  1945   MRN:  [id number]   Room:  [facility]  PCP:  Dana Espinoza MD    Presenting Complaint: Fatigue     Initial Admission Diagnosis: Dyspnea and respiratory abnormalities [R06.00, R06.89]  Pulmonary edema with congestive heart failure, NYHA class 2 (HCC) [I50.1]     Problem List for this Hospitalization (present on admission):    Principal Problem:    Pulmonary edema with congestive heart failure, NYHA class 2 ([facility] [address]  Active Problems:    Status post ablation of atrial fibrillation    Dyspnea and respiratory abnormalities    Acute respiratory failure with hypoxia (HCC)    Stage 3 chronic kidney disease ([facility]    Hypertension  Resolved Problems:    * No resolved hospital problems. *    HPI:  76years old female with history of sCHF/ ef50%, chronically uncontrolled HTN, PMR, Sjogren dz, afib on eliquis, PUD, presents with SOB for last 2 days. She was hypertensive systolic of 077 via EMS. She was borderline hypoxic and at point had 87% started on 2 L nasal cannula. She is otherwise nontoxic in appearance. Patient received 10 mg labetalol did not have much effect she is still 210/115 at which point I started Cardene. Chest x-ray states pneumonia versus pulmonary edema. Feeling this is more likely hypertensive emergency rather than infection however I did give her Rocephin azithromycin. Patient started on Cardene drip. On 2 L nasal cannula. Otherwise appears nontoxic. Patient does not have a white count, denies fever. She is afebrile here. Lactic acid is normal unlikely to be infection. She does have an elevated BNP more supportive of hypertensive emergency. We will also give 40 of Lasix in addition of the Cardene.   Patient will be admitted to the hospital team.     On further questioning, she denies fever, chills, sore throat, runny nose, cheat pain or significant cough. No weight changes as she weigh herself daily. BP in uncontrolled for at least 1.5 years now. She is very compliant w/ meds. Hospital Course:  76years old female with history of sCHF/ ef50%, chronically uncontrolled HTN, PMR, Sjogren dz, afib on eliquis, PUD, presents with SOB for last 2 days. Admitted with acute hypoxic respiratory failure secondary to pulm edema and hypertensive urgency. #Acute hypoxic respiratory failure secondary to pulmonary edema with congestive heart failure, NYHA class 2:  - Likely secondary to noncompliance  - Repeat echo with normal left ventricular systolic function  - Cardiology consulted and medical treatment optimized  - Strict I's and O's, Daily weights  - On 3 L NC, continue to wean as tolerated  - Continue Coreg and Aldactone  - Continue Lasix p.o. 20 mg BID    #Uncontrolled Hypertension:  #Hypertensive urgency:  - Better controlled on current regimen  - No renal artery stenosis on renal ultrasound  - Reported intolerance to ACE/ARB and calcium channel blocker  -- Continue Coreg, Aldactone and clonidine  -- Started on hydralazine 25 mg 3 times daily on  2/10  -- Cardiology assistance appreciate    #Persistent atrial fibrillation:  #History of ablation:  - Currently sinus rhythm  - Continue Eliquis and coreg     #CKD stage III:  - Continue to monitor daily BMP  - Avoid nephrotoxic agents       Disposition: PT/OT recommend [facility]. [facility] has accepted patient. Diet: ADULT DIET; Regular; Low Fat/Low Chol/High Fiber/2 gm Na; No Added Salt (3-4 gm); 2000 ml  ADULT ORAL NUTRITION SUPPLEMENT; Dinner, Breakfast; Standard High Calorie/High Protein Oral Supplement  Code Status: Full Code    Follow Ups:   Follow-up Information     [facility] CARDIOLOGY. Schedule an appointment as soon as possible for a   visit in 1 week(s).     Specialty: Cardiology  Contact information:  [address] [place] [address] [facility]           Fausto Welch MD Follow up in 1 week(s). Specialty: Family Medicine  Contact information:  [address]  [phone]                       Time spent in patient discharge and coordination 40 minutes. Plan was discussed with patient. All questions answered. Patient was stable at time of discharge. Instructions given to call a physician or return if any concerns. Current Discharge Medication List        START taking these medications    Details   spironolactone (ALDACTONE) 25 MG tablet Take 1 tablet by mouth daily  Qty: 30 tablet, Refills: 0      hydrALAZINE (APRESOLINE) 25 MG tablet Take 1 tablet by mouth every 8 hours  Qty: 90 tablet, Refills: 0      cloNIDine (CATAPRES) 0.2 MG tablet Take 1 tablet by mouth 2 times daily as needed (SBP >160)  Qty: 60 tablet, Refills: 0           CONTINUE these medications which have NOT CHANGED    Details   sertraline (ZOLOFT) 50 MG tablet Take 50 mg by mouth daily      apixaban (ELIQUIS) 5 MG TABS tablet Take 1 tablet by mouth in the morning and 1 tablet in the evening. Qty: 180 tablet, Refills: 3      carvedilol (COREG) 25 MG tablet Take 1 tablet by mouth 2 times daily (with meals)  Qty: 180 tablet, Refills: 3      furosemide (LASIX) 20 MG tablet Take 1 tablet by mouth 2 times daily  Qty: 4 tablet, Refills: 0             Some medications may have been reported old/obsolete and marked \"stop taking\" by the system; in reality pt was already off these meds; defer to outpatient or prescribing providers. Procedures done this admission:  * No surgery found *    Consults this admission:  IP CONSULT TO CARDIOLOGY  IP CONSULT TO CASE MANAGEMENT  IP CONSULT TO PHYSICAL MEDICINE REHAB    Echocardiogram results:  02/07/23    TRANSTHORACIC ECHOCARDIOGRAM (TTE) LIMITED (CONTRAST/BUBBLE/3D PRN) 02/07/2023  3:16 PM, 02/07/2023 12:00 AM (Final)    Interpretation Summary    Limited study.     Left Ventricle: Normal left ventricular systolic function with a visually estimated EF of 55 - 60%. Left ventricle size is normal. Mildly increased wall thickness. Normal wall motion. Pericardium: There is a moderate pericardial effusion most prominent posterolaterally/anterolaterally and appears to have increased in size compared to prior. There is no evidence of hemodynamic significance. There is overlying fibrinous appearing material noted as well. There is a pleural effusion noted. Technical qualifiers: Color flow Doppler was performed and pulse wave and/or continuous wave Doppler was performed. Contrast used: Definity. Signed by: Rowena Cash MD on 2/7/2023  3:16 PM, Signed by: Unknown Provider Result on 2/7/2023 12:00 AM      Diagnostic Imaging/Tests:   XR CHEST PORTABLE    Result Date: 2/7/2023  1. Questionable worsening central interstitial infiltrate which could represent early pulmonary edema. Additional cardiomegaly and small left effusion show no significant interval change. Vascular duplex renal arterial complete    Result Date: 2/8/2023  Challenging exam due to large body habitus and patient is on 6 L of O2. Respiratory motion. Mild increased velocities in the right main renal artery. The aortic to renal artery ratio is 1.9 on the right and 0.5 on the left. No significant renal artery stenosis seen. Bilateral simple renal cyst as described. Medical renal disease.        Labs: Results:       BMP, Mg, Phos Recent Labs     02/09/23  0539 02/10/23  0525   * 140   K 3.9 4.3    106   CO2 24 26   ANIONGAP 14* 8   BUN 32* 39*   CREATININE 1.10* 1.00   LABGLOM 51* 57*   CALCIUM 9.2 9.3   GLUCOSE 89 99   MG 2.0 2.1      CBC Recent Labs     02/09/23  0539 02/10/23  0525   WBC 5.9 6.4   RBC 3.60* 3.59*   HGB 11.1* 11.0*   HCT 34.3* 33.8*   MCV 95.3 94.2   MCH 30.8 30.6   MCHC 32.4 32.5   RDW 13.0 12.9    193   MPV 10.8 11.3   NRBC 0.00 0.00   SEGS 66 68   LYMPHOPCT 20 18   EOSRELPCT 2 2   MONOPCT 11 10   BASOPCT 1 1   IMMGRAN 0 1   SEGSABS 3.9 4.4   LYMPHSABS 1. 2 1.2   EOSABS 0.1 0.1   MONOSABS 0.7 0.7   BASOSABS 0.1 0.1   ABSIMMGRAN 0.0 0.0      LFT Recent Labs     02/09/23  0539 02/10/23  0525   BILITOT 0.8 1.0   ALKPHOS 89 84   AST 14* 16   ALT 12 26   PROT 6.3 6.3   LABALBU 3.1* 3.0*   GLOB 3.2 3.3      Cardiac  Lab Results   Component Value Date/Time    NTPROBNP 1,966 02/07/2023 08:12 AM    TROPHS 15.6 02/07/2023 08:12 AM    TROPHS 35.5 11/28/2022 10:25 PM    TROPHS 20.5 11/28/2022 07:51 PM      Coags Lab Results   Component Value Date/Time    APTT 28.9 07/07/2019 04:36 AM    APTT 92.3 07/06/2019 04:59 AM    APTT 103.6 07/05/2019 11:18 PM      A1c Lab Results   Component Value Date/Time    LABA1C 5.0 02/07/2023 08:12 AM    EAG 97 02/07/2023 08:12 AM      Lipids Lab Results   Component Value Date/Time    CHOL 237 10/23/2019 04:10 PM    LDLCALC 167 10/23/2019 04:10 PM    LABVLDL 23 10/23/2019 04:10 PM    HDL 47 10/23/2019 04:10 PM    TRIG 113 10/23/2019 04:10 PM      Thyroid  No results found for: Razia De Leon     Most Recent UA Lab Results   Component Value Date/Time    COLORU YELLOW/STRAW 02/07/2023 08:25 AM    APPEARANCE CLEAR 02/07/2023 08:25 AM    SPECGRAV 1.013 02/07/2023 08:25 AM    LABPH 7.0 02/07/2023 08:25 AM    PROTEINU 100 02/07/2023 08:25 AM    GLUCOSEU Negative 02/07/2023 08:25 AM    KETUA TRACE 02/07/2023 08:25 AM    BILIRUBINUR Negative 02/07/2023 08:25 AM    BILIRUBINUR Negative 04/06/2022 09:22 AM    BLOODU Negative 02/07/2023 08:25 AM    UROBILINOGEN 0.2 02/07/2023 08:25 AM    NITRU Negative 02/07/2023 08:25 AM    LEUKOCYTESUR Negative 02/07/2023 08:25 AM    WBCUA 0-4 02/07/2023 08:25 AM    RBCUA 0-5 02/07/2023 08:25 AM    EPITHUA 0-5 02/07/2023 08:25 AM    BACTERIA Negative 02/07/2023 08:25 AM    LABCAST 0-2 02/07/2023 08:25 AM    MUCUS 0 07/15/2019 07:59 PM        Recent Labs     02/07/23  1050 02/07/23  0941   CULTURE NO GROWTH 3 DAYS NO GROWTH 3 DAYS       All Labs from Last 24 Hrs:  Recent Results (from the past 24 hour(s))   CBC with Auto Differential    Collection Time: 02/10/23  5:25 AM   Result Value Ref Range    WBC 6.4 4.3 - 11.1 K/uL    RBC 3.59 (L) 4.05 - 5.2 M/uL    Hemoglobin 11.0 (L) 11.7 - 15.4 g/dL    Hematocrit 33.8 (L) 35.8 - 46.3 %    MCV 94.2 82 - 102 FL    MCH 30.6 26.1 - 32.9 PG    MCHC 32.5 31.4 - 35.0 g/dL    RDW 12.9 11.9 - 14.6 %    Platelets 013 896 - 133 K/uL    MPV 11.3 9.4 - 12.3 FL    nRBC 0.00 0.0 - 0.2 K/uL    Differential Type AUTOMATED      Seg Neutrophils 68 43 - 78 %    Lymphocytes 18 13 - 44 %    Monocytes 10 4.0 - 12.0 %    Eosinophils % 2 0.5 - 7.8 %    Basophils 1 0.0 - 2.0 %    Immature Granulocytes 1 0.0 - 5.0 %    Segs Absolute 4.4 1.7 - 8.2 K/UL    Absolute Lymph # 1.2 0.5 - 4.6 K/UL    Absolute Mono # 0.7 0.1 - 1.3 K/UL    Absolute Eos # 0.1 0.0 - 0.8 K/UL    Basophils Absolute 0.1 0.0 - 0.2 K/UL    Absolute Immature Granulocyte 0.0 0.0 - 0.5 K/UL   Magnesium    Collection Time: 02/10/23  5:25 AM   Result Value Ref Range    Magnesium 2.1 1.8 - 2.4 mg/dL   Comprehensive Metabolic Panel    Collection Time: 02/10/23  5:25 AM   Result Value Ref Range    Sodium 140 133 - 143 mmol/L    Potassium 4.3 3.5 - 5.1 mmol/L    Chloride 106 101 - 110 mmol/L    CO2 26 21 - 32 mmol/L    Anion Gap 8 2 - 11 mmol/L    Glucose 99 65 - 100 mg/dL    BUN 39 (H) 8 - 23 MG/DL    Creatinine 1.00 0.6 - 1.0 MG/DL    Est, Glom Filt Rate 57 (L) >60 ml/min/1.73m2    Calcium 9.3 8.3 - 10.4 MG/DL    Total Bilirubin 1.0 0.2 - 1.1 MG/DL    ALT 26 12 - 65 U/L    AST 16 15 - 37 U/L    Alk Phosphatase 84 50 - 136 U/L    Total Protein 6.3 6.3 - 8.2 g/dL    Albumin 3.0 (L) 3.2 - 4.6 g/dL    Globulin 3.3 2.8 - 4.5 g/dL    Albumin/Globulin Ratio 0.9 0.4 - 1.6         Allergies   Allergen Reactions    Lisinopril Other (See Comments)    Losartan Other (See Comments)     Burning sensation tongue.     Nifedipine Other (See Comments)     severe headache    Celecoxib Rash     Immunization History   Administered Date(s) Administered    COVID-19, PFIZER PURPLE top, DILUTE for use, (age 15 y+), 30mcg/0.3mL 03/16/2021    Influenza Virus Vaccine 10/06/2014    Influenza, FLUARIX, FLULAVAL, FLUZONE (age 10 mo+) AND AFLURIA, (age 1 y+), PF, 0.5mL 10/23/2019    Influenza, High Dose (Fluzone 65 yrs and older) 10/01/2020    Influenza, Trivalent PF 12/03/2015    PPD Test 07/06/2019    Pneumococcal Conjugate 13-valent (Yolette Qualia) 10/06/2014, 10/01/2020       Recent Vital Data:  Patient Vitals for the past 24 hrs:   Temp Pulse Resp BP SpO2   02/10/23 1123 97.5 °F (36.4 °C) 62 19 (!) 124/55 99 %   02/10/23 0742 97.9 °F (36.6 °C) 66 18 (!) 161/59 99 %   02/10/23 0500 -- -- -- (!) 186/78 --   02/10/23 0425 98.4 °F (36.9 °C) 62 18 (!) 182/84 99 %   02/09/23 2354 98.6 °F (37 °C) 63 20 (!) 144/85 94 %   02/09/23 1930 98.5 °F (36.9 °C) 57 18 132/62 99 %   02/09/23 1815 -- -- -- (!) 121/54 --   02/09/23 1704 99 °F (37.2 °C) 63 -- (!) 170/77 98 %       Oxygen Therapy  SpO2: 99 %  Pulse via Oximetry: 63 beats per minute  Pulse Oximeter Device Mode: Intermittent  O2 Device: Nasal cannula  Skin Assessment: Clean, dry, & intact  Skin Protection for O2 Device: No  PEEP/CPAP (cm H2O): 4 cm H20  O2 Flow Rate (L/min): 4 L/min    Estimated body mass index is 28.5 kg/m² as calculated from the following:    Height as of this encounter: 5' 2\" (1.575 m). Weight as of this encounter: 155 lb 12.8 oz (70.7 kg). Intake/Output Summary (Last 24 hours) at 2/10/2023 1342  Last data filed at 2/10/2023 0500  Gross per 24 hour   Intake --   Output 1700 ml   Net -1700 ml         Physical Exam:    General:    Well nourished. No overt distress, On 3 L NC  Head:  Normocephalic, atraumatic  Eyes:  Sclerae appear normal.  Pupils equally round. HENT:  Nares appear normal, no drainage. Moist mucous membranes  Neck:  No restricted ROM. Trachea midline  CV:   RRR. No m/r/g. No JVD  Lungs:   CTAB. No wheezing, rhonchi, or rales. Respirations even, unlabored  Abdomen:   Soft, nontender, nondistended. Extremities: Warm and dry. No cyanosis or clubbing. No edema. Skin:     No rashes. Normal coloration  Neuro:  CN II-XII grossly intact. Psych:  Normal mood and affect. Signed:  Demetrio Pedersen MD    Part of this note may have been written by using a voice dictation software. The note has been proof read but may still contain some grammatical/other typographical errors.

## 2023-02-10 NOTE — PROGRESS NOTES
[facility] CARDIOLOGY PROGRESS NOTE       2/10/2023 8:23 AM    Admit Date: 2/7/2023    Subjective:   Patient reports breathing and cough is improved. No chest pain, chest pressure, racing heart, abdominal pain, nausea, vomiting, leg swelling. No other complaints at this time. ROS:  Cardiovascular:  As noted above    Objective:      Vitals:    02/09/23 2354 02/10/23 0425 02/10/23 0500 02/10/23 0742   BP: (!) 144/85 (!) 182/84 (!) 186/78 (!) 161/59   Pulse: 63 62  66   Resp: 20 18 18   Temp: 98.6 °F (37 °C) 98.4 °F (36.9 °C)  97.9 °F (36.6 °C)   TempSrc: Temporal Oral  Oral   SpO2: 94% 99%  99%   Weight:       Height:         Physical Exam:  General-No Acute Distress, awake, alert   Neck- supple, no JVD  CV- regular rate and rhythm no MRG  Lung- non labored, decreased in lower fields   Abd- soft, nontender, nondistended, obese  Ext- no edema bilaterally in legs   Skin- warm and dry    Data Review:   Recent Labs     02/09/23  0539 02/10/23  0525   * 140   K 3.9 4.3   MG 2.0 2.1   BUN 32* 39*   WBC 5.9 6.4   HGB 11.1* 11.0*   HCT 34.3* 33.8*    193       Assessment/Plan:     Active Hospital Problems. Dyspnea and respiratory abnormalities    Acute hypoxic respiratory failure     Pulmonary edema with congestive heart failure, NYHA class 2 (HCC)  - diuresis as tolerated, wean oxygen  - LVEF normal   - on Coreg, aldactone, lasix. Status post ablation of atrial fibrillation  - in sinus, continue coreg/Eliquis. Hypertension  - uncontrolled  - No significant renal artery stenosis on renal US   - see what response to AM medications is today, may benefit from additional therapy. Reported intolerance of ACE/ARB,calcium channel blockers.  -Start hydralazine 25 3 times daily, uptitrate as needed while inpatient to control blood pressure. Stage 3 chronic kidney disease ([facility] [address]  - monitor renal function and electrolytes, no AM renal function check today at this time. Sukhjinder Goyal DO  2/10/2023 8:23 AM

## 2023-02-10 NOTE — H&P
[facility]      Admission History and Physical Exam  INPATIENT REHABILITATION CENTER       Admit Date: 2/10/2023  Primary Care Provider: Army Lucy MD    Chief Complaint : \"I've been up here for rehab before for my heart. \"  Admitting Diagnosis:   Cardiac debility [R53.81]  Pulmonary edema with congestive heart failure (HCC) [I50.1]    Principal Problem:    Pulmonary edema with congestive heart failure (HCC)  Active Problems:    Dyspnea and respiratory abnormalities    Stage 3 chronic kidney disease (HCC)    Mixed hyperlipidemia    Impaired gait and mobility    Hypertension    Physical deconditioning    Fibromyalgia    Sjogren's syndrome (HCC)    Gouty arthritis  Resolved Problems:    * No resolved hospital problems. *      Acute Rehab Diagnoses:  Encounter for rehabilitation [Z51.89]   Abnormality of gait and mobility [R26.9]  Decreased independence for activities of daily living (ADL) [Z78.9]    Medical Dx:  Past Medical History:   Diagnosis Date    Arrhythmia     Arthritis     Asthma     Cellulitis     CHF (congestive heart failure) ([address]     Chronic kidney disease     Hx renal failure    Depression     Gout     Gouty arthritis 12/2/2015    Hypercholesterolemia     Hypertension     Morbid obesity ([address]     Nausea & vomiting     Polymyalgia rheumatica ([address] 12/2/2015    PONV (postoperative nausea and vomiting)     Psychiatric disorder     depression & anxiety    PUD (peptic ulcer disease)     Severe obesity with body mass index (BMI) of 35.0 to 39.9 with comorbidity ([address] 8/12/2013    Sjogren's syndrome ([address]     Temporal arteritis ([address]        Date of Evaluation: February 10, 2023    Margarita Berg is a 76 y.o. white female patient at [address] [place] [address] who was admitted to [address] [place] [address] on 2/10/2023 by Roula Dong MD of Physical Medicine and Rehab service with below-mentioned medical history.     HPI: 76 y.o. white female presented to the ED 2/7/2023 with fatigue, generalized weakness and dyspnea x 2d. /107, IV nicardipine started. CXR found PNA vs pulmonary edema. She required 2L O2 in ED. BNP >1900. U/A non-infectious. She was admitted to telemetry for hypertensive emergency, pulmonary edema and acute hypoxic respiratory failure. Cardiology endorsed continued diuresis and better BP control. TTE 2/7 with normal LV size and function, EF 55-60% and moderate pericardial effusion. PT / OT noted mobility and self-care deficits, recommended IRC. Once determined to be stable, patient was transferred to the [address] [place] [address] for comprehensive inpatient rehabilitation program and close medical monitoring. Prior Functional status - independent with ADLs; mod-I for functional mobility (used cane / Rollator); did not drive.     Current Functional status -   Per PT: CGA for bed mobility and transfers, gait 100' x 2 CGA with RW  Per OT: mod A for LB dressing and bathing; min-mod A for toileting; CGA for grooming, UB bathing and dressing; mod-I for feeding         Past Medical History:   Diagnosis Date    Arrhythmia     Arthritis     Asthma     Cellulitis     CHF (congestive heart failure) ([address]     Chronic kidney disease     Hx renal failure    Depression     Gout     Gouty arthritis 12/2/2015    Hypercholesterolemia     Hypertension     Morbid obesity ([address]     Nausea & vomiting     Polymyalgia rheumatica ([address] 12/2/2015    PONV (postoperative nausea and vomiting)     Psychiatric disorder     depression & anxiety    PUD (peptic ulcer disease)     Severe obesity with body mass index (BMI) of 35.0 to 39.9 with comorbidity ([address] 8/12/2013    Sjogren's syndrome ([address]     Temporal arteritis ([address]       Past Surgical History:   Procedure Laterality Date    COLONOSCOPY  6/08    EP DEVICE PROCEDURE N/A 5/24/2022    ABLATION A-FIB W COMPLETE EP STUDY performed by Shelby Gutiérrez MD at [address] [place] [address] CATH LAB    EP STUDY/ABLATION N/A 2013    WPW Ablation HYSTERECTOMY (CERVIX STATUS UNKNOWN)      TX UNLISTED PROCEDURE CARDIAC SURGERY  8/13    HEART ABLATION    TX UNLISTED PROCEDURE CARDIAC SURGERY  4/07    STRESS TEST    TUBAL LIGATION      UPPER GASTROINTESTINAL ENDOSCOPY  8/08     Allergies   Allergen Reactions    Lisinopril Other (See Comments)    Losartan Other (See Comments)     Burning sensation tongue. Nifedipine Other (See Comments)     severe headache    Celecoxib Rash      Family History   Problem Relation Age of Onset    Hypertension Sister     Other Son         PSYCHIATRIC ILLNESS    Diabetes Son     Cancer Father         LUNG    Dementia Mother     Hypertension Mother     Breast Cancer Mother 80    Cancer Mother     Heart Disease Father 62      Social History     Tobacco Use    Smoking status: Never    Smokeless tobacco: Never   Substance Use Topics    Alcohol use: No      Current Facility-Administered Medications   Medication Dose Route Frequency    acetaminophen (TYLENOL) tablet 650 mg  650 mg Oral Q6H PRN    Or    acetaminophen (TYLENOL) suppository 650 mg  650 mg Rectal Q6H PRN    apixaban (ELIQUIS) tablet 5 mg  5 mg Oral BID    carvedilol (COREG) tablet 25 mg  25 mg Oral BID WC    cloNIDine (CATAPRES) tablet 0.2 mg  0.2 mg Oral BID PRN    furosemide (LASIX) tablet 20 mg  20 mg Oral BID WC    hydrALAZINE (APRESOLINE) tablet 25 mg  25 mg Oral Q6H PRN    hydrALAZINE (APRESOLINE) tablet 25 mg  25 mg Oral 3 times per day    [START ON 2/11/2023] magnesium oxide (MAG-OX) tablet 400 mg  400 mg Oral Daily    ondansetron (ZOFRAN-ODT) disintegrating tablet 4 mg  4 mg Oral Q8H PRN    polyethylene glycol (GLYCOLAX) packet 17 g  17 g Oral Daily PRN    [START ON 2/11/2023] spironolactone (ALDACTONE) tablet 25 mg  25 mg Oral Daily       Review of Systems:   CONSTITUTIONAL: +Fatigue. No weight loss, fever, chills. HEENT: +Blood-streaked rhinorrhea. No congestion or sore throat. SKIN: No rash or itching. CARDIOVASCULAR: +Palpitations.  No chest pain or orthopnea. RESPIRATORY: +Dyspnea, cough (both improved). No sputum. GASTROINTESTINAL: No nausea, anorexia, bowel function changes. : +Contact irritation from [employer]. No dysuria, hematuria. MSK: +Generalized OA / FM pain. NEUROLOGICAL: No previous TIA / CVA or seizure. No change in bowel or bladder control. HEMATOLOGIC: No bleeding or excessive bruising. PSYCHIATRIC: +Mild-moderate anxiety. Objective:     Physical Exam:    Visit Vitals  /62   Pulse 90   Temp 98.2 °F (36.8 °C) (Oral)   Resp 16   SpO2 98%       General: No acute distress, well developed, overweight. HEENT: Normocephalic, edentulous upper palate, oral mucosa is moist. Extraocular movements are intact, visual fields full to finger confrontation. Neck: Supple, non-tender. Respiratory: Clear breath sounds bilaterally. Respirations are non-labored. Equal chest rise. Cardiovascular: Normal rate, regular underlying rhythm; no murmur. Palpable pedal pulses, no cyanosis. ABD: Non-tender, not distended, bowel sounds normoactive. Integumentary: Clean, no rash, no skin breakdown. Musculoskeletal: No joint tenderness, no joint swelling. Psychiatric: Appropriate mood & affect. Neuro: Alert, oriented, speech and language intact. No focal deficits. Sensation grossly intact.       Recent Results (from the past 72 hour(s))   Lactate, Sepsis    Collection Time: 02/07/23  9:52 PM   Result Value Ref Range    Lactic Acid, Sepsis 0.7 0.4 - 2.0 MMOL/L   CBC with Auto Differential    Collection Time: 02/09/23  5:39 AM   Result Value Ref Range    WBC 5.9 4.3 - 11.1 K/uL    RBC 3.60 (L) 4.05 - 5.2 M/uL    Hemoglobin 11.1 (L) 11.7 - 15.4 g/dL    Hematocrit 34.3 (L) 35.8 - 46.3 %    MCV 95.3 82 - 102 FL    MCH 30.8 26.1 - 32.9 PG    MCHC 32.4 31.4 - 35.0 g/dL    RDW 13.0 11.9 - 14.6 %    Platelets 190 257 - 800 K/uL    MPV 10.8 9.4 - 12.3 FL    nRBC 0.00 0.0 - 0.2 K/uL    Differential Type AUTOMATED      Seg Neutrophils 66 43 - 78 %    Lymphocytes 20 13 - 44 %    Monocytes 11 4.0 - 12.0 %    Eosinophils % 2 0.5 - 7.8 %    Basophils 1 0.0 - 2.0 %    Immature Granulocytes 0 0.0 - 5.0 %    Segs Absolute 3.9 1.7 - 8.2 K/UL    Absolute Lymph # 1.2 0.5 - 4.6 K/UL    Absolute Mono # 0.7 0.1 - 1.3 K/UL    Absolute Eos # 0.1 0.0 - 0.8 K/UL    Basophils Absolute 0.1 0.0 - 0.2 K/UL    Absolute Immature Granulocyte 0.0 0.0 - 0.5 K/UL   Magnesium    Collection Time: 02/09/23  5:39 AM   Result Value Ref Range    Magnesium 2.0 1.8 - 2.4 mg/dL   Comprehensive Metabolic Panel    Collection Time: 02/09/23  5:39 AM   Result Value Ref Range    Sodium 146 (H) 133 - 143 mmol/L    Potassium 3.9 3.5 - 5.1 mmol/L    Chloride 108 101 - 110 mmol/L    CO2 24 21 - 32 mmol/L    Anion Gap 14 (H) 2 - 11 mmol/L    Glucose 89 65 - 100 mg/dL    BUN 32 (H) 8 - 23 MG/DL    Creatinine 1.10 (H) 0.6 - 1.0 MG/DL    Est, Glom Filt Rate 51 (L) >60 ml/min/1.73m2    Calcium 9.2 8.3 - 10.4 MG/DL    Total Bilirubin 0.8 0.2 - 1.1 MG/DL    ALT 12 12 - 65 U/L    AST 14 (L) 15 - 37 U/L    Alk Phosphatase 89 50 - 136 U/L    Total Protein 6.3 6.3 - 8.2 g/dL    Albumin 3.1 (L) 3.2 - 4.6 g/dL    Globulin 3.2 2.8 - 4.5 g/dL    Albumin/Globulin Ratio 1.0 0.4 - 1.6     CBC with Auto Differential    Collection Time: 02/10/23  5:25 AM   Result Value Ref Range    WBC 6.4 4.3 - 11.1 K/uL    RBC 3.59 (L) 4.05 - 5.2 M/uL    Hemoglobin 11.0 (L) 11.7 - 15.4 g/dL    Hematocrit 33.8 (L) 35.8 - 46.3 %    MCV 94.2 82 - 102 FL    MCH 30.6 26.1 - 32.9 PG    MCHC 32.5 31.4 - 35.0 g/dL    RDW 12.9 11.9 - 14.6 %    Platelets 545 683 - 174 K/uL    MPV 11.3 9.4 - 12.3 FL    nRBC 0.00 0.0 - 0.2 K/uL    Differential Type AUTOMATED      Seg Neutrophils 68 43 - 78 %    Lymphocytes 18 13 - 44 %    Monocytes 10 4.0 - 12.0 %    Eosinophils % 2 0.5 - 7.8 %    Basophils 1 0.0 - 2.0 %    Immature Granulocytes 1 0.0 - 5.0 %    Segs Absolute 4.4 1.7 - 8.2 K/UL    Absolute Lymph # 1.2 0.5 - 4.6 K/UL    Absolute Mono # 0.7 0.1 - 1.3 K/UL    Absolute Eos # 0.1 0.0 - 0.8 K/UL    Basophils Absolute 0.1 0.0 - 0.2 K/UL    Absolute Immature Granulocyte 0.0 0.0 - 0.5 K/UL   Magnesium    Collection Time: 02/10/23  5:25 AM   Result Value Ref Range    Magnesium 2.1 1.8 - 2.4 mg/dL   Comprehensive Metabolic Panel    Collection Time: 02/10/23  5:25 AM   Result Value Ref Range    Sodium 140 133 - 143 mmol/L    Potassium 4.3 3.5 - 5.1 mmol/L    Chloride 106 101 - 110 mmol/L    CO2 26 21 - 32 mmol/L    Anion Gap 8 2 - 11 mmol/L    Glucose 99 65 - 100 mg/dL    BUN 39 (H) 8 - 23 MG/DL    Creatinine 1.00 0.6 - 1.0 MG/DL    Est, Glom Filt Rate 57 (L) >60 ml/min/1.73m2    Calcium 9.3 8.3 - 10.4 MG/DL    Total Bilirubin 1.0 0.2 - 1.1 MG/DL    ALT 26 12 - 65 U/L    AST 16 15 - 37 U/L    Alk Phosphatase 84 50 - 136 U/L    Total Protein 6.3 6.3 - 8.2 g/dL    Albumin 3.0 (L) 3.2 - 4.6 g/dL    Globulin 3.3 2.8 - 4.5 g/dL    Albumin/Globulin Ratio 0.9 0.4 - 1.6         Imaging:   XR CHEST PORTABLE  Result Date: 2/7/2023  1. Questionable worsening central interstitial infiltrate which could represent early pulmonary edema. Additional cardiomegaly and small left effusion show no significant interval change. Assessment and Plan      Daily physician / PA medical management:      Impression and Plan:  # Cardiac debility [R53.81], Congestive heart failure ([facility] [I50.1] - interdisciplinary approach to rehabilitation including PT, OT, nursing and physiatry and disease specific education. Continue furosemide 20mg BID and spironolactone 25mg daily. Daily weights. Wean O2 as tolerated. # Hypertension - continue hydralazine 25mg q8h. # Atrial fibrillation - s/p ablation; continue BID Eliquis 5mg and carvedilol 25mg. # CKD3 - monitor with BMP, avoid nephrotoxic agents. # DVT prophylaxis - on Eliquis. # Bladder program / urinary retention / neurogenic bladder - schedule voids q6-8h.  Check post-void residual as needed; in-and-out catheter if post-void residual is more than 400ml. # Bowel program - at risk for constipation as a side effect of opioids, other medications, impaired mobility, etc. MiraLAX daily for regularity, Senokot-S for stool softener + laxative. PRN MOM, bisacodyl suppository or tablets for constipation. STATUS COMPARED TO PREADMISSION: There are no clinically significant differences between the patient's current status and the information described on the preadmission screening document.            Signed By: Ema Castillo PA-C    February 10, 2023    Physician Assistant with [facility]

## 2023-02-10 NOTE — PROGRESS NOTES
Hospitalist Progress Note   Admit Date:  2023  7:53 AM   Name:  Christoph Jennings   Age:  76 y.o. Sex:  female  :  1945   MRN:  [id number]   Room:  [facility]/    Presenting Complaint: Fatigue     Reason(s) for Admission: Dyspnea and respiratory abnormalities [R06.00, R06.89]  Pulmonary edema with congestive heart failure, NYHA class 2 ([address] [I50.1]     Hospital Course:   76years old female with history of sCHF/ ef50%, chronically uncontrolled HTN, PMR, Sjogren dz, afib on eliquis, PUD, presents with SOB for last 2 days. Admitted with acute hypoxic respiratory failure secondary to pulm edema and hypertensive urgency. Subjective & 24hr Events (02/10/23): Patient is seen at the bedside. Lying comfortably in the bed. On 4 L nasal cannula, weaned to 3 L at bedside. Denies chest pain, vision, nausea, vomiting or abdominal pain. Does not look fluid overloaded. [facility] has accepted patient but patient refused. Assessment & Plan:     Acute hypoxic respiratory failure secondary to pulmonary edema with congestive heart failure, NYHA class 2:  - Likely secondary to noncompliance  - Repeat echo with normal left ventricular systolic function  - Renal ultrasound with medical renal disease.   Simple cyst.  - Cardiology consulted, appreciate recommendation  - Strict I's and O's, Daily weights  - On 3 L NC, continue to wean as tolerated  - Continue Coreg and Aldactone  - Continue IV Lasix 20 mg BID    Persistent atrial fibrillation:  History of ablation:  - Currently sinus rhythm  - Continue Eliquis and coreg    Uncontrolled Hypertension:  Hypertensive urgency:  - Uncontrolled  - No renal artery stenosis on renal ultrasound  - Reported intolerance to ACE/ARB and calcium channel blocker  -- Continue Coreg, Aldactone  -- Started on hydralazine 25 mg 3 times daily 2/10  -- Cardiology following    CKD stage III:  - Continue to monitor daily BMP  - Avoid nephrotoxic agents      Anticipated discharge needs: [facility] accepted patient but patient refused. Diet:  ADULT DIET; Regular; Low Fat/Low Chol/High Fiber/2 gm Na; No Added Salt (3-4 gm); 2000 ml  ADULT ORAL NUTRITION SUPPLEMENT; Dinner, Breakfast; Standard High Calorie/High Protein Oral Supplement  DVT PPx: Eliquis  Code status: Full Code    Hospital Problems:  Principal Problem:    Pulmonary edema with congestive heart failure, NYHA class 2 (HCC)  Active Problems:    Status post ablation of atrial fibrillation    Dyspnea and respiratory abnormalities    Stage 3 chronic kidney disease (HCC)    Hypertension  Resolved Problems:    * No resolved hospital problems. *      Objective:   Patient Vitals for the past 24 hrs:   Temp Pulse Resp BP SpO2   02/10/23 1123 97.5 °F (36.4 °C) 62 19 (!) 124/55 99 %   02/10/23 0742 97.9 °F (36.6 °C) 66 18 (!) 161/59 99 %   02/10/23 0500 -- -- -- (!) 186/78 --   02/10/23 0425 98.4 °F (36.9 °C) 62 18 (!) 182/84 99 %   02/09/23 2354 98.6 °F (37 °C) 63 20 (!) 144/85 94 %   02/09/23 1930 98.5 °F (36.9 °C) 57 18 132/62 99 %   02/09/23 1815 -- -- -- (!) 121/54 --   02/09/23 1704 99 °F (37.2 °C) 63 -- (!) 170/77 98 %         Oxygen Therapy  SpO2: 99 %  Pulse via Oximetry: 63 beats per minute  Pulse Oximeter Device Mode: Intermittent  O2 Device: Nasal cannula  Skin Assessment: Clean, dry, & intact  Skin Protection for O2 Device: No  PEEP/CPAP (cm H2O): 4 cm H20  O2 Flow Rate (L/min): 4 L/min    Estimated body mass index is 28.5 kg/m² as calculated from the following:    Height as of this encounter: 5' 2\" (1.575 m). Weight as of this encounter: 155 lb 12.8 oz (70.7 kg). Intake/Output Summary (Last 24 hours) at 2/10/2023 1324  Last data filed at 2/10/2023 0500  Gross per 24 hour   Intake --   Output 1700 ml   Net -1700 ml           Physical Exam:   Blood pressure (!) 124/55, pulse 62, temperature 97.5 °F (36.4 °C), temperature source Oral, resp.  rate 19, height 5' 2\" (1.575 m), weight 155 lb 12.8 oz (70.7 kg), SpO2 99 %, not currently breastfeeding. General:    Well nourished. Head:  Normocephalic, atraumatic  Eyes:  Sclerae appear normal.  Pupils equally round. ENT:  Nares appear normal.  Moist oral mucosa  Neck:  No restricted ROM. Trachea midline   CV:   RRR. No m/r/g. No jugular venous distension. Lungs:   Decreased breath sounds bilaterally. No wheezing, rhonchi, or rales. Symmetric expansion. Abdomen:   Soft, nontender, nondistended. Extremities: No cyanosis or clubbing. No edema  Skin:     No rashes and normal coloration. Warm and dry. Neuro:  CN II-XII grossly intact. Psych:  Normal mood and affect.       I have personally reviewed labs and tests:  Recent Labs:  Recent Results (from the past 48 hour(s))   CBC with Auto Differential    Collection Time: 02/09/23  5:39 AM   Result Value Ref Range    WBC 5.9 4.3 - 11.1 K/uL    RBC 3.60 (L) 4.05 - 5.2 M/uL    Hemoglobin 11.1 (L) 11.7 - 15.4 g/dL    Hematocrit 34.3 (L) 35.8 - 46.3 %    MCV 95.3 82 - 102 FL    MCH 30.8 26.1 - 32.9 PG    MCHC 32.4 31.4 - 35.0 g/dL    RDW 13.0 11.9 - 14.6 %    Platelets 288 527 - 934 K/uL    MPV 10.8 9.4 - 12.3 FL    nRBC 0.00 0.0 - 0.2 K/uL    Differential Type AUTOMATED      Seg Neutrophils 66 43 - 78 %    Lymphocytes 20 13 - 44 %    Monocytes 11 4.0 - 12.0 %    Eosinophils % 2 0.5 - 7.8 %    Basophils 1 0.0 - 2.0 %    Immature Granulocytes 0 0.0 - 5.0 %    Segs Absolute 3.9 1.7 - 8.2 K/UL    Absolute Lymph # 1.2 0.5 - 4.6 K/UL    Absolute Mono # 0.7 0.1 - 1.3 K/UL    Absolute Eos # 0.1 0.0 - 0.8 K/UL    Basophils Absolute 0.1 0.0 - 0.2 K/UL    Absolute Immature Granulocyte 0.0 0.0 - 0.5 K/UL   Magnesium    Collection Time: 02/09/23  5:39 AM   Result Value Ref Range    Magnesium 2.0 1.8 - 2.4 mg/dL   Comprehensive Metabolic Panel    Collection Time: 02/09/23  5:39 AM   Result Value Ref Range    Sodium 146 (H) 133 - 143 mmol/L    Potassium 3.9 3.5 - 5.1 mmol/L    Chloride 108 101 - 110 mmol/L    CO2 24 21 - 32 mmol/L    Anion Gap 14 (H) 2 - 11 mmol/L    Glucose 89 65 - 100 mg/dL    BUN 32 (H) 8 - 23 MG/DL    Creatinine 1.10 (H) 0.6 - 1.0 MG/DL    Est, Glom Filt Rate 51 (L) >60 ml/min/1.73m2    Calcium 9.2 8.3 - 10.4 MG/DL    Total Bilirubin 0.8 0.2 - 1.1 MG/DL    ALT 12 12 - 65 U/L    AST 14 (L) 15 - 37 U/L    Alk Phosphatase 89 50 - 136 U/L    Total Protein 6.3 6.3 - 8.2 g/dL    Albumin 3.1 (L) 3.2 - 4.6 g/dL    Globulin 3.2 2.8 - 4.5 g/dL    Albumin/Globulin Ratio 1.0 0.4 - 1.6     CBC with Auto Differential    Collection Time: 02/10/23  5:25 AM   Result Value Ref Range    WBC 6.4 4.3 - 11.1 K/uL    RBC 3.59 (L) 4.05 - 5.2 M/uL    Hemoglobin 11.0 (L) 11.7 - 15.4 g/dL    Hematocrit 33.8 (L) 35.8 - 46.3 %    MCV 94.2 82 - 102 FL    MCH 30.6 26.1 - 32.9 PG    MCHC 32.5 31.4 - 35.0 g/dL    RDW 12.9 11.9 - 14.6 %    Platelets 753 351 - 641 K/uL    MPV 11.3 9.4 - 12.3 FL    nRBC 0.00 0.0 - 0.2 K/uL    Differential Type AUTOMATED      Seg Neutrophils 68 43 - 78 %    Lymphocytes 18 13 - 44 %    Monocytes 10 4.0 - 12.0 %    Eosinophils % 2 0.5 - 7.8 %    Basophils 1 0.0 - 2.0 %    Immature Granulocytes 1 0.0 - 5.0 %    Segs Absolute 4.4 1.7 - 8.2 K/UL    Absolute Lymph # 1.2 0.5 - 4.6 K/UL    Absolute Mono # 0.7 0.1 - 1.3 K/UL    Absolute Eos # 0.1 0.0 - 0.8 K/UL    Basophils Absolute 0.1 0.0 - 0.2 K/UL    Absolute Immature Granulocyte 0.0 0.0 - 0.5 K/UL   Magnesium    Collection Time: 02/10/23  5:25 AM   Result Value Ref Range    Magnesium 2.1 1.8 - 2.4 mg/dL   Comprehensive Metabolic Panel    Collection Time: 02/10/23  5:25 AM   Result Value Ref Range    Sodium 140 133 - 143 mmol/L    Potassium 4.3 3.5 - 5.1 mmol/L    Chloride 106 101 - 110 mmol/L    CO2 26 21 - 32 mmol/L    Anion Gap 8 2 - 11 mmol/L    Glucose 99 65 - 100 mg/dL    BUN 39 (H) 8 - 23 MG/DL    Creatinine 1.00 0.6 - 1.0 MG/DL    Est, Glom Filt Rate 57 (L) >60 ml/min/1.73m2    Calcium 9.3 8.3 - 10.4 MG/DL    Total Bilirubin 1.0 0.2 - 1.1 MG/DL    ALT 26 12 - 65 U/L    AST 16 15 - 37 U/L    Alk Phosphatase 84 50 - 136 U/L    Total Protein 6.3 6.3 - 8.2 g/dL    Albumin 3.0 (L) 3.2 - 4.6 g/dL    Globulin 3.3 2.8 - 4.5 g/dL    Albumin/Globulin Ratio 0.9 0.4 - 1.6         I have personally reviewed imaging studies:  Other Studies:  Vascular duplex renal arterial complete   Final Result   Challenging exam due to large body habitus and patient is on 6 L of   O2. Respiratory motion. Mild increased velocities in the right main renal   artery. The aortic to renal artery ratio is 1.9 on the right and 0.5 on the   left. No significant renal artery stenosis seen. Bilateral simple renal cyst as described. Medical renal disease. XR CHEST PORTABLE   Final Result   1. Questionable worsening central interstitial infiltrate which could represent   early pulmonary edema. Additional cardiomegaly and small left effusion show no   significant interval change.                 Current Meds:  Current Facility-Administered Medications   Medication Dose Route Frequency    hydrALAZINE (APRESOLINE) tablet 25 mg  25 mg Oral 3 times per day    apixaban (ELIQUIS) tablet 5 mg  5 mg Oral Q12H    carvedilol (COREG) tablet 25 mg  25 mg Oral BID WC    cloNIDine (CATAPRES) tablet 0.2 mg  0.2 mg Oral BID PRN    hydrALAZINE (APRESOLINE) injection 10 mg  10 mg IntraVENous Q6H PRN    furosemide (LASIX) injection 20 mg  20 mg IntraVENous BID    magnesium oxide (MAG-OX) tablet 400 mg  400 mg Oral Daily    sodium chloride flush 0.9 % injection 5-40 mL  5-40 mL IntraVENous 2 times per day    sodium chloride flush 0.9 % injection 5-40 mL  5-40 mL IntraVENous PRN    0.9 % sodium chloride infusion   IntraVENous PRN    ondansetron (ZOFRAN-ODT) disintegrating tablet 4 mg  4 mg Oral Q8H PRN    Or    ondansetron (ZOFRAN) injection 4 mg  4 mg IntraVENous Q6H PRN    polyethylene glycol (GLYCOLAX) packet 17 g  17 g Oral Daily PRN    acetaminophen (TYLENOL) tablet 650 mg  650 mg Oral Q6H PRN    Or    acetaminophen (TYLENOL) suppository 650 mg  650 mg Rectal Q6H PRN    spironolactone (ALDACTONE) tablet 25 mg  25 mg Oral Daily       Signed:  Cliff Tubbs MD    Part of this note may have been written by using a voice dictation software. The note has been proof read but may still contain some grammatical/other typographical errors.

## 2023-02-10 NOTE — CARE COORDINATION
LOS 3 D  CM reviewed clinical record and patient's status discussed in IDR. Patient remains on 3 lpm O2 with no home O2. Adjustment of meds for blood pressure control. PT/OT recommending [facility]. Patient offered [facility] bed today but patient declined, \" I'm not ready\". I. Patient reports she's unsure if she'll need rehab. She reports her granddaughter has agreed to move in with her for awhile at discharge. Patient also reports she's secured someone to bring her groceries. [facility] does admit over the weekend. Covid test within 72 hrs of admission. CM will offer patient list of home health agencies if declines [facility] admission at discharge. 1500 CM informed patient changed her mind and is agreeable to discharge to [facility] today. No other discharge needs at this time identified. If patient unable to wean O2, an O2 qualifier will need to be ordered.

## 2023-02-10 NOTE — PROGRESS NOTES
ACUTE PHYSICAL THERAPY GOALS:   (Developed with and agreed upon by patient and/or caregiver.)     (1.) Jose D Trent  will move from supine to sit and sit to supine , scoot up and down, and roll side to side with MODIFIED INDEPENDENCE within 7 treatment day(s). (2.) Jose D Trent will transfer from bed to chair and chair to bed with STAND BY ASSIST using the least restrictive device within 7 treatment day(s). (3.) Jose D Trent will ambulate with STAND BY ASSIST for 250 feet with the least restrictive device within 7 treatment day(s). (4.) Jose D Trent will perform standing static and dynamic balance activities x 8 minutes with STAND BY ASSIST to improve safety within 7 treatment day(s). (5.) Jose D Trent will perform bilateral lower extremity exercises x 15 min for HEP with SUPERVISION to improve strength, endurance, and functional mobility within 7 treatment day(s). PHYSICAL THERAPY: Daily Note AM   (Link to Caseload Tracking: PT Visit Days : 2  Time In/Out PT Charge Capture  Rehab Caseload Tracker  Orders    Jose D Trent is a 76 y.o. female   PRIMARY DIAGNOSIS: Pulmonary edema with congestive heart failure, NYHA class 2 (HCC)  Dyspnea and respiratory abnormalities [R06.00, R06.89]  Pulmonary edema with congestive heart failure, NYHA class 2 ([address] [I50.1]       Inpatient: Payor: MEDICARE / Plan: MEDICARE PART A AND B / Product Type: *No Product type* /     ASSESSMENT:     REHAB RECOMMENDATIONS:   Recommendation to date pending progress:  Setting:  Inpatient Rehab Facility    Equipment:    To Be Determined     ASSESSMENT:  Ms. Jeanie Lee  is a 76year old female who presents supine in bed upon PT entry. Pt made great progress towards her goals today, performing mobility including bed mobility, transfers, standing balance tasks, and ambulation [id number] with CGA and RW. Pt fatigues easily and VSS on 3L O2.  Pt presents as functioning below her baseline, with deficits in mobility including transfers, gait, balance, and activity tolerance. Pt will benefit from skilled therapy services to address stated deficits to promote return to highest level of function, independence, and safety. Will continue to follow.      SUBJECTIVE:   Ms. Chasity Haider states, \"Thanks for your patience, I'm moving a little slowly\"     Social/Functional Lives With: Alone  Type of Home: Apartment  Home Layout: One level  Home Access: Level entry  Bathroom Shower/Tub: Tub/Shower unit  Bathroom Equipment: Tub transfer bench  Home Equipment: Dolan Meigs  Has the patient had two or more falls in the past year or any fall with injury in the past year?: No  Receives Help From: Family  ADL Assistance: Independent  Ambulation Assistance: Needs assistance (Cane or rollator)  Transfer Assistance: Independent  Active : No  Mode of Transportation: Car  OBJECTIVE:     PAIN: Rosita Gins / O2: PRECAUTION / Ke Feil / Jose Brass:   Pre Treatment: None         Post Treatment: None Vitals    VSS    Oxygen   3L O2, nasal cannula   Purewick and Telemetry     RESTRICTIONS/PRECAUTIONS:  Restrictions/Precautions  Restrictions/Precautions: Fall Risk  Restrictions/Precautions: Fall Risk     MOBILITY: I Mod I S SBA CGA Min Mod Max Total  NT x2 Comments:   Bed Mobility    Rolling [] [] [] [] [] [] [] [] [] [x] []    Supine to Sit [] [] [] [] [x] [] [] [] [] [] []    Scooting [] [] [] [] [x] [] [] [] [] [] []    Sit to Supine [] [] [] [] [] [] [] [] [] [x] []    Transfers    Sit to Stand [] [] [] [] [x] [] [] [] [] [] []    Bed to Chair [] [] [] [] [x] [] [] [] [] [] []    Stand to Sit [] [] [] [] [x] [] [] [] [] [] []     [] [] [] [] [] [] [] [] [] [] []    I=Independent, Mod I=Modified Independent, S=Supervision, SBA=Standby Assistance, CGA=Contact Guard Assistance,   Min=Minimal Assistance, Mod=Moderate Assistance, Max=Maximal Assistance, Total=Total Assistance, NT=Not Tested    BALANCE: Good Fair+ Fair Fair- Poor NT Comments   Sitting Static [x] [] [] [] [] []    Sitting Dynamic [] [x] [] [] [] []              Standing Static [] [x] [] [] [] []    Standing Dynamic [] [x] [x] [] [] []      GAIT: I Mod I S SBA CGA Min Mod Max Total  NT x2 Comments:   Level of Assistance [] [] [] [] [x] [] [] [] [] [] []    Distance 2x100  feet    DME Gait Belt and Rolling Walker    Gait Quality Decreased sintia , Decreased step clearance, Decreased step length, Narrow base of support, Shuffling , and Trunk flexion    Weightbearing Status      Stairs      I=Independent, Mod I=Modified Independent, S=Supervision, SBA=Standby Assistance, CGA=Contact Guard Assistance,   Min=Minimal Assistance, Mod=Moderate Assistance, Max=Maximal Assistance, Total=Total Assistance, NT=Not Tested    PLAN:   FREQUENCY AND DURATION: 3 times/week for duration of hospital stay or until stated goals are met, whichever comes first.    TREATMENT:   TREATMENT:   Therapeutic Activity (27 Minutes): Therapeutic activity included Supine to Sit, Scooting, Lateral Scooting, Transfer Training, Ambulation on level ground, Sitting balance , and Standing balance to improve functional Activity tolerance, Balance, Mobility, and Strength.     TREATMENT GRID:  N/A    AFTER TREATMENT PRECAUTIONS: Bed/Chair Locked, Call light within reach, Chair, and Needs within reach    INTERDISCIPLINARY COLLABORATION:  RN/ PCT    EDUCATION:      TIME IN/OUT:  Time In: 1035  Time Out: [facility]  Minutes: [address], PT

## 2023-02-11 LAB
ALBUMIN SERPL-MCNC: 3 G/DL (ref 3.2–4.6)
ALBUMIN/GLOB SERPL: 0.9 (ref 0.4–1.6)
ALP SERPL-CCNC: 78 U/L (ref 50–136)
ALT SERPL-CCNC: 14 U/L (ref 12–65)
ANION GAP SERPL CALC-SCNC: 3 MMOL/L (ref 2–11)
AST SERPL-CCNC: 18 U/L (ref 15–37)
BASOPHILS # BLD: 0.1 K/UL (ref 0–0.2)
BASOPHILS NFR BLD: 1 % (ref 0–2)
BILIRUB SERPL-MCNC: 1.3 MG/DL (ref 0.2–1.1)
BUN SERPL-MCNC: 49 MG/DL (ref 8–23)
CALCIUM SERPL-MCNC: 9.1 MG/DL (ref 8.3–10.4)
CHLORIDE SERPL-SCNC: 107 MMOL/L (ref 101–110)
CO2 SERPL-SCNC: 31 MMOL/L (ref 21–32)
CREAT SERPL-MCNC: 1.4 MG/DL (ref 0.6–1)
DIFFERENTIAL METHOD BLD: ABNORMAL
EOSINOPHIL # BLD: 0.1 K/UL (ref 0–0.8)
EOSINOPHIL NFR BLD: 2 % (ref 0.5–7.8)
ERYTHROCYTE [DISTWIDTH] IN BLOOD BY AUTOMATED COUNT: 13.2 % (ref 11.9–14.6)
GLOBULIN SER CALC-MCNC: 3.3 G/DL (ref 2.8–4.5)
GLUCOSE SERPL-MCNC: 114 MG/DL (ref 65–100)
HCT VFR BLD AUTO: 32.1 % (ref 35.8–46.3)
HGB BLD-MCNC: 10.6 G/DL (ref 11.7–15.4)
IMM GRANULOCYTES # BLD AUTO: 0 K/UL (ref 0–0.5)
IMM GRANULOCYTES NFR BLD AUTO: 0 % (ref 0–5)
LYMPHOCYTES # BLD: 1.1 K/UL (ref 0.5–4.6)
LYMPHOCYTES NFR BLD: 19 % (ref 13–44)
MAGNESIUM SERPL-MCNC: 2.5 MG/DL (ref 1.8–2.4)
MCH RBC QN AUTO: 31.1 PG (ref 26.1–32.9)
MCHC RBC AUTO-ENTMCNC: 33 G/DL (ref 31.4–35)
MCV RBC AUTO: 94.1 FL (ref 82–102)
MONOCYTES # BLD: 0.8 K/UL (ref 0.1–1.3)
MONOCYTES NFR BLD: 13 % (ref 4–12)
NEUTS SEG # BLD: 3.9 K/UL (ref 1.7–8.2)
NEUTS SEG NFR BLD: 65 % (ref 43–78)
NRBC # BLD: 0 K/UL (ref 0–0.2)
PLATELET # BLD AUTO: 218 K/UL (ref 150–450)
PMV BLD AUTO: 11.4 FL (ref 9.4–12.3)
POTASSIUM SERPL-SCNC: 4.1 MMOL/L (ref 3.5–5.1)
PROT SERPL-MCNC: 6.3 G/DL (ref 6.3–8.2)
RBC # BLD AUTO: 3.41 M/UL (ref 4.05–5.2)
SODIUM SERPL-SCNC: 141 MMOL/L (ref 133–143)
WBC # BLD AUTO: 6 K/UL (ref 4.3–11.1)

## 2023-02-11 PROCEDURE — 83735 ASSAY OF MAGNESIUM: CPT

## 2023-02-11 PROCEDURE — 97165 OT EVAL LOW COMPLEX 30 MIN: CPT

## 2023-02-11 PROCEDURE — 1180000000 HC REHAB R&B

## 2023-02-11 PROCEDURE — 6370000000 HC RX 637 (ALT 250 FOR IP): Performed by: PHYSICIAN ASSISTANT

## 2023-02-11 PROCEDURE — 97116 GAIT TRAINING THERAPY: CPT

## 2023-02-11 PROCEDURE — 85025 COMPLETE CBC W/AUTO DIFF WBC: CPT

## 2023-02-11 PROCEDURE — 97530 THERAPEUTIC ACTIVITIES: CPT

## 2023-02-11 PROCEDURE — 97535 SELF CARE MNGMENT TRAINING: CPT

## 2023-02-11 PROCEDURE — 80053 COMPREHEN METABOLIC PANEL: CPT

## 2023-02-11 PROCEDURE — 97161 PT EVAL LOW COMPLEX 20 MIN: CPT

## 2023-02-11 PROCEDURE — 36415 COLL VENOUS BLD VENIPUNCTURE: CPT

## 2023-02-11 PROCEDURE — 97110 THERAPEUTIC EXERCISES: CPT

## 2023-02-11 RX ADMIN — CARVEDILOL 25 MG: 25 TABLET, FILM COATED ORAL at 08:48

## 2023-02-11 RX ADMIN — APIXABAN 5 MG: 5 TABLET, FILM COATED ORAL at 21:48

## 2023-02-11 RX ADMIN — FUROSEMIDE 20 MG: 20 TABLET ORAL at 08:48

## 2023-02-11 RX ADMIN — HYDRALAZINE HYDROCHLORIDE 25 MG: 50 TABLET, FILM COATED ORAL at 14:23

## 2023-02-11 RX ADMIN — MAGNESIUM GLUCONATE 500 MG ORAL TABLET 400 MG: 500 TABLET ORAL at 08:48

## 2023-02-11 RX ADMIN — APIXABAN 5 MG: 5 TABLET, FILM COATED ORAL at 08:48

## 2023-02-11 RX ADMIN — HYDRALAZINE HYDROCHLORIDE 25 MG: 50 TABLET, FILM COATED ORAL at 05:53

## 2023-02-11 RX ADMIN — HYDRALAZINE HYDROCHLORIDE 25 MG: 50 TABLET, FILM COATED ORAL at 21:47

## 2023-02-11 RX ADMIN — CARVEDILOL 25 MG: 25 TABLET, FILM COATED ORAL at 17:30

## 2023-02-11 RX ADMIN — SPIRONOLACTONE 25 MG: 25 TABLET ORAL at 08:48

## 2023-02-11 NOTE — PLAN OF CARE
Problem: Safety - Adult  Goal: Free from fall injury  Outcome: Progressing  Flowsheets (Taken 2/10/2023 8448)  Free From Fall Injury:   Instruct family/caregiver on patient safety   Based on caregiver fall risk screen, instruct family/caregiver to ask for assistance with transferring infant if caregiver noted to have fall risk factors

## 2023-02-11 NOTE — PROGRESS NOTES
Occupational Therapy    IRC OCCUPATIONAL THERAPY INITIAL EVALUATION    Time In 1970      Time Out 65        HPI (per MD report): \"77 y.o. white female presented to the ED 2/7/2023 with fatigue, generalized weakness and dyspnea x 2d. /107, IV nicardipine started. CXR found PNA vs pulmonary edema. She required 2L O2 in ED. BNP >1900. U/A non-infectious. She was admitted to telemetry for hypertensive emergency, pulmonary edema and acute hypoxic respiratory failure. Cardiology endorsed continued diuresis and better BP control. TTE 2/7 with normal LV size and function, EF 55-60% and moderate pericardial effusion. PT / OT noted mobility and self-care deficits, recommended IRC. Once determined to be stable, patient was transferred to the [facility] for comprehensive inpatient rehabilitation program and close medical monitoring. \"  Past Medical History:   Diagnosis Date    Arrhythmia     Arthritis     Asthma     Cellulitis     CHF (congestive heart failure) (HCC)     Chronic kidney disease     Hx renal failure    Depression     Gout     Gouty arthritis 12/2/2015    Hypercholesterolemia     Hypertension     Morbid obesity (HCC)     Nausea & vomiting     Polymyalgia rheumatica ([address] 12/2/2015    PONV (postoperative nausea and vomiting)     Psychiatric disorder     depression & anxiety    PUD (peptic ulcer disease)     Severe obesity with body mass index (BMI) of 35.0 to 39.9 with comorbidity ([address] 8/12/2013    Sjogren's syndrome ([address]     Temporal arteritis ([address]        Patient's Goal: \"Being able to get to the point where I can shop for my own groceries, keep my apartment clean, and just feel better. \"  Pain: No pain expressed. Precautions: Falls    Prior Level of Function: Pt. Was I with all self-are ADLs, ambulated short distances without AD, longer distances with rollator; pt. I with laundry, light housekeeping, and medication management. Pt. I with light meal preparation, received MOW 1 meal/day.  Pt. Does not drive.  LIVING SITUATION:    Environment   Living Alone Yes   Support System None   Home Set Up 1 Level Independent Living Facility   Home Entrance No JAIDA   Bathroom Setup  Tub Shower, Grab Bars, and Tub Transfer Bench   Current DME Rollator, Cane, and Grab Bars     UPPER EXTREMITY ASSESSMENT:   PIEDAD LARA   General Evaluation [facility]   [facility] Intact Intact   GMC Intact Intact   Light Touch Intact Intact   Proprioception Intact Intact   Subluxation N/A N/A   Inattention/Neglect N/A N/A   Muscle Tone Normal Normal     STRENGTH: PIEDAD LARA   Shoulder Flexion 5/5 Completes full range of motion against gravity with maximum resistance 5/5 Completes full range of motion against gravity with maximum resistance   Shoulder Abduction 5/5 Completes full range of motion against gravity with maximum resistance 5/5 Completes full range of motion against gravity with maximum resistance   Elbow Flexion 5/5 Completes full range of motion against gravity with maximum resistance 5/5 Completes full range of motion against gravity with maximum resistance   Elbow Extension  5/5 Completes full range of motion against gravity with maximum resistance 5/5 Completes full range of motion against gravity with maximum resistance    5/5 Completes full range of motion against gravity with maximum resistance 5/5 Completes full range of motion against gravity with maximum resistance     BALANCE:   Static Dynamic   Sitting Good: Able to maintain balance against moderate resistance Good-/Fair+: Able to sit unsupported and weight shift across midline minimally   Standing Fair: Able to stand unsupported without UE support and without LOB for 1-2 minutes Fair: Stand independently unsupported, weight shift, and reach ipsilaterally, LOB when crossing midline    Pusher's Syndrome: N/A    FUNCTIONAL MOBILITY:   Sit to Supine Supervision or touching assistance SUP   Sit to Stand Supervision or touching assistance CGA with use of RW   Transfer Assist Supervision or touching assistance CGA with use of RW     ACTIVITIES OF DAILY LIVING:   Score Comments   Eating Independent Independent   Oral Hygiene Setup or clean-up assistance S/U   Bathing Supervision or touching assistance UB SUP; LB CGA standing; sitting in shower chair   Upper Body  Dressing Setup or clean-up assistance S/U   Lower Body Dressing Supervision or touching assistance CGA standing; donning brief and pants    Donning/[place] Footwear Supervision or touching assistance SUP; donning B socks     Toilet Transfer Supervision or touching assistance CGA with use of RW and grab bar   [place] or touching assistance CGA standing     /71   Pulse 65   Temp 98.1 °F (36.7 °C) (Oral)   Resp 16   Ht 5' 2\" (1.575 m)   Wt 152 lb (68.9 kg)   SpO2 96%   BMI 27.80 kg/m²      Cognition: Pt scored a 15/15 on the IRF-BURTON. Attention, STM, problem solving, safety awareness deficits not noted. Vision/Perception: No deficits noted   Problem List: Activity Tolerance and Strength   Functional Limitations: ADL, IADL, Functional Transfers, and Functional Mobility   Session: Pt agreeable to OT evaluation. Pt completed the following with details recorded above: MMT/ROM, ADLs, IRF-BURTON, and informal OT interview. Interdisciplinary Communication: Collaborated with PT regarding patient's current level of function, plan of care, and safety measures. Patient/Family Education: Patient educated On the role of OT, On POC, and On IRC expectations. GOALS:  Short Term Goals:  Time Frame for Short Term Goals: 10 days   STG 1: Patient will dress UB with [place] using AE/DME PRN. STG 2: Patient will dress LB with [place] using AE/DME PRN. STG 3: Patient will don footwear with [place] using AE/DME PRN. STG 4: Patient will bathe with [place] using AE/DME PRN. STG 5: Patient will toilet with [place] using AE/DME PRN.   STG 6: Patient will carry out light meal preparation task with [place]. OT order received, chart reviewed, and OT orders acknowledged. Patient will benefit from skilled OT services to address deficits to maximize functional performance with self-care tasks and functional mobility. Treatment is likely to include activity tolerance, coordination, balance, functional mobility, ADL, and IADL training. Patient will be seen for 1.5-2 hours of skilled OT services 5-6 days a week as appropriate. Initate POC.      Tiara Tinoco OT   2/11/2023

## 2023-02-11 NOTE — PROGRESS NOTES
PHYSICAL THERAPY EXAMINATION    Time In: 9259  Time Out: 1059  Total Treatment Time:  67 Minutes         HPI:  76 y.o. white female presented to the ED 2/7/2023 with fatigue, generalized weakness and dyspnea x 2d. /107, IV nicardipine started. CXR found PNA vs pulmonary edema. She required 2L O2 in ED. BNP >1900. U/A non-infectious. She was admitted to telemetry for hypertensive emergency, pulmonary edema and acute hypoxic respiratory failure. Cardiology endorsed continued diuresis and better BP control. TTE 2/7 with normal LV size and function, EF 55-60% and moderate pericardial effusion. PT / OT noted mobility and self-care deficits, recommended IRC    Past Medical History:   Past Medical History:   Diagnosis Date    Arrhythmia     Arthritis     Asthma     Cellulitis     CHF (congestive heart failure) ([address]     Chronic kidney disease     Hx renal failure    Depression     Gout     Gouty arthritis 12/2/2015    Hypercholesterolemia     Hypertension     Morbid obesity (HCC)     Nausea & vomiting     Polymyalgia rheumatica ([address] 12/2/2015    PONV (postoperative nausea and vomiting)     Psychiatric disorder     depression & anxiety    PUD (peptic ulcer disease)     Severe obesity with body mass index (BMI) of 35.0 to 39.9 with comorbidity ([address] 8/12/2013    Sjogren's syndrome [address]     Temporal arteritis ([facility]        Pt's Goal:  \"I feel better breathing today and more energy. \"      Precautions:  Falls, O2     Impairments:    Decreased strength B LE  [x]     Decreased strength trunk/core  [x]     Decreased AROM   []     Decreased PROM  []    Decreased endurance  [x]     Decreased balance sitting  []     Decreased balance standing  [x]     Pain   []     Slow ambulation velocity  [x]    Decreased coordination  []    Decreased safety awareness  [x]      Functional Limitations:   Decreased independence with bed mobility  [x]     Decreased independence with functional transfers  [x]     Decreased independence with ambulation  [x]     Decreased independence with stair negotiation  [x]       Previous Functional Level: Pt was ambulating using rollator or cane for community outings and no AD within the home. Home Situation:      Environment   Living Situation Independent Living   Home Entrance No steps   Lives Alone Yes   Support Systems No Support Systems   DME Used Rollator and Cane   DME Available Rollator and Cane            Outcome Measures:     Pain level: 0/10  Pain Location:  no pain   Pain Interventions: Medication per order, rest, positioning,relaxation    Vital Signs:  /71   Pulse 65   Temp 98.1 °F (36.7 °C) (Oral)   Resp 16   Ht 5' 2\" (1.575 m)   Wt 152 lb (68.9 kg)   SpO2 96%   BMI 27.80 kg/m²     Education:  Pt was instructed and educated on strengthening ex's, ambulation using RW, bed mobility and transfers. Interdisciplinary Communication: Collaborated with OT about pt's length of stay. Cognition: Requires increase time for simple commands, alert and oriented to self and place.      Lower Extremity Function:     LLE RLE   ROM WFL WFL   Fine Motor Coordination Intact Intact   Tone Normal Normal   Sensation Light Touch Intact Light Touch Intact   Strength Grossly WFL Grossly WFL         PRIMARY MODE OF LOCOMOTION: Ambulation    Functional Assessment:    Balance  Comments   Static Sitting Good:  Pt. able to maintain balance w/o UE support;  exhibits some postural sway    Dynamic Sitting Good - accepts moderate challenge;  can maintain balance while picking object off the floor    Static Standing Fair:  Pt. requires UE support;  may need occasional min A    Dynamic Standing Fair - accepts minimal challenge;  can maintain balance while turning head/trunk    Picking Up Object From Floor 88     Not attempted due to medical condition or safety concerns         BED MOBILITY & TRANSFERS Quality Indicator Score Comments   Rolling 4  Supervision or touching assistance        Supine to Sit 4  Supervision or touching assistance        Sit to Supine 4  Supervision or touching assistance        Sit to Stand 4  Supervision or touching assistance        Bed to/from Chair 4  Supervision or touching assistance        Car Transfer 88     Not attempted due to medical condition or safety concerns         WHEELCHAIR MOBILITY/MANAGEMENT Initial Assessment/Quality Indicator Score Comments   Able to Propel 48' w/ 2 Turns 9     Not applicable     Able to Propel 570' 9     Not applicable     Wheelchair set up assistance required Total A    Wheelchair management NT        AMBULATION Initial Assessment/Quality Indicator Score Comments   Assistive device RW    Walk 10' 4       Decrease velocity   Walk 50' with 2 Turns 4  Supervision or touching assistance        Walk 150' 88     Not attempted due to medical condition or safety concerns     Walking 10' on Unlevel Surfaces 88     Not attempted due to medical condition or safety concerns         STEPS/STAIRS Initial Assessment/Quality Indicator Sore Comments   1 Curb Step 88     Not attempted due to medical condition or safety concerns     4 Steps 88     Not attempted due to medical condition or safety concerns     12 Steps 88     Not attempted due to medical condition or safety concerns     Curbs/Ramps NT      TREATMENT:  SEATED EXERCISES Sets Reps Comments   Hip Flexion 3 10    Long Arc Quads 3 10    Hip Adduction/Ball Squeeze 3 10    Hip Abduction with green Theraband 3 10    Hamstring Curls with green Theraband 3 10    Hip Extension with green Theraband 3 10       Pt was instructed and worked on transfer training and [employer] to increase endurance x10 min. Pt. Was left up in recliner with all needs within reach. PHYSICAL THERAPY PLAN OF CARE    Therapy Diagnosis:   Please see table above    Order received from MD for physical therapy services and chart reviewed.   Pt to be seen at least 5 times per week for at least 1.5 hours of physical therapy per day for 10 days. Thank you for the referral.    Goals:  Long Term Goals:  Pt will demonstrate roll left & right & transition supine<>sit with Independent in 10 days   2. Pt. will transfer from bed to/from chair with Independent using the least restrictive device in 10 days   3. Pt. will ambulate with 150 feet with Rollator or LRAD and Modified Independent in 10 days   4. Pt. Will ambulate up/down 1 curb step with walker and Modified Independent in 10 days  5. Pt will ambulate up/down 4 steps using hand rail with CGA in 10 days. Pt would benefit from skilled physical therapy in order to improve independent functional mobility within the home. Interventions may include range of motion (AROM, PROM B LE/trunk), motor function (B LE/trunk strengthening/coordination), activity tolerance (vitals, oxygen saturation levels), bed mobility training, balance activities, gait training (progressive ambulation program), and functional transfer training. Please see IRC; Interdisciplinary Eval, Care Plan, and Patient Education for further information regarding physical therapy examination and plan of care.        Jeanne Calderon, PT  2/11/2023

## 2023-02-12 PROBLEM — E44.1 MILD PROTEIN-CALORIE MALNUTRITION (HCC): Chronic | Status: ACTIVE | Noted: 2023-02-12

## 2023-02-12 LAB
BACTERIA SPEC CULT: NORMAL
BACTERIA SPEC CULT: NORMAL
SERVICE CMNT-IMP: NORMAL
SERVICE CMNT-IMP: NORMAL

## 2023-02-12 PROCEDURE — 1180000000 HC REHAB R&B

## 2023-02-12 PROCEDURE — 99232 SBSQ HOSP IP/OBS MODERATE 35: CPT | Performed by: PHYSICIAN ASSISTANT

## 2023-02-12 PROCEDURE — 6370000000 HC RX 637 (ALT 250 FOR IP): Performed by: PHYSICIAN ASSISTANT

## 2023-02-12 RX ORDER — LACTULOSE 10 G/15ML
20 SOLUTION ORAL DAILY PRN
Status: DISCONTINUED | OUTPATIENT
Start: 2023-02-12 | End: 2023-02-17 | Stop reason: HOSPADM

## 2023-02-12 RX ORDER — SENNA AND DOCUSATE SODIUM 50; 8.6 MG/1; MG/1
2 TABLET, FILM COATED ORAL NIGHTLY
Status: DISCONTINUED | OUTPATIENT
Start: 2023-02-12 | End: 2023-02-14

## 2023-02-12 RX ORDER — FUROSEMIDE 20 MG/1
20 TABLET ORAL DAILY
Status: DISCONTINUED | OUTPATIENT
Start: 2023-02-13 | End: 2023-02-17 | Stop reason: HOSPADM

## 2023-02-12 RX ADMIN — HYDRALAZINE HYDROCHLORIDE 25 MG: 50 TABLET, FILM COATED ORAL at 15:55

## 2023-02-12 RX ADMIN — APIXABAN 5 MG: 5 TABLET, FILM COATED ORAL at 20:47

## 2023-02-12 RX ADMIN — MAGNESIUM GLUCONATE 500 MG ORAL TABLET 400 MG: 500 TABLET ORAL at 08:44

## 2023-02-12 RX ADMIN — CARVEDILOL 25 MG: 25 TABLET, FILM COATED ORAL at 15:55

## 2023-02-12 RX ADMIN — HYDRALAZINE HYDROCHLORIDE 25 MG: 50 TABLET, FILM COATED ORAL at 05:45

## 2023-02-12 RX ADMIN — CARVEDILOL 25 MG: 25 TABLET, FILM COATED ORAL at 08:44

## 2023-02-12 RX ADMIN — APIXABAN 5 MG: 5 TABLET, FILM COATED ORAL at 08:44

## 2023-02-12 RX ADMIN — POLYETHYLENE GLYCOL 3350 17 G: 17 POWDER, FOR SOLUTION ORAL at 08:44

## 2023-02-12 RX ADMIN — FUROSEMIDE 20 MG: 20 TABLET ORAL at 08:44

## 2023-02-12 RX ADMIN — SENNOSIDES AND DOCUSATE SODIUM 2 TABLET: 8.6; 5 TABLET ORAL at 20:47

## 2023-02-12 RX ADMIN — HYDRALAZINE HYDROCHLORIDE 25 MG: 50 TABLET, FILM COATED ORAL at 20:54

## 2023-02-12 RX ADMIN — SPIRONOLACTONE 25 MG: 25 TABLET ORAL at 08:44

## 2023-02-12 NOTE — PROGRESS NOTES
Comprehensive Nutrition Assessment    Type and Reason for Visit: Initial, Positive Nutrition Screen  Malnutrition Screening Tool: Malnutrition Screen  Have you recently lost weight without trying?: 2 to 13 pounds (1 point)  Have you been eating poorly because of a decreased appetite?: Yes (1 point)  Malnutrition Screening Tool Score: 2    Nutrition Recommendations/Plan:   Meals and Snacks:  Diet: Continue current order  Nutrition Supplement Therapy:  Medical food supplement therapy:  Continue Ensure Enlive twice per day (this provides 350 kcal and 20 grams protein per bottle)       Malnutrition Assessment:  Malnutrition Status: Mild malnutrition (poor intake during admission and unintentional wt loss.)  Context: Chronic Illness  Findings of clinical characteristics of malnutrition:   Energy Intake:  Mild decrease in energy intake (Comment)  Weight Loss:  Greater than 10% over 6 months     Body Fat Loss:  No significant body fat loss     Muscle Mass Loss:  No significant muscle mass loss    Fluid Accumulation:  Unable to assess     Strength:  Not Performed  sunken eyes, thin appearing  Nutrition Assessment:  Nutrition History: Pt states she doesn't like the taste of ensure, but knows she needs the protein- encouraged to try all the flavors. Can mix into shakes or smoothie at home too. She is trying to eat her foods first and then ONS. Likes having desserts on trays. Pt pays attention to sodium in all her foods and limits to <500 mg per meal at home. Is happy the  staff tracks that for her here. She received MOW and is scared to eat it sometimes because it tastes salty to her. Do You Have Any Cultural, Tenriism, or Ethnic Food Preferences?: No   Nutrition Background:       Admitted to acute rehab with primary dx pulmonary edema with CHF. Nutrition Interval:  Pt happy with ONS, but glad it doesn't come every meal tray.  She has a hard time eating everything brought to her and doesn't want to waste foods. Current Nutrition Therapies:  ADULT DIET; Regular; Low Fat/Low Chol/High Fiber/2 gm Na; No Added Salt (3-4 gm); 2000 ml  ADULT ORAL NUTRITION SUPPLEMENT; Dinner, Breakfast; Standard High Calorie/High Protein Oral Supplement    Current Intake:   Average Meal Intake: 51-75% Average Supplements Intake: 1-25%      Anthropometric Measures:  Height: 5' 2\" (157.5 cm)  Current Body Wt: 151 lb 14.4 oz (68.9 kg) (2/11), Weight source: Standing Scale  BMI: 27.8, Overweight (BMI 25.0-29. 9)  Admission Body Weight: 152 lb (68.9 kg) (2/11 standing)  Ideal Body Weight (Kg) (Calculated): 50 kg (110 lbs), 138.1 %  Usual Body Wt: 169 lb (76.7 kg) (8/18 6 months ago. Per patient, weight has been slowly declining over the last several years), Percent weight change: -10.1       BMI Category Overweight (BMI 25.0-29. 9)  Estimated Daily Nutrient Needs:  Energy (kcal/day): 9801-4465 (Kcal/kg (20-25) Weight used: 68.9 kg Current  Protein (g/day): 69-83 (1-1.2 g/kg) Weight Used: (Current) 68.9 kg  Fluid (ml/day):   (1 ml/kcal)    Nutrition Diagnosis:   Unintended weight loss related to  (variable appetite over past 6 months.) as evidenced by weight loss greater than or equal to 10% in 6 months  Nutrition Interventions:   Food and/or Nutrient Delivery: Continue Current Diet, Continue Oral Nutrition Supplement     Coordination of Nutrition Care: Continue to monitor while inpatient  Plan of Care discussed with: patient    Goals:       Active Goal: Meet at least 75% of estimated needs, by next RD assessment (continue to)       Nutrition Monitoring and Evaluation:      Food/Nutrient Intake Outcomes: Food and Nutrient Intake, Supplement Intake  Physical Signs/Symptoms Outcomes: Weight, Meal Time Behavior    Discharge Planning:    Continue Oral Nutrition Supplement    Addy Reinoso RD

## 2023-02-12 NOTE — CARE COORDINATION
CM met with patient to discuss d/c plan and needs. Patient alert/orient x4. Patient were able to state her name, , place, situation. Patient verified demographic, no changes. Verified PCP (Dr. Milagro Larios) was last seen about a month ago and insurance (Medicare / Medicaid). Patient states he lives with alone in an apartment with entry level. Patient states was independent with her ADL's prior to admission and do has DME's ([facility], [address] [place] [address], [place]) in the home. Patient states she doesn't drive she take the bus to all her appointments. Patient states she do have two daughters and they aren't involved. Patient did question a referral be made to [facility]. Patient states she needs assistance in the home. Patient states she has to relay on public bus to transport her from place to place. States she needs assistance with cleaning her home as well. CM discussed ACP with patient and she states that she doesn't need to discuss that, patient declined. CM made patient aware of Wednesday, Team Conference. Patient has requested that CM make contact with anyone and if she needs to provide an update on her to progress, services, DME's and discharge update to anyone she share what have been recommended. C provided patient an opportunity to ask questions, patient had no questions at this time. CM will continue to monitor and remain available for any needs, concerns or questions that may arise. 23 [id number]   Service Assessment   Patient Orientation Alert and Oriented;Person;Place;Situation;Self   Cognition Alert   History Provided By Patient;Medical Record   Primary Caregiver [address] [place] [address]   PCP Verified by CM Yes   Last Visit to PCP Within last 3 months   Prior Functional Level Assistance with the following:;Housework; Shopping   Current Functional Level Assistance with the following:;Bathing;Dressing; Toileting;Housework; Shopping;Mobility   Can patient return to prior living arrangement Yes   Ability to make needs known: Good   Family able to assist with home care needs: Yes   Would you like for me to discuss the discharge plan with any other family members/significant others, and if so, who? No   Financial Resources Medicaid; Medicare   Social/Functional History   Lives With Alone   Type of Home Senior housing apartment   Home Layout One level   Home Access Level entry   [address]   [address] bars in shower; Tub transfer bench   Bathroom Accessibility Accessible   Home Equipment Cane;Grab bars; Reacher;Rollator; Alert Rubens Kluver Help From Family   ADL Assistance Needs assistance   Homemaking Assistance Needs assistance   Homemaking Responsibilities Yes   Ambulation Assistance Needs assistance   Transfer Assistance Needs assistance   Active  No   Mode of Transportation Car   Occupation Retired   Discharge Planning   Type of [address] Prior To Admission Meals On [address] [facility]   DME Ordered? No   Potential Assistance Purchasing Medications No   Patient expects to be discharged to: Apartment   One/Two Story Residence One story   History of falls? 0   Services At/After Discharge   Transition of Care Consult (CM Consult) Discharge [address] [place] [address] [facility] Provided?  No   Mode of Transport at Discharge Other (see comment)   Confirm Follow Up Transport Family   Condition of Participation: Discharge Planning   The Plan for Transition of Care is related to the following treatment goals: IRC to get back to baseline

## 2023-02-12 NOTE — PROGRESS NOTES
INPATIENT REHAB CENTER PROGRESS NOTE    Julien Javier  Admit Date: 2/10/2023  Admit Diagnosis:   Debility [R53.81]  Pulmonary edema with congestive heart failure ([facility] [address] [I50.1]    Subjective     2/12/2023  reports she is not as debilitated this stay vs rehab in 2019; believes she already made progress during initial therapy yesterday. Brief dyspnea episode x 1 in PT, resolved with rest. Denies CP, palpitations. No BM \"in a while,\" +flatus, no abdominal pain / bloating. Bowel program initiated. Objective:     Current Facility-Administered Medications   Medication Dose Route Frequency    [START ON 2/13/2023] furosemide (LASIX) tablet 20 mg  20 mg Oral Daily    acetaminophen (TYLENOL) tablet 650 mg  650 mg Oral Q6H PRN    Or    acetaminophen (TYLENOL) suppository 650 mg  650 mg Rectal Q6H PRN    apixaban (ELIQUIS) tablet 5 mg  5 mg Oral BID    carvedilol (COREG) tablet 25 mg  25 mg Oral BID WC    cloNIDine (CATAPRES) tablet 0.2 mg  0.2 mg Oral BID PRN    hydrALAZINE (APRESOLINE) tablet 25 mg  25 mg Oral Q6H PRN    hydrALAZINE (APRESOLINE) tablet 25 mg  25 mg Oral 3 times per day    magnesium oxide (MAG-OX) tablet 400 mg  400 mg Oral Daily    ondansetron (ZOFRAN-ODT) disintegrating tablet 4 mg  4 mg Oral Q8H PRN    polyethylene glycol (GLYCOLAX) packet 17 g  17 g Oral Daily PRN    spironolactone (ALDACTONE) tablet 25 mg  25 mg Oral Daily          Physical Exam:   Visit Vitals  BP (!) 166/74   Pulse 64   Temp 98.2 °F (36.8 °C) (Oral)   Resp 16   Ht 5' 2\" (1.575 m)   Wt 151 lb 14.4 oz (68.9 kg)   SpO2 97%   BMI 27.78 kg/m²         General: No acute distress, well developed, overweight. HEENT: Normocephalic, edentulous upper palate, oral mucosa moist. Extraocular movements intact grossly. Neck: Supple, non-tender. Respiratory: Clear breath sounds bilaterally. Respirations unlabored. Equal chest rise. Cardiovascular: Normal rate, regular underlying rhythm; no murmur.  Palpable pedal pulses, no cyanosis. ABD: Soft, non-tender, not distended, bowel sounds normoactive. Integumentary: Clean, no rash, no skin breakdown. Musculoskeletal: No joint tenderness, no joint swelling. Psychiatric: Appropriate mood & affect. Neuro: Alert, oriented; speech and language intact. No focal deficits. Sensation grossly intact.        Functional Assessment:    Per PT: 1st treatment pending; initial intake evaluation below    LLE RLE   ROM [facility] WFL   Fine Motor Coordination Intact Intact   Tone Normal Normal   Sensation Light Touch Intact Light Touch Intact   Strength Grossly WFL Grossly WFL      Per OT:   ACTIVITIES OF DAILY LIVING:    Score Comments   Eating Independent Independent   Oral Hygiene Setup or clean-up assistance S/U   Bathing Supervision or touching assistance UB SUP; LB CGA standing; sitting in shower chair   Upper Body  Dressing Setup or clean-up assistance S/U   Lower Body Dressing Supervision or touching assistance CGA standing; donning brief and pants    Donning/[place] Footwear Supervision or touching assistance SUP; donning B socks      Toilet Transfer Supervision or touching assistance CGA with use of RW and grab bar   Toileting Hygiene Supervision or touching assistance CGA standing          Labs/Studies:  Recent Results (from the past 72 hour(s))   CBC with Auto Differential    Collection Time: 02/10/23  5:25 AM   Result Value Ref Range    WBC 6.4 4.3 - 11.1 K/uL    RBC 3.59 (L) 4.05 - 5.2 M/uL    Hemoglobin 11.0 (L) 11.7 - 15.4 g/dL    Hematocrit 33.8 (L) 35.8 - 46.3 %    MCV 94.2 82 - 102 FL    MCH 30.6 26.1 - 32.9 PG    MCHC 32.5 31.4 - 35.0 g/dL    RDW 12.9 11.9 - 14.6 %    Platelets 997 466 - 468 K/uL    MPV 11.3 9.4 - 12.3 FL    nRBC 0.00 0.0 - 0.2 K/uL    Differential Type AUTOMATED      Seg Neutrophils 68 43 - 78 %    Lymphocytes 18 13 - 44 %    Monocytes 10 4.0 - 12.0 %    Eosinophils % 2 0.5 - 7.8 %    Basophils 1 0.0 - 2.0 %    Immature Granulocytes 1 0.0 - 5.0 %    Segs Absolute 4.4 1.7 - 8.2 K/UL    Absolute Lymph # 1.2 0.5 - 4.6 K/UL    Absolute Mono # 0.7 0.1 - 1.3 K/UL    Absolute Eos # 0.1 0.0 - 0.8 K/UL    Basophils Absolute 0.1 0.0 - 0.2 K/UL    Absolute Immature Granulocyte 0.0 0.0 - 0.5 K/UL   Magnesium    Collection Time: 02/10/23  5:25 AM   Result Value Ref Range    Magnesium 2.1 1.8 - 2.4 mg/dL   Comprehensive Metabolic Panel    Collection Time: 02/10/23  5:25 AM   Result Value Ref Range    Sodium 140 133 - 143 mmol/L    Potassium 4.3 3.5 - 5.1 mmol/L    Chloride 106 101 - 110 mmol/L    CO2 26 21 - 32 mmol/L    Anion Gap 8 2 - 11 mmol/L    Glucose 99 65 - 100 mg/dL    BUN 39 (H) 8 - 23 MG/DL    Creatinine 1.00 0.6 - 1.0 MG/DL    Est, Glom Filt Rate 57 (L) >60 ml/min/1.73m2    Calcium 9.3 8.3 - 10.4 MG/DL    Total Bilirubin 1.0 0.2 - 1.1 MG/DL    ALT 26 12 - 65 U/L    AST 16 15 - 37 U/L    Alk Phosphatase 84 50 - 136 U/L    Total Protein 6.3 6.3 - 8.2 g/dL    Albumin 3.0 (L) 3.2 - 4.6 g/dL    Globulin 3.3 2.8 - 4.5 g/dL    Albumin/Globulin Ratio 0.9 0.4 - 1.6     CBC with Auto Differential    Collection Time: 02/11/23  4:45 AM   Result Value Ref Range    WBC 6.0 4.3 - 11.1 K/uL    RBC 3.41 (L) 4.05 - 5.2 M/uL    Hemoglobin 10.6 (L) 11.7 - 15.4 g/dL    Hematocrit 32.1 (L) 35.8 - 46.3 %    MCV 94.1 82 - 102 FL    MCH 31.1 26.1 - 32.9 PG    MCHC 33.0 31.4 - 35.0 g/dL    RDW 13.2 11.9 - 14.6 %    Platelets 937 419 - 373 K/uL    MPV 11.4 9.4 - 12.3 FL    nRBC 0.00 0.0 - 0.2 K/uL    Differential Type AUTOMATED      Seg Neutrophils 65 43 - 78 %    Lymphocytes 19 13 - 44 %    Monocytes 13 (H) 4.0 - 12.0 %    Eosinophils % 2 0.5 - 7.8 %    Basophils 1 0.0 - 2.0 %    Immature Granulocytes 0 0.0 - 5.0 %    Segs Absolute 3.9 1.7 - 8.2 K/UL    Absolute Lymph # 1.1 0.5 - 4.6 K/UL    Absolute Mono # 0.8 0.1 - 1.3 K/UL    Absolute Eos # 0.1 0.0 - 0.8 K/UL    Basophils Absolute 0.1 0.0 - 0.2 K/UL    Absolute Immature Granulocyte 0.0 0.0 - 0.5 K/UL   Comprehensive Metabolic Panel    Collection Time: 02/11/23  4:45 AM   Result Value Ref Range    Sodium 141 133 - 143 mmol/L    Potassium 4.1 3.5 - 5.1 mmol/L    Chloride 107 101 - 110 mmol/L    CO2 31 21 - 32 mmol/L    Anion Gap 3 2 - 11 mmol/L    Glucose 114 (H) 65 - 100 mg/dL    BUN 49 (H) 8 - 23 MG/DL    Creatinine 1.40 (H) 0.6 - 1.0 MG/DL    Est, Glom Filt Rate 38 (L) >60 ml/min/1.73m2    Calcium 9.1 8.3 - 10.4 MG/DL    Total Bilirubin 1.3 (H) 0.2 - 1.1 MG/DL    ALT 14 12 - 65 U/L    AST 18 15 - 37 U/L    Alk Phosphatase 78 50 - 136 U/L    Total Protein 6.3 6.3 - 8.2 g/dL    Albumin 3.0 (L) 3.2 - 4.6 g/dL    Globulin 3.3 2.8 - 4.5 g/dL    Albumin/Globulin Ratio 0.9 0.4 - 1.6     Magnesium    Collection Time: 02/11/23  4:45 AM   Result Value Ref Range    Magnesium 2.5 (H) 1.8 - 2.4 mg/dL         Assessment and Plan      Daily physician / PA medical management:    # Cardiac debility [R53.81], Congestive heart failure (HCC) [I50.1] - interdisciplinary approach to rehabilitation including PT, OT, nursing and physiatry and disease specific education. Continue furosemide 20mg BID and spironolactone 25mg daily. Daily weights. Wean O2 as tolerated. # Hypertension - hydralazine 25mg q8h. # Atrial fibrillation - s/p ablation; continue BID Eliquis 5mg and carvedilol 25mg. # CKD3 - monitor with BMP, avoid nephrotoxic agents. # DVT prophylaxis - on Eliquis. # Bladder program / urinary retention / neurogenic bladder - schedule voids q6-8h. Check post-void residual as needed; in-and-out catheter if post-void residual is more than 400ml. # Bowel program - at risk for constipation as a side effect of opioids, other medications, impaired mobility, etc. MiraLAX daily for regularity, Senokot-S for stool softener + laxative. PRN MOM, bisacodyl suppository or tablets for constipation. Below are the active medical comorbidities / hospital conditions which will affect rehab course with plan for mitigation.  Continue daily physician / PA medical management:  Principal Problem:    Pulmonary edema with congestive heart failure (HCC)  Active Problems:    Dyspnea and respiratory abnormalities    Mild protein-calorie malnutrition (HCC)    Stage 3 chronic kidney disease (HCC)    Mixed hyperlipidemia    Impaired gait and mobility    Hypertension    Physical deconditioning    Fibromyalgia    Sjogren's syndrome (HCC)    Gouty arthritis  Resolved Problems:    * No resolved hospital problems.  *          Signed By: Namrata Linda PA-C    February 12, 2023      Physician Assistant with [facility]

## 2023-02-13 PROCEDURE — 6370000000 HC RX 637 (ALT 250 FOR IP): Performed by: PHYSICIAN ASSISTANT

## 2023-02-13 PROCEDURE — 1180000000 HC REHAB R&B

## 2023-02-13 PROCEDURE — 97535 SELF CARE MNGMENT TRAINING: CPT

## 2023-02-13 PROCEDURE — 97116 GAIT TRAINING THERAPY: CPT

## 2023-02-13 PROCEDURE — 97110 THERAPEUTIC EXERCISES: CPT

## 2023-02-13 PROCEDURE — 97530 THERAPEUTIC ACTIVITIES: CPT

## 2023-02-13 RX ORDER — LOPERAMIDE HYDROCHLORIDE 2 MG/1
2 CAPSULE ORAL ONCE
Status: COMPLETED | OUTPATIENT
Start: 2023-02-13 | End: 2023-02-13

## 2023-02-13 RX ADMIN — APIXABAN 5 MG: 5 TABLET, FILM COATED ORAL at 08:06

## 2023-02-13 RX ADMIN — HYDRALAZINE HYDROCHLORIDE 25 MG: 50 TABLET, FILM COATED ORAL at 21:09

## 2023-02-13 RX ADMIN — APIXABAN 5 MG: 5 TABLET, FILM COATED ORAL at 21:08

## 2023-02-13 RX ADMIN — CARVEDILOL 25 MG: 25 TABLET, FILM COATED ORAL at 08:06

## 2023-02-13 RX ADMIN — HYDRALAZINE HYDROCHLORIDE 25 MG: 50 TABLET, FILM COATED ORAL at 04:29

## 2023-02-13 RX ADMIN — BISACODYL 10 MG: 5 TABLET, COATED ORAL at 09:41

## 2023-02-13 RX ADMIN — MAGNESIUM GLUCONATE 500 MG ORAL TABLET 400 MG: 500 TABLET ORAL at 08:07

## 2023-02-13 RX ADMIN — HYDRALAZINE HYDROCHLORIDE 25 MG: 50 TABLET, FILM COATED ORAL at 15:46

## 2023-02-13 RX ADMIN — SPIRONOLACTONE 25 MG: 25 TABLET ORAL at 08:07

## 2023-02-13 RX ADMIN — CARVEDILOL 25 MG: 25 TABLET, FILM COATED ORAL at 15:46

## 2023-02-13 RX ADMIN — LOPERAMIDE HYDROCHLORIDE 2 MG: 2 CAPSULE ORAL at 21:08

## 2023-02-13 RX ADMIN — FUROSEMIDE 20 MG: 20 TABLET ORAL at 08:07

## 2023-02-13 RX ADMIN — POLYETHYLENE GLYCOL 3350 17 G: 17 POWDER, FOR SOLUTION ORAL at 12:25

## 2023-02-13 NOTE — PLAN OF CARE
Problem: Safety - Adult  Goal: Free from fall injury  2/13/2023 1737 by Freeman Cruz RN  Outcome: Progressing  2/13/2023 0842 by Freeman Cruz RN  Outcome: Progressing     Problem: ABCDS Injury Assessment  Goal: Absence of physical injury  2/13/2023 1737 by Freeman Cruz RN  Outcome: Progressing  2/13/2023 0842 by Freeman Cruz RN  Outcome: Progressing

## 2023-02-13 NOTE — PROGRESS NOTES
OT DAILY NOTE    Time In:    0830     Time Out: 0954       Functional Mobility   Score Comments   Supine to Sit 6: Independent I   Sit to Stand 4: Supervision or touching A S for safety   Transfer Assist 4: Supervision or touching A S with RW     Activities of Daily Living   Score Comments   Oral Hygiene Independent I   Bathing Supervision or touching assistance S to monitor O2; stayed above 95% on RA   Upper Body  Dressing Setup or clean-up assistance S/U   Lower Body Dressing Supervision or touching assistance S for safety   Donning/[place] Footwear Setup or clean-up assistance S/U   Toilet Transfer Supervision or touching assistance S for safety   [facility] or touching assistance S for safety     Summary of Session: Pt was in bed and agreeable to tx. Pt's performance with ADL is reflected in above chart. Pt was on RA for whole session with O2 remaining above 95% when spot checked. Pt reported feeling much better. Pt completed 5 minutes on the ergometer frontwards and backwards with moderate resistance to increase UB strength and activity tolerance for integration into functional mobility. Pt had a rest break between directions. Pt walked back to room with RW. Pt was left in w/c with all needs within reach.        Aleksandra Nelson, OT  2/13/2023

## 2023-02-13 NOTE — PLAN OF CARE
Problem: Safety - Adult  Goal: Free from fall injury  2/13/2023 0842 by Jony Butler RN  Outcome: Progressing  2/13/2023 0213 by Oj Paiz RN  Outcome: Progressing     Problem: ABCDS Injury Assessment  Goal: Absence of physical injury  2/13/2023 0842 by Jony Butler RN  Outcome: Progressing  2/13/2023 0213 by Oj Paiz RN  Outcome: Progressing

## 2023-02-13 NOTE — CARE COORDINATION
Patient needs are continue to be followed by Dr. Ailyn White. Patient has no discharge date / plan at this time. CM will continue to follow / monitor for any needs, concerns or questions that may arise.

## 2023-02-13 NOTE — PROGRESS NOTES
PHYSICAL THERAPY DAILY NOTE  Time In:  1303  Time Out:  1346  Total Treatment Time:  43 Minutes  Pt. Seen for: PM, Balance Training, Gait Training, Patient Education, Therapeutic Exercise, Transfer Training, and Other     Subjective: Patient reporting she feels OK. Reports she had a BM and feels better. Objective:  Precautions: Falls    GROSS ASSESSMENT Daily Assessment           COGNITION Daily Assessment    Alert, able to follow commands,cooperating,participating, motivated         BED/MAT MOBILITY Daily Assessment   Increased time and effort to complete with cues for body mechanics   Rolling Right: NT  Rolling Left: NT  Supine to Sit: NT  Sit to Supine: Supervision/Standby Assist       TRANSFERS Daily Assessment   Increased time and effort to complete with cues for body mechanics and rollator parts management    Commode transfers using grab bar and RW with SBA/Supervision. Independent with clothing management and hygiene   Sit to Stand: Supervision/Standby Assist  Stand to Sit: Supervision/Standby Assist  Transfer Type: Stand Pivot with rollator  Transfer Assistance: Supervision/Standby Assist  Car Transfers: NT         GAIT Daily Assessment   Gait training with one time cue for upright posture Amount of Assistance: Supervision/Standby Assist  Distance (ft): 170 ft x 2 with 5 min sitting rest break between attempt  Assistive Device: Rollator  Surface: Level Surface   Gait training up/down 20 ft ramp with rollator and SBA/Supervision.  3 min sitting rest break after going up ramp    Gait training x 20 ft x 2 with rollator in figure 8 pattern around bolsters with SBA/Supervision with one time cue for balance control while making multiple turns    Gait training x 20 ft x 2 with rollator ambulating through 2 simulated 24 inch wide doorways at 90 degree angles to each other with SBA/Supervision and one time cue for managing RW in limited space    STEPS/STAIRS Daily Assessment   NT        BALANCE Daily Assessment   Static standing without hand support on grab bar during toileting activity including hygiene and clothing management. No loss of balance. Static Sitting: Good:  Pt. able to maintain balance w/o UE support;  exhibits some postural sway  Dynamic Sitting: Good - accepts moderate challenge;  can maintain balance while picking object off the floor  Static Standing: Fair:  Pt. requires UE support;  may need occasional min A  Dynamic Standing: Fair - accepts minimal challenge;  can maintain balance while turning head/trunk       WHEELCHAIR MOBILITY Daily Assessment   NT        LOWER EXTREMITY EXERCISES Daily Assessment    NA     Pain level: No c/o pain during treatment  Pain Location:  NA  Pain Interventions: NA    Vital Signs:  Visit Vitals  BP (!) 170/58   Pulse 68   Temp 98.4 °F (36.9 °C) (Oral)   Resp 16   Ht 5' 2\" (1.575 m)   Wt 151 lb 14.4 oz (68.9 kg)   SpO2 96%   BMI 27.78 kg/m²     02 sat 94 to 98% during treatment    Education:  Bed mobility training,transfer training, gait training, balance training,fall precautions, body mechanics,activity pacing,rollator parts management, energy conservation, Patient verbalizing understanding and demonstrating  understanding of patient education. Recommend follow up education. Interdisciplinary Communication: spoke with RN regarding BM and urinary output    Patient returned to room at end of treatment. Patient supine in bed with head of bed elevated and bed rails up x 2. Needs placed in reach of patient. On room air. 02 sat 96%         Assessment: Progressing towards modified independent functional level with rollator. Plan of Care: Continue with POC and progress as tolerated.      Jere Cordero, PT  2/13/2023

## 2023-02-13 NOTE — PROGRESS NOTES
PHYSICAL THERAPY DAILY NOTE  Time In:  1115  Time Out:  1200  Total Treatment Time:  39 Minutes  Pt. Seen for: AM, Gait Training, Patient Education, Therapeutic Exercise, Transfer Training, and Other     Subjective: Patient reporting she feels OK. Reports she is no longer on 02. Reports she does not have 02 at home. Reports she has a rollator and cane that she uses at home some of the time. Objective:  Precautions: Falls    GROSS ASSESSMENT Daily Assessment    NA       COGNITION Daily Assessment    Alert, able to follow commands,cooperating,participating, motivated,          BED/MAT MOBILITY Daily Assessment   NT        TRANSFERS Daily Assessment   Increased time and effort to complete with cues for body mechanics  One time cue for rollator parts management Sit to Stand: Supervision/Standby Assist  Stand to Sit: Supervision/Standby Assist  Transfer Type: Stand Pivot with RW  Transfer Assistance: Supervision/Standby Assist  Car Transfers: NT         GAIT Daily Assessment   Gait training with one time cue for posture correction.   Amount of Assistance: Supervision/Standby Assist  Distance (ft): 150 ft x 2 with 5 minute sitting rest break between attempts  Assistive Device: Rollator  Surface: Level Surface       STEPS/STAIRS Daily Assessment   NT        BALANCE Daily Assessment    Static Sitting: Good:  Pt. able to maintain balance w/o UE support;  exhibits some postural sway  Dynamic Sitting: Good - accepts moderate challenge;  can maintain balance while picking object off the floor  Static Standing: Fair:  Pt. requires UE support;  may need occasional min A  Dynamic Standing: Fair - accepts minimal challenge;  can maintain balance while turning head/trunk       WHEELCHAIR MOBILITY Daily Assessment   NT        LOWER EXTREMITY EXERCISES Daily Assessment    LE motomed x 10 mins at level 1     Pain level: No c/o pain during treatment  Pain Location:  NA  Pain Interventions: NA    Vital Signs:  Visit Vitals  BP (!) 131/44   Pulse 64   Temp 98.2 °F (36.8 °C) (Oral)   Resp 18   Ht 5' 2\" (1.575 m)   Wt 151 lb 14.4 oz (68.9 kg)   SpO2 97%   BMI 27.78 kg/m²     Patient on room air during treatment. 02 sat 94 to 98% and HR 62 to 68    Education: transfer training, gait training, balance training,fall precautions, body mechanics,activity pacing, energy conservation, Patient verbalizing understanding and demonstrating  understanding of patient education. Recommend follow up education. Interdisciplinary Communication: spoke with OT regarding weaning 02     Patient returned to room at end of treatment and remained up in recliner with LEs elevated and with needs in reach. Assessment: Progressing towards goals. 02 sat and HR stable on room air during treatment. Functional endurance and strength improving. requires multiple and frequent rest breaks during treatment. Plan of Care: Continue with POC and progress as tolerated.      Carisa Aden, PT  2/13/2023

## 2023-02-13 NOTE — PROGRESS NOTES
INPATIENT REHAB CENTER PROGRESS NOTE    Anjel Keane  Admit Date: 2/10/2023  Admit Diagnosis:   Debility [R53.81]  Pulmonary edema with congestive heart failure ([address] [I50.1]    Subjective     2/13/2023  seen today in her room. She reports still no BM despite laxatives but is having significant gas. She reports significant improvement in her breathing. Objective:     Current Facility-Administered Medications   Medication Dose Route Frequency    furosemide (LASIX) tablet 20 mg  20 mg Oral Daily    bisacodyl (DULCOLAX) EC tablet 10 mg  10 mg Oral Daily PRN    magnesium hydroxide (MILK OF MAGNESIA) 400 MG/5ML suspension 30 mL  30 mL Oral Daily PRN    lactulose (CHRONULAC) 10 GM/15ML solution 20 g  20 g Oral Daily PRN    sennosides-docusate sodium (SENOKOT-S) 8.6-50 MG tablet 2 tablet  2 tablet Oral Nightly    acetaminophen (TYLENOL) tablet 650 mg  650 mg Oral Q6H PRN    Or    acetaminophen (TYLENOL) suppository 650 mg  650 mg Rectal Q6H PRN    apixaban (ELIQUIS) tablet 5 mg  5 mg Oral BID    carvedilol (COREG) tablet 25 mg  25 mg Oral BID WC    cloNIDine (CATAPRES) tablet 0.2 mg  0.2 mg Oral BID PRN    hydrALAZINE (APRESOLINE) tablet 25 mg  25 mg Oral Q6H PRN    hydrALAZINE (APRESOLINE) tablet 25 mg  25 mg Oral 3 times per day    magnesium oxide (MAG-OX) tablet 400 mg  400 mg Oral Daily    ondansetron (ZOFRAN-ODT) disintegrating tablet 4 mg  4 mg Oral Q8H PRN    polyethylene glycol (GLYCOLAX) packet 17 g  17 g Oral Daily PRN    spironolactone (ALDACTONE) tablet 25 mg  25 mg Oral Daily          Physical Exam:   Visit Vitals  BP (!) 131/44   Pulse 64   Temp 98.2 °F (36.8 °C) (Oral)   Resp 18   Ht 5' 2\" (1.575 m)   Wt 151 lb 14.4 oz (68.9 kg)   SpO2 97%   BMI 27.78 kg/m²         General: No acute distress, well developed, overweight. HEENT: Normocephalic,   oral mucosa moist. Extraocular movements intact  . Neck: Supple, non-tender. Respiratory:  Respirations unlabored.  Equal chest rise.  Cardiovascular: Normal rate, no cyanosis. ABD: Soft, non-tender, not distended,   Integumentary: Clean, no rash, no skin breakdown. Musculoskeletal: No joint tenderness, no joint swelling. Psychiatric: Appropriate mood & affect. Neuro: Alert, oriented; speech and language intact. No focal deficits. Sensation grossly intact.        Functional Assessment:      Functional Mobility    Score Comments   Supine to Sit 6: Independent I   Sit to Stand 4: Supervision or touching A S for safety   Transfer Assist 4: Supervision or touching A S with RW      Activities of Daily Living    Score Comments   Oral Hygiene Independent I   Bathing Supervision or touching assistance S to monitor O2; stayed above 95% on RA   Upper Body  Dressing Setup or clean-up assistance S/U   Lower Body Dressing Supervision or touching assistance S for safety   Donning/[place] Footwear Setup or clean-up assistance S/U   Toilet Transfer Supervision or touching assistance S for safety   Toileting Hygiene Supervision or touching assistance S for safety             Labs/Studies:  Recent Results (from the past 72 hour(s))   CBC with Auto Differential    Collection Time: 02/11/23  4:45 AM   Result Value Ref Range    WBC 6.0 4.3 - 11.1 K/uL    RBC 3.41 (L) 4.05 - 5.2 M/uL    Hemoglobin 10.6 (L) 11.7 - 15.4 g/dL    Hematocrit 32.1 (L) 35.8 - 46.3 %    MCV 94.1 82 - 102 FL    MCH 31.1 26.1 - 32.9 PG    MCHC 33.0 31.4 - 35.0 g/dL    RDW 13.2 11.9 - 14.6 %    Platelets 865 950 - 641 K/uL    MPV 11.4 9.4 - 12.3 FL    nRBC 0.00 0.0 - 0.2 K/uL    Differential Type AUTOMATED      Seg Neutrophils 65 43 - 78 %    Lymphocytes 19 13 - 44 %    Monocytes 13 (H) 4.0 - 12.0 %    Eosinophils % 2 0.5 - 7.8 %    Basophils 1 0.0 - 2.0 %    Immature Granulocytes 0 0.0 - 5.0 %    Segs Absolute 3.9 1.7 - 8.2 K/UL    Absolute Lymph # 1.1 0.5 - 4.6 K/UL    Absolute Mono # 0.8 0.1 - 1.3 K/UL    Absolute Eos # 0.1 0.0 - 0.8 K/UL    Basophils Absolute 0.1 0.0 - 0.2 K/UL Absolute Immature Granulocyte 0.0 0.0 - 0.5 K/UL   Comprehensive Metabolic Panel    Collection Time: 02/11/23  4:45 AM   Result Value Ref Range    Sodium 141 133 - 143 mmol/L    Potassium 4.1 3.5 - 5.1 mmol/L    Chloride 107 101 - 110 mmol/L    CO2 31 21 - 32 mmol/L    Anion Gap 3 2 - 11 mmol/L    Glucose 114 (H) 65 - 100 mg/dL    BUN 49 (H) 8 - 23 MG/DL    Creatinine 1.40 (H) 0.6 - 1.0 MG/DL    Est, Glom Filt Rate 38 (L) >60 ml/min/1.73m2    Calcium 9.1 8.3 - 10.4 MG/DL    Total Bilirubin 1.3 (H) 0.2 - 1.1 MG/DL    ALT 14 12 - 65 U/L    AST 18 15 - 37 U/L    Alk Phosphatase 78 50 - 136 U/L    Total Protein 6.3 6.3 - 8.2 g/dL    Albumin 3.0 (L) 3.2 - 4.6 g/dL    Globulin 3.3 2.8 - 4.5 g/dL    Albumin/Globulin Ratio 0.9 0.4 - 1.6     Magnesium    Collection Time: 02/11/23  4:45 AM   Result Value Ref Range    Magnesium 2.5 (H) 1.8 - 2.4 mg/dL         Assessment and Plan      Daily physician / PA medical management:    # Cardiac debility [R53.81], Congestive heart failure ([facility] [I50.1] - interdisciplinary approach to rehabilitation including PT, OT, nursing and physiatry and disease specific education. Continue furosemide 20mg BID and spironolactone 25mg daily. Daily weights. Wean O2 as tolerated. # Hypertension - hydralazine 25mg q8h. # Atrial fibrillation - s/p ablation; continue BID Eliquis 5mg and carvedilol 25mg. # CKD3 - monitor with BMP, avoid nephrotoxic agents. # DVT prophylaxis - on Eliquis. # Bladder program / urinary retention / neurogenic bladder - schedule voids q6-8h. Check post-void residual as needed; in-and-out catheter if post-void residual is more than 400ml. # Bowel program - at risk for constipation as a side effect of opioids, other medications, impaired mobility, etc. MiraLAX daily for regularity, Senokot-S for stool softener + laxative. PRN MOM, bisacodyl suppository or tablets for constipation.         Below are the active medical comorbidities / hospital conditions which will affect rehab course with plan for mitigation. Continue daily physician / PA medical management:  Principal Problem:    Pulmonary edema with congestive heart failure (HCC)  Active Problems:    Dyspnea and respiratory abnormalities    Mild protein-calorie malnutrition (HCC)    Stage 3 chronic kidney disease (HCC)    Mixed hyperlipidemia    Impaired gait and mobility    Hypertension    Physical deconditioning    Fibromyalgia    Sjogren's syndrome (HCC)    Gouty arthritis  Resolved Problems:    * No resolved hospital problems.  *          Signed By: Prabha Wong MD    February 13, 2023

## 2023-02-14 PROCEDURE — 97116 GAIT TRAINING THERAPY: CPT

## 2023-02-14 PROCEDURE — 97110 THERAPEUTIC EXERCISES: CPT

## 2023-02-14 PROCEDURE — 6370000000 HC RX 637 (ALT 250 FOR IP): Performed by: PHYSICIAN ASSISTANT

## 2023-02-14 PROCEDURE — 99232 SBSQ HOSP IP/OBS MODERATE 35: CPT | Performed by: PHYSICIAN ASSISTANT

## 2023-02-14 PROCEDURE — 97530 THERAPEUTIC ACTIVITIES: CPT

## 2023-02-14 PROCEDURE — 97535 SELF CARE MNGMENT TRAINING: CPT

## 2023-02-14 PROCEDURE — 1180000000 HC REHAB R&B

## 2023-02-14 RX ADMIN — HYDRALAZINE HYDROCHLORIDE 25 MG: 50 TABLET, FILM COATED ORAL at 05:46

## 2023-02-14 RX ADMIN — CARVEDILOL 25 MG: 25 TABLET, FILM COATED ORAL at 08:19

## 2023-02-14 RX ADMIN — APIXABAN 5 MG: 5 TABLET, FILM COATED ORAL at 21:05

## 2023-02-14 RX ADMIN — FUROSEMIDE 20 MG: 20 TABLET ORAL at 08:19

## 2023-02-14 RX ADMIN — HYDRALAZINE HYDROCHLORIDE 25 MG: 50 TABLET, FILM COATED ORAL at 21:05

## 2023-02-14 RX ADMIN — CARVEDILOL 25 MG: 25 TABLET, FILM COATED ORAL at 17:38

## 2023-02-14 RX ADMIN — APIXABAN 5 MG: 5 TABLET, FILM COATED ORAL at 08:19

## 2023-02-14 RX ADMIN — MAGNESIUM GLUCONATE 500 MG ORAL TABLET 400 MG: 500 TABLET ORAL at 08:19

## 2023-02-14 RX ADMIN — SPIRONOLACTONE 25 MG: 25 TABLET ORAL at 08:19

## 2023-02-14 RX ADMIN — HYDRALAZINE HYDROCHLORIDE 25 MG: 50 TABLET, FILM COATED ORAL at 14:46

## 2023-02-14 NOTE — PROGRESS NOTES
OT DAILY NOTE    Time In:    0828     Time Out: 0912       Functional Mobility   Score Comments   Supine to Sit 6: Independent I   Sit to Stand 6: Independent I   Transfer Assist 4: Supervision or touching A S for safety with RW     Activities of Daily Living   Score Comments   Oral Hygiene Independent I   Bathing Supervision or touching assistance S   Upper Body  Dressing Setup or clean-up assistance S/U   Lower Body Dressing Supervision or touching assistance S for safety   Donning/[place] Footwear Setup or clean-up assistance S/U   Toilet Transfer Supervision or touching assistance S for safety   [facility] or touching assistance S for safety     Summary of Session: Pt was in bed and agreeable to tx. Pt's performance with ADL is reflected in above chart. Pt completed 5 minutes on the ergometer frontwards and backwards with moderate resistance to increase UB strength and activity tolerance for integration into functional mobility. Pt demonstrating better activity tolerance.  Pt was left awaiting PT.    Daniel Jasso, OT  2/14/2023

## 2023-02-14 NOTE — PROGRESS NOTES
PHYSICAL THERAPY DAILY NOTE  Time In:  5869  Time Out:  1433  Total Treatment Time:  45 Minutes  Pt. Seen for: PM, Balance Training, Gait Training, Patient Education, Therapeutic Exercise, Transfer Training, and Other     Subjective: Patient reporting she does not feel as good today. Reports she hopes she feels better tomorrow         Objective:  Precautions: Falls    GROSS ASSESSMENT Daily Assessment           COGNITION Daily Assessment    Alert, able to follow commands,cooperating,participating, motivated         BED/MAT MOBILITY Daily Assessment   NT        TRANSFERS Daily Assessment   Increased time and effort to complete with cues for body mechanics and rollator parts management    Commode transfers using grab bar and rollator with modified independence. Independent with clothing management    Sit to Stand: Modified independent  Stand to Sit: Modified independent  Transfer Type: Stand Pivot with rollator  Transfer Assistance: Modified independent  Car Transfers: NT         GAIT Daily Assessment   Gait training with one time cue for upright posture Amount of Assistance: Modified independent  Distance (ft): 200 ft  Assistive Device: Rollator  Surface: Level Surface       Gait training x 20 ft x 2 with rollator in figure 8 pattern around bolsters with Supervision with one time cue for balance control while making multiple turns    Gait training x 20 ft x 1 with rollator while picking up multiple objects from floor with reacher. Able to complete with supervision. STEPS/STAIRS Daily Assessment   Gait training up/down 6 inch step with rollator and min assist and cues for managing Rollator        BALANCE Daily Assessment   Static standing without hand support on grab bar during toileting activity including hygiene and clothing management. No loss of balance.  Static Sitting: Good:  Pt. able to maintain balance w/o UE support;  exhibits some postural sway  Dynamic Sitting: Good - accepts moderate challenge;  can maintain balance while picking object off the floor  Static Standing: Fair:  Pt. requires UE support;  may need occasional min A  Dynamic Standing: Fair - accepts minimal challenge;  can maintain balance while turning head/trunk       WHEELCHAIR MOBILITY Daily Assessment   NT        LOWER EXTREMITY EXERCISES Daily Assessment    LE motomed x 10 mins at level 2 with supervision     Pain level: No c/o pain during treatment  Pain Location:  NA  Pain Interventions: NA    Vital Signs:  Visit Vitals  /67   Pulse 62   Temp 98.1 °F (36.7 °C) (Oral)   Resp 16   Ht 5' 2\" (1.575 m)   Wt 152 lb 11.2 oz (69.3 kg)   SpO2 96%   BMI 27.93 kg/m²       Education: transfer training, gait training, balance training,fall precautions, body mechanics,activity pacing,rollator parts management, energy conservation, Patient verbalizing understanding and demonstrating  understanding of patient education. Recommend follow up education. Interdisciplinary Communication: spoke with RN regarding patient in bathroom    Patient returned to bathroom in room at end of treatment. Call light in reach. RN to assist patient out of bathroom         Assessment: Patient has progressed to modified independence with bed mobility, transfers and gait with rollator. Plan of Care: Continue with POC and progress as tolerated.      Jus Reynolds, PT  2/14/2023

## 2023-02-14 NOTE — PROGRESS NOTES
INPATIENT REHAB CENTER PROGRESS NOTE    Brandon Running  Admit Date: 2/10/2023  Admit Diagnosis:   Debility [R53.81]  Pulmonary edema with congestive heart failure (HCC) [I50.1]    Subjective     2/14/2023  BM x 2 yesterday after MiraLAX, bisacodyl 10mg PO and Senokot-S; then had diarrhea x 1 late PM with lightheadedness after. No abdominal cramping today, +flatus. Slightly fatigued today but states \"I killed it!!\" re: therapy performance yesterday. No dyspnea, CP or palpitations today. 2/13/2023  seen today in her room. She reports still no BM despite laxatives but is having significant gas. She reports significant improvement in her breathing. 2/12/2023  reports she is not as debilitated this stay vs rehab in 2019; believes she already made progress during initial therapy yesterday. Brief dyspnea episode x 1 in PT, resolved with rest. Denies CP, palpitations. No BM \"in a while,\" +flatus, no abdominal pain / bloating. Bowel program initiated. Lasix decreased to 20mg daily due to low BP and elevated Cr/BUN.     Objective:     Current Facility-Administered Medications   Medication Dose Route Frequency    furosemide (LASIX) tablet 20 mg  20 mg Oral Daily    bisacodyl (DULCOLAX) EC tablet 10 mg  10 mg Oral Daily PRN    magnesium hydroxide (MILK OF MAGNESIA) 400 MG/5ML suspension 30 mL  30 mL Oral Daily PRN    lactulose (CHRONULAC) 10 GM/15ML solution 20 g  20 g Oral Daily PRN    sennosides-docusate sodium (SENOKOT-S) 8.6-50 MG tablet 2 tablet  2 tablet Oral Nightly    acetaminophen (TYLENOL) tablet 650 mg  650 mg Oral Q6H PRN    Or    acetaminophen (TYLENOL) suppository 650 mg  650 mg Rectal Q6H PRN    apixaban (ELIQUIS) tablet 5 mg  5 mg Oral BID    carvedilol (COREG) tablet 25 mg  25 mg Oral BID WC    cloNIDine (CATAPRES) tablet 0.2 mg  0.2 mg Oral BID PRN    hydrALAZINE (APRESOLINE) tablet 25 mg  25 mg Oral Q6H PRN    hydrALAZINE (APRESOLINE) tablet 25 mg  25 mg Oral 3 times per day    ondansetron (ZOFRAN-ODT) disintegrating tablet 4 mg  4 mg Oral Q8H PRN    polyethylene glycol (GLYCOLAX) packet 17 g  17 g Oral Daily PRN    spironolactone (ALDACTONE) tablet 25 mg  25 mg Oral Daily          Physical Exam:   Visit Vitals  /67   Pulse 62   Temp 98.1 °F (36.7 °C) (Oral)   Resp 16   Ht 5' 2\" (1.575 m)   Wt 152 lb 11.2 oz (69.3 kg)   SpO2 96%   BMI 27.93 kg/m²         General: No acute distress, well developed, overweight. HEENT: Normocephalic, edentulous upper palate, oral mucosa moist. EOM intact grossly. Neck: Supple, non-tender. Respiratory: Clear breath sounds bilaterally. Respirations unlabored. Equal chest rise. Cardiovascular: Normal rate, regular underlying rhythm; no murmur. Palpable pedal pulses, trace right pedal edema. ABD: Soft, non-tender, not distended, bowel sounds normoactive. Integumentary: Clean, no rash, no skin breakdown. Musculoskeletal: No joint tenderness, no joint swelling. Psychiatric: Appropriate mood & affect. Neuro: Alert, oriented; speech and language intact. No focal deficits. Sensation grossly intact. Functional Assessment:    Per PT: supervision-SBA for bed mobility, sit<>stand, stand-pivot transfers with RW; increased time and effort to complete with cues for body mechanics and Rollator parts management; commode transfers using grab bar and RW with SBA/Supervision. Independent with clothing management and hygiene  GAIT Daily Assessment   Gait training with one time cue for upright posture Amount of Assistance: Supervision/Standby Assist  Distance (ft): 170 ft x 2 with 5 min sitting rest break between attempt  Assistive Device: Rollator  Surface: Level Surface   Gait training up/down 20 ft ramp with Rollator and SBA/Supervision.  3 min sitting rest break after going up ramp     Gait training x 20 ft x 2 with Rollator in figure 8 pattern around bolsters with SBA/Supervision with one time cue for balance control while making multiple turns     Gait training x 20 ft x 2 with Rollator ambulating through 2 simulated 24 inch wide doorways at 90 degree angles to each other with SBA/Supervision and one time cue for managing RW in limited space        Labs/Studies:  No results found for this or any previous visit (from the past 72 hour(s)). Assessment and Plan      Daily physician / PA medical management:     # Cardiac debility [R53.81], Congestive heart failure ([facility] [I50.1] - interdisciplinary approach to rehabilitation including PT, OT, nursing and physiatry and disease specific education. Continue furosemide 20mg BID and spironolactone 25mg daily. Daily weights. Wean O2 as tolerated. # Hypertension - hydralazine 25mg q8h. # Atrial fibrillation - s/p ablation; continue BID Eliquis 5mg and carvedilol 25mg. # CKD3 - monitor with BMP, avoid nephrotoxic agents. # DVT prophylaxis - on Eliquis. # Bladder program / urinary retention / neurogenic bladder - schedule voids q6-8h. Check post-void residual as needed; in-and-out catheter if post-void residual is more than 400ml. # Bowel program - at risk for constipation as a side effect of opioids, other medications, impaired mobility, etc. MiraLAX daily for regularity, Senokot-S for stool softener + laxative. PRN MOM, bisacodyl suppository or tablets for constipation. Below are the active medical comorbidities / hospital conditions which will affect rehab course with plan for mitigation. Continue daily physician / PA medical management:  Principal Problem:    Pulmonary edema with congestive heart failure (HCC)  Active Problems:    Dyspnea and respiratory abnormalities    Mild protein-calorie malnutrition (HCC)    Stage 3 chronic kidney disease (HCC)    Mixed hyperlipidemia    Impaired gait and mobility    Hypertension    Physical deconditioning    Fibromyalgia    Sjogren's syndrome (HCC)    Gouty arthritis  Resolved Problems:    * No resolved hospital problems.  *          Signed By: Brayden Epperson VARINDER    February 14, 2023      Physician Assistant with [facility]

## 2023-02-14 NOTE — PROGRESS NOTES
PHYSICAL THERAPY DAILY NOTE  Time In:  922  Time Out:  1011  Total Treatment Time:  52 Minutes  Pt. Seen for: AM, Balance Training, Gait Training, Patient Education, Therapeutic Exercise, Transfer Training, and Other     Subjective: Patient reporting she does not feel as good this AM. Reports she had multiple bouts of diarrhea last night and did not sleep as well. Reports she feels like she is not breathing as well. Patient reporting she normally walks around her apartment without a device holding onto objects         Objective:  Precautions: Falls    GROSS ASSESSMENT Daily Assessment    NA       COGNITION Daily Assessment    Alert, able to follow commands,cooperating,participating, motivated,          BED/MAT MOBILITY Daily Assessment   Increased time and effort to complete using bed rail   Rolling Right: Modified Independent  Rolling Left: Modified Independent  Supine to Sit: Modified Independent  Sit to Supine: Modified Independent       TRANSFERS Daily Assessment   Increased time and effort to complete. Able to recall rollator parts management   Sit to Stand: Modified Independent  Stand to Sit: Modified Independent  Transfer Type: Stand Pivot with rollator  Transfer Assistance: Modified Independent  Car Transfers: Supervision/Standby Assist SBA using rollator in and out of rehab car         GAIT Daily Assessment   Gait training with one time cue for posture correction and to improve step length and step clearance Amount of Assistance: Supervision/Standby Assist  Distance (ft): 200 x 1  150ft x 1  Assistive Device: Rollator  Surface: Level Surface   Gait training x 1 sets of 10 ft x 2 with bilateral hand support on II bars, 1 set of 10ft x 2 of single hand support on II bar and 2 sets of 10 ft x 2 without hand support on II bars. Able to complete with SBA and cues as noted above.  Decreased step length and gait speed noted when ambulating without support on II bars    Gait training up/down 20 ft ramp with rollator and supervision. 3 min sitting rest break after ambulating up ramp. Gait training x 50 ft x 1 with rollator ambulating around multiple randomly placed objects with supervision. STEPS/STAIRS Daily Assessment   NT        BALANCE Daily Assessment    Static Sitting: Good:  Pt. able to maintain balance w/o UE support;  exhibits some postural sway  Dynamic Sitting: Good - accepts moderate challenge;  can maintain balance while picking object off the floor  Static Standing: Good:  Pt. able to maintain balance w/o UE support;  exhibits some postural sway  Dynamic Standing: Fair - accepts minimal challenge;  can maintain balance while turning head/trunk       WHEELCHAIR MOBILITY Daily Assessment   NA        LOWER EXTREMITY EXERCISES Daily Assessment    NA     Pain level: No c/o pain during treatment  Pain Location:  NA  Pain Interventions: NA    Vital Signs:  Visit Vitals  /67   Pulse 62   Temp 98.1 °F (36.7 °C) (Oral)   Resp 16   Ht 5' 2\" (1.575 m)   Wt 152 lb 11.2 oz (69.3 kg)   SpO2 96%   BMI 27.93 kg/m²   /83, HR 66 and 02 sat 95% at end of treatment      Education: ,transfer training, gait training, balance training,fall precautions, body mechanics,activity pacing, energy conservation, rollator parts management, Patient verbalizing understanding and demonstrating understanding of patient education. Recommend follow up education. Interdisciplinary Communication: spoke with OT regarding D/C plans    Patient returned to room at end of treatment and remained up in recliner with LEs elevated and with needs in reach. Assessment: Progressing towards modified independent functional level with rollator. Overall functional endurance and strength improving. Initiating gait without an assistive device. Plan of Care: Continue with POC and progress as tolerated.      Sandra Madison, PT  2/14/2023

## 2023-02-14 NOTE — PROGRESS NOTES
OT Daily Note  Time In:    1300     Time Out: 1345        Pain: Pt has no complaints of pain. Functional Mobility      Score Comments   Sit to Stand 6: Independent I   Transfer Assist 6: Independent I with 4WW     Pt walked to the gym. Balance   Pt stood and passed bean bags across the table into a bucket while tossing a balloon to work on speed and dual tasking. Pt required mod cueing. Pt was fluid at some times, but demonstrated inconsistent performance over 40 trials. No LOB. Pt stood for approximately 10 minutes while engaging in [employer]. Pt struggled with coordination requiring max cueing. Strengthening   Pt completed the anterior alphabet. Pt was unable to state the alphabet while doing the exercise I.      Plan: Continue with POC. Pt was left awaiting PT Yuliet Jensen OTR/L  2/14/2023

## 2023-02-14 NOTE — PROGRESS NOTES
OT Daily Note  Time In:    1115     Time Out: 1158        Pain: Pt has no complaints of pain. Functional Mobility      Score Comments   Sit to Stand 6: Independent I   Transfer Assist 6: Independent I with 4WW     Pt walked to and from the gym     Cognition   Pt used written directions to create a design to promote improvement with coordination skills and visual perceptual skills. Pt completed task with x3 errors. Pt was able to identify and correct mistake with min verbal cueing. Pt engaged with pipetree to build UB activity tolerance and problem solving abilities to allow pt to complete simple IADL more safely without fatigue. Pt completed two simple designs I.      Education   Purpose of activities      Plan: Continue with POC. Pt was left in bed with all needs within reach.      Marigene Mini OTR/L  2/14/2023

## 2023-02-15 LAB
ANION GAP SERPL CALC-SCNC: 7 MMOL/L (ref 2–11)
BUN SERPL-MCNC: 41 MG/DL (ref 8–23)
CALCIUM SERPL-MCNC: 8.8 MG/DL (ref 8.3–10.4)
CHLORIDE SERPL-SCNC: 108 MMOL/L (ref 101–110)
CO2 SERPL-SCNC: 26 MMOL/L (ref 21–32)
CREAT SERPL-MCNC: 1.2 MG/DL (ref 0.6–1)
ERYTHROCYTE [DISTWIDTH] IN BLOOD BY AUTOMATED COUNT: 13.2 % (ref 11.9–14.6)
GLUCOSE SERPL-MCNC: 97 MG/DL (ref 65–100)
HCT VFR BLD AUTO: 31.8 % (ref 35.8–46.3)
HGB BLD-MCNC: 10.4 G/DL (ref 11.7–15.4)
MCH RBC QN AUTO: 30.8 PG (ref 26.1–32.9)
MCHC RBC AUTO-ENTMCNC: 32.7 G/DL (ref 31.4–35)
MCV RBC AUTO: 94.1 FL (ref 82–102)
NRBC # BLD: 0 K/UL (ref 0–0.2)
PLATELET # BLD AUTO: 193 K/UL (ref 150–450)
PMV BLD AUTO: 11.8 FL (ref 9.4–12.3)
POTASSIUM SERPL-SCNC: 4.5 MMOL/L (ref 3.5–5.1)
RBC # BLD AUTO: 3.38 M/UL (ref 4.05–5.2)
SODIUM SERPL-SCNC: 141 MMOL/L (ref 133–143)
WBC # BLD AUTO: 5.7 K/UL (ref 4.3–11.1)

## 2023-02-15 PROCEDURE — 1180000000 HC REHAB R&B

## 2023-02-15 PROCEDURE — 36415 COLL VENOUS BLD VENIPUNCTURE: CPT

## 2023-02-15 PROCEDURE — 6370000000 HC RX 637 (ALT 250 FOR IP): Performed by: PHYSICIAN ASSISTANT

## 2023-02-15 PROCEDURE — 80048 BASIC METABOLIC PNL TOTAL CA: CPT

## 2023-02-15 PROCEDURE — 97530 THERAPEUTIC ACTIVITIES: CPT

## 2023-02-15 PROCEDURE — 97116 GAIT TRAINING THERAPY: CPT

## 2023-02-15 PROCEDURE — 85027 COMPLETE CBC AUTOMATED: CPT

## 2023-02-15 PROCEDURE — 97110 THERAPEUTIC EXERCISES: CPT

## 2023-02-15 RX ADMIN — APIXABAN 5 MG: 5 TABLET, FILM COATED ORAL at 21:35

## 2023-02-15 RX ADMIN — CARVEDILOL 25 MG: 25 TABLET, FILM COATED ORAL at 17:08

## 2023-02-15 RX ADMIN — APIXABAN 5 MG: 5 TABLET, FILM COATED ORAL at 08:11

## 2023-02-15 RX ADMIN — CARVEDILOL 25 MG: 25 TABLET, FILM COATED ORAL at 08:11

## 2023-02-15 RX ADMIN — HYDRALAZINE HYDROCHLORIDE 25 MG: 50 TABLET, FILM COATED ORAL at 15:36

## 2023-02-15 RX ADMIN — FUROSEMIDE 20 MG: 20 TABLET ORAL at 08:11

## 2023-02-15 RX ADMIN — SPIRONOLACTONE 25 MG: 25 TABLET ORAL at 08:11

## 2023-02-15 RX ADMIN — HYDRALAZINE HYDROCHLORIDE 25 MG: 50 TABLET, FILM COATED ORAL at 21:35

## 2023-02-15 RX ADMIN — HYDRALAZINE HYDROCHLORIDE 25 MG: 50 TABLET, FILM COATED ORAL at 05:25

## 2023-02-15 NOTE — CARE COORDINATION
Interdisciplinary Wednesday, Team Conference Meeting Notes    Interdisciplinary team conference meeting completed to discuss plan of care. Estimated D/C Date: 02/17/23    Recommended Follow-Up Therapy: Staff has recommended HH (PT/OT). Patient needs no DME's nor family training. Communication with family/caregivers: CM made patient aware of the recommendations that were discussed in Team Conference. Patient states she wasn't sure about HH. CM will completed the [facility] referral.  Patient will need transport home. CM will make arrangements for home. CM will continue to follow.

## 2023-02-15 NOTE — PROGRESS NOTES
Physical Therapy  PHYSICAL THERAPY DAILY NOTE  Time In:  252 AM  Time Out:  1006 AM  Total Treatment Time:  (P) 43 Minutes  Pt. Seen for: AM, Gait Training, Therapeutic Exercise, and Transfer Training     Subjective: \" I feel weak but I am ok. Just did not have a good night. \"         Objective:  Precautions: Falls and Poor Safety Awareness    GROSS ASSESSMENT Daily Assessment           COGNITION Daily Assessment           BED/MAT MOBILITY Daily Assessment    Rolling Right:   Rolling Left:   Supine to Sit: Supervision/Standby Assist  Sit to Supine: Supervision/Standby Assist       TRANSFERS Daily Assessment    Sit to Stand: Supervision/Standby Assist  Stand to Sit: Supervision/Standby Assist  Transfer Type: Stand Pivot and with rollator  Transfer Assistance: Supervision/Standby Assist  Car Transfers: NT         GAIT Daily Assessment   Patient uses rollator at home and at times pushes rollator too far ahead of her. . Checked O2 sats on room air during and after gait. O2 sats remained between 96 to 98% through out treatment. Amount of Assistance: Supervision/Standby Assist and cues to slow down  Distance (ft): 125  Assistive Device: Rollator  Surface: Level Surface       STEPS/STAIRS Daily Assessment    Steps Ambulated:    Level of Assistance:    Railing:  Assistive Device:        BALANCE Daily Assessment    Static Sitting:   Dynamic Sitting:   Static Standing:   Dynamic Standing:        WHEELCHAIR MOBILITY Daily Assessment    Able to Propel (ft):   Assistance:   Surface:   Wheelchair Set-up:        LOWER EXTREMITY EXERCISES Daily Assessment         Pain level: no pain noted. Pain Location:    Pain Interventions:     Vital Signs:  BP (!) 147/58   Pulse 62   Temp 98.6 °F (37 °C) (Oral)   Resp 16   Ht 5' 2\" (1.575 m)   Wt 152 lb 3.2 oz (69 kg)   SpO2 98%   BMI 27.84 kg/m²       Education:      Interdisciplinary Communication:     Pt. Left in bed with call bell at reach.          Assessment: Patient gaining strength and endurance. Plan of Care: Continue with plan of care.     Nicholas Booker, PTA  2/15/2023

## 2023-02-15 NOTE — PROGRESS NOTES
OT Daily Note  Time In:    1118     Time Out: 1200        Pain: Pt has no complaints of pain. Functional Mobility   Pt walked to and from the gym with I with 4 WW. Cognition   Pt completed a 24 piece jigsaw puzzle to increase problem solving and visual perceptual skills. Pt completed puzzle with mod assistance. Pt wanted to shut down when getting frustrated, but was able to work through it with mod cueing and encouragement. Concerns with pt dealing with novel situations at home. Interdisciplinary Communication: Team conference    Plan: Continue with POC. Pt was left in bed with all needs within reach.      Sesar Correa OTR/L  2/15/2023

## 2023-02-15 NOTE — PROGRESS NOTES
OT Daily Note  Time In:    1305     Time Out: 1345        Pain: Pt has no complaints of pain. Functional Mobility   Pt was I with [address] [place] [address]. Activity Tolerance   Pt engaged with medium soft putty with 20 red beads to improve activity tolerance. Pt demonstrated good quality during activity. Pt required cueing to count beads. Strengthening   Pt completed one set of the anterior alphabet with fair quality and min cueing. Education   Discharge and green sock policy     Plan: Continue with POC.  Pt was left awaiting PT.     Nay Durbin OTR/L  2/15/2023

## 2023-02-15 NOTE — PROGRESS NOTES
PHYSICAL THERAPY DAILY NOTE  Time In:  1350  Time Out:  1440  Total Treatment Time:  50 Minutes  Pt. Seen for: PM, Balance Training, Gait Training, Therapeutic Exercise, and Transfer Training     Subjective: pt has no complaints, pleasant and agreeable to therapy.          Objective:  Precautions: Falls    GROSS ASSESSMENT Daily Assessment           COGNITION Daily Assessment    Alert and oriented       BED/MAT MOBILITY Daily Assessment    Rolling Right: Modified Independent  Rolling Left: Modified Independent  Sit to Supine: Modified Independent       TRANSFERS Daily Assessment    Sit to Stand: Modified Independent  Stand to Sit: Modified Independent  Transfer Type: Stand Pivot  Transfer Assistance: Modified Independent  Car Transfers: NT         GAIT Daily Assessment    Amount of Assistance: Modified Independent  Distance (ft): 200 ft, 125 ft  Assistive Device: Rollator  Surface: Level Surface       STEPS/STAIRS Daily Assessment           BALANCE Daily Assessment    Static Sitting: Good:  Pt. able to maintain balance w/o UE support;  exhibits some postural sway  Dynamic Sitting: Good - accepts moderate challenge;  can maintain balance while picking object off the floor  Static Standing: Good:  Pt. able to maintain balance w/o UE support;  exhibits some postural sway  Dynamic Standing: Fair - accepts minimal challenge;  can maintain balance while turning head/trunk       WHEELCHAIR MOBILITY Daily Assessment           LOWER EXTREMITY EXERCISES Daily Assessment    SEATED EXERCISES Sets Reps Comments   Ankle Pumps 2 15    Hip Flexion 2 15    Long Arc Quads 2 15    Hip Adduction/Ball Squeeze 2 15    Hip Abduction with red Theraband 2 15    Hamstring Curls with red Theraband 2 15    Hip Extension with red Theraband 2 15           Pain level: 0/10  Pain Location:  NA  Pain Interventions: NA    Vital Signs:  BP (!) 147/58   Pulse 62   Temp 98.6 °F (37 °C) (Oral)   Resp 16   Ht 5' 2\" (1.575 m)   Wt 152 lb 3.2 oz (69 kg)   SpO2 98%   BMI 27.84 kg/m²       Education:  bed mobility and transfer training, gait training, HEP training    Interdisciplinary Communication: discussed status/progress with PTA    Pt. Left resting in bed at her request, all needs in reach         Assessment: Ms. Hank Stern is doing well with mobility, ambulation with rollator and is at modified independent level. Plans to discharge home on Friday with family support. Plan of Care: Continue with POC and progress as tolerated.        Sixto Robledo, PT  2/15/2023

## 2023-02-15 NOTE — PROGRESS NOTES
INPATIENT REHAB CENTER PROGRESS NOTE    Loree Bagley  Admit Date: 2/10/2023  Admit Diagnosis:   Debility [R53.81]  Pulmonary edema with congestive heart failure ([address] [I50.1]    Subjective     2/15/2023  seen today in her room. No SOB without O2 supplementation. No LE swelling. No fatigue. Discussed today in team conference. .         Objective:     Current Facility-Administered Medications   Medication Dose Route Frequency    furosemide (LASIX) tablet 20 mg  20 mg Oral Daily    bisacodyl (DULCOLAX) EC tablet 10 mg  10 mg Oral Daily PRN    magnesium hydroxide (MILK OF MAGNESIA) 400 MG/5ML suspension 30 mL  30 mL Oral Daily PRN    lactulose (CHRONULAC) 10 GM/15ML solution 20 g  20 g Oral Daily PRN    acetaminophen (TYLENOL) tablet 650 mg  650 mg Oral Q6H PRN    Or    acetaminophen (TYLENOL) suppository 650 mg  650 mg Rectal Q6H PRN    apixaban (ELIQUIS) tablet 5 mg  5 mg Oral BID    carvedilol (COREG) tablet 25 mg  25 mg Oral BID WC    cloNIDine (CATAPRES) tablet 0.2 mg  0.2 mg Oral BID PRN    hydrALAZINE (APRESOLINE) tablet 25 mg  25 mg Oral Q6H PRN    hydrALAZINE (APRESOLINE) tablet 25 mg  25 mg Oral 3 times per day    ondansetron (ZOFRAN-ODT) disintegrating tablet 4 mg  4 mg Oral Q8H PRN    polyethylene glycol (GLYCOLAX) packet 17 g  17 g Oral Daily PRN    spironolactone (ALDACTONE) tablet 25 mg  25 mg Oral Daily          Physical Exam:   Visit Vitals  BP (!) 147/58   Pulse 62   Temp 98.6 °F (37 °C) (Oral)   Resp 16   Ht 5' 2\" (1.575 m)   Wt 152 lb 3.2 oz (69 kg)   SpO2 98%   BMI 27.84 kg/m²         General: No acute distress, well developed, overweight. HEENT: Normocephalic,   oral mucosa moist. Extraocular movements intact  . Neck: Supple, non-tender. Respiratory:  Respirations unlabored. Equal chest rise. Cardiovascular: Normal rate, no cyanosis. ABD: Soft, non-tender, not distended,   Integumentary: Clean, no rash, no skin breakdown.   Musculoskeletal: No joint tenderness, no joint swelling. Psychiatric: Appropriate mood & affect. Neuro: Alert, oriented; speech and language intact. No focal deficits. Sensation grossly intact. Functional Assessment:   Patient has progressed to modified independence with bed mobility, transfers and gait with rollator. Labs/Studies:  Recent Results (from the past 72 hour(s))   Basic Metabolic Panel    Collection Time: 02/15/23  5:49 AM   Result Value Ref Range    Sodium 141 133 - 143 mmol/L    Potassium 4.5 3.5 - 5.1 mmol/L    Chloride 108 101 - 110 mmol/L    CO2 26 21 - 32 mmol/L    Anion Gap 7 2 - 11 mmol/L    Glucose 97 65 - 100 mg/dL    BUN 41 (H) 8 - 23 MG/DL    Creatinine 1.20 (H) 0.6 - 1.0 MG/DL    Est, Glom Filt Rate 46 (L) >60 ml/min/1.73m2    Calcium 8.8 8.3 - 10.4 MG/DL   CBC    Collection Time: 02/15/23  5:49 AM   Result Value Ref Range    WBC 5.7 4.3 - 11.1 K/uL    RBC 3.38 (L) 4.05 - 5.2 M/uL    Hemoglobin 10.4 (L) 11.7 - 15.4 g/dL    Hematocrit 31.8 (L) 35.8 - 46.3 %    MCV 94.1 82 - 102 FL    MCH 30.8 26.1 - 32.9 PG    MCHC 32.7 31.4 - 35.0 g/dL    RDW 13.2 11.9 - 14.6 %    Platelets 115 852 - 585 K/uL    MPV 11.8 9.4 - 12.3 FL    nRBC 0.00 0.0 - 0.2 K/uL         Assessment and Plan      Daily physician / PA medical management:    # Cardiac debility [R53.81], Congestive heart failure ([facility] [address] [I50.1] - interdisciplinary approach to rehabilitation including PT, OT, nursing and physiatry and disease specific education. Continue furosemide 20mg BID and spironolactone 25mg daily. Daily weights. Wean O2 as tolerated. # Hypertension - hydralazine 25mg q8h. # Atrial fibrillation - s/p ablation; continue BID Eliquis 5mg and carvedilol 25mg. # CKD3 - monitor with BMP, avoid nephrotoxic agents. # DVT prophylaxis - on Eliquis. # Bladder program / urinary retention / neurogenic bladder - schedule voids q6-8h. Check post-void residual as needed; in-and-out catheter if post-void residual is more than 400ml.      # Bowel program - at risk for constipation as a side effect of opioids, other medications, impaired mobility, etc. MiraLAX daily for regularity, Senokot-S for stool softener + laxative. PRN MOM, bisacodyl suppository or tablets for constipation. Below are the active medical comorbidities / hospital conditions which will affect rehab course with plan for mitigation. Continue daily physician / PA medical management:  Principal Problem:    Pulmonary edema with congestive heart failure (HCC)  Active Problems:    Dyspnea and respiratory abnormalities    Mild protein-calorie malnutrition (HCC)    Stage 3 chronic kidney disease (HCC)    Mixed hyperlipidemia    Impaired gait and mobility    Hypertension    Physical deconditioning    Fibromyalgia    Sjogren's syndrome (HCC)    Gouty arthritis  Resolved Problems:    * No resolved hospital problems.  *     Expected discharge 2/17/23     Signed By: Yolanda Caldwell MD    February 15, 2023

## 2023-02-16 LAB
APPEARANCE UR: CLEAR
BACTERIA URNS QL MICRO: NEGATIVE /HPF
BILIRUB UR QL: NEGATIVE
CASTS URNS QL MICRO: ABNORMAL /LPF
COLOR UR: ABNORMAL
EPI CELLS #/AREA URNS HPF: ABNORMAL /HPF
GLUCOSE UR STRIP.AUTO-MCNC: NEGATIVE MG/DL
HGB UR QL STRIP: NEGATIVE
KETONES UR QL STRIP.AUTO: NEGATIVE MG/DL
LEUKOCYTE ESTERASE UR QL STRIP.AUTO: NEGATIVE
MUCOUS THREADS URNS QL MICRO: 0 /LPF
NITRITE UR QL STRIP.AUTO: NEGATIVE
PH UR STRIP: 6 (ref 5–9)
PROT UR STRIP-MCNC: NEGATIVE MG/DL
RBC #/AREA URNS HPF: ABNORMAL /HPF
SP GR UR REFRACTOMETRY: 1.01 (ref 1–1.02)
URINE CULTURE IF INDICATED: ABNORMAL
UROBILINOGEN UR QL STRIP.AUTO: 0.2 EU/DL (ref 0.2–1)
WBC URNS QL MICRO: ABNORMAL /HPF

## 2023-02-16 PROCEDURE — 97116 GAIT TRAINING THERAPY: CPT

## 2023-02-16 PROCEDURE — 97530 THERAPEUTIC ACTIVITIES: CPT

## 2023-02-16 PROCEDURE — 97110 THERAPEUTIC EXERCISES: CPT

## 2023-02-16 PROCEDURE — 99232 SBSQ HOSP IP/OBS MODERATE 35: CPT | Performed by: PHYSICIAN ASSISTANT

## 2023-02-16 PROCEDURE — 97535 SELF CARE MNGMENT TRAINING: CPT

## 2023-02-16 PROCEDURE — 81001 URINALYSIS AUTO W/SCOPE: CPT

## 2023-02-16 PROCEDURE — 1180000000 HC REHAB R&B

## 2023-02-16 PROCEDURE — 6370000000 HC RX 637 (ALT 250 FOR IP): Performed by: PHYSICIAN ASSISTANT

## 2023-02-16 RX ORDER — FUROSEMIDE 20 MG/1
20 TABLET ORAL DAILY
Qty: 30 TABLET | Refills: 0 | Status: SHIPPED
Start: 2023-02-17

## 2023-02-16 RX ADMIN — CARVEDILOL 25 MG: 25 TABLET, FILM COATED ORAL at 09:00

## 2023-02-16 RX ADMIN — HYDRALAZINE HYDROCHLORIDE 25 MG: 50 TABLET, FILM COATED ORAL at 05:57

## 2023-02-16 RX ADMIN — APIXABAN 5 MG: 5 TABLET, FILM COATED ORAL at 21:05

## 2023-02-16 RX ADMIN — HYDRALAZINE HYDROCHLORIDE 25 MG: 50 TABLET, FILM COATED ORAL at 21:05

## 2023-02-16 RX ADMIN — HYDRALAZINE HYDROCHLORIDE 25 MG: 50 TABLET, FILM COATED ORAL at 15:49

## 2023-02-16 RX ADMIN — APIXABAN 5 MG: 5 TABLET, FILM COATED ORAL at 09:00

## 2023-02-16 RX ADMIN — CARVEDILOL 25 MG: 25 TABLET, FILM COATED ORAL at 15:49

## 2023-02-16 RX ADMIN — SPIRONOLACTONE 25 MG: 25 TABLET ORAL at 09:01

## 2023-02-16 RX ADMIN — FUROSEMIDE 20 MG: 20 TABLET ORAL at 09:01

## 2023-02-16 NOTE — PROGRESS NOTES
PHYSICAL THERAPY DISCHARGE SUMMARY    TIME IN: 56  TIME OUT: 1003  Total Treatment Time:  45 Minutes      Precautions at discharge:   Falls     Problem List:    Decreased strength B LE  [x]     Decreased strength trunk/core  [x]     Decreased AROM   [x]     Decreased PROM  []     Decreased balance sitting  []     Decreased balance standing  [x]     Decreased endurance  [x]     Pain  []        Functional Limitations:   Decreased independence with bed mobility  []     Decreased independence with functional transfers  []     Decreased independence with ambulation  [x]     Decreased independence with stair negotiation  [x]               Objective:     Vital Signs:Visit Vitals  BP (!) 156/78   Pulse 71   Temp 98.4 °F (36.9 °C) (Oral)   Resp 16   Ht 5' 2\" (1.575 m)   Wt 152 lb 1.6 oz (69 kg)   SpO2 94%   BMI 27.82 kg/m²     On room air, 02 sat 97% and HR 77 after ambulating 200ft    Pain level: No c/o pain during treatment  Pain location: NA  Pain interventions: NA     Patient education: Bed mobility training,transfer training, gait training, balance training,fall precautions, body mechanics,activity pacing, rollator parts management, Patient verbalizing understanding and demonstrating understanding of patient education. Recommend follow up education.       Interdisciplinary Communication: spoke with  regarding D/C plans     Cognition: Alert, able to follow commands,cooperating,participating, motivated,       Lower Extremity Function:     LLE RLE   ROM Generally decreased, functional Generally decreased, functional   Fine Motor Coordination Intact Intact   Tone Normal Normal   Sensation Light Touch Intact and Proprioception Intact Light Touch Intact and Proprioception Intact   Strength  Hip -3/5 to 4/5, knee +4/5 to 5/5, ankle 4/5 to 5/5  Hip -3/5 to 4/5, knee +4/5 to 5/5, ankle 4/5 to 5/5         PRIMARY MODE OF LOCOMOTION: Ambulation      Functional Assessment:    Balance  Comments   Static Sitting Good: Pt. able to maintain balance w/o UE support;  exhibits some postural sway    Dynamic Sitting Good - accepts moderate challenge;  can maintain balance while picking object off the floor    Static Standing Fair:  Pt. requires UE support;  may need occasional min A    Dynamic Standing Fair - accepts minimal challenge;  can maintain balance while turning head/trunk    Picking Up Object From Floor 6  Independent (using reacher and rollator)                       BED MOBILITY &TRANSFERS Initial Assessment   Discharge Assessment Comments   Rolling  4   6  Independent (Increased time and effort to complete all bed mobility)        Supine to Sit 4   6  Independent        Sit to to Supine 4   6  Independent        Sit to Stand 4   6  Independent (with rollator)        Chair To/From Bed 4      Independent (SPT with rollator)        Car Transfer 88   6  Independent (Increased time and effort to complete with altered body mechanics)              WHEELCHAIR MOBILITY/MANAGEMENT Initial Assessment Discharge Assessment Comments   Able to Propel 48' w/ 2 Turns 9    NA           Able to Propel 150' 9  NA           Wheelchair set up assistance required  NA    Wheelchair management  NT          AMBULATION Initial Assessment Discharge Assessment Comments   Assistive device  Rollator    Walk 10' 4     6  Independent (with rollator)        Walk 48' with 2 Turns 4   6  Independent (with rollator)        Walk 150' 88   6  Independent (with rollator)        Walking 10' on Unlevel Surface 88   6  Independent (with rollator)              STEPS/STAIRS Initial Assessment Discharge Assessment Comments   1 Curb Step 88   6  Independent (up/down 6 iinch step with rollator)        4 Steps 88   6  Independent (with hand rails, single step at a time with 3 min sitting rest break after going up steps)        12 Steps 88    88     Not attempted due to medical condition or safety concerns (unable due to decreased endurance)     Ramp  Modified Independent With rollator      Patient returned to room at end of treatment and remained up in recliner with LEs elevated and with needs in reach. PHYSICAL THERAPY PLAN OF CARE     Pt will demonstrate roll left & right & transition supine<>sit with Independent in 10 days (MET)  2. Pt. will transfer from bed to/from chair with Independent using the least restrictive device in 10 days (MET)  3. Pt. will ambulate with 150 feet with Rollator or LRAD and Modified Independent in 10 days (MET)  4. Pt. Will ambulate up/down 1 curb step with walker and Modified Independent in 10 days(MET)  5. Pt will ambulate up/down 4 steps using hand rail with CGA in 10 days. (MET)     Pt would benefit from continued skilled physical therapy in order to improve independent functional mobility within the home with use of least restrictive device. Interventions may include range of motion (AROM, PROM B LE/trunk), motor function (B LE/trunk strengthening/coordination), activity tolerance (vitals, oxygen saturation levels), bed mobility training, balance activities, gait training (progressive ambulation program), and functional transfer training. HEP handout:   SEATED EXERCISES Sets Reps Comments   Ankle Pumps 3 10    Hip Flexion 3 10    Long Arc Quads 3 10    Hip Adduction/Ball Squeeze 3 10    Hip Abduction with red Theraband 3 10    Hamstring Curls with red Theraband 3 10      . Pt to be discharged 02/17/2023 with assistance provided by [facility] and family if available. Therapy Recommendations upon discharge:   [facility] PT   Equipment needs at discharge:  patient owns a rollator, no there DME recommend at this time     Please see IRC; Interdisciplinary Eval, Care Plan, and Patient Education for further information regarding physical therapy discharge summary and plan of care.        Nilson Yeun, PT  2/16/2023

## 2023-02-16 NOTE — PROGRESS NOTES
PHYSICAL THERAPY DAILY NOTE  Time In:  1435  Time Out:  1515  Total Treatment Time:  40 Minutes  Pt. Seen for: PM, Balance Training, Gait Training, Patient Education, Therapeutic Exercise, Transfer Training, and Other     Subjective: Patient reporting she feels better now. Reports she is going home tomorrow. Reports she is not sure who is taking her home. Requesting to have therapy before she goes home tomorrow. Objective:  Precautions: Falls    GROSS ASSESSMENT Daily Assessment           COGNITION Daily Assessment    Alert, able to follow commands,cooperating,participating, motivated         BED/MAT MOBILITY Daily Assessment   NT        TRANSFERS Daily Assessment   Increased time and effort to complete with cues for body mechanics and rollator parts management    Commode transfers using grab bar and rollator with modified independence. Independent with clothing management    Sit to Stand: Modified independent  Stand to Sit: Modified independent  Transfer Type: Stand Pivot with rollator  Transfer Assistance: Modified independent  Car Transfers: NT         GAIT Daily Assessment   Gait training with one time cue for upright posture Amount of Assistance: Modified independent  Distance (ft): 200 ft x 1  100ft x 1  Assistive Device: Rollator  Surface: Level Surface           STEPS/STAIRS Daily Assessment   Gait training up/down 6 inch step with rollator and min assist and cues for managing Rollator        BALANCE Daily Assessment   Static standing without hand support on grab bar during toileting activity including hygiene and clothing management. No loss of balance.  Static Sitting: Good:  Pt. able to maintain balance w/o UE support;  exhibits some postural sway  Dynamic Sitting: Good - accepts moderate challenge;  can maintain balance while picking object off the floor  Static Standing: Fair:  Pt. requires UE support;  may need occasional min A  Dynamic Standing: Fair - accepts minimal challenge;  can maintain balance while turning head/trunk       WHEELCHAIR MOBILITY Daily Assessment   NT        LOWER EXTREMITY EXERCISES Daily Assessment    SEATED EXERCISES Sets Reps Comments   Ankle Pumps 2 10    Hip Flexion 2 10    Long Arc Quads 2 10    Hip Adduction/Ball Squeeze 2 10    Hip Abduction with red Theraband 2 10    Hamstring Curls with red Theraband 2 10           Pain level: No c/o pain during treatment  Pain Location:  NA  Pain Interventions: NA    Vital Signs:  Visit Vitals  BP (!) 157/65   Pulse 67   Temp 97.9 °F (36.6 °C) (Oral)   Resp 18   Ht 5' 2\" (1.575 m)   Wt 152 lb 1.6 oz (69 kg)   SpO2 100%   BMI 27.82 kg/m²   / 57 HR 66 to 72, 02 sat 96 to 97% during treatment    Education: transfer training, gait training, balance training,fall precautions, body mechanics,activity pacing,rollator parts management, energy conservation, Patient verbalizing understanding and demonstrating  understanding of patient education. Recommend follow up education. Interdisciplinary Communication: spoke with  regarding D/C plans    Patient returned to room at end of treatment and remained up in recliner with LEs elevated and with needs in reach. Assessment: Functional mobility and strength cont to improve. Vital signs stable during PT treatment.           Plan of Care: D/C home 02/17/2023 with follow up with Gilma Meeks, PT  2/16/2023

## 2023-02-16 NOTE — PROGRESS NOTES
OT Daily Note  Time In:    1030     Time Out: 1115        Pain:  Pt had a fullness in stomach that prevented her from participating. CHANA Acuna notified. Functional Mobility   Pt was I with all mobility with 4 WW. Self-Care   Pt toileted I and gave a urine sample. Cognition   Pt worked with Re huber to copy a design. Pt did well, but did not have confidence. Activity Tolerance   Pt completed 5 minutes on the ergometer frontwards and backwards with moderate resistance to increase UB strength and activity tolerance for integration into functional mobility. Pt was unable to go backwards secondary to a fullness in stomach. Pt was able to walk back to room. Interdisciplinary Communication: CHANA Acuna on pt's concerns    Plan: Continue with POC. Pt was left in bed with all needs within reach.      Veto Many OTR/L  2/16/2023

## 2023-02-16 NOTE — PROGRESS NOTES
OT DISCHARGE NOTE    Time In:    0830     Time Out: 0915        Goals:  STG 1: Patient will dress UB with Olga using AE/DME PRN. 2/16/2023 Goal met   STG 2: Patient will dress LB with Olga using AE/DME PRN. 2/16/2023 Goal met   STG 3: Patient will don footwear with Olga using AE/DME PRN. 2/16/2023 Goal met   STG 4: Patient will bathe with Olga using AE/DME PRN. 2/16/2023 Goal met   STG 5: Patient will toilet with Olga using AE/DME PRN. 2/16/2023 Goal met   STG 6: Patient will carry out light meal preparation task with Olga. 2/16/2023 Goal met     *Initial Assessment is the worst a patient did on that activity in the first 72 hrs of being admitted as gathered by the OT   *Discharge Assessment is an average of the patient's performance over the last 48 hrs   Initial Assessment Discharge Assessment 2/16/2023   Eating Independent:  Independent Independent:  Independent   Oral Hygiene Setup or clean-up assistance:   S/U Independent:  I   Bathing Supervision or touching assistance:  UB SUP; LB CGA standing; sitting in shower chair Independent:  I   Upper Body Dressing Setup or clean-up assistance:  S/U Independent: I   Lower Body Dressing Supervision or touching assistance:  CGA standing; donning brief and pants Independent:  I   Donning/[place] Footwear Supervision or touching assistance:  SUP; donning B socks Independent:  I   Toilet Transfer Supervision or touching assistance:  CGA with use of RW and grab bar Independent:  I   Toilet Hygiene Supervision or touching assistance:  CGA standing Independent:  I   BIMS: 15/15    Discharge Location: Home  Safety/Supervision Recommendations/Functional Level: Pt is I with basic self-care. Family Training: Pt has no A.      Recommended Continuing Therapy: None indicated  Residual Deficits: Activity tolerance, higher level cognition  Progress over LOS: Pt made improvements in balance, activity tolerance, and safety awareness allowing for improvements in bathing, dressing, and toileting. Pt's biggest barrier is cognitive deficits. Pt would benefit from continued skilled services. Summary of Session: Pt was in bed and agreeable to tx. Pt's performance with ADL is reflected in above chart. Pt was left with PT [place] [facility].      Chavo Hernandez OT   2/16/2023

## 2023-02-16 NOTE — PROGRESS NOTES
INPATIENT REHAB CENTER PROGRESS NOTE    Hero Quinn  Admit Date: 2/10/2023  Admit Diagnosis:   Debility [R53.81]  Pulmonary edema with congestive heart failure ([facility] [address] [I50.1]    Subjective     2/16/2023 c/o abdominal \"fullness\" sensation during AM OT, was anxious and panicky, returned to room. AVSS, U/A normal. No dyspnea, LE edema, weakness, does appear anxious but states she is excited about d/c tomorrow. 2/15/2023 seen today in her room. No SOB without O2 supplementation. No LE swelling. No fatigue. Discussed today in team conference. 2/14/2023  BM x 2 yesterday after MiraLAX, bisacodyl 10mg PO and Senokot-S; then had diarrhea x 1 late PM with lightheadedness after. No abdominal cramping today, +flatus. Slightly fatigued today but states \"I killed it!!\" re: therapy performance yesterday. No dyspnea, CP or palpitations today. 2/13/2023  seen today in her room. She reports still no BM despite laxatives but is having significant gas. She reports significant improvement in her breathing. 2/12/2023  reports she is not as debilitated this stay vs rehab in 2019; believes she already made progress during initial therapy yesterday. Brief dyspnea episode x 1 in PT, resolved with rest. Denies CP, palpitations. No BM \"in a while,\" +flatus, no abdominal pain / bloating. Bowel program initiated. Lasix decreased to 20mg daily due to low BP and elevated Cr/BUN.      Objective:     Current Facility-Administered Medications   Medication Dose Route Frequency    furosemide (LASIX) tablet 20 mg  20 mg Oral Daily    bisacodyl (DULCOLAX) EC tablet 10 mg  10 mg Oral Daily PRN    magnesium hydroxide (MILK OF MAGNESIA) 400 MG/5ML suspension 30 mL  30 mL Oral Daily PRN    lactulose (CHRONULAC) 10 GM/15ML solution 20 g  20 g Oral Daily PRN    acetaminophen (TYLENOL) tablet 650 mg  650 mg Oral Q6H PRN    Or    acetaminophen (TYLENOL) suppository 650 mg  650 mg Rectal Q6H PRN    apixaban (ELIQUIS) tablet 5 mg  5 mg Oral BID    carvedilol (COREG) tablet 25 mg  25 mg Oral BID WC    cloNIDine (CATAPRES) tablet 0.2 mg  0.2 mg Oral BID PRN    hydrALAZINE (APRESOLINE) tablet 25 mg  25 mg Oral Q6H PRN    hydrALAZINE (APRESOLINE) tablet 25 mg  25 mg Oral 3 times per day    ondansetron (ZOFRAN-ODT) disintegrating tablet 4 mg  4 mg Oral Q8H PRN    polyethylene glycol (GLYCOLAX) packet 17 g  17 g Oral Daily PRN    spironolactone (ALDACTONE) tablet 25 mg  25 mg Oral Daily          Physical Exam:   Visit Vitals  BP (!) 157/65   Pulse 67   Temp 97.9 °F (36.6 °C) (Oral)   Resp 18   Ht 5' 2\" (1.575 m)   Wt 152 lb 1.6 oz (69 kg)   SpO2 100%   BMI 27.82 kg/m²         General: Mildly anxious; well developed, overweight. HEENT: Normocephalic, edentulous upper palate, oral mucosa moist. Extraocular movements intact. Neck: Supple, non-tender. Respiratory: Clear breath sounds bilaterally, good air exchange. Respirations unlabored. Equal chest rise. Cardiovascular: Normal rate, regular underlying rhythm; no murmur. Palpable pedal pulses, no pedal edema. ABD: Soft, non-tender, not distended, bowel sounds normoactive. Integumentary: Clean, no rash, no skin breakdown. Musculoskeletal: No joint tenderness, no joint swelling. Psychiatric: Appropriate mood & affect. Neuro: Alert, oriented; speech and language intact. No focal deficits. Sensation grossly intact. Functional Assessment:  Functional Mobility   Pt was I with all mobility with 4 WW. Self-Care   Pt toileted I and gave a urine sample. Cognition   Pt worked with [employer] to copy a design. Pt did well, but did not have confidence. Activity Tolerance   Pt completed 5 minutes on the ergometer frontwards and backwards with moderate resistance to increase UB strength and activity tolerance for integration into functional mobility. Pt was unable to go backwards secondary to a fullness in stomach. Pt was able to walk back to room. Labs/Studies:  Recent Results (from the past 72 hour(s))   Basic Metabolic Panel    Collection Time: 02/15/23  5:49 AM   Result Value Ref Range    Sodium 141 133 - 143 mmol/L    Potassium 4.5 3.5 - 5.1 mmol/L    Chloride 108 101 - 110 mmol/L    CO2 26 21 - 32 mmol/L    Anion Gap 7 2 - 11 mmol/L    Glucose 97 65 - 100 mg/dL    BUN 41 (H) 8 - 23 MG/DL    Creatinine 1.20 (H) 0.6 - 1.0 MG/DL    Est, Glom Filt Rate 46 (L) >60 ml/min/1.73m2    Calcium 8.8 8.3 - 10.4 MG/DL   CBC    Collection Time: 02/15/23  5:49 AM   Result Value Ref Range    WBC 5.7 4.3 - 11.1 K/uL    RBC 3.38 (L) 4.05 - 5.2 M/uL    Hemoglobin 10.4 (L) 11.7 - 15.4 g/dL    Hematocrit 31.8 (L) 35.8 - 46.3 %    MCV 94.1 82 - 102 FL    MCH 30.8 26.1 - 32.9 PG    MCHC 32.7 31.4 - 35.0 g/dL    RDW 13.2 11.9 - 14.6 %    Platelets 485 218 - 001 K/uL    MPV 11.8 9.4 - 12.3 FL    nRBC 0.00 0.0 - 0.2 K/uL   Urinalysis with Reflex to Culture    Collection Time: 02/16/23 11:28 AM    Specimen: Urine   Result Value Ref Range    Color, UA YELLOW/STRAW      Appearance CLEAR      Specific Gravity, UA 1.012 1.001 - 1.023      pH, Urine 6.0 5.0 - 9.0      Protein, UA Negative NEG mg/dL    Glucose, UA Negative mg/dL    Ketones, Urine Negative NEG mg/dL    Bilirubin Urine Negative NEG      Blood, Urine Negative NEG      Urobilinogen, Urine 0.2 0.2 - 1.0 EU/dL    Nitrite, Urine Negative NEG      Leukocyte Esterase, Urine Negative NEG      Urine Culture if Indicated CULTURE NOT INDICATED BY UA RESULT      WBC, UA 0-4 U4 /hpf    RBC, UA 0-5 U5 /hpf    BACTERIA, URINE Negative NEG /hpf    Epithelial Cells UA 0-5 U5 /hpf    Casts 2-5 (A) U2 /lpf    Mucus, UA 0 0 /lpf         Assessment and Plan      Daily physician / PA medical management:    # Cardiac debility [R53.81], Congestive heart failure (HCC) [I50.1] - interdisciplinary approach to rehabilitation including PT, OT, nursing and physiatry and disease specific education.  Continue furosemide 20mg BID and spironolactone 25mg daily. Daily weights. Wean O2 as tolerated. # Hypertension - hydralazine 25mg q8h. # Atrial fibrillation - s/p ablation; continue BID Eliquis 5mg and carvedilol 25mg. # CKD3 - monitor with BMP, avoid nephrotoxic agents. # DVT prophylaxis - on Eliquis. # Bladder program / urinary retention / neurogenic bladder - schedule voids q6-8h. Check post-void residual as needed; in-and-out catheter if post-void residual is more than 400ml. # Bowel program - at risk for constipation as a side effect of opioids, other medications, impaired mobility, etc. MiraLAX daily for regularity, Senokot-S for stool softener + laxative. PRN MOM, bisacodyl suppository or tablets for constipation. Below are the active medical comorbidities / hospital conditions which will affect rehab course with plan for mitigation. Continue daily physician / PA medical management:  Principal Problem:    Pulmonary edema with congestive heart failure (HCC)  Active Problems:    Dyspnea and respiratory abnormalities    Mild protein-calorie malnutrition (HCC)    Stage 3 chronic kidney disease (HCC)    Mixed hyperlipidemia    Impaired gait and mobility    Hypertension    Physical deconditioning    Fibromyalgia    Sjogren's syndrome (HCC)    Gouty arthritis  Resolved Problems:    * No resolved hospital problems.  *     Expected discharge 2/17/23     Signed By: SYDNEY Rodríguez    February 16, 2023

## 2023-02-17 ENCOUNTER — HOME HEALTH ADMISSION (OUTPATIENT)
Dept: HOME HEALTH SERVICES | Facility: HOME HEALTH | Age: 78
End: 2023-02-17
Payer: MEDICARE

## 2023-02-17 VITALS
WEIGHT: 150.9 LBS | DIASTOLIC BLOOD PRESSURE: 49 MMHG | TEMPERATURE: 98.1 F | HEIGHT: 62 IN | SYSTOLIC BLOOD PRESSURE: 132 MMHG | OXYGEN SATURATION: 93 % | HEART RATE: 65 BPM | RESPIRATION RATE: 16 BRPM | BODY MASS INDEX: 27.77 KG/M2

## 2023-02-17 LAB
COLLECT DURATION TIME UR: 24 HR
METANEPH 24H UR-MRATE: 87 UG/24 HR (ref 36–209)
METANEPHS 24H UR-MCNC: 97 UG/L
NORMETANEPHRINE 24H UR-MCNC: 271 UG/L
NORMETANEPHRINE 24H UR-MRATE: 244 UG/24 HR (ref 131–612)
SPECIMEN VOL ?TM UR: 900 ML

## 2023-02-17 PROCEDURE — 6370000000 HC RX 637 (ALT 250 FOR IP): Performed by: PHYSICIAN ASSISTANT

## 2023-02-17 RX ADMIN — FUROSEMIDE 20 MG: 20 TABLET ORAL at 09:10

## 2023-02-17 RX ADMIN — HYDRALAZINE HYDROCHLORIDE 25 MG: 50 TABLET, FILM COATED ORAL at 13:11

## 2023-02-17 RX ADMIN — SPIRONOLACTONE 25 MG: 25 TABLET ORAL at 09:08

## 2023-02-17 RX ADMIN — APIXABAN 5 MG: 5 TABLET, FILM COATED ORAL at 09:02

## 2023-02-17 RX ADMIN — CARVEDILOL 25 MG: 25 TABLET, FILM COATED ORAL at 09:08

## 2023-02-17 NOTE — CARE COORDINATION
Patient has discharge orders for today. Patient daughter requested a referral made to [facility] (PT/OT). Daughter has requested that patient be transported home. Patient will ne transported by [employer]. Daughter Carissa Camarena) states that she will  medications and grocery for patient. Patient needs no DME's. Patient has met all treatment goals / milestones. CM will continue to monitor and will continue to follow for any needs, concern or questions that may arise.        ASSESSMENT NOTE    Attending Physician: No att. providers found  Admit Problem: Debility [R53.81]  Pulmonary edema with congestive heart failure ([facility] [address] [I50.1]  Date/Time of Admission: 2/10/2023  4:32 PM  Problem List:  Patient Active Problem List   Diagnosis    Stage 3 chronic kidney disease ([facility] [address]    Peptic ulcer    Mixed hyperlipidemia    Impaired gait and mobility    Hypertension    High risk medication use    Physical deconditioning    Polymyalgia rheumatica (HCC)    Fibromyalgia    Sjogren's syndrome (HCC)    Gouty arthritis    Status post ablation of atrial fibrillation    Pulmonary edema with congestive heart failure, NYHA class 2 (HCC)    Dyspnea and respiratory abnormalities    Acute respiratory failure with hypoxia (HCC)    Pulmonary edema with congestive heart failure (HCC)    Mild protein-calorie malnutrition ([facility] [address]       Service Assessment  Patient Orientation Alert and Oriented, Person, Place, Situation, Self   Cognition Alert   History Provided By Patient, Medical Record   Primary Caregiver Self   Accompanied By/Relationship     [address] is:     PCP Verified by CM Yes   Last Visit to PCP Within last 3 months   Prior Functional Level Assistance with the following:, Housework, Shopping   Current Functional Level Assistance with the following:, Bathing, Dressing, Toileting, Housework, Shopping, Mobility   Can patient return to prior living arrangement Yes   Ability to make needs known: Good Family able to assist with home care needs: Yes   Would you like for me to discuss the discharge plan with any other family members/significant others, and if so, who? No   Financial Resources [facility], Medicare   Community Resources     CM/SW Referral       Social/Functional History  Lives With Alone   Type of Home Senior housing apartment   Home Layout One level   Home Access Level entry   [address] [facility] - Number of Steps     Entrance Stairs - Rails     Bathroom Shower/Tub Tub/Shower unit   Bathroom Toilet Standard   Bathroom Equipment Grab bars in shower, Tub transfer bench   [address], Grab bars, Reacher, Rollator, Alert [place] [employer] [address] Work     Driving     Shopping          Other (Comment)     Homemaking Responsibilities Yes   [address] [facility] Paying/Finance [address] [facility] Management     Other (Comment)     Ambulation Assistance Needs assistance   Transfer Assistance Needs assistance   Active  No   Patient's  Info     Mode of Transportation Car   Education     Occupation Retired   Type of Occupation       Discharge Planning   Type of [address] Prior To Admission Meals On [address] [facility] Needed Emergency Call  System   DME     DME     DME Ordered? No   Potential Assistance Purchasing Medications No   Meds-to-Beds: Does the patient want to have any new prescriptions delivered to bedside prior to discharge?      Type of Home Care Services PT, OT   Patient expects to be discharged to: Apartment   Follow Up Appointment: Best Day/Time     One/Two Story Residence: One story   # of Interior Steps     Height of Each Step (in)     [employer] Available     History of Falls? No     Services At/After Discharge  Transition of Care Consult (CM Consult): Discharge Planning   Internal Home Health     Internal Hospice     Reason Outside Agency [address]     Partner SNF     Reason Why Partner SNF Not Chosen     Internal Comfort Care     Reason Outside [address] Discharge     [facility] Resource Information Provided? No   Mode of Transport at Discharge Other (see comment)   Hospital Transport Time of Discharge     Confirm Follow Up Transport Family     Condition of Participation: Discharge Planning  The plan for Transition of Care is related to the following treatment goals: IRC to get back to baseline   The Patient and/or Patient Representative was provided with a Choice of Provider? Name of the Patient Representative who was provided with the Choice of Provider and agrees with the Discharge Plan? The Patient and/or Patient Representative Agree with the Discharge Plan? Freedom of Choice list was provided with basic dialogue that supports the individualized plan of care/goals, treatment preferences, and shares the quality data associated with the providers?        Documentation for Discharge Appeal  Discharge Appealed by     Date notified by [facility] of appeal request:     Time notified by QIO of appeal request:     Detailed Notice of Discharge given to:     Date Notice of Discharge given:     Time Notice of Discharge given:     Date records sent to QIO     Time records sent to Kirsten Urbano     Date Notified of Outcome     Time Notified of Outcome     Outcome of appeal           FERNANDO Parrish 02/17/23 3:56 PM

## 2023-02-17 NOTE — PROGRESS NOTES
Discharge instructions given to patient. Follow up appointments made with cardiology. Patient to call PCP on Monday to schedule appointment by Wednesday. Medications discussed. Unit phone number provided in case questions arise after discharge. Understanding verbalized.

## 2023-02-17 NOTE — DISCHARGE SUMMARY
[facility] CHILDREN  Inpatient Rehab Program      PHYSICAL MEDICINE & REHABILITATION DISCHARGE SUMMARY     Date: 2/17/2023  Admission Date: 2/10/2023  Discharge Date: 2/17/2023    Primary Care Provider: Adenike Tee MD     Discharged Condition: stable    Principal Problem:    Pulmonary edema with congestive heart failure (HCC)  Active Problems:    Dyspnea and respiratory abnormalities    Mild protein-calorie malnutrition (HCC)    Stage 3 chronic kidney disease (HCC)    Mixed hyperlipidemia    Impaired gait and mobility    Hypertension    Physical deconditioning    Fibromyalgia    Sjogren's syndrome (HCC)    Gouty arthritis  Resolved Problems:    * No resolved hospital problems. [facility] Course:     76 y.o. white female presented to the ED 2/7/2023 with fatigue, generalized weakness and dyspnea x 2d. /107, IV nicardipine started. CXR found PNA vs pulmonary edema. She required 2L O2 in ED. BNP >1900. U/A non-infectious. She was admitted to telemetry for hypertensive emergency, pulmonary edema and acute hypoxic respiratory failure. Cardiology endorsed continued diuresis and better BP control. TTE 2/7 with normal LV size and function, EF 55-60% and moderate pericardial effusion. PT / OT noted mobility and self-care deficits, recommended IRC where she received interdisciplinary approach to rehabilitation including PT, OT, nursing and physiatry. Hospital course was complicated by significant constipation which resolved with bowel program and laxatives. She initially had some episodes of SOB during activity that improved during the admission. Once respiratory status improved and BP was decreasing her lasix was decreased. She tolerated activity and progressed well with therapy toward rehab goals. She is being discharged home with [facility].        FUNCTIONAL LEVEL AT DISCHARGE:     PT -   TRANSFERS Daily Assessment   Increased time and effort to complete with cues for body mechanics and rollator parts management     Commode transfers using grab bar and rollator with modified independence. Independent with clothing management     Sit to Stand: Modified independent  Stand to Sit: Modified independent  Transfer Type: Stand Pivot with rollator  Transfer Assistance: Modified independent  Car Transfers: NT            GAIT Daily Assessment   Gait training with one time cue for upright posture Amount of Assistance: Modified independent  Distance (ft): 200 ft x 1  100ft x 1  Assistive Device: Rollator  Surface: Level Surface     BALANCE Daily Assessment   Static standing without hand support on grab bar during toileting activity including hygiene and clothing management. No loss of balance. Static Sitting: Good:  Pt. able to maintain balance w/o UE support;  exhibits some postural sway  Dynamic Sitting: Good - accepts moderate challenge;  can maintain balance while picking object off the floor  Static Standing: Fair:  Pt. requires UE support;  may need occasional min A  Dynamic Standing: Fair - accepts minimal challenge;  can maintain balance while turning head/trunk       OT -   Functional Mobility   Pt was I with all mobility with 4 WW. Self-Care   Pt toileted I and gave a urine sample. Cognition   Pt worked with [employer] to copy a design. Pt did well, but did not have confidence.              Past Medical History:   Diagnosis Date    Arrhythmia     Arthritis     Asthma     Cellulitis     CHF (congestive heart failure) (HCC)     Chronic kidney disease     Hx renal failure    Depression     Gout     Gouty arthritis 12/2/2015    Hypercholesterolemia     Hypertension     Morbid obesity (HCC)     Nausea & vomiting     Polymyalgia rheumatica ([facility] [address] 12/2/2015    PONV (postoperative nausea and vomiting)     Psychiatric disorder     depression & anxiety    PUD (peptic ulcer disease)     Severe obesity with body mass index (BMI) of 35.0 to 39.9 with comorbidity ([address] 8/12/2013    Sjogren's syndrome ([facility] [address]     Temporal arteritis ([facility] [address]       Past Surgical History:   Procedure Laterality Date    COLONOSCOPY  6/08    EP DEVICE PROCEDURE N/A 5/24/2022    ABLATION A-FIB W COMPLETE EP STUDY performed by Sebastián Cramer MD at [address] [place] [address] CATH LAB    EP STUDY/ABLATION N/A 2013    WPW Ablation    HYSTERECTOMY (CERVIX STATUS UNKNOWN)      [place] UNLISTED PROCEDURE CARDIAC SURGERY  8/13    HEART ABLATION    [place] UNLISTED PROCEDURE CARDIAC SURGERY  4/07    STRESS TEST    TUBAL LIGATION      UPPER GASTROINTESTINAL ENDOSCOPY  8/08      Family History   Problem Relation Age of Onset    Hypertension Sister     Other Son         PSYCHIATRIC ILLNESS    Diabetes Son     Cancer Father         LUNG    Dementia Mother     Hypertension Mother     Breast Cancer Mother 80    Cancer Mother     Heart Disease Father 62      Social History     Tobacco Use    Smoking status: Never    Smokeless tobacco: Never   Substance Use Topics    Alcohol use: No     Allergies   Allergen Reactions    Lisinopril Other (See Comments)    Losartan Other (See Comments)     Burning sensation tongue. Nifedipine Other (See Comments)     severe headache    Celecoxib Rash      Prior to Admission medications    Medication Sig Start Date End Date Taking?  Authorizing Provider   furosemide (LASIX) 20 MG tablet Take 1 tablet by mouth daily 2/17/23  Yes SYDNEY Haynes   spironolactone (ALDACTONE) 25 MG tablet Take 1 tablet by mouth daily 2/11/23   González Gibbs MD   hydrALAZINE (APRESOLINE) 25 MG tablet Take 1 tablet by mouth every 8 hours 2/10/23   González Gibbs MD   cloNIDine (CATAPRES) 0.2 MG tablet Take 1 tablet by mouth 2 times daily as needed (SBP >160) 2/10/23   González Gibbs MD   sertraline (ZOLOFT) 50 MG tablet Take 50 mg by mouth daily    Historical Provider, MD   apixaban (ELIQUIS) 5 MG TABS tablet Take 1 tablet by mouth in the morning and 1 tablet in the evening. 9/22/22   Venita Moore MD   carvedilol (COREG) 25 MG tablet Take 1 tablet by mouth 2 times daily (with meals) 9/22/22   Roma Boykin MD       Lab Review:   Recent Results (from the past 72 hour(s))   Basic Metabolic Panel    Collection Time: 02/15/23  5:49 AM   Result Value Ref Range    Sodium 141 133 - 143 mmol/L    Potassium 4.5 3.5 - 5.1 mmol/L    Chloride 108 101 - 110 mmol/L    CO2 26 21 - 32 mmol/L    Anion Gap 7 2 - 11 mmol/L    Glucose 97 65 - 100 mg/dL    BUN 41 (H) 8 - 23 MG/DL    Creatinine 1.20 (H) 0.6 - 1.0 MG/DL    Est, Glom Filt Rate 46 (L) >60 ml/min/1.73m2    Calcium 8.8 8.3 - 10.4 MG/DL   CBC    Collection Time: 02/15/23  5:49 AM   Result Value Ref Range    WBC 5.7 4.3 - 11.1 K/uL    RBC 3.38 (L) 4.05 - 5.2 M/uL    Hemoglobin 10.4 (L) 11.7 - 15.4 g/dL    Hematocrit 31.8 (L) 35.8 - 46.3 %    MCV 94.1 82 - 102 FL    MCH 30.8 26.1 - 32.9 PG    MCHC 32.7 31.4 - 35.0 g/dL    RDW 13.2 11.9 - 14.6 %    Platelets 009 625 - 760 K/uL    MPV 11.8 9.4 - 12.3 FL    nRBC 0.00 0.0 - 0.2 K/uL   Urinalysis with Reflex to Culture    Collection Time: 02/16/23 11:28 AM    Specimen: Urine   Result Value Ref Range    Color, UA YELLOW/STRAW      Appearance CLEAR      Specific Gravity, UA 1.012 1.001 - 1.023      pH, Urine 6.0 5.0 - 9.0      Protein, UA Negative NEG mg/dL    Glucose, UA Negative mg/dL    Ketones, Urine Negative NEG mg/dL    Bilirubin Urine Negative NEG      Blood, Urine Negative NEG      Urobilinogen, Urine 0.2 0.2 - 1.0 EU/dL    Nitrite, Urine Negative NEG      Leukocyte Esterase, Urine Negative NEG      Urine Culture if Indicated CULTURE NOT INDICATED BY UA RESULT      WBC, UA 0-4 U4 /hpf    RBC, UA 0-5 U5 /hpf    BACTERIA, URINE Negative NEG /hpf    Epithelial Cells UA 0-5 U5 /hpf    Casts 2-5 (A) U2 /lpf    Mucus, UA 0 0 /lpf         Discharge Physical Exam:  Visit Vitals  /69   Pulse 71   Temp 98.1 °F (36.7 °C) (Oral)   Resp 16   Ht 5' 2\" (1.575 m)   Wt 150 lb 14.4 oz (68.4 kg)   SpO2 93%   BMI 27.60 kg/m²      General: No acute distress, well developed, overweight. HEENT: Normocephalic,   oral mucosa moist. Extraocular movements intact  . Neck: Supple, non-tender. Respiratory:  Respirations unlabored. Equal chest rise. Cardiovascular: Normal rate, no cyanosis. ABD: Soft, non-tender, not distended,   Integumentary: Clean, no rash, no skin breakdown. Musculoskeletal: No joint tenderness, no joint swelling. Psychiatric: Appropriate mood & affect. Neuro: Alert, oriented; speech and language intact. No focal deficits. Sensation grossly intact. DISCHARGE INSTRUCTIONS:       1. Diet: regular  2. Activity: as tolerated. Current Discharge Medication List        CONTINUE these medications which have CHANGED    Details   furosemide (LASIX) 20 MG tablet Take 1 tablet by mouth daily  Qty: 30 tablet, Refills: 0           CONTINUE these medications which have NOT CHANGED    Details   spironolactone (ALDACTONE) 25 MG tablet Take 1 tablet by mouth daily  Qty: 30 tablet, Refills: 0      hydrALAZINE (APRESOLINE) 25 MG tablet Take 1 tablet by mouth every 8 hours  Qty: 90 tablet, Refills: 0      cloNIDine (CATAPRES) 0.2 MG tablet Take 1 tablet by mouth 2 times daily as needed (SBP >160)  Qty: 60 tablet, Refills: 0      sertraline (ZOLOFT) 50 MG tablet Take 50 mg by mouth daily      apixaban (ELIQUIS) 5 MG TABS tablet Take 1 tablet by mouth in the morning and 1 tablet in the evening. Qty: 180 tablet, Refills: 3      carvedilol (COREG) 25 MG tablet Take 1 tablet by mouth 2 times daily (with meals)  Qty: 180 tablet, Refills: 3              I have reviewed the patients controlled substance prescription history as maintained in the [facility] prescription drug monitoring program (PDMP) so that prescription(s) for controlled substance, if any provided, could be given.         DISPOSITION: discharged home with family assist.       OUTPATIENT FOLLOW-UP:  Ayden Topete MD  [facility]  [phone]    Schedule an appointment as soon as possible for a visit in 5 day(s)  follow up with PCP after hospitalization for heart failure, pulmonary edema    Robinette Blizzard, MD  [address]  [address] [place]  [phone]    Go on 3/13/2023  appt at 1:30PM for follow up with primary cardiologist after hospitalization for heart failure, pulmonary edema. Cherelle Dixon   [address] [facility]  [phone]  Follow up  They will call you to schedule your first home therapy visit. Time spent in patient discharge planning and coordination: >35 minutes.       Signed By: Dayanna Patino MD     February 17, 2023

## 2023-02-17 NOTE — PROGRESS NOTES
PHYSICAL THERAPY DAILY NOTE  Time In:  916  Time Out:  1002  Total Treatment Time:  55 Minutes  Pt. Seen for: AM, Balance Training, Gait Training, Patient Education, Therapeutic Exercise, Transfer Training, and Other     Subjective: Patient reporting she is going home today. Reports she would like to try walking with her cane today         Objective:  Precautions: Falls    GROSS ASSESSMENT Daily Assessment           COGNITION Daily Assessment    Alert, able to follow commands,cooperating,participating, motivated         BED/MAT MOBILITY Daily Assessment   NT        TRANSFERS Daily Assessment   Increased time and effort to complete with cues for body mechanics and rollator parts management     Sit to Stand: Modified independent  Stand to Sit: Modified independent  Transfer Type: Stand Pivot with rollator and/or with SPT  Transfer Assistance: Modified independent  Car Transfers: NT         GAIT Daily Assessment   Gait training with one time cue for upright posture Amount of Assistance: Modified independent  Distance (ft): 200 ft x 1  100ft  x 1 with rollator, 100ft x 1 with straight cane  Assistive Device: Rollator, straight cane  Surface: Level Surface   Gait training up/down 20 ft ramp with straight cane and modified independence    Gait training x 20 ft x 2 with straight cane ambulating through multiple 24 inch wide simulated doorways with modified independence        STEPS/STAIRS Daily Assessment   Gait training up/down 6 inch step with straight cane and SBA Gait training up/down 4 steps with single hand rail and straight cane in opposite hand. Able to complete single step at a time with modified independence.  Increased time and effort to complete       BALANCE Daily Assessment    Static Sitting: Good:  Pt. able to maintain balance w/o UE support;  exhibits some postural sway  Dynamic Sitting: Good - accepts moderate challenge;  can maintain balance while picking object off the floor  Static Standing: Fair:  Pt. requires UE support;  may need occasional min A  Dynamic Standing: Fair - accepts minimal challenge;  can maintain balance while turning head/trunk       WHEELCHAIR MOBILITY Daily Assessment   NT        LOWER EXTREMITY EXERCISES Daily Assessment   Increased time and effort to complete with multiple and frequent rest breaks. Cues for correct form  Min assist with cues to complete SEATED EXERCISES Sets Reps Comments   Ankle Pumps 2 10    Hip Flexion 2 10    Long Arc Quads 2 10    Hip Adduction/Ball Squeeze 2 10    Hip Abduction with red Theraband 2 10    Hamstring Curls with red Theraband 2 10           Pain level: No c/o pain during treatment  Pain Location:  NA  Pain Interventions: NA    Vital Signs:  Visit Vitals  /69   Pulse 71   Temp 98.1 °F (36.7 °C) (Oral)   Resp 16   Ht 5' 2\" (1.575 m)   Wt 150 lb 14.4 oz (68.4 kg)   SpO2 93%   BMI 27.60 kg/m²         Education: transfer training, gait training, balance training,fall precautions, body mechanics,activity pacing,rollator parts management, energy conservation, Patient verbalizing understanding and demonstrating  understanding of patient education. Recommend follow up education with [facility] PT    Interdisciplinary Communication: spoke with RN regarding D/C plans    Patient returned to room at end of treatment and remained up in recliner with LEs elevated and with needs in reach. Assessment: Able to progress to short distance gait with straight cane.  Recommending patient use rollator for ambulation outside of her home         Plan of Care: D/C home 02/17/2023 with follow up with Gilma Meeks, PT  2/17/2023

## 2023-02-18 ENCOUNTER — HOME CARE VISIT (OUTPATIENT)
Dept: SCHEDULING | Facility: HOME HEALTH | Age: 78
End: 2023-02-18

## 2023-02-18 VITALS
SYSTOLIC BLOOD PRESSURE: 112 MMHG | TEMPERATURE: 98 F | HEART RATE: 68 BPM | DIASTOLIC BLOOD PRESSURE: 66 MMHG | OXYGEN SATURATION: 96 % | RESPIRATION RATE: 16 BRPM

## 2023-02-18 PROCEDURE — 0221000100 HH NO PAY CLAIM PROCEDURE

## 2023-02-18 PROCEDURE — G0151 HHCP-SERV OF PT,EA 15 MIN: HCPCS

## 2023-02-18 ASSESSMENT — ENCOUNTER SYMPTOMS
DYSPNEA ACTIVITY LEVEL: AFTER AMBULATING 10 - 20 FT
CONSTIPATION: 1

## 2023-02-20 ENCOUNTER — APPOINTMENT (OUTPATIENT)
Dept: GENERAL RADIOLOGY | Age: 78
End: 2023-02-20
Payer: MEDICARE

## 2023-02-20 ENCOUNTER — HOSPITAL ENCOUNTER (EMERGENCY)
Age: 78
Discharge: HOME | End: 2023-02-20
Attending: EMERGENCY MEDICINE
Payer: MEDICARE

## 2023-02-20 VITALS
DIASTOLIC BLOOD PRESSURE: 59 MMHG | HEART RATE: 57 BPM | OXYGEN SATURATION: 98 % | TEMPERATURE: 98.7 F | SYSTOLIC BLOOD PRESSURE: 139 MMHG | RESPIRATION RATE: 12 BRPM

## 2023-02-20 DIAGNOSIS — I10 HYPERTENSION, UNSPECIFIED TYPE: Primary | ICD-10-CM

## 2023-02-20 LAB
ALBUMIN SERPL-MCNC: 3.5 G/DL (ref 3.2–4.6)
ALBUMIN/GLOB SERPL: 1 (ref 0.4–1.6)
ALP SERPL-CCNC: 94 U/L (ref 50–136)
ALT SERPL-CCNC: 17 U/L (ref 12–65)
ANION GAP SERPL CALC-SCNC: 8 MMOL/L (ref 2–11)
AST SERPL-CCNC: 15 U/L (ref 15–37)
BILIRUB SERPL-MCNC: 0.8 MG/DL (ref 0.2–1.1)
BUN SERPL-MCNC: 48 MG/DL (ref 8–23)
CALCIUM SERPL-MCNC: 9.1 MG/DL (ref 8.3–10.4)
CHLORIDE SERPL-SCNC: 107 MMOL/L (ref 101–110)
CO2 SERPL-SCNC: 24 MMOL/L (ref 21–32)
CREAT SERPL-MCNC: 1.3 MG/DL (ref 0.6–1)
EKG ATRIAL RATE: 60 BPM
EKG DIAGNOSIS: NORMAL
EKG P AXIS: 12 DEGREES
EKG P-R INTERVAL: 146 MS
EKG Q-T INTERVAL: 448 MS
EKG QRS DURATION: 104 MS
EKG QTC CALCULATION (BAZETT): 459 MS
EKG R AXIS: 23 DEGREES
EKG T AXIS: 52 DEGREES
EKG VENTRICULAR RATE: 63 BPM
ERYTHROCYTE [DISTWIDTH] IN BLOOD BY AUTOMATED COUNT: 13 % (ref 11.9–14.6)
GLOBULIN SER CALC-MCNC: 3.6 G/DL (ref 2.8–4.5)
GLUCOSE SERPL-MCNC: 116 MG/DL (ref 65–100)
HCT VFR BLD AUTO: 31.3 % (ref 35.8–46.3)
HGB BLD-MCNC: 10.6 G/DL (ref 11.7–15.4)
MCH RBC QN AUTO: 30.7 PG (ref 26.1–32.9)
MCHC RBC AUTO-ENTMCNC: 33.9 G/DL (ref 31.4–35)
MCV RBC AUTO: 90.7 FL (ref 82–102)
NRBC # BLD: 0 K/UL (ref 0–0.2)
PLATELET # BLD AUTO: 214 K/UL (ref 150–450)
PMV BLD AUTO: 11.7 FL (ref 9.4–12.3)
POTASSIUM SERPL-SCNC: 4.2 MMOL/L (ref 3.5–5.1)
PROT SERPL-MCNC: 7.1 G/DL (ref 6.3–8.2)
RBC # BLD AUTO: 3.45 M/UL (ref 4.05–5.2)
SODIUM SERPL-SCNC: 139 MMOL/L (ref 133–143)
TROPONIN I SERPL HS-MCNC: 10.2 PG/ML (ref 0–14)
TROPONIN I SERPL HS-MCNC: 9.8 PG/ML (ref 0–14)
WBC # BLD AUTO: 7.7 K/UL (ref 4.3–11.1)

## 2023-02-20 PROCEDURE — 85027 COMPLETE CBC AUTOMATED: CPT

## 2023-02-20 PROCEDURE — 80053 COMPREHEN METABOLIC PANEL: CPT

## 2023-02-20 PROCEDURE — 94761 N-INVAS EAR/PLS OXIMETRY MLT: CPT

## 2023-02-20 PROCEDURE — 71045 X-RAY EXAM CHEST 1 VIEW: CPT

## 2023-02-20 PROCEDURE — 93005 ELECTROCARDIOGRAM TRACING: CPT | Performed by: EMERGENCY MEDICINE

## 2023-02-20 PROCEDURE — 84484 ASSAY OF TROPONIN QUANT: CPT

## 2023-02-20 PROCEDURE — 99285 EMERGENCY DEPT VISIT HI MDM: CPT

## 2023-02-20 ASSESSMENT — PAIN - FUNCTIONAL ASSESSMENT: PAIN_FUNCTIONAL_ASSESSMENT: NONE - DENIES PAIN

## 2023-02-20 ASSESSMENT — ENCOUNTER SYMPTOMS
NAUSEA: 0
VOMITING: 0
RHINORRHEA: 0
ABDOMINAL PAIN: 0
SHORTNESS OF BREATH: 0
SORE THROAT: 0
PHOTOPHOBIA: 0

## 2023-02-20 NOTE — ED NOTES
Pt arrives via [facility] coming from home c/o HTN and mid sternal chest pain. Pt describes the pain as burning. Pt was admitted to this hospital and discharged. Hx HF. /70 at time of triage.       Alcira Rhodes RN  02/20/23 0544

## 2023-02-20 NOTE — ED PROVIDER NOTES
Emergency Department Provider Note                   PCP:                Wilfrido Fonseca MD               Age: 76 y.o. Sex: female       ICD-10-CM    1. Hypertension, unspecified type  I10           DISPOSITION Decision To Discharge 02/20/2023 05:48:17 AM       Medical Decision Making  Patient is a 78-year-old female who presented facility today and is well-appearing in no acute distress. Vital signs on arrival and during her time in the department are normal.  Blood pressures look fine. Patient in no acute distress or pain. Cardiac work-up was obtained which was negative here today. I reassured the patient of the normal work-up. Independent review of CXR is normal. Reassured her her blood pressure medications were working appropriately and her blood pressure seems well controlled. I encouraged her to follow-up with her family doctor as needed for concerns she may have. We discussed return precautions back to the ED which the patient reports understanding. Patient is stable for discharge home at this time. Amount and/or Complexity of Data Reviewed  Labs: ordered. Radiology: ordered. ECG/medicine tests: ordered. Complexity of Problem: 1 stable, acute illness. (3)    I have conducted an independent ordering and review of Labs. I have conducted an independent ordering and review of EKG. I have conducted an independent ordering and review of X-rays. Considerations: Shared decision making was utilized in the care of this patient. Patient was discharged risks and benefits of hospitalization were discussed.          Orders Placed This Encounter   Procedures    XR CHEST PORTABLE    CBC    Comprehensive Metabolic Panel    Troponin    Cardiac Monitor    Pulse Oximetry    EKG 12 Lead    Saline lock IV        Medications - No data to display    New Prescriptions    No medications on file        Clark Maloney is a 76 y.o. female who presents to the Emergency Department with chief complaint of Chief Complaint   Patient presents with    Hypertension      Patient is a 75-year-old female who presents to the facility today via EMS with concern about small bump up on the right side of her forehead and her elevated blood pressure. She states she is concerned her blood pressure caused this bump on her head. Patient states when EMS came out her blood pressure was really high with a systolic pressure of around 250. She states she was concerned but had a \"bad experience\" last time she was here and so she thinks maybe her anxiety made her blood pressure go up but she also thought it was contributing to the bump on her head. She states she noticed another bump on the other side of her head the other day but that 1 seems to have gone away. She denies being in any pain here today. She denies any sort of chest pain or pressure. She states for a couple minutes she felt a \"burning sensation in the center of her chest\" but that has also gone away by the time she got to the department. She is having no fevers, chills, cough, shortness of breath, abdominal pain, back pain, nausea, vomiting, or any other symptoms here today. The history is provided by the patient. Review of Systems   Constitutional:  Negative for chills and fever. HENT:  Negative for congestion, rhinorrhea and sore throat. Eyes:  Negative for photophobia and visual disturbance. Respiratory:  Negative for shortness of breath. Cardiovascular:  Negative for chest pain. Gastrointestinal:  Negative for abdominal pain, nausea and vomiting. Genitourinary:  Negative for dysuria, frequency and urgency. Musculoskeletal:  Negative for gait problem. Skin:  Negative for rash. Neurological:  Negative for dizziness, syncope and headaches. Psychiatric/Behavioral:  Negative for agitation and behavioral problems. All other systems reviewed and are negative.     Past Medical History:   Diagnosis Date    Arrhythmia     Arthritis Asthma     Cellulitis     CHF (congestive heart failure) (HCC)     Chronic kidney disease     Hx renal failure    Depression     Gout     Gouty arthritis 12/2/2015    Hypercholesterolemia     Hypertension     Morbid obesity (HCC)     Nausea & vomiting     Polymyalgia rheumatica (HCC) 12/2/2015    PONV (postoperative nausea and vomiting)     Psychiatric disorder     depression & anxiety    PUD (peptic ulcer disease)     Severe obesity with body mass index (BMI) of 35.0 to 39.9 with comorbidity (HCC) 8/12/2013    Sjogren's syndrome (HCC)     Temporal arteritis (HCC)         Past Surgical History:   Procedure Laterality Date    COLONOSCOPY  6/08    EP DEVICE PROCEDURE N/A 5/24/2022    ABLATION A-FIB W COMPLETE EP STUDY performed by Soto Sanches MD at [facility] CARDIAC CATH LAB    EP STUDY/ABLATION N/A 2013    WPW Ablation    HYSTERECTOMY (CERVIX STATUS UNKNOWN)      AK UNLISTED PROCEDURE CARDIAC SURGERY  8/13    HEART ABLATION    AK UNLISTED PROCEDURE CARDIAC SURGERY  4/07    STRESS TEST    TUBAL LIGATION      UPPER GASTROINTESTINAL ENDOSCOPY  8/08        Family History   Problem Relation Age of Onset    Hypertension Sister     Other Son         PSYCHIATRIC ILLNESS    Diabetes Son     Cancer Father         LUNG    Dementia Mother     Hypertension Mother     Breast Cancer Mother 89    Cancer Mother     Heart Disease Father 58        Social History     Socioeconomic History    Marital status:    Tobacco Use    Smoking status: Never    Smokeless tobacco: Never   Vaping Use    Vaping Use: Never used   Substance and Sexual Activity    Alcohol use: No    Drug use: No   Social History Narrative    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, 16 great grandchildren        Allergies: Lisinopril, Losartan, Nifedipine, and Celecoxib    Previous Medications    APIXABAN (ELIQUIS) 5 MG TABS TABLET    Take 1 tablet by mouth in the morning and 1 tablet in the evening.    CARVEDILOL (COREG) 25 MG  TABLET    Take 1 tablet by mouth 2 times daily (with meals)    CLONIDINE (CATAPRES) 0.2 MG TABLET    Take 1 tablet by mouth 2 times daily as needed (SBP >160)    FUROSEMIDE (LASIX) 20 MG TABLET    Take 1 tablet by mouth daily    HYDRALAZINE (APRESOLINE) 25 MG TABLET    Take 1 tablet by mouth every 8 hours    SERTRALINE (ZOLOFT) 50 MG TABLET    Take 50 mg by mouth daily    SPIRONOLACTONE (ALDACTONE) 25 MG TABLET    Take 1 tablet by mouth daily        Vitals signs and nursing note reviewed. Patient Vitals for the past 4 hrs:   Pulse Resp BP SpO2   02/20/23 0500 58 13 138/71 97 %   02/20/23 0355 59 11 (!) 146/55 98 %   02/20/23 0255 63 14 (!) 163/57 99 %   02/20/23 0155 61 13 (!) 161/57 98 %          Physical Exam  Vitals and nursing note reviewed. Constitutional:       General: She is not in acute distress. Appearance: Normal appearance. She is not ill-appearing or toxic-appearing. HENT:      Head: Normocephalic and atraumatic. Right Ear: External ear normal.      Left Ear: External ear normal.   Eyes:      Extraocular Movements: Extraocular movements intact. Conjunctiva/sclera: Conjunctivae normal.   Cardiovascular:      Rate and Rhythm: Normal rate and regular rhythm. Pulses: Normal pulses. Heart sounds: Normal heart sounds. Pulmonary:      Effort: Pulmonary effort is normal.      Breath sounds: Normal breath sounds. Abdominal:      General: Abdomen is flat. Bowel sounds are normal. There is no distension. Palpations: Abdomen is soft. Tenderness: There is no abdominal tenderness. There is no guarding or rebound. Musculoskeletal:         General: Normal range of motion. Cervical back: Normal range of motion. Skin:     General: Skin is warm and dry. Capillary Refill: Capillary refill takes less than 2 seconds. Neurological:      General: No focal deficit present. Mental Status: She is alert and oriented to person, place, and time.    Psychiatric: Mood and Affect: Mood normal.         Behavior: Behavior normal.        Procedures    ED EKG Interpretation  EKG was interpreted in the absence of a cardiologist.    Rate: 79  EKG Interpretation: EKG Interpretation: sinus rhythm  ST Segments: Nonspecific ST segments - NO STEMI    Is this patient to be included in the SEP-1 core measure due to severe sepsis or septic shock? No Exclusion criteria - the patient is NOT to be included for SEP-1 Core Measure due to: Infection is not suspected     Results for orders placed or performed during the hospital encounter of 02/20/23   XR CHEST PORTABLE    Narrative    EXAMINATION: XR CHEST PORTABLE      DATE OF EXAM: 2/20/2023 1:30 AM    HISTORY: chest discomfort    COMPARISON: 2/7/2023. FINDINGS:     The lungs are clear. Some interstitial prominence and small pleural effusions   have resolved since the prior study. Cardiomegaly is unchanged. Bones and upper   abdomen are normal.        Impression    1. No acute abnormalities are identified. 2.  Interstitial prominence and small pleural effusions seen previously have   resolved. 3.  Cardiomegaly.       Mirian Clark M.D.   2/20/2023 1:50:00 AM   CBC   Result Value Ref Range    WBC 7.7 4.3 - 11.1 K/uL    RBC 3.45 (L) 4.05 - 5.2 M/uL    Hemoglobin 10.6 (L) 11.7 - 15.4 g/dL    Hematocrit 31.3 (L) 35.8 - 46.3 %    MCV 90.7 82 - 102 FL    MCH 30.7 26.1 - 32.9 PG    MCHC 33.9 31.4 - 35.0 g/dL    RDW 13.0 11.9 - 14.6 %    Platelets 059 108 - 203 K/uL    MPV 11.7 9.4 - 12.3 FL    nRBC 0.00 0.0 - 0.2 K/uL   Comprehensive Metabolic Panel   Result Value Ref Range    Sodium 139 133 - 143 mmol/L    Potassium 4.2 3.5 - 5.1 mmol/L    Chloride 107 101 - 110 mmol/L    CO2 24 21 - 32 mmol/L    Anion Gap 8 2 - 11 mmol/L    Glucose 116 (H) 65 - 100 mg/dL    BUN 48 (H) 8 - 23 MG/DL    Creatinine 1.30 (H) 0.6 - 1.0 MG/DL    Est, Glom Filt Rate 42 (L) >60 ml/min/1.73m2    Calcium 9.1 8.3 - 10.4 MG/DL    Total Bilirubin 0.8 0.2 - 1.1 MG/DL ALT 17 12 - 65 U/L    AST 15 15 - 37 U/L    Alk Phosphatase 94 50 - 136 U/L    Total Protein 7.1 6.3 - 8.2 g/dL    Albumin 3.5 3.2 - 4.6 g/dL    Globulin 3.6 2.8 - 4.5 g/dL    Albumin/Globulin Ratio 1.0 0.4 - 1.6     Troponin   Result Value Ref Range    Troponin, High Sensitivity 9.8 0 - 14 pg/mL   Troponin   Result Value Ref Range    Troponin, High Sensitivity 10.2 0 - 14 pg/mL        XR CHEST PORTABLE   Final Result      1. No acute abnormalities are identified. 2.  Interstitial prominence and small pleural effusions seen previously have    resolved. 3.  Cardiomegaly. Tushar Walker M.D.    2/20/2023 1:50:00 AM                            Voice dictation software was used during the making of this note. This software is not perfect and grammatical and other typographical errors may be present. This note has not been completely proofread for errors.      [employer], VARINDER  02/20/23 9415

## 2023-02-20 NOTE — ED NOTES
Report given to CHANA Tarango. Transferring patient care at this time.       Devaughn Del Rio RN  02/20/23 0383

## 2023-02-20 NOTE — ED NOTES
.I have reviewed discharge instructions with the patient. The patient verbalized understanding. Patient left ED via Discharge Method: stretcher to Home with [facility] transport service. Opportunity for questions and clarification provided. Patient given 0 scripts. To continue your aftercare when you leave the hospital, you may receive an automated call from our care team to check in on how you are doing. This is a free service and part of our promise to provide the best care and service to meet your aftercare needs.  If you have questions, or wish to unsubscribe from this service please call [phone]. Thank you for Choosing our [facility] Emergency Department.       Savannah Prater RN  02/20/23 2333

## 2023-02-20 NOTE — PROGRESS NOTES
Physician Progress Note      PATIENT:               Rain Hallman  [facility] #:                  [id number]  :                       1945  ADMIT DATE:       2023 7:53 AM  DISCH DATE:        2/10/2023 4:24 PM  RESPONDING  PROVIDER #:        Claribel Arias MD          QUERY TEXT:    Pt admitted with HTN Emergency and associated pulmonary edema and has CHF   documented. After study, can the acuity of the systolic heart failure be   specified as: The medical record reflects the following:  Risk Factors: dyspnea, SOB, rales, small pleural effusion, cardiomegaly  Clinical Indicators: BNP of 1,966  Treatment: IV Lasix  Options provided:  -- Acute on Chronic Systolic CHF/HFrEF  -- Acute Systolic CHF/HFrEF  -- Chronic Systolic CHF/HFrEF  -- Other - I will add my own diagnosis  -- Disagree - Not applicable / Not valid  -- Disagree - Clinically unable to determine / Unknown  -- Refer to Clinical Documentation Reviewer    PROVIDER RESPONSE TEXT:    This patient is in acute on chronic systolic CHF/HFrEF. Query created by: Solomon Zendejas on 2023 2:54 PM      QUERY TEXT:    Patient admitted with HTN emergency. Noted documentation of acute respiratory   failure secondary to pulmonary edema with congestive heart failure. In order   to support the diagnosis of acute respiratory failure, please include   additional clinical indicators in your documentation. Or please document if   the diagnosis of acute respiratory failure has been ruled out after further   study.     The medical record reflects the following:  Risk Factors: pulmonary edema, CHF exacerbation  Clinical Indicators: \"borderline hypoxic and at point had 87% started on 2 L   nasal cannula\"  Treatment: echo, supplemental oxygen, lasix, CXR    Acute Respiratory Failure Clinical Indicators per  MS-DRG Training Guide and   Quick Reference Guide:  pO2 < 60 mmHg or SpO2 (pulse oximetry) < 91% breathing room air  pCO2 > 50 and pH < 7.35  P/F ratio (pO2 / FIO2) < 300  pO2 decrease or pCO2 increase by 10 mmHg from baseline (if known)  Supplemental oxygen of 40% or more  Presence of respiratory distress, tachypnea, dyspnea, shortness of breath,   wheezing  Unable to speak in complete sentences  Use of accessory muscles to breathe  Extreme anxiety and feeling of impending doom  Tripod position  Confusion/altered mental status/obtunded  Options provided:  -- Acute Respiratory Failure as evidenced by, Please document evidence.   -- Acute Respiratory Failure ruled out after study  -- Other - I will add my own diagnosis  -- Disagree - Not applicable / Not valid  -- Disagree - Clinically unable to determine / Unknown  -- Refer to Clinical Documentation Reviewer    PROVIDER RESPONSE TEXT:    This patient is in acute respiratory failure as evidenced by patient required   3 L oxygen to keep her oxygen saturations above 90%    Query created by: Silvano Tena on 2/16/2023 2:56 PM      Electronically signed by:  Gunjan Bright MD 2/20/2023 10:55 AM

## 2023-02-20 NOTE — DISCHARGE INSTRUCTIONS
Your work-up here today has all look good. Your blood pressures have been great since you have been here. I would encourage you to follow-up with your family doctor as needed. Return to the ED for new or worsening symptoms.

## 2023-02-21 ENCOUNTER — HOME CARE VISIT (OUTPATIENT)
Dept: HOME HEALTH SERVICES | Facility: HOME HEALTH | Age: 78
End: 2023-02-21

## 2023-02-21 ENCOUNTER — CLINICAL DOCUMENTATION (OUTPATIENT)
Dept: PHYSICAL MEDICINE AND REHAB | Age: 78
End: 2023-02-21

## 2023-02-21 ENCOUNTER — HOME CARE VISIT (OUTPATIENT)
Dept: SCHEDULING | Facility: HOME HEALTH | Age: 78
End: 2023-02-21

## 2023-02-21 VITALS
HEART RATE: 62 BPM | DIASTOLIC BLOOD PRESSURE: 72 MMHG | TEMPERATURE: 97.6 F | SYSTOLIC BLOOD PRESSURE: 136 MMHG | RESPIRATION RATE: 17 BRPM | OXYGEN SATURATION: 99 %

## 2023-02-21 PROCEDURE — G0151 HHCP-SERV OF PT,EA 15 MIN: HCPCS

## 2023-02-21 NOTE — PROGRESS NOTES
Dukene Maggie with [facility] called and left Voicemail, I returned her call. Brandon Serrano is requesting verbal orders for 3 things listed below. Add a : Concerns for transportation to medical appointments. Add Skilled Nursing Visit to educate patient on medical changes and managing current medications. Also, P.T. verbal orders for 2x a week for 3 weeks. All approved.

## 2023-02-23 ENCOUNTER — HOME CARE VISIT (OUTPATIENT)
Dept: SCHEDULING | Facility: HOME HEALTH | Age: 78
End: 2023-02-23

## 2023-02-23 ENCOUNTER — HOME CARE VISIT (OUTPATIENT)
Dept: SCHEDULING | Facility: HOME HEALTH | Age: 78
End: 2023-02-23
Payer: MEDICARE

## 2023-02-23 PROCEDURE — G0155 HHCP-SVS OF CSW,EA 15 MIN: HCPCS

## 2023-02-23 PROCEDURE — G0299 HHS/HOSPICE OF RN EA 15 MIN: HCPCS

## 2023-02-24 ENCOUNTER — HOME CARE VISIT (OUTPATIENT)
Dept: SCHEDULING | Facility: HOME HEALTH | Age: 78
End: 2023-02-24

## 2023-02-24 VITALS
SYSTOLIC BLOOD PRESSURE: 138 MMHG | OXYGEN SATURATION: 99 % | HEART RATE: 62 BPM | DIASTOLIC BLOOD PRESSURE: 64 MMHG | RESPIRATION RATE: 16 BRPM | TEMPERATURE: 97.9 F

## 2023-02-24 VITALS
OXYGEN SATURATION: 100 % | RESPIRATION RATE: 16 BRPM | HEART RATE: 60 BPM | DIASTOLIC BLOOD PRESSURE: 70 MMHG | TEMPERATURE: 97.4 F | SYSTOLIC BLOOD PRESSURE: 134 MMHG

## 2023-02-24 VITALS
RESPIRATION RATE: 15 BRPM | TEMPERATURE: 97.3 F | DIASTOLIC BLOOD PRESSURE: 70 MMHG | HEART RATE: 63 BPM | OXYGEN SATURATION: 99 % | SYSTOLIC BLOOD PRESSURE: 132 MMHG

## 2023-02-24 PROCEDURE — G0157 HHC PT ASSISTANT EA 15: HCPCS

## 2023-02-24 PROCEDURE — G0152 HHCP-SERV OF OT,EA 15 MIN: HCPCS

## 2023-02-24 ASSESSMENT — ENCOUNTER SYMPTOMS: PAIN LOCATION - PAIN QUALITY: ACHING

## 2023-02-25 ASSESSMENT — ENCOUNTER SYMPTOMS
DYSPNEA ACTIVITY LEVEL: AFTER AMBULATING 10 - 20 FT
STOOL DESCRIPTION: SOFT FORMED

## 2023-02-27 ENCOUNTER — HOME CARE VISIT (OUTPATIENT)
Dept: SCHEDULING | Facility: HOME HEALTH | Age: 78
End: 2023-02-27
Payer: MEDICARE

## 2023-02-27 VITALS
TEMPERATURE: 97.9 F | OXYGEN SATURATION: 99 % | RESPIRATION RATE: 16 BRPM | DIASTOLIC BLOOD PRESSURE: 80 MMHG | SYSTOLIC BLOOD PRESSURE: 135 MMHG | HEART RATE: 61 BPM

## 2023-02-27 VITALS
TEMPERATURE: 97.3 F | RESPIRATION RATE: 19 BRPM | SYSTOLIC BLOOD PRESSURE: 132 MMHG | OXYGEN SATURATION: 99 % | BODY MASS INDEX: 27.18 KG/M2 | WEIGHT: 148.6 LBS | DIASTOLIC BLOOD PRESSURE: 70 MMHG

## 2023-02-27 PROCEDURE — G0299 HHS/HOSPICE OF RN EA 15 MIN: HCPCS

## 2023-02-27 PROCEDURE — G0158 HHC OT ASSISTANT EA 15: HCPCS

## 2023-02-27 ASSESSMENT — ENCOUNTER SYMPTOMS
STOOL DESCRIPTION: FORMED
DYSPNEA ACTIVITY LEVEL: AFTER AMBULATING MORE THAN 20 FT

## 2023-02-28 ENCOUNTER — HOME CARE VISIT (OUTPATIENT)
Dept: SCHEDULING | Facility: HOME HEALTH | Age: 78
End: 2023-02-28
Payer: MEDICARE

## 2023-02-28 ENCOUNTER — CLINICAL DOCUMENTATION (OUTPATIENT)
Dept: PHYSICAL MEDICINE AND REHAB | Age: 78
End: 2023-02-28

## 2023-02-28 VITALS
OXYGEN SATURATION: 100 % | DIASTOLIC BLOOD PRESSURE: 62 MMHG | RESPIRATION RATE: 18 BRPM | HEART RATE: 60 BPM | SYSTOLIC BLOOD PRESSURE: 138 MMHG | TEMPERATURE: 98.6 F

## 2023-02-28 PROCEDURE — G0157 HHC PT ASSISTANT EA 15: HCPCS

## 2023-03-01 ENCOUNTER — HOME CARE VISIT (OUTPATIENT)
Dept: SCHEDULING | Facility: HOME HEALTH | Age: 78
End: 2023-03-01
Payer: MEDICARE

## 2023-03-01 VITALS
DIASTOLIC BLOOD PRESSURE: 60 MMHG | RESPIRATION RATE: 16 BRPM | HEART RATE: 69 BPM | SYSTOLIC BLOOD PRESSURE: 135 MMHG | OXYGEN SATURATION: 99 % | TEMPERATURE: 98 F

## 2023-03-01 PROCEDURE — G0158 HHC OT ASSISTANT EA 15: HCPCS

## 2023-03-02 ENCOUNTER — TELEPHONE (OUTPATIENT)
Dept: CARDIOLOGY CLINIC | Age: 78
End: 2023-03-02

## 2023-03-02 ENCOUNTER — HOME CARE VISIT (OUTPATIENT)
Dept: SCHEDULING | Facility: HOME HEALTH | Age: 78
End: 2023-03-02
Payer: MEDICARE

## 2023-03-02 PROCEDURE — G0299 HHS/HOSPICE OF RN EA 15 MIN: HCPCS

## 2023-03-02 PROCEDURE — G0155 HHCP-SVS OF CSW,EA 15 MIN: HCPCS

## 2023-03-02 NOTE — TELEPHONE ENCOUNTER
Weakness and diziness starts after morning meds Takes Spironalactone and Lasix and Hydrazaline   BP is in high 40s and low 60s     PLEASE CALL

## 2023-03-02 NOTE — TELEPHONE ENCOUNTER
Spoke to pt's [facility]. States pt has been feeling bad after taking am meds, states BP has been running 116/46 or lower. Recommend holding hydralazine unless BP consistently >130/80, wait to take spironolactone at noon to spread out her meds a little. Call back on Monday if no improvement. F/u appt with Dr. Brisa Jerry on 3/13.

## 2023-03-03 ENCOUNTER — HOME CARE VISIT (OUTPATIENT)
Dept: SCHEDULING | Facility: HOME HEALTH | Age: 78
End: 2023-03-03
Payer: MEDICARE

## 2023-03-03 VITALS
HEART RATE: 62 BPM | SYSTOLIC BLOOD PRESSURE: 116 MMHG | TEMPERATURE: 97.8 F | RESPIRATION RATE: 18 BRPM | OXYGEN SATURATION: 100 % | DIASTOLIC BLOOD PRESSURE: 78 MMHG

## 2023-03-03 VITALS
HEART RATE: 99 BPM | OXYGEN SATURATION: 96 % | TEMPERATURE: 98.1 F | RESPIRATION RATE: 16 BRPM | DIASTOLIC BLOOD PRESSURE: 70 MMHG | SYSTOLIC BLOOD PRESSURE: 142 MMHG

## 2023-03-03 PROCEDURE — G0157 HHC PT ASSISTANT EA 15: HCPCS

## 2023-03-03 ASSESSMENT — ENCOUNTER SYMPTOMS: STOOL DESCRIPTION: FORMED

## 2023-03-06 ENCOUNTER — HOME CARE VISIT (OUTPATIENT)
Dept: SCHEDULING | Facility: HOME HEALTH | Age: 78
End: 2023-03-06
Payer: MEDICARE

## 2023-03-06 VITALS
HEART RATE: 60 BPM | OXYGEN SATURATION: 99 % | SYSTOLIC BLOOD PRESSURE: 130 MMHG | RESPIRATION RATE: 16 BRPM | DIASTOLIC BLOOD PRESSURE: 60 MMHG | TEMPERATURE: 98 F

## 2023-03-06 PROCEDURE — G0299 HHS/HOSPICE OF RN EA 15 MIN: HCPCS

## 2023-03-06 PROCEDURE — G0158 HHC OT ASSISTANT EA 15: HCPCS

## 2023-03-07 ENCOUNTER — HOME CARE VISIT (OUTPATIENT)
Dept: SCHEDULING | Facility: HOME HEALTH | Age: 78
End: 2023-03-07
Payer: MEDICARE

## 2023-03-07 VITALS
RESPIRATION RATE: 18 BRPM | SYSTOLIC BLOOD PRESSURE: 136 MMHG | DIASTOLIC BLOOD PRESSURE: 62 MMHG | TEMPERATURE: 98.1 F | HEART RATE: 58 BPM | OXYGEN SATURATION: 100 %

## 2023-03-07 VITALS
SYSTOLIC BLOOD PRESSURE: 126 MMHG | DIASTOLIC BLOOD PRESSURE: 70 MMHG | OXYGEN SATURATION: 100 % | TEMPERATURE: 98 F | RESPIRATION RATE: 16 BRPM | HEART RATE: 60 BPM

## 2023-03-07 PROCEDURE — G0157 HHC PT ASSISTANT EA 15: HCPCS

## 2023-03-07 ASSESSMENT — ENCOUNTER SYMPTOMS
DYSPNEA ACTIVITY LEVEL: AFTER AMBULATING MORE THAN 20 FT
STOOL DESCRIPTION: FORMED

## 2023-03-08 ENCOUNTER — HOME CARE VISIT (OUTPATIENT)
Dept: SCHEDULING | Facility: HOME HEALTH | Age: 78
End: 2023-03-08
Payer: MEDICARE

## 2023-03-08 VITALS
DIASTOLIC BLOOD PRESSURE: 64 MMHG | SYSTOLIC BLOOD PRESSURE: 134 MMHG | RESPIRATION RATE: 16 BRPM | TEMPERATURE: 98 F | OXYGEN SATURATION: 99 % | HEART RATE: 69 BPM

## 2023-03-08 PROCEDURE — G0158 HHC OT ASSISTANT EA 15: HCPCS

## 2023-03-09 ENCOUNTER — HOME CARE VISIT (OUTPATIENT)
Dept: SCHEDULING | Facility: HOME HEALTH | Age: 78
End: 2023-03-09
Payer: MEDICARE

## 2023-03-09 VITALS
SYSTOLIC BLOOD PRESSURE: 142 MMHG | RESPIRATION RATE: 16 BRPM | DIASTOLIC BLOOD PRESSURE: 64 MMHG | TEMPERATURE: 98.3 F | HEART RATE: 59 BPM | OXYGEN SATURATION: 97 %

## 2023-03-09 PROCEDURE — G0299 HHS/HOSPICE OF RN EA 15 MIN: HCPCS

## 2023-03-09 ASSESSMENT — ENCOUNTER SYMPTOMS
DYSPNEA ACTIVITY LEVEL: AFTER AMBULATING 10 - 20 FT
STOOL DESCRIPTION: FORMED

## 2023-03-10 ENCOUNTER — HOME CARE VISIT (OUTPATIENT)
Dept: SCHEDULING | Facility: HOME HEALTH | Age: 78
End: 2023-03-10
Payer: MEDICARE

## 2023-03-10 PROCEDURE — G0151 HHCP-SERV OF PT,EA 15 MIN: HCPCS

## 2023-03-11 VITALS
SYSTOLIC BLOOD PRESSURE: 144 MMHG | TEMPERATURE: 97.9 F | OXYGEN SATURATION: 100 % | DIASTOLIC BLOOD PRESSURE: 78 MMHG | HEART RATE: 62 BPM | RESPIRATION RATE: 16 BRPM

## 2023-03-11 ASSESSMENT — ENCOUNTER SYMPTOMS: DYSPNEA ACTIVITY LEVEL: AFTER AMBULATING MORE THAN 20 FT

## 2023-03-13 ENCOUNTER — HOME CARE VISIT (OUTPATIENT)
Dept: SCHEDULING | Facility: HOME HEALTH | Age: 78
End: 2023-03-13
Payer: MEDICARE

## 2023-03-13 ENCOUNTER — OFFICE VISIT (OUTPATIENT)
Dept: CARDIOLOGY CLINIC | Age: 78
End: 2023-03-13
Payer: MEDICARE

## 2023-03-13 VITALS
HEART RATE: 64 BPM | SYSTOLIC BLOOD PRESSURE: 138 MMHG | DIASTOLIC BLOOD PRESSURE: 60 MMHG | WEIGHT: 150.6 LBS | BODY MASS INDEX: 27.71 KG/M2 | HEIGHT: 62 IN

## 2023-03-13 VITALS
RESPIRATION RATE: 16 BRPM | DIASTOLIC BLOOD PRESSURE: 78 MMHG | HEART RATE: 89 BPM | OXYGEN SATURATION: 97 % | SYSTOLIC BLOOD PRESSURE: 121 MMHG | TEMPERATURE: 97.9 F

## 2023-03-13 DIAGNOSIS — M35.3 POLYMYALGIA RHEUMATICA (HCC): ICD-10-CM

## 2023-03-13 DIAGNOSIS — Z86.79 STATUS POST ABLATION OF ATRIAL FIBRILLATION: ICD-10-CM

## 2023-03-13 DIAGNOSIS — I95.89 IATROGENIC HYPOTENSION: ICD-10-CM

## 2023-03-13 DIAGNOSIS — F41.1 GENERALIZED ANXIETY DISORDER: ICD-10-CM

## 2023-03-13 DIAGNOSIS — Z98.890 STATUS POST ABLATION OF ATRIAL FIBRILLATION: ICD-10-CM

## 2023-03-13 DIAGNOSIS — I10 ESSENTIAL HYPERTENSION: Primary | ICD-10-CM

## 2023-03-13 PROCEDURE — 99214 OFFICE O/P EST MOD 30 MIN: CPT | Performed by: INTERNAL MEDICINE

## 2023-03-13 PROCEDURE — 3078F DIAST BP <80 MM HG: CPT | Performed by: INTERNAL MEDICINE

## 2023-03-13 PROCEDURE — 3075F SYST BP GE 130 - 139MM HG: CPT | Performed by: INTERNAL MEDICINE

## 2023-03-13 PROCEDURE — G8417 CALC BMI ABV UP PARAM F/U: HCPCS | Performed by: INTERNAL MEDICINE

## 2023-03-13 PROCEDURE — 1111F DSCHRG MED/CURRENT MED MERGE: CPT | Performed by: INTERNAL MEDICINE

## 2023-03-13 PROCEDURE — G8484 FLU IMMUNIZE NO ADMIN: HCPCS | Performed by: INTERNAL MEDICINE

## 2023-03-13 PROCEDURE — 1036F TOBACCO NON-USER: CPT | Performed by: INTERNAL MEDICINE

## 2023-03-13 PROCEDURE — 1090F PRES/ABSN URINE INCON ASSESS: CPT | Performed by: INTERNAL MEDICINE

## 2023-03-13 PROCEDURE — G8427 DOCREV CUR MEDS BY ELIG CLIN: HCPCS | Performed by: INTERNAL MEDICINE

## 2023-03-13 PROCEDURE — G0158 HHC OT ASSISTANT EA 15: HCPCS

## 2023-03-13 PROCEDURE — G8400 PT W/DXA NO RESULTS DOC: HCPCS | Performed by: INTERNAL MEDICINE

## 2023-03-13 PROCEDURE — 1123F ACP DISCUSS/DSCN MKR DOCD: CPT | Performed by: INTERNAL MEDICINE

## 2023-03-13 RX ORDER — SPIRONOLACTONE 25 MG/1
25 TABLET ORAL DAILY
Qty: 90 TABLET | Refills: 3 | Status: SHIPPED | OUTPATIENT
Start: 2023-03-13

## 2023-03-13 RX ORDER — CLONIDINE HYDROCHLORIDE 0.2 MG/1
0.2 TABLET ORAL 2 TIMES DAILY PRN
Qty: 180 TABLET | Refills: 3 | Status: SHIPPED | OUTPATIENT
Start: 2023-03-13

## 2023-03-13 RX ORDER — FUROSEMIDE 20 MG/1
20 TABLET ORAL DAILY
Qty: 90 TABLET | Refills: 3 | Status: SHIPPED | OUTPATIENT
Start: 2023-03-13

## 2023-03-13 NOTE — PROGRESS NOTES
[facility] CARDIOLOGY  [address] [place] [address], [address] [facility], [address] [place] [address]  PHONE: [phone]      23    NAME:  Nathan Cedillo  : 1945  MRN: [id number]         SUBJECTIVE:   Nathan Cedillo is a 76 y.o. female seen for a follow up visit regarding the following:     Chief Complaint   Patient presents with    Hypertension    Hyperlipidemia              HPI:  Follow up  Hypertension and Hyperlipidemia   . Follow up afib with transient tachycardia induced CM (resolved), chronic posterior pericardial effusion, afib s/t afib ablation ([place]), remote WPW ablation. Reports intolerances to  ACE/ARB/ARNI and higher dose amlodipine. Severe anxiety, see prior notes. Chronic joint pain. Severe anxiety induced hypertension. See triage call, [facility] called with symptomatic hypotension, hydralazine held unless BP > 130, and other meds were spaced out having noted symptoms of low BP shortly after taking meds. This was after she was admitted  with hypertensive emergency with systolic to 879, notes indicate possible noncompliance, (pt denies) echo normal EF, negative SHAUNA. Returned to ER  with concern but was found to have normal hemodynamics and evaluation and was reassured. The triage call was 3/3. While I generally dislike and do not prescribe clonidine for the \"prn\" treatment of elevated BP, she was given this by my colleagues in house, as well as the hydralazine that was subsequently held. She states she actually went to the ER at her [employer] 53[address] urging having been concerned about being able to feel her temporal bone and having reported some visual disturbance, subsequently followed up with her PCP and according to the patient states it was diagnosed as just temporal wasting from weight loss. No headaches or visual disturbance since then.              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   Oct 2013 - 2 week monitor occasional pac's   Mar 2019 - normal renal artery doppler   Jul 2019- afib/RVR-ESTELLA cardioversion, amiodarone   Echo EF 40-45%, normal LV size, mild RVE, marked LAE, mild MR/TR, small to moderate pericardial effusion   ESTELLA- normal biventricular size and function, EF 55-60%   8/19/19 - PFT's normal spirometry but reduced volumes and mild reduced DLCO possible early interstitial lung disease   Mar 2021- Echo - normal EF, ind DF, marked LAE (FERNANDO 56), RVSP 35   Apr 2022- normal EF, abn DF, DIONNE, moderate circumferential effusion  May 2022     Afib ablation with Dr El Pittman  Sep 2022      Limited echo - Normal SF, mild MR, TR, RVSP 43, stable mild to moderate posterior effusion (no longer circumferential)--patient with known autoimmune disease  Feb 2023       TDS, increased renal artery velocities right main renal artery, no significant SHAUNA. Echo - EF 55-60%, mild LVH, mod posterior pericardial effusion without tamponade                   Key CAD CHF Meds            furosemide (LASIX) 20 MG tablet (Taking)    Sig - Route: Take 1 tablet by mouth daily - Oral    spironolactone (ALDACTONE) 25 MG tablet (Taking)    Sig - Route: Take 1 tablet by mouth daily Takes at 3pm - Oral    cloNIDine (CATAPRES) 0.2 MG tablet (Taking)    Sig - Route: Take 1 tablet by mouth 2 times daily as needed (SBP >160) - Oral    apixaban (ELIQUIS) 5 MG TABS tablet (Taking)    Sig - Route: Take 1 tablet by mouth in the morning and 1 tablet in the evening. - Oral    carvedilol (COREG) 25 MG tablet (Taking)    Sig - Route: Take 1 tablet by mouth 2 times daily (with meals) - Oral    hydrALAZINE (APRESOLINE) 25 MG tablet    Sig - Route: Take 1 tablet by mouth every 8 hours - Oral    Patient not taking: Reported on 3/13/2023          Key Antihyperglycemic Medications       Patient is on no antihyperglycemic meds.                 Past Medical History, Past Surgical History, Family history, Social History, and Medications were all reviewed with the patient today and updated as necessary. Prior to Admission medications    Medication Sig Start Date End Date Taking? Authorizing Provider   furosemide (LASIX) 20 MG tablet Take 1 tablet by mouth daily 3/13/23  Yes Renetta Rios MD   spironolactone (ALDACTONE) 25 MG tablet Take 1 tablet by mouth daily Takes at 3pm 3/13/23  Yes Renetta Rios MD   cloNIDine (CATAPRES) 0.2 MG tablet Take 1 tablet by mouth 2 times daily as needed (SBP >160) 3/13/23  Yes Renetta Rios MD   sertraline (ZOLOFT) 50 MG tablet Take 50 mg by mouth daily   Yes Historical Provider, MD   apixaban (ELIQUIS) 5 MG TABS tablet Take 1 tablet by mouth in the morning and 1 tablet in the evening. 9/22/22  Yes Renetta Rios MD   carvedilol (COREG) 25 MG tablet Take 1 tablet by mouth 2 times daily (with meals) 9/22/22  Yes Renetta Rios MD   hydrALAZINE (APRESOLINE) 25 MG tablet Take 1 tablet by mouth every 8 hours  Patient not taking: Reported on 3/13/2023 2/10/23   Ramón Todd MD     Allergies   Allergen Reactions    Lisinopril Other (See Comments)    Losartan Other (See Comments)     Burning sensation tongue.     Nifedipine Other (See Comments)     severe headache    Celecoxib Rash     Past Medical History:   Diagnosis Date    Arrhythmia     Arthritis     Asthma     Cellulitis     CHF (congestive heart failure) (HCC)     Chronic kidney disease     Hx renal failure    Depression     Gout     Gouty arthritis 12/2/2015    Hypercholesterolemia     Hypertension     Morbid obesity ([facility])     Nausea & vomiting     Polymyalgia rheumatica ([facility] 12/2/2015    PONV (postoperative nausea and vomiting)     Psychiatric disorder     depression & anxiety    PUD (peptic ulcer disease)     Severe obesity with body mass index (BMI) of 35.0 to 39.9 with comorbidity ([facility] 8/12/2013    Sjogren's syndrome ([facility]     Temporal arteritis ([facility])      Past Surgical History:   Procedure Laterality Date    COLONOSCOPY  6/08    EP DEVICE PROCEDURE N/A 5/24/2022    ABLATION A-FIB W COMPLETE EP STUDY performed by Tony Pinzon MD at [address] [place] [address] CATH LAB    EP STUDY/ABLATION N/A 2013    WPW Ablation    HYSTERECTOMY (CERVIX STATUS UNKNOWN)      CO UNLISTED PROCEDURE CARDIAC SURGERY  8/13    HEART ABLATION    CO UNLISTED PROCEDURE CARDIAC SURGERY  4/07    STRESS TEST    TUBAL LIGATION      UPPER GASTROINTESTINAL ENDOSCOPY  8/08     Family History   Problem Relation Age of Onset    Hypertension Sister     Other Son         PSYCHIATRIC ILLNESS    Diabetes Son     Cancer Father         LUNG    Dementia Mother     Hypertension Mother     Breast Cancer Mother 80    Cancer Mother     Heart Disease Father 62     Social History     Tobacco Use    Smoking status: Never    Smokeless tobacco: Never   Substance Use Topics    Alcohol use: No       ROS:    Review of Systems   Cardiovascular:  Negative for chest pain. Psychiatric/Behavioral:  The patient is nervous/anxious. PHYSICAL EXAM:   /60   Pulse 64   Ht 5' 2\" (1.575 m)   Wt 150 lb 9.6 oz (68.3 kg)   BMI 27.55 kg/m²      Wt Readings from Last 3 Encounters:   03/13/23 150 lb 9.6 oz (68.3 kg)   02/27/23 148 lb 9.6 oz (67.4 kg)   02/17/23 150 lb 14.4 oz (68.4 kg)     BP Readings from Last 3 Encounters:   03/13/23 138/60   03/10/23 (!) 144/78   03/09/23 (!) 142/64     Pulse Readings from Last 3 Encounters:   03/13/23 64   03/10/23 62   03/09/23 59           Physical Exam  Constitutional:       Appearance: Normal appearance. HENT:      Head: Normocephalic and atraumatic. Eyes:      Conjunctiva/sclera: Conjunctivae normal.   Neck:      Vascular: No carotid bruit. Cardiovascular:      Rate and Rhythm: Normal rate and regular rhythm. Heart sounds: No murmur heard. No friction rub. No gallop. Pulmonary:      Effort: No respiratory distress. Breath sounds: No wheezing or rales.    Musculoskeletal: General: No swelling. Cervical back: Neck supple. Skin:     General: Skin is warm and dry. Neurological:      General: No focal deficit present. Mental Status: She is alert. Psychiatric:         Mood and Affect: Mood normal.         Behavior: Behavior normal.       Medical problems and test results were reviewed with the patient today. DATA REVIEW    LIPID PANEL     Lab Results   Component Value Date    CHOL 237 (H) 10/23/2019     Lab Results   Component Value Date    TRIG 113 10/23/2019     Lab Results   Component Value Date    HDL 47 10/23/2019     Lab Results   Component Value Date    LDLCALC 167 (H) 10/23/2019     Lab Results   Component Value Date    LABVLDL 23 10/23/2019     No results found for: CHOLHDLRATIO    BMP  Lab Results   Component Value Date/Time     02/20/2023 01:20 AM    K 4.2 02/20/2023 01:20 AM     02/20/2023 01:20 AM    CO2 24 02/20/2023 01:20 AM    BUN 48 02/20/2023 01:20 AM    CREATININE 1.30 02/20/2023 01:20 AM    GLUCOSE 116 02/20/2023 01:20 AM    CALCIUM 9.1 02/20/2023 01:20 AM          EKG    2/20/23 - NSR, normal axis, intervals, low voltage, PRWP, normal ST/T    CXR/IMAGING        DEVICE INTERROGATION        OUTSIDE RECORDS REVIEW    Records from outside providers have been reviewed and summarized as noted in the HPI, past history and data review sections of this note, and reviewed with patient. .       ASSESSMENT and Cameron Wills was seen today for hypertension and hyperlipidemia. Diagnoses and all orders for this visit:    Essential hypertension    Status post ablation of atrial fibrillation    Polymyalgia rheumatica (HCC)    Generalized anxiety disorder    Iatrogenic hypotension    Other orders  -     furosemide (LASIX) 20 MG tablet; Take 1 tablet by mouth daily  -     spironolactone (ALDACTONE) 25 MG tablet; Take 1 tablet by mouth daily Takes at 3pm  -     cloNIDine (CATAPRES) 0.2 MG tablet;  Take 1 tablet by mouth 2 times daily as needed (SBP >160)        IMPRESSION:    Labile BP d/t severe anxiety. I do not typically recommend the 'prn\" treatment of hypertension but in this patient's case she was treated thusly in hospital and BP is well controlled currently, would continue. She is no longer taking hydralazine d/t symptomatic hypotension    No documented afib recurrence continue meds        Return in about 6 months (around 9/13/2023). Thank you for allowing me to participate in this patient's care. Please call or contact me if there are any questions or concerns regarding the above.       Kenia Lindquist MD  03/13/23  1:29 PM

## 2023-03-13 NOTE — PATIENT INSTRUCTIONS
Patient Education        Learning About Mindfulness for Stress  What are mindfulness and stress? Stress is what you feel when you have to handle more than you are used to. A lot of things can cause stress. You may feel stress when you go on a job interview, take a test, or run a race. This kind of short-term stress is normal and even useful. It can help you if you need to work hard or react quickly. Stress also can last a long time. Long-term stress is caused by stressful situations or events. Examples of long-term stress include long-term health problems, ongoing problems at work, and conflicts in your family. Long-term stress can harm your health. Mindfulness is a focus only on things happening in the present moment. It's a process of purposefully paying attention to and being aware of your surroundings, your emotions, your thoughts, and how your body feels. You are aware of these things, but you aren't judging these experiences as \"good\" or \"bad. \" Mindfulness can help you learn to calm your mind and body to help you cope with illness, pain, and stress. How does mindfulness help to relieve stress? Mindfulness can help quiet your mind and relax your body. Studies show that it can help some people sleep better, feel less anxious, and bring their blood pressure down. And it's been shown to help some people live and cope better with certain health problems like heart disease, depression, chronic pain, and cancer. How do you practice mindfulness? To be mindful is to pay attention, to be present, and to be accepting. When you're mindful, you do just one thing and you pay close attention to that one thing. For example, you may sit quietly and notice your emotions or how your food tastes and smells. When you're present, you focus on the things that are happening right now. You let go of your thoughts about the past and the future.  When you dwell on the past or the future, you miss moments that can heal and strengthen you. You may miss moments like hearing a child laugh or seeing a friendly face when you think you're all alone.  When you're accepting, you don't  the present moment. Instead you accept your thoughts and feelings as they come.  You can practice anytime, anywhere, and in any way you choose. You can practice in many ways. Here are a few ideas:  While doing your chores, like washing the dishes, let your mind focus on what's in your hand. What does the dish feel like? Is the water warm or cold?  Go outside and take a few deep breaths. What is the air like? Is it warm or cold?  When you can, take some time at the start of your day to sit alone and think.  Take a slow walk by yourself. Count your steps while you breathe in and out.  Try yoga breathing exercises, stretches, and poses to strengthen and relax your muscles.  At work, if you can, try to stop for a few moments each hour. Note how your body feels. Let yourself regroup and let your mind settle before you return to what you were doing.  If you struggle with anxiety or \"worry thoughts,\" imagine your mind as a blue amparo and your worry thoughts as clouds. Now imagine those worry thoughts floating across your mind's amparo. Just let them pass by as you watch.  Follow-up care is a key part of your treatment and safety. Be sure to make and go to all appointments, and call your doctor if you are having problems. It's also a good idea to know your test results and keep a list of the medicines you take.  Where can you learn more?  Go to https://www.[employer].net/patientEd and enter M676 to learn more about \"Learning About Mindfulness for Stress.\"  Current as of: February 9, 2022               Content Version: 13.5  © 1935-8642 [employer].   Care instructions adapted under license by [employer]. If you have questions about a medical condition or this instruction, always ask your healthcare professional. [employer] disclaims any  warranty or liability for your use of this information. Please visit www.cardiosmart. org for more information regarding cardiovascular disease prevention and treatment.

## 2023-03-14 ENCOUNTER — HOME CARE VISIT (OUTPATIENT)
Dept: SCHEDULING | Facility: HOME HEALTH | Age: 78
End: 2023-03-14
Payer: MEDICARE

## 2023-03-14 ENCOUNTER — HOME CARE VISIT (OUTPATIENT)
Dept: HOME HEALTH SERVICES | Facility: HOME HEALTH | Age: 78
End: 2023-03-14
Payer: MEDICARE

## 2023-03-14 VITALS
SYSTOLIC BLOOD PRESSURE: 144 MMHG | BODY MASS INDEX: 26.96 KG/M2 | RESPIRATION RATE: 16 BRPM | OXYGEN SATURATION: 100 % | HEART RATE: 63 BPM | DIASTOLIC BLOOD PRESSURE: 78 MMHG | WEIGHT: 147.4 LBS | TEMPERATURE: 98.2 F

## 2023-03-14 PROCEDURE — G0155 HHCP-SVS OF CSW,EA 15 MIN: HCPCS

## 2023-03-14 PROCEDURE — G0299 HHS/HOSPICE OF RN EA 15 MIN: HCPCS

## 2023-03-14 ASSESSMENT — ENCOUNTER SYMPTOMS: STOOL DESCRIPTION: SOFT FORMED

## 2023-03-15 ENCOUNTER — HOME CARE VISIT (OUTPATIENT)
Dept: SCHEDULING | Facility: HOME HEALTH | Age: 78
End: 2023-03-15
Payer: MEDICARE

## 2023-03-15 VITALS
HEART RATE: 56 BPM | SYSTOLIC BLOOD PRESSURE: 110 MMHG | TEMPERATURE: 98.7 F | OXYGEN SATURATION: 100 % | DIASTOLIC BLOOD PRESSURE: 70 MMHG | RESPIRATION RATE: 16 BRPM

## 2023-03-15 PROCEDURE — G0152 HHCP-SERV OF OT,EA 15 MIN: HCPCS

## 2023-03-20 ENCOUNTER — HOME CARE VISIT (OUTPATIENT)
Dept: SCHEDULING | Facility: HOME HEALTH | Age: 78
End: 2023-03-20
Payer: MEDICARE

## 2023-03-20 VITALS
SYSTOLIC BLOOD PRESSURE: 142 MMHG | OXYGEN SATURATION: 100 % | TEMPERATURE: 98 F | HEART RATE: 60 BPM | RESPIRATION RATE: 18 BRPM | DIASTOLIC BLOOD PRESSURE: 78 MMHG

## 2023-03-20 PROCEDURE — G0299 HHS/HOSPICE OF RN EA 15 MIN: HCPCS

## 2023-03-20 ASSESSMENT — ENCOUNTER SYMPTOMS: STOOL DESCRIPTION: FORMED

## 2023-03-21 ENCOUNTER — HOME CARE VISIT (OUTPATIENT)
Dept: HOME HEALTH SERVICES | Facility: HOME HEALTH | Age: 78
End: 2023-03-21
Payer: MEDICARE

## 2023-03-29 ENCOUNTER — HOME CARE VISIT (OUTPATIENT)
Dept: SCHEDULING | Facility: HOME HEALTH | Age: 78
End: 2023-03-29
Payer: MEDICARE

## 2023-03-29 VITALS
HEART RATE: 78 BPM | TEMPERATURE: 98.4 F | DIASTOLIC BLOOD PRESSURE: 78 MMHG | SYSTOLIC BLOOD PRESSURE: 140 MMHG | OXYGEN SATURATION: 98 % | RESPIRATION RATE: 16 BRPM

## 2023-03-29 PROCEDURE — G0152 HHCP-SERV OF OT,EA 15 MIN: HCPCS

## 2023-03-31 ENCOUNTER — HOME CARE VISIT (OUTPATIENT)
Dept: SCHEDULING | Facility: HOME HEALTH | Age: 78
End: 2023-03-31
Payer: MEDICARE

## 2023-03-31 VITALS
OXYGEN SATURATION: 98 % | DIASTOLIC BLOOD PRESSURE: 70 MMHG | RESPIRATION RATE: 16 BRPM | TEMPERATURE: 98.1 F | HEART RATE: 98 BPM | SYSTOLIC BLOOD PRESSURE: 137 MMHG

## 2023-03-31 PROCEDURE — G0158 HHC OT ASSISTANT EA 15: HCPCS

## 2023-04-05 ENCOUNTER — HOME CARE VISIT (OUTPATIENT)
Dept: SCHEDULING | Facility: HOME HEALTH | Age: 78
End: 2023-04-05
Payer: MEDICARE

## 2023-04-05 VITALS
HEART RATE: 78 BPM | OXYGEN SATURATION: 98 % | TEMPERATURE: 97.7 F | DIASTOLIC BLOOD PRESSURE: 70 MMHG | RESPIRATION RATE: 16 BRPM | SYSTOLIC BLOOD PRESSURE: 134 MMHG

## 2023-04-05 PROCEDURE — G0158 HHC OT ASSISTANT EA 15: HCPCS

## 2023-04-07 ENCOUNTER — HOME CARE VISIT (OUTPATIENT)
Dept: SCHEDULING | Facility: HOME HEALTH | Age: 78
End: 2023-04-07
Payer: MEDICARE

## 2023-04-07 VITALS
OXYGEN SATURATION: 98 % | HEART RATE: 60 BPM | SYSTOLIC BLOOD PRESSURE: 134 MMHG | TEMPERATURE: 98.6 F | DIASTOLIC BLOOD PRESSURE: 62 MMHG | RESPIRATION RATE: 16 BRPM

## 2023-04-07 PROCEDURE — G0158 HHC OT ASSISTANT EA 15: HCPCS

## 2023-05-17 NOTE — Clinical Note
EP catheter inserted in the other (document in comment) and esophagus. [Lung Cancer Screening] : Patient underwent lung cancer screening [2] : 2 [TextBox_12] : 5/2022 [TextBox_52] : 1

## 2023-06-01 ENCOUNTER — OFFICE VISIT (OUTPATIENT)
Age: 78
End: 2023-06-01

## 2023-06-01 VITALS
DIASTOLIC BLOOD PRESSURE: 80 MMHG | WEIGHT: 149 LBS | BODY MASS INDEX: 28.13 KG/M2 | HEIGHT: 61 IN | SYSTOLIC BLOOD PRESSURE: 148 MMHG | HEART RATE: 63 BPM

## 2023-06-01 DIAGNOSIS — I48.19 ATRIAL FIBRILLATION, PERSISTENT (HCC): Primary | ICD-10-CM

## 2023-06-01 NOTE — PROGRESS NOTES
[address]  PHONE: [phone]  1945    SUBJECTIVE:   Andriy Steel is a 76 y.o. female seen for a follow up visit regarding the following:     Chief Complaint   Patient presents with    Atrial Fibrillation     1 yr post afib abl       HPI:    Andriy Steel is a very pleasant 76 y.o. female with a past medical and cardiac history significant for HTN, persistent atrial fibrillation failing amiodarone, CKD III, obesity, PMR s/p afib ablation. She has had some palpitations, but no clear AF. Feeling better since discharge for HTN and volume overload a few months ago. Cardiac PMH: (Old records have been reviewed and summarized below)  TTE (4/6/22): EF 50-55%, severe LAE, moderate sized pericardial effusion     Reviewed office note Dr. Benigno Osler 4/12/22    Past Medical History, Past Surgical History, Family history, Social History, and Medications were all reviewed with the patient today and updated as necessary. Current Outpatient Medications   Medication Sig Dispense Refill    furosemide (LASIX) 20 MG tablet Take 1 tablet by mouth daily 90 tablet 3    spironolactone (ALDACTONE) 25 MG tablet Take 1 tablet by mouth daily Takes at 3pm 90 tablet 3    cloNIDine (CATAPRES) 0.2 MG tablet Take 1 tablet by mouth 2 times daily as needed (SBP >160) 180 tablet 3    hydrALAZINE (APRESOLINE) 25 MG tablet Take 1 tablet by mouth every 8 hours 90 tablet 0    sertraline (ZOLOFT) 50 MG tablet Take 1 tablet by mouth daily      apixaban (ELIQUIS) 5 MG TABS tablet Take 1 tablet by mouth in the morning and 1 tablet in the evening. 180 tablet 3    carvedilol (COREG) 25 MG tablet Take 1 tablet by mouth 2 times daily (with meals) 180 tablet 3     No current facility-administered medications for this visit. Allergies   Allergen Reactions    Lisinopril Other (See Comments)    Losartan Other (See Comments)     Burning sensation tongue.     Nifedipine Other

## 2023-06-04 ENCOUNTER — APPOINTMENT (OUTPATIENT)
Dept: GENERAL RADIOLOGY | Age: 78
End: 2023-06-04
Payer: MEDICARE

## 2023-06-04 ENCOUNTER — HOSPITAL ENCOUNTER (EMERGENCY)
Age: 78
Discharge: HOME | End: 2023-06-05
Attending: EMERGENCY MEDICINE
Payer: MEDICARE

## 2023-06-04 DIAGNOSIS — N30.00 ACUTE CYSTITIS WITHOUT HEMATURIA: ICD-10-CM

## 2023-06-04 DIAGNOSIS — I48.0 PAROXYSMAL ATRIAL FIBRILLATION (HCC): Primary | ICD-10-CM

## 2023-06-04 LAB
ALBUMIN SERPL-MCNC: 3.5 G/DL (ref 3.2–4.6)
ALBUMIN/GLOB SERPL: 1 (ref 0.4–1.6)
ALP SERPL-CCNC: 98 U/L (ref 50–136)
ALT SERPL-CCNC: 12 U/L (ref 12–65)
ANION GAP SERPL CALC-SCNC: 6 MMOL/L (ref 2–11)
APPEARANCE UR: CLEAR
AST SERPL-CCNC: 36 U/L (ref 15–37)
BACTERIA URNS QL MICRO: ABNORMAL /HPF
BASOPHILS # BLD: 0.1 K/UL (ref 0–0.2)
BASOPHILS NFR BLD: 1 % (ref 0–2)
BILIRUB SERPL-MCNC: 0.6 MG/DL (ref 0.2–1.1)
BILIRUB UR QL: NEGATIVE
BUN SERPL-MCNC: 22 MG/DL (ref 8–23)
CALCIUM SERPL-MCNC: 8.8 MG/DL (ref 8.3–10.4)
CASTS URNS QL MICRO: 0 /LPF
CHLORIDE SERPL-SCNC: 110 MMOL/L (ref 101–110)
CO2 SERPL-SCNC: 23 MMOL/L (ref 21–32)
COLOR UR: ABNORMAL
CREAT SERPL-MCNC: 1.1 MG/DL (ref 0.6–1)
CRYSTALS URNS QL MICRO: 0 /LPF
DIFFERENTIAL METHOD BLD: ABNORMAL
EOSINOPHIL # BLD: 0.2 K/UL (ref 0–0.8)
EOSINOPHIL NFR BLD: 3 % (ref 0.5–7.8)
EPI CELLS #/AREA URNS HPF: ABNORMAL /HPF
ERYTHROCYTE [DISTWIDTH] IN BLOOD BY AUTOMATED COUNT: 12.9 % (ref 11.9–14.6)
GLOBULIN SER CALC-MCNC: 3.6 G/DL (ref 2.8–4.5)
GLUCOSE SERPL-MCNC: 106 MG/DL (ref 65–100)
GLUCOSE UR STRIP.AUTO-MCNC: NEGATIVE MG/DL
HCT VFR BLD AUTO: 31.6 % (ref 35.8–46.3)
HGB BLD-MCNC: 10.6 G/DL (ref 11.7–15.4)
HGB UR QL STRIP: NEGATIVE
IMM GRANULOCYTES # BLD AUTO: 0 K/UL (ref 0–0.5)
IMM GRANULOCYTES NFR BLD AUTO: 0 % (ref 0–5)
KETONES UR QL STRIP.AUTO: NEGATIVE MG/DL
LEUKOCYTE ESTERASE UR QL STRIP.AUTO: ABNORMAL
LIPASE SERPL-CCNC: 138 U/L (ref 73–393)
LYMPHOCYTES # BLD: 2.2 K/UL (ref 0.5–4.6)
LYMPHOCYTES NFR BLD: 32 % (ref 13–44)
MCH RBC QN AUTO: 30.9 PG (ref 26.1–32.9)
MCHC RBC AUTO-ENTMCNC: 33.5 G/DL (ref 31.4–35)
MCV RBC AUTO: 92.1 FL (ref 82–102)
MONOCYTES # BLD: 0.6 K/UL (ref 0.1–1.3)
MONOCYTES NFR BLD: 8 % (ref 4–12)
MUCOUS THREADS URNS QL MICRO: 0 /LPF
NEUTS SEG # BLD: 3.9 K/UL (ref 1.7–8.2)
NEUTS SEG NFR BLD: 56 % (ref 43–78)
NITRITE UR QL STRIP.AUTO: NEGATIVE
NRBC # BLD: 0 K/UL (ref 0–0.2)
OTHER OBSERVATIONS: ABNORMAL
PH UR STRIP: 8.5 (ref 5–9)
PLATELET # BLD AUTO: 181 K/UL (ref 150–450)
PMV BLD AUTO: 11.1 FL (ref 9.4–12.3)
POTASSIUM SERPL-SCNC: 4.3 MMOL/L (ref 3.5–5.1)
PROT SERPL-MCNC: 7.1 G/DL (ref 6.3–8.2)
PROT UR STRIP-MCNC: NEGATIVE MG/DL
RBC # BLD AUTO: 3.43 M/UL (ref 4.05–5.2)
RBC #/AREA URNS HPF: ABNORMAL /HPF
SODIUM SERPL-SCNC: 139 MMOL/L (ref 133–143)
SP GR UR REFRACTOMETRY: 1.01 (ref 1–1.02)
TROPONIN I SERPL HS-MCNC: 7.8 PG/ML (ref 0–37)
URINE CULTURE IF INDICATED: ABNORMAL
UROBILINOGEN UR QL STRIP.AUTO: 0.2 EU/DL (ref 0.2–1)
WBC # BLD AUTO: 6.9 K/UL (ref 4.3–11.1)
WBC URNS QL MICRO: ABNORMAL /HPF

## 2023-06-04 PROCEDURE — 81001 URINALYSIS AUTO W/SCOPE: CPT

## 2023-06-04 PROCEDURE — 85025 COMPLETE CBC W/AUTO DIFF WBC: CPT

## 2023-06-04 PROCEDURE — 93005 ELECTROCARDIOGRAM TRACING: CPT | Performed by: EMERGENCY MEDICINE

## 2023-06-04 PROCEDURE — 6370000000 HC RX 637 (ALT 250 FOR IP): Performed by: EMERGENCY MEDICINE

## 2023-06-04 PROCEDURE — 83690 ASSAY OF LIPASE: CPT

## 2023-06-04 PROCEDURE — 99285 EMERGENCY DEPT VISIT HI MDM: CPT

## 2023-06-04 PROCEDURE — 84484 ASSAY OF TROPONIN QUANT: CPT

## 2023-06-04 PROCEDURE — 71045 X-RAY EXAM CHEST 1 VIEW: CPT

## 2023-06-04 PROCEDURE — 80053 COMPREHEN METABOLIC PANEL: CPT

## 2023-06-04 RX ORDER — 0.9 % SODIUM CHLORIDE 0.9 %
500 INTRAVENOUS SOLUTION INTRAVENOUS
Status: COMPLETED | OUTPATIENT
Start: 2023-06-04 | End: 2023-06-05

## 2023-06-04 RX ORDER — DILTIAZEM HYDROCHLORIDE 5 MG/ML
20 INJECTION INTRAVENOUS ONCE
Status: DISCONTINUED | OUTPATIENT
Start: 2023-06-04 | End: 2023-06-05 | Stop reason: HOSPADM

## 2023-06-04 RX ORDER — ASPIRIN 81 MG/1
324 TABLET, CHEWABLE ORAL
Status: COMPLETED | OUTPATIENT
Start: 2023-06-04 | End: 2023-06-04

## 2023-06-04 RX ADMIN — ASPIRIN 81 MG 324 MG: 81 TABLET ORAL at 22:39

## 2023-06-04 ASSESSMENT — ENCOUNTER SYMPTOMS
RHINORRHEA: 0
BACK PAIN: 0
SHORTNESS OF BREATH: 1
NAUSEA: 0
COUGH: 0
CONSTIPATION: 0
COLOR CHANGE: 0
DIARRHEA: 0
VOMITING: 0
SORE THROAT: 0
ABDOMINAL PAIN: 0

## 2023-06-04 ASSESSMENT — LIFESTYLE VARIABLES
HOW OFTEN DO YOU HAVE A DRINK CONTAINING ALCOHOL: NEVER
HOW MANY STANDARD DRINKS CONTAINING ALCOHOL DO YOU HAVE ON A TYPICAL DAY: PATIENT DOES NOT DRINK

## 2023-06-04 ASSESSMENT — PAIN - FUNCTIONAL ASSESSMENT: PAIN_FUNCTIONAL_ASSESSMENT: NONE - DENIES PAIN

## 2023-06-05 ENCOUNTER — TELEPHONE (OUTPATIENT)
Age: 78
End: 2023-06-05

## 2023-06-05 VITALS
WEIGHT: 148 LBS | HEART RATE: 61 BPM | HEIGHT: 61 IN | TEMPERATURE: 98.1 F | SYSTOLIC BLOOD PRESSURE: 151 MMHG | OXYGEN SATURATION: 99 % | BODY MASS INDEX: 27.94 KG/M2 | DIASTOLIC BLOOD PRESSURE: 88 MMHG | RESPIRATION RATE: 29 BRPM

## 2023-06-05 LAB
EKG ATRIAL RATE: 154 BPM
EKG ATRIAL RATE: 68 BPM
EKG DIAGNOSIS: NORMAL
EKG DIAGNOSIS: NORMAL
EKG P AXIS: 29 DEGREES
EKG P-R INTERVAL: 58 MS
EKG Q-T INTERVAL: 288 MS
EKG Q-T INTERVAL: 403 MS
EKG QRS DURATION: 89 MS
EKG QRS DURATION: 93 MS
EKG QTC CALCULATION (BAZETT): 438 MS
EKG QTC CALCULATION (BAZETT): 452 MS
EKG R AXIS: -20 DEGREES
EKG R AXIS: -26 DEGREES
EKG T AXIS: 105 DEGREES
EKG T AXIS: 44 DEGREES
EKG VENTRICULAR RATE: 148 BPM
EKG VENTRICULAR RATE: 71 BPM
TROPONIN I BLD-MCNC: 0.02 NG/ML (ref 0.02–0.05)

## 2023-06-05 PROCEDURE — 84484 ASSAY OF TROPONIN QUANT: CPT

## 2023-06-05 PROCEDURE — 93010 ELECTROCARDIOGRAM REPORT: CPT | Performed by: INTERNAL MEDICINE

## 2023-06-05 PROCEDURE — 2580000003 HC RX 258: Performed by: EMERGENCY MEDICINE

## 2023-06-05 RX ORDER — METOPROLOL SUCCINATE 25 MG/1
25 TABLET, EXTENDED RELEASE ORAL DAILY
Qty: 30 TABLET | Refills: 0 | Status: SHIPPED | OUTPATIENT
Start: 2023-06-05

## 2023-06-05 RX ORDER — CEPHALEXIN 500 MG/1
500 CAPSULE ORAL 3 TIMES DAILY
Qty: 30 CAPSULE | Refills: 0 | Status: SHIPPED | OUTPATIENT
Start: 2023-06-05 | End: 2023-06-15

## 2023-06-05 RX ADMIN — SODIUM CHLORIDE 500 ML: 9 INJECTION, SOLUTION INTRAVENOUS at 00:11

## 2023-06-05 NOTE — ED TRIAGE NOTES
Patient arrives via EMS from home alone c/o palpitations x4 hours. +chest pressure, SOB, diaphoresis  (-) n/v  Patient does take blood thinner for a fib. EMS found A fib RVR on 12 lead EKG, HR 130s, /91, , RR 20, 96% on room air, afebrile    EMS placed L FA 20 gauge IV but did not give any medications. Also placed patient on 2L nasal cannula for comfort.

## 2023-06-05 NOTE — DISCHARGE SUMMARY
I have reviewed discharge instructions with the patient. The patient verbalized understanding. Patient left ED via Discharge Method: wheelchair to Home with [facility] Opportunity for questions and clarification provided. Patient given 2 scripts. To continue your aftercare when you leave the hospital, you may receive an automated call from our care team to check in on how you are doing. This is a free service and part of our promise to provide the best care and service to meet your aftercare needs.  If you have questions, or wish to unsubscribe from this service please call [phone]. Thank you for Choosing our [address] [place] [address] Emergency Department.

## 2023-06-05 NOTE — TELEPHONE ENCOUNTER
Pt states she was in ER last night for rapid afib, HTN. States saw Dr. Laurent Rowland last week. States she suspected she was back in afib when she saw Dr. Laurent Rowland and he ordered a monitor for 5 days--removed this am. BP prior to taking meds yesterday 181/132 and she began having afib. Called EMS and was transported to ER. Also d/c UTI while in ER. [facility] ER rx keflex and metoprolol. BP today 119/54, HR 56. Currently taking carvedilol 25 mg BID, furosemide 20 mg daily, clonidine 0.2 mg BID prn. Instructed to hold off on taking metoprolol with carvedilol until she hears back from this office. Verb understanding. Please advise further recommendations.

## 2023-06-05 NOTE — TELEPHONE ENCOUNTER
Pt has been having afib and really high bp and was er last night and was prescribe new medicine and would like to advised

## 2023-06-05 NOTE — ED PROVIDER NOTES
Emergency Department Provider Note       PCP: Lorna Morley MD   Age: 76 y.o. Sex: female     DISPOSITION Decision To Discharge 06/05/2023 12:40:38 AM       ICD-10-CM    1. Paroxysmal atrial fibrillation (HCC)  I48.0       2. Acute cystitis without hematuria  N30.00           Medical Decision Making     Complexity of Problems Addressed:  1 or more acute illnesses that pose a threat to life or bodily function. Data Reviewed and Analyzed:  Category 1:   I independently ordered and reviewed each unique test.  I reviewed external records: provider visit note from outside specialist.       Category 2:   I independently ordered and interpreted the ED EKG in the absence of a Cardiologist.    Rate: 148  EKG Interpretation: EKG Interpretation: atrial fibrillation  ST Segments: Nonspecific ST segments - NO STEMI    EKG: normal sinus rhythm, nonspecific ST and T waves changes. Rate 71. I independently interpreted the cardiac monitor rhythm strip atrial fibrillation. I interpreted the X-rays no acute. Category 3: Discussion of management or test interpretation. Patient with previous ablation for atrial fibrillation. Usually in normal sinus rhythm. Presented in A-fib RVR. Converted prior to diltiazem here. Feeling much better. No EKG changes and 2 negative troponins. Will start patient on metoprolol and have her follow-up in the office with cardiology. Slight UTI with antibiotics. Risk of Complications and/or Morbidity of Patient Management:  Prescription drug management performed. Patient was discharged risks and benefits of hospitalization were considered. Shared medical decision making was utilized in creating the patients health plan today. Is this patient to be included in the SEP-1 core measure due to severe sepsis or septic shock? No Exclusion criteria - the patient is NOT to be included for SEP-1 Core Measure due to:  Infection is not suspected      History      Cathryn Lee is a 76

## 2023-06-05 NOTE — TELEPHONE ENCOUNTER
Reviewed with pt Dr. Halley Marie recommends against taking metoprolol with carvedilol. Encouraged to monitor sx. Check BP once daily, around lunch time, at least 2-3 hrs after taking am meds. Treat UTI with abx as prescribed and call back with worsening issues. Verb understanding.

## 2023-06-19 ENCOUNTER — TELEPHONE (OUTPATIENT)
Age: 78
End: 2023-06-19

## 2023-06-19 NOTE — TELEPHONE ENCOUNTER
Notified pt that results are not on [employer] yet & that Dr. Edie Chambers is out of office this week but that I will call her with result once it is available. Understanding voiced.

## 2023-06-23 NOTE — TELEPHONE ENCOUNTER
Notified pt of monitor result per Dr. Wayne Mora. Pt voiced understanding & states that she will call our office is symptoms increase/worsen.

## 2023-08-16 RX ORDER — CARVEDILOL 25 MG/1
25 TABLET ORAL 2 TIMES DAILY WITH MEALS
Qty: 180 TABLET | Refills: 3 | Status: SHIPPED | OUTPATIENT
Start: 2023-08-16

## 2023-08-16 NOTE — TELEPHONE ENCOUNTER
MEDICATION REFILL REQUEST      Name of Medication:  carvedilol   Dose:  25 mg  Frequency:  twice a day w/ meals  Quantity:    Days' supply:  90      Pharmacy Name/Location:  [facility]/ [phone]

## 2023-09-12 NOTE — PROGRESS NOTES
[facility] CARDIOLOGY  [address] [facility] [address]  PHONE: [phone]      23    NAME:  Tigre Hanson  : 1945  MRN: [id number]         SUBJECTIVE:   Tigre Hanson is a 76 y.o. female seen for a follow up visit regarding the following:     Chief Complaint   Patient presents with    Hypertension    6 Month Follow-Up            HPI:  Follow up  Hypertension and 6 Month Follow-Up   . Follow up afib, hypertension, Reports intolerances to  ACE/ARB/ARNI and higher dose amlodipine. Severe anxiety, see prior notes. Chronic joint pain. Severe anxiety induced hypertension, intermittent hypotension. She's done ok since last visit. BP looks well. Activity limited by her joint pain but she's gotten herself a scooter so she's been able to get out and do her own shopping and pursue some hobbies.         I've reluctantly agreed to \"prn\" treatment of her BP having worked well in hospital.             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-ablation of posteroseptal pathway   Oct 2013 - 2 week monitor occasional pac's   Mar 2019 - normal renal artery doppler   2019- afib/RVR-ESTELLA cardioversion, amiodarone   Echo EF 40-45%, normal LV size, mild RVE, marked LAE, mild MR/TR, small to moderate pericardial effusion   ESTELLA- normal biventricular size and function, EF 55-60%   19 - PFT's normal spirometry but reduced volumes and mild reduced DLCO possible early interstitial lung disease   Mar 2021- Echo - normal EF, ind DF, marked LAE (FERNANDO 56), RVSP 35   2022- normal EF, abn DF, DIONNE, moderate circumferential effusion  May 2022     Afib ablation with Dr Aston Marshall  Sep 2022      Limited echo - Normal SF, mild MR, TR, RVSP 43, stable mild to moderate posterior effusion (no longer circumferential)--patient with known autoimmune disease  2023       TDS, increased

## 2023-09-13 ENCOUNTER — OFFICE VISIT (OUTPATIENT)
Age: 78
End: 2023-09-13
Payer: MEDICARE

## 2023-09-13 VITALS
DIASTOLIC BLOOD PRESSURE: 80 MMHG | SYSTOLIC BLOOD PRESSURE: 134 MMHG | HEIGHT: 61 IN | HEART RATE: 79 BPM | OXYGEN SATURATION: 98 % | BODY MASS INDEX: 27.72 KG/M2 | WEIGHT: 146.8 LBS

## 2023-09-13 DIAGNOSIS — I10 ESSENTIAL HYPERTENSION: Primary | ICD-10-CM

## 2023-09-13 DIAGNOSIS — Z98.890 STATUS POST ABLATION OF ATRIAL FIBRILLATION: ICD-10-CM

## 2023-09-13 DIAGNOSIS — Z86.79 STATUS POST ABLATION OF ATRIAL FIBRILLATION: ICD-10-CM

## 2023-09-13 PROBLEM — I50.1 PULMONARY EDEMA WITH CONGESTIVE HEART FAILURE (HCC): Status: RESOLVED | Noted: 2023-02-10 | Resolved: 2023-09-13

## 2023-09-13 PROBLEM — I50.1: Status: RESOLVED | Noted: 2023-02-07 | Resolved: 2023-09-13

## 2023-09-13 PROCEDURE — 99214 OFFICE O/P EST MOD 30 MIN: CPT | Performed by: INTERNAL MEDICINE

## 2023-09-13 PROCEDURE — 3075F SYST BP GE 130 - 139MM HG: CPT | Performed by: INTERNAL MEDICINE

## 2023-09-13 PROCEDURE — 3079F DIAST BP 80-89 MM HG: CPT | Performed by: INTERNAL MEDICINE

## 2023-09-13 PROCEDURE — 1036F TOBACCO NON-USER: CPT | Performed by: INTERNAL MEDICINE

## 2023-09-13 PROCEDURE — 1123F ACP DISCUSS/DSCN MKR DOCD: CPT | Performed by: INTERNAL MEDICINE

## 2023-09-13 PROCEDURE — G8427 DOCREV CUR MEDS BY ELIG CLIN: HCPCS | Performed by: INTERNAL MEDICINE

## 2023-09-13 PROCEDURE — G8400 PT W/DXA NO RESULTS DOC: HCPCS | Performed by: INTERNAL MEDICINE

## 2023-09-13 PROCEDURE — 1090F PRES/ABSN URINE INCON ASSESS: CPT | Performed by: INTERNAL MEDICINE

## 2023-09-13 PROCEDURE — G8417 CALC BMI ABV UP PARAM F/U: HCPCS | Performed by: INTERNAL MEDICINE

## 2023-09-20 NOTE — PROGRESS NOTES
Hospitalist Progress Note     Admit Date:  7/3/2019  3:02 AM   Name:  Mahi Torres   Age:  74 y.o.  :  1945   MRN:  [id number]   PCP:  Marianne Perez MD  Treatment Team: Attending Provider: Tanya Leach MD; Consulting Provider: Francisco Sargent MD; Consulting Provider: Chris Kelly MD; Utilization Review: Megan Lynch RN; Consulting Provider: Zahra Pope MD; Care Manager: Wilfredo Edgar RN; Consulting Provider: Brain Miller MD; Physical Therapy Assistant: Mary Park PTA    Subjective:   Ms. Fiordaliza Sepulveda is a 75 y/o WF admitted to ICU on 7/3 with respiratory failure after being intubated. Developed rapid AFib, cardiology consulted. Started on abx and IV diuresis. Extubated  and sent to floor . Hospitalist assumed care .    19  On oxygen   Says feels better  Still on cardizem drip    7/10/19  C/o diarrhea- stool soft- not watery  On cardizem drip        Objective:     Patient Vitals for the past 24 hrs:   Temp Pulse Resp BP SpO2   07/10/19 0851 98.1 °F (36.7 °C) (!) 121 22 126/61 98 %   07/10/19 0447  (!) 131  110/53    07/10/19 0438 98.8 °F (37.1 °C) (!) 113 20 (!) 115/38 96 %   07/10/19 0326  (!) 143      07/10/19 0037 98.8 °F (37.1 °C) (!) 114 20 101/50 96 %   19 2039 98.2 °F (36.8 °C) 94 20 113/43 96 %   19 2027  98  110/44    19 1820 96.8 °F (36 °C) (!) 107 20 105/49 99 %   19 1616  97  109/61    19 1333 98.1 °F (36.7 °C) 86 20 102/51 98 %     Oxygen Therapy  O2 Sat (%): 98 % (07/10/19 0851)  Pulse via Oximetry: 96 beats per minute (19)  O2 Device: Room air (07/10/19 0851)  O2 Flow Rate (L/min): 2 l/min (07/10/19 0420)  FIO2 (%): 28 % (19)    Intake/Output Summary (Last 24 hours) at 7/10/2019 1200  Last data filed at 7/10/2019 0851  Gross per 24 hour   Intake 1300 ml   Output 2325 ml   Net -1025 ml         General:    Well nourished. Alert. On oxygen  heent- normal   CV:   RRR.  Tachycardia  No murmur, rub, or gallop. Lungs:   Mild basilar crepitations  Abdomen:   Soft, nontender, nondistended. Cns- no focal neurological deficits  Extremities: Warm and dry. No cyanosis or edema. Skin:     No rashes or jaundice. Data Review:  I have reviewed all labs, meds, telemetry events, and studies from the last 24 hours. Recent Results (from the past 24 hour(s))   PLEASE READ & DOCUMENT PPD TEST IN 72 HRS    Collection Time: 07/09/19  4:15 PM   Result Value Ref Range    PPD Negative Negative    mm Induration 0 0 - 5 mm   METABOLIC PANEL, BASIC    Collection Time: 07/10/19  4:35 AM   Result Value Ref Range    Sodium 141 136 - 145 mmol/L    Potassium 3.7 3.5 - 5.1 mmol/L    Chloride 102 98 - 107 mmol/L    CO2 32 21 - 32 mmol/L    Anion gap 7 7 - 16 mmol/L    Glucose 112 (H) 65 - 100 mg/dL    BUN 15 8 - 23 MG/DL    Creatinine 1.08 (H) 0.6 - 1.0 MG/DL    GFR est AA >60 >60 ml/min/1.73m2    GFR est non-AA 53 (L) >60 ml/min/1.73m2    Calcium 8.4 8.3 - 10.4 MG/DL   MAGNESIUM    Collection Time: 07/10/19  4:35 AM   Result Value Ref Range    Magnesium 2.0 1.8 - 2.4 mg/dL   PHOSPHORUS    Collection Time: 07/10/19  4:35 AM   Result Value Ref Range    Phosphorus 2.9 2.3 - 3.7 MG/DL   CBC WITH AUTOMATED DIFF    Collection Time: 07/10/19  4:35 AM   Result Value Ref Range    WBC 12.1 (H) 4.3 - 11.1 K/uL    RBC 3.37 (L) 4.05 - 5.2 M/uL    HGB 10.4 (L) 11.7 - 15.4 g/dL    HCT 31.8 (L) 35.8 - 46.3 %    MCV 94.4 79.6 - 97.8 FL    MCH 30.9 26.1 - 32.9 PG    MCHC 32.7 31.4 - 35.0 g/dL    RDW 15.6 (H) 11.9 - 14.6 %    PLATELET 764 021 - 370 K/uL    MPV 11.2 9.4 - 12.3 FL    ABSOLUTE NRBC 0.00 0.0 - 0.2 K/uL    DF AUTOMATED      NEUTROPHILS 80 (H) 43 - 78 %    LYMPHOCYTES 11 (L) 13 - 44 %    MONOCYTES 7 4.0 - 12.0 %    EOSINOPHILS 1 0.5 - 7.8 %    BASOPHILS 1 0.0 - 2.0 %    IMMATURE GRANULOCYTES 0 0.0 - 5.0 %    ABS. NEUTROPHILS 9.7 (H) 1.7 - 8.2 K/UL    ABS. LYMPHOCYTES 1.3 0.5 - 4.6 K/UL    ABS.  MONOCYTES 0.8 0.1 - 1.3 K/UL ABS. EOSINOPHILS 0.2 0.0 - 0.8 K/UL    ABS. BASOPHILS 0.1 0.0 - 0.2 K/UL    ABS. IMM. GRANS. 0.0 0.0 - 0.5 K/UL        All Micro Results     Procedure Component Value Units Date/Time    ANAEROBIC CULTURE - ONLY PERFORMED ON BRONCHIAL BRUSHING [[id number]]  (Abnormal) Collected:  07/03/19 1245    Order Status:  Completed Specimen:  Bronchial lavage Updated:  07/10/19 1151     Special Requests: NO SPECIAL REQUESTS        Culture result:       SCANT ANAEROBIC GRAM POSITIVE RODS            ISOLATE SENT TO   [facility] [employer]  07/10/19      ATYPICAL PNEUMONIA BY PCR,BRONCHOALVEOLAR LAVAGE - INCLUDES: MYCOPLASMA PNEUMONIAE AND CHLAMYDIA PNEUMONIAE [[id number]] Collected:  07/03/19 1245    Order Status:  Completed Specimen:  Bronchial lavage Updated:  07/08/19 [address]     M. PNEUMONIAE, MPPT Not Detected     C. PNEUMONAIE, CPPT Not Detected     Comment: (NOTE)  This test was developed and its performance  characteristics determined by [employer]. It has not  been cleared or approved by the U.S. Food and Drug  Administration. Results should be used in conjunction with  clinical findings, and should not form the sole basis for  a diagnosis or treatment decision. Performed At: [facility]  [address] [place], [place] [id number]  Karen Belcher PhD JU:[id number]         A. GALACTOMANNAN BY EIA, BRONCHOALVEOLAR LAVAGE [[id number]] Collected:  07/03/19 1245    Order Status:  Completed Specimen:  Bronchial lavage Updated:  07/08/19 1735     A. GALACTOMANNAN, AGPT 0.071        Comment: (NOTE)  Interpretation:   Patients with an index value of greater  than or equal to 0.5 are considered to be positive for  galactomannan antigen. The Platelia(TM) Aspergillus EIA  package insert also recommends a new sample be collected  from the patient for follow-up testing. Patients with an  index value of less than 0.5 are considered to be negative  for galactomannan antigen.   A negative result may indicate  that the patient's result is below the detectable level of  the assay. Negative results do not rule out the diagnosis of Invasive  Aspergillosis. Pursuant to the package insert, repeat  testing is recommended if the result is negative, but the  disease is suspected. Due to the potential for  environmental contamination when transferred to pour-off  tubes, which can lead to false positive results, interpret  positive results from samples provided in pour-off tubes  with caution. Results should be used in conjunction with  clinical findings, and should not form the sole basis for  a diagnosis or treatment decision. The Platelia Aspergillus Galactomannan EIA is a product of  [employer] and is FDA approved for in vitro diagnostic use. Performed At: [facility], [address] [place] [id number]  Yunier Marmolejo PhD BT:[id number]         VIRAL CULTURE - INCLUDES ADENOVIRUS, CMV, ENTEROVIRUSES, HSV, INFLUENZA, MUMPS, PARAINFLUENZA, RSV, & VARICELLA ZOSTER [[id number]] Collected:  19    Order Status:  Completed Specimen:  Other from Miscellaneous sample Updated:  19 1436     Source BRONCHIAL LAVAGE        Viral Culture, General Comment        Comment: (NOTE)  Preliminary Report:  No virus isolated at 4 days. Next report to follow after 7 days.   Performed At: [address] [id number]  Lyndsey Garcia MD W         CULTURE, BLOOD [[id number]] Collected:  19 0515    Order Status:  Completed Specimen:  Blood Updated:  19     Special Requests: CENTRAL LINE     Culture result: NO GROWTH 5 DAYS       CULTURE, BLOOD [[id number]] Collected:  19 0641    Order Status:  Completed Specimen:  Blood Updated:  19     Special Requests: Jose Raphael     Culture result: NO GROWTH 5 DAYS       RESPIRATORY VIRAL PANEL, PCR [[id number]] Collected:  19 1245    Order Status:  Completed Specimen:  Respiratory sample Updated:  19 2335     Source BRONCHIAL LAVAGE        Influenza A NEGATIVE         Influenza B NEGATIVE         RSV A NEGATIVE         RSV B NEGATIVE         Parainfluenza 1 NEGATIVE         Parainfluenza 2 NEGATIVE         Parainfluenza 3 NEGATIVE         Rhinovirus NEGATIVE         Metapneumovirus NEGATIVE         Adenovirus NEGATIVE         Comment: (NOTE)  Performed At: [address] [id number]  Aletha Mendoza MD EY:[id number]         [facility] CULTURE AND SMEAR - Sydell Taras [[id number]] Collected:  19 1245    Order Status:  Completed Specimen:  Other from Miscellaneous sample Updated:  19 0836     Source BRONCHIAL LAVAGE        Fungus stain Direct Inoculation     Fungus (Mycology) Culture Other source received     Comment: (NOTE)  Performed At: [address] [id number]  Aletha Mendoza MD KR:[id number]         CULTURE, RESPIRATORY[facility] [[id number]] Collected:  19 0643    Order Status:  Completed Specimen:  Sputum Updated:  19 1214     Special Requests: NO SPECIAL REQUESTS        GRAM STAIN 0 to 2 WBCS/OIF      NO EPITHELIAL CELLS SEEN         FEW GRAM POSITIVE COCCI         2+ MUCUS PRESENT        Culture result:       HEAVY NORMAL RESPIRATORY ROBEL          CULTURE, RESPIRATORY/SPUTUM/BRONCH [facility] [place] [[id number]] Collected:  19 1245    Order Status:  Completed Specimen:  Sputum from Bronchial lavage Updated:  19 0743     Special Requests: NO SPECIAL REQUESTS        GRAM STAIN NO WBCS SEEN         NO DEFINITE ORGANISM SEEN        Culture result: NO GROWTH 2 DAYS       AFB (MYCOBACTERIUM) CULTURE & SMEAR W/REFLEX ID Tilda Sea NOCARDIA [[id number]] Collected:  19 1245    Order Status:  Completed Specimen:  Miscellaneous sample Updated:  19 1336     Source BRONCHIAL LAVAGE        AFB Specimen processing Concentration     Acid Fast Smear NEGATIVE Comment: (NOTE)  Performed At: [address] [id number]  Shaheed Rincon MD SY:[id number]          Acid Fast Culture PENDING          Current Meds:  Current Facility-Administered Medications   Medication Dose Route Frequency    digoxin (LANOXIN) tablet 0.125 mg  0.125 mg Oral DAILY    carvedilol (COREG) tablet 25 mg  25 mg Oral BID WITH MEALS    spironolactone (ALDACTONE) tablet 25 mg  25 mg Oral DAILY    levalbuterol (XOPENEX) nebulizer soln 0.63 mg/3 mL  0.63 mg Nebulization Q6H PRN    furosemide (LASIX) injection 20 mg  20 mg IntraVENous Q12H    potassium chloride (KLOR-CON) packet for solution 20 mEq  20 mEq Oral BID WITH MEALS    NUTRITIONAL SUPPORT ELECTROLYTE PRN ORDERS   Does Not Apply PRN    pantoprazole (PROTONIX) tablet 40 mg  40 mg Oral ACB    bisacodyl (DULCOLAX) tablet 5 mg  5 mg Oral DAILY PRN    apixaban (ELIQUIS) tablet 5 mg  5 mg Oral Q12H    temazepam (RESTORIL) capsule 15 mg  15 mg Oral QHS PRN    sodium chloride (NS) flush 5-40 mL  5-40 mL IntraVENous Q8H    sodium chloride (NS) flush 5-40 mL  5-40 mL IntraVENous PRN    dilTIAZem (CARDIZEM) 125 mg in dextrose 5% 125 mL infusion  0-15 mg/hr IntraVENous TITRATE       Other Studies (last 24 hours):  Xr Chest Sngl V    Result Date: 7/10/2019   Portable view of the chest COMPARISON: July 9, 2019. CLINICAL HISTORY: Shortness of breath. FINDINGS: There are bilateral pleural effusions. There is vascular congestion. No pneumothorax. Heart is enlarged. Right IJ line is stable. IMPRESSION: 1. Bilateral pleural effusions and vascular congestion. 2. No significant change.       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) ICD-10-CM: A41.9, R65.20  ICD-9-CM: 038.9, 995.92  7/3/2019 - Present Unknown        Hypotension due to hypovolemia ICD-10-CM: I95.89, E86.1  ICD-9-CM: 458.8, 276.52  7/3/2019 - Present Unknown        Polymyalgia rheumatica ([facility] ICD-10-CM: M35.3  ICD-9-CM: 951  12/2/2015 - Present Yes        HTN (hypertension) ICD-10-CM: I10  ICD-9-CM: 401.9  8/12/2013 - Present Yes        Severe obesity with body mass index (BMI) of 35.0 to 39.9 with comorbidity ([facility] ICD-10-CM: E66.01  ICD-9-CM: 278.01  8/12/2013 - Present Yes        Sjogren's syndrome ([facility] ICD-10-CM: M35.00  ICD-9-CM: 710.2  8/12/2013 - Present Yes              PLAN:    Acute respiratory failure- during the stay intubated- extubated- presently on nasal canula 2 lit/min- finished course of antibiotics  Acute volume overload- echo ef 52-73%- grade 1 diastolic chf- good diuresis  Afib with rvr- cont eliquis,on cardizem drip- cardiology following  Hypokalemia- resolved  htn  Hypotension resolved. Poly myalgia rheumatica- off steroids  ? Diarrhea - close watch - bowel for now still appear soft/formed    DC planning/Dispo:    DVT ppx:  eliquis.     Signed:  Aura Humphries MD Mohs Rapid Report Verbiage: The area of clinically evident tumor was marked with skin marking ink and appropriately hatched.  The initial incision was made following the Mohs approach through the skin.  The specimen was taken to the lab, divided into the necessary number of pieces, chromacoded and processed according to the Mohs protocol.  This was repeated in successive stages until a tumor free defect was achieved.

## 2023-10-12 NOTE — TELEPHONE ENCOUNTER
MEDICATION REFILL REQUEST      Name of Medication:  Eliquis   Dose:  5 mg  Frequency:  twice a day   Quantity:    Days' supply:  80      Pharmacy Name/Location:  [facility]/ [phone]

## 2024-03-12 PROBLEM — I48.0 PAROXYSMAL ATRIAL FIBRILLATION (HCC): Status: ACTIVE | Noted: 2022-05-25

## 2024-03-21 ENCOUNTER — HOSPITAL ENCOUNTER (EMERGENCY)
Age: 79
Discharge: HOME | End: 2024-03-21
Attending: STUDENT IN AN ORGANIZED HEALTH CARE EDUCATION/TRAINING PROGRAM
Payer: MEDICARE

## 2024-03-21 ENCOUNTER — APPOINTMENT (OUTPATIENT)
Dept: GENERAL RADIOLOGY | Age: 79
End: 2024-03-21
Payer: MEDICARE

## 2024-03-21 VITALS
TEMPERATURE: 98.9 F | RESPIRATION RATE: 14 BRPM | SYSTOLIC BLOOD PRESSURE: 142 MMHG | BODY MASS INDEX: 27.56 KG/M2 | OXYGEN SATURATION: 100 % | HEART RATE: 64 BPM | WEIGHT: 146 LBS | DIASTOLIC BLOOD PRESSURE: 65 MMHG | HEIGHT: 61 IN

## 2024-03-21 DIAGNOSIS — R06.02 SHORTNESS OF BREATH: Primary | ICD-10-CM

## 2024-03-21 DIAGNOSIS — I16.0 HYPERTENSIVE URGENCY: ICD-10-CM

## 2024-03-21 DIAGNOSIS — I50.9 ACUTE ON CHRONIC CONGESTIVE HEART FAILURE, UNSPECIFIED HEART FAILURE TYPE (HCC): ICD-10-CM

## 2024-03-21 DIAGNOSIS — R79.89 ELEVATED TROPONIN I LEVEL: ICD-10-CM

## 2024-03-21 LAB
ANION GAP SERPL CALC-SCNC: 5 MMOL/L (ref 2–11)
BASOPHILS # BLD: 0.1 K/UL (ref 0–0.2)
BASOPHILS NFR BLD: 1 % (ref 0–2)
BILIRUB UR QL: NEGATIVE
BUN SERPL-MCNC: 21 MG/DL (ref 8–23)
CALCIUM SERPL-MCNC: 9.7 MG/DL (ref 8.3–10.4)
CHLORIDE SERPL-SCNC: 109 MMOL/L (ref 103–113)
CO2 SERPL-SCNC: 25 MMOL/L (ref 21–32)
CREAT SERPL-MCNC: 0.9 MG/DL (ref 0.6–1)
DIFFERENTIAL METHOD BLD: ABNORMAL
EKG ATRIAL RATE: 62 BPM
EKG DIAGNOSIS: NORMAL
EKG P AXIS: 38 DEGREES
EKG P-R INTERVAL: 168 MS
EKG Q-T INTERVAL: 398 MS
EKG QRS DURATION: 90 MS
EKG QTC CALCULATION (BAZETT): 405 MS
EKG R AXIS: -14 DEGREES
EKG T AXIS: 45 DEGREES
EKG VENTRICULAR RATE: 62 BPM
EOSINOPHIL # BLD: 0.1 K/UL (ref 0–0.8)
EOSINOPHIL NFR BLD: 1 % (ref 0.5–7.8)
ERYTHROCYTE [DISTWIDTH] IN BLOOD BY AUTOMATED COUNT: 13.2 % (ref 11.9–14.6)
GLUCOSE SERPL-MCNC: 101 MG/DL (ref 65–100)
GLUCOSE UR QL STRIP.AUTO: NEGATIVE MG/DL
HCT VFR BLD AUTO: 29.7 % (ref 35.8–46.3)
HGB BLD-MCNC: 10.1 G/DL (ref 11.7–15.4)
IMM GRANULOCYTES # BLD AUTO: 0 K/UL (ref 0–0.5)
IMM GRANULOCYTES NFR BLD AUTO: 0 % (ref 0–5)
KETONES UR-MCNC: NEGATIVE MG/DL
LEUKOCYTE ESTERASE UR QL STRIP: NEGATIVE
LYMPHOCYTES # BLD: 1.3 K/UL (ref 0.5–4.6)
LYMPHOCYTES NFR BLD: 23 % (ref 13–44)
MAGNESIUM SERPL-MCNC: 2.1 MG/DL (ref 1.8–2.4)
MCH RBC QN AUTO: 29.8 PG (ref 26.1–32.9)
MCHC RBC AUTO-ENTMCNC: 34 G/DL (ref 31.4–35)
MCV RBC AUTO: 87.6 FL (ref 82–102)
MONOCYTES # BLD: 0.3 K/UL (ref 0.1–1.3)
MONOCYTES NFR BLD: 6 % (ref 4–12)
NEUTS SEG # BLD: 3.8 K/UL (ref 1.7–8.2)
NEUTS SEG NFR BLD: 69 % (ref 43–78)
NITRITE UR QL: NEGATIVE
NRBC # BLD: 0 K/UL (ref 0–0.2)
NT PRO BNP: 2130 PG/ML
PH UR: 7 (ref 5–9)
PLATELET # BLD AUTO: 183 K/UL (ref 150–450)
PMV BLD AUTO: 10.4 FL (ref 9.4–12.3)
POTASSIUM SERPL-SCNC: 3.4 MMOL/L (ref 3.5–5.1)
PROT UR QL: NEGATIVE MG/DL
RBC # BLD AUTO: 3.39 M/UL (ref 4.05–5.2)
RBC # UR STRIP: NEGATIVE
SERVICE CMNT-IMP: NORMAL
SODIUM SERPL-SCNC: 139 MMOL/L (ref 136–146)
SP GR UR: 1.01 (ref 1–1.02)
TROPONIN I SERPL HS-MCNC: 27.4 PG/ML (ref 0–14)
TROPONIN I SERPL HS-MCNC: 32.2 PG/ML (ref 0–14)
TROPONIN I SERPL HS-MCNC: 34.4 PG/ML (ref 0–14)
UROBILINOGEN UR QL: 0.2 EU/DL (ref 0.2–1)
WBC # BLD AUTO: 5.6 K/UL (ref 4.3–11.1)

## 2024-03-21 PROCEDURE — 81003 URINALYSIS AUTO W/O SCOPE: CPT

## 2024-03-21 PROCEDURE — 99285 EMERGENCY DEPT VISIT HI MDM: CPT

## 2024-03-21 PROCEDURE — 6360000002 HC RX W HCPCS: Performed by: EMERGENCY MEDICINE

## 2024-03-21 PROCEDURE — 85025 COMPLETE CBC W/AUTO DIFF WBC: CPT

## 2024-03-21 PROCEDURE — 96375 TX/PRO/DX INJ NEW DRUG ADDON: CPT

## 2024-03-21 PROCEDURE — 6370000000 HC RX 637 (ALT 250 FOR IP): Performed by: INTERNAL MEDICINE

## 2024-03-21 PROCEDURE — 80048 BASIC METABOLIC PNL TOTAL CA: CPT

## 2024-03-21 PROCEDURE — 93005 ELECTROCARDIOGRAM TRACING: CPT | Performed by: STUDENT IN AN ORGANIZED HEALTH CARE EDUCATION/TRAINING PROGRAM

## 2024-03-21 PROCEDURE — 84484 ASSAY OF TROPONIN QUANT: CPT

## 2024-03-21 PROCEDURE — 83735 ASSAY OF MAGNESIUM: CPT

## 2024-03-21 PROCEDURE — 83880 ASSAY OF NATRIURETIC PEPTIDE: CPT

## 2024-03-21 PROCEDURE — 96374 THER/PROPH/DIAG INJ IV PUSH: CPT

## 2024-03-21 PROCEDURE — 6360000002 HC RX W HCPCS: Performed by: STUDENT IN AN ORGANIZED HEALTH CARE EDUCATION/TRAINING PROGRAM

## 2024-03-21 PROCEDURE — 6370000000 HC RX 637 (ALT 250 FOR IP): Performed by: EMERGENCY MEDICINE

## 2024-03-21 PROCEDURE — 71045 X-RAY EXAM CHEST 1 VIEW: CPT

## 2024-03-21 PROCEDURE — 93010 ELECTROCARDIOGRAM REPORT: CPT | Performed by: INTERNAL MEDICINE

## 2024-03-21 RX ORDER — CEFDINIR 300 MG/1
300 CAPSULE ORAL 2 TIMES DAILY
Qty: 14 CAPSULE | Refills: 0 | Status: SHIPPED | OUTPATIENT
Start: 2024-03-21 | End: 2024-03-28

## 2024-03-21 RX ORDER — HYDRALAZINE HYDROCHLORIDE 20 MG/ML
10 INJECTION INTRAMUSCULAR; INTRAVENOUS ONCE
Status: COMPLETED | OUTPATIENT
Start: 2024-03-21 | End: 2024-03-21

## 2024-03-21 RX ORDER — FUROSEMIDE 10 MG/ML
40 INJECTION INTRAMUSCULAR; INTRAVENOUS ONCE
Status: COMPLETED | OUTPATIENT
Start: 2024-03-21 | End: 2024-03-21

## 2024-03-21 RX ORDER — POTASSIUM CHLORIDE 20 MEQ/1
40 TABLET, EXTENDED RELEASE ORAL ONCE
Status: COMPLETED | OUTPATIENT
Start: 2024-03-21 | End: 2024-03-21

## 2024-03-21 RX ORDER — ASPIRIN 81 MG/1
324 TABLET, CHEWABLE ORAL
Status: COMPLETED | OUTPATIENT
Start: 2024-03-21 | End: 2024-03-21

## 2024-03-21 RX ORDER — AZITHROMYCIN 250 MG/1
TABLET, FILM COATED ORAL
Qty: 6 TABLET | Refills: 0 | Status: SHIPPED | OUTPATIENT
Start: 2024-03-21 | End: 2024-03-31

## 2024-03-21 RX ADMIN — ASPIRIN 324 MG: 81 TABLET, CHEWABLE ORAL at 09:32

## 2024-03-21 RX ADMIN — HYDRALAZINE HYDROCHLORIDE 10 MG: 20 INJECTION INTRAMUSCULAR; INTRAVENOUS at 06:34

## 2024-03-21 RX ADMIN — FUROSEMIDE 40 MG: 10 INJECTION, SOLUTION INTRAMUSCULAR; INTRAVENOUS at 09:32

## 2024-03-21 RX ADMIN — POTASSIUM CHLORIDE 40 MEQ: 1500 TABLET, EXTENDED RELEASE ORAL at 13:18

## 2024-03-21 ASSESSMENT — PAIN - FUNCTIONAL ASSESSMENT: PAIN_FUNCTIONAL_ASSESSMENT: NONE - DENIES PAIN

## 2024-03-21 NOTE — ED NOTES
I have reviewed discharge instructions with the patient.  The patient verbalized understanding.    Patient left ED via Discharge Method: wheelchair to Home with family.    Opportunity for questions and clarification provided.       Patient given 2 e scripts.         To continue your aftercare when you leave the hospital, you may receive an automated call from our care team to check in on how you are doing.  This is a free service and part of our promise to provide the best care and service to meet your aftercare needs.” If you have questions, or wish to unsubscribe from this service please call [phone].  Thank you for Choosing our [facility] Emergency Department.         Brie White, CHANA  03/21/24 2012

## 2024-03-21 NOTE — ED NOTES
Patient ambulated Normally uses a cane or walker and was able to ambulate under own power with an )2 Saturation of 100% on RA.      Anton No RN  03/21/24 0880

## 2024-03-21 NOTE — CONSULTS
capsule Take 1 capsule by mouth 2 times daily for 7 days    apixaban (ELIQUIS) 5 MG TABS tablet Take 1 tablet by mouth in the morning and 1 tablet in the evening.    carvedilol (COREG) 25 MG tablet Take 1 tablet by mouth 2 times daily (with meals)    furosemide (LASIX) 20 MG tablet Take 1 tablet by mouth daily    sertraline (ZOLOFT) 50 MG tablet Take 1 tablet by mouth daily       Signed:  Mendoza Garcia MD    Part of this note may have been written by using a voice dictation software.  The note has been proof read but may still contain some grammatical/other typographical errors.

## 2024-03-21 NOTE — ED PROVIDER NOTES
[facility]  Emergency Department    DISPOSITION         ICD-10-CM    1. Pneumonia due to infectious organism, unspecified laterality, unspecified part of lung  J18.9         ED Course     ED Course as of 03/21/24 0655   Thu Mar 21, 2024   0355 78-year-old female with history of A-fib, HTN presents with palpitations and shortness of breath now resolved.  Vitals reassuring.  Exam overall reassuring.  Will obtain labs and CXR and monitor on telemetry [ER]   0358 EKG: Normal sinus rhythm, rate 62.  No STEMI.  Normal axis and intervals.  Time: 0355 [ER]   0627 Labs with elevated BNP, mild troponin elevation otherwise reassuring.  Blood pressure slowly improving.  Has remained asymptomatic.  No hypoxia.  No events while on telemetry.  Pending repeat troponin.  There are mild interstitial infiltrates on x-ray (suspect early pneumonia versus mild edema).  [ER]   0654 Patient care handed over to oncoming provider pending repeat troponin and ambulatory sat testing.  If troponin remains negative and she feels improved was able to ambulate without difficulty or desat, she may be a candidate for outpatient treatment of pneumonia.  However there is a low threshold for admission [ER]      ED Course User Index  [ER] Everardo Blackman MD     Data Reviewed and Analyzed:  1 or more acute illnesses that pose a threat to life or bodily function.     I independently ordered and reviewed each unique test.   I interpreted the X-rays no obvious pneumothorax.  I interpreted the labs.       HPI   Gianna Raya is a 78 y.o. female with a history of atrial fibrillation, CHF, HTN who presents to the ED with complaint of palpitations.  States earlier this evening she felt lightheaded and fatigued.  This continued throughout the evening.  She felt that her heart rate was elevated and felt that she was in atrial fibrillation.  She took her blood pressure and it was elevated.  She called EMS and noted that just prior

## 2024-03-21 NOTE — DISCHARGE INSTRUCTIONS
You may take the antibiotics as directed.  Return to the ER for any new or worsening symptoms or for any difficulty breathing.

## 2024-03-21 NOTE — ED TRIAGE NOTES
Patient arrives via EMS from home with weakness, abdominal pain, diarrhea. States that this started earlier tonight. EMS . Patient arrives alert and oriented. Patient also states that she thinks she was in afib, ems noted sinus rhythm in route.

## 2024-03-21 NOTE — ED PROVIDER NOTES
Emergency Department Provider Signout / Continuation of Care Note         DISPOSITION Decision To Admit 03/21/2024 09:25:57 AM       ICD-10-CM    1. Shortness of breath  R06.02       2. Acute on chronic congestive heart failure, unspecified heart failure type (HCC)  I50.9       3. Elevated troponin I level  R79.89       4. Hypertensive urgency  I16.0           The patient's care was signed out to me at shift change.      Final Plan      78-year-old female patient received at signout here with palpitations shortness of breath  History of A-fib  Sinus rhythm on her EKG today  Patient found to have elevated BNP and mild, and trending upward, troponins today  Patient received hydralazine IV for her high blood pressure  Will give IV Lasix and aspirin  Patient with increasing fatigue and dyspnea with minimal exertion today Will admit to hospital service  ED Course as of 03/21/24 0927   u Mar 21, 2024   0355 78-year-old female with history of A-fib, HTN presents with palpitations and shortness of breath now resolved.  Vitals reassuring.  Exam overall reassuring.  Will obtain labs and CXR and monitor on telemetry [ER]   0358 EKG: Normal sinus rhythm, rate 62.  No STEMI.  Normal axis and intervals.  Time: 0355 [ER]   0627 Labs with elevated BNP, mild troponin elevation otherwise reassuring.  Blood pressure slowly improving.  Has remained asymptomatic.  No hypoxia.  No events while on telemetry.  Pending repeat troponin.  There are mild interstitial infiltrates on x-ray (suspect early pneumonia versus mild edema).  [ER]   0654 Patient care handed over to oncoming provider pending repeat troponin and ambulatory sat testing.  If troponin remains negative and she feels improved was able to ambulate without difficulty or desat, she may be a candidate for outpatient treatment of pneumonia.  However there is a low threshold for admission [ER]      ED Course User Index  [ER] Everardo Blackman MD       1 or more acute

## 2024-03-26 NOTE — PROGRESS NOTES
[facility] CARDIOLOGY  [address] [place] [address], [address]  [place] [address]  PHONE: [phone]      24    NAME:  Gianna Raya  : 1945  MRN: [id number]         SUBJECTIVE:   Gianna Raya is a 78 y.o. female seen for a follow up visit regarding the following:     Chief Complaint   Patient presents with    6 Month Follow-Up    Hypertension            HPI:  Follow up  6 Month Follow-Up and Hypertension   .    Follow up afib, hypertension, Reports intolerances to  ACE/ARB/ARNI and higher dose amlodipine. Severe anxiety, see prior notes. Intermittent hypotension.  ER visit 3/21 with dyspnea, initial hypertension responded to hydralazine, CXR with mild infiltrates - PNA vs early edema, elevated NT-pro BNP (not far off her baseline) treated with lasix, improved.  Rhythm sinus throughout.  She is feeling better.  Sinus congestion and drainage persist at night, better during the day.      I've reluctantly agreed to \"prn\" treatment of her BP having worked well in hospital.             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   Oct 2013 - 2 week monitor occasional pac's   Mar 2019 - normal renal artery doppler   2019- afib/RVR-ESTELLA cardioversion, amiodarone   Echo EF 40-45%, normal LV size, mild RVE, marked LAE, mild MR/TR, small to moderate pericardial effusion   ESETLLA- normal biventricular size and function, EF 55-60%   19 - PFT's normal spirometry but reduced volumes and mild reduced DLCO possible early interstitial lung disease   Mar 2021- Echo - normal EF, ind DF, marked LAE (FERNANDO 56), RVSP 35   2022- normal EF, abn DF, DIONNE, moderate circumferential effusion  May 2022     Afib ablation with Dr Sanches  Sep 2022      Limited echo - Normal SF, mild MR, TR, RVSP 43, stable mild to moderate posterior effusion (no longer circumferential)--patient

## 2024-03-27 ENCOUNTER — OFFICE VISIT (OUTPATIENT)
Age: 79
End: 2024-03-27
Payer: MEDICARE

## 2024-03-27 VITALS
WEIGHT: 141 LBS | HEART RATE: 68 BPM | HEIGHT: 61 IN | DIASTOLIC BLOOD PRESSURE: 82 MMHG | SYSTOLIC BLOOD PRESSURE: 136 MMHG | BODY MASS INDEX: 26.62 KG/M2

## 2024-03-27 DIAGNOSIS — R06.02 SHORTNESS OF BREATH: Primary | ICD-10-CM

## 2024-03-27 DIAGNOSIS — I48.0 PAROXYSMAL ATRIAL FIBRILLATION (HCC): ICD-10-CM

## 2024-03-27 PROCEDURE — 3075F SYST BP GE 130 - 139MM HG: CPT | Performed by: INTERNAL MEDICINE

## 2024-03-27 PROCEDURE — 99214 OFFICE O/P EST MOD 30 MIN: CPT | Performed by: INTERNAL MEDICINE

## 2024-03-27 PROCEDURE — 3078F DIAST BP <80 MM HG: CPT | Performed by: INTERNAL MEDICINE

## 2024-03-27 PROCEDURE — 1090F PRES/ABSN URINE INCON ASSESS: CPT | Performed by: INTERNAL MEDICINE

## 2024-03-27 PROCEDURE — G8400 PT W/DXA NO RESULTS DOC: HCPCS | Performed by: INTERNAL MEDICINE

## 2024-03-27 PROCEDURE — G8417 CALC BMI ABV UP PARAM F/U: HCPCS | Performed by: INTERNAL MEDICINE

## 2024-03-27 PROCEDURE — G8484 FLU IMMUNIZE NO ADMIN: HCPCS | Performed by: INTERNAL MEDICINE

## 2024-03-27 PROCEDURE — 1036F TOBACCO NON-USER: CPT | Performed by: INTERNAL MEDICINE

## 2024-03-27 PROCEDURE — G8427 DOCREV CUR MEDS BY ELIG CLIN: HCPCS | Performed by: INTERNAL MEDICINE

## 2024-03-27 PROCEDURE — 1123F ACP DISCUSS/DSCN MKR DOCD: CPT | Performed by: INTERNAL MEDICINE

## 2024-03-27 RX ORDER — POTASSIUM CHLORIDE 750 MG/1
10 TABLET, EXTENDED RELEASE ORAL DAILY
Qty: 90 TABLET | Refills: 3 | Status: SHIPPED | OUTPATIENT
Start: 2024-03-27

## 2024-03-27 RX ORDER — FUROSEMIDE 20 MG/1
20 TABLET ORAL DAILY
Qty: 90 TABLET | Refills: 3 | Status: SHIPPED | OUTPATIENT
Start: 2024-03-27

## 2024-03-27 ASSESSMENT — ENCOUNTER SYMPTOMS: SHORTNESS OF BREATH: 1

## 2024-05-21 NOTE — PROGRESS NOTES
[facility] CARDIOLOGY  [address] [place] [address], [address]  [place] [address]  PHONE: [phone]      24    NAME:  Gianna Raya  : 1945  MRN: [id number]         SUBJECTIVE:   Gianna Raya is a 78 y.o. female seen for a follow up visit regarding the following:     Chief Complaint   Patient presents with    Results     echo    Shortness of Breath            HPI:  Follow up  Results (echo) and Shortness of Breath   .    Follow up afib, hypertension, Reports intolerances to  ACE/ARB/ARNI and higher dose amlodipine. Severe anxiety, see prior notes. Intermittent hypotension.  Age appropriate findings on echo.  Transient volume overload in setting of acute viral infection.       I've reluctantly agreed to \"prn\" treatment of her BP having worked well in hospital.     She's felt some better, still tired.  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   Oct 2013 - 2 week monitor occasional pac's   Mar 2019 - normal renal artery doppler   2019- afib/RVR-ESTELLA cardioversion, amiodarone   Echo EF 40-45%, normal LV size, mild RVE, marked LAE, mild MR/TR, small to moderate pericardial effusion   ESTELLA- normal biventricular size and function, EF 55-60%   19 - PFT's normal spirometry but reduced volumes and mild reduced DLCO possible early interstitial lung disease   Mar 2021- Echo - normal EF, ind DF, marked LAE (FERNANDO 56), RVSP 35   2022- normal EF, abn DF, DIONNE, moderate circumferential effusion  May 2022     Afib ablation with Dr Sanches  Sep 2022      Limited echo - Normal SF, mild MR, TR, RVSP 43, stable mild to moderate posterior effusion (no longer circumferential)--patient with known autoimmune disease  2023       TDS, increased renal artery velocities right main renal

## 2024-05-22 ENCOUNTER — OFFICE VISIT (OUTPATIENT)
Age: 79
End: 2024-05-22
Payer: MEDICARE

## 2024-05-22 VITALS
HEART RATE: 70 BPM | DIASTOLIC BLOOD PRESSURE: 60 MMHG | BODY MASS INDEX: 27.88 KG/M2 | SYSTOLIC BLOOD PRESSURE: 118 MMHG | WEIGHT: 142 LBS | HEIGHT: 60 IN

## 2024-05-22 DIAGNOSIS — Z98.890 STATUS POST ABLATION OF ATRIAL FIBRILLATION: ICD-10-CM

## 2024-05-22 DIAGNOSIS — I48.0 PAROXYSMAL ATRIAL FIBRILLATION (HCC): Primary | ICD-10-CM

## 2024-05-22 DIAGNOSIS — Z86.79 STATUS POST ABLATION OF ATRIAL FIBRILLATION: ICD-10-CM

## 2024-05-22 DIAGNOSIS — I10 ESSENTIAL HYPERTENSION: ICD-10-CM

## 2024-05-22 PROCEDURE — 99214 OFFICE O/P EST MOD 30 MIN: CPT | Performed by: INTERNAL MEDICINE

## 2024-05-22 PROCEDURE — 1036F TOBACCO NON-USER: CPT | Performed by: INTERNAL MEDICINE

## 2024-05-22 PROCEDURE — 1123F ACP DISCUSS/DSCN MKR DOCD: CPT | Performed by: INTERNAL MEDICINE

## 2024-05-22 PROCEDURE — 3074F SYST BP LT 130 MM HG: CPT | Performed by: INTERNAL MEDICINE

## 2024-05-22 PROCEDURE — G8427 DOCREV CUR MEDS BY ELIG CLIN: HCPCS | Performed by: INTERNAL MEDICINE

## 2024-05-22 PROCEDURE — 3078F DIAST BP <80 MM HG: CPT | Performed by: INTERNAL MEDICINE

## 2024-05-22 PROCEDURE — 1090F PRES/ABSN URINE INCON ASSESS: CPT | Performed by: INTERNAL MEDICINE

## 2024-05-22 PROCEDURE — G8417 CALC BMI ABV UP PARAM F/U: HCPCS | Performed by: INTERNAL MEDICINE

## 2024-05-22 PROCEDURE — G8400 PT W/DXA NO RESULTS DOC: HCPCS | Performed by: INTERNAL MEDICINE

## 2024-05-22 RX ORDER — FUROSEMIDE 20 MG/1
20 TABLET ORAL DAILY
Qty: 90 TABLET | Refills: 3 | Status: SHIPPED | OUTPATIENT
Start: 2024-05-22

## 2024-05-22 RX ORDER — POTASSIUM CHLORIDE 750 MG/1
10 TABLET, EXTENDED RELEASE ORAL DAILY
Qty: 90 TABLET | Refills: 3 | Status: SHIPPED | OUTPATIENT
Start: 2024-05-22

## 2024-05-22 NOTE — PATIENT INSTRUCTIONS
adult-strength or 2 to 4 low-dose aspirin. Wait for an ambulance. Do not try to drive yourself.     You have symptoms of a stroke. These may include:  Sudden numbness, tingling, weakness, or loss of movement in your face, arm, or leg, especially on only one side of your body.  Sudden vision changes.  Sudden trouble speaking.  Sudden confusion or trouble understanding simple statements.  Sudden problems with walking or balance.  A sudden, severe headache that is different from past headaches.     You passed out (lost consciousness).   Call your doctor now or seek immediate medical care if:    You have new or increased shortness of breath.     You feel dizzy or lightheaded, or you feel like you may faint.     You have an episode of atrial fibrillation and your doctor wants you to call when you have one.     You have new or worse symptoms.   Watch closely for changes in your health, and be sure to contact your doctor if you have any problems.  Where can you learn more?  Go to https://www.[employer].net/patientEd and enter U020 to learn more about \"Atrial Fibrillation: Care Instructions.\"  Current as of: June 24, 2023               Content Version: 14.0  © 4119-8603 [employer].   Care instructions adapted under license by [employer]. If you have questions about a medical condition or this instruction, always ask your healthcare professional. [employer] disclaims any warranty or liability for your use of this information.

## 2024-09-09 RX ORDER — CARVEDILOL 25 MG/1
25 TABLET ORAL 2 TIMES DAILY WITH MEALS
Qty: 180 TABLET | Refills: 3 | Status: SHIPPED | OUTPATIENT
Start: 2024-09-09

## 2024-10-06 ENCOUNTER — HOSPITAL ENCOUNTER (EMERGENCY)
Age: 79
Discharge: HOME | End: 2024-10-06
Attending: EMERGENCY MEDICINE
Payer: MEDICARE

## 2024-10-06 ENCOUNTER — APPOINTMENT (OUTPATIENT)
Dept: GENERAL RADIOLOGY | Age: 79
End: 2024-10-06
Payer: MEDICARE

## 2024-10-06 ENCOUNTER — APPOINTMENT (OUTPATIENT)
Dept: CT IMAGING | Age: 79
End: 2024-10-06
Payer: MEDICARE

## 2024-10-06 VITALS
OXYGEN SATURATION: 99 % | WEIGHT: 157.1 LBS | HEART RATE: 69 BPM | BODY MASS INDEX: 30.84 KG/M2 | RESPIRATION RATE: 15 BRPM | TEMPERATURE: 98.4 F | SYSTOLIC BLOOD PRESSURE: 197 MMHG | DIASTOLIC BLOOD PRESSURE: 76 MMHG | HEIGHT: 60 IN

## 2024-10-06 DIAGNOSIS — S20.211A CONTUSION OF RIBS, RIGHT, INITIAL ENCOUNTER: ICD-10-CM
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
## 2024-10-06 DIAGNOSIS — S16.1XXA ACUTE CERVICAL MYOFASCIAL STRAIN, INITIAL ENCOUNTER: ICD-10-CM

## 2024-10-06 DIAGNOSIS — S09.90XA CLOSED HEAD INJURY, INITIAL ENCOUNTER: ICD-10-CM

## 2024-10-06 LAB
ALBUMIN SERPL-MCNC: 3.6 G/DL (ref 3.2–4.6)
ALBUMIN/GLOB SERPL: 1.1 (ref 1–1.9)
ALP SERPL-CCNC: 95 U/L (ref 35–104)
ALT SERPL-CCNC: 8 U/L (ref 8–45)
ANION GAP SERPL CALC-SCNC: 11 MMOL/L (ref 9–18)
AST SERPL-CCNC: 20 U/L (ref 15–37)
BACTERIA URNS QL MICRO: NEGATIVE /HPF
BASOPHILS # BLD: 0.1 K/UL (ref 0–0.2)
BASOPHILS NFR BLD: 1 % (ref 0–2)
BILIRUB SERPL-MCNC: 0.4 MG/DL (ref 0–1.2)
BILIRUB UR QL: NEGATIVE
BUN SERPL-MCNC: 21 MG/DL (ref 8–23)
CALCIUM SERPL-MCNC: 9.4 MG/DL (ref 8.8–10.2)
CHLORIDE SERPL-SCNC: 109 MMOL/L (ref 98–107)
CO2 SERPL-SCNC: 24 MMOL/L (ref 20–28)
CREAT SERPL-MCNC: 0.77 MG/DL (ref 0.6–1.1)
DIFFERENTIAL METHOD BLD: ABNORMAL
EOSINOPHIL # BLD: 0.1 K/UL (ref 0–0.8)
EOSINOPHIL NFR BLD: 2 % (ref 0.5–7.8)
EPI CELLS #/AREA URNS HPF: ABNORMAL /HPF
ERYTHROCYTE [DISTWIDTH] IN BLOOD BY AUTOMATED COUNT: 12.9 % (ref 11.9–14.6)
GLOBULIN SER CALC-MCNC: 3.2 G/DL (ref 2.3–3.5)
GLUCOSE SERPL-MCNC: 94 MG/DL (ref 70–99)
GLUCOSE UR QL STRIP.AUTO: NEGATIVE MG/DL
HCT VFR BLD AUTO: 32.3 % (ref 35.8–46.3)
HGB BLD-MCNC: 10.9 G/DL (ref 11.7–15.4)
HYALINE CASTS URNS QL MICRO: ABNORMAL /LPF
IMM GRANULOCYTES # BLD AUTO: 0 K/UL (ref 0–0.5)
IMM GRANULOCYTES NFR BLD AUTO: 0 % (ref 0–5)
KETONES UR-MCNC: NEGATIVE MG/DL
LEUKOCYTE ESTERASE UR QL STRIP: ABNORMAL
LYMPHOCYTES # BLD: 1.1 K/UL (ref 0.5–4.6)
LYMPHOCYTES NFR BLD: 18 % (ref 13–44)
MAGNESIUM SERPL-MCNC: 1.9 MG/DL (ref 1.8–2.4)
MCH RBC QN AUTO: 29.9 PG (ref 26.1–32.9)
MCHC RBC AUTO-ENTMCNC: 33.7 G/DL (ref 31.4–35)
MCV RBC AUTO: 88.7 FL (ref 82–102)
MONOCYTES # BLD: 0.5 K/UL (ref 0.1–1.3)
MONOCYTES NFR BLD: 8 % (ref 4–12)
NEUTS SEG # BLD: 4.4 K/UL (ref 1.7–8.2)
NEUTS SEG NFR BLD: 71 % (ref 43–78)
NITRITE UR QL: NEGATIVE
NRBC # BLD: 0 K/UL (ref 0–0.2)
PH UR: 5.5 (ref 5–9)
PLATELET # BLD AUTO: 185 K/UL (ref 150–450)
PMV BLD AUTO: 10 FL (ref 9.4–12.3)
POTASSIUM SERPL-SCNC: 3.3 MMOL/L (ref 3.5–5.1)
PROT SERPL-MCNC: 6.8 G/DL (ref 6.3–8.2)
PROT UR QL: NEGATIVE MG/DL
RBC # BLD AUTO: 3.64 M/UL (ref 4.05–5.2)
RBC # UR STRIP: NEGATIVE
RBC #/AREA URNS HPF: ABNORMAL /HPF
SERVICE CMNT-IMP: ABNORMAL
SODIUM SERPL-SCNC: 144 MMOL/L (ref 136–145)
SP GR UR: 1.02 (ref 1–1.02)
UROBILINOGEN UR QL: 0.2 EU/DL (ref 0.2–1)
WBC # BLD AUTO: 6.1 K/UL (ref 4.3–11.1)
WBC URNS QL MICRO: ABNORMAL /HPF

## 2024-10-06 PROCEDURE — 80053 COMPREHEN METABOLIC PANEL: CPT

## 2024-10-06 PROCEDURE — 81001 URINALYSIS AUTO W/SCOPE: CPT

## 2024-10-06 PROCEDURE — 83735 ASSAY OF MAGNESIUM: CPT

## 2024-10-06 PROCEDURE — 99285 EMERGENCY DEPT VISIT HI MDM: CPT

## 2024-10-06 PROCEDURE — 71101 X-RAY EXAM UNILAT RIBS/CHEST: CPT

## 2024-10-06 PROCEDURE — 70450 CT HEAD/BRAIN W/O DYE: CPT

## 2024-10-06 PROCEDURE — 85025 COMPLETE CBC W/AUTO DIFF WBC: CPT

## 2024-10-06 PROCEDURE — 72125 CT NECK SPINE W/O DYE: CPT

## 2024-10-06 ASSESSMENT — PAIN - FUNCTIONAL ASSESSMENT: PAIN_FUNCTIONAL_ASSESSMENT: 0-10

## 2024-10-06 ASSESSMENT — PAIN DESCRIPTION - LOCATION: LOCATION: BACK

## 2024-10-06 ASSESSMENT — PAIN SCALES - GENERAL: PAINLEVEL_OUTOF10: 8

## 2024-10-06 NOTE — ED PROVIDER NOTES
Emergency Department Provider Note       PCP: Gladis Moncada MD   Age: 78 y.o.   Sex: female     DISPOSITION Decision To Discharge 10/06/2024 07:55:51 PM  Condition at Disposition: Data Unavailable       ICD-10-CM    1. Fall, initial encounter  W19.XXXA       2. Closed head injury, initial encounter  S09.90XA       3. Acute cervical myofascial strain, initial encounter  S16.1XXA       4. Contusion of ribs, right, initial encounter  S20.211A           Medical Decision Making   Nonsyncopal fall.  Patient states this happens on occasion.  No loss of consciousness dizziness.  No obvious evidence for stroke.  Will get head CT due to being on anticoagulant head trauma.  Extend scan through neck.  Patient has a lot of tenderness on the right ribs.  Will get rib films.  Check EKG.  Check screening lab work especially urinalysis since patient states she has been feeling a little weak lately may have some UTI symptoms  Imaging negative lab work looks excellent.  No UTI     1 or more acute illnesses that pose a threat to life or bodily function.   Prescription drug management performed.  Chronic medical problems impacting care include hypertension, congestive heart failure, atrial fibrillation requiring anticoagulants.  I independently ordered and reviewed each unique test.    I reviewed external records: provider visit note from outside specialist.  Cardiology notes regarding paroxysmal atrial fibrillation, shortness of breath, congestive heart failure  The patients assessment required an independent historian: EMS.  The reason they were needed is important historical information not provided by the patient.    I interpreted the X-rays rib films without obvious rib fracture or pneumothorax.  I interpreted the CT Scan CT of head and C-spine without intracranial injury nor cervical spine fracture.  ED provider's independent EKG interpretation my interpretation EKG shows normal sinus rhythm at 68.  Nonspecific ST changes.

## 2024-10-06 NOTE — ED TRIAGE NOTES
Pt arrives from home via [facility] with complaint of mechanical fall while trying to change batteries in wheelchair. Pt states that she landed on her back. Pt states that she did not lose consciouness. Pt did hit head. Pt is on eliquis. C-collar placed by EMS.

## 2024-10-06 NOTE — DISCHARGE INSTRUCTIONS
Tylenol for pain.  May use throat pillow to splint right ribs if coughing is painful.  Cool compresses may be helpful as well.  Call your doctor tomorrow to recheck toward the latter part of the week.  Recheck for worse, uncontrolled pain or high fever or shortness of breath.  Have family member stay with you tonight.  Head injury precautions

## 2024-10-10 NOTE — TELEPHONE ENCOUNTER
MEDICATION REFILL REQUEST      Name of Medication:  e;iquis  Dose:  5 mg  Frequency:  twice a day   Quantity:    Days' supply:        Pharmacy Name/Location:  [facility]

## 2024-10-10 NOTE — TELEPHONE ENCOUNTER
Requested Prescriptions     Pending Prescriptions Disp Refills    apixaban (ELIQUIS) 5 MG TABS tablet 180 tablet 3     Sig: Take 1 tablet by mouth in the morning and 1 tablet in the evening.     Rx verified last ov 5/22/24

## 2024-12-02 NOTE — PROGRESS NOTES
10/06/24 (!) 197/76   05/22/24 118/60     Pulse Readings from Last 3 Encounters:   12/03/24 70   10/06/24 69   05/22/24 70           Physical Exam  Constitutional:       Appearance: Normal appearance.   HENT:      Head: Normocephalic and atraumatic.   Eyes:      Conjunctiva/sclera: Conjunctivae normal.   Neck:      Vascular: No carotid bruit.   Cardiovascular:      Rate and Rhythm: Normal rate and regular rhythm.      Heart sounds: No murmur heard.     No friction rub. No gallop.   Pulmonary:      Effort: No respiratory distress.      Breath sounds: No wheezing or rales.   Musculoskeletal:         General: No swelling.      Cervical back: Neck supple.   Skin:     General: Skin is warm and dry.   Neurological:      General: No focal deficit present.      Mental Status: She is alert.   Psychiatric:         Mood and Affect: Mood normal.         Behavior: Behavior normal.         Medical problems and test results were reviewed with the patient today.     DATA REVIEW    LIPID PANEL     Lab Results   Component Value Date    CHOL 237 (H) 10/23/2019     Lab Results   Component Value Date    TRIG 113 10/23/2019     Lab Results   Component Value Date    HDL 47 10/23/2019     No components found for: \"LDLCHOLESTEROL\", \"LDLCALC\"  Lab Results   Component Value Date    VLDL 23 10/23/2019     No results found for: \"CHOLHDLRATIO\"  Lab Results   Component Value Date     (H) 10/23/2019         BMP  Lab Results   Component Value Date/Time     10/06/2024 06:51 PM    K 3.3 10/06/2024 06:51 PM     10/06/2024 06:51 PM    CO2 24 10/06/2024 06:51 PM    BUN 21 10/06/2024 06:51 PM    CREATININE 0.77 10/06/2024 06:51 PM    GLUCOSE 94 10/06/2024 06:51 PM    CALCIUM 9.4 10/06/2024 06:51 PM          EKG        CXR/IMAGING        DEVICE INTERROGATION        OUTSIDE RECORDS REVIEW    Records from outside providers have been reviewed and summarized as noted in the HPI, past history and data review sections of this note, and

## 2024-12-03 ENCOUNTER — OFFICE VISIT (OUTPATIENT)
Age: 79
End: 2024-12-03
Payer: MEDICARE

## 2024-12-03 VITALS
BODY MASS INDEX: 28.47 KG/M2 | WEIGHT: 145 LBS | DIASTOLIC BLOOD PRESSURE: 96 MMHG | HEART RATE: 70 BPM | HEIGHT: 60 IN | SYSTOLIC BLOOD PRESSURE: 160 MMHG

## 2024-12-03 DIAGNOSIS — Z86.79 STATUS POST ABLATION OF ATRIAL FIBRILLATION: ICD-10-CM

## 2024-12-03 DIAGNOSIS — I10 ESSENTIAL HYPERTENSION: ICD-10-CM

## 2024-12-03 DIAGNOSIS — I48.0 PAROXYSMAL ATRIAL FIBRILLATION (HCC): Primary | ICD-10-CM

## 2024-12-03 DIAGNOSIS — Z98.890 STATUS POST ABLATION OF ATRIAL FIBRILLATION: ICD-10-CM

## 2024-12-03 DIAGNOSIS — E78.2 MIXED HYPERLIPIDEMIA: ICD-10-CM

## 2024-12-03 PROCEDURE — 1036F TOBACCO NON-USER: CPT | Performed by: INTERNAL MEDICINE

## 2024-12-03 PROCEDURE — 1090F PRES/ABSN URINE INCON ASSESS: CPT | Performed by: INTERNAL MEDICINE

## 2024-12-03 PROCEDURE — 1160F RVW MEDS BY RX/DR IN RCRD: CPT | Performed by: INTERNAL MEDICINE

## 2024-12-03 PROCEDURE — 3077F SYST BP >= 140 MM HG: CPT | Performed by: INTERNAL MEDICINE

## 2024-12-03 PROCEDURE — 99214 OFFICE O/P EST MOD 30 MIN: CPT | Performed by: INTERNAL MEDICINE

## 2024-12-03 PROCEDURE — G8484 FLU IMMUNIZE NO ADMIN: HCPCS | Performed by: INTERNAL MEDICINE

## 2024-12-03 PROCEDURE — 1123F ACP DISCUSS/DSCN MKR DOCD: CPT | Performed by: INTERNAL MEDICINE

## 2024-12-03 PROCEDURE — 3080F DIAST BP >= 90 MM HG: CPT | Performed by: INTERNAL MEDICINE

## 2024-12-03 PROCEDURE — G8427 DOCREV CUR MEDS BY ELIG CLIN: HCPCS | Performed by: INTERNAL MEDICINE

## 2024-12-03 PROCEDURE — G8400 PT W/DXA NO RESULTS DOC: HCPCS | Performed by: INTERNAL MEDICINE

## 2024-12-03 PROCEDURE — 1126F AMNT PAIN NOTED NONE PRSNT: CPT | Performed by: INTERNAL MEDICINE

## 2024-12-03 PROCEDURE — G8417 CALC BMI ABV UP PARAM F/U: HCPCS | Performed by: INTERNAL MEDICINE

## 2024-12-03 PROCEDURE — 1159F MED LIST DOCD IN RCRD: CPT | Performed by: INTERNAL MEDICINE

## 2025-04-28 RX ORDER — FUROSEMIDE 20 MG/1
20 TABLET ORAL DAILY
Qty: 90 TABLET | Refills: 3 | Status: SHIPPED | OUTPATIENT
Start: 2025-04-28

## 2025-04-28 NOTE — TELEPHONE ENCOUNTER
Requested Prescriptions     Pending Prescriptions Disp Refills    furosemide (LASIX) 20 MG tablet 90 tablet 3     Sig: Take 1 tablet by mouth daily     Rx verified last ov 12/3/24

## 2025-04-28 NOTE — TELEPHONE ENCOUNTER
MEDICATION REFILL REQUEST      Name of Medication:  furosemide  Dose:  20 mg  Frequency:  QD  Quantity:  90  Days' supply:  90 with 3 refills      Pharmacy Name/Location:  [id number]

## 2025-07-21 NOTE — PROGRESS NOTES
[facility] CARDIOLOGY  [address] [place] [address], [address]  [place] [address]  PHONE: [phone]      25    NAME:  Gianna Raya  : 1945  MRN: [id number]         SUBJECTIVE:   Gianna Raya is a 79 y.o. female seen for a follow up visit regarding the following:     Chief Complaint   Patient presents with    Atrial Fibrillation            HPI:  Follow up  Atrial Fibrillation   .    Follow up afib, hypertension, Reports intolerances to  ACE/ARB/ARNI and higher dose amlodipine. Severe anxiety, see prior notes. Intermittent hypotension.      Last available potassium level was low at 3.3, reports not taking her potassium but requesting a refill of lasix.  Also not taking the sertraline on her her list.      She complains of recurrent UTIs being addressed by her PCP. Her cardiovascular status has been stable without cp or palpitations        I've reluctantly agreed to \"prn\" treatment of her BP having worked well in hospital.          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   Oct 2013 - 2 week monitor occasional pac's   Mar 2019 - normal renal artery doppler   2019- afib/RVR-ESTELLA cardioversion, amiodarone   Echo EF 40-45%, normal LV size, mild RVE, marked LAE, mild MR/TR, small to moderate pericardial effusion   ESTELLA- normal biventricular size and function, EF 55-60%   19 - PFT's normal spirometry but reduced volumes and mild reduced DLCO possible early interstitial lung disease   Mar 2021- Echo - normal EF, ind DF, marked LAE (FERNANDO 56), RVSP 35   2022- normal EF, abn DF, DIONNE, moderate circumferential effusion  May 2022     Afib ablation with Dr Sanches  Sep 2022      Limited echo - Normal SF, mild MR, TR, RVSP 43, stable mild to moderate posterior effusion (no longer circumferential)--patient with known autoimmune disease  2023       TDS,

## 2025-07-22 ENCOUNTER — OFFICE VISIT (OUTPATIENT)
Age: 80
End: 2025-07-22
Payer: MEDICARE

## 2025-07-22 VITALS
HEART RATE: 78 BPM | SYSTOLIC BLOOD PRESSURE: 136 MMHG | BODY MASS INDEX: 28.16 KG/M2 | DIASTOLIC BLOOD PRESSURE: 78 MMHG | WEIGHT: 143.4 LBS | HEIGHT: 60 IN

## 2025-07-22 DIAGNOSIS — I48.0 PAROXYSMAL ATRIAL FIBRILLATION (HCC): Primary | ICD-10-CM

## 2025-07-22 DIAGNOSIS — I05.1 RHEUMATIC MITRAL REGURGITATION: ICD-10-CM

## 2025-07-22 DIAGNOSIS — I10 ESSENTIAL HYPERTENSION: ICD-10-CM

## 2025-07-22 PROCEDURE — G8417 CALC BMI ABV UP PARAM F/U: HCPCS | Performed by: INTERNAL MEDICINE

## 2025-07-22 PROCEDURE — G8400 PT W/DXA NO RESULTS DOC: HCPCS | Performed by: INTERNAL MEDICINE

## 2025-07-22 PROCEDURE — 1126F AMNT PAIN NOTED NONE PRSNT: CPT | Performed by: INTERNAL MEDICINE

## 2025-07-22 PROCEDURE — G8427 DOCREV CUR MEDS BY ELIG CLIN: HCPCS | Performed by: INTERNAL MEDICINE

## 2025-07-22 PROCEDURE — 1036F TOBACCO NON-USER: CPT | Performed by: INTERNAL MEDICINE

## 2025-07-22 PROCEDURE — 1159F MED LIST DOCD IN RCRD: CPT | Performed by: INTERNAL MEDICINE

## 2025-07-22 PROCEDURE — 3078F DIAST BP <80 MM HG: CPT | Performed by: INTERNAL MEDICINE

## 2025-07-22 PROCEDURE — 1123F ACP DISCUSS/DSCN MKR DOCD: CPT | Performed by: INTERNAL MEDICINE

## 2025-07-22 PROCEDURE — 99214 OFFICE O/P EST MOD 30 MIN: CPT | Performed by: INTERNAL MEDICINE

## 2025-07-22 PROCEDURE — 3075F SYST BP GE 130 - 139MM HG: CPT | Performed by: INTERNAL MEDICINE

## 2025-07-22 PROCEDURE — 1160F RVW MEDS BY RX/DR IN RCRD: CPT | Performed by: INTERNAL MEDICINE

## 2025-07-22 PROCEDURE — 1090F PRES/ABSN URINE INCON ASSESS: CPT | Performed by: INTERNAL MEDICINE

## 2025-07-22 RX ORDER — FUROSEMIDE 20 MG/1
20 TABLET ORAL DAILY
Qty: 90 TABLET | Refills: 3 | Status: SHIPPED | OUTPATIENT
Start: 2025-07-22

## 2025-07-22 RX ORDER — POTASSIUM CHLORIDE 750 MG/1
10 TABLET, EXTENDED RELEASE ORAL DAILY
Qty: 90 TABLET | Refills: 3 | Status: SHIPPED | OUTPATIENT
Start: 2025-07-22

## 2025-07-28 RX ORDER — CARVEDILOL 25 MG/1
25 TABLET ORAL 2 TIMES DAILY WITH MEALS
Qty: 180 TABLET | Refills: 3 | Status: SHIPPED | OUTPATIENT
Start: 2025-07-28

## 2025-07-28 NOTE — TELEPHONE ENCOUNTER
Requested Prescriptions     Pending Prescriptions Disp Refills    carvedilol (COREG) 25 MG tablet [Pharmacy Med Name: CARVEDILOL 25MG TABLETS] 180 tablet 3     Sig: TAKE 1 TABLET BY MOUTH TWICE DAILY WITH MEALS     Rx verified last ov 7/22/25

## (undated) DEVICE — PATCH REF EXT FOR CARTO 3 SYS (EA = 6 PACKS)

## (undated) DEVICE — CATHETER EP 7FR L115CM 2-8-2MM SPC TIP 2MM 10 ELECTRD FJ

## (undated) DEVICE — CATHETER MAP 7FR L115CM 2-6-2 SPC D CRV 22 ELECTRD PENTARAY

## (undated) DEVICE — CATHETER EP 7FR L115CM 2-8-2MM SPC TIP 2MM 10 ELECTRD F L

## (undated) DEVICE — BRONCHOSCOPY PACK: Brand: MEDLINE INDUSTRIES, INC.

## (undated) DEVICE — SINGLE USE SUCTION VALVE MAJ-209: Brand: SINGLE USE SUCTION VALVE (STERILE)

## (undated) DEVICE — CATHETER US 8FR L90CM GRN TIP OVERLAY FOR GE-VIVID I VIVID

## (undated) DEVICE — SINGLE USE BIOPSY VALVE MAJ-210: Brand: SINGLE USE BIOPSY VALVE (STERILE)

## (undated) DEVICE — SYR 50ML SLIP TIP NSAF LF STRL --

## (undated) DEVICE — TUBING PMP FOR CARTO SYS SMARTABLATE

## (undated) DEVICE — PINNACLE INTRODUCER SHEATH: Brand: PINNACLE

## (undated) DEVICE — CONNECTOR VENT EL DBL SWVL 15M 22M 15F

## (undated) DEVICE — CATHETER ABLAT 8FR L115CM 1-6-2MM SPC TIP 3.5MM FJ CRV

## (undated) DEVICE — PINNACLE TIF INTRODUCER SHEATH: Brand: PINNACLE

## (undated) DEVICE — PRESSURE MONITORING SET: Brand: TRUWAVE

## (undated) DEVICE — SYSTEM CLOSURE 6-12 FR VEN VASC VASCADE MVP

## (undated) DEVICE — Device: Brand: NRG TRANSSEPTAL NEEDLE

## (undated) DEVICE — INTRODUCER SHTH CARDIAGUIDE FCL20100